# Patient Record
Sex: MALE | Race: WHITE | Employment: OTHER | URBAN - NONMETROPOLITAN AREA
[De-identification: names, ages, dates, MRNs, and addresses within clinical notes are randomized per-mention and may not be internally consistent; named-entity substitution may affect disease eponyms.]

---

## 2017-01-30 ENCOUNTER — TELEPHONE (OUTPATIENT)
Dept: CARDIOLOGY | Age: 72
End: 2017-01-30

## 2017-01-30 ENCOUNTER — OFFICE VISIT (OUTPATIENT)
Dept: CARDIOLOGY | Age: 72
End: 2017-01-30

## 2017-01-30 VITALS
HEIGHT: 65 IN | BODY MASS INDEX: 27.16 KG/M2 | RESPIRATION RATE: 16 BRPM | DIASTOLIC BLOOD PRESSURE: 84 MMHG | WEIGHT: 163 LBS | SYSTOLIC BLOOD PRESSURE: 130 MMHG | HEART RATE: 63 BPM

## 2017-01-30 DIAGNOSIS — I10 ESSENTIAL HYPERTENSION: Primary | ICD-10-CM

## 2017-01-30 DIAGNOSIS — R09.89 BILATERAL CAROTID BRUITS: ICD-10-CM

## 2017-01-30 DIAGNOSIS — E87.5 HYPERKALEMIA: Primary | ICD-10-CM

## 2017-01-30 DIAGNOSIS — R94.31 ABNORMAL ECG: ICD-10-CM

## 2017-01-30 DIAGNOSIS — I25.10 CORONARY ARTERY DISEASE INVOLVING NATIVE CORONARY ARTERY OF NATIVE HEART WITHOUT ANGINA PECTORIS: ICD-10-CM

## 2017-01-30 DIAGNOSIS — E78.5 HYPERLIPIDEMIA, UNSPECIFIED HYPERLIPIDEMIA TYPE: ICD-10-CM

## 2017-01-30 PROCEDURE — G8598 ASA/ANTIPLAT THER USED: HCPCS | Performed by: INTERNAL MEDICINE

## 2017-01-30 PROCEDURE — 4040F PNEUMOC VAC/ADMIN/RCVD: CPT | Performed by: INTERNAL MEDICINE

## 2017-01-30 PROCEDURE — 1123F ACP DISCUSS/DSCN MKR DOCD: CPT | Performed by: INTERNAL MEDICINE

## 2017-01-30 PROCEDURE — 99213 OFFICE O/P EST LOW 20 MIN: CPT | Performed by: INTERNAL MEDICINE

## 2017-01-30 PROCEDURE — G8420 CALC BMI NORM PARAMETERS: HCPCS | Performed by: INTERNAL MEDICINE

## 2017-01-30 PROCEDURE — 1036F TOBACCO NON-USER: CPT | Performed by: INTERNAL MEDICINE

## 2017-01-30 PROCEDURE — G8427 DOCREV CUR MEDS BY ELIG CLIN: HCPCS | Performed by: INTERNAL MEDICINE

## 2017-01-30 PROCEDURE — 3017F COLORECTAL CA SCREEN DOC REV: CPT | Performed by: INTERNAL MEDICINE

## 2017-01-30 PROCEDURE — 93000 ELECTROCARDIOGRAM COMPLETE: CPT | Performed by: INTERNAL MEDICINE

## 2017-01-30 PROCEDURE — G8484 FLU IMMUNIZE NO ADMIN: HCPCS | Performed by: INTERNAL MEDICINE

## 2017-01-30 RX ORDER — SODIUM POLYSTYRENE SULFONATE 15 G/60ML
15 SUSPENSION ORAL; RECTAL ONCE
Qty: 1 BOTTLE | Refills: 0 | Status: SHIPPED | OUTPATIENT
Start: 2017-01-30 | End: 2018-08-13

## 2017-03-17 DIAGNOSIS — E87.5 HYPERKALEMIA: Primary | ICD-10-CM

## 2017-03-20 ENCOUNTER — OFFICE VISIT (OUTPATIENT)
Dept: INTERNAL MEDICINE | Age: 72
End: 2017-03-20
Payer: MEDICARE

## 2017-03-20 VITALS
SYSTOLIC BLOOD PRESSURE: 130 MMHG | WEIGHT: 163.8 LBS | HEART RATE: 68 BPM | DIASTOLIC BLOOD PRESSURE: 80 MMHG | HEIGHT: 66 IN | BODY MASS INDEX: 26.33 KG/M2

## 2017-03-20 DIAGNOSIS — E11.3219: ICD-10-CM

## 2017-03-20 DIAGNOSIS — I34.0 NON-RHEUMATIC MITRAL REGURGITATION: ICD-10-CM

## 2017-03-20 DIAGNOSIS — I10 ESSENTIAL HYPERTENSION: ICD-10-CM

## 2017-03-20 DIAGNOSIS — Z12.11 SCREEN FOR COLON CANCER: ICD-10-CM

## 2017-03-20 DIAGNOSIS — I25.10 CORONARY ARTERY DISEASE INVOLVING NATIVE CORONARY ARTERY OF NATIVE HEART WITHOUT ANGINA PECTORIS: ICD-10-CM

## 2017-03-20 DIAGNOSIS — E87.5 HYPERKALEMIA: ICD-10-CM

## 2017-03-20 DIAGNOSIS — E78.5 DYSLIPIDEMIA: ICD-10-CM

## 2017-03-20 DIAGNOSIS — E11.21 TYPE 2 DIABETES MELLITUS WITH PROTEINURIC DIABETIC NEPHROPATHY (HCC): Primary | ICD-10-CM

## 2017-03-20 DIAGNOSIS — I35.0 NONRHEUMATIC AORTIC VALVE STENOSIS: ICD-10-CM

## 2017-03-20 PROCEDURE — G8484 FLU IMMUNIZE NO ADMIN: HCPCS | Performed by: INTERNAL MEDICINE

## 2017-03-20 PROCEDURE — G8427 DOCREV CUR MEDS BY ELIG CLIN: HCPCS | Performed by: INTERNAL MEDICINE

## 2017-03-20 PROCEDURE — 1123F ACP DISCUSS/DSCN MKR DOCD: CPT | Performed by: INTERNAL MEDICINE

## 2017-03-20 PROCEDURE — 3017F COLORECTAL CA SCREEN DOC REV: CPT | Performed by: INTERNAL MEDICINE

## 2017-03-20 PROCEDURE — 3045F PR MOST RECENT HEMOGLOBIN A1C LEVEL 7.0-9.0%: CPT | Performed by: INTERNAL MEDICINE

## 2017-03-20 PROCEDURE — 4040F PNEUMOC VAC/ADMIN/RCVD: CPT | Performed by: INTERNAL MEDICINE

## 2017-03-20 PROCEDURE — 1036F TOBACCO NON-USER: CPT | Performed by: INTERNAL MEDICINE

## 2017-03-20 PROCEDURE — 99214 OFFICE O/P EST MOD 30 MIN: CPT | Performed by: INTERNAL MEDICINE

## 2017-03-20 PROCEDURE — G8598 ASA/ANTIPLAT THER USED: HCPCS | Performed by: INTERNAL MEDICINE

## 2017-03-20 PROCEDURE — G8420 CALC BMI NORM PARAMETERS: HCPCS | Performed by: INTERNAL MEDICINE

## 2017-03-20 ASSESSMENT — ENCOUNTER SYMPTOMS
BLOOD IN STOOL: 0
BLURRED VISION: 0
NAUSEA: 0
VOMITING: 0
EYE PAIN: 0
COUGH: 0
DOUBLE VISION: 0
WHEEZING: 0
DIARRHEA: 0
SHORTNESS OF BREATH: 0
ABDOMINAL PAIN: 0
CONSTIPATION: 0
EYE DISCHARGE: 0
SORE THROAT: 0

## 2017-07-13 ENCOUNTER — OFFICE VISIT (OUTPATIENT)
Dept: INTERNAL MEDICINE | Age: 72
End: 2017-07-13
Payer: MEDICARE

## 2017-07-13 VITALS
TEMPERATURE: 98.7 F | BODY MASS INDEX: 25.91 KG/M2 | WEIGHT: 161.2 LBS | RESPIRATION RATE: 18 BRPM | HEART RATE: 80 BPM | HEIGHT: 66 IN | SYSTOLIC BLOOD PRESSURE: 130 MMHG | DIASTOLIC BLOOD PRESSURE: 80 MMHG

## 2017-07-13 DIAGNOSIS — E11.21 TYPE 2 DIABETES MELLITUS WITH PROTEINURIC DIABETIC NEPHROPATHY (HCC): Primary | ICD-10-CM

## 2017-07-13 DIAGNOSIS — E78.5 DYSLIPIDEMIA: ICD-10-CM

## 2017-07-13 DIAGNOSIS — I25.10 CORONARY ARTERY DISEASE INVOLVING NATIVE CORONARY ARTERY OF NATIVE HEART WITHOUT ANGINA PECTORIS: ICD-10-CM

## 2017-07-13 DIAGNOSIS — I10 ESSENTIAL HYPERTENSION: ICD-10-CM

## 2017-07-13 DIAGNOSIS — R01.1 HEART MURMUR: ICD-10-CM

## 2017-07-13 PROCEDURE — 3017F COLORECTAL CA SCREEN DOC REV: CPT | Performed by: NURSE PRACTITIONER

## 2017-07-13 PROCEDURE — G8419 CALC BMI OUT NRM PARAM NOF/U: HCPCS | Performed by: NURSE PRACTITIONER

## 2017-07-13 PROCEDURE — G8427 DOCREV CUR MEDS BY ELIG CLIN: HCPCS | Performed by: NURSE PRACTITIONER

## 2017-07-13 PROCEDURE — G8598 ASA/ANTIPLAT THER USED: HCPCS | Performed by: NURSE PRACTITIONER

## 2017-07-13 PROCEDURE — 1123F ACP DISCUSS/DSCN MKR DOCD: CPT | Performed by: NURSE PRACTITIONER

## 2017-07-13 PROCEDURE — 1036F TOBACCO NON-USER: CPT | Performed by: NURSE PRACTITIONER

## 2017-07-13 PROCEDURE — 4040F PNEUMOC VAC/ADMIN/RCVD: CPT | Performed by: NURSE PRACTITIONER

## 2017-07-13 PROCEDURE — 3046F HEMOGLOBIN A1C LEVEL >9.0%: CPT | Performed by: NURSE PRACTITIONER

## 2017-07-13 PROCEDURE — 99214 OFFICE O/P EST MOD 30 MIN: CPT | Performed by: NURSE PRACTITIONER

## 2017-07-14 ASSESSMENT — ENCOUNTER SYMPTOMS
SORE THROAT: 0
TROUBLE SWALLOWING: 0
ABDOMINAL PAIN: 0
DIARRHEA: 0
SINUS PRESSURE: 0
RHINORRHEA: 0
BLOOD IN STOOL: 0
COLOR CHANGE: 0
CONSTIPATION: 0
SHORTNESS OF BREATH: 0
NAUSEA: 0
COUGH: 0
VOMITING: 0
EYE PAIN: 0
CHEST TIGHTNESS: 0

## 2017-07-31 ENCOUNTER — OFFICE VISIT (OUTPATIENT)
Dept: INTERNAL MEDICINE | Age: 72
End: 2017-07-31
Payer: MEDICARE

## 2017-07-31 ENCOUNTER — OFFICE VISIT (OUTPATIENT)
Dept: CARDIOLOGY | Age: 72
End: 2017-07-31
Payer: MEDICARE

## 2017-07-31 VITALS
HEIGHT: 66 IN | DIASTOLIC BLOOD PRESSURE: 76 MMHG | SYSTOLIC BLOOD PRESSURE: 144 MMHG | BODY MASS INDEX: 25.68 KG/M2 | WEIGHT: 159.8 LBS | HEART RATE: 68 BPM

## 2017-07-31 VITALS
HEART RATE: 63 BPM | DIASTOLIC BLOOD PRESSURE: 56 MMHG | HEIGHT: 66 IN | BODY MASS INDEX: 25.65 KG/M2 | SYSTOLIC BLOOD PRESSURE: 146 MMHG | WEIGHT: 159.6 LBS

## 2017-07-31 DIAGNOSIS — Z00.00 ROUTINE GENERAL MEDICAL EXAMINATION AT A HEALTH CARE FACILITY: ICD-10-CM

## 2017-07-31 DIAGNOSIS — E78.5 DYSLIPIDEMIA: ICD-10-CM

## 2017-07-31 DIAGNOSIS — I35.0 NONRHEUMATIC AORTIC VALVE STENOSIS: ICD-10-CM

## 2017-07-31 DIAGNOSIS — I25.10 CORONARY ARTERY DISEASE INVOLVING NATIVE CORONARY ARTERY OF NATIVE HEART WITHOUT ANGINA PECTORIS: Primary | ICD-10-CM

## 2017-07-31 DIAGNOSIS — I25.10 CORONARY ARTERY DISEASE INVOLVING NATIVE CORONARY ARTERY OF NATIVE HEART WITHOUT ANGINA PECTORIS: ICD-10-CM

## 2017-07-31 DIAGNOSIS — I10 ESSENTIAL HYPERTENSION: ICD-10-CM

## 2017-07-31 DIAGNOSIS — E11.21 TYPE 2 DIABETES MELLITUS WITH PROTEINURIC DIABETIC NEPHROPATHY (HCC): Primary | ICD-10-CM

## 2017-07-31 DIAGNOSIS — I34.0 NON-RHEUMATIC MITRAL REGURGITATION: ICD-10-CM

## 2017-07-31 DIAGNOSIS — E78.5 HYPERLIPIDEMIA, UNSPECIFIED HYPERLIPIDEMIA TYPE: ICD-10-CM

## 2017-07-31 PROCEDURE — 99213 OFFICE O/P EST LOW 20 MIN: CPT | Performed by: INTERNAL MEDICINE

## 2017-07-31 PROCEDURE — 3017F COLORECTAL CA SCREEN DOC REV: CPT | Performed by: INTERNAL MEDICINE

## 2017-07-31 PROCEDURE — 93000 ELECTROCARDIOGRAM COMPLETE: CPT | Performed by: INTERNAL MEDICINE

## 2017-07-31 PROCEDURE — 1036F TOBACCO NON-USER: CPT | Performed by: INTERNAL MEDICINE

## 2017-07-31 PROCEDURE — G8419 CALC BMI OUT NRM PARAM NOF/U: HCPCS | Performed by: INTERNAL MEDICINE

## 2017-07-31 PROCEDURE — 3046F HEMOGLOBIN A1C LEVEL >9.0%: CPT | Performed by: INTERNAL MEDICINE

## 2017-07-31 PROCEDURE — 1123F ACP DISCUSS/DSCN MKR DOCD: CPT | Performed by: INTERNAL MEDICINE

## 2017-07-31 PROCEDURE — G8427 DOCREV CUR MEDS BY ELIG CLIN: HCPCS | Performed by: INTERNAL MEDICINE

## 2017-07-31 PROCEDURE — G0439 PPPS, SUBSEQ VISIT: HCPCS | Performed by: INTERNAL MEDICINE

## 2017-07-31 PROCEDURE — G8598 ASA/ANTIPLAT THER USED: HCPCS | Performed by: INTERNAL MEDICINE

## 2017-07-31 PROCEDURE — 4040F PNEUMOC VAC/ADMIN/RCVD: CPT | Performed by: INTERNAL MEDICINE

## 2017-07-31 ASSESSMENT — PATIENT HEALTH QUESTIONNAIRE - PHQ9: SUM OF ALL RESPONSES TO PHQ QUESTIONS 1-9: 0

## 2017-07-31 ASSESSMENT — LIFESTYLE VARIABLES
HOW OFTEN DO YOU HAVE A DRINK CONTAINING ALCOHOL: 2
HOW MANY STANDARD DRINKS CONTAINING ALCOHOL DO YOU HAVE ON A TYPICAL DAY: 0
HOW OFTEN DO YOU HAVE SIX OR MORE DRINKS ON ONE OCCASION: 0
AUDIT-C TOTAL SCORE: 2

## 2017-07-31 ASSESSMENT — ANXIETY QUESTIONNAIRES: GAD7 TOTAL SCORE: 0

## 2017-11-16 ENCOUNTER — HOSPITAL ENCOUNTER (OUTPATIENT)
Dept: LAB | Age: 72
Setting detail: SPECIMEN
Discharge: HOME | End: 2017-11-16
Payer: MEDICARE

## 2017-11-16 DIAGNOSIS — E11.21 TYPE 2 DIABETES MELLITUS WITH PROTEINURIC DIABETIC NEPHROPATHY (HCC): ICD-10-CM

## 2017-11-16 LAB
ESTIMATED AVERAGE GLUCOSE: 189 MG/DL
HBA1C MFR BLD: 8.2 % (ref 4.8–5.9)

## 2017-11-16 PROCEDURE — 83036 HEMOGLOBIN GLYCOSYLATED A1C: CPT

## 2017-11-16 PROCEDURE — 36415 COLL VENOUS BLD VENIPUNCTURE: CPT

## 2017-11-20 ENCOUNTER — OFFICE VISIT (OUTPATIENT)
Dept: INTERNAL MEDICINE | Age: 72
End: 2017-11-20
Payer: MEDICARE

## 2017-11-20 VITALS
BODY MASS INDEX: 25.71 KG/M2 | HEART RATE: 72 BPM | WEIGHT: 160 LBS | DIASTOLIC BLOOD PRESSURE: 76 MMHG | SYSTOLIC BLOOD PRESSURE: 150 MMHG | HEIGHT: 66 IN

## 2017-11-20 DIAGNOSIS — E78.5 DYSLIPIDEMIA: ICD-10-CM

## 2017-11-20 DIAGNOSIS — I34.0 NON-RHEUMATIC MITRAL REGURGITATION: ICD-10-CM

## 2017-11-20 DIAGNOSIS — I25.10 CORONARY ARTERY DISEASE INVOLVING NATIVE CORONARY ARTERY OF NATIVE HEART WITHOUT ANGINA PECTORIS: ICD-10-CM

## 2017-11-20 DIAGNOSIS — I10 ESSENTIAL HYPERTENSION: ICD-10-CM

## 2017-11-20 DIAGNOSIS — Z12.11 SPECIAL SCREENING FOR MALIGNANT NEOPLASMS, COLON: ICD-10-CM

## 2017-11-20 DIAGNOSIS — I35.0 NONRHEUMATIC AORTIC VALVE STENOSIS: ICD-10-CM

## 2017-11-20 PROCEDURE — G8417 CALC BMI ABV UP PARAM F/U: HCPCS | Performed by: INTERNAL MEDICINE

## 2017-11-20 PROCEDURE — 1036F TOBACCO NON-USER: CPT | Performed by: INTERNAL MEDICINE

## 2017-11-20 PROCEDURE — G8484 FLU IMMUNIZE NO ADMIN: HCPCS | Performed by: INTERNAL MEDICINE

## 2017-11-20 PROCEDURE — 3045F PR MOST RECENT HEMOGLOBIN A1C LEVEL 7.0-9.0%: CPT | Performed by: INTERNAL MEDICINE

## 2017-11-20 PROCEDURE — 3017F COLORECTAL CA SCREEN DOC REV: CPT | Performed by: INTERNAL MEDICINE

## 2017-11-20 PROCEDURE — 4040F PNEUMOC VAC/ADMIN/RCVD: CPT | Performed by: INTERNAL MEDICINE

## 2017-11-20 PROCEDURE — G8598 ASA/ANTIPLAT THER USED: HCPCS | Performed by: INTERNAL MEDICINE

## 2017-11-20 PROCEDURE — 1123F ACP DISCUSS/DSCN MKR DOCD: CPT | Performed by: INTERNAL MEDICINE

## 2017-11-20 PROCEDURE — 99214 OFFICE O/P EST MOD 30 MIN: CPT | Performed by: INTERNAL MEDICINE

## 2017-11-20 PROCEDURE — G8427 DOCREV CUR MEDS BY ELIG CLIN: HCPCS | Performed by: INTERNAL MEDICINE

## 2017-11-20 NOTE — PROGRESS NOTES
Maddy Agarwal received counseling on the following healthy behaviors: exercise  Reviewed prior labs and health maintenance  Continue current medications except where noted below, diet and exercise. Discussed use, benefit, and side effects of prescribed medications. Barriers to medication compliance addressed. Patient given educational materials - see patient instructions  Was a self-tracking handout given in paper form or via [employer]? Yes    Requested Prescriptions      No prescriptions requested or ordered in this encounter       All patient questions answered. Patient voiced understanding. Quality Measures    Body mass index is 25.82 kg/m². Normal. Weight control planned discussed: healthy diet and regular exercise. BP: (!) 162/72. Blood pressure is high. Treatment plan consists of: see progress note below. Fall Risk 7/31/2017 7/13/2017 6/27/2016 3/21/2016 6/1/2015 4/28/2014   2 or more falls in past year? no no no no no no   Fall with injury in past year? no no no no no no     The patient does not have a history of falls. I did not - not indicated , complete a risk assessment for falls.  A plan of care for falls No Treatment plan indicated    Lab Results   Component Value Date    LDLCHOLESTEROL 119 03/16/2017    (goal LDL reduction with dx if diabetes is 50% LDL reduction)    PHQ Scores 7/31/2017 6/27/2016 3/21/2016 3/16/2015 1/20/2014   PHQ2 Score 0 0 0 0 0   PHQ9 Score 0 0 0 0 0     Interpretation of Total Score Depression Severity: 1-4 = Minimal depression, 5-9 = Mild depression, 10-14 = Moderate depression, 15-19 = Moderately severe depression, 20-27 = Severe depression

## 2017-11-20 NOTE — PROGRESS NOTES
Chronic Disease Visit Information    BP Readings from Last 3 Encounters:   11/20/17 (!) 162/72   07/31/17 (!) 144/76   07/31/17 (!) 146/56          Hemoglobin A1C (%)   Date Value   11/16/2017 8.2 (H)   07/27/2017 7.3 (H)   03/16/2017 7.5 (H)     Microalb/Crt. Ratio (mcg/mg creat)   Date Value   03/17/2016 1,476 (H)     LDL Cholesterol (mg/dL)   Date Value   03/16/2017 119     HDL (mg/dL)   Date Value   03/16/2017 39 (L)     BUN (mg/dL)   Date Value   07/27/2017 22     CREATININE (mg/dL)   Date Value   07/27/2017 0.96     Glucose (mg/dL)   Date Value   07/27/2017 167 (H)            Have you changed or started any medications since your last visit including any over-the-counter medicines, vitamins, or herbal medicines? no   Are you having any side effects from any of your medications? -  no  Have you stopped taking any of your medications? Is so, why? -  no    Have you seen any other physician or provider since your last visit? No  Have you had any other diagnostic tests since your last visit? Yes - Records Obtained  Have you been seen in the emergency room and/or had an admission to a hospital since we last saw you? No  Have you had your annual diabetic retinal (eye) exam? Yes - Records Obtained  Have you had your routine dental cleaning in the past 6 months? yes -     Have you activated your [employer] account? If not, what are your barriers?  No: declines     Patient Care Team:  Hitesh García MD as PCP - General (Internal Medicine)  Hitesh García MD as PCP - S Attributed Provider         Medical History Review  Past Medical, Family, and Social History reviewed and does contribute to the patient presenting condition    Health Maintenance   Topic Date Due    Colon cancer screen colonoscopy  07/22/1996    DTaP/Tdap/Td vaccine (1 - Tdap) 01/02/2008    AAA screen  08/11/2021 (Originally 1945)    Diabetic retinal exam  12/12/2017    Lipid screen  03/16/2018    Diabetic foot exam  07/31/2018    Diabetic hemoglobin A1C test  11/16/2018    Zostavax vaccine  Completed    Flu vaccine  Completed    Pneumococcal low/med risk  Completed    Hepatitis C screen  Completed

## 2017-11-20 NOTE — PATIENT INSTRUCTIONS
good, quick way to make sure that you have a balanced meal. It also helps you spread carbs throughout the day. ¨ Divide your plate by types of foods. Put non-starchy vegetables on half the plate, meat or other protein food on one-quarter of the plate, and a grain or starchy vegetable in the final quarter of the plate. To this you can add a small piece of fruit and 1 cup of milk or yogurt, depending on how many carbs you are supposed to eat at a meal.  · Try to eat about the same amount of carbs at each meal. Do not \"save up\" your daily allowance of carbs to eat at one meal.  · Proteins have very little or no carbs per serving. Examples of proteins are beef, chicken, turkey, fish, eggs, tofu, cheese, cottage cheese, and peanut butter. A serving size of meat is 3 ounces, which is about the size of a deck of cards. Examples of meat substitute serving sizes (equal to 1 ounce of meat) are 1/4 cup of cottage cheese, 1 egg, 1 tablespoon of peanut butter, and ½ cup of tofu. How can you eat out and still eat healthy? · Learn to estimate the serving sizes of foods that have carbohydrate. If you measure food at home, it will be easier to estimate the amount in a serving of restaurant food. · If the meal you order has too much carbohydrate (such as potatoes, corn, or baked beans), ask to have a low-carbohydrate food instead. Ask for a salad or green vegetables. · If you use insulin, check your blood sugar before and after eating out to help you plan how much to eat in the future. · If you eat more carbohydrate at a meal than you had planned, take a walk or do other exercise. This will help lower your blood sugar. What else should you know? · Limit saturated fat, such as the fat from meat and dairy products. This is a healthy choice because people who have diabetes are at higher risk of heart disease. So choose lean cuts of meat and nonfat or low-fat dairy products.  Use olive or canola oil instead of butter or shortening when cooking. · Don't skip meals. Your blood sugar may drop too low if you skip meals and take insulin or certain medicines for diabetes. · Check with your doctor before you drink alcohol. Alcohol can cause your blood sugar to drop too low. Alcohol can also cause a bad reaction if you take certain diabetes medicines. Follow-up care is a key part of your treatment and safety. Be sure to make and go to all appointments, and call your doctor if you are having problems. It's also a good idea to know your test results and keep a list of the medicines you take. Where can you learn more? Go to [email or web address] and sign in to your [employer] account. Enter L310 in the [employer] box to learn more about \"Learning About Diabetes Food Guidelines. \"     If you do not have an account, please click on the \"Sign Up Now\" link. Current as of: March 13, 2017  Content Version: 11.3  © 6266-5838 [employer]. Care instructions adapted under license by [facility] ([facility]). If you have questions about a medical condition or this instruction, always ask your healthcare professional. [place] [address] any warranty or liability for your use of this information.

## 2017-11-21 NOTE — PROGRESS NOTES
Martita Lam  YOB: 1945    Date of Service:  11/20/2017      HPI:  Rebel Rodriguez was seen in the internal medicine office today for   Chief Complaint   Patient presents with    Diabetes     4mo follow up    Hyperglycemia    Hypertension    Hyperlipidemia      · and continued evaluation and management of chronic medical problems. We addressed the following new, acute or uncontrolled/unstable issues:    1. The diabetes is the biggest concern. Sugars have been a little higher. HA1c is now in more of an unacceptable range at 8.2. We discussed his medicines. He is on the Glucophage and glucotrol. He really would prefer to avoid a third medication. We discussed this for some time. He wants to give this a try with diet and exercise. He has not had polyuria or polydipsia. He has actually been feeling quite good. We addressed the following chronic issues:    · Hypertension  - He is doing well with his antihypertensive meds. No chest pain, dizziness or palpitations. · Dyslipidemia  - He is not having any difficulty with his cholesterol medications. No significant myalgias. · We talked about the mitral regurgitation. We discussed the need for repeat echocardiogram.  I would recommend we go ahead with this. After a lengthy discussion he is okay with going ahead and getting this set up. He is not having symptoms of increased shortness of breath or chest pain. Also has the mild aortic stenosis on echocardiography and again this can be followed up with the echo. · Coronary Artery Disease  - His coronary artery disease has been stable. He has not noted any change in exercise tolerance, chest pain, dyspnea, or palpitation or dizziness. No trouble with his meds. · We discussed colon cancer screening and he agrees to fecal occult blood testing but does not want to do colonoscopy. Review of Systems:  Constitutional:  Negative for chills, fever, and weight loss.   HENT: Negative for congestion, ear pain, and sore throat. Eyes:  Negative for blurred vision, double vision, pain and discharge. Respiratory:  Negative for cough, shortness of breath, and wheezing. Cardiovascular:  Negative for chest pain, palpitations, and PND. Gastrointestinal:  Negative for abdominal pain, blood in stool, constipation, diarrhea, nausea and vomiting. Genitourinary:  Negative for dysuria, frequency, and hematuria. Musculoskeletal:  Negative for myalgias. Skin:  Negative for rash. Neurological:  Negative for tingling, sensory change, speech change, focal weakness, seizures, and headaches. Endo/Heme/Allergies:  Does not bruise/bleed easily. Psychiatric/Behavioral:  Negative for hallucinations and suicidal ideas. 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  60 tablet 3    metFORMIN (GLUCOPHAGE) 1000 MG tablet Take 1 tablet by mouth 2 times daily (with meals) 60 tablet 3    simvastatin (ZOCOR) 20 MG tablet Take 1 tablet by mouth nightly 30 tablet 3    lisinopril (PRINIVIL;ZESTRIL) 20 MG tablet Take 1 tablet by mouth daily 30 tablet 3    glipiZIDE (GLUCOTROL) 5 MG tablet Take 3 tablets by mouth 2 times daily (before meals) 180 tablet 11    amLODIPine (NORVASC) 10 MG tablet Take 10 mg by mouth daily.  aspirin 81 MG EC tablet Take 81 mg by mouth daily.  sodium polystyrene (SPS) 15 GM/60ML suspension Take 60 mLs by mouth once for 1 dose 1 Bottle 0     No current facility-administered medications for this visit. Past medical History:        Diagnosis Date    Acne rosacea     treated with MetroGel    Anemia     BPH (benign prostatic hypertrophy)     CAD (coronary artery disease)     Stent x 3 DEVON 8/16    Carotid stenosis     Fairly minimal plaquing on ultrasound 5/14--in the conclusion described as less than 50% stenosis but in the body of the report described as minimal    Cataract of both eyes     CRI (chronic renal insufficiency)     Diabetes mellitus, type 2 (HCC)     1.) Proteinuria, 24 hour total urine protein 1,420 mg/24 hrs, 09/08, creatinine clearance 114, 09/08 2.) Repeat protein/creatinine ratio 2.97, 02/12 3. )24 hr urine collection 1958 mg/24 hrs, 04/12. 4.) Repeat protein/creatinine ratio 1.99, 08/12 a.) Repeat protein/creatinine ratio 2.14, 07/13 5.) Renal u/s ok 02/12  6) retinopathy at [address] 12/16  early mod. nonprolifferative  macular involved    Dyslipidemia     Erectile dysfunction     H/O agent Orange exposure     per patient in the [employer] along with exposure to cresote and naphtha    Hyperlipidemia     Hypertension     Non-rheumatic mitral regurgitation 6/27/2016    RBBB     Wears dentures     upper       Family Hx and Social Hx    family history includes Coronary Art Dis in his brother and sister; Heart Attack in his father; Hypertension in his mother; Dondra Verde in his mother; Stroke in his mother.   History   Smoking Status    Former Smoker    Quit date: 1/1/1970   Smokeless Tobacco    Never Used     History   Alcohol Use    0.0 oz/week     Comment: a beer a couple times a week       Physical Examination:  Constitutional:  He appears well-developed and

## 2018-02-12 ENCOUNTER — OFFICE VISIT (OUTPATIENT)
Dept: CARDIOLOGY | Age: 73
End: 2018-02-12
Payer: MEDICARE

## 2018-02-12 VITALS
HEIGHT: 66 IN | HEART RATE: 67 BPM | SYSTOLIC BLOOD PRESSURE: 158 MMHG | DIASTOLIC BLOOD PRESSURE: 80 MMHG | WEIGHT: 159 LBS | BODY MASS INDEX: 25.55 KG/M2

## 2018-02-12 DIAGNOSIS — I10 ESSENTIAL HYPERTENSION: ICD-10-CM

## 2018-02-12 DIAGNOSIS — I35.0 AORTIC VALVE STENOSIS, ETIOLOGY OF CARDIAC VALVE DISEASE UNSPECIFIED: ICD-10-CM

## 2018-02-12 DIAGNOSIS — E78.5 HYPERLIPIDEMIA, UNSPECIFIED HYPERLIPIDEMIA TYPE: ICD-10-CM

## 2018-02-12 DIAGNOSIS — R09.89 BILATERAL CAROTID BRUITS: ICD-10-CM

## 2018-02-12 DIAGNOSIS — I34.0 MITRAL VALVE INSUFFICIENCY, UNSPECIFIED ETIOLOGY: ICD-10-CM

## 2018-02-12 DIAGNOSIS — I25.10 CORONARY ARTERY DISEASE INVOLVING NATIVE CORONARY ARTERY OF NATIVE HEART WITHOUT ANGINA PECTORIS: Primary | ICD-10-CM

## 2018-02-12 PROCEDURE — 99213 OFFICE O/P EST LOW 20 MIN: CPT | Performed by: INTERNAL MEDICINE

## 2018-02-12 PROCEDURE — 93000 ELECTROCARDIOGRAM COMPLETE: CPT | Performed by: INTERNAL MEDICINE

## 2018-02-12 NOTE — PROGRESS NOTES
negative. Vitals:    02/12/18 0959   BP: (!) 170/80   Pulse: 67       Vitals as above. Alert and oriented x 3. No JVD, B/l carotid bruit   Lungs are clear to auscultation. Heart sounds are regular, 4/6 systolic murmur. Abdomen is soft, no tenderness. Extremities trace peripheral edema. EKG: SR, PAC, IRBBB, inferior infarction    Meds:    Current Outpatient Prescriptions:     sodium polystyrene (SPS) 15 GM/60ML suspension, Take 60 mLs by mouth once for 1 dose, Disp: 1 Bottle, Rfl: 0    clopidogrel (PLAVIX) 75 MG tablet, Take 1 tablet by mouth daily Take 4 tabs first day then one daily. , Disp: 60 tablet, Rfl: 3    metFORMIN (GLUCOPHAGE) 1000 MG tablet, Take 1 tablet by mouth 2 times daily (with meals), Disp: 60 tablet, Rfl: 3    simvastatin (ZOCOR) 20 MG tablet, Take 1 tablet by mouth nightly, Disp: 30 tablet, Rfl: 3    lisinopril (PRINIVIL;ZESTRIL) 20 MG tablet, Take 1 tablet by mouth daily, Disp: 30 tablet, Rfl: 3    glipiZIDE (GLUCOTROL) 5 MG tablet, Take 3 tablets by mouth 2 times daily (before meals), Disp: 180 tablet, Rfl: 11    amLODIPine (NORVASC) 10 MG tablet, Take 10 mg by mouth daily. , Disp: , Rfl:     aspirin 81 MG EC tablet, Take 81 mg by mouth daily. , Disp: , Rfl:     Recent Labs:  No results for input(s): CHOL, HDL, TRIG in the last 72 hours. Invalid input(s): CHOLHDLR, LDL, LDLCALCU  No results for input(s): NA, K, CL, CO2, BUN, CREATININE in the last 72 hours. Invalid input(s): CA    Assessment and Plan:        -CAD- Nuclear stress test 08/2/16 showed bella-infarct inferolateral ischemia. Cardiac cath 08/23/16 showed 3 vessel CAD. LN normal. LAD mid 70% stenosis and D1 99% stenosis. LCX 30%. OM1 proximal 99% stenosis. RCA 30% stenosis. RPDA distal 100% stenosis where it was a small vessel with faint left to right collaterals. DEVON to mid LAD, D1 and OM1. Continue dual antiplatelets. Patient denies any angina and dyspnea. -TTE 5/2/16: LVEF 53%.  IL wall is thinned and akinetic. Moderate MR and mild aortic stenosis. Repeat TTE to evaluate for progression  -Carotid artery u/s 1/30/17: 42% stenosis right carotid artery. Obtain carotid u/s. -HTN- with white coat HTN. Continue current therapy.   -Obesity - encouraged diet, exercise, and discussed weight loss extensively. -Hyperlipidemia- continue statin. -H/o agent orange exposure.   -RTC 6 months

## 2018-03-12 ENCOUNTER — HOSPITAL ENCOUNTER (OUTPATIENT)
Dept: NON INVASIVE DIAGNOSTICS | Age: 73
Discharge: HOME | End: 2018-03-12
Payer: MEDICARE

## 2018-03-12 DIAGNOSIS — I10 ESSENTIAL HYPERTENSION: ICD-10-CM

## 2018-03-12 DIAGNOSIS — I34.0 MITRAL VALVE INSUFFICIENCY, UNSPECIFIED ETIOLOGY: ICD-10-CM

## 2018-03-12 DIAGNOSIS — I35.0 AORTIC VALVE STENOSIS, ETIOLOGY OF CARDIAC VALVE DISEASE UNSPECIFIED: ICD-10-CM

## 2018-03-12 LAB
LV EF: 50 %
LV EF: 50 %
LVEF MODALITY: NORMAL
LVEF MODALITY: NORMAL

## 2018-03-12 PROCEDURE — 93306 TTE W/DOPPLER COMPLETE: CPT

## 2018-03-14 ENCOUNTER — HOSPITAL ENCOUNTER (OUTPATIENT)
Dept: LAB | Age: 73
Setting detail: SPECIMEN
Discharge: HOME | End: 2018-03-14
Payer: MEDICARE

## 2018-03-14 DIAGNOSIS — E78.5 DYSLIPIDEMIA: ICD-10-CM

## 2018-03-14 DIAGNOSIS — I10 ESSENTIAL HYPERTENSION: ICD-10-CM

## 2018-03-14 LAB
ABSOLUTE EOS #: 0.4 K/UL (ref 0–0.4)
ABSOLUTE IMMATURE GRANULOCYTE: NORMAL K/UL (ref 0–0.3)
ABSOLUTE LYMPH #: 1.7 K/UL (ref 1–4.8)
ABSOLUTE MONO #: 0.7 K/UL (ref 0.1–1.2)
ALT SERPL-CCNC: 15 U/L (ref 5–41)
ANION GAP SERPL CALCULATED.3IONS-SCNC: 10 MMOL/L (ref 9–17)
AST SERPL-CCNC: 16 U/L
BASOPHILS # BLD: 1 % (ref 0–2)
BASOPHILS ABSOLUTE: 0 K/UL (ref 0–0.2)
BUN BLDV-MCNC: 26 MG/DL (ref 8–23)
BUN/CREAT BLD: 26 (ref 9–20)
CALCIUM SERPL-MCNC: 9.4 MG/DL (ref 8.6–10.4)
CHLORIDE BLD-SCNC: 100 MMOL/L (ref 98–107)
CHOLESTEROL/HDL RATIO: 4.9
CHOLESTEROL: 188 MG/DL
CO2: 29 MMOL/L (ref 20–31)
CREAT SERPL-MCNC: 1.01 MG/DL (ref 0.7–1.2)
DIFFERENTIAL TYPE: NORMAL
EOSINOPHILS RELATIVE PERCENT: 4 % (ref 1–8)
ESTIMATED AVERAGE GLUCOSE: 200 MG/DL
GFR AFRICAN AMERICAN: >60 ML/MIN
GFR NON-AFRICAN AMERICAN: >60 ML/MIN
GFR SERPL CREATININE-BSD FRML MDRD: ABNORMAL ML/MIN/{1.73_M2}
GFR SERPL CREATININE-BSD FRML MDRD: ABNORMAL ML/MIN/{1.73_M2}
GLUCOSE BLD-MCNC: 222 MG/DL (ref 70–99)
HBA1C MFR BLD: 8.6 % (ref 4.8–5.9)
HCT VFR BLD CALC: 41.1 % (ref 41–53)
HDLC SERPL-MCNC: 38 MG/DL
HEMOGLOBIN: 13.6 G/DL (ref 13.5–17.5)
IMMATURE GRANULOCYTES: NORMAL %
LDL CHOLESTEROL: 118 MG/DL (ref 0–130)
LYMPHOCYTES # BLD: 21 % (ref 15–43)
MCH RBC QN AUTO: 26.7 PG (ref 26–34)
MCHC RBC AUTO-ENTMCNC: 33.1 G/DL (ref 31–37)
MCV RBC AUTO: 80.6 FL (ref 80–100)
MONOCYTES # BLD: 8 % (ref 6–14)
NRBC AUTOMATED: NORMAL PER 100 WBC
PDW BLD-RTO: 14.2 % (ref 11–14.5)
PLATELET # BLD: 303 K/UL (ref 140–450)
PLATELET ESTIMATE: NORMAL
PMV BLD AUTO: 7.8 FL (ref 6–12)
POTASSIUM SERPL-SCNC: 5.2 MMOL/L (ref 3.7–5.3)
RBC # BLD: 5.11 M/UL (ref 4.5–5.9)
RBC # BLD: NORMAL 10*6/UL
SEG NEUTROPHILS: 66 % (ref 44–74)
SEGMENTED NEUTROPHILS ABSOLUTE COUNT: 5.6 K/UL (ref 1.8–7.7)
SODIUM BLD-SCNC: 139 MMOL/L (ref 135–144)
TOTAL CK: 50 U/L (ref 39–308)
TRIGL SERPL-MCNC: 160 MG/DL
VLDLC SERPL CALC-MCNC: ABNORMAL MG/DL (ref 1–30)
WBC # BLD: 8.4 K/UL (ref 3.5–11)
WBC # BLD: NORMAL 10*3/UL

## 2018-03-14 PROCEDURE — 85025 COMPLETE CBC W/AUTO DIFF WBC: CPT

## 2018-03-14 PROCEDURE — 36415 COLL VENOUS BLD VENIPUNCTURE: CPT

## 2018-03-14 PROCEDURE — 80061 LIPID PANEL: CPT

## 2018-03-14 PROCEDURE — 83036 HEMOGLOBIN GLYCOSYLATED A1C: CPT

## 2018-03-14 PROCEDURE — 82550 ASSAY OF CK (CPK): CPT

## 2018-03-14 PROCEDURE — 80048 BASIC METABOLIC PNL TOTAL CA: CPT

## 2018-03-14 PROCEDURE — 84450 TRANSFERASE (AST) (SGOT): CPT

## 2018-03-14 PROCEDURE — 84460 ALANINE AMINO (ALT) (SGPT): CPT

## 2018-03-19 ENCOUNTER — OFFICE VISIT (OUTPATIENT)
Dept: INTERNAL MEDICINE | Age: 73
End: 2018-03-19
Payer: MEDICARE

## 2018-03-19 VITALS
HEART RATE: 68 BPM | HEIGHT: 66 IN | WEIGHT: 161 LBS | SYSTOLIC BLOOD PRESSURE: 132 MMHG | BODY MASS INDEX: 25.88 KG/M2 | DIASTOLIC BLOOD PRESSURE: 78 MMHG

## 2018-03-19 DIAGNOSIS — I34.0 NON-RHEUMATIC MITRAL REGURGITATION: ICD-10-CM

## 2018-03-19 DIAGNOSIS — E78.5 DYSLIPIDEMIA: ICD-10-CM

## 2018-03-19 DIAGNOSIS — Z12.11 SPECIAL SCREENING FOR MALIGNANT NEOPLASMS, COLON: ICD-10-CM

## 2018-03-19 DIAGNOSIS — I10 ESSENTIAL HYPERTENSION: ICD-10-CM

## 2018-03-19 DIAGNOSIS — I35.0 NONRHEUMATIC AORTIC VALVE STENOSIS: ICD-10-CM

## 2018-03-19 DIAGNOSIS — I25.10 CORONARY ARTERY DISEASE INVOLVING NATIVE CORONARY ARTERY OF NATIVE HEART WITHOUT ANGINA PECTORIS: ICD-10-CM

## 2018-03-19 PROCEDURE — 1036F TOBACCO NON-USER: CPT | Performed by: INTERNAL MEDICINE

## 2018-03-19 PROCEDURE — 3045F PR MOST RECENT HEMOGLOBIN A1C LEVEL 7.0-9.0%: CPT | Performed by: INTERNAL MEDICINE

## 2018-03-19 PROCEDURE — G8417 CALC BMI ABV UP PARAM F/U: HCPCS | Performed by: INTERNAL MEDICINE

## 2018-03-19 PROCEDURE — 1123F ACP DISCUSS/DSCN MKR DOCD: CPT | Performed by: INTERNAL MEDICINE

## 2018-03-19 PROCEDURE — G8482 FLU IMMUNIZE ORDER/ADMIN: HCPCS | Performed by: INTERNAL MEDICINE

## 2018-03-19 PROCEDURE — 3017F COLORECTAL CA SCREEN DOC REV: CPT | Performed by: INTERNAL MEDICINE

## 2018-03-19 PROCEDURE — 99214 OFFICE O/P EST MOD 30 MIN: CPT | Performed by: INTERNAL MEDICINE

## 2018-03-19 PROCEDURE — G8598 ASA/ANTIPLAT THER USED: HCPCS | Performed by: INTERNAL MEDICINE

## 2018-03-19 PROCEDURE — G8427 DOCREV CUR MEDS BY ELIG CLIN: HCPCS | Performed by: INTERNAL MEDICINE

## 2018-03-19 PROCEDURE — 4040F PNEUMOC VAC/ADMIN/RCVD: CPT | Performed by: INTERNAL MEDICINE

## 2018-03-19 NOTE — PROGRESS NOTES
Alma Larkin received counseling on the following healthy behaviors: exercise  Reviewed prior labs and health maintenance  Continue current medications except where noted below, diet and exercise. Discussed use, benefit, and side effects of prescribed medications. Barriers to medication compliance addressed. Patient given educational materials - see patient instructions  Was a self-tracking handout given in paper form or via [employer]? Yes    Requested Prescriptions      No prescriptions requested or ordered in this encounter       All patient questions answered. Patient voiced understanding. Quality Measures    Body mass index is 25.99 kg/m². Normal. Weight control planned discussed: healthy diet and regular exercise. BP: 132/78 (at home this morning). Blood pressure is normal. Treatment plan consists of: see progress note below. Fall Risk 7/31/2017 7/13/2017 6/27/2016 3/21/2016 6/1/2015 4/28/2014   2 or more falls in past year? no no no no no no   Fall with injury in past year? no no no no no no     The patient does not have a history of falls. I did not - not indicated , complete a risk assessment for falls.  A plan of care for falls No Treatment plan indicated    Lab Results   Component Value Date    LDLCHOLESTEROL 118 03/14/2018    (goal LDL reduction with dx if diabetes is 50% LDL reduction)    PHQ Scores 7/31/2017 6/27/2016 3/21/2016 3/16/2015 1/20/2014   PHQ2 Score 0 0 0 0 0   PHQ9 Score 0 0 0 0 0     Interpretation of Total Score Depression Severity: 1-4 = Minimal depression, 5-9 = Mild depression, 10-14 = Moderate depression, 15-19 = Moderately severe depression, 20-27 = Severe depression

## 2018-03-21 NOTE — PROGRESS NOTES
Alondra Box  YOB: 1945    Date of Service:  3/19/2018      HPI:  aCri Bowles was seen in the internal medicine office today for:  Chief Complaint  Patient presents with  · Hyperglycemia. · Diabetes. · Hypertension. · Dyslipidemia. · Coronary artery disease. · Valvular heart disease. · and continued evaluation and management of chronic medical problems. We addressed the following new, acute or uncontrolled/unstable issues:    1. The diabetes is the biggest concern. Unfortunately, the HA1c has drifted up. We reviewed the HA1c at 8.6 which again is unfortunate. It is one of the higher ones he has had. We reviewed the lipids, LFTs, chem-7 and CBC. The rest of the blood work actually looks pretty good. He has not had polyuria or polydipsia. He has not had hypoglycemia. His wife has had quite a bit of health issues and he thinks because he has been in and out of the hospital and has not been taking time with his diet and has not had time to walk as much this explains this and as the weather warms he does not even want to consider a change in medication. There are a number of things that could be done with the medicines and I think at this point it would be indicated but he feels he can make improvement here over the next 90 days. If he does not he would be willing to make a change in the medicine he tells me. We addressed the following chronic issues:    · Hypertension  - He is doing well with his antihypertensive meds. No chest pain, dizziness or palpitations. · Dyslipidemia  - He is not having any difficulty with his cholesterol medications. No significant myalgias. Of note, we could be more aggressive with his lipid regimen but he has had so much trouble with these medicines he is happy just to be on the more modest dose and does not want to manipulate that. · Coronary Artery Disease  - His coronary artery disease has been stable.   He has not noted any change Medication Sig Dispense Refill    clopidogrel (PLAVIX) 75 MG tablet Take 1 tablet by mouth daily Take 4 tabs first day then one daily. 60 tablet 3    metFORMIN (GLUCOPHAGE) 1000 MG tablet Take 1 tablet by mouth 2 times daily (with meals) 60 tablet 3    simvastatin (ZOCOR) 20 MG tablet Take 1 tablet by mouth nightly 30 tablet 3    lisinopril (PRINIVIL;ZESTRIL) 20 MG tablet Take 1 tablet by mouth daily 30 tablet 3    glipiZIDE (GLUCOTROL) 5 MG tablet Take 3 tablets by mouth 2 times daily (before meals) 180 tablet 11    amLODIPine (NORVASC) 10 MG tablet Take 10 mg by mouth daily.  aspirin 81 MG EC tablet Take 81 mg by mouth daily.  sodium polystyrene (SPS) 15 GM/60ML suspension Take 60 mLs by mouth once for 1 dose 1 Bottle 0     No current facility-administered medications for this visit. Past Medical History:        Diagnosis Date    Acne rosacea     treated with MetroGel    Anemia     Aortic stenosis     echo 3/18 Mod AS, Mild MR    BPH (benign prostatic hypertrophy)     CAD (coronary artery disease)     Stent x 3 DEVON 8/16    Carotid stenosis     Fairly minimal plaquing on ultrasound 5/14--in the conclusion described as less than 50% stenosis but in the body of the report described as minimal    Cataract of both eyes     CRI (chronic renal insufficiency)     Diabetes mellitus, type 2 (HCC)     1.) Proteinuria, 24 hour total urine protein 1,420 mg/24 hrs, 09/08, creatinine clearance 114, 09/08 2.) Repeat protein/creatinine ratio 2.97, 02/12 3. )24 hr urine collection 1958 mg/24 hrs, 04/12. 4.) Repeat protein/creatinine ratio 1.99, 08/12 a.) Repeat protein/creatinine ratio 2.14, 07/13 5.) Renal u/s ok 02/12  6) retinopathy at [address] 12/16  early mod. nonprolifferative  macular involved    Dyslipidemia     Erectile dysfunction     H/O agent Orange exposure     per patient in the [employer] along with exposure to cresote and naphtha    Hyperlipidemia     Hypertension     and no guarding. Musculoskeletal:  Normal range of motion. He exhibits no edema or tenderness. Lymphadenopathy:    He has no cervical adenopathy. Neurological:  He is alert. He has normal strength. No cranial nerve deficit or sensory deficit. He exhibits normal muscle tone. Skin:  Skin is warm and dry. No rash noted. He is not diaphoretic. No pallor. Psychiatric:  He has a normal mood and affect. His behavior is normal.  Judgment normal.       Lab Results   Component Value Date    K 5.2 03/14/2018    CREATININE 1.01 03/14/2018    AST 16 03/14/2018    ALT 15 03/14/2018    HCT 41.1 03/14/2018    LABA1C 8.6 03/14/2018    MICROALBUR 533 03/17/2016    GLUCOSE 222 03/14/2018    CALCIUM 9.4 03/14/2018      Lab Results   Component Value Date    CHOL 188 03/14/2018    TRIG 160 03/14/2018    HDL 38 03/14/2018    LDLCHOLESTEROL 118 03/14/2018       Labs reviewed and discussed with the patient? Yes    Assessment/Plan:    1. Uncontrolled type 2 diabetes mellitus with nephropathy (HCC)  Continue Glucophage. Continue Glucotrol. He would be a reasonable candidate for Jardiance. He does not want to consider another medicine. He is going to work on diet and exercise. He feels he has quite a bit of room to improve here and then we will plan on seeing him back in 90 days with a repeat HA1c. They will let me know if he has any problems.  - Hemoglobin A1C; Future    2. Nonrheumatic aortic valve stenosis  3. Non-rheumatic mitral regurgitation  We reviewed the echocardiography from March 2018 and looks reasonably stable. We will continue to monitor. 4. Essential hypertension  Stable. Continue to monitor. No change to his regimen today. He has a list of readings that look good from home and we are going to continue to monitor this. 5. Dyslipidemia  Doing well on his current regimen. Again, he does not want to try to be more aggressive with his dosing.     6. Special screening for malignant neoplasms, colon  He

## 2018-04-02 ENCOUNTER — TELEPHONE (OUTPATIENT)
Dept: CARDIOLOGY | Age: 73
End: 2018-04-02

## 2018-05-29 ENCOUNTER — TELEPHONE (OUTPATIENT)
Dept: CARDIOLOGY | Age: 73
End: 2018-05-29

## 2018-06-21 ENCOUNTER — HOSPITAL ENCOUNTER (OUTPATIENT)
Dept: LAB | Age: 73
Setting detail: SPECIMEN
Discharge: HOME | End: 2018-06-21
Payer: MEDICARE

## 2018-06-21 LAB
ESTIMATED AVERAGE GLUCOSE: 171 MG/DL
HBA1C MFR BLD: 7.6 % (ref 4.8–5.9)

## 2018-06-21 PROCEDURE — 36415 COLL VENOUS BLD VENIPUNCTURE: CPT

## 2018-06-21 PROCEDURE — 83036 HEMOGLOBIN GLYCOSYLATED A1C: CPT

## 2018-06-25 ENCOUNTER — OFFICE VISIT (OUTPATIENT)
Dept: INTERNAL MEDICINE | Age: 73
End: 2018-06-25
Payer: MEDICARE

## 2018-06-25 VITALS
BODY MASS INDEX: 25.39 KG/M2 | SYSTOLIC BLOOD PRESSURE: 136 MMHG | HEART RATE: 76 BPM | WEIGHT: 158 LBS | DIASTOLIC BLOOD PRESSURE: 70 MMHG | HEIGHT: 66 IN

## 2018-06-25 DIAGNOSIS — I25.10 CORONARY ARTERY DISEASE INVOLVING NATIVE CORONARY ARTERY OF NATIVE HEART WITHOUT ANGINA PECTORIS: ICD-10-CM

## 2018-06-25 DIAGNOSIS — E11.21 TYPE 2 DIABETES MELLITUS WITH PROTEINURIC DIABETIC NEPHROPATHY (HCC): Primary | ICD-10-CM

## 2018-06-25 DIAGNOSIS — I10 ESSENTIAL HYPERTENSION: ICD-10-CM

## 2018-06-25 DIAGNOSIS — I35.0 NONRHEUMATIC AORTIC VALVE STENOSIS: ICD-10-CM

## 2018-06-25 DIAGNOSIS — I34.0 NON-RHEUMATIC MITRAL REGURGITATION: ICD-10-CM

## 2018-06-25 DIAGNOSIS — I65.29 STENOSIS OF CAROTID ARTERY, UNSPECIFIED LATERALITY: ICD-10-CM

## 2018-06-25 DIAGNOSIS — E78.5 DYSLIPIDEMIA: ICD-10-CM

## 2018-06-25 PROCEDURE — 99214 OFFICE O/P EST MOD 30 MIN: CPT | Performed by: INTERNAL MEDICINE

## 2018-06-25 PROCEDURE — G8427 DOCREV CUR MEDS BY ELIG CLIN: HCPCS | Performed by: INTERNAL MEDICINE

## 2018-06-25 PROCEDURE — 3045F PR MOST RECENT HEMOGLOBIN A1C LEVEL 7.0-9.0%: CPT | Performed by: INTERNAL MEDICINE

## 2018-06-25 PROCEDURE — 4040F PNEUMOC VAC/ADMIN/RCVD: CPT | Performed by: INTERNAL MEDICINE

## 2018-06-25 PROCEDURE — G8417 CALC BMI ABV UP PARAM F/U: HCPCS | Performed by: INTERNAL MEDICINE

## 2018-06-25 PROCEDURE — 1036F TOBACCO NON-USER: CPT | Performed by: INTERNAL MEDICINE

## 2018-06-25 PROCEDURE — 1123F ACP DISCUSS/DSCN MKR DOCD: CPT | Performed by: INTERNAL MEDICINE

## 2018-06-25 PROCEDURE — 3017F COLORECTAL CA SCREEN DOC REV: CPT | Performed by: INTERNAL MEDICINE

## 2018-06-25 PROCEDURE — G8598 ASA/ANTIPLAT THER USED: HCPCS | Performed by: INTERNAL MEDICINE

## 2018-06-25 PROCEDURE — 2022F DILAT RTA XM EVC RTNOPTHY: CPT | Performed by: INTERNAL MEDICINE

## 2018-08-13 ENCOUNTER — OFFICE VISIT (OUTPATIENT)
Dept: CARDIOLOGY | Age: 73
End: 2018-08-13
Payer: MEDICARE

## 2018-08-13 VITALS
WEIGHT: 159 LBS | HEIGHT: 66 IN | SYSTOLIC BLOOD PRESSURE: 138 MMHG | BODY MASS INDEX: 25.55 KG/M2 | HEART RATE: 76 BPM | DIASTOLIC BLOOD PRESSURE: 68 MMHG

## 2018-08-13 DIAGNOSIS — E78.5 HYPERLIPIDEMIA, UNSPECIFIED HYPERLIPIDEMIA TYPE: ICD-10-CM

## 2018-08-13 DIAGNOSIS — I10 ESSENTIAL HYPERTENSION: Primary | ICD-10-CM

## 2018-08-13 DIAGNOSIS — I35.0 MODERATE AORTIC STENOSIS: ICD-10-CM

## 2018-08-13 DIAGNOSIS — I25.10 CORONARY ARTERY DISEASE INVOLVING NATIVE CORONARY ARTERY OF NATIVE HEART WITHOUT ANGINA PECTORIS: ICD-10-CM

## 2018-08-13 PROCEDURE — 93000 ELECTROCARDIOGRAM COMPLETE: CPT | Performed by: INTERNAL MEDICINE

## 2018-08-13 PROCEDURE — 99213 OFFICE O/P EST LOW 20 MIN: CPT | Performed by: INTERNAL MEDICINE

## 2018-08-13 NOTE — PROGRESS NOTES
regurgitation. Estimated right ventricular systolic pressure is 36 mmHg. No significant pericardial effusion is seen. Assessment and Plan:    -CAD- Patient is asymptomatic at present. He has h/o nuclear stress test 08/2/16 showed bella-infarct inferolateral ischemia. Cardiac cath 08/23/16 showed 3 vessel CAD. LN normal. LAD mid 70% stenosis and D1 99% stenosis. LCX 30%. OM1 proximal 99% stenosis. RCA 30% stenosis. RPDA distal 100% stenosis where it was a small vessel with faint left to right collaterals. DEVON to mid LAD, D1 and OM1. Continue dual antiplatelets. -TTE 3/12/18 LVEF 53%. IL wall is thinned and akinetic. Moderate aortic stenosis. Repeat TTE in 6 months  -Carotid artery u/s 1/30/17: 42% stenosis right carotid artery. Obtain carotid u/s. -HTN- with white coat HTN. Continue current therapy.   -Obesity - encouraged diet, exercise, and discussed weight loss extensively. -Hyperlipidemia- Last . Patient is on low dose simvastatin which he is able to tolerate. Any increase in dose or switch has caused him severe myalgia. I recommended him addition of zetia. He does not want to take this new medication. -H/o agent orange exposure.   -RTC 6 months

## 2018-09-17 ENCOUNTER — HOSPITAL ENCOUNTER (OUTPATIENT)
Dept: INTERVENTIONAL RADIOLOGY/VASCULAR | Age: 73
Discharge: HOME | End: 2018-09-19
Payer: MEDICARE

## 2018-09-17 DIAGNOSIS — R09.89 BILATERAL CAROTID BRUITS: ICD-10-CM

## 2018-09-17 PROCEDURE — 93880 EXTRACRANIAL BILAT STUDY: CPT

## 2018-10-04 ENCOUNTER — HOSPITAL ENCOUNTER (OUTPATIENT)
Dept: LAB | Age: 73
Setting detail: SPECIMEN
Discharge: HOME | End: 2018-10-04
Payer: MEDICARE

## 2018-10-04 DIAGNOSIS — E11.21 TYPE 2 DIABETES MELLITUS WITH PROTEINURIC DIABETIC NEPHROPATHY (HCC): ICD-10-CM

## 2018-10-04 LAB
ESTIMATED AVERAGE GLUCOSE: 186 MG/DL
HBA1C MFR BLD: 8.1 % (ref 4.8–5.9)

## 2018-10-04 PROCEDURE — 83036 HEMOGLOBIN GLYCOSYLATED A1C: CPT

## 2018-10-04 PROCEDURE — 36415 COLL VENOUS BLD VENIPUNCTURE: CPT

## 2018-10-08 ENCOUNTER — OFFICE VISIT (OUTPATIENT)
Dept: INTERNAL MEDICINE | Age: 73
End: 2018-10-08
Payer: MEDICARE

## 2018-10-08 VITALS
HEIGHT: 66 IN | SYSTOLIC BLOOD PRESSURE: 144 MMHG | WEIGHT: 158 LBS | DIASTOLIC BLOOD PRESSURE: 74 MMHG | HEART RATE: 76 BPM | BODY MASS INDEX: 25.39 KG/M2

## 2018-10-08 DIAGNOSIS — I35.0 NONRHEUMATIC AORTIC VALVE STENOSIS: ICD-10-CM

## 2018-10-08 DIAGNOSIS — Z00.00 ROUTINE GENERAL MEDICAL EXAMINATION AT A HEALTH CARE FACILITY: Primary | ICD-10-CM

## 2018-10-08 DIAGNOSIS — E78.5 DYSLIPIDEMIA: ICD-10-CM

## 2018-10-08 DIAGNOSIS — I25.10 CORONARY ARTERY DISEASE INVOLVING NATIVE CORONARY ARTERY OF NATIVE HEART WITHOUT ANGINA PECTORIS: ICD-10-CM

## 2018-10-08 DIAGNOSIS — I65.29 STENOSIS OF CAROTID ARTERY, UNSPECIFIED LATERALITY: ICD-10-CM

## 2018-10-08 DIAGNOSIS — I10 ESSENTIAL HYPERTENSION: ICD-10-CM

## 2018-10-08 DIAGNOSIS — Z13.6 SCREENING FOR CARDIOVASCULAR CONDITION: ICD-10-CM

## 2018-10-08 PROCEDURE — G0439 PPPS, SUBSEQ VISIT: HCPCS | Performed by: INTERNAL MEDICINE

## 2018-10-08 PROCEDURE — 4040F PNEUMOC VAC/ADMIN/RCVD: CPT | Performed by: INTERNAL MEDICINE

## 2018-10-08 PROCEDURE — 1036F TOBACCO NON-USER: CPT | Performed by: INTERNAL MEDICINE

## 2018-10-08 PROCEDURE — 3017F COLORECTAL CA SCREEN DOC REV: CPT | Performed by: INTERNAL MEDICINE

## 2018-10-08 PROCEDURE — 99214 OFFICE O/P EST MOD 30 MIN: CPT | Performed by: INTERNAL MEDICINE

## 2018-10-08 PROCEDURE — G0446 INTENS BEHAVE THER CARDIO DX: HCPCS | Performed by: INTERNAL MEDICINE

## 2018-10-08 PROCEDURE — 3045F PR MOST RECENT HEMOGLOBIN A1C LEVEL 7.0-9.0%: CPT | Performed by: INTERNAL MEDICINE

## 2018-10-08 PROCEDURE — 2022F DILAT RTA XM EVC RTNOPTHY: CPT | Performed by: INTERNAL MEDICINE

## 2018-10-08 PROCEDURE — G8598 ASA/ANTIPLAT THER USED: HCPCS | Performed by: INTERNAL MEDICINE

## 2018-10-08 PROCEDURE — G8427 DOCREV CUR MEDS BY ELIG CLIN: HCPCS | Performed by: INTERNAL MEDICINE

## 2018-10-08 PROCEDURE — 1123F ACP DISCUSS/DSCN MKR DOCD: CPT | Performed by: INTERNAL MEDICINE

## 2018-10-08 PROCEDURE — G8482 FLU IMMUNIZE ORDER/ADMIN: HCPCS | Performed by: INTERNAL MEDICINE

## 2018-10-08 PROCEDURE — 1101F PT FALLS ASSESS-DOCD LE1/YR: CPT | Performed by: INTERNAL MEDICINE

## 2018-10-08 PROCEDURE — G8417 CALC BMI ABV UP PARAM F/U: HCPCS | Performed by: INTERNAL MEDICINE

## 2018-10-08 ASSESSMENT — LIFESTYLE VARIABLES
HOW MANY STANDARD DRINKS CONTAINING ALCOHOL DO YOU HAVE ON A TYPICAL DAY: 0
AUDIT-C TOTAL SCORE: 2
HOW OFTEN DO YOU HAVE A DRINK CONTAINING ALCOHOL: 2
HOW OFTEN DO YOU HAVE SIX OR MORE DRINKS ON ONE OCCASION: 0

## 2018-10-08 ASSESSMENT — PATIENT HEALTH QUESTIONNAIRE - PHQ9
SUM OF ALL RESPONSES TO PHQ QUESTIONS 1-9: 0
SUM OF ALL RESPONSES TO PHQ QUESTIONS 1-9: 0

## 2018-10-08 ASSESSMENT — ANXIETY QUESTIONNAIRES: GAD7 TOTAL SCORE: 0

## 2018-10-08 NOTE — PATIENT INSTRUCTIONS
Personalized Preventive Plan for Promise Hightower - 10/8/2018  Medicare offers a range of preventive health benefits. Some of the tests and screenings are paid in full while other may be subject to a deductible, co-insurance, and/or copay. Some of these benefits include a comprehensive review of your medical history including lifestyle, illnesses that may run in your family, and various assessments and screenings as appropriate. After reviewing your medical record and screening and assessments performed today your provider may have ordered immunizations, labs, imaging, and/or referrals for you. A list of these orders (if applicable) as well as your Preventive Care list are included within your After Visit Summary for your review. Other Preventive Recommendations:    · A preventive eye exam performed by an eye specialist is recommended every 1-2 years to screen for glaucoma; cataracts, macular degeneration, and other eye disorders. · A preventive dental visit is recommended every 6 months. · Try to get at least 150 minutes of exercise per week or 10,000 steps per day on a pedometer . · Order or download the FREE \"Exercise & Physical Activity: Your Everyday Guide\" from [employer]. Call 7-331.726.4525 or search [employer] online. · You need 4197-8414 mg of calcium and 1111-7858 IU of vitamin D per day. It is possible to meet your calcium requirement with diet alone, but a vitamin D supplement is usually necessary to meet this goal.  · When exposed to the sun, use a sunscreen that protects against both UVA and UVB radiation with an SPF of 30 or greater. Reapply every 2 to 3 hours or after sweating, drying off with a towel, or swimming. · Always wear a seat belt when traveling in a car. Always wear a helmet when riding a bicycle or motorcycle.

## 2018-10-08 NOTE — PROGRESS NOTES
Medicare Annual Wellness Visit  Name: Luh Martinez Date: 10/8/2018   MRN: [id number] Sex: Male   Age: 68 y.o. Ethnicity: Non-/Non    : 1945 Race: Дмитрий Juan is here for Medicare AWV; Diabetes (3mo follow up); Hyperlipidemia; and Hypertension    Screenings for behavioral, psychosocial and functional/safety risks, and cognitive dysfunction are all negative except as indicated below. These results, as well as other patient data from the [address] form, are documented in Flowsheets linked to this Encounter. Allergies   Allergen Reactions    Sulfa Antibiotics Swelling     Swelling of retinas     Prior to Visit Medications    Medication Sig Taking? Authorizing Provider   vitamin D (CHOLECALCIFEROL) 1000 UNIT TABS tablet Take 1,000 Units by mouth daily Yes Historical Provider, MD   clopidogrel (PLAVIX) 75 MG tablet Take 1 tablet by mouth daily Take 4 tabs first day then one daily. Yes Trinh Satrr MD   metFORMIN (GLUCOPHAGE) 1000 MG tablet Take 1 tablet by mouth 2 times daily (with meals) Yes Karen Avalos APRN - CNP   simvastatin (ZOCOR) 20 MG tablet Take 1 tablet by mouth nightly  Patient taking differently: Take 10 mg by mouth nightly  Yes Karen Avalos APRN - CADENCE   lisinopril (PRINIVIL;ZESTRIL) 20 MG tablet Take 1 tablet by mouth daily Yes Karen Avalos APRN - CADENCE   glipiZIDE (GLUCOTROL) 5 MG tablet Take 3 tablets by mouth 2 times daily (before meals) Yes Emi Batista MD   amLODIPine (NORVASC) 10 MG tablet Take 10 mg by mouth daily. Yes Historical Provider, MD   aspirin 81 MG EC tablet Take 81 mg by mouth daily.  Yes Historical Provider, MD     Past Medical History:   Diagnosis Date    Acne rosacea     treated with MetroGel    Anemia     Aortic stenosis     echo 3/18 Mod AS, Mild MR    BPH (benign prostatic hypertrophy)     CAD (coronary artery disease)     Stent x 3 DEVON     Carotid stenosis     Fairly minimal plaquing on ultrasound 5/14--in the conclusion described as less than 50% stenosis but in the body of the report described as minimal    Cataract of both eyes     CRI (chronic renal insufficiency)     Diabetes mellitus, type 2 (HCC)     1.) Proteinuria, 24 hour total urine protein 1,420 mg/24 hrs, 09/08, creatinine clearance 114, 09/08 2.) Repeat protein/creatinine ratio 2.97, 02/12 3. )24 hr urine collection 1958 mg/24 hrs, 04/12. 4.) Repeat protein/creatinine ratio 1.99, 08/12 a.) Repeat protein/creatinine ratio 2.14, 07/13 5.) Renal u/s ok 02/12  6) retinopathy at [address] 12/16  early mod. nonprolifferative  macular involved    Dyslipidemia     Erectile dysfunction     H/O agent Orange exposure     per patient in the [employer] along with exposure to cresote and naphtha    Hyperlipidemia     Hypertension     Non-rheumatic mitral regurgitation 6/27/2016    RBBB     Wears dentures     upper     Past Surgical History:   Procedure Laterality Date    CATARACT REMOVAL Bilateral     CIRCUMCISION  age 49   [facility] EYE SURGERY Bilateral     cataract    TYMPANOSTOMY TUBE PLACEMENT      VASECTOMY       Family History   Problem Relation Age of Onset    Heart Attack Father     Coronary Art Dis Sister     Hypertension Mother     Lung Cancer Mother     Stroke Mother     Coronary Art Dis Brother         later in life       CareTeam (Including outside providers/suppliers regularly involved in providing care):   Patient Care Team:  Josef Juarez MD as PCP - General (Internal Medicine)  Josef Juarez MD as PCP - [facility] Attributed Provider    Wt Readings from Last 3 Encounters:   10/08/18 158 lb (71.7 kg)   08/13/18 159 lb (72.1 kg)   06/25/18 158 lb (71.7 kg)     Vitals:    10/08/18 0806   BP: (!) 166/74   Site: Left Upper Arm   Position: Sitting   Cuff Size: Large Adult   Pulse: 76   Weight: 158 lb (71.7 kg)   Height: 5' 6\" (1.676 m)     Body mass index is 25.5 kg/m².         Patient's complete Health Risk Assessment and

## 2018-10-25 ENCOUNTER — OFFICE VISIT (OUTPATIENT)
Dept: VASCULAR SURGERY | Age: 73
End: 2018-10-25
Payer: MEDICARE

## 2018-10-25 VITALS
WEIGHT: 160.6 LBS | SYSTOLIC BLOOD PRESSURE: 142 MMHG | BODY MASS INDEX: 25.81 KG/M2 | OXYGEN SATURATION: 99 % | DIASTOLIC BLOOD PRESSURE: 78 MMHG | HEART RATE: 73 BPM | HEIGHT: 66 IN

## 2018-10-25 DIAGNOSIS — I65.29 STENOSIS OF CAROTID ARTERY, UNSPECIFIED LATERALITY: Primary | ICD-10-CM

## 2018-10-25 DIAGNOSIS — I65.23 BILATERAL CAROTID ARTERY STENOSIS: ICD-10-CM

## 2018-10-25 PROCEDURE — 4040F PNEUMOC VAC/ADMIN/RCVD: CPT | Performed by: SURGERY

## 2018-10-25 PROCEDURE — G8598 ASA/ANTIPLAT THER USED: HCPCS | Performed by: SURGERY

## 2018-10-25 PROCEDURE — G8427 DOCREV CUR MEDS BY ELIG CLIN: HCPCS | Performed by: SURGERY

## 2018-10-25 PROCEDURE — 99203 OFFICE O/P NEW LOW 30 MIN: CPT | Performed by: SURGERY

## 2018-10-25 PROCEDURE — 1123F ACP DISCUSS/DSCN MKR DOCD: CPT | Performed by: SURGERY

## 2018-10-25 PROCEDURE — G8417 CALC BMI ABV UP PARAM F/U: HCPCS | Performed by: SURGERY

## 2018-10-25 PROCEDURE — G8482 FLU IMMUNIZE ORDER/ADMIN: HCPCS | Performed by: SURGERY

## 2018-10-25 PROCEDURE — 1101F PT FALLS ASSESS-DOCD LE1/YR: CPT | Performed by: SURGERY

## 2018-10-25 PROCEDURE — 3017F COLORECTAL CA SCREEN DOC REV: CPT | Performed by: SURGERY

## 2018-10-25 PROCEDURE — 1036F TOBACCO NON-USER: CPT | Performed by: SURGERY

## 2019-01-17 ENCOUNTER — HOSPITAL ENCOUNTER (OUTPATIENT)
Dept: LAB | Age: 74
Discharge: HOME | End: 2019-01-17
Payer: MEDICARE

## 2019-01-17 DIAGNOSIS — E78.5 DYSLIPIDEMIA: ICD-10-CM

## 2019-01-17 LAB
ALT SERPL-CCNC: 16 U/L (ref 5–41)
AST SERPL-CCNC: 21 U/L
CHOLESTEROL/HDL RATIO: 4.5
CHOLESTEROL: 184 MG/DL
CREATININE URINE: 35 MG/DL (ref 39–259)
ESTIMATED AVERAGE GLUCOSE: 177 MG/DL
HBA1C MFR BLD: 7.8 % (ref 4.8–5.9)
HDLC SERPL-MCNC: 41 MG/DL
LDL CHOLESTEROL: 115 MG/DL (ref 0–130)
TOTAL CK: 92 U/L (ref 39–308)
TOTAL PROTEIN, URINE: 112 MG/DL
TRIGL SERPL-MCNC: 139 MG/DL
VLDLC SERPL CALC-MCNC: NORMAL MG/DL (ref 1–30)

## 2019-01-17 PROCEDURE — 84156 ASSAY OF PROTEIN URINE: CPT

## 2019-01-17 PROCEDURE — 84450 TRANSFERASE (AST) (SGOT): CPT

## 2019-01-17 PROCEDURE — 82550 ASSAY OF CK (CPK): CPT

## 2019-01-17 PROCEDURE — 84460 ALANINE AMINO (ALT) (SGPT): CPT

## 2019-01-17 PROCEDURE — 80061 LIPID PANEL: CPT

## 2019-01-17 PROCEDURE — 82570 ASSAY OF URINE CREATININE: CPT

## 2019-01-17 PROCEDURE — 83036 HEMOGLOBIN GLYCOSYLATED A1C: CPT

## 2019-01-17 PROCEDURE — 36415 COLL VENOUS BLD VENIPUNCTURE: CPT

## 2019-01-21 ENCOUNTER — OFFICE VISIT (OUTPATIENT)
Dept: INTERNAL MEDICINE | Age: 74
End: 2019-01-21
Payer: MEDICARE

## 2019-01-21 VITALS
DIASTOLIC BLOOD PRESSURE: 70 MMHG | WEIGHT: 161 LBS | BODY MASS INDEX: 25.88 KG/M2 | HEART RATE: 64 BPM | SYSTOLIC BLOOD PRESSURE: 182 MMHG | HEIGHT: 66 IN

## 2019-01-21 DIAGNOSIS — I34.0 NON-RHEUMATIC MITRAL REGURGITATION: ICD-10-CM

## 2019-01-21 DIAGNOSIS — I10 ESSENTIAL HYPERTENSION: ICD-10-CM

## 2019-01-21 DIAGNOSIS — E11.21 TYPE 2 DIABETES MELLITUS WITH PROTEINURIC DIABETIC NEPHROPATHY (HCC): Primary | ICD-10-CM

## 2019-01-21 DIAGNOSIS — I25.10 CORONARY ARTERY DISEASE INVOLVING NATIVE CORONARY ARTERY OF NATIVE HEART WITHOUT ANGINA PECTORIS: ICD-10-CM

## 2019-01-21 DIAGNOSIS — I35.0 NONRHEUMATIC AORTIC VALVE STENOSIS: ICD-10-CM

## 2019-01-21 DIAGNOSIS — I65.29 STENOSIS OF CAROTID ARTERY, UNSPECIFIED LATERALITY: ICD-10-CM

## 2019-01-21 DIAGNOSIS — E78.5 DYSLIPIDEMIA: ICD-10-CM

## 2019-01-21 PROCEDURE — 1036F TOBACCO NON-USER: CPT | Performed by: INTERNAL MEDICINE

## 2019-01-21 PROCEDURE — 99214 OFFICE O/P EST MOD 30 MIN: CPT | Performed by: INTERNAL MEDICINE

## 2019-01-21 PROCEDURE — G8417 CALC BMI ABV UP PARAM F/U: HCPCS | Performed by: INTERNAL MEDICINE

## 2019-01-21 PROCEDURE — G8482 FLU IMMUNIZE ORDER/ADMIN: HCPCS | Performed by: INTERNAL MEDICINE

## 2019-01-21 PROCEDURE — 3017F COLORECTAL CA SCREEN DOC REV: CPT | Performed by: INTERNAL MEDICINE

## 2019-01-21 PROCEDURE — 2022F DILAT RTA XM EVC RTNOPTHY: CPT | Performed by: INTERNAL MEDICINE

## 2019-01-21 PROCEDURE — 1101F PT FALLS ASSESS-DOCD LE1/YR: CPT | Performed by: INTERNAL MEDICINE

## 2019-01-21 PROCEDURE — G8598 ASA/ANTIPLAT THER USED: HCPCS | Performed by: INTERNAL MEDICINE

## 2019-01-21 PROCEDURE — 1123F ACP DISCUSS/DSCN MKR DOCD: CPT | Performed by: INTERNAL MEDICINE

## 2019-01-21 PROCEDURE — 4040F PNEUMOC VAC/ADMIN/RCVD: CPT | Performed by: INTERNAL MEDICINE

## 2019-01-21 PROCEDURE — G8427 DOCREV CUR MEDS BY ELIG CLIN: HCPCS | Performed by: INTERNAL MEDICINE

## 2019-01-21 PROCEDURE — 3045F PR MOST RECENT HEMOGLOBIN A1C LEVEL 7.0-9.0%: CPT | Performed by: INTERNAL MEDICINE

## 2019-01-21 ASSESSMENT — PATIENT HEALTH QUESTIONNAIRE - PHQ9
SUM OF ALL RESPONSES TO PHQ QUESTIONS 1-9: 0
2. FEELING DOWN, DEPRESSED OR HOPELESS: 0
SUM OF ALL RESPONSES TO PHQ QUESTIONS 1-9: 0
1. LITTLE INTEREST OR PLEASURE IN DOING THINGS: 0
SUM OF ALL RESPONSES TO PHQ9 QUESTIONS 1 & 2: 0

## 2019-02-04 ENCOUNTER — HOSPITAL ENCOUNTER (OUTPATIENT)
Age: 74
Setting detail: SPECIMEN
Discharge: HOME | End: 2019-02-04
Payer: MEDICARE

## 2019-02-04 PROCEDURE — 82570 ASSAY OF URINE CREATININE: CPT

## 2019-02-04 PROCEDURE — 84156 ASSAY OF PROTEIN URINE: CPT

## 2019-02-05 ENCOUNTER — TELEPHONE (OUTPATIENT)
Dept: INTERNAL MEDICINE | Age: 74
End: 2019-02-05

## 2019-02-05 DIAGNOSIS — R80.8 OTHER PROTEINURIA: Primary | ICD-10-CM

## 2019-02-05 DIAGNOSIS — E11.21 TYPE 2 DIABETES MELLITUS WITH PROTEINURIC DIABETIC NEPHROPATHY (HCC): ICD-10-CM

## 2019-02-05 LAB
CREATININE TIMED UR: NORMAL MG/ X H
CREATININE URINE: 71.8 MG/DL
CREATININE, 24H UR: 1633 MG/24 H (ref 1040–2350)
HOURS COLLECTED: 24 H
HOURS COLLECTED: 24 H
PROTEIN 24 HOUR URINE: 2844 MG/24 H
PROTEIN,TOT TIMED UR: ABNORMAL MG/X H
URINE TOTAL PROTEIN: 125 MG/DL
VOLUME: 2275 ML
VOLUME: 2275 ML

## 2019-03-06 ENCOUNTER — OFFICE VISIT (OUTPATIENT)
Dept: CARDIOLOGY | Age: 74
End: 2019-03-06
Payer: MEDICARE

## 2019-03-06 VITALS
SYSTOLIC BLOOD PRESSURE: 170 MMHG | BODY MASS INDEX: 25.88 KG/M2 | WEIGHT: 161 LBS | HEART RATE: 69 BPM | DIASTOLIC BLOOD PRESSURE: 84 MMHG | HEIGHT: 66 IN

## 2019-03-06 DIAGNOSIS — I25.10 CORONARY ARTERY DISEASE INVOLVING NATIVE CORONARY ARTERY OF NATIVE HEART WITHOUT ANGINA PECTORIS: Primary | ICD-10-CM

## 2019-03-06 DIAGNOSIS — I35.0 AORTIC VALVE STENOSIS, ETIOLOGY OF CARDIAC VALVE DISEASE UNSPECIFIED: ICD-10-CM

## 2019-03-06 DIAGNOSIS — E78.5 HYPERLIPIDEMIA, UNSPECIFIED HYPERLIPIDEMIA TYPE: ICD-10-CM

## 2019-03-06 DIAGNOSIS — I10 ESSENTIAL HYPERTENSION: ICD-10-CM

## 2019-03-06 PROCEDURE — 1101F PT FALLS ASSESS-DOCD LE1/YR: CPT | Performed by: INTERNAL MEDICINE

## 2019-03-06 PROCEDURE — G8482 FLU IMMUNIZE ORDER/ADMIN: HCPCS | Performed by: INTERNAL MEDICINE

## 2019-03-06 PROCEDURE — 99213 OFFICE O/P EST LOW 20 MIN: CPT | Performed by: INTERNAL MEDICINE

## 2019-03-06 PROCEDURE — 93000 ELECTROCARDIOGRAM COMPLETE: CPT | Performed by: INTERNAL MEDICINE

## 2019-03-06 PROCEDURE — 4040F PNEUMOC VAC/ADMIN/RCVD: CPT | Performed by: INTERNAL MEDICINE

## 2019-03-06 PROCEDURE — G8598 ASA/ANTIPLAT THER USED: HCPCS | Performed by: INTERNAL MEDICINE

## 2019-03-06 PROCEDURE — 3017F COLORECTAL CA SCREEN DOC REV: CPT | Performed by: INTERNAL MEDICINE

## 2019-03-06 PROCEDURE — 1036F TOBACCO NON-USER: CPT | Performed by: INTERNAL MEDICINE

## 2019-03-06 PROCEDURE — G8427 DOCREV CUR MEDS BY ELIG CLIN: HCPCS | Performed by: INTERNAL MEDICINE

## 2019-03-06 PROCEDURE — 1123F ACP DISCUSS/DSCN MKR DOCD: CPT | Performed by: INTERNAL MEDICINE

## 2019-03-06 PROCEDURE — G8417 CALC BMI ABV UP PARAM F/U: HCPCS | Performed by: INTERNAL MEDICINE

## 2019-04-25 ENCOUNTER — HOSPITAL ENCOUNTER (OUTPATIENT)
Dept: LAB | Age: 74
Discharge: HOME | End: 2019-04-25
Payer: MEDICARE

## 2019-04-25 DIAGNOSIS — E11.21 TYPE 2 DIABETES MELLITUS WITH PROTEINURIC DIABETIC NEPHROPATHY (HCC): ICD-10-CM

## 2019-04-25 LAB
ABSOLUTE EOS #: 0.4 K/UL (ref 0–0.4)
ABSOLUTE IMMATURE GRANULOCYTE: ABNORMAL K/UL (ref 0–0.3)
ABSOLUTE LYMPH #: 1.6 K/UL (ref 1–4.8)
ABSOLUTE MONO #: 0.8 K/UL (ref 0.1–1.2)
ANION GAP SERPL CALCULATED.3IONS-SCNC: 9 MMOL/L (ref 9–17)
BASOPHILS # BLD: 1 % (ref 0–2)
BASOPHILS ABSOLUTE: 0.1 K/UL (ref 0–0.2)
BUN BLDV-MCNC: 30 MG/DL (ref 8–23)
BUN/CREAT BLD: 27 (ref 9–20)
CALCIUM SERPL-MCNC: 9.8 MG/DL (ref 8.6–10.4)
CHLORIDE BLD-SCNC: 103 MMOL/L (ref 98–107)
CO2: 26 MMOL/L (ref 20–31)
CREAT SERPL-MCNC: 1.1 MG/DL (ref 0.7–1.2)
DIFFERENTIAL TYPE: ABNORMAL
EOSINOPHILS RELATIVE PERCENT: 5 % (ref 1–8)
ESTIMATED AVERAGE GLUCOSE: 174 MG/DL
GFR AFRICAN AMERICAN: >60 ML/MIN
GFR NON-AFRICAN AMERICAN: >60 ML/MIN
GFR SERPL CREATININE-BSD FRML MDRD: ABNORMAL ML/MIN/{1.73_M2}
GFR SERPL CREATININE-BSD FRML MDRD: ABNORMAL ML/MIN/{1.73_M2}
GLUCOSE BLD-MCNC: 197 MG/DL (ref 70–99)
HBA1C MFR BLD: 7.7 % (ref 4.8–5.9)
HCT VFR BLD CALC: 43.7 % (ref 41–53)
HEMOGLOBIN: 14.3 G/DL (ref 13.5–17.5)
IMMATURE GRANULOCYTES: ABNORMAL %
LYMPHOCYTES # BLD: 20 % (ref 15–43)
MCH RBC QN AUTO: 26.6 PG (ref 26–34)
MCHC RBC AUTO-ENTMCNC: 32.8 G/DL (ref 31–37)
MCV RBC AUTO: 81.2 FL (ref 80–100)
MONOCYTES # BLD: 10 % (ref 6–14)
NRBC AUTOMATED: ABNORMAL PER 100 WBC
PDW BLD-RTO: 14.8 % (ref 11–14.5)
PLATELET # BLD: 299 K/UL (ref 140–450)
PLATELET ESTIMATE: ABNORMAL
PMV BLD AUTO: 8.9 FL (ref 6–12)
POTASSIUM SERPL-SCNC: 5.3 MMOL/L (ref 3.7–5.3)
RBC # BLD: 5.38 M/UL (ref 4.5–5.9)
RBC # BLD: ABNORMAL 10*6/UL
SEG NEUTROPHILS: 64 % (ref 44–74)
SEGMENTED NEUTROPHILS ABSOLUTE COUNT: 5.4 K/UL (ref 1.8–7.7)
SODIUM BLD-SCNC: 138 MMOL/L (ref 135–144)
WBC # BLD: 8.3 K/UL (ref 3.5–11)
WBC # BLD: ABNORMAL 10*3/UL

## 2019-04-25 PROCEDURE — 85025 COMPLETE CBC W/AUTO DIFF WBC: CPT

## 2019-04-25 PROCEDURE — 36415 COLL VENOUS BLD VENIPUNCTURE: CPT

## 2019-04-25 PROCEDURE — 83036 HEMOGLOBIN GLYCOSYLATED A1C: CPT

## 2019-04-25 PROCEDURE — 80048 BASIC METABOLIC PNL TOTAL CA: CPT

## 2019-04-29 ENCOUNTER — HOSPITAL ENCOUNTER (OUTPATIENT)
Dept: LAB | Age: 74
Discharge: HOME | End: 2019-04-29
Payer: MEDICARE

## 2019-04-29 ENCOUNTER — OFFICE VISIT (OUTPATIENT)
Dept: INTERNAL MEDICINE | Age: 74
End: 2019-04-29
Payer: MEDICARE

## 2019-04-29 ENCOUNTER — HOSPITAL ENCOUNTER (OUTPATIENT)
Dept: NON INVASIVE DIAGNOSTICS | Age: 74
Discharge: HOME | End: 2019-04-29
Payer: MEDICARE

## 2019-04-29 VITALS
HEIGHT: 66 IN | SYSTOLIC BLOOD PRESSURE: 156 MMHG | DIASTOLIC BLOOD PRESSURE: 78 MMHG | HEART RATE: 84 BPM | WEIGHT: 165 LBS | BODY MASS INDEX: 26.52 KG/M2

## 2019-04-29 DIAGNOSIS — E78.5 DYSLIPIDEMIA: ICD-10-CM

## 2019-04-29 DIAGNOSIS — R80.8 OTHER PROTEINURIA: ICD-10-CM

## 2019-04-29 DIAGNOSIS — I65.29 STENOSIS OF CAROTID ARTERY, UNSPECIFIED LATERALITY: ICD-10-CM

## 2019-04-29 DIAGNOSIS — I35.0 NONRHEUMATIC AORTIC VALVE STENOSIS: ICD-10-CM

## 2019-04-29 DIAGNOSIS — I35.0 AORTIC VALVE STENOSIS, ETIOLOGY OF CARDIAC VALVE DISEASE UNSPECIFIED: ICD-10-CM

## 2019-04-29 DIAGNOSIS — I34.0 NON-RHEUMATIC MITRAL REGURGITATION: ICD-10-CM

## 2019-04-29 DIAGNOSIS — E11.21 TYPE 2 DIABETES MELLITUS WITH PROTEINURIC DIABETIC NEPHROPATHY (HCC): Primary | ICD-10-CM

## 2019-04-29 DIAGNOSIS — I25.10 CORONARY ARTERY DISEASE INVOLVING NATIVE CORONARY ARTERY OF NATIVE HEART WITHOUT ANGINA PECTORIS: ICD-10-CM

## 2019-04-29 DIAGNOSIS — I10 ESSENTIAL HYPERTENSION: ICD-10-CM

## 2019-04-29 LAB
-: NORMAL
AMORPHOUS: NORMAL
BACTERIA: NORMAL
BILIRUBIN URINE: NEGATIVE
CASTS UA: NORMAL /LPF (ref 0–2)
COLOR: ABNORMAL
COMMENT UA: ABNORMAL
CRYSTALS, UA: NORMAL /HPF
EPITHELIAL CELLS UA: NORMAL /HPF (ref 0–5)
GLUCOSE URINE: ABNORMAL
KETONES, URINE: NEGATIVE
LEUKOCYTE ESTERASE, URINE: NEGATIVE
LV EF: 50 %
LVEF MODALITY: NORMAL
MUCUS: NORMAL
NITRITE, URINE: NEGATIVE
OTHER OBSERVATIONS UA: NORMAL
PH UA: 6 (ref 5–6)
PROTEIN UA: ABNORMAL
RBC UA: NORMAL /HPF (ref 0–4)
RENAL EPITHELIAL, UA: NORMAL /HPF
SPECIFIC GRAVITY UA: 1.02 (ref 1.01–1.02)
TRICHOMONAS: NORMAL
TURBIDITY: ABNORMAL
URINE HGB: ABNORMAL
UROBILINOGEN, URINE: NORMAL
WBC UA: NORMAL /HPF (ref 0–4)
YEAST: NORMAL

## 2019-04-29 PROCEDURE — 93306 TTE W/DOPPLER COMPLETE: CPT

## 2019-04-29 PROCEDURE — 3017F COLORECTAL CA SCREEN DOC REV: CPT | Performed by: INTERNAL MEDICINE

## 2019-04-29 PROCEDURE — 1036F TOBACCO NON-USER: CPT | Performed by: INTERNAL MEDICINE

## 2019-04-29 PROCEDURE — G8427 DOCREV CUR MEDS BY ELIG CLIN: HCPCS | Performed by: INTERNAL MEDICINE

## 2019-04-29 PROCEDURE — 1123F ACP DISCUSS/DSCN MKR DOCD: CPT | Performed by: INTERNAL MEDICINE

## 2019-04-29 PROCEDURE — 81001 URINALYSIS AUTO W/SCOPE: CPT

## 2019-04-29 PROCEDURE — G8598 ASA/ANTIPLAT THER USED: HCPCS | Performed by: INTERNAL MEDICINE

## 2019-04-29 PROCEDURE — 3045F PR MOST RECENT HEMOGLOBIN A1C LEVEL 7.0-9.0%: CPT | Performed by: INTERNAL MEDICINE

## 2019-04-29 PROCEDURE — 2022F DILAT RTA XM EVC RTNOPTHY: CPT | Performed by: INTERNAL MEDICINE

## 2019-04-29 PROCEDURE — G8417 CALC BMI ABV UP PARAM F/U: HCPCS | Performed by: INTERNAL MEDICINE

## 2019-04-29 PROCEDURE — 99214 OFFICE O/P EST MOD 30 MIN: CPT | Performed by: INTERNAL MEDICINE

## 2019-04-29 PROCEDURE — 4040F PNEUMOC VAC/ADMIN/RCVD: CPT | Performed by: INTERNAL MEDICINE

## 2019-04-29 RX ORDER — PIOGLITAZONEHYDROCHLORIDE 15 MG/1
15 TABLET ORAL DAILY
COMMUNITY
End: 2019-07-29 | Stop reason: DRUGHIGH

## 2019-04-29 NOTE — PROGRESS NOTES
Ellie Waterman received counseling on the following healthy behaviors: exercise  Reviewed prior labs and health maintenance  Continue current medications except where noted below, diet and exercise. Discussed use, benefit, and side effects of prescribed medications. Barriers to medication compliance addressed. Patient given educational materials - see patient instructions  Was a self-tracking handout given in paper form or via [employer]? Yes    Requested Prescriptions      No prescriptions requested or ordered in this encounter       All patient questions answered. Patient voiced understanding. Quality Measures    Body mass index is 26.63 kg/m². Elevated. Weight control planned discussed: healthy diet and regular exercise. BP: (!) 158/78. Blood pressure is high. Treatment plan consists of: see progress note below. Fall Risk 10/8/2018 7/31/2017 7/13/2017 6/27/2016 3/21/2016 6/1/2015 4/28/2014   2 or more falls in past year? no no no no no no no   Fall with injury in past year? no no no no no no no     The patient does not have a history of falls. I did not - not indicated , complete a risk assessment for falls.  A plan of care for falls No Treatment plan indicated    Lab Results   Component Value Date    LDLCHOLESTEROL 115 01/17/2019    (goal LDL reduction with dx if diabetes is 50% LDL reduction)    PHQ Scores 1/21/2019 10/8/2018 7/31/2017 6/27/2016 3/21/2016 3/16/2015 1/20/2014   PHQ2 Score 0 0 0 0 0 0 0   PHQ9 Score 0 0 0 0 0 0 0     Interpretation of Total Score Depression Severity: 1-4 = Minimal depression, 5-9 = Mild depression, 10-14 = Moderate depression, 15-19 = Moderately severe depression, 20-27 = Severe depression

## 2019-04-30 ENCOUNTER — HOSPITAL ENCOUNTER (OUTPATIENT)
Dept: INTERVENTIONAL RADIOLOGY/VASCULAR | Age: 74
Discharge: HOME | End: 2019-05-02
Payer: MEDICARE

## 2019-04-30 DIAGNOSIS — R80.8 OTHER PROTEINURIA: ICD-10-CM

## 2019-04-30 PROCEDURE — 76775 US EXAM ABDO BACK WALL LIM: CPT

## 2019-05-02 ENCOUNTER — TELEPHONE (OUTPATIENT)
Dept: CARDIOLOGY | Age: 74
End: 2019-05-02

## 2019-05-06 NOTE — PROGRESS NOTES
cough, shortness of breath, and wheezing. Cardiovascular:  Negative for chest pain, palpitations, and PND. Gastrointestinal:  Negative for abdominal pain, blood in stool, constipation, diarrhea, nausea and vomiting. Genitourinary:  Negative for dysuria, frequency, and hematuria. Musculoskeletal:  Negative for myalgias. Skin:  Negative for rash. Neurological:  Negative for tingling, sensory change, speech change, focal weakness, seizures, and headaches. Endo/Heme/Allergies:  Does not bruise/bleed easily. Psychiatric/Behavioral:  Negative for hallucinations and suicidal ideas. 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 ([facility])    Non-rheumatic mitral regurgitation    Nonrheumatic aortic valve stenosis    Uncontrolled type 2 diabetes mellitus with nephropathy ([facility] [address]    Coronary artery disease involving native coronary artery of native heart without angina pectoris    Controlled type 2 diabetes mellitus with mild nonproliferative retinopathy and macular edema, without long-term current use of insulin ([facility])    Carotid stenosis       Medications:    Current Outpatient Medications   Medication Sig Dispense Refill    pioglitazone (ACTOS) 15 MG tablet Take 15 mg by mouth daily      vitamin D (CHOLECALCIFEROL) 1000 UNIT TABS tablet Take 1,000 Units by mouth daily      clopidogrel (PLAVIX) 75 MG tablet Take 1 tablet by mouth daily Take 4 tabs first day then one daily.  60 tablet 3    metFORMIN (GLUCOPHAGE) 1000 MG tablet Take 1 tablet by mouth 2 times daily (with meals) 60 tablet 3    simvastatin (ZOCOR) 20 MG tablet Take 1 tablet by mouth nightly (Patient taking differently: Take 10 mg by mouth nightly ) 30 tablet 3    lisinopril (PRINIVIL;ZESTRIL) 20 MG tablet Take 1 tablet by mouth daily 30 tablet 3    glipiZIDE (GLUCOTROL) 5 MG tablet Take 3 tablets by mouth 2 times daily (before meals) (Patient taking differently: Take 10 mg by mouth 2 times daily (before meals) ) 180 tablet 11    amLODIPine (NORVASC) 10 MG tablet Take 10 mg by mouth daily.  aspirin 81 MG EC tablet Take 81 mg by mouth daily. No current facility-administered medications for this visit. Past Medical History:        Diagnosis Date    Acne rosacea     treated with MetroGel    Anemia     Aortic stenosis     echo 3/18 Mod AS, Mild MR    BPH (benign prostatic hypertrophy)     CAD (coronary artery disease)     Stent x 3 DEVON     Carotid stenosis     Fairly minimal plaquing on ultrasound --in the conclusion described as less than 50% stenosis but in the body of the report described as minimal    Cataract of both eyes     CRI (chronic renal insufficiency)     Diabetes mellitus, type 2 (HCC)     1.) Proteinuria, 24 hour total urine protein 1,420 mg/24 hrs, , creatinine clearance 114,  2.) Repeat protein/creatinine ratio 2.97,  3. )24 hr urine collection 1958 mg/24 hrs, . 4.) Repeat protein/creatinine ratio 1.99,  a.) Repeat protein/creatinine ratio 2.14,  5.) Renal u/s ok   6) retinopathy at [address] [facility]   early mod. nonprolifferative  macular involved    Dyslipidemia     Erectile dysfunction     H/O agent Orange exposure     per patient in the [facility] along with exposure to cresote and naphtha    Hyperlipidemia     Hypertension     Non-rheumatic mitral regurgitation 2016    RBBB     Wears dentures     upper       Family Hx and Social Hx    family history includes Coronary Art Dis in his brother and sister; Heart Attack in his father; Hypertension in his mother; Faisal Jordan in his mother; Stroke in his mother.   Social History     Tobacco Use   Smoking Status Former Smoker    Packs/day: 1.00    Years: 1.50    Pack years: 1.50    Last attempt to quit: 1970    Years since quittin.3 sensory deficit. He exhibits normal muscle tone. Skin:  Skin is warm and dry. No rash noted. He is not diaphoretic. No pallor. Psychiatric:  He has a normal mood and affect. Her behavior is normal.  Judgment normal.      Lab Results   Component Value Date    K 5.3 04/25/2019    CREATININE 1.10 04/25/2019    AST 21 01/17/2019    ALT 16 01/17/2019    HCT 43.7 04/25/2019    LABA1C 7.7 04/25/2019    MICROALBUR 533 03/17/2016    GLUCOSE 197 04/25/2019    CALCIUM 9.8 04/25/2019      Lab Results   Component Value Date    CHOL 184 01/17/2019    TRIG 139 01/17/2019    HDL 41 01/17/2019    LDLCHOLESTEROL 115 01/17/2019       Labs reviewed and discussed with the patient? Yes    Assessment/Plan:    1. 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 We would like to be closer to 7.0. With the proteinuria it is particularly important to be aggressive with this. He prefers not to consider a new medication. He remains on the glipizide and Glucophage and Actos. He is going to be working more with his daily activity and daily steps and he thinks he can improve that and is going to be working on diet and we will plan on a 3-month HA1c.    - Hemoglobin A1C; Future    2. Other proteinuria  He is going to see Dr. Quentin Gottron as scheduled. Check renal ultrasound and UA with micro as well before that visit. - Urinalysis Reflex to Culture; Future  - US Renal Kidney; Future    3. Essential hypertension  He tends to run higher here, but he tends to run well at home. His readings have tended to be 120/70s and we will monitor. 4. Dyslipidemia  He rejects a higher dose of the Zocor but has been taking that regularly and we will continue to monitor the lipids. Try to work on diet. 5. Non-rheumatic mitral regurgitation  6.  Nonrheumatic aortic valve stenosis  The echocardiogram done today was reviewed and does show evidence of moderate aortic stenosis which is stable and mild mitral regurgitation. 7. Coronary artery disease involving native coronary artery of native heart without angina pectoris  Stable. Continue to monitor. 8. Stenosis of carotid artery, unspecified laterality  This was 50-69% on the right when checked September 2018. He has a followup with the vascular surgeon and a followup ultrasound at that time. 9.  He declines colon cancer screening. 10.  Given prescription for the shingles vaccine which he plans to try to get at the [address] [facility]. He will call if any problems prior to return. No orders of the defined types were placed in this encounter. Mercedez Abbott am personally transcribing for Julio Dale MD 5/6/19 at 11:07 AM.    I, Julio Dale MD, personally performed the services described in this document as transcribed by Sebastian Longoria, and it is both accurate and complete.     Electronically signed by Julio Dale MD 5/6/2019 at 1:12 PM.

## 2019-05-07 NOTE — TELEPHONE ENCOUNTER
Moderate aortic stenosis at present however reaching severe. Will need repeat echo in 6-12 months. Follow up in 6 months

## 2019-05-16 ENCOUNTER — HOSPITAL ENCOUNTER (OUTPATIENT)
Dept: LAB | Age: 74
Discharge: HOME | End: 2019-05-16
Payer: MEDICARE

## 2019-05-16 LAB
ABSOLUTE EOS #: 0.3 K/UL (ref 0–0.4)
ABSOLUTE IMMATURE GRANULOCYTE: ABNORMAL K/UL (ref 0–0.3)
ABSOLUTE LYMPH #: 1.6 K/UL (ref 1–4.8)
ABSOLUTE MONO #: 0.7 K/UL (ref 0.1–1.2)
BASOPHILS # BLD: 1 % (ref 0–2)
BASOPHILS ABSOLUTE: 0 K/UL (ref 0–0.2)
CREATININE URINE: 80.8 MG/DL (ref 39–259)
DIFFERENTIAL TYPE: ABNORMAL
EOSINOPHILS RELATIVE PERCENT: 4 % (ref 1–8)
HCT VFR BLD CALC: 41.4 % (ref 41–53)
HEMOGLOBIN: 13.6 G/DL (ref 13.5–17.5)
IMMATURE GRANULOCYTES: ABNORMAL %
LYMPHOCYTES # BLD: 17 % (ref 15–43)
MCH RBC QN AUTO: 26.4 PG (ref 26–34)
MCHC RBC AUTO-ENTMCNC: 32.8 G/DL (ref 31–37)
MCV RBC AUTO: 80.6 FL (ref 80–100)
MONOCYTES # BLD: 8 % (ref 6–14)
NRBC AUTOMATED: ABNORMAL PER 100 WBC
PDW BLD-RTO: 14.6 % (ref 11–14.5)
PLATELET # BLD: 301 K/UL (ref 140–450)
PLATELET ESTIMATE: ABNORMAL
PMV BLD AUTO: 8.3 FL (ref 6–12)
RBC # BLD: 5.14 M/UL (ref 4.5–5.9)
RBC # BLD: ABNORMAL 10*6/UL
SEG NEUTROPHILS: 70 % (ref 44–74)
SEGMENTED NEUTROPHILS ABSOLUTE COUNT: 6.8 K/UL (ref 1.8–7.7)
TOTAL PROTEIN, URINE: 357 MG/DL
URINE TOTAL PROTEIN CREATININE RATIO: 4.42 (ref 0–0.2)
WBC # BLD: 9.5 K/UL (ref 3.5–11)
WBC # BLD: ABNORMAL 10*3/UL

## 2019-05-16 PROCEDURE — 84156 ASSAY OF PROTEIN URINE: CPT

## 2019-05-16 PROCEDURE — 85025 COMPLETE CBC W/AUTO DIFF WBC: CPT

## 2019-05-16 PROCEDURE — 36415 COLL VENOUS BLD VENIPUNCTURE: CPT

## 2019-05-16 PROCEDURE — 82570 ASSAY OF URINE CREATININE: CPT

## 2019-07-25 ENCOUNTER — HOSPITAL ENCOUNTER (OUTPATIENT)
Dept: LAB | Age: 74
Discharge: HOME | End: 2019-07-25
Payer: MEDICARE

## 2019-07-25 DIAGNOSIS — E11.21 TYPE 2 DIABETES MELLITUS WITH PROTEINURIC DIABETIC NEPHROPATHY (HCC): ICD-10-CM

## 2019-07-25 LAB
ESTIMATED AVERAGE GLUCOSE: 169 MG/DL
HBA1C MFR BLD: 7.5 % (ref 4.8–5.9)

## 2019-07-25 PROCEDURE — 36415 COLL VENOUS BLD VENIPUNCTURE: CPT

## 2019-07-25 PROCEDURE — 83036 HEMOGLOBIN GLYCOSYLATED A1C: CPT

## 2019-07-29 ENCOUNTER — OFFICE VISIT (OUTPATIENT)
Dept: INTERNAL MEDICINE | Age: 74
End: 2019-07-29
Payer: MEDICARE

## 2019-07-29 VITALS
DIASTOLIC BLOOD PRESSURE: 80 MMHG | HEART RATE: 88 BPM | SYSTOLIC BLOOD PRESSURE: 132 MMHG | WEIGHT: 168.4 LBS | BODY MASS INDEX: 27.06 KG/M2 | HEIGHT: 66 IN | RESPIRATION RATE: 18 BRPM

## 2019-07-29 DIAGNOSIS — I10 ESSENTIAL HYPERTENSION: ICD-10-CM

## 2019-07-29 DIAGNOSIS — E11.21 TYPE 2 DIABETES MELLITUS WITH PROTEINURIC DIABETIC NEPHROPATHY (HCC): Primary | ICD-10-CM

## 2019-07-29 DIAGNOSIS — I65.29 STENOSIS OF CAROTID ARTERY, UNSPECIFIED LATERALITY: ICD-10-CM

## 2019-07-29 DIAGNOSIS — I34.0 NON-RHEUMATIC MITRAL REGURGITATION: ICD-10-CM

## 2019-07-29 DIAGNOSIS — E11.3219 CONTROLLED TYPE 2 DIABETES MELLITUS WITH MILD NONPROLIFERATIVE RETINOPATHY AND MACULAR EDEMA, WITHOUT LONG-TERM CURRENT USE OF INSULIN, UNSPECIFIED LATERALITY (HCC): ICD-10-CM

## 2019-07-29 DIAGNOSIS — R80.8 OTHER PROTEINURIA: ICD-10-CM

## 2019-07-29 DIAGNOSIS — I35.0 NONRHEUMATIC AORTIC VALVE STENOSIS: ICD-10-CM

## 2019-07-29 DIAGNOSIS — E78.5 DYSLIPIDEMIA: ICD-10-CM

## 2019-07-29 DIAGNOSIS — I25.10 CORONARY ARTERY DISEASE INVOLVING NATIVE CORONARY ARTERY OF NATIVE HEART WITHOUT ANGINA PECTORIS: ICD-10-CM

## 2019-07-29 PROCEDURE — G8417 CALC BMI ABV UP PARAM F/U: HCPCS | Performed by: NURSE PRACTITIONER

## 2019-07-29 PROCEDURE — 2022F DILAT RTA XM EVC RTNOPTHY: CPT | Performed by: NURSE PRACTITIONER

## 2019-07-29 PROCEDURE — G8598 ASA/ANTIPLAT THER USED: HCPCS | Performed by: NURSE PRACTITIONER

## 2019-07-29 PROCEDURE — G8427 DOCREV CUR MEDS BY ELIG CLIN: HCPCS | Performed by: NURSE PRACTITIONER

## 2019-07-29 PROCEDURE — 4040F PNEUMOC VAC/ADMIN/RCVD: CPT | Performed by: NURSE PRACTITIONER

## 2019-07-29 PROCEDURE — 3017F COLORECTAL CA SCREEN DOC REV: CPT | Performed by: NURSE PRACTITIONER

## 2019-07-29 PROCEDURE — 99214 OFFICE O/P EST MOD 30 MIN: CPT | Performed by: NURSE PRACTITIONER

## 2019-07-29 PROCEDURE — 1036F TOBACCO NON-USER: CPT | Performed by: NURSE PRACTITIONER

## 2019-07-29 PROCEDURE — 1123F ACP DISCUSS/DSCN MKR DOCD: CPT | Performed by: NURSE PRACTITIONER

## 2019-07-29 PROCEDURE — 3045F PR MOST RECENT HEMOGLOBIN A1C LEVEL 7.0-9.0%: CPT | Performed by: NURSE PRACTITIONER

## 2019-07-29 RX ORDER — PIOGLITAZONEHYDROCHLORIDE 30 MG/1
30 TABLET ORAL DAILY
Status: ON HOLD | COMMUNITY
End: 2021-08-01 | Stop reason: HOSPADM

## 2019-07-29 RX ORDER — GLIPIZIDE 10 MG/1
10 TABLET ORAL
COMMUNITY

## 2019-07-29 RX ORDER — SIMVASTATIN 40 MG
20 TABLET ORAL NIGHTLY
COMMUNITY

## 2019-07-29 ASSESSMENT — ENCOUNTER SYMPTOMS
COLOR CHANGE: 0
WHEEZING: 0
EYE PAIN: 0
COUGH: 0
CONSTIPATION: 0
RHINORRHEA: 0
FACIAL SWELLING: 0
TROUBLE SWALLOWING: 0
SINUS PRESSURE: 0
BLOOD IN STOOL: 0
SHORTNESS OF BREATH: 0
ABDOMINAL PAIN: 0
VOMITING: 0
NAUSEA: 0
CHEST TIGHTNESS: 0
SORE THROAT: 0
DIARRHEA: 0

## 2019-09-09 ENCOUNTER — HOSPITAL ENCOUNTER (OUTPATIENT)
Dept: LAB | Age: 74
Discharge: HOME | End: 2019-09-09
Payer: MEDICARE

## 2019-09-09 LAB
-: NORMAL
ABSOLUTE EOS #: 0.5 K/UL (ref 0–0.4)
ABSOLUTE IMMATURE GRANULOCYTE: ABNORMAL K/UL (ref 0–0.3)
ABSOLUTE LYMPH #: 1.5 K/UL (ref 1–4.8)
ABSOLUTE MONO #: 0.8 K/UL (ref 0.1–1.2)
AMORPHOUS: NORMAL
ANION GAP SERPL CALCULATED.3IONS-SCNC: 11 MMOL/L (ref 9–17)
BACTERIA: NORMAL
BASOPHILS # BLD: 1 % (ref 0–2)
BASOPHILS ABSOLUTE: 0.1 K/UL (ref 0–0.2)
BILIRUBIN URINE: NEGATIVE
BUN BLDV-MCNC: 23 MG/DL (ref 8–23)
BUN/CREAT BLD: 19 (ref 9–20)
CALCIUM SERPL-MCNC: 9.9 MG/DL (ref 8.6–10.4)
CASTS UA: NORMAL /LPF (ref 0–2)
CHLORIDE BLD-SCNC: 100 MMOL/L (ref 98–107)
CO2: 27 MMOL/L (ref 20–31)
COLOR: ABNORMAL
COMMENT UA: ABNORMAL
CREAT SERPL-MCNC: 1.18 MG/DL (ref 0.7–1.2)
CREATININE URINE: 50.8 MG/DL (ref 39–259)
CRYSTALS, UA: NORMAL /HPF
DIFFERENTIAL TYPE: ABNORMAL
EOSINOPHILS RELATIVE PERCENT: 5 % (ref 1–8)
EPITHELIAL CELLS UA: NORMAL /HPF (ref 0–5)
GFR AFRICAN AMERICAN: >60 ML/MIN
GFR NON-AFRICAN AMERICAN: >60 ML/MIN
GFR SERPL CREATININE-BSD FRML MDRD: ABNORMAL ML/MIN/{1.73_M2}
GFR SERPL CREATININE-BSD FRML MDRD: ABNORMAL ML/MIN/{1.73_M2}
GLUCOSE BLD-MCNC: 181 MG/DL (ref 70–99)
GLUCOSE URINE: NEGATIVE
HCT VFR BLD CALC: 39.7 % (ref 41–53)
HEMOGLOBIN: 13.3 G/DL (ref 13.5–17.5)
IMMATURE GRANULOCYTES: ABNORMAL %
KETONES, URINE: NEGATIVE
LEUKOCYTE ESTERASE, URINE: NEGATIVE
LYMPHOCYTES # BLD: 17 % (ref 15–43)
MCH RBC QN AUTO: 27.5 PG (ref 26–34)
MCHC RBC AUTO-ENTMCNC: 33.6 G/DL (ref 31–37)
MCV RBC AUTO: 81.9 FL (ref 80–100)
MONOCYTES # BLD: 9 % (ref 6–14)
MUCUS: NORMAL
NITRITE, URINE: NEGATIVE
NRBC AUTOMATED: ABNORMAL PER 100 WBC
OTHER OBSERVATIONS UA: NORMAL
PDW BLD-RTO: 15 % (ref 11–14.5)
PH UA: 7 (ref 5–6)
PLATELET # BLD: 326 K/UL (ref 140–450)
PLATELET ESTIMATE: ABNORMAL
PMV BLD AUTO: 8 FL (ref 6–12)
POTASSIUM SERPL-SCNC: 5.5 MMOL/L (ref 3.7–5.3)
PROTEIN UA: ABNORMAL
RBC # BLD: 4.85 M/UL (ref 4.5–5.9)
RBC # BLD: ABNORMAL 10*6/UL
RBC UA: NORMAL /HPF (ref 0–4)
RENAL EPITHELIAL, UA: NORMAL /HPF
SEG NEUTROPHILS: 68 % (ref 44–74)
SEGMENTED NEUTROPHILS ABSOLUTE COUNT: 6.4 K/UL (ref 1.8–7.7)
SODIUM BLD-SCNC: 138 MMOL/L (ref 135–144)
SPECIFIC GRAVITY UA: 1.02 (ref 1.01–1.02)
TOTAL PROTEIN, URINE: 163 MG/DL
TRICHOMONAS: NORMAL
TURBIDITY: ABNORMAL
URINE HGB: NEGATIVE
URINE TOTAL PROTEIN CREATININE RATIO: 3.21 (ref 0–0.2)
UROBILINOGEN, URINE: NORMAL
WBC # BLD: 9.2 K/UL (ref 3.5–11)
WBC # BLD: ABNORMAL 10*3/UL
WBC UA: NORMAL /HPF (ref 0–4)
YEAST: NORMAL

## 2019-09-09 PROCEDURE — 85025 COMPLETE CBC W/AUTO DIFF WBC: CPT

## 2019-09-09 PROCEDURE — 82570 ASSAY OF URINE CREATININE: CPT

## 2019-09-09 PROCEDURE — 36415 COLL VENOUS BLD VENIPUNCTURE: CPT

## 2019-09-09 PROCEDURE — 84156 ASSAY OF PROTEIN URINE: CPT

## 2019-09-09 PROCEDURE — 80048 BASIC METABOLIC PNL TOTAL CA: CPT

## 2019-09-09 PROCEDURE — 81001 URINALYSIS AUTO W/SCOPE: CPT

## 2019-09-11 ENCOUNTER — OFFICE VISIT (OUTPATIENT)
Dept: CARDIOLOGY | Age: 74
End: 2019-09-11
Payer: MEDICARE

## 2019-09-11 VITALS
HEART RATE: 76 BPM | BODY MASS INDEX: 26.52 KG/M2 | SYSTOLIC BLOOD PRESSURE: 140 MMHG | HEIGHT: 66 IN | DIASTOLIC BLOOD PRESSURE: 70 MMHG | WEIGHT: 165 LBS

## 2019-09-11 DIAGNOSIS — E78.2 MIXED HYPERLIPIDEMIA: ICD-10-CM

## 2019-09-11 DIAGNOSIS — I25.10 CORONARY ARTERY DISEASE INVOLVING NATIVE CORONARY ARTERY OF NATIVE HEART WITHOUT ANGINA PECTORIS: ICD-10-CM

## 2019-09-11 DIAGNOSIS — N18.30 CKD (CHRONIC KIDNEY DISEASE), STAGE III (HCC): ICD-10-CM

## 2019-09-11 DIAGNOSIS — E66.09 OBESITY DUE TO EXCESS CALORIES WITH SERIOUS COMORBIDITY IN PEDIATRIC PATIENT, UNSPECIFIED BMI: ICD-10-CM

## 2019-09-11 DIAGNOSIS — R07.9 CHEST PAIN, UNSPECIFIED TYPE: Primary | ICD-10-CM

## 2019-09-11 PROCEDURE — G8417 CALC BMI ABV UP PARAM F/U: HCPCS | Performed by: INTERNAL MEDICINE

## 2019-09-11 PROCEDURE — 3017F COLORECTAL CA SCREEN DOC REV: CPT | Performed by: INTERNAL MEDICINE

## 2019-09-11 PROCEDURE — 1123F ACP DISCUSS/DSCN MKR DOCD: CPT | Performed by: INTERNAL MEDICINE

## 2019-09-11 PROCEDURE — G8598 ASA/ANTIPLAT THER USED: HCPCS | Performed by: INTERNAL MEDICINE

## 2019-09-11 PROCEDURE — 4040F PNEUMOC VAC/ADMIN/RCVD: CPT | Performed by: INTERNAL MEDICINE

## 2019-09-11 PROCEDURE — 99214 OFFICE O/P EST MOD 30 MIN: CPT | Performed by: INTERNAL MEDICINE

## 2019-09-11 PROCEDURE — G8427 DOCREV CUR MEDS BY ELIG CLIN: HCPCS | Performed by: INTERNAL MEDICINE

## 2019-09-11 PROCEDURE — 1036F TOBACCO NON-USER: CPT | Performed by: INTERNAL MEDICINE

## 2019-09-11 NOTE — PROGRESS NOTES
gallop  Abdomen: soft, non-tender; bowel sounds normal; no masses,  no organomegaly  Extremities: extremities normal, atraumatic, no cyanosis or edema  Neurologic: Mental status: Alert, oriented, thought content appropriate  Skin: WNL for age and condition, no obvious rashes or leasions      Cardiac Data:  EKG: SR, IRBBB, LVH criteria, inferior infarction. Labs:     CBC:   Recent Labs     09/09/19  0952   WBC 9.2   HGB 13.3*   HCT 39.7*        BMP:   Recent Labs     09/09/19  0952      K 5.5*   CO2 27   BUN 23   CREATININE 1.18   LABGLOM >60   GLUCOSE 181*     PT/INR: No results for input(s): PROTIME, INR in the last 72 hours. FASTING LIPID PANEL:  Lab Results   Component Value Date    HDL 41 01/17/2019    TRIG 139 01/17/2019     LIVER PROFILE:No results for input(s): AST, ALT, LABALBU in the last 72 hours. TTE 3/12/18  Left ventricle is normal in size Global left ventricular systolic function  is low normal. Estimated ejection fraction is 50%. Interventricular septum is thickened. Inferolateral wall is thinned and akinetic. Left atrium is moderately dilated. Right atrium is mildly dilated . Normal right ventricular size and function. Aortic leaflet calcification with moderate stenosis. Peak instantaneous gradient 55 mmHg and mean gradient 32 mmHg. Mitral annular calcification is seen. Mild mitral regurgitation. Trivial tricuspid regurgitation. Estimated right ventricular systolic pressure is 36 mmHg. No significant pericardial effusion is seen.     Assessment and Plan:     -CAD- Patient is asymptomatic at present. He has h/o nuclear stress test 08/2/16 showed bella-infarct inferolateral ischemia. Cardiac cath 08/23/16 showed 3 vessel CAD. LN normal. LAD mid 70% stenosis and D1 99% stenosis. LCX 30%. OM1 proximal 99% stenosis. RCA 30% stenosis. RPDA distal 100% stenosis where it was a small vessel with faint left to right collaterals. DEVON to mid LAD, D1 and OM1.  Patient is

## 2019-09-25 ENCOUNTER — HOSPITAL ENCOUNTER (OUTPATIENT)
Dept: INTERVENTIONAL RADIOLOGY/VASCULAR | Age: 74
Discharge: HOME | End: 2019-09-27
Payer: MEDICARE

## 2019-09-25 DIAGNOSIS — I65.23 BILATERAL CAROTID ARTERY STENOSIS: ICD-10-CM

## 2019-09-25 PROCEDURE — 93880 EXTRACRANIAL BILAT STUDY: CPT

## 2019-10-24 ENCOUNTER — OFFICE VISIT (OUTPATIENT)
Dept: VASCULAR SURGERY | Age: 74
End: 2019-10-24
Payer: MEDICARE

## 2019-10-24 VITALS
SYSTOLIC BLOOD PRESSURE: 130 MMHG | HEART RATE: 78 BPM | HEIGHT: 66 IN | DIASTOLIC BLOOD PRESSURE: 64 MMHG | WEIGHT: 169 LBS | BODY MASS INDEX: 27.16 KG/M2

## 2019-10-24 DIAGNOSIS — I65.29 STENOSIS OF CAROTID ARTERY, UNSPECIFIED LATERALITY: Primary | ICD-10-CM

## 2019-10-24 PROCEDURE — G8598 ASA/ANTIPLAT THER USED: HCPCS | Performed by: SURGERY

## 2019-10-24 PROCEDURE — G8417 CALC BMI ABV UP PARAM F/U: HCPCS | Performed by: SURGERY

## 2019-10-24 PROCEDURE — 99213 OFFICE O/P EST LOW 20 MIN: CPT | Performed by: SURGERY

## 2019-10-24 PROCEDURE — 4040F PNEUMOC VAC/ADMIN/RCVD: CPT | Performed by: SURGERY

## 2019-10-24 PROCEDURE — G8427 DOCREV CUR MEDS BY ELIG CLIN: HCPCS | Performed by: SURGERY

## 2019-10-24 PROCEDURE — G8482 FLU IMMUNIZE ORDER/ADMIN: HCPCS | Performed by: SURGERY

## 2019-10-24 PROCEDURE — 3017F COLORECTAL CA SCREEN DOC REV: CPT | Performed by: SURGERY

## 2019-10-24 PROCEDURE — 1036F TOBACCO NON-USER: CPT | Performed by: SURGERY

## 2019-10-24 PROCEDURE — 1123F ACP DISCUSS/DSCN MKR DOCD: CPT | Performed by: SURGERY

## 2019-10-31 ENCOUNTER — HOSPITAL ENCOUNTER (OUTPATIENT)
Dept: LAB | Age: 74
Discharge: HOME | End: 2019-10-31
Payer: MEDICARE

## 2019-10-31 DIAGNOSIS — E11.21 TYPE 2 DIABETES MELLITUS WITH PROTEINURIC DIABETIC NEPHROPATHY (HCC): ICD-10-CM

## 2019-10-31 LAB
ESTIMATED AVERAGE GLUCOSE: 146 MG/DL
HBA1C MFR BLD: 6.7 % (ref 4.8–5.9)

## 2019-10-31 PROCEDURE — 36415 COLL VENOUS BLD VENIPUNCTURE: CPT

## 2019-10-31 PROCEDURE — 83036 HEMOGLOBIN GLYCOSYLATED A1C: CPT

## 2019-11-04 ENCOUNTER — OFFICE VISIT (OUTPATIENT)
Dept: INTERNAL MEDICINE | Age: 74
End: 2019-11-04
Payer: MEDICARE

## 2019-11-04 VITALS
WEIGHT: 171.2 LBS | SYSTOLIC BLOOD PRESSURE: 136 MMHG | DIASTOLIC BLOOD PRESSURE: 72 MMHG | BODY MASS INDEX: 27.51 KG/M2 | RESPIRATION RATE: 18 BRPM | HEIGHT: 66 IN | HEART RATE: 76 BPM

## 2019-11-04 DIAGNOSIS — E78.5 DYSLIPIDEMIA: ICD-10-CM

## 2019-11-04 DIAGNOSIS — I34.0 NON-RHEUMATIC MITRAL REGURGITATION: ICD-10-CM

## 2019-11-04 DIAGNOSIS — E11.21 TYPE 2 DIABETES MELLITUS WITH PROTEINURIC DIABETIC NEPHROPATHY (HCC): ICD-10-CM

## 2019-11-04 DIAGNOSIS — I25.10 CORONARY ARTERY DISEASE INVOLVING NATIVE CORONARY ARTERY OF NATIVE HEART WITHOUT ANGINA PECTORIS: ICD-10-CM

## 2019-11-04 DIAGNOSIS — I10 ESSENTIAL HYPERTENSION: ICD-10-CM

## 2019-11-04 DIAGNOSIS — I65.29 STENOSIS OF CAROTID ARTERY, UNSPECIFIED LATERALITY: ICD-10-CM

## 2019-11-04 DIAGNOSIS — R80.8 OTHER PROTEINURIA: ICD-10-CM

## 2019-11-04 DIAGNOSIS — I35.0 NONRHEUMATIC AORTIC VALVE STENOSIS: ICD-10-CM

## 2019-11-04 DIAGNOSIS — Z00.00 ROUTINE GENERAL MEDICAL EXAMINATION AT A HEALTH CARE FACILITY: Primary | ICD-10-CM

## 2019-11-04 DIAGNOSIS — E11.3219 CONTROLLED TYPE 2 DIABETES MELLITUS WITH MILD NONPROLIFERATIVE RETINOPATHY AND MACULAR EDEMA, WITHOUT LONG-TERM CURRENT USE OF INSULIN, UNSPECIFIED LATERALITY (HCC): ICD-10-CM

## 2019-11-04 PROCEDURE — 1123F ACP DISCUSS/DSCN MKR DOCD: CPT | Performed by: NURSE PRACTITIONER

## 2019-11-04 PROCEDURE — G8598 ASA/ANTIPLAT THER USED: HCPCS | Performed by: NURSE PRACTITIONER

## 2019-11-04 PROCEDURE — 4040F PNEUMOC VAC/ADMIN/RCVD: CPT | Performed by: NURSE PRACTITIONER

## 2019-11-04 PROCEDURE — G0439 PPPS, SUBSEQ VISIT: HCPCS | Performed by: NURSE PRACTITIONER

## 2019-11-04 PROCEDURE — 3044F HG A1C LEVEL LT 7.0%: CPT | Performed by: NURSE PRACTITIONER

## 2019-11-04 PROCEDURE — 3017F COLORECTAL CA SCREEN DOC REV: CPT | Performed by: NURSE PRACTITIONER

## 2019-11-04 PROCEDURE — G8482 FLU IMMUNIZE ORDER/ADMIN: HCPCS | Performed by: NURSE PRACTITIONER

## 2019-11-04 SDOH — HEALTH STABILITY: MENTAL HEALTH: HOW MANY STANDARD DRINKS CONTAINING ALCOHOL DO YOU HAVE ON A TYPICAL DAY?: 1 OR 2

## 2019-11-04 SDOH — HEALTH STABILITY: MENTAL HEALTH: HOW OFTEN DO YOU HAVE A DRINK CONTAINING ALCOHOL?: 2-4 TIMES A MONTH

## 2019-11-04 ASSESSMENT — ENCOUNTER SYMPTOMS
FACIAL SWELLING: 0
ABDOMINAL PAIN: 0
DIARRHEA: 0
VOMITING: 0
SINUS PRESSURE: 0
COLOR CHANGE: 0
RHINORRHEA: 0
WHEEZING: 0
EYE PAIN: 0
TROUBLE SWALLOWING: 0
CONSTIPATION: 0
COUGH: 0
BLOOD IN STOOL: 0
SHORTNESS OF BREATH: 0
NAUSEA: 0
CHEST TIGHTNESS: 0
SORE THROAT: 0

## 2019-11-04 ASSESSMENT — PATIENT HEALTH QUESTIONNAIRE - PHQ9
SUM OF ALL RESPONSES TO PHQ QUESTIONS 1-9: 0
SUM OF ALL RESPONSES TO PHQ QUESTIONS 1-9: 0

## 2019-11-04 ASSESSMENT — LIFESTYLE VARIABLES
HOW OFTEN DO YOU HAVE SIX OR MORE DRINKS ON ONE OCCASION: 0
HOW OFTEN DO YOU HAVE A DRINK CONTAINING ALCOHOL: 2
HOW MANY STANDARD DRINKS CONTAINING ALCOHOL DO YOU HAVE ON A TYPICAL DAY: 0
AUDIT-C TOTAL SCORE: 2

## 2019-12-03 ENCOUNTER — HOSPITAL ENCOUNTER (INPATIENT)
Dept: CARDIAC CATH/INVASIVE PROCEDURES | Age: 74
LOS: 3 days | Discharge: HOME | DRG: 280 | End: 2019-12-06
Attending: INTERNAL MEDICINE | Admitting: INTERNAL MEDICINE
Payer: MEDICARE

## 2019-12-03 ENCOUNTER — HOSPITAL ENCOUNTER (INPATIENT)
Age: 74
LOS: 1 days | Discharge: ANOTHER HOSPITAL | DRG: 280 | End: 2019-12-03
Attending: EMERGENCY MEDICINE | Admitting: INTERNAL MEDICINE
Payer: MEDICARE

## 2019-12-03 ENCOUNTER — APPOINTMENT (OUTPATIENT)
Dept: GENERAL RADIOLOGY | Age: 74
DRG: 280 | End: 2019-12-03
Payer: MEDICARE

## 2019-12-03 VITALS
HEART RATE: 76 BPM | HEIGHT: 66 IN | SYSTOLIC BLOOD PRESSURE: 134 MMHG | OXYGEN SATURATION: 96 % | BODY MASS INDEX: 27.68 KG/M2 | WEIGHT: 172.2 LBS | RESPIRATION RATE: 18 BRPM | TEMPERATURE: 99.1 F | DIASTOLIC BLOOD PRESSURE: 60 MMHG

## 2019-12-03 DIAGNOSIS — I35.0 AORTIC STENOSIS, SEVERE: ICD-10-CM

## 2019-12-03 PROBLEM — J44.1 COPD EXACERBATION (HCC): Status: ACTIVE | Noted: 2019-12-03

## 2019-12-03 LAB
ABSOLUTE EOS #: 0.4 K/UL (ref 0–0.4)
ABSOLUTE IMMATURE GRANULOCYTE: ABNORMAL K/UL (ref 0–0.3)
ABSOLUTE LYMPH #: 1.7 K/UL (ref 1–4.8)
ABSOLUTE MONO #: 0.9 K/UL (ref 0.1–1.2)
ANION GAP SERPL CALCULATED.3IONS-SCNC: 12 MMOL/L (ref 9–17)
ANION GAP SERPL CALCULATED.3IONS-SCNC: 14 MMOL/L (ref 9–17)
BASOPHILS # BLD: 1 % (ref 0–2)
BASOPHILS ABSOLUTE: 0.1 K/UL (ref 0–0.2)
BNP INTERPRETATION: ABNORMAL
BUN BLDV-MCNC: 27 MG/DL (ref 8–23)
BUN BLDV-MCNC: 30 MG/DL (ref 8–23)
BUN/CREAT BLD: 25 (ref 9–20)
BUN/CREAT BLD: ABNORMAL (ref 9–20)
CALCIUM SERPL-MCNC: 8.6 MG/DL (ref 8.6–10.4)
CALCIUM SERPL-MCNC: 9.6 MG/DL (ref 8.6–10.4)
CHLORIDE BLD-SCNC: 103 MMOL/L (ref 98–107)
CHLORIDE BLD-SCNC: 105 MMOL/L (ref 98–107)
CO2: 18 MMOL/L (ref 20–31)
CO2: 24 MMOL/L (ref 20–31)
CREAT SERPL-MCNC: 1.12 MG/DL (ref 0.7–1.2)
CREAT SERPL-MCNC: 1.22 MG/DL (ref 0.7–1.2)
D DIMER: 235 NG/ML
DIFFERENTIAL TYPE: ABNORMAL
EOSINOPHILS RELATIVE PERCENT: 4 % (ref 1–8)
GFR AFRICAN AMERICAN: >60 ML/MIN
GFR AFRICAN AMERICAN: >60 ML/MIN
GFR NON-AFRICAN AMERICAN: 58 ML/MIN
GFR NON-AFRICAN AMERICAN: >60 ML/MIN
GFR SERPL CREATININE-BSD FRML MDRD: ABNORMAL ML/MIN/{1.73_M2}
GLUCOSE BLD-MCNC: 218 MG/DL (ref 70–99)
GLUCOSE BLD-MCNC: 273 MG/DL (ref 75–110)
GLUCOSE BLD-MCNC: 307 MG/DL (ref 75–110)
GLUCOSE BLD-MCNC: 386 MG/DL (ref 75–110)
GLUCOSE BLD-MCNC: 395 MG/DL (ref 70–99)
HCT VFR BLD CALC: 35.7 % (ref 40.7–50.3)
HCT VFR BLD CALC: 40.2 % (ref 41–53)
HEMOGLOBIN: 11.7 G/DL (ref 13–17)
HEMOGLOBIN: 13.1 G/DL (ref 13.5–17.5)
IMMATURE GRANULOCYTES: ABNORMAL %
LYMPHOCYTES # BLD: 14 % (ref 15–43)
MCH RBC QN AUTO: 27 PG (ref 26–34)
MCH RBC QN AUTO: 27.6 PG (ref 25.2–33.5)
MCHC RBC AUTO-ENTMCNC: 32.6 G/DL (ref 31–37)
MCHC RBC AUTO-ENTMCNC: 32.8 G/DL (ref 28.4–34.8)
MCV RBC AUTO: 82.9 FL (ref 80–100)
MCV RBC AUTO: 84.2 FL (ref 82.6–102.9)
MONOCYTES # BLD: 8 % (ref 6–14)
MYOGLOBIN: 28 NG/ML (ref 28–72)
MYOGLOBIN: 35 NG/ML (ref 28–72)
NRBC AUTOMATED: 0 PER 100 WBC
NRBC AUTOMATED: ABNORMAL PER 100 WBC
PARTIAL THROMBOPLASTIN TIME: 30.5 SEC (ref 27–35)
PARTIAL THROMBOPLASTIN TIME: 35 SEC (ref 27–35)
PDW BLD-RTO: 14.1 % (ref 11.8–14.4)
PDW BLD-RTO: 14.9 % (ref 11–14.5)
PLATELET # BLD: 272 K/UL (ref 140–450)
PLATELET # BLD: 282 K/UL (ref 138–453)
PLATELET ESTIMATE: ABNORMAL
PMV BLD AUTO: 11.2 FL (ref 8.1–13.5)
PMV BLD AUTO: 8.9 FL (ref 6–12)
POTASSIUM SERPL-SCNC: 4.3 MMOL/L (ref 3.7–5.3)
POTASSIUM SERPL-SCNC: 4.5 MMOL/L (ref 3.7–5.3)
PRO-BNP: 2131 PG/ML
RBC # BLD: 4.24 M/UL (ref 4.21–5.77)
RBC # BLD: 4.85 M/UL (ref 4.5–5.9)
RBC # BLD: ABNORMAL 10*6/UL
SEG NEUTROPHILS: 73 % (ref 44–74)
SEGMENTED NEUTROPHILS ABSOLUTE COUNT: 9 K/UL (ref 1.8–7.7)
SODIUM BLD-SCNC: 137 MMOL/L (ref 135–144)
SODIUM BLD-SCNC: 139 MMOL/L (ref 135–144)
TROPONIN INTERP: ABNORMAL
TROPONIN T: ABNORMAL NG/ML
TROPONIN, HIGH SENSITIVITY: 104 NG/L (ref 0–22)
TROPONIN, HIGH SENSITIVITY: 135 NG/L (ref 0–22)
TROPONIN, HIGH SENSITIVITY: 135 NG/L (ref 0–22)
TROPONIN, HIGH SENSITIVITY: 87 NG/L (ref 0–22)
WBC # BLD: 12.1 K/UL (ref 3.5–11)
WBC # BLD: 16.2 K/UL (ref 3.5–11.3)
WBC # BLD: ABNORMAL 10*3/UL

## 2019-12-03 PROCEDURE — 2060000000 HC ICU INTERMEDIATE R&B

## 2019-12-03 PROCEDURE — 36415 COLL VENOUS BLD VENIPUNCTURE: CPT

## 2019-12-03 PROCEDURE — 99223 1ST HOSP IP/OBS HIGH 75: CPT | Performed by: INTERNAL MEDICINE

## 2019-12-03 PROCEDURE — 2709999900 HC NON-CHARGEABLE SUPPLY

## 2019-12-03 PROCEDURE — 6360000002 HC RX W HCPCS: Performed by: INTERNAL MEDICINE

## 2019-12-03 PROCEDURE — 2500000003 HC RX 250 WO HCPCS

## 2019-12-03 PROCEDURE — 94150 VITAL CAPACITY TEST: CPT

## 2019-12-03 PROCEDURE — C1760 CLOSURE DEV, VASC: HCPCS

## 2019-12-03 PROCEDURE — C1769 GUIDE WIRE: HCPCS

## 2019-12-03 PROCEDURE — B2111ZZ FLUOROSCOPY OF MULTIPLE CORONARY ARTERIES USING LOW OSMOLAR CONTRAST: ICD-10-PCS | Performed by: INTERNAL MEDICINE

## 2019-12-03 PROCEDURE — 85025 COMPLETE CBC W/AUTO DIFF WBC: CPT

## 2019-12-03 PROCEDURE — 4A023N8 MEASUREMENT OF CARDIAC SAMPLING AND PRESSURE, BILATERAL, PERCUTANEOUS APPROACH: ICD-10-PCS | Performed by: INTERNAL MEDICINE

## 2019-12-03 PROCEDURE — 71045 X-RAY EXAM CHEST 1 VIEW: CPT

## 2019-12-03 PROCEDURE — 85027 COMPLETE CBC AUTOMATED: CPT

## 2019-12-03 PROCEDURE — C1894 INTRO/SHEATH, NON-LASER: HCPCS

## 2019-12-03 PROCEDURE — 2580000003 HC RX 258: Performed by: NURSE PRACTITIONER

## 2019-12-03 PROCEDURE — 83880 ASSAY OF NATRIURETIC PEPTIDE: CPT

## 2019-12-03 PROCEDURE — 99232 SBSQ HOSP IP/OBS MODERATE 35: CPT | Performed by: INTERNAL MEDICINE

## 2019-12-03 PROCEDURE — C1751 CATH, INF, PER/CENT/MIDLINE: HCPCS

## 2019-12-03 PROCEDURE — 93460 R&L HRT ART/VENTRICLE ANGIO: CPT | Performed by: INTERNAL MEDICINE

## 2019-12-03 PROCEDURE — 82947 ASSAY GLUCOSE BLOOD QUANT: CPT

## 2019-12-03 PROCEDURE — 6360000002 HC RX W HCPCS: Performed by: EMERGENCY MEDICINE

## 2019-12-03 PROCEDURE — 84484 ASSAY OF TROPONIN QUANT: CPT

## 2019-12-03 PROCEDURE — 85730 THROMBOPLASTIN TIME PARTIAL: CPT

## 2019-12-03 PROCEDURE — 94761 N-INVAS EAR/PLS OXIMETRY MLT: CPT

## 2019-12-03 PROCEDURE — 80048 BASIC METABOLIC PNL TOTAL CA: CPT

## 2019-12-03 PROCEDURE — 97161 PT EVAL LOW COMPLEX 20 MIN: CPT | Performed by: PHYSICAL THERAPIST

## 2019-12-03 PROCEDURE — 6360000004 HC RX CONTRAST MEDICATION

## 2019-12-03 PROCEDURE — 2580000003 HC RX 258: Performed by: STUDENT IN AN ORGANIZED HEALTH CARE EDUCATION/TRAINING PROGRAM

## 2019-12-03 PROCEDURE — 83874 ASSAY OF MYOGLOBIN: CPT

## 2019-12-03 PROCEDURE — 96375 TX/PRO/DX INJ NEW DRUG ADDON: CPT

## 2019-12-03 PROCEDURE — B2151ZZ FLUOROSCOPY OF LEFT HEART USING LOW OSMOLAR CONTRAST: ICD-10-PCS | Performed by: INTERNAL MEDICINE

## 2019-12-03 PROCEDURE — 7100000001 HC PACU RECOVERY - ADDTL 15 MIN

## 2019-12-03 PROCEDURE — 6370000000 HC RX 637 (ALT 250 FOR IP): Performed by: EMERGENCY MEDICINE

## 2019-12-03 PROCEDURE — 85379 FIBRIN DEGRADATION QUANT: CPT

## 2019-12-03 PROCEDURE — 87040 BLOOD CULTURE FOR BACTERIA: CPT

## 2019-12-03 PROCEDURE — 96365 THER/PROPH/DIAG IV INF INIT: CPT

## 2019-12-03 PROCEDURE — 6360000002 HC RX W HCPCS

## 2019-12-03 PROCEDURE — 99285 EMERGENCY DEPT VISIT HI MDM: CPT

## 2019-12-03 PROCEDURE — 96372 THER/PROPH/DIAG INJ SC/IM: CPT

## 2019-12-03 PROCEDURE — 6370000000 HC RX 637 (ALT 250 FOR IP): Performed by: STUDENT IN AN ORGANIZED HEALTH CARE EDUCATION/TRAINING PROGRAM

## 2019-12-03 PROCEDURE — 94664 DEMO&/EVAL PT USE INHALER: CPT

## 2019-12-03 PROCEDURE — 93005 ELECTROCARDIOGRAM TRACING: CPT | Performed by: EMERGENCY MEDICINE

## 2019-12-03 PROCEDURE — B2131ZZ FLUOROSCOPY OF MULTIPLE CORONARY ARTERY BYPASS GRAFTS USING LOW OSMOLAR CONTRAST: ICD-10-PCS | Performed by: INTERNAL MEDICINE

## 2019-12-03 PROCEDURE — 6370000000 HC RX 637 (ALT 250 FOR IP): Performed by: NURSE PRACTITIONER

## 2019-12-03 PROCEDURE — 2580000003 HC RX 258: Performed by: EMERGENCY MEDICINE

## 2019-12-03 PROCEDURE — 7100000000 HC PACU RECOVERY - FIRST 15 MIN

## 2019-12-03 PROCEDURE — 2700000000 HC OXYGEN THERAPY PER DAY

## 2019-12-03 RX ORDER — LISINOPRIL 20 MG/1
20 TABLET ORAL DAILY
Status: DISCONTINUED | OUTPATIENT
Start: 2019-12-03 | End: 2019-12-03 | Stop reason: HOSPADM

## 2019-12-03 RX ORDER — ASPIRIN 81 MG/1
81 TABLET ORAL DAILY
Status: DISCONTINUED | OUTPATIENT
Start: 2019-12-03 | End: 2019-12-06 | Stop reason: HOSPADM

## 2019-12-03 RX ORDER — SIMVASTATIN 10 MG
10 TABLET ORAL NIGHTLY
Status: DISCONTINUED | OUTPATIENT
Start: 2019-12-03 | End: 2019-12-03 | Stop reason: HOSPADM

## 2019-12-03 RX ORDER — 0.9 % SODIUM CHLORIDE 0.9 %
1000 INTRAVENOUS SOLUTION INTRAVENOUS ONCE
Status: COMPLETED | OUTPATIENT
Start: 2019-12-03 | End: 2019-12-03

## 2019-12-03 RX ORDER — METHYLPREDNISOLONE SODIUM SUCCINATE 125 MG/2ML
125 INJECTION, POWDER, LYOPHILIZED, FOR SOLUTION INTRAMUSCULAR; INTRAVENOUS ONCE
Status: COMPLETED | OUTPATIENT
Start: 2019-12-03 | End: 2019-12-03

## 2019-12-03 RX ORDER — SODIUM CHLORIDE 0.9 % (FLUSH) 0.9 %
10 SYRINGE (ML) INJECTION EVERY 12 HOURS SCHEDULED
Status: DISCONTINUED | OUTPATIENT
Start: 2019-12-03 | End: 2019-12-06 | Stop reason: HOSPADM

## 2019-12-03 RX ORDER — LISINOPRIL 20 MG/1
20 TABLET ORAL DAILY
Status: DISCONTINUED | OUTPATIENT
Start: 2019-12-03 | End: 2019-12-06 | Stop reason: HOSPADM

## 2019-12-03 RX ORDER — IPRATROPIUM BROMIDE AND ALBUTEROL SULFATE 2.5; .5 MG/3ML; MG/3ML
1 SOLUTION RESPIRATORY (INHALATION)
Status: DISCONTINUED | OUTPATIENT
Start: 2019-12-03 | End: 2019-12-03

## 2019-12-03 RX ORDER — ASPIRIN 81 MG/1
324 TABLET, CHEWABLE ORAL ONCE
Status: COMPLETED | OUTPATIENT
Start: 2019-12-03 | End: 2019-12-03

## 2019-12-03 RX ORDER — ALBUTEROL SULFATE 2.5 MG/3ML
2.5 SOLUTION RESPIRATORY (INHALATION)
Status: DISCONTINUED | OUTPATIENT
Start: 2019-12-03 | End: 2019-12-03 | Stop reason: HOSPADM

## 2019-12-03 RX ORDER — ACETAMINOPHEN 325 MG/1
650 TABLET ORAL EVERY 4 HOURS PRN
Status: DISCONTINUED | OUTPATIENT
Start: 2019-12-03 | End: 2019-12-03 | Stop reason: HOSPADM

## 2019-12-03 RX ORDER — VITAMIN B COMPLEX
1000 TABLET ORAL 2 TIMES DAILY
Status: DISCONTINUED | OUTPATIENT
Start: 2019-12-03 | End: 2019-12-06 | Stop reason: HOSPADM

## 2019-12-03 RX ORDER — MAGNESIUM SULFATE 1 G/100ML
1 INJECTION INTRAVENOUS ONCE
Status: COMPLETED | OUTPATIENT
Start: 2019-12-03 | End: 2019-12-03

## 2019-12-03 RX ORDER — SODIUM CHLORIDE 9 MG/ML
INJECTION, SOLUTION INTRAVENOUS CONTINUOUS
Status: DISCONTINUED | OUTPATIENT
Start: 2019-12-03 | End: 2019-12-03 | Stop reason: HOSPADM

## 2019-12-03 RX ORDER — ALBUTEROL SULFATE 2.5 MG/3ML
2.5 SOLUTION RESPIRATORY (INHALATION)
Status: DISCONTINUED | OUTPATIENT
Start: 2019-12-03 | End: 2019-12-03

## 2019-12-03 RX ORDER — ALBUTEROL SULFATE 90 UG/1
2 AEROSOL, METERED RESPIRATORY (INHALATION) EVERY 6 HOURS PRN
Status: DISCONTINUED | OUTPATIENT
Start: 2019-12-03 | End: 2019-12-06 | Stop reason: HOSPADM

## 2019-12-03 RX ORDER — SODIUM CHLORIDE 0.9 % (FLUSH) 0.9 %
10 SYRINGE (ML) INJECTION EVERY 12 HOURS SCHEDULED
Status: DISCONTINUED | OUTPATIENT
Start: 2019-12-03 | End: 2019-12-03 | Stop reason: HOSPADM

## 2019-12-03 RX ORDER — ATORVASTATIN CALCIUM 40 MG/1
40 TABLET, FILM COATED ORAL NIGHTLY
Status: DISCONTINUED | OUTPATIENT
Start: 2019-12-03 | End: 2019-12-06 | Stop reason: HOSPADM

## 2019-12-03 RX ORDER — ACETAMINOPHEN 325 MG/1
650 TABLET ORAL EVERY 4 HOURS PRN
Status: DISCONTINUED | OUTPATIENT
Start: 2019-12-03 | End: 2019-12-06 | Stop reason: HOSPADM

## 2019-12-03 RX ORDER — NICOTINE POLACRILEX 4 MG
15 LOZENGE BUCCAL PRN
Status: DISCONTINUED | OUTPATIENT
Start: 2019-12-03 | End: 2019-12-03 | Stop reason: HOSPADM

## 2019-12-03 RX ORDER — NICOTINE 21 MG/24HR
1 PATCH, TRANSDERMAL 24 HOURS TRANSDERMAL DAILY PRN
Status: DISCONTINUED | OUTPATIENT
Start: 2019-12-03 | End: 2019-12-03 | Stop reason: HOSPADM

## 2019-12-03 RX ORDER — SODIUM CHLORIDE 0.9 % (FLUSH) 0.9 %
10 SYRINGE (ML) INJECTION PRN
Status: DISCONTINUED | OUTPATIENT
Start: 2019-12-03 | End: 2019-12-06 | Stop reason: HOSPADM

## 2019-12-03 RX ORDER — SODIUM CHLORIDE 0.9 % (FLUSH) 0.9 %
10 SYRINGE (ML) INJECTION PRN
Status: DISCONTINUED | OUTPATIENT
Start: 2019-12-03 | End: 2019-12-03 | Stop reason: HOSPADM

## 2019-12-03 RX ORDER — PIOGLITAZONEHYDROCHLORIDE 15 MG/1
30 TABLET ORAL DAILY
Status: DISCONTINUED | OUTPATIENT
Start: 2019-12-03 | End: 2019-12-03 | Stop reason: HOSPADM

## 2019-12-03 RX ORDER — HEPARIN SODIUM 10000 [USP'U]/100ML
12 INJECTION, SOLUTION INTRAVENOUS CONTINUOUS
Status: DISCONTINUED | OUTPATIENT
Start: 2019-12-03 | End: 2019-12-03 | Stop reason: HOSPADM

## 2019-12-03 RX ORDER — DEXTROSE MONOHYDRATE 50 MG/ML
100 INJECTION, SOLUTION INTRAVENOUS PRN
Status: DISCONTINUED | OUTPATIENT
Start: 2019-12-03 | End: 2019-12-03 | Stop reason: HOSPADM

## 2019-12-03 RX ORDER — AMLODIPINE BESYLATE 10 MG/1
10 TABLET ORAL DAILY
Status: DISCONTINUED | OUTPATIENT
Start: 2019-12-03 | End: 2019-12-06 | Stop reason: HOSPADM

## 2019-12-03 RX ORDER — ASPIRIN 81 MG/1
81 TABLET ORAL DAILY
Status: DISCONTINUED | OUTPATIENT
Start: 2019-12-03 | End: 2019-12-03 | Stop reason: HOSPADM

## 2019-12-03 RX ORDER — ONDANSETRON 2 MG/ML
4 INJECTION INTRAMUSCULAR; INTRAVENOUS EVERY 6 HOURS PRN
Status: DISCONTINUED | OUTPATIENT
Start: 2019-12-03 | End: 2019-12-03 | Stop reason: HOSPADM

## 2019-12-03 RX ORDER — CLOPIDOGREL BISULFATE 75 MG/1
75 TABLET ORAL DAILY
Status: DISCONTINUED | OUTPATIENT
Start: 2019-12-03 | End: 2019-12-03 | Stop reason: HOSPADM

## 2019-12-03 RX ORDER — VITAMIN B COMPLEX
1000 TABLET ORAL 2 TIMES DAILY
Status: DISCONTINUED | OUTPATIENT
Start: 2019-12-03 | End: 2019-12-03 | Stop reason: HOSPADM

## 2019-12-03 RX ORDER — AMLODIPINE BESYLATE 5 MG/1
10 TABLET ORAL DAILY
Status: DISCONTINUED | OUTPATIENT
Start: 2019-12-03 | End: 2019-12-03 | Stop reason: HOSPADM

## 2019-12-03 RX ORDER — DEXTROSE MONOHYDRATE 25 G/50ML
12.5 INJECTION, SOLUTION INTRAVENOUS PRN
Status: DISCONTINUED | OUTPATIENT
Start: 2019-12-03 | End: 2019-12-03 | Stop reason: HOSPADM

## 2019-12-03 RX ORDER — GLIPIZIDE 5 MG/1
10 TABLET ORAL
Status: DISCONTINUED | OUTPATIENT
Start: 2019-12-03 | End: 2019-12-03 | Stop reason: HOSPADM

## 2019-12-03 RX ADMIN — PIOGLITAZONE 30 MG: 15 TABLET ORAL at 10:13

## 2019-12-03 RX ADMIN — MAGNESIUM SULFATE 1 G: 1 INJECTION INTRAVENOUS at 03:00

## 2019-12-03 RX ADMIN — LISINOPRIL 20 MG: 20 TABLET ORAL at 10:13

## 2019-12-03 RX ADMIN — METHYLPREDNISOLONE SODIUM SUCCINATE 125 MG: 125 INJECTION, POWDER, FOR SOLUTION INTRAMUSCULAR; INTRAVENOUS at 03:00

## 2019-12-03 RX ADMIN — INSULIN LISPRO 4 UNITS: 100 INJECTION, SOLUTION INTRAVENOUS; SUBCUTANEOUS at 13:04

## 2019-12-03 RX ADMIN — HEPARIN SODIUM AND DEXTROSE 12 UNITS/KG/HR: 10000; 5 INJECTION INTRAVENOUS at 09:50

## 2019-12-03 RX ADMIN — SODIUM CHLORIDE: 9 INJECTION, SOLUTION INTRAVENOUS at 12:29

## 2019-12-03 RX ADMIN — INSULIN LISPRO 3 UNITS: 100 INJECTION, SOLUTION INTRAVENOUS; SUBCUTANEOUS at 10:14

## 2019-12-03 RX ADMIN — ASPIRIN 81 MG 324 MG: 81 TABLET ORAL at 03:00

## 2019-12-03 RX ADMIN — VITAMIN D, TAB 1000IU (100/BT) 1000 UNITS: 25 TAB at 10:13

## 2019-12-03 RX ADMIN — ENOXAPARIN SODIUM 70 MG: 80 INJECTION SUBCUTANEOUS at 03:55

## 2019-12-03 RX ADMIN — SODIUM CHLORIDE, PRESERVATIVE FREE 10 ML: 5 INJECTION INTRAVENOUS at 21:50

## 2019-12-03 RX ADMIN — GLIPIZIDE 10 MG: 5 TABLET ORAL at 06:45

## 2019-12-03 RX ADMIN — CEFTRIAXONE 1 G: 1 INJECTION, POWDER, FOR SOLUTION INTRAMUSCULAR; INTRAVENOUS at 04:03

## 2019-12-03 RX ADMIN — SODIUM CHLORIDE 1000 ML: 9 INJECTION, SOLUTION INTRAVENOUS at 03:55

## 2019-12-03 RX ADMIN — AZITHROMYCIN MONOHYDRATE 500 MG: 500 INJECTION, POWDER, LYOPHILIZED, FOR SOLUTION INTRAVENOUS at 04:31

## 2019-12-03 RX ADMIN — AMLODIPINE BESYLATE 10 MG: 5 TABLET ORAL at 10:13

## 2019-12-03 RX ADMIN — INSULIN LISPRO 3 UNITS: 100 INJECTION, SOLUTION INTRAVENOUS; SUBCUTANEOUS at 21:12

## 2019-12-03 ASSESSMENT — PAIN SCALES - GENERAL
PAINLEVEL_OUTOF10: 0

## 2019-12-03 NOTE — PROGRESS NOTES
Normal Values                        FEV1 Predicted Normal Values          Age                                     Height in Feet and Inches       Age                                     Height in Feet and Inches       4' 11\" 5' 1\" 5' 3\" 5' 5\" 5' 7\" 5' 9\" 5' 11\" 6' 1\"  4' 11\" 5' 1\" 5' 3\" 5' 5\" 5' 7\" 5' 9\" 5' 11\" 6' 1\"   42 - 45 2.49 2.66 2.84 3.03 3.22 3.42 3.62 3.83 42 - 45 2.82 3.03 3.26 3.49 3.72 3.96 4.22 4.47   46 - 49 2.40 2.57 2.76 2.94 3.14 3.33 3.54 3.75 46 - 49 2.70 2.92 3.14 3.37 3.61 3.85 4.10 4.36   50 - 53 2.31 2.48 2.66 2.85 3.04 3.24 3.45 3.66 50 - 53 2.58 2.80 3.02 3.25 3.49 3.73 3.98 4.24   54 - 57 2.21 2.38 2.57 2.75 2.95 3.14 3.35 3.56 54 - 57 2.46 2.67 2.89 3.12 3.36 3.60 3.85 4.11   58 - 61 2.10 2.28 2.46 2.65 2.84 3.04 3.24 3.45 58 - 61 2.32 2.54 2.76 2.99 3.23 3.47 3.72 3.98   62 - 65 1.99 2.17 2.35 2.54 2.73 2.93 3.13 3.34 62 - 65 2.19 2.40 2.62 2.85 3.09 3.33 3.58 3.84   66 - 69 1.88 2.05 2.23 2.42 2.61 2.81 3.02 3.23 66 - 69 2.04 2.26 2.48 2.71 2.95 3.19 3.44 3.70   70+ 1.82 1.99 2.17 2.36 2.55 2.75 2.95 3.16 70+ 1.97 2.19 2.41 2.64 2.87 3.12 3.37 3.62             Predicted Peak Expiratory Flow Rate                                       Height (in)  Female       Height (in) Male           Age 92 18 19 69 73 37 78 74 Age            20 270 796 058 498 154 422 564 011  29 10 42 14 70 13 74 45 96   25 337 352 366 381 396 411 426 441 25 447 476 505 533 [phone] 677   30 329 344 359 374 389 404 419 434 30 437 466 494 523 552 580 609 638 667   35 322 337 351 366 381 396 411 426 35 426 455 484 512 541 570 598 627 657   40 314 329 344 359 374 389 404 419 40 416 445 473 502 531 559 588 617 647   45 307 322 336 351 366 381 396 411 45 405 434 463 491 520 549 577 606 636   50 299 314 329 344 359 374 389 404 50 395 424 452 481 510 538 567 596 625   55 292 307 321 336 351 366 381 396 55 384 413 442 470 499 528 556 585 615   60 284 299 314 329 344 359 374 389 60 374 403 431 460 489 517 546 575 605 65 277 292 306 321 336 351 366 381 65 363 392 421 449 478 507 535 564 594   70 269 284 299 314 329 344 359 374 70 353 382 410 439 468 496 525 554 583   75 261 274 289 305 319 334 348 364 75 344 372 400 429 458 487 515 544 573   80 253 266 282 296 312 327 342 356 80 335 362 390 419 448 [phone] 562

## 2019-12-03 NOTE — PROGRESS NOTES
Incentive Spirometry education and demonstration given by Respiratory Therapy. Pt achieving 1000 mL at time of instruction. Incentive Spirometer left at bedside and   Patient instructed to do a minimum of 10 breaths every hour.       Moshe Howard  6:46 AM

## 2019-12-03 NOTE — H&P
HOSPITALIST ADMISSION H&P      REASON FOR ADMISSION:  Pneumonia----RLL-----elevated troponins  ESTIMATED LENGTH OF STAY:  > 2 midnights---2-4 days    ATTENDING/ADMITTING PHYSICIAN: Radha Paniagua MD  PCP: ESTHER Kaufmna CNP    HISTORY OF PRESENT ILLNESS:      The patient is a 69 y.o. male patient of ESTHER Kaufman CNP who presents with increasing shortness of breath---was hunting several days before noted to be episodically winded--seen in ER--CXR --> RLL air space disease----elevated troponins--seen by Cardiology --> [place] [id number] for cardiac catheterization---EKG consistent with at least prior inferior infarction       See below for additional PMH. Patient ydwl-zjyzlquuev-zzsvdriw-available records reviewed, including, but not limited to, ER reports--labs--imaging----EKGs---office records--personal notes       Past Medical History:   Diagnosis Date    Acne rosacea     treated with MetroGel    Anemia     Aortic stenosis     echo 3/18 Mod AS, Mild MR    BPH (benign prostatic hypertrophy)     CAD (coronary artery disease)     Stent x 3 DEVON 8/16    Carotid stenosis     Fairly minimal plaquing on ultrasound 5/14--in the conclusion described as less than 50% stenosis but in the body of the report described as minimal    Cataract of both eyes     CRI (chronic renal insufficiency)     Diabetes mellitus, type 2 (HCC)     1.) Proteinuria, 24 hour total urine protein 1,420 mg/24 hrs, 09/08, creatinine clearance 114, 09/08 2.) Repeat protein/creatinine ratio 2.97, 02/12 3. )24 hr urine collection 1958 mg/24 hrs, 04/12. 4.) Repeat protein/creatinine ratio 1.99, 08/12 a.) Repeat protein/creatinine ratio 2.14, 07/13 5.) Renal u/s ok 02/12  6) retinopathy at [address] 12/16  early mod. nonprolifferative  macular involved    Dyslipidemia     Erectile dysfunction     H/O agent Orange exposure     per patient in the [employer] along with exposure to cresote and naphtha    Hyperlipidemia     Hypertension exacerbation ([facility] [address]    Pneumonia of right lower lobe due to infectious organism ([facility] [address]     SHIRLEY CERVANTES md             dc    IM  73  WM  [brayan Rhodes NP--IM; DC Cardiology---[facility]]  FULL CODE    LOVENOX--dcd HEPARIN gtt   SUPPLEMENTAL OXYGEN   AGENT ORANGE exposure     Anti-infectives:  Rocephin IV, Zithromax IV    RLL pneumonia---12. 3.2019  COPD exacerbation due to pneumonia----12. 3.2019          CXR---12. 3.2019---RLL air space disease  Elevated troponin---12. 3.2019  CKD--Stage 3    ASCVD         EKG----12. 3.2019----#2--NSR--73--RSR V1-2--RBBB---inferior infarction         EKG---12. 3.2019--NSR--83---RSR V1-2---RBBB--inferior infarction          2D ECHO---4.29.2019---moderately dilated LA--thickened IVS---inferolateral                         LV thin-akinetic--low normal LVSF--mildly dilated RA--RV dilatation--                        NRVSF--MAC--mild MR--AV leaflet calcification---moderate AS pg 61 mm Hg                         mg 33 mm Hg--trivial TR--RVSP ~ 43 mm Hg---mild pulmonary HTN          Cardiac catheterization---8.23.2016---PTCA--DEVON stent [id number]-LAD--0% LM--                         75% mid-LAD---99% D1--99% OM1---30% left circumflex  Valvular heart disease         Non-rheumatic MR         Non-rheumatic AS  III/VI LENNIE--AS  Peripheral vascular disease         Carotid stenosis   RBBB  Hypertension  Hyperlipidemia   Diabetes Mellitus Type 2           Mild non-proliferative retinopathy           Diabetic proteinuric nephropathy  Tobacco abuse---quit---1.1.1970  HEARING IMPAIRMENT  PMH:  acne rosacea, anemia, BPH, ED, dentures   PSH:  see above, cataract--IOL---OU, circumcision--age 51, PE tubes, vasectomy     Allergies: sulfa---swelling     Code Status:   FULL CODE  OARRS---12. 3.2019         PLAN:    1. Seen by Cardiology---to [facility] for cardiac catheterization  2. Pneumonia--IV antibiotics---med nebs---O2 as required  3. Diabetic admission---orders  4.   Home medications

## 2019-12-03 NOTE — FLOWSHEET NOTE
Assessment: Patient is just learning that he will be transferred to [facility] for a heart cath. He is accepting the transfer with some denial that he has had a heart event. His wife is present and supportive. He is connected and active in his Sikhism and Sikhism has been notified. Intervention: Engaged in conversation. Blessed patient and notified Sikhism. Called [facility] regarding transfer. Patient expressed gratitude for visit and offer of continued prayers. Plan: Chaplains remain available for spiritual and emotional support.        12/03/19 1036   Encounter Summary   Services provided to: Patient and family together   Referral/Consult From: [facility] Family members   Place [facility] Yes   Continue Visiting   (12/3/19)   Complexity of Encounter Moderate   Length of Encounter 15 minutes   Spiritual Assessment Completed Yes   Spiritual/Pentecostalism   Type Spiritual support   Assessment Approachable   Intervention Active listening;Prayer   Outcome Expressed gratitude;Engaged in conversation

## 2019-12-03 NOTE — PROGRESS NOTES
Physical Therapy    Facility/Department: [facility]  PROGRESSIVE CARE  Initial Assessment    NAME: Graham Santana  : 1945  MRN: [id number]    Date of Service: 12/3/2019    Discharge Recommendations:  Home independently        Assessment   Prognosis: Good  Decision Making: Low Complexity  No Skilled PT: Independent with functional mobility   REQUIRES PT FOLLOW UP: No  Activity Tolerance  Activity Tolerance: Patient Tolerated treatment well       Patient Diagnosis(es): The primary encounter diagnosis was Pneumonia of right lower lobe due to infectious organism ([facility] [address] Diagnoses of BJ (acute kidney injury) ([facility] [address] Hypoxia, and Elevated troponin were also pertinent to this visit. has a past medical history of Acne rosacea, Anemia, Aortic stenosis, BPH (benign prostatic hypertrophy), CAD (coronary artery disease), Carotid stenosis, Cataract of both eyes, CRI (chronic renal insufficiency), Diabetes mellitus, type 2 ([facility] [address] Dyslipidemia, Erectile dysfunction, H/O agent Orange exposure, Hyperlipidemia, Hypertension, Non-rheumatic mitral regurgitation, RBBB, and Wears dentures. has a past surgical history that includes Circumcision (age 49); Vasectomy; Tympanostomy tube placement; Cataract removal (Bilateral); and eye surgery (Bilateral).     Restrictions     Vision/Hearing        Subjective  General  Chart Reviewed: Yes  Patient assessed for rehabilitation services?: Yes  Response To Previous Treatment: Not applicable  Family / Caregiver Present: Yes  Follows Commands: Within Functional Limits  Pain Screening  Patient Currently in Pain: No  Vital Signs  Patient Currently in Pain: No       Orientation  Orientation  Overall Orientation Status: Within Normal Limits  Social/Functional History  Social/Functional History  Lives With: Spouse  Type of Home: House  Home Layout: One level  ADL Assistance: Independent  Homemaking Assistance: Independent  Ambulation Assistance: Independent  Transfer Assistance: Independent  Active : Yes  Mode of Transportation: Car  Occupation: Full time employment  Cognition        Objective                      Bed mobility  Supine to Sit: Independent  Sit to Supine: Independent  Scooting: Independent  Transfers  Sit to Stand: Independent  Stand to sit:  Independent  Ambulation  Ambulation?: Yes  Ambulation 1  Device: No Device  Assistance: Independent     Balance  Sitting - Static: Good  Sitting - Dynamic: Good  Standing - Static: Good  Standing - Dynamic: Good        Plan        G-Code       OutComes Score                                                  AM-PAC Score             Goals  Short term goals  Time Frame for Short term goals: 1 day  Short term goal 1: Assess functional status       Therapy Time   Individual Concurrent Group Co-treatment   Time In 1015         Time Out 1025         Minutes 10                 Aldo Hernandez PT

## 2019-12-03 NOTE — CARE COORDINATION
Pt plans on returning home after discharge. Denies any needs at this time. Pt is independent with all ADL's , still drives, and bathroom/ bedroom are first floor accessible.

## 2019-12-03 NOTE — CONSULTS
tablet Take 1 tablet by mouth daily 8/24/16  Yes Eric Castillo APRN - CNP   amLODIPine (NORVASC) 10 MG tablet Take 10 mg by mouth daily. Yes Historical Provider, MD   aspirin 81 MG EC tablet Take 81 mg by mouth daily. Yes Historical Provider, MD       Current Medications: Scheduled Meds:   amLODIPine  10 mg Oral Daily    aspirin  81 mg Oral Daily    metFORMIN  1,000 mg Oral BID WC    lisinopril  20 mg Oral Daily    clopidogrel  75 mg Oral Daily    Vitamin D  1,000 Units Oral BID    pioglitazone  30 mg Oral Daily    simvastatin  10 mg Oral Nightly    glipiZIDE  10 mg Oral BID AC    sodium chloride flush  10 mL Intravenous 2 times per day    [START ON 12/4/2019] azithromycin  500 mg Intravenous Q24H    And    [START ON 12/4/2019] cefTRIAXone (ROCEPHIN) IV  1 g Intravenous Q24H    [START ON 12/4/2019] enoxaparin  1 mg/kg Subcutaneous Daily    insulin lispro  0-6 Units Subcutaneous TID WC    insulin lispro  0-3 Units Subcutaneous Nightly     Continuous Infusions:   sodium chloride 75 mL/hr at 12/03/19 0615    dextrose      heparin (porcine)       PRN Meds:.sodium chloride flush, magnesium hydroxide, ondansetron, nicotine, acetaminophen, albuterol, glucose, dextrose, glucagon (rDNA), dextrose     Allergies:  Sulfa antibiotics    Social History:   reports that he quit smoking about 49 years ago. He has a 1.50 pack-year smoking history. He has never used smokeless tobacco. He reports current alcohol use. He reports that he does not use drugs. Family History: family history includes Coronary Art Dis in his brother and sister; Heart Attack in his father; Hypertension in his mother; Halina Lacer in his mother; Stroke in his mother.      Review of Systems   CONSTITUTIONAL:  negative for fevers, chills, fatigue and malaise    EYES:  negative for discharge    HEENT:  negative for epistaxis and sore throat    RESPIRATORY:  positive forshortness of breath, wheezing    CARDIOVASCULAR:  negative for chest pain, palpitations, syncope, edema    GASTROINTESTINAL:  negative for nausea, vomiting, diarrhea, constipation, abdominal pain    GENITOURINARY:  negative for incontinence    MUSCULOSKELETAL:  negative for neck or back pain    NEUROLOGICAL:  negative for headaches, seizures and double vision   PSYCHIATRIC:  negative               PHYSICAL EXAM:    Blood pressure 129/62, pulse 72, temperature 98 °F (36.7 °C), temperature source Oral, resp. rate 18, height 5' 6\" (1.676 m), weight 172 lb 3.2 oz (78.1 kg), SpO2 92 %. CONSTITUTIONAL: AOx4, no apparent distress, appears stated age   HEAD: normocephalic, atraumatic   EYES: PERRLA, EOMI   ENT: moist mucous membranes, uvula midline   NECK:  symmetric, no midline tenderness to palpation   LUNGS: clear to auscultation bilaterally   CARDIOVASCULAR: regular rate and rhythm, ESM  murmurs  ABDOMEN: Soft, non-tender, non-distended with normal active bowel sounds   SKIN: no rash       DATA:    ECG:NSR , Hyperacute T changes lateral leads   Echo:4/2019  Normal left ventricular diameter. Thickened interventricular septum. Inferolateral wall is thin and akinetic. Left ventricular systolic function is low normal.  Left ventricular ejection fraction 50 %. Grade II Moderate diastolic dysfunction. Left atrium is moderately dilated. Right atrium is mildly dilated . Aortic leaflet calcification with moderate stenosis. Peak Velocity 3.83  cm/s, mean gradient 33 mmHg. Mitral annular calcification. Mild mitral regurgitation. Trivial tricuspid regurgitation. Estimated right ventricular systolic pressure is 43 mmHg. Mild pulmonary  hypertension.       Stress:  Cardiac cath 08/23/16 showed 3 vessel CAD. LN normal. LAD mid 70% stenosis and D1 99% stenosis. LCX 30%. OM1 proximal 99% stenosis. RCA 30% stenosis. RPDA distal 100% stenosis where it was a small vessel with faint left to right collaterals. DEVON to mid LAD, D1 and OM1.     Labs:     CBC:   Recent Labs     12/03/19  0310

## 2019-12-03 NOTE — ED TRIAGE NOTES
Patient presents to ED via EMS after waking up short of breath approximately 1 hour prior to arrival.  Patient denies chest pain or further symptoms. He was found to have an SPO2 of 88% by EMS and they did do an atrovent treatment en route. Patient has history of CAD with multiple stents placed.

## 2019-12-03 NOTE — ED PROVIDER NOTES
artery disease), Carotid stenosis, Cataract of both eyes, CRI (chronic renal insufficiency), Diabetes mellitus, type 2 ([facility] [address] [address] Dyslipidemia, Erectile dysfunction, H/O agent Orange exposure, Hyperlipidemia, Hypertension, Non-rheumatic mitral regurgitation, RBBB, and Wears dentures. SURGICAL HISTORY      has a past surgical history that includes Circumcision (age 49); Vasectomy; Tympanostomy tube placement; Cataract removal (Bilateral); and eye surgery (Bilateral). CURRENT MEDICATIONS       Previous Medications    AMLODIPINE (NORVASC) 10 MG TABLET    Take 10 mg by mouth daily. ASPIRIN 81 MG EC TABLET    Take 81 mg by mouth daily. CLOPIDOGREL (PLAVIX) 75 MG TABLET    Take 1 tablet by mouth daily Take 4 tabs first day then one daily. GLIPIZIDE (GLUCOTROL) 10 MG TABLET    Take 10 mg by mouth 2 times daily (before meals)    LISINOPRIL (PRINIVIL;ZESTRIL) 20 MG TABLET    Take 1 tablet by mouth daily    METFORMIN (GLUCOPHAGE) 1000 MG TABLET    Take 1 tablet by mouth 2 times daily (with meals)    PIOGLITAZONE (ACTOS) 30 MG TABLET    Take 30 mg by mouth daily    SIMVASTATIN (ZOCOR) 10 MG TABLET    Take 10 mg by mouth nightly    VITAMIN D (CHOLECALCIFEROL) 1000 UNIT TABS TABLET    Take 1,000 Units by mouth 2 times daily        ALLERGIES     is allergic to sulfa antibiotics. FAMILY HISTORY     He indicated that the status of his mother is unknown. He indicated that his father is . He indicated that his sister is . He indicated that the status of his brother is unknown.     family history includes Coronary Art Dis in his brother and sister; Heart Attack in his father; Hypertension in his mother; Allie Shouts in his mother; Stroke in his mother. SOCIAL HISTORY      reports that he quit smoking about 49 years ago. He has a 1.50 pack-year smoking history. He has never used smokeless tobacco. He reports current alcohol use. He reports that he does not use drugs.   I counseled the patient against mmol/L    Anion Gap 12 9 - 17 mmol/L    GFR Non-African American 58 (L) >60 mL/min    GFR African American >60 >60 mL/min    GFR Comment          GFR Staging NOT REPORTED    D-Dimer   Result Value Ref Range    D-Dimer, Quant 235 <400 ng/mL   Troponin   Result Value Ref Range    Troponin, High Sensitivity 135 (HH) 0 - 22 ng/L    Troponin T NOT REPORTED <0.03 ng/mL    Troponin Interp NOT REPORTED    Brain Natriuretic Peptide   Result Value Ref Range    Pro-BNP 2,131 (H) <300 pg/mL    BNP Interpretation Pro-BNP Reference Range:        EMERGENCY DEPARTMENT COURSE:     ED Course as of Dec 03 0422   Tue Dec 03, 2019   0343 I updated the patient on his results. He states he overall feels improved. He still requires 2L supplemental oxygen via NC to maintain oxygen saturations in the mid 90's. Plan to treat RLL pneumonia with azithromycin and rocephin. Patient agreeable with plan for admission.    [AM]   0350 I spoke to cardiology, Dr. Conor Du, and updated him on the patient. He recommends giving the patient Lovenox 1mg/kg and admitting to [facility] for cardiology to see in the morning.  I updated the patient on cardiology recommendations.     [AM]   0422 I spoke to DEWAYNE [facility] JHONATHAN NP who agrees to admit the patient with cardiology consult    [AM]      ED Course User Index  [AM] Shailesh Cade,        The patient was given the following medications:  Orders Placed This Encounter   Medications    methylPREDNISolone sodium (SOLU-MEDROL) injection 125 mg    magnesium sulfate 1 g in dextrose 5% 100 mL IVPB    aspirin chewable tablet 324 mg    cefTRIAXone (ROCEPHIN) 1 g IVPB in 50 mL D5W minibag    azithromycin (ZITHROMAX) 500 mg in D5W 250ml addavial    0.9 % sodium chloride bolus    enoxaparin (LOVENOX) injection 70 mg        Vitals:    Vitals:    12/03/19 0304 12/03/19 0326 12/03/19 0345 12/03/19 0352   BP:  131/62  134/63   Pulse: 73 75  73   Resp:   20 20   Temp:       SpO2: 93% 95%  94%   Weight: -------------------------  BP: 134/63, Temp: 97.5 °F (36.4 °C), Pulse: 73, Resp: 20    CONSULTS:  None    CRITICAL CARE:   None    PROCEDURES:  None    DIAGNOSIS/ MDM:   Liliana Starkey is a 69 y.o. male who presents with shortness of breath. The patient arrives hypoxic and slightly tachypneic. Vital signs are otherwise stable. He was given Atrovent and albuterol by EMS in route to the hospital with significant improvement in his symptoms. The patient was treated with supplemental oxygen, magnesium and Solu-Medrol and his symptoms continue to improve. Heart regular rate and rhythm. Lungs clear to auscultation. Abdomen soft nontender. Capillary refill less than 2 seconds. Radial pulses 2/4 and equal bilaterally. BMP shows signs of acute kidney injury with elevated BUN/creatinine. He was treated with 500cc of IV fluids. D-dimer is unremarkable. CBC shows a slight leukocytosis of 12.1 but is otherwise unchanged from baseline. BNP is elevated at 2,131 but he has no signs of edema or fluid overload. Troponin is elevated at 135 but there may be some issues with calibrating the machine. EKGx2 does not show any evidence of ST elevation or depression. The patient continues to deny any chest pain. Chest x-ray shows a right lower lobe pneumonia and he was started on Rocephin and Azithromycine. I spoke to cardiology on-call, Dr. Conor Du, who recommends treating the patient with 1mg/kg of Lovenox and admitting him to [facility]. He states that cardiology will evaluate him in the morning. I spoke to DEWAYNE ONEIL CTR NP at [place] [address] who agrees to admit the patient. FINAL IMPRESSION      1. Pneumonia of right lower lobe due to infectious organism ([facility] [address]    2. BJ (acute kidney injury) ([facility] [address]    3. Hypoxia    4.  Elevated troponin          DISPOSITION/PLAN   DISPOSITION  Admit        (Please note that portions of this note were completed with a voice recognitionprogram.  Efforts were made to edit the dictations but occasionally words are mis-transcribed.)    Fredricka Landau DO  Emergency Physician Attending             Fredricka Landau, DO  12/03/19 042

## 2019-12-04 LAB
-: NORMAL
ANION GAP SERPL CALCULATED.3IONS-SCNC: 13 MMOL/L (ref 9–17)
BUN BLDV-MCNC: 31 MG/DL (ref 8–23)
BUN/CREAT BLD: ABNORMAL (ref 9–20)
CALCIUM SERPL-MCNC: 9 MG/DL (ref 8.6–10.4)
CHLORIDE BLD-SCNC: 103 MMOL/L (ref 98–107)
CO2: 20 MMOL/L (ref 20–31)
CREAT SERPL-MCNC: 1.1 MG/DL (ref 0.7–1.2)
EKG ATRIAL RATE: 73 BPM
EKG ATRIAL RATE: 83 BPM
EKG P AXIS: 44 DEGREES
EKG P AXIS: 46 DEGREES
EKG P-R INTERVAL: 196 MS
EKG P-R INTERVAL: 202 MS
EKG Q-T INTERVAL: 378 MS
EKG Q-T INTERVAL: 410 MS
EKG QRS DURATION: 102 MS
EKG QRS DURATION: 104 MS
EKG QTC CALCULATION (BAZETT): 444 MS
EKG QTC CALCULATION (BAZETT): 451 MS
EKG R AXIS: -17 DEGREES
EKG R AXIS: -24 DEGREES
EKG T AXIS: 27 DEGREES
EKG T AXIS: 40 DEGREES
EKG VENTRICULAR RATE: 73 BPM
EKG VENTRICULAR RATE: 83 BPM
ESTIMATED AVERAGE GLUCOSE: 160 MG/DL
GFR AFRICAN AMERICAN: >60 ML/MIN
GFR NON-AFRICAN AMERICAN: >60 ML/MIN
GFR SERPL CREATININE-BSD FRML MDRD: ABNORMAL ML/MIN/{1.73_M2}
GFR SERPL CREATININE-BSD FRML MDRD: ABNORMAL ML/MIN/{1.73_M2}
GLUCOSE BLD-MCNC: 192 MG/DL (ref 75–110)
GLUCOSE BLD-MCNC: 235 MG/DL (ref 75–110)
GLUCOSE BLD-MCNC: 312 MG/DL (ref 75–110)
GLUCOSE BLD-MCNC: 320 MG/DL (ref 75–110)
GLUCOSE BLD-MCNC: 334 MG/DL (ref 75–110)
GLUCOSE BLD-MCNC: 337 MG/DL (ref 70–99)
HBA1C MFR BLD: 7.2 % (ref 4–6)
HCT VFR BLD CALC: 37.9 % (ref 40.7–50.3)
HEMOGLOBIN: 12.5 G/DL (ref 13–17)
MCH RBC QN AUTO: 27.6 PG (ref 25.2–33.5)
MCHC RBC AUTO-ENTMCNC: 33 G/DL (ref 28.4–34.8)
MCV RBC AUTO: 83.7 FL (ref 82.6–102.9)
NRBC AUTOMATED: 0 PER 100 WBC
PDW BLD-RTO: 14.6 % (ref 11.8–14.4)
PLATELET # BLD: 390 K/UL (ref 138–453)
PMV BLD AUTO: 12.1 FL (ref 8.1–13.5)
POTASSIUM SERPL-SCNC: 4.7 MMOL/L (ref 3.7–5.3)
RBC # BLD: 4.53 M/UL (ref 4.21–5.77)
REASON FOR REJECTION: NORMAL
SODIUM BLD-SCNC: 136 MMOL/L (ref 135–144)
TROPONIN INTERP: ABNORMAL
TROPONIN T: ABNORMAL NG/ML
TROPONIN, HIGH SENSITIVITY: 112 NG/L (ref 0–22)
WBC # BLD: 18.7 K/UL (ref 3.5–11.3)
ZZ NTE CLEAN UP: ORDERED TEST: NORMAL
ZZ NTE WITH NAME CLEAN UP: SPECIMEN SOURCE: NORMAL

## 2019-12-04 PROCEDURE — 6360000002 HC RX W HCPCS: Performed by: NURSE PRACTITIONER

## 2019-12-04 PROCEDURE — 6370000000 HC RX 637 (ALT 250 FOR IP): Performed by: STUDENT IN AN ORGANIZED HEALTH CARE EDUCATION/TRAINING PROGRAM

## 2019-12-04 PROCEDURE — 6370000000 HC RX 637 (ALT 250 FOR IP): Performed by: NURSE PRACTITIONER

## 2019-12-04 PROCEDURE — 83036 HEMOGLOBIN GLYCOSYLATED A1C: CPT

## 2019-12-04 PROCEDURE — 2060000000 HC ICU INTERMEDIATE R&B

## 2019-12-04 PROCEDURE — 84484 ASSAY OF TROPONIN QUANT: CPT

## 2019-12-04 PROCEDURE — 99222 1ST HOSP IP/OBS MODERATE 55: CPT | Performed by: NURSE PRACTITIONER

## 2019-12-04 PROCEDURE — 2580000003 HC RX 258: Performed by: STUDENT IN AN ORGANIZED HEALTH CARE EDUCATION/TRAINING PROGRAM

## 2019-12-04 PROCEDURE — 36415 COLL VENOUS BLD VENIPUNCTURE: CPT

## 2019-12-04 PROCEDURE — 94640 AIRWAY INHALATION TREATMENT: CPT

## 2019-12-04 PROCEDURE — 82947 ASSAY GLUCOSE BLOOD QUANT: CPT

## 2019-12-04 PROCEDURE — 2580000003 HC RX 258: Performed by: NURSE PRACTITIONER

## 2019-12-04 PROCEDURE — 80048 BASIC METABOLIC PNL TOTAL CA: CPT

## 2019-12-04 PROCEDURE — 99221 1ST HOSP IP/OBS SF/LOW 40: CPT | Performed by: THORACIC SURGERY (CARDIOTHORACIC VASCULAR SURGERY)

## 2019-12-04 PROCEDURE — 2700000000 HC OXYGEN THERAPY PER DAY

## 2019-12-04 PROCEDURE — 85027 COMPLETE CBC AUTOMATED: CPT

## 2019-12-04 RX ORDER — FAMOTIDINE 20 MG/1
20 TABLET, FILM COATED ORAL DAILY
Status: DISCONTINUED | OUTPATIENT
Start: 2019-12-04 | End: 2019-12-06 | Stop reason: HOSPADM

## 2019-12-04 RX ORDER — DEXTROSE MONOHYDRATE 50 MG/ML
100 INJECTION, SOLUTION INTRAVENOUS PRN
Status: DISCONTINUED | OUTPATIENT
Start: 2019-12-04 | End: 2019-12-06 | Stop reason: HOSPADM

## 2019-12-04 RX ORDER — CLOPIDOGREL BISULFATE 75 MG/1
75 TABLET ORAL DAILY
Status: DISCONTINUED | OUTPATIENT
Start: 2019-12-04 | End: 2019-12-06 | Stop reason: HOSPADM

## 2019-12-04 RX ORDER — NICOTINE POLACRILEX 4 MG
15 LOZENGE BUCCAL PRN
Status: DISCONTINUED | OUTPATIENT
Start: 2019-12-04 | End: 2019-12-06 | Stop reason: HOSPADM

## 2019-12-04 RX ORDER — DEXTROSE MONOHYDRATE 25 G/50ML
12.5 INJECTION, SOLUTION INTRAVENOUS PRN
Status: DISCONTINUED | OUTPATIENT
Start: 2019-12-04 | End: 2019-12-06 | Stop reason: HOSPADM

## 2019-12-04 RX ADMIN — SODIUM CHLORIDE, PRESERVATIVE FREE 10 ML: 5 INJECTION INTRAVENOUS at 20:38

## 2019-12-04 RX ADMIN — AMLODIPINE BESYLATE 10 MG: 10 TABLET ORAL at 09:01

## 2019-12-04 RX ADMIN — INSULIN LISPRO 4 UNITS: 100 INJECTION, SOLUTION INTRAVENOUS; SUBCUTANEOUS at 15:08

## 2019-12-04 RX ADMIN — FAMOTIDINE 20 MG: 20 TABLET, FILM COATED ORAL at 15:23

## 2019-12-04 RX ADMIN — Medication 81 MG: at 09:01

## 2019-12-04 RX ADMIN — PIPERACILLIN AND TAZOBACTAM 3.38 G: 3; .375 INJECTION, POWDER, FOR SOLUTION INTRAVENOUS at 15:05

## 2019-12-04 RX ADMIN — PIPERACILLIN AND TAZOBACTAM 3.38 G: 3; .375 INJECTION, POWDER, FOR SOLUTION INTRAVENOUS at 23:22

## 2019-12-04 RX ADMIN — INSULIN LISPRO 4 UNITS: 100 INJECTION, SOLUTION INTRAVENOUS; SUBCUTANEOUS at 18:38

## 2019-12-04 RX ADMIN — CLOPIDOGREL 75 MG: 75 TABLET, FILM COATED ORAL at 09:01

## 2019-12-04 RX ADMIN — INSULIN LISPRO 2 UNITS: 100 INJECTION, SOLUTION INTRAVENOUS; SUBCUTANEOUS at 09:27

## 2019-12-04 RX ADMIN — INSULIN LISPRO 1 UNITS: 100 INJECTION, SOLUTION INTRAVENOUS; SUBCUTANEOUS at 20:51

## 2019-12-04 RX ADMIN — VITAMIN D, TAB 1000IU (100/BT) 1000 UNITS: 25 TAB at 20:38

## 2019-12-04 RX ADMIN — DESMOPRESSIN ACETATE 40 MG: 0.2 TABLET ORAL at 20:38

## 2019-12-04 RX ADMIN — ALBUTEROL SULFATE 2 PUFF: 90 AEROSOL, METERED RESPIRATORY (INHALATION) at 07:50

## 2019-12-04 RX ADMIN — VITAMIN D, TAB 1000IU (100/BT) 1000 UNITS: 25 TAB at 09:00

## 2019-12-04 RX ADMIN — SODIUM CHLORIDE, PRESERVATIVE FREE 10 ML: 5 INJECTION INTRAVENOUS at 09:04

## 2019-12-04 RX ADMIN — LISINOPRIL 20 MG: 20 TABLET ORAL at 09:01

## 2019-12-04 ASSESSMENT — ENCOUNTER SYMPTOMS
WHEEZING: 0
CONSTIPATION: 0
SINUS PAIN: 0
BACK PAIN: 0
DIARRHEA: 0
SHORTNESS OF BREATH: 1
NAUSEA: 0
COUGH: 1
VOMITING: 0
CHEST TIGHTNESS: 0
ABDOMINAL DISTENTION: 0
ABDOMINAL PAIN: 0
SINUS PRESSURE: 0

## 2019-12-04 NOTE — DISCHARGE SUMMARY
AS.  7.  Peripheral vascular disease, carotid stenosis. 8.  Right bundle-branch block. 9.  Hypertension. 10.  Hyperlipidemia. 11.  Diabetes mellitus type 2, with mild nonproliferative retinopathy,  diabetic proteinuric nephropathy. 12.  Tobacco abuse, quit 01/01/1970. 13.  Hearing impairment. Other medical problems set forth in the progress note of 12/03/2019,  incorporated for reference herein. HISTORY OF PRESENT ILLNESS AND HOSPITAL COURSE:  The patient is a  67-year-old white male, patient of Heidy Gipson, Nurse Practitioner,  Internal Medicine, and of [facility], [facility]. The patient presented with several issues along with right  lower lobe pneumonia, treated with Rocephin, Zithromax, med-neb  treatments, supplemental oxygen as required. The patient has COPD  exacerbation due to pneumonia. The patient noted to have elevated  troponin, was seen by Cardiology. The patient being transferred to  [facility] for cardiac  catheterization due to a non-ST elevation MI. Chronic kidney  disease at a stage III level. Underlying coronary artery disease, see  above. Prior stents OM1, D1, mid LAD 2016. The patient also has  valvular heart disease with grade 2 to 3/6 systolic ejection murmur of  aortic stenosis. The patient has had, what appears to be, a stable  right bundle-branch block, and hypertension, blood pressure 129/62. Prior smoker,     diabetic type 2, blood glucose elevated at 218. The  patient's hemoglobin A1c, however, is 6.7. The patient was in [address] agent orange exposure, which has  contributed to the patient's diabetes mellitus type 2 and other medical  issues. LABORATORY DATA:  Around the time of discharge, white cell count 12.1,  hemoglobin 13.1, hematocrit 40.2, platelets 563,846.   Sodium 139,  potassium 4.3, chloride 103, CO2 of 24, BUN 30, creatinine 1.2, glucose  218, calcium 9.6, GFR 58. Myoglobin 28. BNP (brain natriuretic  peptide) 2131. Hemoglobin A1c 6.7. DISCHARGE INSTRUCTIONS:  FOLLOWUP:  Discharged 12/03/2019, to [facility]. DIET:  Cardiac, diabetic. Held prior to transfer. ACTIVITY:  Bedrest on discharge and transfer. CONDITION ON DISCHARGE:  Improved. MEDICATIONS:  Heparin continuous infusion; Zithromax 500 mg p.o. IV  daily; ceftriaxone (Rocephin) 1 gm IV every 24 hours; simvastatin  (Zocor) 10 mg p.o. nightly; amlodipine (Norvasc) 10 mg p.o. daily;  aspirin 81 mg p.o. daily; lisinopril 20 mg p.o. daily; Plavix  (clopidogrel) 75 mg p.o. daily; vitamin D 1000 units twice daily; Actos  (pioglitazone) 30 mg p.o. daily; metformin (Glucophage) 1000 mg p.o.  b.i.d. with meals; glipizide (Glucotrol) 10 mg p.o. b.i.d. before meals. Follow up with the patient's personal care provider, Heidy Gipson,  Nurse Practitioner, Internal Medicine. [facility] with [facility]. The patient having been  transferred to [address] for  cardiac catheterization 12/03/2019. Any aspect of the patient's care not discussed in the chart and/or  dictation will be addressed and treated as an outpatient. The patient's medications have been reviewed including, but not limited  to, pre-hospital, hospital and discharge medications. The patient  and/or the patient's personal representatives were specifically advised  the only medications to be taken are those set forth in the discharge  orders and no other medications should be taken. Any prior medications  not on the discharge orders are specifically discontinued.         Clare Agustin    D: 12/03/2019 17:25:31       T: 12/03/2019 17:31:00     [email or web address]  Job#: [id number]     Doc#: [id number]    CC:

## 2019-12-05 LAB
FOLATE: 9.5 NG/ML
GLUCOSE BLD-MCNC: 239 MG/DL (ref 75–110)
GLUCOSE BLD-MCNC: 249 MG/DL (ref 75–110)
GLUCOSE BLD-MCNC: 272 MG/DL (ref 75–110)
GLUCOSE BLD-MCNC: 297 MG/DL (ref 75–110)
TSH SERPL DL<=0.05 MIU/L-ACNC: 1.55 MIU/L (ref 0.3–5)
VITAMIN B-12: 484 PG/ML (ref 232–1245)
VITAMIN D 25-HYDROXY: 26.3 NG/ML (ref 30–100)

## 2019-12-05 PROCEDURE — 94727 GAS DIL/WSHOT DETER LNG VOL: CPT

## 2019-12-05 PROCEDURE — 6360000002 HC RX W HCPCS: Performed by: FAMILY MEDICINE

## 2019-12-05 PROCEDURE — 2580000003 HC RX 258: Performed by: NURSE PRACTITIONER

## 2019-12-05 PROCEDURE — 2580000003 HC RX 258: Performed by: STUDENT IN AN ORGANIZED HEALTH CARE EDUCATION/TRAINING PROGRAM

## 2019-12-05 PROCEDURE — 6370000000 HC RX 637 (ALT 250 FOR IP): Performed by: STUDENT IN AN ORGANIZED HEALTH CARE EDUCATION/TRAINING PROGRAM

## 2019-12-05 PROCEDURE — 94060 EVALUATION OF WHEEZING: CPT

## 2019-12-05 PROCEDURE — 6370000000 HC RX 637 (ALT 250 FOR IP): Performed by: NURSE PRACTITIONER

## 2019-12-05 PROCEDURE — 82306 VITAMIN D 25 HYDROXY: CPT

## 2019-12-05 PROCEDURE — 82607 VITAMIN B-12: CPT

## 2019-12-05 PROCEDURE — 36415 COLL VENOUS BLD VENIPUNCTURE: CPT

## 2019-12-05 PROCEDURE — 82746 ASSAY OF FOLIC ACID SERUM: CPT

## 2019-12-05 PROCEDURE — 94726 PLETHYSMOGRAPHY LUNG VOLUMES: CPT

## 2019-12-05 PROCEDURE — 2060000000 HC ICU INTERMEDIATE R&B

## 2019-12-05 PROCEDURE — 84443 ASSAY THYROID STIM HORMONE: CPT

## 2019-12-05 PROCEDURE — 94729 DIFFUSING CAPACITY: CPT

## 2019-12-05 PROCEDURE — 82947 ASSAY GLUCOSE BLOOD QUANT: CPT

## 2019-12-05 PROCEDURE — 99232 SBSQ HOSP IP/OBS MODERATE 35: CPT | Performed by: FAMILY MEDICINE

## 2019-12-05 PROCEDURE — 94760 N-INVAS EAR/PLS OXIMETRY 1: CPT

## 2019-12-05 PROCEDURE — 6360000002 HC RX W HCPCS: Performed by: NURSE PRACTITIONER

## 2019-12-05 RX ORDER — FUROSEMIDE 10 MG/ML
40 INJECTION INTRAMUSCULAR; INTRAVENOUS 2 TIMES DAILY
Status: DISCONTINUED | OUTPATIENT
Start: 2019-12-05 | End: 2019-12-06 | Stop reason: HOSPADM

## 2019-12-05 RX ADMIN — SODIUM CHLORIDE, PRESERVATIVE FREE 10 ML: 5 INJECTION INTRAVENOUS at 20:50

## 2019-12-05 RX ADMIN — INSULIN LISPRO 4 UNITS: 100 INJECTION, SOLUTION INTRAVENOUS; SUBCUTANEOUS at 08:14

## 2019-12-05 RX ADMIN — CLOPIDOGREL 75 MG: 75 TABLET, FILM COATED ORAL at 08:16

## 2019-12-05 RX ADMIN — PIPERACILLIN AND TAZOBACTAM 3.38 G: 3; .375 INJECTION, POWDER, FOR SOLUTION INTRAVENOUS at 20:49

## 2019-12-05 RX ADMIN — LISINOPRIL 20 MG: 20 TABLET ORAL at 08:16

## 2019-12-05 RX ADMIN — Medication 81 MG: at 08:16

## 2019-12-05 RX ADMIN — VITAMIN D, TAB 1000IU (100/BT) 1000 UNITS: 25 TAB at 22:14

## 2019-12-05 RX ADMIN — VITAMIN D, TAB 1000IU (100/BT) 1000 UNITS: 25 TAB at 08:16

## 2019-12-05 RX ADMIN — INSULIN LISPRO 6 UNITS: 100 INJECTION, SOLUTION INTRAVENOUS; SUBCUTANEOUS at 12:16

## 2019-12-05 RX ADMIN — AMLODIPINE BESYLATE 10 MG: 10 TABLET ORAL at 08:15

## 2019-12-05 RX ADMIN — FUROSEMIDE 40 MG: 10 INJECTION, SOLUTION INTRAMUSCULAR; INTRAVENOUS at 19:05

## 2019-12-05 RX ADMIN — PIPERACILLIN AND TAZOBACTAM 3.38 G: 3; .375 INJECTION, POWDER, FOR SOLUTION INTRAVENOUS at 12:54

## 2019-12-05 RX ADMIN — INSULIN LISPRO 6 UNITS: 100 INJECTION, SOLUTION INTRAVENOUS; SUBCUTANEOUS at 18:01

## 2019-12-05 RX ADMIN — PIPERACILLIN AND TAZOBACTAM 3.38 G: 3; .375 INJECTION, POWDER, FOR SOLUTION INTRAVENOUS at 06:57

## 2019-12-05 RX ADMIN — INSULIN LISPRO 2 UNITS: 100 INJECTION, SOLUTION INTRAVENOUS; SUBCUTANEOUS at 20:50

## 2019-12-05 RX ADMIN — DESMOPRESSIN ACETATE 40 MG: 0.2 TABLET ORAL at 20:49

## 2019-12-05 RX ADMIN — FAMOTIDINE 20 MG: 20 TABLET, FILM COATED ORAL at 08:16

## 2019-12-06 VITALS
BODY MASS INDEX: 28.2 KG/M2 | TEMPERATURE: 97.7 F | HEART RATE: 71 BPM | RESPIRATION RATE: 18 BRPM | HEIGHT: 66 IN | DIASTOLIC BLOOD PRESSURE: 72 MMHG | WEIGHT: 175.44 LBS | SYSTOLIC BLOOD PRESSURE: 111 MMHG | OXYGEN SATURATION: 96 %

## 2019-12-06 LAB
ABSOLUTE EOS #: 0.37 K/UL (ref 0–0.44)
ABSOLUTE IMMATURE GRANULOCYTE: 0.06 K/UL (ref 0–0.3)
ABSOLUTE LYMPH #: 1.23 K/UL (ref 1.1–3.7)
ABSOLUTE MONO #: 1.05 K/UL (ref 0.1–1.2)
ANION GAP SERPL CALCULATED.3IONS-SCNC: 11 MMOL/L (ref 9–17)
BASOPHILS # BLD: 1 % (ref 0–2)
BASOPHILS ABSOLUTE: 0.08 K/UL (ref 0–0.2)
BNP INTERPRETATION: ABNORMAL
BUN BLDV-MCNC: 21 MG/DL (ref 8–23)
BUN/CREAT BLD: ABNORMAL (ref 9–20)
CALCIUM SERPL-MCNC: 8.9 MG/DL (ref 8.6–10.4)
CHLORIDE BLD-SCNC: 102 MMOL/L (ref 98–107)
CO2: 24 MMOL/L (ref 20–31)
CREAT SERPL-MCNC: 1.1 MG/DL (ref 0.7–1.2)
DIFFERENTIAL TYPE: ABNORMAL
EOSINOPHILS RELATIVE PERCENT: 3 % (ref 1–4)
GFR AFRICAN AMERICAN: >60 ML/MIN
GFR NON-AFRICAN AMERICAN: >60 ML/MIN
GFR SERPL CREATININE-BSD FRML MDRD: ABNORMAL ML/MIN/{1.73_M2}
GFR SERPL CREATININE-BSD FRML MDRD: ABNORMAL ML/MIN/{1.73_M2}
GLUCOSE BLD-MCNC: 202 MG/DL (ref 75–110)
GLUCOSE BLD-MCNC: 247 MG/DL (ref 70–99)
GLUCOSE BLD-MCNC: 323 MG/DL (ref 75–110)
HCT VFR BLD CALC: 39 % (ref 40.7–50.3)
HEMOGLOBIN: 12.2 G/DL (ref 13–17)
IMMATURE GRANULOCYTES: 1 %
LV EF: 31 %
LVEF MODALITY: NORMAL
LYMPHOCYTES # BLD: 11 % (ref 24–43)
MCH RBC QN AUTO: 26.3 PG (ref 25.2–33.5)
MCHC RBC AUTO-ENTMCNC: 31.3 G/DL (ref 28.4–34.8)
MCV RBC AUTO: 84.1 FL (ref 82.6–102.9)
MONOCYTES # BLD: 10 % (ref 3–12)
NRBC AUTOMATED: 0 PER 100 WBC
PDW BLD-RTO: 14.1 % (ref 11.8–14.4)
PLATELET # BLD: 287 K/UL (ref 138–453)
PLATELET ESTIMATE: ABNORMAL
PMV BLD AUTO: 10.8 FL (ref 8.1–13.5)
POTASSIUM SERPL-SCNC: 4.1 MMOL/L (ref 3.7–5.3)
PRO-BNP: 2701 PG/ML
RBC # BLD: 4.64 M/UL (ref 4.21–5.77)
RBC # BLD: ABNORMAL 10*6/UL
SEG NEUTROPHILS: 74 % (ref 36–65)
SEGMENTED NEUTROPHILS ABSOLUTE COUNT: 7.96 K/UL (ref 1.5–8.1)
SODIUM BLD-SCNC: 137 MMOL/L (ref 135–144)
WBC # BLD: 10.8 K/UL (ref 3.5–11.3)
WBC # BLD: ABNORMAL 10*3/UL

## 2019-12-06 PROCEDURE — 82947 ASSAY GLUCOSE BLOOD QUANT: CPT

## 2019-12-06 PROCEDURE — 2580000003 HC RX 258: Performed by: NURSE PRACTITIONER

## 2019-12-06 PROCEDURE — 83880 ASSAY OF NATRIURETIC PEPTIDE: CPT

## 2019-12-06 PROCEDURE — 6360000002 HC RX W HCPCS: Performed by: FAMILY MEDICINE

## 2019-12-06 PROCEDURE — 99231 SBSQ HOSP IP/OBS SF/LOW 25: CPT | Performed by: FAMILY MEDICINE

## 2019-12-06 PROCEDURE — 93306 TTE W/DOPPLER COMPLETE: CPT

## 2019-12-06 PROCEDURE — 36415 COLL VENOUS BLD VENIPUNCTURE: CPT

## 2019-12-06 PROCEDURE — 6370000000 HC RX 637 (ALT 250 FOR IP): Performed by: NURSE PRACTITIONER

## 2019-12-06 PROCEDURE — 6360000002 HC RX W HCPCS: Performed by: NURSE PRACTITIONER

## 2019-12-06 PROCEDURE — 80048 BASIC METABOLIC PNL TOTAL CA: CPT

## 2019-12-06 PROCEDURE — 2580000003 HC RX 258: Performed by: STUDENT IN AN ORGANIZED HEALTH CARE EDUCATION/TRAINING PROGRAM

## 2019-12-06 PROCEDURE — 85025 COMPLETE CBC W/AUTO DIFF WBC: CPT

## 2019-12-06 PROCEDURE — 6370000000 HC RX 637 (ALT 250 FOR IP): Performed by: STUDENT IN AN ORGANIZED HEALTH CARE EDUCATION/TRAINING PROGRAM

## 2019-12-06 RX ORDER — FUROSEMIDE 40 MG/1
20 TABLET ORAL DAILY
Qty: 30 TABLET | Refills: 0 | Status: SHIPPED | OUTPATIENT
Start: 2019-12-06 | End: 2020-07-10 | Stop reason: SDUPTHER

## 2019-12-06 RX ORDER — CLOPIDOGREL BISULFATE 75 MG/1
75 TABLET ORAL DAILY
Qty: 60 TABLET | Refills: 3 | Status: SHIPPED | OUTPATIENT
Start: 2019-12-06

## 2019-12-06 RX ORDER — LEVOFLOXACIN 500 MG/1
500 TABLET, FILM COATED ORAL DAILY
Qty: 7 TABLET | Refills: 0 | Status: SHIPPED | OUTPATIENT
Start: 2019-12-06 | End: 2019-12-13

## 2019-12-06 RX ADMIN — PIPERACILLIN AND TAZOBACTAM 3.38 G: 3; .375 INJECTION, POWDER, FOR SOLUTION INTRAVENOUS at 06:29

## 2019-12-06 RX ADMIN — Medication 81 MG: at 09:51

## 2019-12-06 RX ADMIN — INSULIN LISPRO 8 UNITS: 100 INJECTION, SOLUTION INTRAVENOUS; SUBCUTANEOUS at 14:39

## 2019-12-06 RX ADMIN — FAMOTIDINE 20 MG: 20 TABLET, FILM COATED ORAL at 09:51

## 2019-12-06 RX ADMIN — AMLODIPINE BESYLATE 10 MG: 10 TABLET ORAL at 09:51

## 2019-12-06 RX ADMIN — INSULIN LISPRO 4 UNITS: 100 INJECTION, SOLUTION INTRAVENOUS; SUBCUTANEOUS at 09:51

## 2019-12-06 RX ADMIN — FUROSEMIDE 40 MG: 10 INJECTION, SOLUTION INTRAMUSCULAR; INTRAVENOUS at 09:51

## 2019-12-06 RX ADMIN — SODIUM CHLORIDE, PRESERVATIVE FREE 10 ML: 5 INJECTION INTRAVENOUS at 10:08

## 2019-12-06 RX ADMIN — LISINOPRIL 20 MG: 20 TABLET ORAL at 09:51

## 2019-12-06 RX ADMIN — CLOPIDOGREL 75 MG: 75 TABLET, FILM COATED ORAL at 09:52

## 2019-12-06 RX ADMIN — VITAMIN D, TAB 1000IU (100/BT) 1000 UNITS: 25 TAB at 09:51

## 2019-12-09 LAB
CULTURE: NORMAL
CULTURE: NORMAL
Lab: NORMAL
Lab: NORMAL
SPECIMEN DESCRIPTION: NORMAL
SPECIMEN DESCRIPTION: NORMAL

## 2019-12-11 ENCOUNTER — OFFICE VISIT (OUTPATIENT)
Dept: CARDIOLOGY | Age: 74
End: 2019-12-11
Payer: MEDICARE

## 2019-12-11 VITALS
HEIGHT: 65 IN | HEART RATE: 64 BPM | SYSTOLIC BLOOD PRESSURE: 148 MMHG | BODY MASS INDEX: 28.16 KG/M2 | WEIGHT: 169 LBS | DIASTOLIC BLOOD PRESSURE: 70 MMHG

## 2019-12-11 DIAGNOSIS — I10 ESSENTIAL HYPERTENSION: ICD-10-CM

## 2019-12-11 DIAGNOSIS — E78.5 HYPERLIPIDEMIA, UNSPECIFIED HYPERLIPIDEMIA TYPE: ICD-10-CM

## 2019-12-11 DIAGNOSIS — I25.10 CORONARY ARTERY DISEASE INVOLVING NATIVE CORONARY ARTERY OF NATIVE HEART WITHOUT ANGINA PECTORIS: Primary | ICD-10-CM

## 2019-12-11 PROCEDURE — G8427 DOCREV CUR MEDS BY ELIG CLIN: HCPCS | Performed by: INTERNAL MEDICINE

## 2019-12-11 PROCEDURE — G8598 ASA/ANTIPLAT THER USED: HCPCS | Performed by: INTERNAL MEDICINE

## 2019-12-11 PROCEDURE — 4040F PNEUMOC VAC/ADMIN/RCVD: CPT | Performed by: INTERNAL MEDICINE

## 2019-12-11 PROCEDURE — 1123F ACP DISCUSS/DSCN MKR DOCD: CPT | Performed by: INTERNAL MEDICINE

## 2019-12-11 PROCEDURE — G8482 FLU IMMUNIZE ORDER/ADMIN: HCPCS | Performed by: INTERNAL MEDICINE

## 2019-12-11 PROCEDURE — 1036F TOBACCO NON-USER: CPT | Performed by: INTERNAL MEDICINE

## 2019-12-11 PROCEDURE — 99214 OFFICE O/P EST MOD 30 MIN: CPT | Performed by: INTERNAL MEDICINE

## 2019-12-11 PROCEDURE — 3017F COLORECTAL CA SCREEN DOC REV: CPT | Performed by: INTERNAL MEDICINE

## 2019-12-11 PROCEDURE — G8417 CALC BMI ABV UP PARAM F/U: HCPCS | Performed by: INTERNAL MEDICINE

## 2019-12-11 PROCEDURE — 1111F DSCHRG MED/CURRENT MED MERGE: CPT | Performed by: INTERNAL MEDICINE

## 2019-12-17 ENCOUNTER — TELEPHONE (OUTPATIENT)
Dept: INTERNAL MEDICINE | Age: 74
End: 2019-12-17

## 2020-01-07 ENCOUNTER — TELEPHONE (OUTPATIENT)
Dept: OTHER | Age: 75
End: 2020-01-07

## 2020-01-07 DIAGNOSIS — Q25.1 STENOSIS OF AORTA: Primary | ICD-10-CM

## 2020-01-07 PROBLEM — R80.9 PROTEINURIA: Status: ACTIVE | Noted: 2020-01-07

## 2020-01-07 NOTE — TELEPHONE ENCOUNTER
Call to Dr. Nina Sandoval of [address] [place], [place], pt's nephrologist.  Pt's labs t/o 2019 shared with [facility], [address] and this information relayed to Dr. Geneva Santillan. \"No hydration orders needed\"  to proceed with CTA imaging planned for TAVR work up per nephro.

## 2020-01-08 ENCOUNTER — HOSPITAL ENCOUNTER (OUTPATIENT)
Dept: CT IMAGING | Age: 75
Discharge: HOME | End: 2020-01-10
Payer: MEDICARE

## 2020-01-08 ENCOUNTER — HOSPITAL ENCOUNTER (OUTPATIENT)
Dept: CARDIOLOGY CLINIC | Age: 75
Discharge: HOME | End: 2020-01-08
Payer: MEDICARE

## 2020-01-08 PROBLEM — Z97.4 WEARS HEARING AID IN BOTH EARS: Status: ACTIVE | Noted: 2020-01-08

## 2020-01-08 PROBLEM — I50.22 CHF NYHA CLASS I, CHRONIC, SYSTOLIC (HCC): Status: ACTIVE | Noted: 2020-01-08

## 2020-01-08 LAB
GFR NON-AFRICAN AMERICAN: 55 ML/MIN
GFR SERPL CREATININE-BSD FRML MDRD: >60 ML/MIN
GFR SERPL CREATININE-BSD FRML MDRD: ABNORMAL ML/MIN/{1.73_M2}
POC CREATININE: 1.27 MG/DL (ref 0.51–1.19)

## 2020-01-08 PROCEDURE — 75572 CT HRT W/3D IMAGE: CPT

## 2020-01-08 PROCEDURE — 99214 OFFICE O/P EST MOD 30 MIN: CPT | Performed by: NURSE PRACTITIONER

## 2020-01-08 PROCEDURE — 74174 CTA ABD&PLVS W/CONTRAST: CPT

## 2020-01-08 PROCEDURE — 82565 ASSAY OF CREATININE: CPT

## 2020-01-08 PROCEDURE — 6360000004 HC RX CONTRAST MEDICATION: Performed by: NURSE PRACTITIONER

## 2020-01-08 RX ADMIN — IOHEXOL 180 ML: 350 INJECTION, SOLUTION INTRAVENOUS at 11:39

## 2020-01-08 ASSESSMENT — ENCOUNTER SYMPTOMS
EYE DISCHARGE: 0
COUGH: 0
SHORTNESS OF BREATH: 0
BLOOD IN STOOL: 0
ABDOMINAL PAIN: 0

## 2020-01-08 NOTE — PROGRESS NOTES
Date:  2020  Time:  7:01 AM    Valve Clinic at [address] [facility]    Office: [phone] Fax: [phone]    Re:  Mary Croninred   Patient : 1945    No sob, pt is very active, hunts fishes, gardens, does carpentry, mows the lawn. Denies sob with any of these activities. No fatigue  No curtailing of activities to avoid symptoms   No recent edema , took lasix one week ago, takes prn only  No palps  No cp    Works as seo still, 4 d/week       Review of Systems   Constitutional: Negative for activity change, chills, fatigue and fever. Eyes: Negative for discharge and visual disturbance. Respiratory: Negative for cough and shortness of breath. Cardiovascular: Negative for chest pain and leg swelling. Gastrointestinal: Negative for abdominal pain and blood in stool. Endocrine: Negative for cold intolerance and heat intolerance. Genitourinary: Negative for dysuria and flank pain. Musculoskeletal: Negative for joint swelling and myalgias. Skin: Negative for pallor and rash. Neurological: Negative for dizziness and headaches. Psychiatric/Behavioral: Negative for hallucinations and suicidal ideas. Physical Exam  Vitals signs and nursing note reviewed. Constitutional:       Appearance: He is well-developed. Comments:      HENT:      Head: Normocephalic and atraumatic. Eyes:      General: No scleral icterus. Conjunctiva/sclera: Conjunctivae normal.   Neck:      Musculoskeletal: Neck supple. Cardiovascular:      Rate and Rhythm: Normal rate and regular rhythm. Heart sounds: Normal heart sounds. Comments: + Systolic murmur  Pulmonary:      Effort: Pulmonary effort is normal.      Breath sounds: Normal breath sounds. No wheezing or rales. Abdominal:      General: Bowel sounds are normal.      Palpations: Abdomen is soft. Musculoskeletal: Normal range of motion. Right lower leg: Edema present. Left lower leg: Edema present. Comments: 1+ b/l LE edema   Skin:     General: Skin is warm and dry. Neurological:      Mental Status: He is alert and oriented to person, place, and time. Vital Signs:   HR 73   /76  SpO2:98    %      POC Cr=1.27    CBC:   Lab Results   Component Value Date    WBC 10.8 12/06/2019    RBC 4.64 12/06/2019    HGB 12.2 12/06/2019    HCT 39.0 12/06/2019    MCV 84.1 12/06/2019    MCH 26.3 12/06/2019    MCHC 31.3 12/06/2019    RDW 14.1 12/06/2019     12/06/2019    MPV 10.8 12/06/2019     CMP:    Lab Results   Component Value Date     01/16/2020    K 4.9 01/16/2020    CL 99 01/16/2020    CO2 26 01/16/2020    BUN 29 01/16/2020    CREATININE 1.20 01/21/2020    CREATININE 1.26 01/16/2020    GFRAA >60 01/16/2020    LABGLOM 59 01/21/2020    GLUCOSE 179 01/16/2020    PROT 7.3 02/06/2012    CALCIUM 10.1 01/16/2020    AST 21 01/17/2019    ALT 16 01/17/2019 12/2019   a1c = 7.2             [facility] Cardiomyopathy Questionnaire [facility])  The following questions refer to your heart failure and how it may affect your life. Please read and complete the  following questions. There are no right or wrong answers. Please ramsey the answer that best applies to you. 1. Heart failure affects different people in different ways. Some feel shortness of breath while others feel fatigue. Please indicate how much you are limited by heart failure (shortness of breath or fatigue) in your ability to do  the following activities over the past 2 weeks. Activity                       Extremely      Limited Quite a bit    Limited Moderatly    Limited Slightly         Limited Not at all      Limted Limited for other   reasons / or no activity    1 2 3 4 5 6   a. Showering/bathing   []  []  []  []  [x]  []    b.  Walking 1 block on level ground   []  []   []  []   [x]  []    c. Hurrying or jogging (As if to catch a bus) []  []   []  []    [x]  []   Total Score:               2. Over the past 2 weeks, how many Mostly  satisfied Completely  satisfied   1 2 3 4 5    []  []   []    []   [x]  Total Score             8.How much does your heart failure affect your lifestyle? Please indicate how your heart failure may have limited  your participation in the following activities over the past 2 weeks. Activity                       Severely      Limited Limited  Quite a bit   Moderately   Limited Slightly         Limited Did not      Limit at all Does not apply  or did not do   for other reasons    1 2 3 4 5 6   a. Hobbies/recreational activities  []   []  []   []   [x]   []     b. Working or doing household chores  []   []  []    []   [x]  []     c. Visiting family or friends out of the home []    []  []   []    [x]  []   Total Score: Total Score Now:  61    Previous Reported Score:             Diagnostic Testing:    [x]  R&L Heart Cardiac Catheterization  Date Completed: 12/3/19   [x]  Echo      Date Completed: 12/3/19   [x]  MSCT      Date Completed: 1/8/19   [x]  PFT      Date Completed: 12/9/19   [x]  Carotid Ultrasound    Date Completed: 9/25/19    Functional/Physical Evaluation   [x]  [id number]   Score: 62  Date Completed: 1/8/19    [x]  MMSE   Score:  29  Date Completed: 1/8/19      [x]  DEL VALLE ADL's  Score: 6/6  Date Completed: 1/8/19    [x]  5 Meter Walk Test  Score:  4.6,4.5,4.8 s Date Completed: 1/8/19   [x]   Test   Score: 20, 22, 22 kg Date Completed: 1/8/19    Assessment:  1. Aortic stenosis, severe    2. CHF NYHA class I, chronic, systolic (HCC)    3. Stage 2 chronic kidney disease    4. Wears hearing aid in both ears    5. Elevated serum creatinine        Plan:      Proceed with TAVR. Tentatively scheduled for Jan 21, 2020. Pt has edema on exam , taking his prn lasix more frequently is discussed at length. Patient verbalizes understanding. Pt has seen Nephro in the past. No hydration orders were recommend for CTA (see epic notes)  Pt's POC Cr is 1.27 .  Lab slip given to monitor labs over the weekend. Patient verbalizes understanding. Education on TAVR procedure completed by Eyad Boogie RN. Assessment/questionaires completed. Heart/valve models displayed and described. Projected length of stay and follow up appointments discussed. All questions were answered to patient's satisfaction. Patient and family member ( wife, Babs Li)  verbalized  understanding of teaching. [id number], MMSE,   And  DEL VALLE ADLs , 5 Meter Walk Test and   Test testing completed.               Electronically signed by ESTHER Palencia CNP on 1/21/2020 at 7:01 AM

## 2020-01-11 ENCOUNTER — HOSPITAL ENCOUNTER (OUTPATIENT)
Dept: LAB | Age: 75
Discharge: HOME | End: 2020-01-11
Payer: MEDICARE

## 2020-01-11 LAB
ANION GAP SERPL CALCULATED.3IONS-SCNC: 11 MMOL/L (ref 9–17)
BUN BLDV-MCNC: 31 MG/DL (ref 8–23)
BUN/CREAT BLD: 24 (ref 9–20)
CALCIUM SERPL-MCNC: 10.2 MG/DL (ref 8.6–10.4)
CHLORIDE BLD-SCNC: 102 MMOL/L (ref 98–107)
CO2: 25 MMOL/L (ref 20–31)
CREAT SERPL-MCNC: 1.27 MG/DL (ref 0.7–1.2)
GFR AFRICAN AMERICAN: >60 ML/MIN
GFR NON-AFRICAN AMERICAN: 56 ML/MIN
GFR SERPL CREATININE-BSD FRML MDRD: ABNORMAL ML/MIN/{1.73_M2}
GFR SERPL CREATININE-BSD FRML MDRD: ABNORMAL ML/MIN/{1.73_M2}
GLUCOSE BLD-MCNC: 191 MG/DL (ref 70–99)
POTASSIUM SERPL-SCNC: 5.4 MMOL/L (ref 3.7–5.3)
SODIUM BLD-SCNC: 138 MMOL/L (ref 135–144)

## 2020-01-11 PROCEDURE — 80048 BASIC METABOLIC PNL TOTAL CA: CPT

## 2020-01-11 PROCEDURE — 36415 COLL VENOUS BLD VENIPUNCTURE: CPT

## 2020-01-13 ENCOUNTER — TELEPHONE (OUTPATIENT)
Dept: OTHER | Age: 75
End: 2020-01-13

## 2020-01-16 ENCOUNTER — HOSPITAL ENCOUNTER (OUTPATIENT)
Dept: LAB | Age: 75
Discharge: HOME | End: 2020-01-16
Payer: MEDICARE

## 2020-01-16 LAB
ANION GAP SERPL CALCULATED.3IONS-SCNC: 12 MMOL/L (ref 9–17)
BUN BLDV-MCNC: 29 MG/DL (ref 8–23)
BUN/CREAT BLD: 23 (ref 9–20)
CALCIUM SERPL-MCNC: 10.1 MG/DL (ref 8.6–10.4)
CHLORIDE BLD-SCNC: 99 MMOL/L (ref 98–107)
CO2: 26 MMOL/L (ref 20–31)
CREAT SERPL-MCNC: 1.26 MG/DL (ref 0.7–1.2)
GFR AFRICAN AMERICAN: >60 ML/MIN
GFR NON-AFRICAN AMERICAN: 56 ML/MIN
GFR SERPL CREATININE-BSD FRML MDRD: ABNORMAL ML/MIN/{1.73_M2}
GFR SERPL CREATININE-BSD FRML MDRD: ABNORMAL ML/MIN/{1.73_M2}
GLUCOSE BLD-MCNC: 179 MG/DL (ref 70–99)
POTASSIUM SERPL-SCNC: 4.9 MMOL/L (ref 3.7–5.3)
SODIUM BLD-SCNC: 137 MMOL/L (ref 135–144)

## 2020-01-16 PROCEDURE — 80048 BASIC METABOLIC PNL TOTAL CA: CPT

## 2020-01-16 PROCEDURE — 36415 COLL VENOUS BLD VENIPUNCTURE: CPT

## 2020-01-17 NOTE — TELEPHONE ENCOUNTER
Pt and wife called and updated on 1/16/20 that K+ now wnl. Cr stable. Pt to be called on 1/20/20 regarding details for his meds the morning of his TAVR procedure. Patient verbalizes understanding.

## 2020-01-20 ENCOUNTER — PREP FOR PROCEDURE (OUTPATIENT)
Dept: OTHER | Age: 75
End: 2020-01-20

## 2020-01-20 ENCOUNTER — ANESTHESIA EVENT (OUTPATIENT)
Dept: CARDIAC CATH/INVASIVE PROCEDURES | Age: 75
DRG: 267 | End: 2020-01-20
Payer: MEDICARE

## 2020-01-20 ENCOUNTER — TELEPHONE (OUTPATIENT)
Dept: OTHER | Age: 75
End: 2020-01-20

## 2020-01-20 RX ORDER — CLOPIDOGREL BISULFATE 75 MG/1
225 TABLET ORAL ONCE
Status: CANCELLED | OUTPATIENT
Start: 2020-01-20 | End: 2020-01-20

## 2020-01-20 RX ORDER — VANCOMYCIN HYDROCHLORIDE 1 G/200ML
1000 INJECTION, SOLUTION INTRAVENOUS EVERY 12 HOURS
Status: CANCELLED | OUTPATIENT
Start: 2020-01-20 | End: 2020-01-21

## 2020-01-20 RX ORDER — SODIUM CHLORIDE 9 MG/ML
INJECTION, SOLUTION INTRAVENOUS CONTINUOUS
Status: CANCELLED | OUTPATIENT
Start: 2020-01-20

## 2020-01-20 NOTE — TELEPHONE ENCOUNTER
Pt updated on meds to take in am as recommended by Dr Marcelline Bloch. Pt to arrive here at 0930. NPO at midnight tonight. Patient verbalizes understanding.

## 2020-01-21 ENCOUNTER — ANESTHESIA (OUTPATIENT)
Dept: CARDIAC CATH/INVASIVE PROCEDURES | Age: 75
DRG: 267 | End: 2020-01-21
Payer: MEDICARE

## 2020-01-21 ENCOUNTER — HOSPITAL ENCOUNTER (INPATIENT)
Dept: CARDIAC CATH/INVASIVE PROCEDURES | Age: 75
LOS: 2 days | Discharge: HOME | DRG: 267 | End: 2020-01-23
Attending: INTERNAL MEDICINE | Admitting: INTERNAL MEDICINE
Payer: MEDICARE

## 2020-01-21 VITALS — SYSTOLIC BLOOD PRESSURE: 77 MMHG | OXYGEN SATURATION: 99 % | TEMPERATURE: 96.2 F | DIASTOLIC BLOOD PRESSURE: 25 MMHG

## 2020-01-21 PROBLEM — Z95.2 S/P TAVR (TRANSCATHETER AORTIC VALVE REPLACEMENT): Status: ACTIVE | Noted: 2020-01-21

## 2020-01-21 LAB
% CKMB: 7.6 % (ref 0–3.5)
ACTIVATED CLOTTING TIME: 211 SEC (ref 79–149)
ACTIVATED CLOTTING TIME: 215 SEC (ref 79–149)
ACTIVATED CLOTTING TIME: 227 SEC (ref 79–149)
ALBUMIN SERPL-MCNC: 4 G/DL (ref 3.5–5.2)
BILIRUB SERPL-MCNC: 0.26 MG/DL (ref 0.3–1.2)
BNP INTERPRETATION: ABNORMAL
CK MB: 2.9 NG/ML
CKMB INTERPRETATION: ABNORMAL
EKG ATRIAL RATE: 66 BPM
EKG P AXIS: 45 DEGREES
EKG P-R INTERVAL: 214 MS
EKG Q-T INTERVAL: 430 MS
EKG QRS DURATION: 102 MS
EKG QTC CALCULATION (BAZETT): 450 MS
EKG R AXIS: -29 DEGREES
EKG T AXIS: 28 DEGREES
EKG VENTRICULAR RATE: 66 BPM
GFR NON-AFRICAN AMERICAN: 59 ML/MIN
GFR SERPL CREATININE-BSD FRML MDRD: >60 ML/MIN
GFR SERPL CREATININE-BSD FRML MDRD: ABNORMAL ML/MIN/{1.73_M2}
GLUCOSE BLD-MCNC: 178 MG/DL (ref 75–110)
GLUCOSE BLD-MCNC: 182 MG/DL (ref 74–100)
GLUCOSE BLD-MCNC: 182 MG/DL (ref 75–110)
GLUCOSE BLD-MCNC: 201 MG/DL (ref 75–110)
HCT VFR BLD CALC: 39.7 % (ref 40.7–50.3)
HEMOGLOBIN: 12.9 G/DL (ref 13–17)
INR BLD: 0.9
MAGNESIUM: 1.8 MG/DL (ref 1.6–2.6)
MAGNESIUM: 2.5 MG/DL (ref 1.6–2.6)
MCH RBC QN AUTO: 27.4 PG (ref 25.2–33.5)
MCHC RBC AUTO-ENTMCNC: 32.5 G/DL (ref 28.4–34.8)
MCV RBC AUTO: 84.3 FL (ref 82.6–102.9)
NRBC AUTOMATED: 0 PER 100 WBC
PDW BLD-RTO: 14 % (ref 11.8–14.4)
PLATELET # BLD: 259 K/UL (ref 138–453)
PMV BLD AUTO: 10.6 FL (ref 8.1–13.5)
POC CHLORIDE: 104 MMOL/L (ref 98–107)
POC CREATININE: 1.2 MG/DL (ref 0.51–1.19)
POC HEMATOCRIT: 39 % (ref 41–53)
POC HEMOGLOBIN: 13.2 G/DL (ref 13.5–17.5)
POC POTASSIUM: 3.7 MMOL/L (ref 3.5–4.5)
POC SODIUM: 139 MMOL/L (ref 138–146)
POTASSIUM SERPL-SCNC: 4 MMOL/L (ref 3.7–5.3)
PRO-BNP: 1906 PG/ML
PROTHROMBIN TIME: 9.9 SEC (ref 9–12)
RBC # BLD: 4.71 M/UL (ref 4.21–5.77)
TOTAL CK: 38 U/L (ref 39–308)
WBC # BLD: 7.5 K/UL (ref 3.5–11.3)

## 2020-01-21 PROCEDURE — 7100000000 HC PACU RECOVERY - FIRST 15 MIN

## 2020-01-21 PROCEDURE — 33361 REPLACE AORTIC VALVE PERQ: CPT | Performed by: THORACIC SURGERY (CARDIOTHORACIC VASCULAR SURGERY)

## 2020-01-21 PROCEDURE — 85347 COAGULATION TIME ACTIVATED: CPT

## 2020-01-21 PROCEDURE — 2709999900 HC NON-CHARGEABLE SUPPLY

## 2020-01-21 PROCEDURE — 85610 PROTHROMBIN TIME: CPT

## 2020-01-21 PROCEDURE — C1769 GUIDE WIRE: HCPCS

## 2020-01-21 PROCEDURE — 6360000002 HC RX W HCPCS: Performed by: INTERNAL MEDICINE

## 2020-01-21 PROCEDURE — 2500000003 HC RX 250 WO HCPCS: Performed by: INTERNAL MEDICINE

## 2020-01-21 PROCEDURE — 2500000003 HC RX 250 WO HCPCS: Performed by: NURSE ANESTHETIST, CERTIFIED REGISTERED

## 2020-01-21 PROCEDURE — 82553 CREATINE MB FRACTION: CPT

## 2020-01-21 PROCEDURE — 85014 HEMATOCRIT: CPT

## 2020-01-21 PROCEDURE — 82565 ASSAY OF CREATININE: CPT

## 2020-01-21 PROCEDURE — 83880 ASSAY OF NATRIURETIC PEPTIDE: CPT

## 2020-01-21 PROCEDURE — 7100000001 HC PACU RECOVERY - ADDTL 15 MIN

## 2020-01-21 PROCEDURE — 6370000000 HC RX 637 (ALT 250 FOR IP)

## 2020-01-21 PROCEDURE — 6370000000 HC RX 637 (ALT 250 FOR IP): Performed by: INTERNAL MEDICINE

## 2020-01-21 PROCEDURE — C1760 CLOSURE DEV, VASC: HCPCS

## 2020-01-21 PROCEDURE — 6360000002 HC RX W HCPCS: Performed by: NURSE PRACTITIONER

## 2020-01-21 PROCEDURE — 2580000003 HC RX 258: Performed by: NURSE ANESTHETIST, CERTIFIED REGISTERED

## 2020-01-21 PROCEDURE — 3700000000 HC ANESTHESIA ATTENDED CARE

## 2020-01-21 PROCEDURE — 82947 ASSAY GLUCOSE BLOOD QUANT: CPT

## 2020-01-21 PROCEDURE — 6360000004 HC RX CONTRAST MEDICATION

## 2020-01-21 PROCEDURE — 93005 ELECTROCARDIOGRAM TRACING: CPT | Performed by: NURSE PRACTITIONER

## 2020-01-21 PROCEDURE — 84295 ASSAY OF SERUM SODIUM: CPT

## 2020-01-21 PROCEDURE — 82550 ASSAY OF CK (CPK): CPT

## 2020-01-21 PROCEDURE — 82435 ASSAY OF BLOOD CHLORIDE: CPT

## 2020-01-21 PROCEDURE — 2780000006 HC MISC HEART VALVE

## 2020-01-21 PROCEDURE — 2500000003 HC RX 250 WO HCPCS

## 2020-01-21 PROCEDURE — 2060000000 HC ICU INTERMEDIATE R&B

## 2020-01-21 PROCEDURE — 6360000002 HC RX W HCPCS: Performed by: NURSE ANESTHETIST, CERTIFIED REGISTERED

## 2020-01-21 PROCEDURE — 86920 COMPATIBILITY TEST SPIN: CPT

## 2020-01-21 PROCEDURE — C1894 INTRO/SHEATH, NON-LASER: HCPCS

## 2020-01-21 PROCEDURE — 82040 ASSAY OF SERUM ALBUMIN: CPT

## 2020-01-21 PROCEDURE — 85027 COMPLETE CBC AUTOMATED: CPT

## 2020-01-21 PROCEDURE — 84132 ASSAY OF SERUM POTASSIUM: CPT

## 2020-01-21 PROCEDURE — 86901 BLOOD TYPING SEROLOGIC RH(D): CPT

## 2020-01-21 PROCEDURE — 83735 ASSAY OF MAGNESIUM: CPT

## 2020-01-21 PROCEDURE — 02RF38Z REPLACEMENT OF AORTIC VALVE WITH ZOOPLASTIC TISSUE, PERCUTANEOUS APPROACH: ICD-10-PCS | Performed by: INTERNAL MEDICINE

## 2020-01-21 PROCEDURE — 0483T TMVI PERCUTANEOUS APPROACH: CPT | Performed by: INTERNAL MEDICINE

## 2020-01-21 PROCEDURE — 2580000003 HC RX 258: Performed by: NURSE PRACTITIONER

## 2020-01-21 PROCEDURE — 86850 RBC ANTIBODY SCREEN: CPT

## 2020-01-21 PROCEDURE — 2580000003 HC RX 258: Performed by: INTERNAL MEDICINE

## 2020-01-21 PROCEDURE — 86900 BLOOD TYPING SEROLOGIC ABO: CPT

## 2020-01-21 PROCEDURE — C1725 CATH, TRANSLUMIN NON-LASER: HCPCS

## 2020-01-21 PROCEDURE — 82247 BILIRUBIN TOTAL: CPT

## 2020-01-21 PROCEDURE — 6360000002 HC RX W HCPCS

## 2020-01-21 PROCEDURE — 3700000001 HC ADD 15 MINUTES (ANESTHESIA)

## 2020-01-21 RX ORDER — PROTAMINE SULFATE 10 MG/ML
INJECTION, SOLUTION INTRAVENOUS PRN
Status: DISCONTINUED | OUTPATIENT
Start: 2020-01-21 | End: 2020-01-21 | Stop reason: SDUPTHER

## 2020-01-21 RX ORDER — GLIPIZIDE 10 MG/1
10 TABLET ORAL
Status: DISCONTINUED | OUTPATIENT
Start: 2020-01-21 | End: 2020-01-23 | Stop reason: HOSPADM

## 2020-01-21 RX ORDER — FUROSEMIDE 20 MG/1
20 TABLET ORAL DAILY
Status: DISCONTINUED | OUTPATIENT
Start: 2020-01-21 | End: 2020-01-23

## 2020-01-21 RX ORDER — LIDOCAINE HYDROCHLORIDE 10 MG/ML
INJECTION, SOLUTION EPIDURAL; INFILTRATION; INTRACAUDAL; PERINEURAL PRN
Status: DISCONTINUED | OUTPATIENT
Start: 2020-01-21 | End: 2020-01-21 | Stop reason: SDUPTHER

## 2020-01-21 RX ORDER — FENTANYL CITRATE 50 UG/ML
50 INJECTION, SOLUTION INTRAMUSCULAR; INTRAVENOUS EVERY 5 MIN PRN
Status: DISCONTINUED | OUTPATIENT
Start: 2020-01-21 | End: 2020-01-23 | Stop reason: HOSPADM

## 2020-01-21 RX ORDER — FENTANYL CITRATE 50 UG/ML
INJECTION, SOLUTION INTRAMUSCULAR; INTRAVENOUS PRN
Status: DISCONTINUED | OUTPATIENT
Start: 2020-01-21 | End: 2020-01-21 | Stop reason: SDUPTHER

## 2020-01-21 RX ORDER — ONDANSETRON 2 MG/ML
INJECTION INTRAMUSCULAR; INTRAVENOUS PRN
Status: DISCONTINUED | OUTPATIENT
Start: 2020-01-21 | End: 2020-01-21 | Stop reason: SDUPTHER

## 2020-01-21 RX ORDER — ROCURONIUM BROMIDE 10 MG/ML
INJECTION, SOLUTION INTRAVENOUS PRN
Status: DISCONTINUED | OUTPATIENT
Start: 2020-01-21 | End: 2020-01-21 | Stop reason: SDUPTHER

## 2020-01-21 RX ORDER — HEPARIN SODIUM 1000 [USP'U]/ML
INJECTION, SOLUTION INTRAVENOUS; SUBCUTANEOUS PRN
Status: DISCONTINUED | OUTPATIENT
Start: 2020-01-21 | End: 2020-01-21 | Stop reason: SDUPTHER

## 2020-01-21 RX ORDER — SIMVASTATIN 10 MG
10 TABLET ORAL NIGHTLY
Status: DISCONTINUED | OUTPATIENT
Start: 2020-01-21 | End: 2020-01-23 | Stop reason: HOSPADM

## 2020-01-21 RX ORDER — LABETALOL 20 MG/4 ML (5 MG/ML) INTRAVENOUS SYRINGE
5 ONCE
Status: DISCONTINUED | OUTPATIENT
Start: 2020-01-21 | End: 2020-01-23 | Stop reason: HOSPADM

## 2020-01-21 RX ORDER — ASPIRIN 81 MG/1
81 TABLET ORAL DAILY
Status: DISCONTINUED | OUTPATIENT
Start: 2020-01-21 | End: 2020-01-23 | Stop reason: HOSPADM

## 2020-01-21 RX ORDER — MAGNESIUM SULFATE 1 G/100ML
1 INJECTION INTRAVENOUS
Status: COMPLETED | OUTPATIENT
Start: 2020-01-21 | End: 2020-01-21

## 2020-01-21 RX ORDER — SODIUM CHLORIDE 9 MG/ML
INJECTION, SOLUTION INTRAVENOUS CONTINUOUS
Status: DISCONTINUED | OUTPATIENT
Start: 2020-01-21 | End: 2020-01-23 | Stop reason: HOSPADM

## 2020-01-21 RX ORDER — ONDANSETRON 2 MG/ML
4 INJECTION INTRAMUSCULAR; INTRAVENOUS
Status: ACTIVE | OUTPATIENT
Start: 2020-01-21 | End: 2020-01-21

## 2020-01-21 RX ORDER — AMLODIPINE BESYLATE 10 MG/1
10 TABLET ORAL DAILY
Status: DISCONTINUED | OUTPATIENT
Start: 2020-01-21 | End: 2020-01-23 | Stop reason: HOSPADM

## 2020-01-21 RX ORDER — PIOGLITAZONEHYDROCHLORIDE 15 MG/1
30 TABLET ORAL DAILY
Status: DISCONTINUED | OUTPATIENT
Start: 2020-01-21 | End: 2020-01-23 | Stop reason: HOSPADM

## 2020-01-21 RX ORDER — FENTANYL CITRATE 50 UG/ML
25 INJECTION, SOLUTION INTRAMUSCULAR; INTRAVENOUS EVERY 5 MIN PRN
Status: DISCONTINUED | OUTPATIENT
Start: 2020-01-21 | End: 2020-01-23 | Stop reason: HOSPADM

## 2020-01-21 RX ORDER — PROPOFOL 10 MG/ML
INJECTION, EMULSION INTRAVENOUS PRN
Status: DISCONTINUED | OUTPATIENT
Start: 2020-01-21 | End: 2020-01-21 | Stop reason: SDUPTHER

## 2020-01-21 RX ORDER — EPHEDRINE SULFATE/0.9% NACL/PF 50 MG/5 ML
SYRINGE (ML) INTRAVENOUS PRN
Status: DISCONTINUED | OUTPATIENT
Start: 2020-01-21 | End: 2020-01-21 | Stop reason: SDUPTHER

## 2020-01-21 RX ORDER — CLOPIDOGREL BISULFATE 75 MG/1
75 TABLET ORAL DAILY
Status: DISCONTINUED | OUTPATIENT
Start: 2020-01-21 | End: 2020-01-23 | Stop reason: HOSPADM

## 2020-01-21 RX ORDER — LISINOPRIL 20 MG/1
20 TABLET ORAL DAILY
Status: DISCONTINUED | OUTPATIENT
Start: 2020-01-21 | End: 2020-01-23 | Stop reason: HOSPADM

## 2020-01-21 RX ORDER — POTASSIUM CHLORIDE 20 MEQ/1
40 TABLET, EXTENDED RELEASE ORAL ONCE
Status: COMPLETED | OUTPATIENT
Start: 2020-01-21 | End: 2020-01-21

## 2020-01-21 RX ORDER — VANCOMYCIN HYDROCHLORIDE 1 G/200ML
1000 INJECTION, SOLUTION INTRAVENOUS EVERY 12 HOURS
Status: COMPLETED | OUTPATIENT
Start: 2020-01-21 | End: 2020-01-21

## 2020-01-21 RX ORDER — PROPOFOL 10 MG/ML
INJECTION, EMULSION INTRAVENOUS CONTINUOUS PRN
Status: DISCONTINUED | OUTPATIENT
Start: 2020-01-21 | End: 2020-01-21 | Stop reason: SDUPTHER

## 2020-01-21 RX ORDER — CLOPIDOGREL BISULFATE 75 MG/1
225 TABLET ORAL ONCE
Status: COMPLETED | OUTPATIENT
Start: 2020-01-21 | End: 2020-01-21

## 2020-01-21 RX ADMIN — Medication 5 MG: at 08:28

## 2020-01-21 RX ADMIN — HEPARIN SODIUM 12000 UNITS: 1000 INJECTION INTRAVENOUS; SUBCUTANEOUS at 08:42

## 2020-01-21 RX ADMIN — PHENYLEPHRINE HYDROCHLORIDE 100 MCG: 10 INJECTION INTRAVENOUS at 08:37

## 2020-01-21 RX ADMIN — PROPOFOL 50 MCG/KG/MIN: 10 INJECTION, EMULSION INTRAVENOUS at 08:10

## 2020-01-21 RX ADMIN — PIOGLITAZONE 30 MG: 15 TABLET ORAL at 11:04

## 2020-01-21 RX ADMIN — Medication 5 MG: at 08:32

## 2020-01-21 RX ADMIN — LIDOCAINE HYDROCHLORIDE 10 MG: 10 INJECTION, SOLUTION EPIDURAL; INFILTRATION; INTRACAUDAL; PERINEURAL at 07:42

## 2020-01-21 RX ADMIN — VANCOMYCIN HYDROCHLORIDE 1000 MG: 1 INJECTION, SOLUTION INTRAVENOUS at 18:05

## 2020-01-21 RX ADMIN — Medication 5 MG: at 08:49

## 2020-01-21 RX ADMIN — SODIUM CHLORIDE: 0.9 INJECTION, SOLUTION INTRAVENOUS at 10:26

## 2020-01-21 RX ADMIN — Medication 5 MG: at 08:37

## 2020-01-21 RX ADMIN — SODIUM CHLORIDE 0.7 MCG/KG/HR: 9 INJECTION, SOLUTION INTRAVENOUS at 07:48

## 2020-01-21 RX ADMIN — SODIUM CHLORIDE: 0.9 INJECTION, SOLUTION INTRAVENOUS at 07:46

## 2020-01-21 RX ADMIN — Medication 5 MG: at 08:44

## 2020-01-21 RX ADMIN — POTASSIUM CHLORIDE 40 MEQ: 1500 TABLET, EXTENDED RELEASE ORAL at 10:55

## 2020-01-21 RX ADMIN — SODIUM CHLORIDE: 0.9 INJECTION, SOLUTION INTRAVENOUS at 06:48

## 2020-01-21 RX ADMIN — ROCURONIUM BROMIDE 50 MG: 10 INJECTION INTRAVENOUS at 08:25

## 2020-01-21 RX ADMIN — AMLODIPINE BESYLATE 10 MG: 10 TABLET ORAL at 11:04

## 2020-01-21 RX ADMIN — GLIPIZIDE 10 MG: 10 TABLET ORAL at 16:44

## 2020-01-21 RX ADMIN — FENTANYL CITRATE 25 MCG: 50 INJECTION INTRAMUSCULAR; INTRAVENOUS at 08:07

## 2020-01-21 RX ADMIN — CLOPIDOGREL BISULFATE 225 MG: 75 TABLET ORAL at 06:23

## 2020-01-21 RX ADMIN — PROTAMINE SULFATE 20 MG: 10 INJECTION, SOLUTION INTRAVENOUS at 09:07

## 2020-01-21 RX ADMIN — MAGNESIUM SULFATE HEPTAHYDRATE 1 G: 1 INJECTION, SOLUTION INTRAVENOUS at 10:55

## 2020-01-21 RX ADMIN — HEPARIN SODIUM 4000 UNITS: 1000 INJECTION INTRAVENOUS; SUBCUTANEOUS at 08:54

## 2020-01-21 RX ADMIN — ONDANSETRON 4 MG: 2 INJECTION, SOLUTION INTRAMUSCULAR; INTRAVENOUS at 09:21

## 2020-01-21 RX ADMIN — PHENYLEPHRINE HYDROCHLORIDE 100 MCG: 10 INJECTION INTRAVENOUS at 08:44

## 2020-01-21 RX ADMIN — PHENYLEPHRINE HYDROCHLORIDE 100 MCG: 10 INJECTION INTRAVENOUS at 08:49

## 2020-01-21 RX ADMIN — ROCURONIUM BROMIDE 20 MG: 10 INJECTION INTRAVENOUS at 08:52

## 2020-01-21 RX ADMIN — MAGNESIUM SULFATE HEPTAHYDRATE 1 G: 1 INJECTION, SOLUTION INTRAVENOUS at 12:26

## 2020-01-21 RX ADMIN — PHENYLEPHRINE HYDROCHLORIDE 30 MCG/MIN: 10 INJECTION INTRAVENOUS at 08:44

## 2020-01-21 RX ADMIN — SUGAMMADEX 200 MG: 100 INJECTION, SOLUTION INTRAVENOUS at 09:21

## 2020-01-21 RX ADMIN — SODIUM CHLORIDE: 0.9 INJECTION, SOLUTION INTRAVENOUS at 08:55

## 2020-01-21 RX ADMIN — PROPOFOL 150 MG: 10 INJECTION, EMULSION INTRAVENOUS at 08:25

## 2020-01-21 RX ADMIN — VANCOMYCIN HYDROCHLORIDE 1 G: 1 INJECTION, SOLUTION INTRAVENOUS at 07:56

## 2020-01-21 RX ADMIN — LISINOPRIL 20 MG: 20 TABLET ORAL at 13:39

## 2020-01-21 RX ADMIN — FENTANYL CITRATE 50 MCG: 50 INJECTION INTRAMUSCULAR; INTRAVENOUS at 07:55

## 2020-01-21 RX ADMIN — SIMVASTATIN 10 MG: 10 TABLET, FILM COATED ORAL at 20:54

## 2020-01-21 RX ADMIN — FENTANYL CITRATE 25 MCG: 50 INJECTION INTRAMUSCULAR; INTRAVENOUS at 08:00

## 2020-01-21 ASSESSMENT — PULMONARY FUNCTION TESTS
PIF_VALUE: 20
PIF_VALUE: 19
PIF_VALUE: 18
PIF_VALUE: 19
PIF_VALUE: 18
PIF_VALUE: 0
PIF_VALUE: 1
PIF_VALUE: 19
PIF_VALUE: 17
PIF_VALUE: 1
PIF_VALUE: 19
PIF_VALUE: 1
PIF_VALUE: 1
PIF_VALUE: 24
PIF_VALUE: 19
PIF_VALUE: 16
PIF_VALUE: 1
PIF_VALUE: 20
PIF_VALUE: 16
PIF_VALUE: 2
PIF_VALUE: 1
PIF_VALUE: 1
PIF_VALUE: 18
PIF_VALUE: 21
PIF_VALUE: 1
PIF_VALUE: 18
PIF_VALUE: 1
PIF_VALUE: 19
PIF_VALUE: 17
PIF_VALUE: 19
PIF_VALUE: 21
PIF_VALUE: 20
PIF_VALUE: 1
PIF_VALUE: 1
PIF_VALUE: 20
PIF_VALUE: 1
PIF_VALUE: 2
PIF_VALUE: 1
PIF_VALUE: 17
PIF_VALUE: 0
PIF_VALUE: 1
PIF_VALUE: 19
PIF_VALUE: 17
PIF_VALUE: 1
PIF_VALUE: 1
PIF_VALUE: 0
PIF_VALUE: 1
PIF_VALUE: 16
PIF_VALUE: 17
PIF_VALUE: 19
PIF_VALUE: 19
PIF_VALUE: 1
PIF_VALUE: 21
PIF_VALUE: 1
PIF_VALUE: 18
PIF_VALUE: 20
PIF_VALUE: 17
PIF_VALUE: 1
PIF_VALUE: 20
PIF_VALUE: 19
PIF_VALUE: 2
PIF_VALUE: 19
PIF_VALUE: 1
PIF_VALUE: 18
PIF_VALUE: 1
PIF_VALUE: 19
PIF_VALUE: 19
PIF_VALUE: 20
PIF_VALUE: 18
PIF_VALUE: 1
PIF_VALUE: 21
PIF_VALUE: 1
PIF_VALUE: 19
PIF_VALUE: 1
PIF_VALUE: 21
PIF_VALUE: 19
PIF_VALUE: 19
PIF_VALUE: 1
PIF_VALUE: 4
PIF_VALUE: 22
PIF_VALUE: 22
PIF_VALUE: 1
PIF_VALUE: 20
PIF_VALUE: 2
PIF_VALUE: 1
PIF_VALUE: 19
PIF_VALUE: 1
PIF_VALUE: 17
PIF_VALUE: 2
PIF_VALUE: 2
PIF_VALUE: 17
PIF_VALUE: 19
PIF_VALUE: 19
PIF_VALUE: 20
PIF_VALUE: 1

## 2020-01-21 ASSESSMENT — PAIN SCALES - GENERAL
PAINLEVEL_OUTOF10: 0

## 2020-01-21 NOTE — PROGRESS NOTES
Spoke with Frankie Kanner in cath lab. . waiting to hear back from [facility] about venous sheath site.

## 2020-01-21 NOTE — H&P
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)    Non-rheumatic mitral regurgitation    Nonrheumatic aortic valve stenosis    Uncontrolled type 2 diabetes mellitus with nephropathy ([facility] [address]    Coronary artery disease involving native coronary artery of native heart without angina pectoris    Controlled type 2 diabetes mellitus with mild nonproliferative retinopathy and macular edema, without long-term current use of insulin (HCC)    Carotid stenosis    CKD (chronic kidney disease), stage III ([facility])    COPD exacerbation ([facility])    Pneumonia of right lower lobe due to infectious organism ([facility] [address]    Aortic stenosis, severe    Hypertension    Hyperlipidemia    H/O agent Orange exposure    Diabetes mellitus, type 2 ([facility] [address]    CAD (coronary artery disease)    Anemia    Proteinuria    Wears hearing aid in both ears    CHF NYHA class I, chronic, systolic ([facility])       RECOMMENDATIONS:  1. TAVR: EVOLUT 34 is recommended. 2. Possible post deployment dilation due to calcification. 3. Manage medication prost procedure and adjust due to CKD  4. Closure of both Femoral access  5. Echocardiogram post procedure. 6. Further orders post procedure. Discussed with patient and family and Nurse.     Electronically signed by Alvaro Bartholomew MD on 1/21/2020 at [address] [place] [address]           [phone]

## 2020-01-21 NOTE — PROGRESS NOTES
Patient admitted, consent signed and questions answered. Patient ready for procedure. Call light to reach with side rails up 2 of 2. Chest and groin clipped. family at bedside with patient. History and physical updated.

## 2020-01-21 NOTE — ANESTHESIA POSTPROCEDURE EVALUATION
Department of Anesthesiology  Postprocedure Note    Patient: Ingris Garduno  MRN: [id number]  YOB: 1945  Date of evaluation: 1/21/2020  Time:  12:15 PM     Procedure Summary     Date:  01/21/20 Room / Location:  [facility] Cath Lab    Anesthesia Start:  0746 Anesthesia Stop:  4149    Procedure:  TAVR W/ ANESTHESIA Diagnosis:             S/P TAVR (transcatheter aortic valve replacement)    Scheduled Providers:   Responsible Provider:  Dannielle Mar MD    Anesthesia Type:  MAC ASA Status:  4          Anesthesia Type: MAC    Herminia Phase I: Herminia Score: 9    Herminia Phase II:      Last vitals: Reviewed and per EMR flowsheets.    POST-OP ANESTHESIA NOTE       BP (!) 121/47   Pulse 60   Temp 97.7 °F (36.5 °C) (Oral)   Resp 17   Ht 5' 6\" (1.676 m)   Wt 160 lb (72.6 kg)   SpO2 100%   BMI 25.82 kg/m²    Pain Assessment: 0-10  Pain Level: 0       Anesthesia Post Evaluation    Patient location during evaluation: ICU  Patient participation: complete - patient participated  Level of consciousness: awake  Pain score: 0  Airway patency: patent  Nausea & Vomiting: no vomiting and no nausea  Complications: no  Cardiovascular status: hemodynamically stable  Respiratory status: acceptable  Hydration status: stable

## 2020-01-21 NOTE — PROGRESS NOTES
Dr. Jake Gant in at bedside to remove transvenous pacemaker. RN removed art line at that time as well.

## 2020-01-22 LAB
ABO/RH: NORMAL
ANION GAP SERPL CALCULATED.3IONS-SCNC: 10 MMOL/L (ref 9–17)
ANTIBODY SCREEN: NEGATIVE
ARM BAND NUMBER: NORMAL
BLD PROD TYP BPU: NORMAL
BLD PROD TYP BPU: NORMAL
BUN BLDV-MCNC: 17 MG/DL (ref 8–23)
BUN/CREAT BLD: ABNORMAL (ref 9–20)
CALCIUM SERPL-MCNC: 9.2 MG/DL (ref 8.6–10.4)
CHLORIDE BLD-SCNC: 101 MMOL/L (ref 98–107)
CO2: 24 MMOL/L (ref 20–31)
CREAT SERPL-MCNC: 0.9 MG/DL (ref 0.7–1.2)
CROSSMATCH RESULT: NORMAL
CROSSMATCH RESULT: NORMAL
DISPENSE STATUS BLOOD BANK: NORMAL
DISPENSE STATUS BLOOD BANK: NORMAL
EXPIRATION DATE: NORMAL
GFR AFRICAN AMERICAN: >60 ML/MIN
GFR NON-AFRICAN AMERICAN: >60 ML/MIN
GFR SERPL CREATININE-BSD FRML MDRD: ABNORMAL ML/MIN/{1.73_M2}
GFR SERPL CREATININE-BSD FRML MDRD: ABNORMAL ML/MIN/{1.73_M2}
GLUCOSE BLD-MCNC: 154 MG/DL (ref 75–110)
GLUCOSE BLD-MCNC: 157 MG/DL (ref 75–110)
GLUCOSE BLD-MCNC: 197 MG/DL (ref 75–110)
GLUCOSE BLD-MCNC: 201 MG/DL (ref 70–99)
GLUCOSE BLD-MCNC: 218 MG/DL (ref 75–110)
HCT VFR BLD CALC: 39 % (ref 40.7–50.3)
HEMOGLOBIN: 12.2 G/DL (ref 13–17)
MAGNESIUM: 1.9 MG/DL (ref 1.6–2.6)
MCH RBC QN AUTO: 26.6 PG (ref 25.2–33.5)
MCHC RBC AUTO-ENTMCNC: 31.3 G/DL (ref 28.4–34.8)
MCV RBC AUTO: 85.2 FL (ref 82.6–102.9)
NRBC AUTOMATED: 0 PER 100 WBC
PDW BLD-RTO: 14 % (ref 11.8–14.4)
PLATELET # BLD: 232 K/UL (ref 138–453)
PMV BLD AUTO: 10.5 FL (ref 8.1–13.5)
POTASSIUM SERPL-SCNC: 5 MMOL/L (ref 3.7–5.3)
RBC # BLD: 4.58 M/UL (ref 4.21–5.77)
SODIUM BLD-SCNC: 135 MMOL/L (ref 135–144)
TRANSFUSION STATUS: NORMAL
TRANSFUSION STATUS: NORMAL
UNIT DIVISION: 0
UNIT DIVISION: 0
UNIT NUMBER: NORMAL
UNIT NUMBER: NORMAL
WBC # BLD: 10.2 K/UL (ref 3.5–11.3)

## 2020-01-22 PROCEDURE — 85027 COMPLETE CBC AUTOMATED: CPT

## 2020-01-22 PROCEDURE — 80048 BASIC METABOLIC PNL TOTAL CA: CPT

## 2020-01-22 PROCEDURE — 83735 ASSAY OF MAGNESIUM: CPT

## 2020-01-22 PROCEDURE — 2060000000 HC ICU INTERMEDIATE R&B

## 2020-01-22 PROCEDURE — 6370000000 HC RX 637 (ALT 250 FOR IP): Performed by: INTERNAL MEDICINE

## 2020-01-22 PROCEDURE — 82947 ASSAY GLUCOSE BLOOD QUANT: CPT

## 2020-01-22 PROCEDURE — 36415 COLL VENOUS BLD VENIPUNCTURE: CPT

## 2020-01-22 RX ADMIN — GLIPIZIDE 10 MG: 10 TABLET ORAL at 16:28

## 2020-01-22 RX ADMIN — SIMVASTATIN 10 MG: 10 TABLET, FILM COATED ORAL at 20:11

## 2020-01-22 RX ADMIN — GLIPIZIDE 10 MG: 10 TABLET ORAL at 08:34

## 2020-01-22 RX ADMIN — PIOGLITAZONE 30 MG: 15 TABLET ORAL at 08:34

## 2020-01-22 RX ADMIN — LISINOPRIL 20 MG: 20 TABLET ORAL at 08:34

## 2020-01-22 RX ADMIN — CLOPIDOGREL 75 MG: 75 TABLET, FILM COATED ORAL at 08:34

## 2020-01-22 RX ADMIN — Medication 81 MG: at 08:34

## 2020-01-22 RX ADMIN — AMLODIPINE BESYLATE 10 MG: 10 TABLET ORAL at 08:34

## 2020-01-22 RX ADMIN — FUROSEMIDE 20 MG: 20 TABLET ORAL at 08:34

## 2020-01-22 ASSESSMENT — PAIN SCALES - GENERAL
PAINLEVEL_OUTOF10: 0

## 2020-01-22 NOTE — PROGRESS NOTES
The incidental findings from pt's TAVR work up were discussed with the pt and his wife. The indeterminate left adrenal gland nodule measuring 2 cm, and the 4.0 mm solid pulmonary nodules within the R upper and lower lobes noted on CTA were discussed in detail. The pt understands to discuss these findings with his pcp at his next appointment. He understands an adrenal CT at 1 year is recommended and additional routine follow-up imaging of the chest is dependent upon his risk stratification.      [facility]

## 2020-01-22 NOTE — FLOWSHEET NOTE
Assessment: Patient was reclining in his chair when  visited. Family was present and open to spiritual care. Patient remained hopeful and seemed to appreciate the treatment and care he was receiving. When asked how he was feeling, patient responded; \"fine. I am okay. \" Patient said he was raised Jain and a member of [employer]. Intervention:  provided presence, offered support and prayed with patient and family. Patient received sacrament of anointing of the sick. Outcome: Patient and family were very appreciative of the anointing and spiritual support they received. Chaplains would continue to visit for on going assessment of patient's condition and for more prayers and support. 01/22/20 1119   Encounter Summary   Services provided to: Patient and family together   Support System Family members   Place of [address] [facility]   Continue Visiting   (01/22/2020)   Complexity of Encounter Moderate   Length of Encounter 30 minutes   Spiritual Assessment Completed Yes   Routine   Type Initial   Spiritual/Judaism   Type Spiritual support   Assessment Calm; Approachable; Hopeful   Intervention Active listening;Prayer; Anointing;Nurtured hope   Outcome Expressed gratitude   Sacraments   Sacrament of Sick-Anointing Anointed

## 2020-01-22 NOTE — FLOWSHEET NOTE
SPIRITUAL CARE NOTE     PATIENT:  Darien Ireland  ROOM:  12  :  [place]       Patient returned entry greeting hospitably. When asked, reported doing well. Reported a valve replacement procedure. We discussed this some.   Engaged in reeflective listening.  offered prayer, which was accepted. Patient expressed gratitude.

## 2020-01-22 NOTE — PROGRESS NOTES
Wt 160 lb (72.6 kg)   SpO2 97%   BMI 25.82 kg/m²   General appearance: Alert. No acute distress. HEENT: Head: Normal, normocephalic, atraumatic.   Neck: Supple, no carotid bruit, no JVD, trachea midline  Lungs: Clear to auscultation bilaterally  Heart: Regular rate and rhythm, S1, S2 normal, no murmur  Abdomen: Soft, non-tender; bowel sounds normal  Extremities: No cyanosis or edema  Neurologic: No focal neurologic deficits  Mental status: Alert, oriented, mood appropriate      EKG:   Results for orders placed or performed during the hospital encounter of 01/21/20   EKG 12 Lead   Result Value Ref Range    Ventricular Rate 66 BPM    Atrial Rate 66 BPM    P-R Interval 214 ms    QRS Duration 102 ms    Q-T Interval 430 ms    QTc Calculation (Bazett) 450 ms    P Axis 45 degrees    R Axis -29 degrees    T Axis 28 degrees    Narrative    Sinus rhythm with 1st degree A-V block with Premature atrial complexes with Aberrant conduction  RSR' or QR pattern in V1 suggests right ventricular conduction delay  Moderate voltage criteria for LVH, may be normal variant  Inferior infarct , age undetermined  Abnormal ECG  No previous ECGs available       ECHO:   Results for orders placed or performed during the hospital encounter of 12/03/19   ECHO Complete 2D W Doppler W Color   Result Value Ref Range    Left Ventricular Ejection Fraction 31     LVEF MODALITY ECHO     Narrative    Transthoracic Echocardiography Report (TTE)     [facility]     Date of Study               12/06/2019                SHIRLEY PORTER      Date of      1945  Gender                      Male   Birth      Age          69 year(s)  Race                              Room Number  3011        Height:                     65 inch, 165.1 cm      Corporate ID [id number]    Weight:                     172 pounds, 78 kg   #      Patient Acct [de-identified]   BSA:          1.86 m^2      BMI:      28.62   # Atrium  Left atrium is mildly dilated. Left Ventricle  Left ventricle is normal in size. Global left ventricular systolic function  is moderately reduced. Estimated ejection fraction is 30 % . Calculated EF via heart model is 31 %. Grade I (mild) left ventricular diastolic dysfunction. Right Atrium  Right atrial dilatation. Right Ventricle  Mildly dilated right ventricular cavity. Right ventricular function appears normal .  Mitral Valve  Normal mitral valve structure. Mild mitral regurgitation. No mitral stenosis. Aortic Valve  The aortic valve is calcified with restricted cusp mobility. Moderate Aortic Stenosis, however, maybe underestimated due to poor LVEF. Mean gradient of 26 mmHg and a valve area of 0.97 cm2. No aortic insufficiency. Tricuspid Valve  Normal tricuspid valve leaflets. Trivial tricuspid regurgitation. No tricuspid stenosis. Estimated right ventricular systolic pressure is 27 mmHg. Pulmonic Valve  Pulmonic valve is normal in structure and function. Trivial pulmonic insufficiency. No evidence of pulmonic stenosis. Pericardial Effusion  No significant pericardial effusion is seen. Miscellaneous  Normal aortic root dimension. The ascending aorta is normal in size. E/E' = 12.5 . IVC normal diameter & inspiratory collapse indicating normal RA filling  pressure .     M-mode / 2D Measurements & Calculations:      LVIDd:5.8 cm(3.7 - 5.6 cm)       Diastolic ZOYMCF:831 ml   AYHZW:6.1 cm(2.2 - 4.0 cm)       Systolic YHFPNB:475 ml   KDPF:1.4 cm(0.6 - 1.1 cm)        Aortic Root:3.1 cm(2.0 - 3.7 cm)   LVPWd:0.6 cm(0.6 - 1.1 cm)       LA Dimension: 4.8 cm(1.9 - 4.0 cm)   Fractional Shortening:15.52 %    LA volume/Index: 67.9 ml /37m^2   Calculated LVEF (%): 31.68 %     LVOT:2.2 cm                                    RVDd:4.8 cm      Mitral:                                 Aortic      Valve Area (P1/2-Time): 1.93 cm^2       Peak Velocity: 3.27 m/s   Peak E-Wave: 0.52 m/s Weight:               172 pounds, 78 kg   ID #      Patient   [id number]             BSA:       1.86 m^2   BMI:     28.62   Acct #                                                         kg/m^2      MR #      [id number]               Performing Physician  Laura Kirk      Accession [id number]             Referring Physician   #      Case ID # [id number]                 Assisting Physician     Additional Comments  H&P reviewed and patient examined by performing physician prior to the  procedure on 12/3/2019 at [phone]  No changes noted. If changes, see note below. Mallampati Classification 2 / ASA Classification III : per Physician . Patient medications reviewed by Physician prior to procedure. corrected Case ID to [id number] 12/4/19    Procedure  Procedure Type:     Diagnostic procedure: Rt & Lt Heart Cath, Coronary Angios, LVgram     Complications:    - No complication    Indications:    - NSTEMI      - Aortic stenosis by ECHO      - CHF     Conclusions      Procedure Summary      Severe aortic stenosis. Mild LV systolic dysfunction. Patent mid LAD, proximal D1 and mid LCX stents. Focal distal LAD 70%   stenosis. Recommendations      Medical therapy as needed. Risk factor modification. Elective CV surgical consultation for AVR/ CABG      Signature      ----------------------------------------------------------------   Electronically signed by Alanda Crigler Sohail(Performing Physician)   on 12/04/2019 08:21   ----------------------------------------------------------------      Angiographic Findings      Cardiac Arteries and Lesion Findings     LMCA: has distal 20% stenosis. LAD: has mid patent stent. The distal segment has focal 70% stenosis. The D1  stent is patent. LCx: has patent mid stent. The OM1 has 20% stenosis. The OM2 has 30%  stenosis. The LPL branch is occluded (known) with right to left collaterals. RCA: has 30-40% stenosis. The RPDA is occluded which is known.      Coronary Tree Dominance: Right     LV Analysis  LV function assessed as:Abnormal.  Ejection Fraction  +----------------------------------------------------------------------+---+  ! Method                                                                ! EF%! +----------------------------------------------------------------------+---+  ! LV gram                                                               !40 !  +----------------------------------------------------------------------+---+     LV Segment Contractility      1 - Normal    3 - Mild         5 - Severe       7 - Dyskinesis   hypokinesis      hypokinesis      2 -           4 - Moderate     6 - Akinesis     8 - Aneurysm   Hypokinesis   hypokinesis     Procedure Data  Procedure Start Time: 12/03/2019 17:41. Procedure End Time: 12/03/2019  18:10. Diagnostic Cath Status: Urgent    Entry Locations    - Retrograde Percutaneous access was performed through the Right Femoral      artery. A 6 Fr sheath was inserted. Hemostasis was successfully obtained      using Mynx (Access). - Percutaneous access was performed through the Right Brachial Vein. A 7      Fr sheath was inserted. Hemostasis was successfully obtained using Mynx      (Access). Procedure Medications: - Versed I.V. 2 mg. Catheters and Wires:    - 7F Pulmonary Wedge Pressure Catheter was used for Right heart cath. - 6F Catheter AL 1 was used for Left ventriculography. - 6F Dual Lumen Pigtail was used for Left ventriculography. - 6F Catheter JL 4 was used for Left coronary angiography. - 6F Catheter JR 4 was used for Right coronary angiography. Contrast Material:    - Optiray 71686 ml    Fluoroscopy Time: Diagnostic: 5:45 minutes. Total: 5:45 minutes. Estimated Blood Loss: 15 ml. Medical History     Allergies    - Sulfa. Risk Factors      The patient risk factors include:last creatinine: 1.2 mg/dl and creatinine   clearance: 60.5 ml/min.      Admission Data  Admission Date: 12/03/2019    Admission Status: Inpatient.        -The patient's anginal syndrome was assessed as No symptoms according to      the [facility] clinical classification. Hemodynamics      Condition: Baseline Room Air      Estimated: 231. 26Heart Rate: 94 bpm     Oxygen Saturation  +--------+-----+----+----------------------+---+---------------------------+  ! Location! pCO2 ! pO2 !% Saturation          ! Hgb! O2 Content                 ! +--------+-----+----+----------------------+---+---------------------------+  ! PA      !     !    !71.1                  !   !                           !  +--------+-----+----+----------------------+---+---------------------------+  ! FA      !     !    !92.6                  !   !                           !  +--------+-----+----+----------------------+---+---------------------------+    Pressure  +-----+--------------------------------------------------------------------+  ! Site ! Pressure                                                            ! +-----+--------------------------------------------------------------------+  ! AO   !-18/-21 (-20)                                                       !  +-----+--------------------------------------------------------------------+  ! AO   !104/59 (77)                                                         !  +-----+--------------------------------------------------------------------+  ! AO   !113/53 (80)                                                         !  +-----+--------------------------------------------------------------------+  ! AO   !104/42 (74)                                                         !  +-----+--------------------------------------------------------------------+  ! LV   !165/0 ,13                                                           ! +-----+--------------------------------------------------------------------+  ! LV   !157/1 ,12 !  +----------+--------+--------+--------+---------+----------+---------------+  ! Aortic    !50      !48      !0.82    !0.44     !250.93    ! Nery           !  +----------+--------+--------+--------+---------+----------+---------------+  ! Aortic    !50      !48      !        !         !          !               !  +----------+--------+--------+--------+---------+----------+---------------+    Shunts     Oxygen Values      O2 Gsmhecva297. 16O2 Esulclpvxcl490.26     Vascular Resistance    +-----------------------------+-----+------+------+------+---------+-------+  ! CO method                    ! TSVR ! SVR   ! TPVR  ! PVR   ! TPVR/TSVR! PVR/SVR!  +-----------------------------+-----+------+------+------+---------+-------+  ! Raul Rob                         !99 [id number]. 1!960.68!314.98!156.04!0.32     !0.16   !  +-----------------------------+-----+------+------+------+---------+-------+         Assessment:   1. Severe aortic stenosis s/p TAVR on 1/21/2020  2. Ischemic / valvular cardiomyopathy  3. CAD s/p stents in past  4. CKD  5. DM II  6. Chronic anemia  7. COPD  8. HTN  9.  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, without long-term current use of insulin (HCC)    Carotid stenosis    CKD [facility]chronic kidney disease), stage III ([facility])    COPD exacerbation (HCC)    Pneumonia of right lower lobe due to infectious organism ([address]    Aortic stenosis, severe    Hypertension    Hyperlipidemia    H/O agent Orange exposure    Diabetes mellitus, type 2 ([address]    CAD (coronary artery disease)    Anemia    Proteinuria    Wears hearing aid in both ears    CHF NYHA class I, chronic, systolic (HCC)    S/P TAVR (transcatheter aortic valve replacement)         Plan / Recommendations:   1. On ASA 81, Plavix 75, Lasix 20 PO daily, Zocor 10, Lisinopril 20, and Norvasc 10  2. S/P temp pacer removal on 1/21  3. PT/OT evaluation for right leg pain  4. Continue current management  5. Echo tomorrow  6. Likely D/C home if echo unremarkable    Electronically signed on 01/22/20 at 3:17 PM by:    Leeann Araiza MD   Fellow, [address]    Attending Physician Statement  I have discussed the case of Arsenio Tellez including pertinent history and exam findings with the resident. I have seen and examined the patient and the key elements of the encounter have been performed by me. I agree with the assessment, plan and orders as documented by the resident With changes made to the note.      Electronically signed by Blu Villatoro MD on 1/23/2020 at 1:58 PM.    [facility]      [phone]

## 2020-01-23 VITALS
TEMPERATURE: 98 F | HEART RATE: 59 BPM | BODY MASS INDEX: 25.71 KG/M2 | WEIGHT: 160 LBS | DIASTOLIC BLOOD PRESSURE: 53 MMHG | HEIGHT: 66 IN | SYSTOLIC BLOOD PRESSURE: 124 MMHG | RESPIRATION RATE: 20 BRPM | OXYGEN SATURATION: 99 %

## 2020-01-23 PROBLEM — Z77.22 SECOND HAND SMOKE EXPOSURE: Status: ACTIVE | Noted: 2020-01-23

## 2020-01-23 LAB
ABSOLUTE EOS #: 0.29 K/UL (ref 0–0.44)
ABSOLUTE IMMATURE GRANULOCYTE: 0.06 K/UL (ref 0–0.3)
ABSOLUTE LYMPH #: 1.07 K/UL (ref 1.1–3.7)
ABSOLUTE MONO #: 1.1 K/UL (ref 0.1–1.2)
ANION GAP SERPL CALCULATED.3IONS-SCNC: 10 MMOL/L (ref 9–17)
BASOPHILS # BLD: 1 % (ref 0–2)
BASOPHILS ABSOLUTE: 0.05 K/UL (ref 0–0.2)
BUN BLDV-MCNC: 19 MG/DL (ref 8–23)
BUN/CREAT BLD: ABNORMAL (ref 9–20)
CALCIUM SERPL-MCNC: 8.9 MG/DL (ref 8.6–10.4)
CHLORIDE BLD-SCNC: 102 MMOL/L (ref 98–107)
CO2: 24 MMOL/L (ref 20–31)
CREAT SERPL-MCNC: 1.02 MG/DL (ref 0.7–1.2)
DIFFERENTIAL TYPE: ABNORMAL
EOSINOPHILS RELATIVE PERCENT: 3 % (ref 1–4)
GFR AFRICAN AMERICAN: >60 ML/MIN
GFR NON-AFRICAN AMERICAN: >60 ML/MIN
GFR SERPL CREATININE-BSD FRML MDRD: ABNORMAL ML/MIN/{1.73_M2}
GFR SERPL CREATININE-BSD FRML MDRD: ABNORMAL ML/MIN/{1.73_M2}
GLUCOSE BLD-MCNC: 167 MG/DL (ref 70–99)
GLUCOSE BLD-MCNC: 172 MG/DL (ref 75–110)
GLUCOSE BLD-MCNC: 191 MG/DL (ref 75–110)
HCT VFR BLD CALC: 33 % (ref 40.7–50.3)
HEMOGLOBIN: 11.2 G/DL (ref 13–17)
IMMATURE GRANULOCYTES: 1 %
LV EF: 36 %
LVEF MODALITY: NORMAL
LYMPHOCYTES # BLD: 11 % (ref 24–43)
MAGNESIUM: 2 MG/DL (ref 1.6–2.6)
MCH RBC QN AUTO: 29.6 PG (ref 25.2–33.5)
MCHC RBC AUTO-ENTMCNC: 33.9 G/DL (ref 28.4–34.8)
MCV RBC AUTO: 87.3 FL (ref 82.6–102.9)
MONOCYTES # BLD: 11 % (ref 3–12)
NRBC AUTOMATED: 0 PER 100 WBC
PDW BLD-RTO: 16 % (ref 11.8–14.4)
PLATELET # BLD: 163 K/UL (ref 138–453)
PLATELET ESTIMATE: ABNORMAL
PMV BLD AUTO: 10.5 FL (ref 8.1–13.5)
POTASSIUM SERPL-SCNC: 4.7 MMOL/L (ref 3.7–5.3)
RBC # BLD: 3.78 M/UL (ref 4.21–5.77)
RBC # BLD: ABNORMAL 10*6/UL
SEG NEUTROPHILS: 73 % (ref 36–65)
SEGMENTED NEUTROPHILS ABSOLUTE COUNT: 7.04 K/UL (ref 1.5–8.1)
SODIUM BLD-SCNC: 136 MMOL/L (ref 135–144)
WBC # BLD: 9.6 K/UL (ref 3.5–11.3)
WBC # BLD: ABNORMAL 10*3/UL

## 2020-01-23 PROCEDURE — 36415 COLL VENOUS BLD VENIPUNCTURE: CPT

## 2020-01-23 PROCEDURE — 97535 SELF CARE MNGMENT TRAINING: CPT

## 2020-01-23 PROCEDURE — 82947 ASSAY GLUCOSE BLOOD QUANT: CPT

## 2020-01-23 PROCEDURE — 6370000000 HC RX 637 (ALT 250 FOR IP): Performed by: INTERNAL MEDICINE

## 2020-01-23 PROCEDURE — 97530 THERAPEUTIC ACTIVITIES: CPT

## 2020-01-23 PROCEDURE — 97162 PT EVAL MOD COMPLEX 30 MIN: CPT

## 2020-01-23 PROCEDURE — 93306 TTE W/DOPPLER COMPLETE: CPT

## 2020-01-23 PROCEDURE — 85025 COMPLETE CBC W/AUTO DIFF WBC: CPT

## 2020-01-23 PROCEDURE — 97166 OT EVAL MOD COMPLEX 45 MIN: CPT

## 2020-01-23 PROCEDURE — 83735 ASSAY OF MAGNESIUM: CPT

## 2020-01-23 PROCEDURE — 80048 BASIC METABOLIC PNL TOTAL CA: CPT

## 2020-01-23 RX ORDER — FUROSEMIDE 40 MG/1
40 TABLET ORAL DAILY
Status: DISCONTINUED | OUTPATIENT
Start: 2020-01-24 | End: 2020-01-23 | Stop reason: HOSPADM

## 2020-01-23 RX ORDER — AMLODIPINE BESYLATE 10 MG/1
10 TABLET ORAL DAILY
Qty: 30 TABLET | Refills: 5 | Status: ON HOLD | OUTPATIENT
Start: 2020-01-23 | End: 2022-03-30 | Stop reason: HOSPADM

## 2020-01-23 RX ADMIN — AMLODIPINE BESYLATE 10 MG: 10 TABLET ORAL at 08:43

## 2020-01-23 RX ADMIN — CLOPIDOGREL 75 MG: 75 TABLET, FILM COATED ORAL at 08:43

## 2020-01-23 RX ADMIN — PIOGLITAZONE 30 MG: 15 TABLET ORAL at 08:43

## 2020-01-23 RX ADMIN — GLIPIZIDE 10 MG: 10 TABLET ORAL at 08:43

## 2020-01-23 RX ADMIN — LISINOPRIL 20 MG: 20 TABLET ORAL at 08:43

## 2020-01-23 RX ADMIN — Medication 81 MG: at 08:43

## 2020-01-23 RX ADMIN — FUROSEMIDE 20 MG: 20 TABLET ORAL at 08:43

## 2020-01-23 ASSESSMENT — PAIN SCALES - GENERAL
PAINLEVEL_OUTOF10: 0

## 2020-01-23 NOTE — PROGRESS NOTES
Occupational Therapy   Occupational Therapy Initial Assessment  Date: 2020   Patient Name: Tc Mayer  MRN: [id number]     : 1945    Copied from Cardiology Progress note 2020:  Reason for Admission: S/P TAVR (transcatheter aortic valve replacement)     Date of Service: 2020    Discharge Recommendations:    No therapy recommended at discharge. OT Equipment Recommendations  Equipment Needed: Yes  Mobility Devices: [place]: Rolling    Assessment   Performance deficits / Impairments: Decreased ADL status; Decreased functional mobility ; Decreased endurance  Assessment: Pt would benefit from continued acute care OT to address minimal deficits in ADL/ functional activities, endurance and functional transfers/ mobility following TAVR. Pt required use of RW and SBA assist for safety during activity on this date   Treatment Diagnosis: TAVR  Prognosis: Good  Decision Making: Medium Complexity  OT Education: OT Role;Plan of Care;Transfer Training  Patient Education: good return   REQUIRES OT FOLLOW UP: Yes  Activity Tolerance  Activity Tolerance: Patient Tolerated treatment well  Safety Devices  Safety Devices in place: Yes  Type of devices: Call light within reach;Nurse notified; Left in chair;Gait belt  Restraints  Initially in place: No         Patient Diagnosis(es): The encounter diagnosis was S/P TAVR (transcatheter aortic valve replacement). has a past medical history of Acne rosacea, Anemia, Aortic stenosis, BPH (benign prostatic hypertrophy), CAD (coronary artery disease), Carotid stenosis, Cataract of both eyes, CRI (chronic renal insufficiency), Diabetes mellitus, type 2 ([address] Dyslipidemia, Erectile dysfunction, H/O agent Orange exposure, Hearing loss, Hyperlipidemia, Hypertension, Non-rheumatic mitral regurgitation, RBBB, and Wears dentures. has a past surgical history that includes Circumcision (age 49); Vasectomy;  Tympanostomy tube placement; Cataract removal (Bilateral); Stand by assistance(use of RW )  Functional Mobility  Functional - Mobility Device: Rolling Walker  Activity: Other  Assist Level: Stand by assistance  Functional Mobility Comments: NO LOB or SOB. Increased time d/t LLE pain with weightbearing   ADL  Feeding: Independent  Grooming: Contact guard assistance  UE Bathing: Contact guard assistance  LE Bathing: Contact guard assistance  UE Dressing: Contact guard assistance(todon second gown standing )  LE Dressing: Contact guard assistance(to don/ doff socks seated in recliner)  Toileting: Contact guard assistance  Additional Comments: Pt seated in recliner on arrival. Pt completed sit > stand transfer and functional mobility onto unit with use of RW. Pt required VCS for hand placement on RW during session. No LOB or SOB noted. Pt able to don/ doff sock seated in recliner. Pt reamined in chair, call light in reach and RN notified on therapist exit.       Tone RUE  RUE Tone: Normotonic  Tone LUE  LUE Tone: Normotonic  Coordination  Movements Are Fluid And Coordinated: Yes        Bed mobility  Comment: Pt seated in chair on arrival, returned to chair on exit      Transfers  Stand Step Transfers: Stand by assistance  Sit to stand: Stand by assistance  Stand to sit: Stand by assistance  Transfer Comments: Pt denies dizziness with postional change         Cognition  Overall Cognitive Status: WFL     Perception  Overall Perceptual Status: WFL        Sensation  Overall Sensation Status: WFL        LUE AROM (degrees)  LUE AROM : WFL  RUE AROM (degrees)  RUE AROM : WFL  LUE Strength  Gross LUE Strength: WFL  RUE Strength  Gross RUE Strength: WFL    Plan   Plan  Times per week: 4-5x/wk   Current Treatment Recommendations: Functional Mobility Training, Endurance Training, Safety Education & Training, Patient/Caregiver Education & Training, Equipment Evaluation, Education, & procurement, Self-Care / ADL    AM-PAC Score  AM-PAC Inpatient Daily Activity Raw Score: 19 (01/23/20 1109)  AM-PAC Inpatient ADL T-Scale Score : 40.22 (01/23/20 1109)  ADL Inpatient CMS 0-100% Score: 42.8 (01/23/20 1109)  ADL Inpatient CMS G-Code Modifier : CK (01/23/20 1109)    Goals  Short term goals  Time Frame for Short term goals: by discharge, pt will  Short term goal 1: demo I in UE/LE ADL activities   Short term goal 2: demo I in functional transfers/ mobility with use of RW and good safety awareness during ADL/ functional activities   Short term goal 3: demo increased activity tolerance of 35+ min to assist with ADL/ functional activities   Short term goal 4: demo understanding and I use of ECWS, fall prevention tech during functional activities   Patient Goals   Patient goals : to go home ASAP       Therapy Time   Individual Concurrent Group Co-treatment   Time In 0815         Time Out 0836         Minutes 21          See above for LOF. RN reports patient is medically stable for therapy treatment this date. Chart reviewed prior to treatment and patient is agreeable for therapy. All lines intact and patient positioned comfortably at end of treatment. All patient needs addressed prior to ending therapy session.         Win Chua, OTR/L

## 2020-01-23 NOTE — PROGRESS NOTES
disease), Carotid stenosis, Cataract of both eyes, CRI (chronic renal insufficiency), Diabetes mellitus, type 2 ([address] Dyslipidemia, Erectile dysfunction, H/O agent Orange exposure, Hearing loss, Hyperlipidemia, Hypertension, Non-rheumatic mitral regurgitation, RBBB, and Wears dentures. has a past surgical history that includes Circumcision (age 49); Vasectomy; Tympanostomy tube placement; Cataract removal (Bilateral); eye surgery (Bilateral); and Cardiac catheterization (12/03/2019). Restrictions  Restrictions/Precautions  Restrictions/Precautions: General Precautions, Fall Risk  Required Braces or Orthoses?: No  Position Activity Restriction  Other position/activity restrictions: Up with assist, ambulate patient   Vision/Hearing        Subjective  General  Patient assessed for rehabilitation services?: Yes  Family / Caregiver Present: No  Follows Commands: Within Functional Limits  General Comment  Comments: Pt is sitting up in chair upon arrival and is agreeable to physical therapy. Subjective  Subjective: Pt reports that his right leg is feeling better than yesterday.    Pain Screening  Patient Currently in Pain: Denies  Vital Signs  Patient Currently in Pain: Denies       Orientation  Orientation  Overall Orientation Status: Within Functional Limits  Social/Functional History  Social/Functional History  Lives With: Spouse  Type of Home: House  Home Layout: One level  Home Access: Stairs to enter without rails  Entrance Stairs - Number of Steps: 1 SETH  Bathroom Shower/Tub: Tub/Shower unit, Walk-in shower  Bathroom Toilet: Handicap height  Bathroom Equipment: Grab bars in shower, Built-in shower seat(Pt reports grab bars and shower chair in walk-in shower )  Home Equipment: Standard walker(possible RW, was not using prior to admission)  ADL Assistance: [address] [place] [address] Avenue: Independent  Homemaking Responsibilities: Yes(Pt reports home care activities are shared with wife )  Meal Prep Patient/Caregiver Education & Training, Balance Training, Neuromuscular Re-education, Functional Mobility Training, Transfer Training, Safety Education & Training  Safety Devices  Type of devices: All fall risk precautions in place, Left in chair, Call light within reach, Gait belt                                                  AM-PAC Score  AM-PAC Inpatient Mobility Raw Score : 19 (01/23/20 0851)  AM-PAC Inpatient T-Scale Score : 45.44 (01/23/20 0851)  Mobility Inpatient CMS 0-100% Score: 41.77 (01/23/20 0851)  Mobility Inpatient CMS G-Code Modifier : CK (01/23/20 0851)          Goals  Short term goals  Time Frame for Short term goals: 10 visits  Short term goal 1: Pt will be able to perform bed mobility independently. Short term goal 2: Pt will be able to perform transfers independently. Short term goal 3: Pt will be able to ambulate 300 ft independently with least restrictive device. Short term goal 4: Pt will be able to ascend/descend 1 step independently without use of hand rail. Patient Goals   Patient goals : To go home today       Therapy Time   Individual Concurrent Group Co-treatment   Time In 0820         Time Out 9570         Minutes 15         Timed Code Treatment Minutes: 8 Minutes       TARI Pena This treatment/evaluation completed by signing SPT. Signing PT agrees with treatment and documentation.

## 2020-01-23 NOTE — CARE COORDINATION
Case Management Initial Discharge Plan  Wyatt Dueñas,             Met with:patient to discuss discharge plans. Information verified: address, contacts, phone number, , insurance Yes  PCP: ESTHER Pacheco CNP  Date of last visit: couple months    Insurance Provider: Ofe Hameed, [facility]    Discharge Planning    Living Arrangements:  Spouse/Significant Other   Support Systems:  Spouse/Significant Other    Home has 1 stories  1 stairs to climb to get into front door, location of bedroom/bathroom in home main    Patient able to perform ADL's:Independent    Current Services (outpatient & in home) none  DME equipment: none  DME provider: none      Potential Assistance Needed:  N/A    Patient agreeable to home care: No  [place] of choice provided:  no    Prior SNF/Rehab Placement and Facility: no  Agreeable to SNF/Rehab: No  [place] of choice provided: no   Evaluation: no    Expected Discharge date:     Patient expects to be discharged to:  home  Follow Up Appointment: Best Day/ Time:      Transportation provider: family  Transportation arrangements needed for discharge: No    Readmission Risk              Risk of Unplanned Readmission:        15             Does patient have a readmission risk score greater than 14?: Yes  If yes, follow-up appointment must be made within 7 days of discharge.      Goals of Care:       Discharge Plan: home with wife          Electronically signed by Hima Thomas RN on 20 at 11:34 AM
Topic Date Due    Lipid screen  01/17/2020       London Hollins RN

## 2020-01-24 ENCOUNTER — CARE COORDINATION (OUTPATIENT)
Dept: CASE MANAGEMENT | Age: 75
End: 2020-01-24

## 2020-01-24 PROCEDURE — 1111F DSCHRG MED/CURRENT MED MERGE: CPT | Performed by: NURSE PRACTITIONER

## 2020-01-27 ENCOUNTER — CARE COORDINATION (OUTPATIENT)
Dept: CASE MANAGEMENT | Age: 75
End: 2020-01-27

## 2020-01-29 ENCOUNTER — OFFICE VISIT (OUTPATIENT)
Dept: CARDIOLOGY | Age: 75
End: 2020-01-29
Payer: MEDICARE

## 2020-01-29 ENCOUNTER — OFFICE VISIT (OUTPATIENT)
Dept: INTERNAL MEDICINE | Age: 75
End: 2020-01-29
Payer: MEDICARE

## 2020-01-29 VITALS
RESPIRATION RATE: 18 BRPM | HEIGHT: 66 IN | BODY MASS INDEX: 25.71 KG/M2 | HEART RATE: 63 BPM | WEIGHT: 160 LBS | DIASTOLIC BLOOD PRESSURE: 70 MMHG | SYSTOLIC BLOOD PRESSURE: 136 MMHG

## 2020-01-29 VITALS
HEIGHT: 66 IN | WEIGHT: 160 LBS | HEART RATE: 63 BPM | BODY MASS INDEX: 25.71 KG/M2 | DIASTOLIC BLOOD PRESSURE: 70 MMHG | SYSTOLIC BLOOD PRESSURE: 136 MMHG

## 2020-01-29 PROCEDURE — 1123F ACP DISCUSS/DSCN MKR DOCD: CPT | Performed by: INTERNAL MEDICINE

## 2020-01-29 PROCEDURE — G8482 FLU IMMUNIZE ORDER/ADMIN: HCPCS | Performed by: INTERNAL MEDICINE

## 2020-01-29 PROCEDURE — 3017F COLORECTAL CA SCREEN DOC REV: CPT | Performed by: INTERNAL MEDICINE

## 2020-01-29 PROCEDURE — 99214 OFFICE O/P EST MOD 30 MIN: CPT | Performed by: INTERNAL MEDICINE

## 2020-01-29 PROCEDURE — 99495 TRANSJ CARE MGMT MOD F2F 14D: CPT | Performed by: NURSE PRACTITIONER

## 2020-01-29 PROCEDURE — 4040F PNEUMOC VAC/ADMIN/RCVD: CPT | Performed by: INTERNAL MEDICINE

## 2020-01-29 PROCEDURE — 93010 ELECTROCARDIOGRAM REPORT: CPT | Performed by: INTERNAL MEDICINE

## 2020-01-29 PROCEDURE — 1111F DSCHRG MED/CURRENT MED MERGE: CPT | Performed by: INTERNAL MEDICINE

## 2020-01-29 PROCEDURE — G8417 CALC BMI ABV UP PARAM F/U: HCPCS | Performed by: INTERNAL MEDICINE

## 2020-01-29 PROCEDURE — 1036F TOBACCO NON-USER: CPT | Performed by: INTERNAL MEDICINE

## 2020-01-29 PROCEDURE — 99213 OFFICE O/P EST LOW 20 MIN: CPT

## 2020-01-29 PROCEDURE — G8427 DOCREV CUR MEDS BY ELIG CLIN: HCPCS | Performed by: INTERNAL MEDICINE

## 2020-01-29 PROCEDURE — 93005 ELECTROCARDIOGRAM TRACING: CPT | Performed by: INTERNAL MEDICINE

## 2020-01-29 ASSESSMENT — PATIENT HEALTH QUESTIONNAIRE - PHQ9
SUM OF ALL RESPONSES TO PHQ9 QUESTIONS 1 & 2: 0
1. LITTLE INTEREST OR PLEASURE IN DOING THINGS: 0
SUM OF ALL RESPONSES TO PHQ QUESTIONS 1-9: 0
SUM OF ALL RESPONSES TO PHQ QUESTIONS 1-9: 0
2. FEELING DOWN, DEPRESSED OR HOPELESS: 0

## 2020-01-29 NOTE — PROGRESS NOTES
distress and appears stated age  [de-identified]: PERRL, no cervical lymphadenopathy. No masses palpable. Normal oral mucosa  Respiratory:  · Normal excursion and expansion without use of accessory muscles  · Resp Auscultation: Good respiratory effort. No for increased work of breathing. On auscultation: clear to auscultation bilaterally  Cardiovascular:  · 3/6 systolic murmur  · Jugular venous pulsation Normal  · Right carotid bruit  Abdomen:   · soft  · Bowel sounds present  Extremities:  ·  No edema  Neurological:  · Alert and oriented. Labs:   FASTING LIPID PANEL:  Lab Results   Component Value Date    HDL 41 01/17/2019    TRIG 139 01/17/2019       TTE 3/12/18  Left ventricle is normal in size Global left ventricular systolic function  is low normal. Estimated ejection fraction is 50%. Interventricular septum is thickened. Inferolateral wall is thinned and akinetic. Left atrium is moderately dilated. Right atrium is mildly dilated . Normal right ventricular size and function. Aortic leaflet calcification with moderate stenosis. Peak instantaneous gradient 55 mmHg and mean gradient 32 mmHg. Mitral annular calcification is seen. Mild mitral regurgitation. Trivial tricuspid regurgitation. Estimated right ventricular systolic pressure is 36 mmHg. No significant pericardial effusion is seen. Carotid u/s 9/25/19  The right internal carotid artery demonstrates 0-50% stenosis .       2. The left internal carotid artery demonstrates 0-50% stenosis .       3. Bilateral vertebral arteries are patent with flow in the normal direction.       4. Atherosclerotic disease at the bilateral carotid bifurcation. TTE 12/3/19  Summary  Left ventricle is normal in size. Global left ventricular systolic function  is moderately reduced. Calculated EF via heart model is 31 %. Grade I (mild) left ventricular diastolic dysfunction. Left atrium is mildly dilated. Right atrial dilatation.   Mildly dilated right ventricular cavity. Right ventricular function appears normal .  The aortic valve is calcified with restricted cusp mobility. Moderate Aortic Stenosis, however, maybe underestimated due to poor LVEF. Mild mitral regurgitation. Trivial tricuspid regurgitation. Estimated right ventricular systolic  pressure is 27 mmHg. Cardiac cath 12/3/19   Procedure Summary      Severe aortic stenosis. Mild LV systolic dysfunction. Patent mid LAD, proximal D1 and mid LCX stents. Focal distal LAD 70%   stenosis. Recommendations      Medical therapy as needed. Risk factor modification. Elective CV surgical consultation for AVR/ CABG    Echo 01/23/2020:  Left ventricle is mildly enlarged, basal septal hypertrophy, global left  ventricular systolic function is moderately to severely reduced, calculated  ejection fraction is 36%. Grade I (mild) left ventricular diastolic dysfunction. Left atrium is moderately dilated. Normal right ventricular size and function. Bioprosthetic valve is seated in the aortic position, appears to be  functioning normally with no obvious regrugitation or stenosis. Trivial perivalvular leak noted. Peak instantaneous gradient 17 mmHg and mean gradient 9 mmHg. Mitral valve sclerosis without stenosis. Mild mitral regurgitation. Tricuspid valve was not well visualized. Trivial tricuspid regurgitation.     Assessment and Plan:     -CAD with h/o DEVON to LAD, D and mid LCX 8/23/16. Cardiac cath for NSTEMI 12/3/19 showed patent stents with chronic RDPA occlusion with focal distal LAD 70%; LVEF 40%. Stable on current medical treatment.  -Severe aortic stenosis s/p TAVR on 1/21/2020 functioning normally on echo post TAVR as above.  -Carotid artery disease. Follows with vascular surgery. -HTN- with white coat HTN. Continue current therapy.   -Ischemic / valvular cardiomyopathy EF of 35-40% currently without signs of volume overload. -Hyperlipidemia- Last  on 1/17/19.  Patient is on low dose simvastatin which he is able to tolerate. Any increase in dose or switch has caused him severe myalgia. I recommended him addition of zetia. He does not want to take this new medication. -H/o agent orange exposure.   -CKD- Has been referred to nephrology by PCP  -RTC 6 months    Electronically signed by Abhinav Cobos MD on 1/29/2020 at 12:08 PM      [facility]  [phone]

## 2020-01-29 NOTE — PROGRESS NOTES
Transition Care Office Visit    Date of Face-to-Face: 1/29/2020  Persons at visit: wife   Date of interactive contact/ attempted contact with the patient and/or caregiver: 1/27/2020-See transitional care telephone note. HPI :   73-year-old patient with history of diabetes, hypertension, dyslipidemia, mitral regurgitation aortic valve stenosis CAD and stenosis of carotid artery being seen for post hospital follow-up from [facility] where he was admitted 1/21/2020 through 1/23/2020 when he was admitted for a TAVR for the aortic stenosis. He underwent transcatheter aortic valve replacement on 1/21/202020 with no significant postop complications except for right lower leg tenderness. States over the last week this is significantly improving however is inquiring about physical therapy due to some weakness in that leg. Denies any back pain. No increased swelling. No redness. No calf pain states it is just tender when he walks on his right leg. Previously had discharge was only able to walk for 5 steps now able to walk the entire distance and without stopping for the pain. He denies any shortness of breath. No chest discomfort. Blood pressure significantly improved. Labs stable during hospitalization. Noted in work-up for valve replacement abnormal CT showing lung nodule along with an adrenal cyst which suggested one-year follow-up CT. Patient is in agreement of getting this set up. He is also here for his 3-month follow-up. Continues on the diabetic meds without any hypoglycemia. Tries to work on diet. Continues to follow with nephrology for the proteinuria. No difficulties with urination. No lightheadedness chest discomfort shortness of breath. We again discussed the dyslipidemia. Continues on low-dose Zocor. Increase in medication or further statin drugs.     Activity: activity as tolerated and at the recommendation of [facility] with recent aortic valve replacement  Any medication changes since post-hospitalization phone call? No  Any treatment changes since post-hospitalization phone call? No  Any further education needed on medications/treatment plan? No    Current Medications   Outpatient Medications Marked as Taking for the 1/29/20 encounter (Office Visit) with ESTHER Torres CNP   Medication Sig Dispense Refill    amLODIPine (NORVASC) 10 MG tablet Take 1 tablet by mouth daily 30 tablet 5    furosemide (LASIX) 40 MG tablet Take 0.5 tablets by mouth daily 30 tablet 0    clopidogrel (PLAVIX) 75 MG tablet Take 1 tablet by mouth daily Take 1 daily 60 tablet 3    pioglitazone (ACTOS) 30 MG tablet Take 30 mg by mouth daily      simvastatin (ZOCOR) 10 MG tablet Take 10 mg by mouth nightly      glipiZIDE (GLUCOTROL) 10 MG tablet Take 10 mg by mouth 2 times daily (before meals)      vitamin D (CHOLECALCIFEROL) 1000 UNIT TABS tablet Take 1,000 Units by mouth 2 times daily       metFORMIN (GLUCOPHAGE) 1000 MG tablet Take 1 tablet by mouth 2 times daily (with meals) 60 tablet 3    lisinopril (PRINIVIL;ZESTRIL) 20 MG tablet Take 1 tablet by mouth daily 30 tablet 3    aspirin 81 MG EC tablet Take 81 mg by mouth daily. Medication Reconciliation  Provider has reviewed medication record and it has been modified as necessary to accurately depict current medications since hospitalization. Angelo Lazcano has been instructed on these changes.      Social History     Socioeconomic History    Marital status:      Spouse name: None    Number of children: None    Years of education: None    Highest education level: None   Occupational History    Occupation: seo   Social Needs    Financial resource strain: None    Food insecurity:     Worry: None     Inability: None    Transportation needs:     Medical: None     Non-medical: None   Tobacco Use    Smoking status: Former Smoker     Packs/day: 0.50     Years: 2.00     Pack years: 1.00     Last attempt to quit: 1/1/1970 pharynx moist and pink,  Back: Symmetric, no tenderness  Lungs: Chest rises and falls symmetrically, no use of accessory muscles, Lungs clear throughout  Chest wall: No tenderness  Heart[de-identified] Regular rate and rhythm, S1 and S2 normal, no murmur or gallop  Abdomen: Nontender, bowel sounds active in all 4 quadrants, no masses  Extremities: Extremities without clubbing,cyanosis or edema. Decreased strength to right lower extremity. No significant edema, negative Homans, pulses palpable  skin: Skin color normal, no rashes or lesions. Neurological: Cranial nerves II-XII appears grossly intact- no focal deficit  Psychiatric: mood and affect within normal limits    Assessment/Plan    1. 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 Hemoglobin A1c in the next week through [place]. Continue to work on diet, remain active. - CBC; Future  - Comprehensive Metabolic Panel; Future    2. 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 Other proteinuria  Continues to follow with Dr. Katja Paul, continues on ACE inhibitor    4. Essential hypertension  Stable on current antihypertensive regimen, continue to monitor    5. Dyslipidemia  Continues on low-dose Zocor. Declines any increase or change in statin due to significant myalgias. We discussed Zetia which she continues to decline  - Lipid Panel; Future    6. Non-rheumatic mitral regurgitation  Stable, ongoing follow-up with cardiology. 7. Nonrheumatic aortic valve stenosis  Recent trans catheter aortic valve replacement  Seen by cardiology prior to my visit, continue on current drug regimen. Has a follow-up in 30 days scheduled with cardiology    8.  Coronary artery disease involving native coronary artery of native heart without angina pectoris  Stable at present, history of 3 stents, continues on Plavix, aspirin, statin, ACE inhibitor. Seen by cardiology prior to this appointment    9. Stenosis of carotid artery, unspecified laterality  Ongoing follow-up with vascular surgeon. Previously noted to have 50 to 69% on the right when checked in September 2018. Continue on Plavix Zocor and aspirin. Vascular surgeon suggest every 2-year carotid ultrasounds. 10. Adrenal cyst ([address]  Follow-up with CT in 1 year  - CT ADRENALS WO CONTRAST; Future    11. Lung nodule  Low risk follow-up 1 year CT  - CT Chest W WO Contrast; Future    12. Right leg pain    - Amb External Referral To Physical Therapy    13. Vitamin D deficiency  Serial lab follow-up  - Vitamin D 25 Hydroxy; Future        Decision making of moderate complexity   Hospital records reviewed. Plan as noted above  Eye exam will be completed by the [place] along with his upcoming labs he currently declines colonoscopy. We discussed Cologuard. States wants to readdress this in 3 months time follow-up.   He has had too much going on here recently  Follow up for routine visit, call sooner with any concerns prior    Electronically signed by ESTHER Andrea CNP on 1/29/2020 at 2:02 PM

## 2020-02-03 ENCOUNTER — CARE COORDINATION (OUTPATIENT)
Dept: CASE MANAGEMENT | Age: 75
End: 2020-02-03

## 2020-02-03 NOTE — CARE COORDINATION
[facility] Transitions Final Call    2/3/2020    Patient: Eriberto Perales  Patient : 1945   MRN: [id number]  Reason for Admission: TAVR  Discharge Date: 20 RARS: Readmission Risk Score: 14      Spoke with: patient reports feeling better & doing well. He was seen by PCP & cardio on  - PT ordered for his right foot heaviness, but improving. Denies any other concern. Informed of final transitional call & resolved episode. Care Transitions Subsequent and Final Call    Schedule Follow Up Appointment with PCP:  Completed  Subsequent and Final Calls  Do you have any ongoing symptoms?:  Yes  Onset of Patient-reported symptoms: In the past 7 days  Have your medications changed?:  No  Do you have any questions related to your medications?:  No  Do you currently have any active services?:  No  Do you have any needs or concerns that I can assist you with?:  No  Care Transitions Interventions                          Other Interventions:             Follow Up  Future Appointments   Date Time Provider Julio Ratliff   3/4/2020 10:30 AM MD STEVE Carter [facility]   2020  1:30 PM ESTHER Johnson - CNP [facility]   6/3/2020  9:15 AM MD STEVE Carter [facility]   2021 10:00 AM MD CT ROOM KIRAN CT SCAN [facility] [place]   2021 11:00 AM MD CT ROOM KIRAN CT SCAN [facility] [place]       Josse Benedict RN

## 2020-02-27 ENCOUNTER — HOSPITAL ENCOUNTER (OUTPATIENT)
Dept: NON INVASIVE DIAGNOSTICS | Age: 75
Discharge: HOME | End: 2020-02-27
Payer: MEDICARE

## 2020-02-27 LAB
LV EF: 45 %
LVEF MODALITY: NORMAL

## 2020-02-27 PROCEDURE — 93306 TTE W/DOPPLER COMPLETE: CPT

## 2020-03-04 ENCOUNTER — HOSPITAL ENCOUNTER (OUTPATIENT)
Dept: LAB | Age: 75
Discharge: HOME | End: 2020-03-04
Payer: MEDICARE

## 2020-03-04 ENCOUNTER — OFFICE VISIT (OUTPATIENT)
Dept: CARDIOLOGY | Age: 75
End: 2020-03-04
Payer: MEDICARE

## 2020-03-04 VITALS
HEIGHT: 66 IN | DIASTOLIC BLOOD PRESSURE: 70 MMHG | WEIGHT: 165 LBS | SYSTOLIC BLOOD PRESSURE: 148 MMHG | BODY MASS INDEX: 26.52 KG/M2 | HEART RATE: 66 BPM

## 2020-03-04 LAB
ANION GAP SERPL CALCULATED.3IONS-SCNC: 14 MMOL/L (ref 9–17)
BUN BLDV-MCNC: 20 MG/DL (ref 8–23)
BUN/CREAT BLD: 19 (ref 9–20)
CALCIUM SERPL-MCNC: 9.9 MG/DL (ref 8.6–10.4)
CHLORIDE BLD-SCNC: 101 MMOL/L (ref 98–107)
CO2: 24 MMOL/L (ref 20–31)
CREAT SERPL-MCNC: 1.07 MG/DL (ref 0.7–1.2)
GFR AFRICAN AMERICAN: >60 ML/MIN
GFR NON-AFRICAN AMERICAN: >60 ML/MIN
GFR SERPL CREATININE-BSD FRML MDRD: ABNORMAL ML/MIN/{1.73_M2}
GFR SERPL CREATININE-BSD FRML MDRD: ABNORMAL ML/MIN/{1.73_M2}
GLUCOSE BLD-MCNC: 208 MG/DL (ref 70–99)
POTASSIUM SERPL-SCNC: 4.4 MMOL/L (ref 3.7–5.3)
SODIUM BLD-SCNC: 139 MMOL/L (ref 135–144)

## 2020-03-04 PROCEDURE — 93010 ELECTROCARDIOGRAM REPORT: CPT | Performed by: INTERNAL MEDICINE

## 2020-03-04 PROCEDURE — G8427 DOCREV CUR MEDS BY ELIG CLIN: HCPCS | Performed by: INTERNAL MEDICINE

## 2020-03-04 PROCEDURE — 4040F PNEUMOC VAC/ADMIN/RCVD: CPT | Performed by: INTERNAL MEDICINE

## 2020-03-04 PROCEDURE — 1036F TOBACCO NON-USER: CPT | Performed by: INTERNAL MEDICINE

## 2020-03-04 PROCEDURE — 99212 OFFICE O/P EST SF 10 MIN: CPT

## 2020-03-04 PROCEDURE — 1123F ACP DISCUSS/DSCN MKR DOCD: CPT | Performed by: INTERNAL MEDICINE

## 2020-03-04 PROCEDURE — 99213 OFFICE O/P EST LOW 20 MIN: CPT

## 2020-03-04 PROCEDURE — G8417 CALC BMI ABV UP PARAM F/U: HCPCS | Performed by: INTERNAL MEDICINE

## 2020-03-04 PROCEDURE — 93005 ELECTROCARDIOGRAM TRACING: CPT | Performed by: INTERNAL MEDICINE

## 2020-03-04 PROCEDURE — 3017F COLORECTAL CA SCREEN DOC REV: CPT | Performed by: INTERNAL MEDICINE

## 2020-03-04 PROCEDURE — 36415 COLL VENOUS BLD VENIPUNCTURE: CPT

## 2020-03-04 PROCEDURE — 80048 BASIC METABOLIC PNL TOTAL CA: CPT

## 2020-03-04 PROCEDURE — G8482 FLU IMMUNIZE ORDER/ADMIN: HCPCS | Performed by: INTERNAL MEDICINE

## 2020-03-04 PROCEDURE — 99214 OFFICE O/P EST MOD 30 MIN: CPT | Performed by: INTERNAL MEDICINE

## 2020-03-04 NOTE — PROGRESS NOTES
Today's Date: 3/4/2020  Patient's Name: Chucky Root  Patient's age: 69 y.o., 1945    Subjective:  Chucky Root  is follow-up status post TAVR. No chest pain, no dyspnea, no PND, no syncope or pre-syncope, no orthopnea. He is complaining of right knee buckling back while walking. He denies any numbness      Past Medical History:   has a past medical history of Acne rosacea, Anemia, Aortic stenosis, BPH (benign prostatic hypertrophy), CAD (coronary artery disease), Carotid stenosis, Cataract of both eyes, CRI (chronic renal insufficiency), Diabetes mellitus, type 2 ([facility] Dyslipidemia, Erectile dysfunction, H/O agent Orange exposure, Hearing loss, Hyperlipidemia, Hypertension, Non-rheumatic mitral regurgitation, RBBB, and Wears dentures. Past Surgical History:   has a past surgical history that includes Circumcision (age 49); Vasectomy; Tympanostomy tube placement; Cataract removal (Bilateral); eye surgery (Bilateral); and Cardiac catheterization (12/03/2019). Home Medications:  Prior to Admission medications    Medication Sig Start Date End Date Taking?  Authorizing Provider   amLODIPine (NORVASC) 10 MG tablet Take 1 tablet by mouth daily 1/23/20   Tavia Chavira MD   furosemide (LASIX) 40 MG tablet Take 0.5 tablets by mouth daily 12/6/19   Chante Renteria MD   clopidogrel (PLAVIX) 75 MG tablet Take 1 tablet by mouth daily Take 1 daily 12/6/19   ESTHER Hollins - CNP   pioglitazone (ACTOS) 30 MG tablet Take 30 mg by mouth daily    Historical Provider, MD   simvastatin (ZOCOR) 10 MG tablet Take 10 mg by mouth nightly    Historical Provider, MD   glipiZIDE (GLUCOTROL) 10 MG tablet Take 10 mg by mouth 2 times daily (before meals)    Historical Provider, MD   vitamin D (CHOLECALCIFEROL) 1000 UNIT TABS tablet Take 1,000 Units by mouth 2 times daily     Historical Provider, MD   metFORMIN (GLUCOPHAGE) 1000 MG tablet Take 1 tablet by mouth 2 times daily (with meals) 8/25/16 MaharvinderESTHER Hill CNP   lisinopril (PRINIVIL;ZESTRIL) 20 MG tablet Take 1 tablet by mouth daily 8/24/16   Jimbo ESTHER Child CNP   aspirin 81 MG EC tablet Take 81 mg by mouth daily. Historical Provider, MD       Allergies:  Sulfa antibiotics    Social History:   reports that he quit smoking about 50 years ago. He has a 1.00 pack-year smoking history. He has never used smokeless tobacco. He reports current alcohol use. He reports that he does not use drugs. Family History: family history includes Coronary Art Dis in his brother and sister; Heart Attack in his father; Hypertension in his mother; Lorenda Pamela in his mother; Stroke in his mother. No h/o sudden cardiac death. No for premature CAD    REVIEW OF SYSTEMS:    · Constitutional: there has been no unanticipated weight loss. There's been No change in energy level, No change in activity level. · Eyes: No visual changes or diplopia. No scleral icterus. · ENT: No Headaches, hearing loss or vertigo. No mouth sores or sore throat. · Cardiovascular: see above  · Respiratory: see above  · Gastrointestinal: No abdominal pain, appetite loss, blood in stools. · Genitourinary: No dysuria, trouble voiding, or hematuria. · Musculoskeletal: see above. · Integumentary: No rash or pruritis. · Neurological: No headache or diplopia. No tingling  · Psychiatric: No anxiety, or depression. · Endocrine: No temperature intolerance. · Hematologic/Lymphatic: No abnormal bruising or bleeding, blood clots or swollen lymph nodes. · Allergic/Immunologic: No nasal congestion or hives. PHYSICAL EXAM:      Vitals:    03/04/20 1020   BP: (!) 160/70   Pulse: 66       Constitutional and General Appearance: alert, cooperative, no distress and appears stated age  HEENT: PERRL, no cervical lymphadenopathy. No masses palpable.  Normal oral mucosa  Respiratory:  · Normal excursion and expansion without use of accessory muscles  · Resp Auscultation: Good fraction is 36%. Grade I (mild) left ventricular diastolic dysfunction. Left atrium is moderately dilated. Normal right ventricular size and function. Bioprosthetic valve is seated in the aortic position, appears to be  functioning normally with no obvious regrugitation or stenosis. Trivial perivalvular leak noted. Peak instantaneous gradient 17 mmHg and mean gradient 9 mmHg. Mitral valve sclerosis without stenosis. Mild mitral regurgitation. Tricuspid valve was not well visualized. Trivial tricuspid regurgitation. S/p TAVR 1/21/2020 34 mm CoreValve EVOLUT via right Femoral approach. Serial # [id number]    TTE 2/27/2020  Summary  Thinned and severely hypokinetic inferolateral wall. Left ventricular systolic function is mildly reduced. Left ventricular ejection fraction 45 %. Grade II (moderate) left ventricular diastolic dysfunction. Right ventricular dilatation with normal systolic function. Bioprosthetic aortic valve( TAVR) that is normal in appearance and function. Peak instantaneous gradient 9 mmHg and mean gradient 5 mmHg. Trace aortic insufficiency. Mitral leaflets are mildly thickened without stenosis. Mild mitral regurgitation. No pericardial effusion.     Assessment and Plan:     -CAD with h/o DEVON to LAD, D and mid LCX 8/23/16. Cardiac cath for NSTEMI 12/3/19 showed patent stents with chronic RDPA occlusion with focal distal LAD 70%; LVEF 40%. Stable on current medical treatment.  -Severe aortic stenosis s/p TAVR on 1/21/2020 functioning normally on echo post TAVR as above. Antibiotics prior to dental work. -Carotid artery disease. Follows with vascular surgery. -HTN- with white coat HTN. Continue current therapy.   -Ischemic / valvular cardiomyopathy EF of 45% currently without signs of volume overload. -Hyperlipidemia- Last  on 1/17/19. Patient is on low dose simvastatin which he is able to tolerate. Any increase in dose or switch has caused him severe myalgia.  I recommended

## 2020-03-10 ENCOUNTER — OFFICE VISIT (OUTPATIENT)
Dept: ORTHOPEDIC SURGERY | Age: 75
End: 2020-03-10
Payer: MEDICARE

## 2020-03-10 ENCOUNTER — HOSPITAL ENCOUNTER (OUTPATIENT)
Dept: GENERAL RADIOLOGY | Age: 75
Discharge: HOME | End: 2020-03-12
Payer: MEDICARE

## 2020-03-10 VITALS
HEIGHT: 66 IN | WEIGHT: 165 LBS | BODY MASS INDEX: 26.52 KG/M2 | HEART RATE: 66 BPM | SYSTOLIC BLOOD PRESSURE: 148 MMHG | DIASTOLIC BLOOD PRESSURE: 68 MMHG

## 2020-03-10 PROCEDURE — 99204 OFFICE O/P NEW MOD 45 MIN: CPT

## 2020-03-10 PROCEDURE — G8482 FLU IMMUNIZE ORDER/ADMIN: HCPCS | Performed by: PHYSICIAN ASSISTANT

## 2020-03-10 PROCEDURE — 4040F PNEUMOC VAC/ADMIN/RCVD: CPT | Performed by: PHYSICIAN ASSISTANT

## 2020-03-10 PROCEDURE — 20610 DRAIN/INJ JOINT/BURSA W/O US: CPT | Performed by: PHYSICIAN ASSISTANT

## 2020-03-10 PROCEDURE — 1036F TOBACCO NON-USER: CPT | Performed by: PHYSICIAN ASSISTANT

## 2020-03-10 PROCEDURE — 99203 OFFICE O/P NEW LOW 30 MIN: CPT | Performed by: PHYSICIAN ASSISTANT

## 2020-03-10 PROCEDURE — G8427 DOCREV CUR MEDS BY ELIG CLIN: HCPCS | Performed by: PHYSICIAN ASSISTANT

## 2020-03-10 PROCEDURE — 3017F COLORECTAL CA SCREEN DOC REV: CPT | Performed by: PHYSICIAN ASSISTANT

## 2020-03-10 PROCEDURE — 1123F ACP DISCUSS/DSCN MKR DOCD: CPT | Performed by: PHYSICIAN ASSISTANT

## 2020-03-10 PROCEDURE — 73562 X-RAY EXAM OF KNEE 3: CPT

## 2020-03-10 PROCEDURE — G8417 CALC BMI ABV UP PARAM F/U: HCPCS | Performed by: PHYSICIAN ASSISTANT

## 2020-03-10 RX ORDER — BUPIVACAINE HYDROCHLORIDE 5 MG/ML
5 INJECTION, SOLUTION PERINEURAL ONCE
Status: COMPLETED | OUTPATIENT
Start: 2020-03-10 | End: 2020-03-10

## 2020-03-10 RX ORDER — TRIAMCINOLONE ACETONIDE 40 MG/ML
80 INJECTION, SUSPENSION INTRA-ARTICULAR; INTRAMUSCULAR ONCE
Status: COMPLETED | OUTPATIENT
Start: 2020-03-10 | End: 2020-03-10

## 2020-03-10 RX ADMIN — TRIAMCINOLONE ACETONIDE 80 MG: 40 INJECTION, SUSPENSION INTRA-ARTICULAR; INTRAMUSCULAR at 14:58

## 2020-03-10 RX ADMIN — BUPIVACAINE HYDROCHLORIDE 25 MG: 5 INJECTION, SOLUTION PERINEURAL at 14:57

## 2020-03-10 NOTE — PROGRESS NOTES
SURGERY Bilateral     cataract    TYMPANOSTOMY TUBE PLACEMENT      VASECTOMY           Current  Medications:  Current Outpatient Medications   Medication Sig Dispense Refill    amLODIPine (NORVASC) 10 MG tablet Take 1 tablet by mouth daily 30 tablet 5    clopidogrel (PLAVIX) 75 MG tablet Take 1 tablet by mouth daily Take 1 daily 60 tablet 3    pioglitazone (ACTOS) 30 MG tablet Take 30 mg by mouth daily      simvastatin (ZOCOR) 10 MG tablet Take 10 mg by mouth nightly      glipiZIDE (GLUCOTROL) 10 MG tablet Take 10 mg by mouth 2 times daily (before meals)      vitamin D (CHOLECALCIFEROL) 1000 UNIT TABS tablet Take 1,000 Units by mouth 2 times daily       metFORMIN (GLUCOPHAGE) 1000 MG tablet Take 1 tablet by mouth 2 times daily (with meals) 60 tablet 3    lisinopril (PRINIVIL;ZESTRIL) 20 MG tablet Take 1 tablet by mouth daily 30 tablet 3    aspirin 81 MG EC tablet Take 81 mg by mouth daily.  furosemide (LASIX) 40 MG tablet Take 0.5 tablets by mouth daily (Patient not taking: Reported on 3/10/2020) 30 tablet 0     No current facility-administered medications for this visit.         Allergies:  Sulfa antibiotics    Social History:   Social History     Tobacco Use   Smoking Status Former Smoker    Packs/day: 0.50    Years: 2.00    Pack years: 1.00    Last attempt to quit: 1970    Years since quittin.2   Smokeless Tobacco Never Used     Social History     Substance and Sexual Activity   Alcohol Use Yes    Alcohol/week: 0.0 standard drinks    Frequency: 2-4 times a month    Drinks per session: 1 or 2    Binge frequency: Never    Comment: RARELY     Social History     Substance and Sexual Activity   Drug Use No       Family History:  Family History   Problem Relation Age of Onset    Heart Attack Father     Coronary Art Dis Sister     Hypertension Mother     Lung Cancer Mother     Stroke Mother     Coronary Art Dis Brother         later in life       REVIEW OF SYSTEMS:  Constitutional: Denies any fever, chills. Musculoskeletal: Positive for right knee pain. PHYSICAL EXAM:  [unfilled]  General appearance:  Alert and oriented x 3. No apparent distress, appears stated age, calm and cooperative. Musculoskeletal: Minimal pain with palpation along the medial joint line negative valgus stress testing does have some pain with deep knee flexion no catching or locking is noted. DATA:  CBC:   Lab Results   Component Value Date    WBC 9.6 01/23/2020    HGB 11.2 01/23/2020     01/23/2020     BMP:    Lab Results   Component Value Date     03/04/2020    K 4.4 03/04/2020     03/04/2020    CO2 24 03/04/2020    BUN 20 03/04/2020    CREATININE 1.07 03/04/2020    CALCIUM 9.9 03/04/2020    GLUCOSE 208 03/04/2020     PT/INR:    Lab Results   Component Value Date    PROTIME 9.9 01/21/2020    INR 0.9 01/21/2020     Troponin:  No results found for: TROPONINI  No results for input(s): LIPASE, AMYLASE in the last 72 hours. No results for input(s): AST, ALT, BILIDIR, BILITOT, ALKPHOS in the last 72 hours. Uric Acid:  No components found for: URIC  Urine Culture:  No components found for: CURINE    Radiology:   Xr Knee Right (3 Views)    Result Date: 3/10/2020  EXAMINATION: THREE XRAY VIEWS OF THE RIGHT KNEE 3/10/2020 9:54 am COMPARISON: None. HISTORY: ORDERING SYSTEM PROVIDED HISTORY: Right knee pain, unspecified chronicity TECHNOLOGIST PROVIDED HISTORY: pain Reason for Exam: No injury; states he had a heart valve replacement on 1/21/20 and since then his rt knee has been giving out on him; denies pain Acuity: Acute Type of Exam: Initial FINDINGS: There is no acute osseous abnormality. The joint spaces are maintained. There is no joint effusion. The surrounding soft tissues are unremarkable. No acute osseous or soft tissue abnormality.        X-rays personally reviewed by me of mild osteoarthritic changes over the medial compartment mild patellofemoral joint arthritis       Diagnosis

## 2020-03-16 ENCOUNTER — TELEPHONE (OUTPATIENT)
Dept: INTERNAL MEDICINE | Age: 75
End: 2020-03-16

## 2020-03-16 ENCOUNTER — TELEPHONE (OUTPATIENT)
Dept: CARDIOLOGY | Age: 75
End: 2020-03-16

## 2020-03-16 RX ORDER — IBUPROFEN 600 MG/1
600 TABLET ORAL EVERY 6 HOURS
Qty: 28 TABLET | Refills: 0 | Status: SHIPPED | OUTPATIENT
Start: 2020-03-16 | End: 2020-08-19

## 2020-03-16 NOTE — TELEPHONE ENCOUNTER
Patient states that his right leg is still bothering him. He got a shot of cortizone last week and it did not help any. He is wondering what is next? There is no pain, he states that it just is not working right.

## 2020-03-16 NOTE — TELEPHONE ENCOUNTER
Call to pt by  and Rashmi Silvestre RN after discussion with dr Huong Sahu regarding pt's RLE weakness since his TAVR procedure. Pt has been going to PT and states his leg is not much better. He works for 3 hrs, then must rest for about 2 hrs, in order to go back to work for the afternoon. He states his knee is not giving out, but feels his leg is not stable. He wife reports he seems to drag his foot. He denies pain. Per Dr Diaz Haines recommendation, pt to stop zocor and asa. Uric acid lab ordered. Pt to take ibuprofen 600 mg q 6 hrs, x 1 week. Pt instructed to return phone call in one week and update on progress. PT is Klarissa De León in [place].

## 2020-03-17 ENCOUNTER — HOSPITAL ENCOUNTER (OUTPATIENT)
Dept: LAB | Age: 75
Discharge: HOME | End: 2020-03-17
Payer: MEDICARE

## 2020-03-17 LAB — URIC ACID: 4.7 MG/DL (ref 3.4–7)

## 2020-03-17 PROCEDURE — 36415 COLL VENOUS BLD VENIPUNCTURE: CPT

## 2020-03-17 PROCEDURE — 84550 ASSAY OF BLOOD/URIC ACID: CPT

## 2020-03-23 ENCOUNTER — TELEPHONE (OUTPATIENT)
Dept: CARDIOLOGY | Age: 75
End: 2020-03-23

## 2020-03-23 RX ORDER — IBUPROFEN 600 MG/1
600 TABLET ORAL EVERY 6 HOURS PRN
Qty: 28 TABLET | Refills: 0 | Status: SHIPPED | OUTPATIENT
Start: 2020-03-23 | End: 2020-08-19

## 2020-03-23 NOTE — TELEPHONE ENCOUNTER
Pt calls back this week as was planned for an update. Pt states he has stopped his ASA and zocor, and had lab work. Initially he states his leg/gait is no better , but then states the NSAID treatment  X 1 w \"may have helped some. \" In regards to  the foot dragging his wife mentioned last week, she states improvement is noted \" on some days, and some days not much. \"   Pt's Phys therapist, Precious Jefferson , called at [facility] [place] -[phone]. He states he is working with pt on Closed Chain exercises and that pt is slowly improving. He does plan to work the pt harder this week, adding in additional exercises to help the pt with his weak quads and hips. An overall goal is to improve functional strength. Writer will reorder ibuprofen 600 q 6 hrs for another week to help improve any  Inflammation. Advised to return to his asa and zocor qd. It is reccommended pt continue PT plan to it's completion. Patient verbalizes understanding. Discussed pt comments, pt progress, and medication advice plans with Dr Beatrice Moffett.

## 2020-03-25 PROBLEM — E78.5 DYSLIPIDEMIA: Status: RESOLVED | Noted: 2020-03-25 | Resolved: 2020-03-24

## 2020-04-28 ENCOUNTER — OFFICE VISIT (OUTPATIENT)
Dept: ORTHOPEDIC SURGERY | Age: 75
End: 2020-04-28
Payer: MEDICARE

## 2020-04-28 VITALS
HEART RATE: 67 BPM | TEMPERATURE: 97.7 F | SYSTOLIC BLOOD PRESSURE: 152 MMHG | HEIGHT: 66 IN | DIASTOLIC BLOOD PRESSURE: 73 MMHG | BODY MASS INDEX: 26.52 KG/M2 | WEIGHT: 165 LBS

## 2020-04-28 PROCEDURE — 99213 OFFICE O/P EST LOW 20 MIN: CPT | Performed by: PHYSICIAN ASSISTANT

## 2020-04-28 PROCEDURE — 1123F ACP DISCUSS/DSCN MKR DOCD: CPT | Performed by: PHYSICIAN ASSISTANT

## 2020-04-28 PROCEDURE — 99214 OFFICE O/P EST MOD 30 MIN: CPT

## 2020-04-28 PROCEDURE — 1036F TOBACCO NON-USER: CPT | Performed by: PHYSICIAN ASSISTANT

## 2020-04-28 PROCEDURE — G8427 DOCREV CUR MEDS BY ELIG CLIN: HCPCS | Performed by: PHYSICIAN ASSISTANT

## 2020-04-28 PROCEDURE — G8417 CALC BMI ABV UP PARAM F/U: HCPCS | Performed by: PHYSICIAN ASSISTANT

## 2020-04-28 PROCEDURE — 3017F COLORECTAL CA SCREEN DOC REV: CPT | Performed by: PHYSICIAN ASSISTANT

## 2020-04-28 PROCEDURE — 4040F PNEUMOC VAC/ADMIN/RCVD: CPT | Performed by: PHYSICIAN ASSISTANT

## 2020-04-28 NOTE — PROGRESS NOTES
Activity   Alcohol Use Yes    Alcohol/week: 0.0 standard drinks    Frequency: 2-4 times a month    Drinks per session: 1 or 2    Binge frequency: Never    Comment: RARELY     Social History     Substance and Sexual Activity   Drug Use No       Family History:  Family History   Problem Relation Age of Onset    Heart Attack Father     Coronary Art Dis Sister     Hypertension Mother     Lung Cancer Mother     Stroke Mother     Coronary Art Dis Brother         later in life       REVIEW OF SYSTEMS:  Constitutional: Denies any fever, chills. Musculoskeletal: Positive for right leg pain. PHYSICAL EXAM:  [unfilled]  General appearance:  Alert and oriented x 3. No apparent distress, appears stated age, calm and cooperative. Musculoskeletal: Patient noted to have full range of motion of right hip no pain with straight leg raise right lower extremity. Able to flex extend toes. Able to fully flex and extend the knee. Does have hyperextension of this right knee compared to the left. Does have some laxity with posterior drawer test.  Valgus varus stress testing negative no pain to palpation along the medial or lateral joint line of the right knee. Noted pitting edema bilateral lower extremities. DATA:  CBC:   Lab Results   Component Value Date    WBC 9.6 01/23/2020    HGB 11.2 01/23/2020     01/23/2020     BMP:    Lab Results   Component Value Date     03/04/2020    K 4.4 03/04/2020     03/04/2020    CO2 24 03/04/2020    BUN 20 03/04/2020    CREATININE 1.07 03/04/2020    CALCIUM 9.9 03/04/2020    GLUCOSE 208 03/04/2020     PT/INR:    Lab Results   Component Value Date    PROTIME 9.9 01/21/2020    INR 0.9 01/21/2020     Troponin:  No results found for: TROPONINI  No results for input(s): LIPASE, AMYLASE in the last 72 hours. No results for input(s): AST, ALT, BILIDIR, BILITOT, ALKPHOS in the last 72 hours.   Uric Acid:  No components found for: URIC  Urine Culture:  No components

## 2020-05-01 ENCOUNTER — HOSPITAL ENCOUNTER (OUTPATIENT)
Dept: MRI IMAGING | Age: 75
Discharge: HOME | End: 2020-05-03
Payer: MEDICARE

## 2020-05-01 PROCEDURE — 73721 MRI JNT OF LWR EXTRE W/O DYE: CPT

## 2020-05-12 ENCOUNTER — TELEPHONE (OUTPATIENT)
Dept: ORTHOPEDIC SURGERY | Age: 75
End: 2020-05-12

## 2020-05-19 ENCOUNTER — HOSPITAL ENCOUNTER (OUTPATIENT)
Dept: INTERVENTIONAL RADIOLOGY/VASCULAR | Age: 75
Discharge: HOME | End: 2020-05-21
Payer: MEDICARE

## 2020-05-19 PROCEDURE — 93922 UPR/L XTREMITY ART 2 LEVELS: CPT

## 2020-05-21 ENCOUNTER — HOSPITAL ENCOUNTER (OUTPATIENT)
Dept: LAB | Age: 75
Discharge: HOME | End: 2020-05-21
Payer: MEDICARE

## 2020-05-21 LAB
ALBUMIN SERPL-MCNC: 3.9 G/DL (ref 3.5–5.2)
ALBUMIN/GLOBULIN RATIO: 1.4 (ref 1–2.5)
ALP BLD-CCNC: 70 U/L (ref 40–129)
ALT SERPL-CCNC: 12 U/L (ref 5–41)
ANION GAP SERPL CALCULATED.3IONS-SCNC: 10 MMOL/L (ref 9–17)
AST SERPL-CCNC: 15 U/L
BILIRUB SERPL-MCNC: 0.23 MG/DL (ref 0.3–1.2)
BUN BLDV-MCNC: 20 MG/DL (ref 8–23)
BUN/CREAT BLD: 20 (ref 9–20)
CALCIUM SERPL-MCNC: 9.5 MG/DL (ref 8.6–10.4)
CHLORIDE BLD-SCNC: 103 MMOL/L (ref 98–107)
CHOLESTEROL/HDL RATIO: 3.8
CHOLESTEROL: 186 MG/DL
CO2: 27 MMOL/L (ref 20–31)
CREAT SERPL-MCNC: 0.99 MG/DL (ref 0.7–1.2)
ESTIMATED AVERAGE GLUCOSE: 166 MG/DL
GFR AFRICAN AMERICAN: >60 ML/MIN
GFR NON-AFRICAN AMERICAN: >60 ML/MIN
GFR SERPL CREATININE-BSD FRML MDRD: ABNORMAL ML/MIN/{1.73_M2}
GFR SERPL CREATININE-BSD FRML MDRD: ABNORMAL ML/MIN/{1.73_M2}
GLUCOSE BLD-MCNC: 130 MG/DL (ref 70–99)
HBA1C MFR BLD: 7.4 % (ref 4.8–5.9)
HCT VFR BLD CALC: 38.8 % (ref 40.7–50.3)
HDLC SERPL-MCNC: 49 MG/DL
HEMOGLOBIN: 12.9 G/DL (ref 13–17)
LDL CHOLESTEROL: 110 MG/DL (ref 0–130)
MCH RBC QN AUTO: 27.3 PG (ref 25.2–33.5)
MCHC RBC AUTO-ENTMCNC: 33.2 G/DL (ref 25.2–33.5)
MCV RBC AUTO: 82.2 FL (ref 82.6–102.9)
NRBC AUTOMATED: 0 PER 100 WBC
PDW BLD-RTO: 14.7 % (ref 11.8–14.4)
PLATELET # BLD: 327 K/UL (ref 138–453)
PMV BLD AUTO: 9.8 FL (ref 8.1–13.5)
POTASSIUM SERPL-SCNC: 4.4 MMOL/L (ref 3.7–5.3)
RBC # BLD: 4.72 M/UL (ref 4.21–5.77)
SODIUM BLD-SCNC: 140 MMOL/L (ref 135–144)
TOTAL CK: 37 U/L (ref 39–308)
TOTAL PROTEIN: 6.6 G/DL (ref 6.4–8.3)
TRIGL SERPL-MCNC: 136 MG/DL
VITAMIN D 25-HYDROXY: 31.4 NG/ML (ref 30–100)
VLDLC SERPL CALC-MCNC: NORMAL MG/DL (ref 1–30)
WBC # BLD: 8.7 K/UL (ref 3.5–11.3)

## 2020-05-21 PROCEDURE — 82306 VITAMIN D 25 HYDROXY: CPT

## 2020-05-21 PROCEDURE — 36415 COLL VENOUS BLD VENIPUNCTURE: CPT

## 2020-05-21 PROCEDURE — 80061 LIPID PANEL: CPT

## 2020-05-21 PROCEDURE — 83036 HEMOGLOBIN GLYCOSYLATED A1C: CPT

## 2020-05-21 PROCEDURE — 82550 ASSAY OF CK (CPK): CPT

## 2020-05-21 PROCEDURE — 80053 COMPREHEN METABOLIC PANEL: CPT

## 2020-05-21 PROCEDURE — 85027 COMPLETE CBC AUTOMATED: CPT

## 2020-05-28 ENCOUNTER — OFFICE VISIT (OUTPATIENT)
Dept: VASCULAR SURGERY | Age: 75
End: 2020-05-28
Payer: MEDICARE

## 2020-05-28 VITALS
SYSTOLIC BLOOD PRESSURE: 110 MMHG | HEART RATE: 72 BPM | HEIGHT: 66 IN | DIASTOLIC BLOOD PRESSURE: 70 MMHG | BODY MASS INDEX: 25.39 KG/M2 | WEIGHT: 158 LBS

## 2020-05-28 PROBLEM — I73.9 PERIPHERAL ARTERIAL DISEASE (HCC): Status: ACTIVE | Noted: 2020-05-28

## 2020-05-28 PROCEDURE — 99214 OFFICE O/P EST MOD 30 MIN: CPT | Performed by: SURGERY

## 2020-05-28 PROCEDURE — 4040F PNEUMOC VAC/ADMIN/RCVD: CPT | Performed by: SURGERY

## 2020-05-28 PROCEDURE — 3017F COLORECTAL CA SCREEN DOC REV: CPT | Performed by: SURGERY

## 2020-05-28 PROCEDURE — G8427 DOCREV CUR MEDS BY ELIG CLIN: HCPCS | Performed by: SURGERY

## 2020-05-28 PROCEDURE — G8417 CALC BMI ABV UP PARAM F/U: HCPCS | Performed by: SURGERY

## 2020-05-28 PROCEDURE — 99213 OFFICE O/P EST LOW 20 MIN: CPT | Performed by: SURGERY

## 2020-05-28 PROCEDURE — 1123F ACP DISCUSS/DSCN MKR DOCD: CPT | Performed by: SURGERY

## 2020-05-28 PROCEDURE — 1036F TOBACCO NON-USER: CPT | Performed by: SURGERY

## 2020-05-28 NOTE — PROGRESS NOTES
[id number] [facility] VASCULAR SURG  [address] [facility], [address]  [place] [place] [facility] [address]  Marguerite Landis  1945  69 y.o.male       Chief Complaint:  Chief Complaint   Patient presents with    New Patient     left knee from ankle feels full after valve place in January        History of present Illness: This is a 67-year-old male who had previously seen for carotid stenosis which was asymptomatic. In January 2020 he underwent aortic valve replacement percutaneously. He states that immediately after the procedure he was unable to walk without his right leg feeling as if were going to give out. He is had an MRI of the knee which shows some arthritic changes and also had an CONNER PVR study which was normal.  Again he has palpable pulses on exam and no signs of arterial or venous disease. He states that prior to his procedure he was able to walk 3 miles without any problems and suddenly he feels debilitated. He states that his leg feels as if it swollen by the end of the day although he states that it in fact is not swollen when he looks at it. I reassured him that this is not an arterial or venous problem. It may be a case of injury to a lymph node in his groin which could give him the symptoms that he describes. There is no treatment for this other than to wear compression stockings. He is pretty insistent that his leg does not feel right and a work-up by neurology may be in order. Again from the standpoint of either a stroke or nerve injury, the symptoms typically do not wax and wane and I am convinced this is secondary to osteoarthritis of either his back or his knee.     Past Medical History:  has a past medical history of Acne rosacea, Anemia, Aortic stenosis, BPH (benign prostatic hypertrophy), CAD (coronary artery disease), Carotid stenosis, Cataract of both eyes, CRI (chronic renal insufficiency), Diabetes mellitus, type 2 ([facility] [address] tablet, Take 30 mg by mouth daily, Disp: , Rfl:     simvastatin (ZOCOR) 10 MG tablet, Take 10 mg by mouth nightly Per patient takes 40 mg, Disp: , Rfl:     glipiZIDE (GLUCOTROL) 10 MG tablet, Take 10 mg by mouth 2 times daily (before meals), Disp: , Rfl:     vitamin D (CHOLECALCIFEROL) 1000 UNIT TABS tablet, Take 1,000 Units by mouth 2 times daily , Disp: , Rfl:     metFORMIN (GLUCOPHAGE) 1000 MG tablet, Take 1 tablet by mouth 2 times daily (with meals), Disp: 60 tablet, Rfl: 3    lisinopril (PRINIVIL;ZESTRIL) 20 MG tablet, Take 1 tablet by mouth daily, Disp: 30 tablet, Rfl: 3    aspirin 81 MG EC tablet, Take 81 mg by mouth daily. , Disp: , Rfl:     ibuprofen (ADVIL;MOTRIN) 600 MG tablet, Take 1 tablet by mouth every 6 hours as needed for Pain (Patient not taking: Reported on 5/28/2020), Disp: 28 tablet, Rfl: 0    ibuprofen (ADVIL;MOTRIN) 600 MG tablet, Take 1 tablet by mouth every 6 hours (Patient not taking: Reported on 5/28/2020), Disp: 28 tablet, Rfl: 0    Vital Signs:  Vitals:    05/28/20 1042   BP: 110/70   Pulse: 72       Physical Exam:  General appearance - alert, well appearing and in no acute distress  Mental status - oriented to person, place and time with normal affect  Head - normocephalic and atraumatic  Eyes - pupils equal and reactive, extraocular eye movements intact, conjunctiva clear  Ears - hearing appears to be intact  Nose - no drainage noted  Mouth - mucous membranes moist  Neck - supple, no carotid bruits, thyroid not palpable, no JVD  Chest - clear to auscultation, normal effort  Heart - normal rate, regular rhythm, no murmurs  Abdomen - soft, non-tender, non-distended, bowel sounds present all four quadrants, no masses, hepatomegaly, splenomegaly or aortic enlargement  Neurological - normal speech, no focal findings or movement disorder noted, cranial nerves II through XII grossly intact  Extremities - peripheral pulses palpable, 1+ pitting edema on the right, no calf pain with

## 2020-06-02 ENCOUNTER — HOSPITAL ENCOUNTER (OUTPATIENT)
Dept: GENERAL RADIOLOGY | Age: 75
Discharge: HOME | End: 2020-06-04
Payer: MEDICARE

## 2020-06-02 ENCOUNTER — OFFICE VISIT (OUTPATIENT)
Dept: INTERNAL MEDICINE | Age: 75
End: 2020-06-02
Payer: MEDICARE

## 2020-06-02 ENCOUNTER — TELEPHONE (OUTPATIENT)
Dept: INTERNAL MEDICINE | Age: 75
End: 2020-06-02

## 2020-06-02 VITALS
DIASTOLIC BLOOD PRESSURE: 68 MMHG | SYSTOLIC BLOOD PRESSURE: 136 MMHG | BODY MASS INDEX: 25.5 KG/M2 | HEART RATE: 60 BPM | WEIGHT: 158 LBS | RESPIRATION RATE: 18 BRPM

## 2020-06-02 PROBLEM — J44.1 COPD EXACERBATION (HCC): Status: RESOLVED | Noted: 2019-12-03 | Resolved: 2020-06-02

## 2020-06-02 PROCEDURE — 1123F ACP DISCUSS/DSCN MKR DOCD: CPT | Performed by: NURSE PRACTITIONER

## 2020-06-02 PROCEDURE — 3051F HG A1C>EQUAL 7.0%<8.0%: CPT | Performed by: NURSE PRACTITIONER

## 2020-06-02 PROCEDURE — G8427 DOCREV CUR MEDS BY ELIG CLIN: HCPCS | Performed by: NURSE PRACTITIONER

## 2020-06-02 PROCEDURE — 4040F PNEUMOC VAC/ADMIN/RCVD: CPT | Performed by: NURSE PRACTITIONER

## 2020-06-02 PROCEDURE — 72100 X-RAY EXAM L-S SPINE 2/3 VWS: CPT

## 2020-06-02 PROCEDURE — G8417 CALC BMI ABV UP PARAM F/U: HCPCS | Performed by: NURSE PRACTITIONER

## 2020-06-02 PROCEDURE — 3017F COLORECTAL CA SCREEN DOC REV: CPT | Performed by: NURSE PRACTITIONER

## 2020-06-02 PROCEDURE — 99212 OFFICE O/P EST SF 10 MIN: CPT

## 2020-06-02 PROCEDURE — 2022F DILAT RTA XM EVC RTNOPTHY: CPT | Performed by: NURSE PRACTITIONER

## 2020-06-02 PROCEDURE — 1036F TOBACCO NON-USER: CPT | Performed by: NURSE PRACTITIONER

## 2020-06-02 PROCEDURE — 99214 OFFICE O/P EST MOD 30 MIN: CPT | Performed by: NURSE PRACTITIONER

## 2020-06-03 ASSESSMENT — ENCOUNTER SYMPTOMS
SHORTNESS OF BREATH: 0
RHINORRHEA: 0
TROUBLE SWALLOWING: 0
CHEST TIGHTNESS: 0
BLOOD IN STOOL: 0
WHEEZING: 0
DIARRHEA: 0
FACIAL SWELLING: 0
EYE PAIN: 0
COUGH: 0
SORE THROAT: 0
NAUSEA: 0
CONSTIPATION: 0
VOMITING: 0
ABDOMINAL PAIN: 0
SINUS PRESSURE: 0
COLOR CHANGE: 0

## 2020-07-10 RX ORDER — FUROSEMIDE 40 MG/1
20 TABLET ORAL DAILY
Qty: 30 TABLET | Refills: 0 | Status: SHIPPED | OUTPATIENT
Start: 2020-07-10 | End: 2021-08-25 | Stop reason: DRUGHIGH

## 2020-07-27 NOTE — OP NOTE
therefore  was thought to be a good TAVR candidate. He was found to be an  acceptable candidate, and he was put forward for TAVR on 01/21/2020. FINDINGS AT SURGERY:  There was the successful deployment of the  mentioned 34-mm CoreValve device into the aortic annulus with no  significant perivalvular leak and no significant residual stenosis. SURGERY IN DETAIL:  The patient was identified in his bed in the [facility] and  was transported to the cardiac catheterization lab, where he was induced  for local MAC anesthesia. Dr. Phill Ching accessed both femoral arteries and  the left femoral vein for the reasons described above. The valve was  crossed, and ultimately with Dr. Phill Ching and Dr. Morelos An sharing position 1  and with myself in position 2, we deployed the aortic valve across into  the annulus without difficulty. The findings were as described above,  and there was no bleeding at the conclusion of the case with successful  percutaneous closure device placement. RAINE Garcia MD    D: 07/26/2020 14:26:14       T: 07/26/2020 22:42:16     [email or web address]  Job#: [id number]     Doc#: [id number]    CC:
with no complication. The protamine was then administered, and the wound were closed in layers with Vicryl suture. The patient tolerated the procedure well. Estimated Blood Loss:  [] Minimal < 25 cc [x] Moderate 25-50 cc  [] >50 cc    IMPRESSION:    1. Successful Transcatheter Artic valve replacement    RECOMMENDATIONS:  1. Post TAVR protocol  2. Temporary pacer management  3. TTE in 48 hours. Discussed with patient family and nursing.     Electronically signed by Hossein Fletcher MD on 1/21/2020 at 7:35 AM.  [facility] cardiology Consultant
Statement Selected

## 2020-08-19 ENCOUNTER — OFFICE VISIT (OUTPATIENT)
Dept: NEUROLOGY | Age: 75
End: 2020-08-19
Payer: MEDICARE

## 2020-08-19 VITALS
WEIGHT: 166.2 LBS | HEIGHT: 66 IN | TEMPERATURE: 98 F | BODY MASS INDEX: 26.71 KG/M2 | SYSTOLIC BLOOD PRESSURE: 153 MMHG | DIASTOLIC BLOOD PRESSURE: 73 MMHG | HEART RATE: 67 BPM

## 2020-08-19 PROBLEM — R26.2 IMPAIRED AMBULATION: Status: ACTIVE | Noted: 2020-08-19

## 2020-08-19 PROBLEM — R29.898 HEAVY SENSATION OF LOWER EXTREMITY: Status: ACTIVE | Noted: 2020-08-19

## 2020-08-19 PROCEDURE — G8427 DOCREV CUR MEDS BY ELIG CLIN: HCPCS | Performed by: PSYCHIATRY & NEUROLOGY

## 2020-08-19 PROCEDURE — 3051F HG A1C>EQUAL 7.0%<8.0%: CPT | Performed by: PSYCHIATRY & NEUROLOGY

## 2020-08-19 PROCEDURE — G8417 CALC BMI ABV UP PARAM F/U: HCPCS | Performed by: PSYCHIATRY & NEUROLOGY

## 2020-08-19 PROCEDURE — 3017F COLORECTAL CA SCREEN DOC REV: CPT | Performed by: PSYCHIATRY & NEUROLOGY

## 2020-08-19 PROCEDURE — 2022F DILAT RTA XM EVC RTNOPTHY: CPT | Performed by: PSYCHIATRY & NEUROLOGY

## 2020-08-19 PROCEDURE — 1036F TOBACCO NON-USER: CPT | Performed by: PSYCHIATRY & NEUROLOGY

## 2020-08-19 PROCEDURE — 1123F ACP DISCUSS/DSCN MKR DOCD: CPT | Performed by: PSYCHIATRY & NEUROLOGY

## 2020-08-19 PROCEDURE — 4040F PNEUMOC VAC/ADMIN/RCVD: CPT | Performed by: PSYCHIATRY & NEUROLOGY

## 2020-08-19 PROCEDURE — 99204 OFFICE O/P NEW MOD 45 MIN: CPT | Performed by: PSYCHIATRY & NEUROLOGY

## 2020-08-19 NOTE — PROGRESS NOTES
NEUROLOGY FOLLOW-UP  Patient Name:  Mona Quiroz  :   1945  Clinic Visit Date: 2020        REVIEW OF SYSTEMS  Constitutional Weight changes: absent, Fevers : absent, Fatigue: absent; Any recent hospitalizations:  absent.    HEENT Ears: normal, Visual disturbance: absent   Reespiratory Shortness of breath: absent, Cough: absent   Cardivascular Chest pain: absent, Leg swelling :present   GI Constipation: absent, Diarrhea: absent, Swallowing change: absent    Urinary frequency: absent, Urinary urgency: absent, Urinary incontinence: absent   Musculoskeletal Neck pain: absent, Back pain: absent, Stiffness: absent, Muscle pain: absent, Joint pain: absent   Dermatological Hair loss: absent, Skin changes: absent   Neurological Memory loss: absent, Confusion: absent, Seizures: absent; Trouble walking or imbalance: present, Dizziness: absent, Weakness: absent, Numbness absent, Tremor: absent, Spasm: absent, Speech difficulty: absent, Headache: absent, Light sensitivity: absent   Psychiatric Anxiety: absent, Depression  absent, Suicidal ideations absent   Hematologic Abnormal bleeding: absent, Anemia: absent, Clotting disorder: absent, Lymph gland changes: absent

## 2020-08-19 NOTE — PROGRESS NOTES
NEUROLOGY CONSULT  Patient Name:       Tamika Lopez  :        1945  Clinic Visit Date:    2020    Dear Dr. Ria Ryan, APRN - CNP     I had the opportunity to see your patient, Mr. Tamika Lopez in neurology consultation today. As you know he  is a 77 y.o. right handed  male presented with c/o heaviness in right lower extremity since . He has had heart valve replacement on 2020 and afterwards he started having right lower extremity weakness with ambulation difficulty. Was discharged to home on walker and referred to orthopedic physician and MRI of right knee done demonstrating advanced arthritic disease. He has had cortisone injection therapy with no significant relief. Afterwards he had physical therapy for about 8 weeks without \"much benefit\". Then he went back to his PCP and he was referred to vascular surgeon, Dr. Parish Ruggiero. Patient was told \"good circulation and all pulses are okay\" and he was referred to chiropractic therapy. Patient had chiropractic adjustments once weekly for ~6 weeks with no significant improvement. Subsequently he was referred to neurology. He describes heaviness as difficulty lifting right knee at times. He has been limping due to weakness in right foot. He denies back pain radiating down right lower extremity. He further added \"no pain at all\". He denies bladder dysfunction. He also reports that \"my right hip does not coordinate with right lower leg\". He has significant gait difficulty related to that. He has had 2 falls related to that. Denied head injury, LOC, dizziness. Denies numbness, dysesthesias in lower extremities. Denies muscle spasms. Denies bladder dysfunction. Review of systems done by staff reviewed and pertinent positives include balance difficulty and walking difficulty.      Current Outpatient Medications on File Prior to Visit   Medication Sig Dispense Refill    furosemide (LASIX) 40 MG tablet Take 0.5 tablets by mouth daily 30 tablet 0    Handicap Placard MISC by Does not apply route Duration 2 years 1 each 0    Handicap Placard MISC by Does not apply route Duration 2 years. 1 each 0    amLODIPine (NORVASC) 10 MG tablet Take 1 tablet by mouth daily 30 tablet 5    clopidogrel (PLAVIX) 75 MG tablet Take 1 tablet by mouth daily Take 1 daily 60 tablet 3    pioglitazone (ACTOS) 30 MG tablet Take 30 mg by mouth daily      simvastatin (ZOCOR) 40 MG tablet Take 40 mg by mouth nightly       glipiZIDE (GLUCOTROL) 10 MG tablet Take 10 mg by mouth 2 times daily (before meals)      vitamin D (CHOLECALCIFEROL) 1000 UNIT TABS tablet Take 1,000 Units by mouth 2 times daily       metFORMIN (GLUCOPHAGE) 1000 MG tablet Take 1 tablet by mouth 2 times daily (with meals) 60 tablet 3    lisinopril (PRINIVIL;ZESTRIL) 20 MG tablet Take 1 tablet by mouth daily 30 tablet 3    aspirin 81 MG EC tablet Take 81 mg by mouth daily. No current facility-administered medications on file prior to visit. Allergies: Caitlyn Wright is allergic to sulfa antibiotics. Past Medical History:   Diagnosis Date    Acne rosacea     treated with MetroGel    Anemia     Aortic stenosis     echo 3/18 Mod AS, Mild MR    BPH (benign prostatic hypertrophy)     CAD (coronary artery disease)     Stent x 3 DEVON 8/16    Carotid stenosis     Fairly minimal plaquing on ultrasound 5/14--in the conclusion described as less than 50% stenosis but in the body of the report described as minimal    Cataract of both eyes     CRI (chronic renal insufficiency)     Diabetes mellitus, type 2 (HCC)     1.) Proteinuria, 24 hour total urine protein 1,420 mg/24 hrs, 09/08, creatinine clearance 114, 09/08 2.) Repeat protein/creatinine ratio 2.97, 02/12 3. )24 hr urine collection 1958 mg/24 hrs, 04/12. 4.) Repeat protein/creatinine ratio 1.99, 08/12 a.) Repeat protein/creatinine ratio 2.14, 07/13 5.) Renal u/s ok 02/12  6) retinopathy at [facility] 12/16  early mod. nonprolifferative  macular involved    Dyslipidemia     Erectile dysfunction     H/O agent Orange exposure     per patient in the [employer] along with exposure to cresote and naphtha    Hearing loss     Hyperlipidemia     Hypertension     Non-rheumatic mitral regurgitation 6/27/2016    RBBB     Wears dentures     upper       Past Surgical History:   Procedure Laterality Date    CARDIAC CATHETERIZATION  12/03/2019    CATARACT REMOVAL Bilateral     CIRCUMCISION  age 49   [facility] Bilateral     cataract    TYMPANOSTOMY TUBE PLACEMENT      VASECTOMY       Social History: Margie Hobbs  reports that he quit smoking about 50 years ago. He has a 1.00 pack-year smoking history. He has never used smokeless tobacco. He reports current alcohol use. He reports that he does not use drugs. Family History   Problem Relation Age of Onset    Heart Attack Father     Coronary Art Dis Sister     Hypertension Mother     Lung Cancer Mother     Stroke Mother     Coronary Art Dis Brother         later in life       On exam: Blood pressure (!) 153/73, pulse 67, temperature 98 °F (36.7 °C), height 5' 6\" (1.676 m), weight 166 lb 3.2 oz (75.4 kg). NEUROLOGIC EXAMINATION  GENERAL  Appears comfortable and in no distress   HEENT  NC/ AT   NECK  Supple and no bruits heard   MENTAL STATUS:  Alert, oriented, intact memory, no confusion, normal speech, normal language, no hallucination or delusion   CRANIAL NERVES: II     -      PERRLA, Fundi reveal intact venous pulsations;  Visual fields intact to confrontation  III,IV,VI -  EOMs full, no afferent defect, no                      ARI, no ptosis  V     -     Normal facial sensation  VII    -     Normal facial symmetry  VIII   -     Intact hearing  IX,X -     Symmetrical palate  XI    -     Symmetrical shoulder shrug  XII   -     Midline tongue, no atrophy    MOTOR FUNCTION:  significant for good strength of grade 5/5 in bilateral proximal and distal muscle groups of both upper and lower extremities with normal bulk, normal tone and no involuntary movements, no tremor; SLR negative; FIRDA testing negative   SENSORY FUNCTION:  Normal touch, normal pin, normal vibration, normal proprioception   CEREBELLAR FUNCTION:  Intact fine motor control over upper limbs   REFLEX FUNCTION:  Symmetric DTRs in UE, brisk DTRs in bilateral LE; no perverted reflex, no Babinski sign   STATION and GAIT  Normal station, antalgic gait (limping)      XR Lumbar Spine (6/2/20): multilevel degenerative disc disease and facet joint arthropathy. No acute lumbar spine abnormality. MRI Rt knee (5/1/20): Impression and Plan: Mr. Ade Kate is a 77 y.o. male with   Heaviness in Rt Lower leg and ambulation difficulty since 1/23/2020   Hx of hosp for TAVR for severe aortic stenosis on 1/21/2020    Neurologic examination significant for antalgia without any sensorimotor impairment; will get NCS/ EMG testing of bilateral lower extremities  Follow up in clinic in 2-3 weeks afterwards. Elevated blood pressure reading: Checks blood pressures at home and\" always normal\";  whitecoat hypertension  Comorbid conditions include hypertension, dyslipidemia, diabetes, CAD    Follow-up in clinic in 2-3 weeks after EMG testing. Please note that portions of this note were completed with a voice recognition program.  Although every effort was made to ensure the accuracy of this automated transcription, some errors in transcription may have occurred; occasionally words are mis-transcribed. Thank you for understanding.

## 2020-08-19 NOTE — LETTER
[facility] Neurology  [address] [place] [address]  Phone: [phone]  Fax: [phone]    Uvaldo Mills MD        2020     ESTHER Stanton CNP  [address] [facility]    Patient: Devan Estes  MR Number: [id number]  YOB: 1945  Date of Visit: 2020    Dear Dr. Ryan Cruz: Thank you for the request for consultation for Diallo Grider to me for the evaluation of SHIRLEY CEVRANTES  Below are the relevant portions of my assessment and plan of care. NEUROLOGY CONSULT  Patient Name:       Devan Estes  :        1945  Clinic Visit Date:    2020    Dear Dr. Ryan Cruz, ESTHER - CNP     I had the opportunity to see your patient, Mr. Devan Estse in neurology consultation today. As you know he  is a 77 y.o. right handed  male presented with c/o heaviness in right lower extremity since . He has had heart valve replacement on 2020 and afterwards he started having right lower extremity weakness with ambulation difficulty. Was discharged to home on walker and referred to orthopedic physician and MRI of right knee done demonstrating advanced arthritic disease. He has had cortisone injection therapy with no significant relief. Afterwards he had physical therapy for about 8 weeks without \"much benefit\". Then he went back to his PCP and he was referred to vascular surgeon, Dr. Driss Rojas. Patient was told \"good circulation and all pulses are okay\" and he was referred to chiropractic therapy. Patient had chiropractic adjustments once weekly for ~6 weeks with no significant improvement. Subsequently he was referred to neurology. He describes heaviness as difficulty lifting right knee at times. He has been limping due to weakness in right foot.   He denies back pain radiating down right lower extremity. He further added \"no pain at all\". He denies bladder dysfunction. He also reports that \"my right hip does not coordinate with right lower leg\". He has significant gait difficulty related to that. He has had 2 falls related to that. Denied head injury, LOC, dizziness. Denies numbness, dysesthesias in lower extremities. Denies muscle spasms. Denies bladder dysfunction. Review of systems done by staff reviewed and pertinent positives include balance difficulty and walking difficulty. Current Outpatient Medications on File Prior to Visit   Medication Sig Dispense Refill    furosemide (LASIX) 40 MG tablet Take 0.5 tablets by mouth daily 30 tablet 0    Handicap Placard MISC by Does not apply route Duration 2 years 1 each 0    Handicap Placard MISC by Does not apply route Duration 2 years. 1 each 0    amLODIPine (NORVASC) 10 MG tablet Take 1 tablet by mouth daily 30 tablet 5    clopidogrel (PLAVIX) 75 MG tablet Take 1 tablet by mouth daily Take 1 daily 60 tablet 3    pioglitazone (ACTOS) 30 MG tablet Take 30 mg by mouth daily      simvastatin (ZOCOR) 40 MG tablet Take 40 mg by mouth nightly       glipiZIDE (GLUCOTROL) 10 MG tablet Take 10 mg by mouth 2 times daily (before meals)      vitamin D (CHOLECALCIFEROL) 1000 UNIT TABS tablet Take 1,000 Units by mouth 2 times daily       metFORMIN (GLUCOPHAGE) 1000 MG tablet Take 1 tablet by mouth 2 times daily (with meals) 60 tablet 3    lisinopril (PRINIVIL;ZESTRIL) 20 MG tablet Take 1 tablet by mouth daily 30 tablet 3    aspirin 81 MG EC tablet Take 81 mg by mouth daily. No current facility-administered medications on file prior to visit. Allergies: Zonia Organ is allergic to sulfa antibiotics.     Past Medical History:   Diagnosis Date    Acne rosacea     treated with MetroGel    Anemia     Aortic stenosis     echo 3/18 Mod AS, Mild MR    BPH (benign prostatic hypertrophy)  CAD (coronary artery disease)     Stent x 3 DEVON 8/16    Carotid stenosis     Fairly minimal plaquing on ultrasound 5/14--in the conclusion described as less than 50% stenosis but in the body of the report described as minimal    Cataract of both eyes     CRI (chronic renal insufficiency)     Diabetes mellitus, type 2 (HCC)     1.) Proteinuria, 24 hour total urine protein 1,420 mg/24 hrs, 09/08, creatinine clearance 114, 09/08 2.) Repeat protein/creatinine ratio 2.97, 02/12 3. )24 hr urine collection 1958 mg/24 hrs, 04/12. 4.) Repeat protein/creatinine ratio 1.99, 08/12 a.) Repeat protein/creatinine ratio 2.14, 07/13 5.) Renal u/s ok 02/12  6) retinopathy at [address] 12/16  early mod. nonprolifferative  macular involved    Dyslipidemia     Erectile dysfunction     H/O agent Orange exposure     per patient in the [employer] along with exposure to cresote and naphtha    Hearing loss     Hyperlipidemia     Hypertension     Non-rheumatic mitral regurgitation 6/27/2016    RBBB     Wears dentures     upper       Past Surgical History:   Procedure Laterality Date    CARDIAC CATHETERIZATION  12/03/2019    CATARACT REMOVAL Bilateral     CIRCUMCISION  age 49   [facility] E[facility] SURGERY Bilateral     cataract    TYMPANOSTOMY TUBE PLACEMENT      VASECTOMY       Social History: Ziara Bran  reports that he quit smoking about 50 years ago. He has a 1.00 pack-year smoking history. He has never used smokeless tobacco. He reports current alcohol use. He reports that he does not use drugs. Family History   Problem Relation Age of Onset    Heart Attack Father     Coronary Art Dis Sister     Hypertension Mother     Lung Cancer Mother     Stroke Mother     Coronary Art Dis Brother         later in life       On exam: Blood pressure (!) 153/73, pulse 67, temperature 98 °F (36.7 °C), height 5' 6\" (1.676 m), weight 166 lb 3.2 oz (75.4 kg).     NEUROLOGIC EXAMINATION  GENERAL  Appears comfortable and in no distress

## 2020-09-03 ENCOUNTER — HOSPITAL ENCOUNTER (OUTPATIENT)
Dept: LAB | Age: 75
Discharge: HOME | End: 2020-09-03
Payer: MEDICARE

## 2020-09-03 LAB
ABSOLUTE EOS #: 0.42 K/UL (ref 0–0.44)
ABSOLUTE IMMATURE GRANULOCYTE: 0.1 K/UL (ref 0–0.3)
ABSOLUTE LYMPH #: 1.4 K/UL (ref 1.1–3.7)
ABSOLUTE MONO #: 0.72 K/UL (ref 0.1–1.2)
ANION GAP SERPL CALCULATED.3IONS-SCNC: 11 MMOL/L (ref 9–17)
ANION GAP SERPL CALCULATED.3IONS-SCNC: 11 MMOL/L (ref 9–17)
BASOPHILS # BLD: 1 % (ref 0–2)
BASOPHILS ABSOLUTE: 0.08 K/UL (ref 0–0.2)
BUN BLDV-MCNC: 29 MG/DL (ref 8–23)
BUN BLDV-MCNC: 29 MG/DL (ref 8–23)
BUN/CREAT BLD: 23 (ref 9–20)
BUN/CREAT BLD: 23 (ref 9–20)
CALCIUM SERPL-MCNC: 10.2 MG/DL (ref 8.6–10.4)
CALCIUM SERPL-MCNC: 10.2 MG/DL (ref 8.6–10.4)
CHLORIDE BLD-SCNC: 102 MMOL/L (ref 98–107)
CHLORIDE BLD-SCNC: 102 MMOL/L (ref 98–107)
CO2: 26 MMOL/L (ref 20–31)
CO2: 26 MMOL/L (ref 20–31)
CREAT SERPL-MCNC: 1.27 MG/DL (ref 0.7–1.2)
CREAT SERPL-MCNC: 1.27 MG/DL (ref 0.7–1.2)
DIFFERENTIAL TYPE: ABNORMAL
EOSINOPHILS RELATIVE PERCENT: 5 % (ref 1–4)
ESTIMATED AVERAGE GLUCOSE: 166 MG/DL
GFR AFRICAN AMERICAN: >60 ML/MIN
GFR AFRICAN AMERICAN: >60 ML/MIN
GFR NON-AFRICAN AMERICAN: 55 ML/MIN
GFR NON-AFRICAN AMERICAN: 55 ML/MIN
GFR SERPL CREATININE-BSD FRML MDRD: ABNORMAL ML/MIN/{1.73_M2}
GLUCOSE BLD-MCNC: 172 MG/DL (ref 70–99)
GLUCOSE BLD-MCNC: 172 MG/DL (ref 70–99)
HBA1C MFR BLD: 7.4 % (ref 4.8–5.9)
HCT VFR BLD CALC: 40.7 % (ref 40.7–50.3)
HEMOGLOBIN: 13.1 G/DL (ref 13–17)
IMMATURE GRANULOCYTES: 1 %
LYMPHOCYTES # BLD: 16 % (ref 24–43)
MCH RBC QN AUTO: 28.1 PG (ref 25.2–33.5)
MCHC RBC AUTO-ENTMCNC: 32.2 G/DL (ref 25.2–33.5)
MCV RBC AUTO: 87.3 FL (ref 82.6–102.9)
MONOCYTES # BLD: 8 % (ref 3–12)
NRBC AUTOMATED: 0 PER 100 WBC
PDW BLD-RTO: 14.3 % (ref 11.8–14.4)
PLATELET # BLD: 279 K/UL (ref 138–453)
PLATELET ESTIMATE: ABNORMAL
PMV BLD AUTO: 10.1 FL (ref 8.1–13.5)
POTASSIUM SERPL-SCNC: 5.1 MMOL/L (ref 3.7–5.3)
POTASSIUM SERPL-SCNC: 5.1 MMOL/L (ref 3.7–5.3)
RBC # BLD: 4.66 M/UL (ref 4.21–5.77)
RBC # BLD: ABNORMAL 10*6/UL
SEG NEUTROPHILS: 69 % (ref 36–65)
SEGMENTED NEUTROPHILS ABSOLUTE COUNT: 6.16 K/UL (ref 1.5–8.1)
SODIUM BLD-SCNC: 139 MMOL/L (ref 135–144)
SODIUM BLD-SCNC: 139 MMOL/L (ref 135–144)
TOTAL PROTEIN, URINE: 115 MG/DL
WBC # BLD: 8.9 K/UL (ref 3.5–11.3)
WBC # BLD: ABNORMAL 10*3/UL

## 2020-09-03 PROCEDURE — 83036 HEMOGLOBIN GLYCOSYLATED A1C: CPT

## 2020-09-03 PROCEDURE — 36415 COLL VENOUS BLD VENIPUNCTURE: CPT

## 2020-09-03 PROCEDURE — 80048 BASIC METABOLIC PNL TOTAL CA: CPT

## 2020-09-03 PROCEDURE — 85025 COMPLETE CBC W/AUTO DIFF WBC: CPT

## 2020-09-03 PROCEDURE — 84156 ASSAY OF PROTEIN URINE: CPT

## 2020-09-08 ENCOUNTER — HOSPITAL ENCOUNTER (OUTPATIENT)
Dept: NEUROLOGY | Age: 75
Discharge: HOME | End: 2020-09-08
Payer: MEDICARE

## 2020-09-08 PROCEDURE — 95886 MUSC TEST DONE W/N TEST COMP: CPT

## 2020-09-08 PROCEDURE — 95911 NRV CNDJ TEST 9-10 STUDIES: CPT

## 2020-09-08 NOTE — PROGRESS NOTES
EMG/NCS Bilateral    lower Completed    PCP: Augustina Lim, APRN - CNP    Ordering: Flor Dee MD    Interpreting Physician: Payam Roca, Neurologist    Technician: Frank Albrecht RN

## 2020-09-09 ENCOUNTER — OFFICE VISIT (OUTPATIENT)
Dept: CARDIOLOGY | Age: 75
End: 2020-09-09
Payer: MEDICARE

## 2020-09-09 ENCOUNTER — OFFICE VISIT (OUTPATIENT)
Dept: INTERNAL MEDICINE | Age: 75
End: 2020-09-09
Payer: MEDICARE

## 2020-09-09 ENCOUNTER — HOSPITAL ENCOUNTER (OUTPATIENT)
Dept: LAB | Age: 75
Discharge: HOME | End: 2020-09-09
Payer: MEDICARE

## 2020-09-09 VITALS
TEMPERATURE: 96.9 F | HEART RATE: 60 BPM | DIASTOLIC BLOOD PRESSURE: 74 MMHG | WEIGHT: 170 LBS | SYSTOLIC BLOOD PRESSURE: 138 MMHG | HEIGHT: 66 IN | OXYGEN SATURATION: 98 % | BODY MASS INDEX: 27.32 KG/M2 | RESPIRATION RATE: 16 BRPM

## 2020-09-09 VITALS
DIASTOLIC BLOOD PRESSURE: 70 MMHG | BODY MASS INDEX: 27.32 KG/M2 | SYSTOLIC BLOOD PRESSURE: 158 MMHG | WEIGHT: 170 LBS | HEART RATE: 61 BPM | HEIGHT: 66 IN

## 2020-09-09 LAB — POTASSIUM SERPL-SCNC: 5 MMOL/L (ref 3.7–5.3)

## 2020-09-09 PROCEDURE — 93005 ELECTROCARDIOGRAM TRACING: CPT | Performed by: INTERNAL MEDICINE

## 2020-09-09 PROCEDURE — 99213 OFFICE O/P EST LOW 20 MIN: CPT

## 2020-09-09 PROCEDURE — G8417 CALC BMI ABV UP PARAM F/U: HCPCS | Performed by: INTERNAL MEDICINE

## 2020-09-09 PROCEDURE — 1123F ACP DISCUSS/DSCN MKR DOCD: CPT | Performed by: INTERNAL MEDICINE

## 2020-09-09 PROCEDURE — 1123F ACP DISCUSS/DSCN MKR DOCD: CPT | Performed by: NURSE PRACTITIONER

## 2020-09-09 PROCEDURE — 4040F PNEUMOC VAC/ADMIN/RCVD: CPT | Performed by: NURSE PRACTITIONER

## 2020-09-09 PROCEDURE — 99214 OFFICE O/P EST MOD 30 MIN: CPT | Performed by: INTERNAL MEDICINE

## 2020-09-09 PROCEDURE — 99214 OFFICE O/P EST MOD 30 MIN: CPT | Performed by: NURSE PRACTITIONER

## 2020-09-09 PROCEDURE — 2022F DILAT RTA XM EVC RTNOPTHY: CPT | Performed by: NURSE PRACTITIONER

## 2020-09-09 PROCEDURE — 3051F HG A1C>EQUAL 7.0%<8.0%: CPT | Performed by: NURSE PRACTITIONER

## 2020-09-09 PROCEDURE — G8417 CALC BMI ABV UP PARAM F/U: HCPCS | Performed by: NURSE PRACTITIONER

## 2020-09-09 PROCEDURE — G8427 DOCREV CUR MEDS BY ELIG CLIN: HCPCS | Performed by: NURSE PRACTITIONER

## 2020-09-09 PROCEDURE — 1036F TOBACCO NON-USER: CPT | Performed by: INTERNAL MEDICINE

## 2020-09-09 PROCEDURE — 1036F TOBACCO NON-USER: CPT | Performed by: NURSE PRACTITIONER

## 2020-09-09 PROCEDURE — 99213 OFFICE O/P EST LOW 20 MIN: CPT | Performed by: NURSE PRACTITIONER

## 2020-09-09 PROCEDURE — 36415 COLL VENOUS BLD VENIPUNCTURE: CPT

## 2020-09-09 PROCEDURE — 3017F COLORECTAL CA SCREEN DOC REV: CPT | Performed by: INTERNAL MEDICINE

## 2020-09-09 PROCEDURE — 4040F PNEUMOC VAC/ADMIN/RCVD: CPT | Performed by: INTERNAL MEDICINE

## 2020-09-09 PROCEDURE — G8428 CUR MEDS NOT DOCUMENT: HCPCS | Performed by: INTERNAL MEDICINE

## 2020-09-09 PROCEDURE — 99213 OFFICE O/P EST LOW 20 MIN: CPT | Performed by: INTERNAL MEDICINE

## 2020-09-09 PROCEDURE — 93010 ELECTROCARDIOGRAM REPORT: CPT | Performed by: INTERNAL MEDICINE

## 2020-09-09 PROCEDURE — 84132 ASSAY OF SERUM POTASSIUM: CPT

## 2020-09-09 PROCEDURE — 3017F COLORECTAL CA SCREEN DOC REV: CPT | Performed by: NURSE PRACTITIONER

## 2020-09-09 RX ORDER — SODIUM POLYSTYRENE SULFONATE 15 G/60ML
15 SUSPENSION ORAL; RECTAL ONCE
Qty: 1 BOTTLE | Refills: 3 | Status: SHIPPED | OUTPATIENT
Start: 2020-09-09 | End: 2020-09-30

## 2020-09-09 NOTE — PROGRESS NOTES
09/09/20  Margie Hobbs  1945      Chief Complaint:   1. Controlled type 2 diabetes mellitus with mild nonproliferative retinopathy and macular edema, without long-term current use of insulin, unspecified laterality ([address]    2. 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 Other proteinuria    4. Essential hypertension    5. Dyslipidemia    6. Non-rheumatic mitral regurgitation    7. Nonrheumatic aortic valve stenosis    8. Coronary artery disease involving native coronary artery of native heart without angina pectoris    9. Stenosis of carotid artery, unspecified laterality    10. Lung nodule    11. Adrenal cyst ([facility] [address]    12. Vitamin D deficiency        HPI:  72-year-old patient being seen for 3-month follow-up of above chronic health conditions. Of most concern to patient is ongoing complaints of heaviness to right lower leg. He has seen orthopedics he has seen vascular surgeon and we have gotten him set up with Dr. Bryson Brannon, neurology and Moriah Hill who completed an EMG. We reviewed this with patient today. We are going to get him set back up with them due to abnormal findings. He states he can continue to walk and move around but not like he used to. States he was walking 3 miles a day and now he is just getting up moving to do the things he needs to do it is noted his weight is up and his hemoglobin A1c is up however he again contributes this to that right lower leg. His blood pressure has been stable denies any dizziness lightheadedness. Denies any difficulty with statin drugs again he declines high-dose statin. No chest discomfort shortness of breath no swelling to lower extremities we again reviewed his previous abnormal CT of lung and abdomen and he is set up to have repeat scans in January.   He continues to follow with his eye doctor through the [place] we will obtain this record he did come from cardiology's appointment today and was noted to have T wave inversion Patient connected to scheduling to set up stress echo, labs and OV. Patient needs to complete this before mid-May, he is aware that Dr. Palmer's schedule may be full.   with a potassium of 5 so they sent him in some Kayexalate. Also was having some difficulties with bruising to bilateral forearms and he asked to go off of his aspirin. Cardiology okayed him to be off of the aspirin but continue to suggest Plavix daily. Patient states bruises minimal to bilateral arms was just concerned asking our opinion. We discussed maybe going down to aspirin Monday Wednesday Friday continue on Plavix daily and see how the bruising goes if stable with aspirin 3 times a week suggest he stay on it 3 times a week. Allergies   Allergen Reactions    Sulfa Antibiotics Swelling     Swelling of retinas       Past Medical History:   Diagnosis Date    Acne rosacea     treated with MetroGel    Anemia     Aortic stenosis     echo 3/18 Mod AS, Mild MR    BPH (benign prostatic hypertrophy)     CAD (coronary artery disease)     Stent x 3 DEVON 8/16    Carotid stenosis     Fairly minimal plaquing on ultrasound 5/14--in the conclusion described as less than 50% stenosis but in the body of the report described as minimal    Cataract of both eyes     CRI (chronic renal insufficiency)     Diabetes mellitus, type 2 (HCC)     1.) Proteinuria, 24 hour total urine protein 1,420 mg/24 hrs, 09/08, creatinine clearance 114, 09/08 2.) Repeat protein/creatinine ratio 2.97, 02/12 3. )24 hr urine collection 1958 mg/24 hrs, 04/12. 4.) Repeat protein/creatinine ratio 1.99, 08/12 a.) Repeat protein/creatinine ratio 2.14, 07/13 5.) Renal u/s ok 02/12  6) retinopathy at [address] 12/16  early mod. nonprolifferative  macular involved    Dyslipidemia     Erectile dysfunction     H/O agent Orange exposure     per patient in the [employer] along with exposure to cresote and naphtha    Hearing loss     Hyperlipidemia     Hypertension     Non-rheumatic mitral regurgitation 6/27/2016    RBBB     Wears dentures     upper       Past Surgical History:   Procedure Laterality Date    CARDIAC CATHETERIZATION  12/03/2019    CATARACT REMOVAL Bilateral     CIRCUMCISION  age 49   [facility] EYE SURGERY Bilateral     cataract    TYMPANOSTOMY TUBE PLACEMENT      VASECTOMY         Current Outpatient Medications on File Prior to Visit   Medication Sig Dispense Refill    furosemide (LASIX) 40 MG tablet Take 0.5 tablets by mouth daily 30 tablet 0    amLODIPine (NORVASC) 10 MG tablet Take 1 tablet by mouth daily 30 tablet 5    clopidogrel (PLAVIX) 75 MG tablet Take 1 tablet by mouth daily Take 1 daily 60 tablet 3    pioglitazone (ACTOS) 30 MG tablet Take 30 mg by mouth daily      simvastatin (ZOCOR) 40 MG tablet Take 40 mg by mouth nightly       glipiZIDE (GLUCOTROL) 10 MG tablet Take 10 mg by mouth 2 times daily (before meals)      vitamin D (CHOLECALCIFEROL) 1000 UNIT TABS tablet Take 1,000 Units by mouth 2 times daily       metFORMIN (GLUCOPHAGE) 1000 MG tablet Take 1 tablet by mouth 2 times daily (with meals) 60 tablet 3    lisinopril (PRINIVIL;ZESTRIL) 20 MG tablet Take 1 tablet by mouth daily 30 tablet 3    sodium polystyrene (SPS) 15 GM/60ML suspension Take 60 mLs by mouth once for 1 dose 1 Bottle 3    Handicap Placard MISC by Does not apply route Duration 2 years 1 each 0    Handicap Placard MISC by Does not apply route Duration 2 years. 1 each 0     No current facility-administered medications on file prior to visit.         Social History     Socioeconomic History    Marital status:      Spouse name: Not on file    Number of children: Not on file    Years of education: Not on file    Highest education level: Not on file   Occupational History    Occupation: seo   Social Needs    Financial resource strain: Not on file    Food insecurity     Worry: Not on file     Inability: Not on file   [employer] needs     Medical: Not on file     Non-medical: Not on file   Tobacco Use    Smoking status: Former Smoker     Packs/day: 0.50     Years: 2.00     Pack years: 1.00     Last attempt to quit: 1/1/1970     Years since quittin.7    Smokeless tobacco: Never Used   Substance and Sexual Activity    Alcohol use: Yes     Alcohol/week: 0.0 standard drinks     Frequency: 2-4 times a month     Drinks per session: 1 or 2     Binge frequency: Never     Comment: RARELY    Drug use: No    Sexual activity: Not on file   Lifestyle    Physical activity     Days per week: Not on file     Minutes per session: Not on file    Stress: Not on file   Relationships    Social connections     Talks on phone: Not on file     Gets together: Not on file     Attends Sikhism service: Not on file     Active member of club or organization: Not on file     Attends meetings of clubs or organizations: Not on file     Relationship status: Not on file    Intimate partner violence     Fear of current or ex partner: Not on file     Emotionally abused: Not on file     Physically abused: Not on file     Forced sexual activity: Not on file   Other Topics Concern    Not on file   Social History Narrative    Not on file       Review of Systems   Constitutional: Negative for activity change, appetite change, chills, fatigue, fever and unexpected weight change. HENT: Negative for congestion, dental problem, ear discharge, ear pain, facial swelling, hearing loss, postnasal drip, rhinorrhea, sinus pressure, sore throat and trouble swallowing. Eyes: Negative for pain and visual disturbance. Respiratory: Negative for cough, chest tightness, shortness of breath and wheezing. Cardiovascular: Negative for chest pain, palpitations and leg swelling. Gastrointestinal: Negative for abdominal pain, blood in stool, constipation, diarrhea, nausea and vomiting. Endocrine: Negative for cold intolerance, heat intolerance and polyuria. Genitourinary: Negative for difficulty urinating. Musculoskeletal: Negative for arthralgias, gait problem, myalgias, neck pain and neck stiffness. Skin: Negative for color change, rash and wound.    Neurological: Negative for dizziness, tremors, seizures, weakness, light-headedness, numbness and headaches. Heaviness to right leg - ongoing    Psychiatric/Behavioral: Negative for confusion and hallucinations. The patient is not nervous/anxious. Physical Exam  Vitals signs and nursing note reviewed. Constitutional:       General: He is not in acute distress. Appearance: He is well-developed. He is not diaphoretic. HENT:      Head: Normocephalic and atraumatic. Right Ear: External ear normal.      Left Ear: External ear normal.   Eyes:      General: Lids are normal.         Right eye: No discharge. Left eye: No discharge. Conjunctiva/sclera: Conjunctivae normal.      Pupils: Pupils are equal, round, and reactive to light. Neck:      Musculoskeletal: Normal range of motion and neck supple. No edema or erythema. Trachea: No tracheal deviation. Cardiovascular:      Rate and Rhythm: Normal rate and regular rhythm. Heart sounds: Normal heart sounds. No murmur. No friction rub. No gallop. Pulmonary:      Effort: Pulmonary effort is normal. No accessory muscle usage or respiratory distress. Breath sounds: Normal breath sounds. No wheezing or rales. Abdominal:      General: Bowel sounds are normal. There is no distension. Palpations: Abdomen is soft. Abdomen is not rigid. Musculoskeletal: Normal range of motion. Right hip: He exhibits normal range of motion, normal strength, no tenderness, no bony tenderness, no swelling and no crepitus. Right knee: He exhibits normal range of motion, no swelling, no effusion and no ecchymosis. No tenderness found. No medial joint line and no lateral joint line tenderness noted. Right upper leg: He exhibits no tenderness, no bony tenderness, no swelling, no edema, no deformity and no laceration. Skin:     General: Skin is warm and dry. Capillary Refill: Capillary refill takes less than 2 seconds. Coloration: Skin is not pale. Findings: No erythema or rash. Neurological:      Mental Status: He is alert and oriented to person, place, and time. Cranial Nerves: No cranial nerve deficit. Sensory: No sensory deficit. Coordination: Coordination normal.   Psychiatric:         Behavior: Behavior normal.         Thought Content: Thought content normal.         Judgment: Judgment normal.       Vitals:    09/09/20 1058   BP: 138/74   Site: Left Upper Arm   Position: Sitting   Cuff Size: Large Adult   Pulse: 60   Resp: 16   Temp: 96.9 °F (36.1 °C)   TempSrc: Temporal   SpO2: 98%   Weight: 170 lb (77.1 kg)   Height: 5' 6\" (1.676 m)       Assessment:  1. Controlled type 2 diabetes mellitus with mild nonproliferative retinopathy and macular edema, without long-term current use of insulin, unspecified laterality (HCC)   hemoglobin A1c 7.4-   Continues on Actos 30 mg, Glucotrol 10 mg twice a day and metformin at thousand twice a day  [facility]     2. 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 Other proteinuria  Continues on ACE- follows with Dr Saeed Jolley    4. Essential hypertension  Stable continue on current drug regime     5. Dyslipidemia  The 10-year ASCVD risk score (Brian Goodwin, et al., 2013) is: 49.1%    Values used to calculate the score:      Age: 77 years      Sex: Male      Is Non- : No      Diabetic: Yes      Tobacco smoker: No      Systolic Blood Pressure: 023 mmHg      Is BP treated: Yes      HDL Cholesterol: 49 mg/dL      Total Cholesterol: 186 mg/dL  Continues on low dose statin declines high dose     6. Non-rheumatic mitral regurgitation  7. Nonrheumatic aortic valve stenosis  Status post TAVR, ongoing follow-up with cardiology stable, continues on aspirin Plavix    8.  Coronary artery disease involving native coronary artery of native heart without angina pectoris  Statin and ACE inhibitor. History of 3 stents. Seen cardiology prior to my appointment today. Aspirin was stopped today by cardio due to bruising to bilateral arms  Patient is going to try to take his Plavix every day and aspirin Monday Wednesday Friday to see how the bruising goes    9. Stenosis of carotid artery, unspecified laterality  Ongoing follow-up with vascular surgeon, carotid ultrasound stable 9/25/2019 will be due for repeat ultrasound in 2 years. Seen by vascular surgeon 5/28/2020    10. Lung nodule  Has repeat follow-up scheduled January 11. Adrenal cyst [facility])  Has a repeat follow-up in January 12. Video vitamin D deficiency  Stable on vitamin D      Plan:  As noted above. Again patient declines colonoscopy aware of risk  Follow up for routine visit. Call sooner with concerns prior.     Electronically signed by ESTHER Moon CNP on 9/9/2020 at 1:14 PM

## 2020-09-09 NOTE — PROGRESS NOTES
Today's Date: 9/9/2020  Patient's Name: Mabel Garduno  Patient's age: 77 y.o., 1945    Subjective:  Mabel Garduno  is here for follow up. He reports feeling good. He denies any chest pain, no dyspnea, no PND, no syncope or pre-syncope, no orthopnea. He has numbness in right lower extremities. He has seen neurology and vascular surgery. He did not take his BP medication. At home BP is 120-130/70s    Past Medical History:   has a past medical history of Acne rosacea, Anemia, Aortic stenosis, BPH (benign prostatic hypertrophy), CAD (coronary artery disease), Carotid stenosis, Cataract of both eyes, CRI (chronic renal insufficiency), Diabetes mellitus, type 2 ([address] Dyslipidemia, Erectile dysfunction, H/O agent Orange exposure, Hearing loss, Hyperlipidemia, Hypertension, Non-rheumatic mitral regurgitation, RBBB, and Wears dentures. Past Surgical History:   has a past surgical history that includes Circumcision (age 49); Vasectomy; Tympanostomy tube placement; Cataract removal (Bilateral); eye surgery (Bilateral); and Cardiac catheterization (12/03/2019). Home Medications:  Prior to Admission medications    Medication Sig Start Date End Date Taking? Authorizing Provider   furosemide (LASIX) 40 MG tablet Take 0.5 tablets by mouth daily 7/10/20   Declan Tony MD   Handicap Placard MISC by Does not apply route Duration 2 years 6/2/20   ESTHER Juárez CNP   Handicap Placard MISC by Does not apply route Duration 2 years.  6/2/20   ESTHER Juárez CNP   amLODIPine (NORVASC) 10 MG tablet Take 1 tablet by mouth daily 1/23/20   Sigifredo Richardson MD   clopidogrel (PLAVIX) 75 MG tablet Take 1 tablet by mouth daily Take 1 daily 12/6/19   ESTHER Flores CNP   pioglitazone (ACTOS) 30 MG tablet Take 30 mg by mouth daily    Historical Provider, MD   simvastatin (ZOCOR) 40 MG tablet Take 40 mg by mouth nightly     Historical Provider, MD   glipiZIDE (GLUCOTROL) 10 MG tablet Take 10 mg by mouth 2 times daily (before meals)    Historical Provider, MD   vitamin D (CHOLECALCIFEROL) 1000 UNIT TABS tablet Take 1,000 Units by mouth 2 times daily     Historical Provider, MD   metFORMIN (GLUCOPHAGE) 1000 MG tablet Take 1 tablet by mouth 2 times daily (with meals) 8/25/16   Eldon KinseyESTHER CNP   lisinopril (PRINIVIL;ZESTRIL) 20 MG tablet Take 1 tablet by mouth daily 8/24/16   ESTHER Johnson CNP   aspirin 81 MG EC tablet Take 81 mg by mouth daily. Historical Provider, MD       Allergies:  Sulfa antibiotics    Social History:   reports that he quit smoking about 50 years ago. He has a 1.00 pack-year smoking history. He has never used smokeless tobacco. He reports current alcohol use. He reports that he does not use drugs. Family History: family history includes Coronary Art Dis in his brother and sister; Heart Attack in his father; Hypertension in his mother; Deette Allis in his mother; Stroke in his mother. No h/o sudden cardiac death. No for premature CAD    REVIEW OF SYSTEMS:    · Constitutional: there has been no unanticipated weight loss. There's been No change in energy level, No change in activity level. · Eyes: No visual changes or diplopia. No scleral icterus. · ENT: No Headaches, hearing loss or vertigo. No mouth sores or sore throat. · Cardiovascular: see above  · Respiratory: see above  · Gastrointestinal: No abdominal pain, appetite loss, blood in stools. · Genitourinary: No dysuria, trouble voiding, or hematuria. · Musculoskeletal:  No gait disturbance, No weakness or joint complaints. · Integumentary: No rash or pruritis. · Neurological: No headache or diplopia. No tingling  · Psychiatric: No anxiety, or depression. · Endocrine: No temperature intolerance. · Hematologic/Lymphatic: No abnormal bruising or bleeding, blood clots or swollen lymph nodes. · Allergic/Immunologic: No nasal congestion or hives.       PHYSICAL EXAM:      Vitals: 09/09/20 1015   BP: (!) 180/70   Pulse:        Constitutional and General Appearance: alert, cooperative, no distress and appears stated age  HEENT: PERRL, no cervical lymphadenopathy. No masses palpable. Normal oral mucosa  Respiratory:  · Normal excursion and expansion without use of accessory muscles  · Resp Auscultation: Good respiratory effort. No for increased work of breathing. On auscultation: clear to auscultation bilaterally  Cardiovascular:  · Heart tones are crisp and normal. regular S1 and S2.  · Jugular venous pulsation Normal  · The carotid upstroke is normal in amplitude and contour without delay or bruit   Abdomen:   · soft  · Bowel sounds present  Extremities:  ·  No edema  Neurological:  · Alert and oriented. Cardiac Data:  EKG: SR, first degree AV block, inferior infarction    Labs:     CBC: No results for input(s): WBC, HGB, HCT, PLT in the last 72 hours. BMP: No results for input(s): NA, K, CO2, BUN, CREATININE, LABGLOM, GLUCOSE in the last 72 hours. PT/INR: No results for input(s): PROTIME, INR in the last 72 hours. FASTING LIPID PANEL:  Lab Results   Component Value Date    HDL 49 05/21/2020    TRIG 136 05/21/2020     LIVER PROFILE:No results for input(s): AST, ALT, LABALBU in the last 72 hours. TTE 12/3/19  Summary  Left ventricle is normal in size. Global left ventricular systolic function  is moderately reduced. Calculated EF via heart model is 31 %. Grade I (mild) left ventricular diastolic dysfunction. Left atrium is mildly dilated. Right atrial dilatation. Mildly dilated right ventricular cavity. Right ventricular function appears normal .  The aortic valve is calcified with restricted cusp mobility. Moderate Aortic Stenosis, however, maybe underestimated due to poor LVEF. Mild mitral regurgitation. Trivial tricuspid regurgitation.  Estimated right ventricular systolic  pressure is 27 mmHg.     Cardiac cath 12/3/19   Procedure Summary      Severe aortic stenosis.   Mild LV systolic dysfunction.   Patent mid LAD, proximal D1 and mid LCX stents. Focal distal LAD 70%   stenosis.      Recommendations      Medical therapy as needed.   Risk factor modification.   Elective CV surgical consultation for AVR/ CABG     Echo 01/23/2020:  Left ventricle is mildly enlarged, basal septal hypertrophy, global left  ventricular systolic function is moderately to severely reduced, calculated  ejection fraction is 36%. Grade I (mild) left ventricular diastolic dysfunction. Left atrium is moderately dilated. Normal right ventricular size and function. Bioprosthetic valve is seated in the aortic position, appears to be  functioning normally with no obvious regrugitation or stenosis. Trivial perivalvular leak noted. Peak instantaneous gradient 17 mmHg and mean gradient 9 mmHg. Mitral valve sclerosis without stenosis. Mild mitral regurgitation. Tricuspid valve was not well visualized. Trivial tricuspid regurgitation.     S/p TAVR 1/21/2020 34 mm CoreValve EVOLUT via right Femoral approach. Serial # [id number]     TTE 2/27/2020  Summary  Thinned and severely hypokinetic inferolateral wall. Left ventricular systolic function is mildly reduced. Left ventricular ejection fraction 45 %. Grade II (moderate) left ventricular diastolic dysfunction. Right ventricular dilatation with normal systolic function. Bioprosthetic aortic valve( TAVR) that is normal in appearance and function. Peak instantaneous gradient 9 mmHg and mean gradient 5 mmHg. Trace aortic insufficiency. Mitral leaflets are mildly thickened without stenosis. Mild mitral regurgitation. No pericardial effusion.     Assessment and Plan:     -CAD with h/o DEVON to LAD, D and mid LCX 8/23/16. Cardiac cath for NSTEMI 12/3/19 showed patent stents with chronic RDPA occlusion with focal distal LAD 70%; LVEF 40%. Stable on current medical treatment.  Continue ASA/plavix  -Severe aortic stenosis s/p TAVR on 1/21/2020 functioning normally on echo post TAVR as above. Antibiotics prior to dental work. -Carotid artery disease. Follows with vascular surgery. -HTN- with white coat HTN. Continue current therapy.   -Ischemic / valvular cardiomyopathy EF of 45% currently without signs of volume overload. -Hyperlipidemia- Last  on 5/21/2020. Patient is on low dose simvastatin which he is able to tolerate. Any increase in dose or switch has caused him severe myalgia. I recommended him addition of zetia. He does not want to take this new medication. -H/o agent orange exposure. -CKD- Has been referred to nephrology by PCP  -Right lower ext numbess. Seeing neurology and vascular surgery.  -Check K. It was 5.1 on 9/3.   -RTC 6 months    Elidia Haji [facility]           [phone]      Reports a lot of bruising. Stop ASA and continue plavix.

## 2020-09-09 NOTE — PROCEDURES
Deja [address]                 [address] [place] [id number]                          EMG/NERVE CONDUCTION STUDIES REPORT      PATIENT NAME: Bayron Martinez                   :        1945  MED REC NO:   [id number]                             ROOM:  ACCOUNT NO:   [de-identified]                           ADMIT DATE: 2020  PROVIDER:     Martha Christian MD    DATE OF EM2020    REFERRING PROVIDER:  Tony Walker MD, Moriah juan [facility]. TECHNICAL SUMMARY:  The nature, purpose, goals, expectations and process  involved in the procedures of nerve conduction studies and needle  electromyography were reviewed, discussed, explained and verbal consent  was obtained from the patient. The patient's questions were answered  with reference to the above processes and procedures. There were no significant technical difficulties encountered during this  study and nerve conduction studies were performed under temperature  monitoring. CLINICAL DATA:        The patient is 77years old male with a history of heart  valve replacement in 2020, with complaints of heaviness involving the  legs, gait difficulty, balance problem. Complains of weakness  especially involving the right lower extremity, progressive since  2020. The patient has history of peripheral arterial disorder. The  patient has history of type 2 diabetes. The purpose of the study is to evaluate for mononeuropathy,  radiculopathy, plexopathy, peripheral polyneuropathy, myopathy. SUMMARY:        The sensory nerve action potentials of the right and left  sural and superficial peroneal nerves were not recordable bilaterally. Compound muscle action potentials of the right peroneal nerve recorded  at ankle is not recordable. Peroneal motor conduction studies recorded at tibialis anterior shows  low amplitudes, normal distal latency.     Peroneal motor conduction studies on the left side shows low amplitudes  and low conduction velocities. Tibial motor conduction studies shows low amplitude, prolonged distal  latencies, and borderline conduction velocities. Proximal conductions as measured by the F responses were not recordable  in the right peroneal and tibial nerves. Left peroneal nerve F response  is not recordable. Left tibial F response appear fairly within normal  limits. Right tibial H response is not recordable. Left tibial H response is  Borderline. Nerve  Conductions   Results  Were  Personally  Reviewed and  Analysed. Abnormal  Nerve  Conductions  Were  Personally  Repeated,  Verified, reconfirmed  And  Updated as needed  appropriately. Please    See   Wave  Forms   And    Details  Of     Nerve  Conduction   Studies   For  Additional  Information             Extensive   Needle  Electromyography  Was personally  Performed  In  Both      Lower  Extremities     In  The  Following  Muscles :      Gluteus  Medius,   Vastus  Lateralis,  Internal  Hamstring,    External  Hamstring,   Anterior Tibialis,  Medial  Gastrocnemius,         Extensive  Needle  Electromyography  Shows  No   Abnormality with :      A) Normal  insertional  Activity. There  Is  Absence  Of   P  Waves,  Fibrillations,  Fasciculations and        Other  Spontaneous   Activity. B) Voluntary  Motor unit  Potentials    Show :    Normal  Effort,  Recruitment, amplitude,  Duration. No Polyphasia  Noted. IMPRESSION:        1. There is electrodiagnostic evidence of severe right peroneal    Mononeuropathy. 2.  There is electrodiagnostic evidence of moderate, sensory motor,    peripheral polyneuropathy involving examined both lower extremities. 3.  Nonrecordable F responses and right tibial H responses are    suggestive of early involvement of L4-L5 and L5-S1 myotomes.         4.  Needle electromyography shows no active denervation changes or    chronic reinnervation changes. 5.  There is no definite electrodiagnostic evidence of inflammatory    myopathy involving examined both lower extremities. Further clinical correlation and followup recommended.           Diana Mckinney MD  Board Certified Neurologist    D: 09/08/2020 12:00:20       T: 09/08/2020 12:29:41     [id number]  Job#: [id number]     Doc#: [id number]    CC:  [address]

## 2020-09-10 ASSESSMENT — ENCOUNTER SYMPTOMS
ABDOMINAL PAIN: 0
FACIAL SWELLING: 0
COUGH: 0
SHORTNESS OF BREATH: 0
NAUSEA: 0
SINUS PRESSURE: 0
SORE THROAT: 0
CONSTIPATION: 0
WHEEZING: 0
EYE PAIN: 0
CHEST TIGHTNESS: 0
TROUBLE SWALLOWING: 0
BLOOD IN STOOL: 0
RHINORRHEA: 0
VOMITING: 0
DIARRHEA: 0
COLOR CHANGE: 0

## 2020-09-11 ENCOUNTER — TELEPHONE (OUTPATIENT)
Dept: NEUROLOGY | Age: 75
End: 2020-09-11

## 2020-09-11 NOTE — PROGRESS NOTES
Dr. Irizarry Shock office returned call. Staff will ask Dr. Consuelo Low to review EMG results from 9/8/20 and advise patient recommendations and whether the provider still wants to see him in office on 9/30/20. Last DM eye exam requested from [address]

## 2020-09-11 NOTE — TELEPHONE ENCOUNTER
Please let the patient know that EMG results need to be discussed and also he needs to be examined. I will do those during upcoming visit.   Thank you.   -dr. Lara Standing

## 2020-09-17 ENCOUNTER — HOSPITAL ENCOUNTER (OUTPATIENT)
Dept: LAB | Age: 75
Discharge: HOME | End: 2020-09-17
Payer: MEDICARE

## 2020-09-17 LAB
CREAT SERPL-MCNC: 1.37 MG/DL (ref 0.7–1.2)
GFR AFRICAN AMERICAN: >60 ML/MIN
GFR NON-AFRICAN AMERICAN: 51 ML/MIN
GFR SERPL CREATININE-BSD FRML MDRD: ABNORMAL ML/MIN/{1.73_M2}
GFR SERPL CREATININE-BSD FRML MDRD: ABNORMAL ML/MIN/{1.73_M2}
POTASSIUM SERPL-SCNC: 4.7 MMOL/L (ref 3.7–5.3)

## 2020-09-17 PROCEDURE — 84132 ASSAY OF SERUM POTASSIUM: CPT

## 2020-09-17 PROCEDURE — 36415 COLL VENOUS BLD VENIPUNCTURE: CPT

## 2020-09-17 PROCEDURE — 82565 ASSAY OF CREATININE: CPT

## 2020-09-30 ENCOUNTER — OFFICE VISIT (OUTPATIENT)
Dept: NEUROLOGY | Age: 75
End: 2020-09-30
Payer: MEDICARE

## 2020-09-30 VITALS
HEIGHT: 66 IN | SYSTOLIC BLOOD PRESSURE: 170 MMHG | WEIGHT: 164 LBS | TEMPERATURE: 98 F | DIASTOLIC BLOOD PRESSURE: 84 MMHG | BODY MASS INDEX: 26.36 KG/M2 | HEART RATE: 53 BPM

## 2020-09-30 PROCEDURE — 4040F PNEUMOC VAC/ADMIN/RCVD: CPT | Performed by: PSYCHIATRY & NEUROLOGY

## 2020-09-30 PROCEDURE — 2022F DILAT RTA XM EVC RTNOPTHY: CPT | Performed by: PSYCHIATRY & NEUROLOGY

## 2020-09-30 PROCEDURE — 1036F TOBACCO NON-USER: CPT | Performed by: PSYCHIATRY & NEUROLOGY

## 2020-09-30 PROCEDURE — 1123F ACP DISCUSS/DSCN MKR DOCD: CPT | Performed by: PSYCHIATRY & NEUROLOGY

## 2020-09-30 PROCEDURE — 3017F COLORECTAL CA SCREEN DOC REV: CPT | Performed by: PSYCHIATRY & NEUROLOGY

## 2020-09-30 PROCEDURE — 3051F HG A1C>EQUAL 7.0%<8.0%: CPT | Performed by: PSYCHIATRY & NEUROLOGY

## 2020-09-30 PROCEDURE — 99214 OFFICE O/P EST MOD 30 MIN: CPT | Performed by: PSYCHIATRY & NEUROLOGY

## 2020-09-30 PROCEDURE — G8427 DOCREV CUR MEDS BY ELIG CLIN: HCPCS | Performed by: PSYCHIATRY & NEUROLOGY

## 2020-09-30 PROCEDURE — G8417 CALC BMI ABV UP PARAM F/U: HCPCS | Performed by: PSYCHIATRY & NEUROLOGY

## 2020-09-30 NOTE — PROGRESS NOTES
NEUROLOGY FOLLOW-UP VISIT  Patient Name:       Los Ochoa  :        1945  Clinic Visit Date:    2020    Dear Dr. Ekta Mcdermott, APRN - CNP     I saw Mr. Los Ochoa in follow-up in the office today in continuation of neurologic care. As you know he  is a 77 y.o. right handed  male is seen in initial neurology consultation on 2020 for c/o heaviness in right lower extremity since . Today he comes to clinic for follow-up visit stating \"no pain\" but that he continues to drag his right foot. He has had physical therapy sessions x10-12 sessions with marginal improvement. He denied radiating pain and paresthesias from the lower back and also denied bladder dysfunction. He describes heaviness as difficulty lifting right knee at times. He has been limping due to weakness in right foot. He denies back pain radiating down right lower extremity. He further added \"no pain at all\". He denies bladder dysfunction. He also reports that \"my right hip does not coordinate with right lower leg\". He has significant gait difficulty related to that. He has had 2 falls related to that. Denied head injury, LOC, dizziness. Denies numbness, dysesthesias in lower extremities. Denies muscle spasms. Denies bladder dysfunction. He has had heart valve replacement on 2020 and afterwards he started having right lower extremity weakness with ambulation difficulty. Was discharged to home on walker and referred to orthopedic physician and MRI of right knee done demonstrating advanced arthritic disease. He has had cortisone injection therapy with no significant relief. Afterwards he had physical therapy for about 8 weeks without \"much benefit\". Then he went back to his PCP and he was referred to vascular surgeon, Dr. Kathe Rondon. Patient was told \"good circulation and all pulses are okay\" and he was referred to chiropractic therapy.   Patient had chiropractic adjustments once weekly for ~6 weeks with no significant improvement. Subsequently he was referred to neurology. Review of systems done by staff reviewed and pertinent positives include balance difficulty and walking difficulty. He has not had any recent febrile illnesses. He offers no new complaints stating \"nothing checked\". Current Outpatient Medications on File Prior to Visit   Medication Sig Dispense Refill    furosemide (LASIX) 40 MG tablet Take 0.5 tablets by mouth daily (Patient taking differently: Take 20 mg by mouth as needed ) 30 tablet 0    Handicap Placard MISC by Does not apply route Duration 2 years 1 each 0    amLODIPine (NORVASC) 10 MG tablet Take 1 tablet by mouth daily 30 tablet 5    clopidogrel (PLAVIX) 75 MG tablet Take 1 tablet by mouth daily Take 1 daily 60 tablet 3    pioglitazone (ACTOS) 30 MG tablet Take 30 mg by mouth daily      simvastatin (ZOCOR) 40 MG tablet Take 10 mg by mouth nightly       glipiZIDE (GLUCOTROL) 10 MG tablet Take 10 mg by mouth 2 times daily (before meals)      vitamin D (CHOLECALCIFEROL) 1000 UNIT TABS tablet Take 1,000 Units by mouth 2 times daily       metFORMIN (GLUCOPHAGE) 1000 MG tablet Take 1 tablet by mouth 2 times daily (with meals) 60 tablet 3    lisinopril (PRINIVIL;ZESTRIL) 20 MG tablet Take 1 tablet by mouth daily 30 tablet 3     No current facility-administered medications on file prior to visit. Allergies: Laurie Jesus is allergic to sulfa antibiotics.     Past Medical History:   Diagnosis Date    Acne rosacea     treated with MetroGel    Anemia     Aortic stenosis     echo 3/18 Mod AS, Mild MR    BPH (benign prostatic hypertrophy)     CAD (coronary artery disease)     Stent x 3 DEVON 8/16    Carotid stenosis     Fairly minimal plaquing on ultrasound 5/14--in the conclusion described as less than 50% stenosis but in the body of the report described as minimal    Cataract of both eyes     CRI (chronic renal insufficiency)     Diabetes mellitus, type 2 ([facility] [address]     1.) Proteinuria, 24 hour total urine protein 1,420 mg/24 hrs, 09/08, creatinine clearance 114, 09/08 2.) Repeat protein/creatinine ratio 2.97, 02/12 3. )24 hr urine collection 1958 mg/24 hrs, 04/12. 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    Dyslipidemia     Erectile dysfunction     H/O agent Orange exposure     per patient in the [employer] along with exposure to cresote and naphtha    Hearing loss     Hyperlipidemia     Hypertension     Non-rheumatic mitral regurgitation 6/27/2016    RBBB     Wears dentures     upper       Past Surgical History:   Procedure Laterality Date    CARDIAC CATHETERIZATION  12/03/2019    CATARACT REMOVAL Bilateral     CIRCUMCISION  age 49   [facility] Bilateral     cataract    TYMPANOSTOMY TUBE PLACEMENT      VASECTOMY       Social History: Jennifer Potter  reports that he quit smoking about 50 years ago. He has a 1.00 pack-year smoking history. He has never used smokeless tobacco. He reports current alcohol use. He reports that he does not use drugs. Family History   Problem Relation Age of Onset    Heart Attack Father     Coronary Art Dis Sister     Hypertension Mother     Lung Cancer Mother     Stroke Mother     Coronary Art Dis Brother         later in life       On exam: Blood pressure (!) 170/84, pulse 53, temperature 98 °F (36.7 °C), height 5' 6\" (1.676 m), weight 164 lb (74.4 kg). NEUROLOGIC EXAMINATION  GENERAL  Appears comfortable and in no distress   HEENT  NC/ AT   NECK  Supple and no bruits heard   MENTAL STATUS:  Alert, oriented, intact memory, no confusion, normal speech, normal language, no hallucination or delusion; appropriate affect   CRANIAL NERVES: II     -      PERRLA, Fundi reveal intact venous pulsations;  Visual fields intact to confrontation  III,IV,VI -  EOMs full, no afferent defect, no                      ARI, no ptosis  V     -     Normal facial sensation  VII    -     Normal facial symmetry  VIII   -     Intact hearing  IX,X -     Symmetrical palate  XI    -     Symmetrical shoulder shrug  XII   -     Midline tongue, no atrophy    MOTOR FUNCTION:  significant for good strength of grade 5/5 in bilateral proximal and distal muscle groups of both upper and lower extremities with normal bulk, normal tone and no involuntary movements, no tremor; SLR negative; FIRDA testing negative   SENSORY FUNCTION:  Normal touch, normal pin, normal vibration, normal proprioception   CEREBELLAR FUNCTION:  Intact fine motor control over upper limbs   REFLEX FUNCTION:  Symmetric DTRs in UE, brisk DTRs in bilateral LE; no perverted reflex, no Babinski sign   STATION and GAIT  Normal station, antalgic gait (limping)      XR Lumbar Spine (6/2/20): multilevel degenerative disc disease and facet joint arthropathy. No acute lumbar spine abnormality. MRI Rt knee (5/1/20): ACL ganglion formation versus mucoid degeneration of the ACL. No discrete ACL tear evident. Grade 2 patellofemoral chondromalacia. Degeneration within medial and lateral menisci without discrete meniscal tear.   Small Baker's cyst.    CBC:     Lab Results   Component Value Date    WBC 8.9 09/03/2020     09/03/2020      BMP:     Lab Results   Component Value Date     09/03/2020    K 4.7 09/17/2020    GLUCOSE 172 (H) 09/03/2020    CALCIUM 10.2 09/03/2020       No results found for: PHENYTOIN, PHENOBARB, VALPROATE, CBMZ    Lab Results   Component Value Date    CHOL 186 05/21/2020    LDLCHOLESTEROL 110 05/21/2020    HDL 49 05/21/2020    TRIG 136 05/21/2020    ALT 12 05/21/2020    AST 15 05/21/2020    TSH 1.55 12/05/2019    INR 0.9 01/21/2020    LABA1C 7.4 (H) 09/03/2020    LABMICR 1,476 (H) 03/17/2016    [id number] 484 12/05/2019             NCS/EMG by Dr. Jeniffer Foley on 9/8/2020: Bilateral sural and superficial peroneal sensory potentials were not recordable bilaterally. Right peroneal motor potential is unelicitable. Right peroneal motor potential recording right tibialis anterior muscle showed low amplitude but with a normal distal latency. Tibial motor potentials demonstrated low amplitude, prolonged distal latencies and borderline conduction velocities. Right peroneal and tibial F wave latencies are unelicitable. Left peroneal F wave latency is unrecordable. Left tibial F wave latency is within normal limits. Right tibial heads reflex is not recordable. Left tibial H-reflex is borderline. Impression and Plan: Mr. Chetan Felix is a 77 y.o. male with   Abnormal sensation in right lower extremity with resultant gait difficulties since 1/23/2020 with weakness and right lower leg; dragging right foot; Abnormal neuro exam significant for antalgia    Abnormal NCS/EMG (9/8/2020) demonstrating multiple unelicitable sensory responses bilaterally; unrecordable distal peroneal motor potential but same peroneal motor potential recording proximal muscle demonstrated smaller responses; unelicitable F-wave latencies; all of these findings are suggestive of diffuse sensorimotor peripheral polyneuropathy these findings are suggestive of distal symmetrical sensorimotor peripheral polyneuropathy seen in patients with diabetes mellitus with elevated hemoglobin A1c at 7.4; poorly controlled diabetes; Presently on Pioglitazaone, glipizide and metformin for diabetes control.     Reviewed EMG findings and also advised to keep checking bs tid as recommended by his endocrinologist at [facility]  Also to get MRI Lumbar Spine    Hx of hosp for TAVR for severe aortic stenosis on 1/21/2020  Elevated blood pressure reading: Checks blood pressures at home and\" always normal\";  whitecoat hypertension  Comorbid conditions include hypertension, dyslipidemia, diabetes, CAD    Follow-up in clinic in 3 months. Please note that portions of this note were completed with a voice recognition program.  Although every effort was made to ensure the accuracy of this automated transcription, some errors in transcription may have occurred; occasionally words are mis-transcribed. Thank you for understanding.

## 2020-09-30 NOTE — PROGRESS NOTES
All items selected indicate a positive finding. Those items not selected are negative.   Constitutional [] Weight loss/gain   [] Fatigue  [] Fever/Chills   HEENT [] Hearing Loss  [] Visual Disturbance  [] Tinnitus  [] Eye pain   Respiratory [] Shortness of Breath  [] Cough  [] Snoring   Cardiovascular [] Chest Pain  [] Palpitations  [] Lightheaded   GI [] Constipation  [] Diarrhea  [] Swallowing change  [] Nausea/vomiting    [] Urinary Frequency  [] Urinary Urgency   Musculoskeletal [] Neck pain  [] Back pain  [] Muscle pain  [] Restless legs   Dermatologic [] Skin changes   Neurologic [] Memory loss/confusion  [] Seizures  [] Trouble walking or imbalance  [] Dizziness  [] Sleep disturbance  [] Weakness  [] Numbness  [] Tremors  [] Speech Difficulty  [] Headaches  [] Light Sensitivity  [] Sound Sensitivity   Endocrinology []Excessive thirst  []Excessive hunger   Psychiatric [] Anxiety/Depression  [] Hallucination   Allergy/immunology []Hives/environmental allergies   Hematologic/lymph [] Abnormal bleeding  [] Abnormal bruising

## 2020-10-08 ENCOUNTER — HOSPITAL ENCOUNTER (OUTPATIENT)
Dept: MRI IMAGING | Age: 75
Discharge: HOME | End: 2020-10-10
Payer: MEDICARE

## 2020-10-08 PROCEDURE — 72148 MRI LUMBAR SPINE W/O DYE: CPT

## 2020-10-23 ENCOUNTER — TELEPHONE (OUTPATIENT)
Dept: NEUROLOGY | Age: 75
End: 2020-10-23

## 2020-10-23 NOTE — TELEPHONE ENCOUNTER
----- Message from Lahoma Hatchet, MD sent at 10/15/2020 10:16 PM EDT -----  Regarding: mri lumbar spine  Jeniffer Dos Santos    Please let him know that MRI Lumbar spine is showing a lot of arthritic changes at  multiple levels. If he has any back pain radiating down lower extremities; we can refer him to physical therapy and or pain clinic for possible epidural injection therapy. MRI lumbar spine (10/8/2020): No abnormalities of the spinal cord. Multilevel degenerative changes with disc bulges and facet degenerative changes causing moderate-severe disc space narrowing creating moderate-severe bilateral foraminal stenosis.     Thank you.   -dr. Caroline Ennis

## 2020-10-23 NOTE — TELEPHONE ENCOUNTER
I spoke with Gita Sears and gave him the results. He denies a lot of back pain, he has heaviness in the right leg from the knee down. He has already had therapy. It helped a little bit.  He would like to get his leg feeling normal again and ask what you suggest.

## 2020-10-24 NOTE — TELEPHONE ENCOUNTER
Gera Morales  I called and spoke to the patient. He did not have back pain. Therefore no used to refer to pain clinic. Discussed about physical therapy. Patient is agreeable to get physical therapy for gait strengthening. I will put in the consult for physical therapy.   Please call the patient next week about the referral.  Thank you so much  -Amalia Carlisle MD

## 2020-12-02 ENCOUNTER — OFFICE VISIT (OUTPATIENT)
Dept: NEUROLOGY | Age: 75
End: 2020-12-02
Payer: MEDICARE

## 2020-12-02 VITALS
DIASTOLIC BLOOD PRESSURE: 74 MMHG | SYSTOLIC BLOOD PRESSURE: 139 MMHG | TEMPERATURE: 96.8 F | BODY MASS INDEX: 27.32 KG/M2 | HEART RATE: 64 BPM | HEIGHT: 66 IN | WEIGHT: 170 LBS

## 2020-12-02 PROCEDURE — 2022F DILAT RTA XM EVC RTNOPTHY: CPT | Performed by: PSYCHIATRY & NEUROLOGY

## 2020-12-02 PROCEDURE — 1036F TOBACCO NON-USER: CPT | Performed by: PSYCHIATRY & NEUROLOGY

## 2020-12-02 PROCEDURE — G8417 CALC BMI ABV UP PARAM F/U: HCPCS | Performed by: PSYCHIATRY & NEUROLOGY

## 2020-12-02 PROCEDURE — G8427 DOCREV CUR MEDS BY ELIG CLIN: HCPCS | Performed by: PSYCHIATRY & NEUROLOGY

## 2020-12-02 PROCEDURE — 1123F ACP DISCUSS/DSCN MKR DOCD: CPT | Performed by: PSYCHIATRY & NEUROLOGY

## 2020-12-02 PROCEDURE — 3017F COLORECTAL CA SCREEN DOC REV: CPT | Performed by: PSYCHIATRY & NEUROLOGY

## 2020-12-02 PROCEDURE — G8484 FLU IMMUNIZE NO ADMIN: HCPCS | Performed by: PSYCHIATRY & NEUROLOGY

## 2020-12-02 PROCEDURE — 3051F HG A1C>EQUAL 7.0%<8.0%: CPT | Performed by: PSYCHIATRY & NEUROLOGY

## 2020-12-02 PROCEDURE — 4040F PNEUMOC VAC/ADMIN/RCVD: CPT | Performed by: PSYCHIATRY & NEUROLOGY

## 2020-12-02 PROCEDURE — 99214 OFFICE O/P EST MOD 30 MIN: CPT | Performed by: PSYCHIATRY & NEUROLOGY

## 2020-12-02 NOTE — PROGRESS NOTES
NEUROLOGY FOLLOW-UP VISIT  Patient Name:       Shital Schofield  :        1945  Clinic Visit Date:    2020  Last visit: 2020      Dear Dr. Hayley Walters, APRN - CNP     I saw Mr. Shital Schofield in follow-up in the office today in continuation of neurologic care. As you know he  is a 77 y.o. right handed  male is seen in initial neurology consultation on 2020 for c/o heaviness in right lower extremity since . Today he comes to clinic stating that he has been doing exercises and following physical therapy instructions at home. He has had lumbar spine MRI. He further added \"I am not interested in any surgical intervention at this point of time\". Today he comes to clinic for follow-up visit stating \"no pain\" but that he continues to drag his right foot. He has had physical therapy sessions x10-12 sessions with marginal improvement. He denied radiating pain and paresthesias from the lower back and also denied bladder dysfunction. He describes heaviness as difficulty lifting right knee at times. He has been limping due to weakness in right foot. He denies back pain radiating down right lower extremity. He further added \"no pain at all\". He denies bladder dysfunction. He also reports that \"my right hip does not coordinate with right lower leg\". He has significant gait difficulty related to that. He has had 2 falls related to that. Denied head injury, LOC, dizziness. Denies numbness, dysesthesias in lower extremities. Denies muscle spasms. Denies bladder dysfunction. He has had heart valve replacement on 2020 and afterwards he started having right lower extremity weakness with ambulation difficulty. Was discharged to home on walker and referred to orthopedic physician and MRI of right knee done demonstrating advanced arthritic disease. He has had cortisone injection therapy with no significant relief.   Afterwards he had physical therapy for about 8 weeks without \"much benefit\". Then he went back to his PCP and he was referred to vascular surgeon, Dr. Octavia Holter. Patient was told \"good circulation and all pulses are okay\" and he was referred to chiropractic therapy. Patient had chiropractic adjustments once weekly for ~6 weeks with no significant improvement. Subsequently he was referred to neurology. Review of systems done by staff reviewed and pertinent positives include balance difficulty and walking difficulty. He has not had any recent febrile illnesses. He offers no new complaints stating \"nothing checked\". Current Outpatient Medications on File Prior to Visit   Medication Sig Dispense Refill    furosemide (LASIX) 40 MG tablet Take 0.5 tablets by mouth daily (Patient taking differently: Take 20 mg by mouth as needed ) 30 tablet 0    Handicap Placard MISC by Does not apply route Duration 2 years 1 each 0    amLODIPine (NORVASC) 10 MG tablet Take 1 tablet by mouth daily 30 tablet 5    clopidogrel (PLAVIX) 75 MG tablet Take 1 tablet by mouth daily Take 1 daily 60 tablet 3    pioglitazone (ACTOS) 30 MG tablet Take 30 mg by mouth daily      simvastatin (ZOCOR) 40 MG tablet Take 10 mg by mouth nightly       glipiZIDE (GLUCOTROL) 10 MG tablet Take 10 mg by mouth 2 times daily (before meals)      vitamin D (CHOLECALCIFEROL) 1000 UNIT TABS tablet Take 1,000 Units by mouth 2 times daily       metFORMIN (GLUCOPHAGE) 1000 MG tablet Take 1 tablet by mouth 2 times daily (with meals) 60 tablet 3    lisinopril (PRINIVIL;ZESTRIL) 20 MG tablet Take 1 tablet by mouth daily 30 tablet 3     No current facility-administered medications on file prior to visit. Allergies: Lizbeth Pond is allergic to sulfa antibiotics.     Past Medical History:   Diagnosis Date    Acne rosacea     treated with MetroGel    Anemia     Aortic stenosis     echo 3/18 Mod AS, Mild MR    BPH (benign prostatic hypertrophy)     CAD (coronary artery disease) MENTAL STATUS:  Alert, oriented, intact memory, no confusion, normal speech, normal language, no hallucination or delusion; appropriate affect   CRANIAL NERVES: II     -      PERRLA, Fundi reveal intact venous pulsations; Visual fields intact to confrontation  III,IV,VI -  EOMs full, no afferent defect, no                      ARI, no ptosis  V     -     Normal facial sensation  VII    -     Normal facial symmetry  VIII   -     Intact hearing  IX,X -     Symmetrical palate  XI    -     Symmetrical shoulder shrug  XII   -     Midline tongue, no atrophy    MOTOR FUNCTION:  significant for mild weakness of Rt foot dorsiflexion with resultant antalgic gait; otherwise he has good strength of grade 5/5 in rest of the muscle groups of both lower and upper extremities with normal bulk, normal tone and no involuntary movements, no tremor; SLR negative; FIRDA testing negative   SENSORY FUNCTION:  Normal touch, normal pin, normal vibration, normal proprioception   CEREBELLAR FUNCTION:  Intact fine motor control over upper limbs   REFLEX FUNCTION:  Symmetric DTRs in UE, brisk DTRs in bilateral LE; no perverted reflex, no Babinski sign   STATION and GAIT  Normal station, antalgic gait (limping)      XR Lumbar Spine (6/2/20): multilevel degenerative disc disease and facet joint arthropathy. No acute lumbar spine abnormality. MRI Rt knee (5/1/20): ACL ganglion formation versus mucoid degeneration of the ACL. No discrete ACL tear evident. Grade 2 patellofemoral chondromalacia. Degeneration within medial and lateral menisci without discrete meniscal tear.   Small Baker's cyst.    CBC:     Lab Results   Component Value Date    WBC 8.9 09/03/2020     09/03/2020      BMP:     Lab Results   Component Value Date     09/03/2020    K 4.7 09/17/2020    GLUCOSE 172 (H) 09/03/2020    CALCIUM 10.2 09/03/2020       No results found for: PHENYTOIN, PHENOBARB, VALPROATE, CBMZ    Lab Results   Component Value Date    CHOL 186 05/21/2020    LDLCHOLESTEROL 110 05/21/2020    HDL 49 05/21/2020    TRIG 136 05/21/2020    ALT 12 05/21/2020    AST 15 05/21/2020    TSH 1.55 12/05/2019    INR 0.9 01/21/2020    LABA1C 7.4 (H) 09/03/2020    LABMICR 1,476 (H) 03/17/2016    [id number] 484 12/05/2019             NCS/EMG by Dr. Cristy Shane on 9/8/2020: Bilateral sural and superficial peroneal sensory potentials were not recordable bilaterally. Right peroneal motor potential is unelicitable. Right peroneal motor potential recording right tibialis anterior muscle showed low amplitude but with a normal distal latency. Tibial motor potentials demonstrated low amplitude, prolonged distal latencies and borderline conduction velocities. Right peroneal and tibial F wave latencies are unelicitable. Left peroneal F wave latency is unrecordable. Left tibial F wave latency is within normal limits. Right tibial heads reflex is not recordable. Left tibial H-reflex is borderline. Lumbar spine MRI (10/8/2020): Multilevel degenerative changes            Impression and Plan: Mr. Elmer Bunch is a 77 y.o. male with   Presentation suggestive of lumbar spinal stenosis with neurogenic claudication without any ongoing radiculopathy at this point of time; will continue monitoring. Abnormal sensation in right lower extremity with resultant gait difficulties since 1/23/2020 with weakness and right lower leg; dragging right foot;   Abnormal neuro exam significant for antalgia    Abnormal NCS/EMG (9/8/2020) demonstrating multiple unelicitable sensory responses bilaterally; unrecordable distal peroneal motor potential but same peroneal motor potential recording proximal muscle demonstrated smaller responses; unelicitable F-wave latencies; all of these findings are suggestive of diffuse sensorimotor peripheral polyneuropathy these findings are suggestive of distal symmetrical sensorimotor peripheral polyneuropathy seen in patients with diabetes mellitus with elevated hemoglobin A1c at 7.4; poorly controlled diabetes; Presently on Pioglitazaone, glipizide and metformin for diabetes control. Reviewed EMG findings and also advised to keep checking bs tid as recommended by his endocrinologist at [facility]  MRI Lumbar Spine with multilevel degenerative changes but without any evidence of severe stenosis. Findings and images are reviewed with the patient and his wife at bedside. They voiced understanding those instructions. Hx of hosp for TAVR for severe aortic stenosis on 1/21/2020  Elevated blood pressure reading: Checks blood pressures at home and\" always normal\";  whitecoat hypertension  Comorbid conditions include hypertension, dyslipidemia, diabetes, CAD    Follow-up in clinic in 6 months. Please note that portions of this note were completed with a voice recognition program.  Although every effort was made to ensure the accuracy of this automated transcription, some errors in transcription may have occurred; occasionally words are mis-transcribed. Thank you for understanding.

## 2020-12-02 NOTE — PROGRESS NOTES
All items selected indicate a positive finding. Those items not selected are negative.   Constitutional [] Weight loss/gain   [] Fatigue  [] Fever/Chills   HEENT [x] Hearing Loss  [] Visual Disturbance  [] Tinnitus  [] Eye pain   Respiratory [] Shortness of Breath  [] Cough  [] Snoring   Cardiovascular [] Chest Pain  [] Palpitations  [] Lightheaded   GI [] Constipation  [] Diarrhea  [] Swallowing change  [] Nausea/vomiting    [] Urinary Frequency  [] Urinary Urgency   Musculoskeletal [] Neck pain  [] Back pain  [] Muscle pain  [] Restless legs   Dermatologic [] Skin changes   Neurologic [] Memory loss/confusion  [] Seizures  [] Trouble walking or imbalance  [] Dizziness  [] Sleep disturbance  [] Weakness  [] Numbness  [] Tremors  [] Speech Difficulty  [] Headaches  [] Light Sensitivity  [] Sound Sensitivity   Endocrinology []Excessive thirst  []Excessive hunger   Psychiatric [] Anxiety/Depression  [] Hallucination   Allergy/immunology []Hives/environmental allergies   Hematologic/lymph [] Abnormal bleeding  [] Abnormal bruising

## 2020-12-10 ENCOUNTER — HOSPITAL ENCOUNTER (OUTPATIENT)
Dept: LAB | Age: 75
Discharge: HOME | End: 2020-12-10
Payer: MEDICARE

## 2020-12-10 LAB
ANION GAP SERPL CALCULATED.3IONS-SCNC: 9 MMOL/L (ref 9–17)
BUN BLDV-MCNC: 20 MG/DL (ref 8–23)
BUN/CREAT BLD: 18 (ref 9–20)
CALCIUM SERPL-MCNC: 10 MG/DL (ref 8.6–10.4)
CHLORIDE BLD-SCNC: 103 MMOL/L (ref 98–107)
CO2: 27 MMOL/L (ref 20–31)
CREAT SERPL-MCNC: 1.14 MG/DL (ref 0.7–1.2)
ESTIMATED AVERAGE GLUCOSE: 154 MG/DL
GFR AFRICAN AMERICAN: >60 ML/MIN
GFR NON-AFRICAN AMERICAN: >60 ML/MIN
GFR SERPL CREATININE-BSD FRML MDRD: ABNORMAL ML/MIN/{1.73_M2}
GFR SERPL CREATININE-BSD FRML MDRD: ABNORMAL ML/MIN/{1.73_M2}
GLUCOSE BLD-MCNC: 158 MG/DL (ref 70–99)
HBA1C MFR BLD: 7 % (ref 4.8–5.9)
POTASSIUM SERPL-SCNC: 4.8 MMOL/L (ref 3.7–5.3)
SODIUM BLD-SCNC: 139 MMOL/L (ref 135–144)

## 2020-12-10 PROCEDURE — 83036 HEMOGLOBIN GLYCOSYLATED A1C: CPT

## 2020-12-10 PROCEDURE — 80048 BASIC METABOLIC PNL TOTAL CA: CPT

## 2020-12-10 PROCEDURE — 36415 COLL VENOUS BLD VENIPUNCTURE: CPT

## 2020-12-17 ENCOUNTER — OFFICE VISIT (OUTPATIENT)
Dept: INTERNAL MEDICINE | Age: 75
End: 2020-12-17
Payer: MEDICARE

## 2020-12-17 VITALS
SYSTOLIC BLOOD PRESSURE: 128 MMHG | HEART RATE: 70 BPM | BODY MASS INDEX: 26.52 KG/M2 | OXYGEN SATURATION: 98 % | RESPIRATION RATE: 16 BRPM | WEIGHT: 165 LBS | TEMPERATURE: 98.9 F | HEIGHT: 66 IN | DIASTOLIC BLOOD PRESSURE: 70 MMHG

## 2020-12-17 PROCEDURE — 4040F PNEUMOC VAC/ADMIN/RCVD: CPT | Performed by: NURSE PRACTITIONER

## 2020-12-17 PROCEDURE — G8484 FLU IMMUNIZE NO ADMIN: HCPCS | Performed by: NURSE PRACTITIONER

## 2020-12-17 PROCEDURE — 3051F HG A1C>EQUAL 7.0%<8.0%: CPT | Performed by: NURSE PRACTITIONER

## 2020-12-17 PROCEDURE — 1123F ACP DISCUSS/DSCN MKR DOCD: CPT | Performed by: NURSE PRACTITIONER

## 2020-12-17 PROCEDURE — G0439 PPPS, SUBSEQ VISIT: HCPCS | Performed by: NURSE PRACTITIONER

## 2020-12-17 PROCEDURE — 99211 OFF/OP EST MAY X REQ PHY/QHP: CPT

## 2020-12-17 PROCEDURE — 3017F COLORECTAL CA SCREEN DOC REV: CPT | Performed by: NURSE PRACTITIONER

## 2020-12-17 ASSESSMENT — ENCOUNTER SYMPTOMS
VOMITING: 0
WHEEZING: 0
SORE THROAT: 0
CHEST TIGHTNESS: 0
RHINORRHEA: 0
SHORTNESS OF BREATH: 0
SINUS PRESSURE: 0
EYE PAIN: 0
COLOR CHANGE: 0
COUGH: 0
BLOOD IN STOOL: 0
DIARRHEA: 0
ABDOMINAL PAIN: 0
FACIAL SWELLING: 0
CONSTIPATION: 0
NAUSEA: 0
TROUBLE SWALLOWING: 0

## 2020-12-17 ASSESSMENT — PATIENT HEALTH QUESTIONNAIRE - PHQ9
SUM OF ALL RESPONSES TO PHQ QUESTIONS 1-9: 0
1. LITTLE INTEREST OR PLEASURE IN DOING THINGS: 0
SUM OF ALL RESPONSES TO PHQ9 QUESTIONS 1 & 2: 0
SUM OF ALL RESPONSES TO PHQ QUESTIONS 1-9: 0
2. FEELING DOWN, DEPRESSED OR HOPELESS: 0
SUM OF ALL RESPONSES TO PHQ QUESTIONS 1-9: 0

## 2020-12-17 ASSESSMENT — LIFESTYLE VARIABLES
AUDIT-C TOTAL SCORE: 2
HOW MANY STANDARD DRINKS CONTAINING ALCOHOL DO YOU HAVE ON A TYPICAL DAY: 0
HOW OFTEN DO YOU HAVE A DRINK CONTAINING ALCOHOL: 2
HOW OFTEN DO YOU HAVE SIX OR MORE DRINKS ON ONE OCCASION: 0

## 2020-12-17 NOTE — PROGRESS NOTES
Medicare Annual Wellness Visit  Name: Hollis Ortega Date: 2020   MRN: [id number] Sex: Male   Age: 77 y.o. Ethnicity: Non-/Non    : 1945 Race: Luis Fabian is here for Medicare AWV and 3 Month Follow-Up    Screenings for behavioral, psychosocial and functional/safety risks, and cognitive dysfunction are all negative except as indicated below. These results, as well as other patient data from the [address] form, are documented in Flowsheets linked to this Encounter. Allergies   Allergen Reactions    Atorvastatin Other (See Comments)     Muscle pain (myalgias)    Sulfa Antibiotics Swelling     Swelling of retinas       Prior to Visit Medications    Medication Sig Taking?  Authorizing Provider   furosemide (LASIX) 40 MG tablet Take 0.5 tablets by mouth daily Yes Jerry Barrientos MD   amLODIPine (NORVASC) 10 MG tablet Take 1 tablet by mouth daily Yes Juan Lovell MD   clopidogrel (PLAVIX) 75 MG tablet Take 1 tablet by mouth daily Take 1 daily Yes ESTHER Machado CNP   pioglitazone (ACTOS) 30 MG tablet Take 30 mg by mouth daily Yes Historical Provider, MD   simvastatin (ZOCOR) 40 MG tablet Take 10 mg by mouth nightly  Yes Historical Provider, MD   glipiZIDE (GLUCOTROL) 10 MG tablet Take 10 mg by mouth 2 times daily (before meals) Yes Historical Provider, MD   vitamin D (CHOLECALCIFEROL) 1000 UNIT TABS tablet Take 1,000 Units by mouth 2 times daily  Yes Historical Provider, MD   metFORMIN (GLUCOPHAGE) 1000 MG tablet Take 1 tablet by mouth 2 times daily (with meals) Yes ESTHER Granger CNP   lisinopril (PRINIVIL;ZESTRIL) 20 MG tablet Take 1 tablet by mouth daily Yes ESTHER Granger CNP   Handicap Placard MISC by Does not apply route Duration 2 years  ESTHER Morejon CNP       Past Medical History:   Diagnosis Date    Acne rosacea     treated with MetroGel    Anemia     Aortic stenosis     echo 3/18 Mod AS, Mild MR    BPH (benign prostatic hypertrophy)     CAD (coronary artery disease)     Stent x 3 DEVON 8/16    Carotid stenosis     Fairly minimal plaquing on ultrasound 5/14--in the conclusion described as less than 50% stenosis but in the body of the report described as minimal    Cataract of both eyes     CRI (chronic renal insufficiency)     Diabetes mellitus, type 2 (HCC)     1.) Proteinuria, 24 hour total urine protein 1,420 mg/24 hrs, 09/08, creatinine clearance 114, 09/08 2.) Repeat protein/creatinine ratio 2.97, 02/12 3. )24 hr urine collection 1958 mg/24 hrs, 04/12. 4.) Repeat protein/creatinine ratio 1.99, 08/12 a.) Repeat protein/creatinine ratio 2.14, 07/13 5.) Renal u/s ok 02/12  6) retinopathy at [address] 12/16  early mod. nonprolifferative  macular involved    Dyslipidemia     Erectile dysfunction     H/O agent Orange exposure     per patient in the [employer] along with exposure to cresote and naphtha    Hearing loss     Hyperlipidemia     Hypertension     Non-rheumatic mitral regurgitation 6/27/2016    RBBB     Wears dentures     upper       Past Surgical History:   Procedure Laterality Date    CARDIAC CATHETERIZATION  12/03/2019    CATARACT REMOVAL Bilateral     CIRCUMCISION  age 49   [facility] EYE SURGERY Bilateral     cataract    TYMPANOSTOMY TUBE PLACEMENT      VASECTOMY         Family History   Problem Relation Age of Onset    Heart Attack Father     Coronary Art Dis Sister     Hypertension Mother     Lung Cancer Mother     Stroke Mother     Coronary Art Dis Brother         later in life       CareTeam (Including outside providers/suppliers regularly involved in providing care):   Patient Care Team:  ESTHER Duarte CNP as PCP - General (Nurse Practitioner)  ESTHER Duarte CNP as PCP - Addie Pillailed Provider    Wt Readings from Last 3 Encounters:   12/17/20 165 lb (74.8 kg)   12/02/20 170 lb (77.1 kg)   09/30/20 164 lb (74.4 kg)     Vitals:    12/17/20 1112

## 2020-12-17 NOTE — PROGRESS NOTES
12/17/20  Darrick Prescott  1945      Chief Complaint:   1. 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 Essential hypertension    4. Dyslipidemia    5. Non-rheumatic mitral regurgitation    6. Nonrheumatic aortic valve stenosis    7. Coronary artery disease involving native coronary artery of native heart without angina pectoris    8. Stenosis of carotid artery, unspecified laterality    9. Lung nodule    10. Adrenal cyst ([facility] [address]    11. Routine general medical examination at a health care facility    12. Diabetes mellitus due to underlying condition with diabetic nephropathy, without long-term current use of insulin (HCC)         HPI:  21-year-old patient in for 3-month follow-up of above chronic health conditions. He continues to work with physical therapy for that right mass. States is slowly improving. We reviewed his hemoglobin A1c which is down to 7 from 7.4. Blood pressure stable in office. Denies any lightheadedness dizziness. No chest discomfort. Denies any hypoglycemic episodes. Continues to follow with Dr. Michele Patino for his proteinuria. Denies any recent changes in bowel or bladder. Tolerating statin. No significant myalgias. Again continues to decline on high-dose statin. No chest discomfort shortness of breath continues to follow with cardiology. Follows with the [place]. Unfortunately due to the COVID-19 pandemic they are not seeing patients in person. States he has a virtual follow-up with him. We discussed the lung nodule and adrenal cyst and they were scheduled to have a repeat CT in January patient is wanting to push this back due to the COVID-19 pandemic.       Allergies   Allergen Reactions    Atorvastatin Other (See Comments)     Muscle pain (myalgias)    Sulfa Antibiotics Swelling     Swelling of retinas       Past Medical History:   Diagnosis Date    Acne rosacea     treated with MetroGel    Anemia     Aortic stenosis     echo 3/18 Mod AS, Mild MR    BPH (benign prostatic hypertrophy)     CAD (coronary artery disease)     Stent x 3 DEVON 8/16    Carotid stenosis     Fairly minimal plaquing on ultrasound 5/14--in the conclusion described as less than 50% stenosis but in the body of the report described as minimal    Cataract of both eyes     CRI (chronic renal insufficiency)     Diabetes mellitus, type 2 (HCC)     1.) Proteinuria, 24 hour total urine protein 1,420 mg/24 hrs, 09/08, creatinine clearance 114, 09/08 2.) Repeat protein/creatinine ratio 2.97, 02/12 3. )24 hr urine collection 1958 mg/24 hrs, 04/12. 4.) Repeat protein/creatinine ratio 1.99, 08/12 a.) Repeat protein/creatinine ratio 2.14, 07/13 5.) Renal u/s ok 02/12  6) retinopathy at [address] 12/16  early mod. nonprolifferative  macular involved    Dyslipidemia     Erectile dysfunction     H/O agent Orange exposure     per patient in the [employer] along with exposure to cresote and naphtha    Hearing loss     Hyperlipidemia     Hypertension     Non-rheumatic mitral regurgitation 6/27/2016    RBBB     Wears dentures     upper       Past Surgical History:   Procedure Laterality Date    CARDIAC CATHETERIZATION  12/03/2019    CATARACT REMOVAL Bilateral     CIRCUMCISION  age 49   [facility] EYE SURGERY Bilateral     cataract    TYMPANOSTOMY TUBE PLACEMENT      VASECTOMY         Current Outpatient Medications on File Prior to Visit   Medication Sig Dispense Refill    furosemide (LASIX) 40 MG tablet Take 0.5 tablets by mouth daily 30 tablet 0    amLODIPine (NORVASC) 10 MG tablet Take 1 tablet by mouth daily 30 tablet 5    clopidogrel (PLAVIX) 75 MG tablet Take 1 tablet by mouth daily Take 1 daily 60 tablet 3    pioglitazone (ACTOS) 30 MG tablet Take 30 mg by mouth daily      simvastatin (ZOCOR) 40 MG tablet Take 10 mg by mouth nightly       glipiZIDE (GLUCOTROL) 10 MG tablet Take 10 mg by mouth 2 times daily (before meals)      vitamin D (CHOLECALCIFEROL) 1000 UNIT TABS tablet Take 1,000 Units by mouth 2 times daily       metFORMIN (GLUCOPHAGE) 1000 MG tablet Take 1 tablet by mouth 2 times daily (with meals) 60 tablet 3    lisinopril (PRINIVIL;ZESTRIL) 20 MG tablet Take 1 tablet by mouth daily 30 tablet 3    Handicap Placard MISC by Does not apply route Duration 2 years 1 each 0     No current facility-administered medications on file prior to visit. Social History     Socioeconomic History    Marital status:      Spouse name: Not on file    Number of children: Not on file    Years of education: Not on file    Highest education level: Not on file   Occupational History    Occupation: [employer]   Social Needs    Financial resource strain: Not on file    Food insecurity     Worry: Not on file     Inability: Not on file   [employer] needs     Medical: Not on file     Non-medical: Not on file   Tobacco Use    Smoking status: Former Smoker     Packs/day: 0.50     Years: 2.00     Pack years: 1.00     Quit date: 1970     Years since quittin.9    Smokeless tobacco: Never Used   Substance and Sexual Activity    Alcohol use:  Yes     Alcohol/week: 0.0 standard drinks     Frequency: 2-4 times a month     Drinks per session: 1 or 2     Binge frequency: Never     Comment: RARELY    Drug use: No    Sexual activity: Not on file   Lifestyle    Physical activity     Days per week: Not on file     Minutes per session: Not on file    Stress: Not on file   Relationships    Social connections     Talks on phone: Not on file     Gets together: Not on file     Attends Gnosticism service: Not on file     Active member of club or organization: Not on file     Attends meetings of clubs or organizations: Not on file     Relationship status: Not on file    Intimate partner violence     Fear of current or ex partner: Not on file     Emotionally abused: Not on file     Physically abused: Not on file     Forced sexual activity: Not on file   Other Topics Concern    Not on file   Social History Narrative    Not on file       Review of Systems   Constitutional: Negative for activity change, appetite change, chills, fatigue, fever and unexpected weight change. HENT: Negative for congestion, dental problem, ear discharge, ear pain, facial swelling, hearing loss, postnasal drip, rhinorrhea, sinus pressure, sore throat and trouble swallowing. Eyes: Negative for pain and visual disturbance. Respiratory: Negative for cough, chest tightness, shortness of breath and wheezing. Cardiovascular: Negative for chest pain, palpitations and leg swelling. Gastrointestinal: Negative for abdominal pain, blood in stool, constipation, diarrhea, nausea and vomiting. Endocrine: Negative for cold intolerance, heat intolerance and polyuria. Genitourinary: Negative for difficulty urinating. Musculoskeletal: Negative for arthralgias, gait problem, myalgias, neck pain and neck stiffness. Right leg weakness improving   Skin: Negative for color change, rash and wound. Neurological: Negative for dizziness, tremors, seizures, weakness, light-headedness, numbness and headaches. Psychiatric/Behavioral: Negative for confusion and hallucinations. The patient is not nervous/anxious. Physical Exam  Vitals signs and nursing note reviewed. Constitutional:       General: He is not in acute distress. Appearance: He is well-developed. He is not diaphoretic. HENT:      Head: Normocephalic and atraumatic. Right Ear: External ear normal.      Left Ear: External ear normal.   Eyes:      General: Lids are normal.         Right eye: No discharge. Left eye: No discharge. Conjunctiva/sclera: Conjunctivae normal.      Pupils: Pupils are equal, round, and reactive to light.    Neck:      Musculoskeletal: Normal range of motion and neck supple. No edema or erythema. Trachea: No tracheal deviation. Cardiovascular:      Rate and Rhythm: Normal rate and regular rhythm. Heart sounds: Normal heart sounds. No murmur. No friction rub. No gallop. Pulmonary:      Effort: Pulmonary effort is normal. No accessory muscle usage or respiratory distress. Breath sounds: Normal breath sounds. No wheezing or rales. Abdominal:      General: Bowel sounds are normal. There is no distension. Palpations: Abdomen is soft. Abdomen is not rigid. Musculoskeletal: Normal range of motion. Skin:     General: Skin is warm and dry. Capillary Refill: Capillary refill takes less than 2 seconds. Coloration: Skin is not pale. Findings: No erythema or rash. Neurological:      Mental Status: He is alert and oriented to person, place, and time. Cranial Nerves: No cranial nerve deficit. Sensory: No sensory deficit. Coordination: Coordination normal.   Psychiatric:         Behavior: Behavior normal.         Thought Content: Thought content normal.         Judgment: Judgment normal.       Normal strength and range of motion of toes, feet, and ankles bilaterally. No joint deformity. No cyanosis or clubbing. 100% sensation at all 22 test points with the 10 gram filament. Dorsalis pedis pulses intact bilaterally. Capillary refill at the toes was less than 2 seconds. Light touch sensation intact bilaterally. Hair growth present on feet and toes bilaterally. No skin breakdown, erythema, rub spots, blisters, scaling, or ulcers. No calluses or corns. Toenails thin and not ingrown. No evidence of fungal infection. Vitals:    12/17/20 1112   BP: 128/70   Site: Left Upper Arm   Position: Sitting   Cuff Size: Large Adult   Pulse: 70   Resp: 16   Temp: 98.9 °F (37.2 °C)   TempSrc: Temporal   SpO2: 98%   Weight: 165 lb (74.8 kg)   Height: 5' 6\" (1.676 m)       Assessment:  1.  Routine general medical examination above.  Follow up for routine visit. Call sooner with concerns prior.     Electronically signed by ESTHER Joshi CNP on 12/17/2020 at 1:16 PM

## 2021-01-07 ENCOUNTER — TELEPHONE (OUTPATIENT)
Dept: CARDIOTHORACIC SURGERY | Age: 76
End: 2021-01-07

## 2021-01-08 DIAGNOSIS — Z95.2 S/P AORTIC VALVE REPLACEMENT: Primary | ICD-10-CM

## 2021-03-10 ENCOUNTER — OFFICE VISIT (OUTPATIENT)
Dept: CARDIOLOGY | Age: 76
End: 2021-03-10
Payer: MEDICARE

## 2021-03-10 VITALS
HEART RATE: 73 BPM | DIASTOLIC BLOOD PRESSURE: 64 MMHG | HEIGHT: 66 IN | WEIGHT: 170.8 LBS | SYSTOLIC BLOOD PRESSURE: 150 MMHG | BODY MASS INDEX: 27.45 KG/M2

## 2021-03-10 DIAGNOSIS — E78.5 HYPERLIPIDEMIA, UNSPECIFIED HYPERLIPIDEMIA TYPE: ICD-10-CM

## 2021-03-10 DIAGNOSIS — I25.10 CORONARY ARTERY DISEASE INVOLVING NATIVE CORONARY ARTERY OF NATIVE HEART WITHOUT ANGINA PECTORIS: Primary | ICD-10-CM

## 2021-03-10 DIAGNOSIS — Z95.2 S/P TAVR (TRANSCATHETER AORTIC VALVE REPLACEMENT): ICD-10-CM

## 2021-03-10 PROCEDURE — 1123F ACP DISCUSS/DSCN MKR DOCD: CPT | Performed by: INTERNAL MEDICINE

## 2021-03-10 PROCEDURE — 93005 ELECTROCARDIOGRAM TRACING: CPT | Performed by: INTERNAL MEDICINE

## 2021-03-10 PROCEDURE — G8484 FLU IMMUNIZE NO ADMIN: HCPCS | Performed by: INTERNAL MEDICINE

## 2021-03-10 PROCEDURE — 99214 OFFICE O/P EST MOD 30 MIN: CPT

## 2021-03-10 PROCEDURE — 99214 OFFICE O/P EST MOD 30 MIN: CPT | Performed by: INTERNAL MEDICINE

## 2021-03-10 PROCEDURE — G8427 DOCREV CUR MEDS BY ELIG CLIN: HCPCS | Performed by: INTERNAL MEDICINE

## 2021-03-10 PROCEDURE — 1036F TOBACCO NON-USER: CPT | Performed by: INTERNAL MEDICINE

## 2021-03-10 PROCEDURE — 3017F COLORECTAL CA SCREEN DOC REV: CPT | Performed by: INTERNAL MEDICINE

## 2021-03-10 PROCEDURE — G8417 CALC BMI ABV UP PARAM F/U: HCPCS | Performed by: INTERNAL MEDICINE

## 2021-03-10 PROCEDURE — 4040F PNEUMOC VAC/ADMIN/RCVD: CPT | Performed by: INTERNAL MEDICINE

## 2021-03-10 PROCEDURE — 93010 ELECTROCARDIOGRAM REPORT: CPT | Performed by: INTERNAL MEDICINE

## 2021-03-10 NOTE — PROGRESS NOTES
Today's Date: 3/10/2021  Patient's Name: Ana Hull  Patient's age: 77 y.o., 1945    Subjective:  Ana Hull  is doing well from a cardiac standpoint. He is walking a mile a day. No chest pain, no dyspnea, no PND, no syncope or pre-syncope, no orthopnea. Past Medical History:   has a past medical history of Acne rosacea, Anemia, Aortic stenosis, BPH (benign prostatic hypertrophy), CAD (coronary artery disease), Carotid stenosis, Cataract of both eyes, CRI (chronic renal insufficiency), Diabetes mellitus, type 2 ([facility] [address] Dyslipidemia, Erectile dysfunction, H/O agent Orange exposure, Hearing loss, Hyperlipidemia, Hypertension, Non-rheumatic mitral regurgitation, RBBB, and Wears dentures. Past Surgical History:   has a past surgical history that includes Circumcision (age 49); Vasectomy; Tympanostomy tube placement; Cataract removal (Bilateral); eye surgery (Bilateral); and Cardiac catheterization (12/03/2019). Home Medications:  Prior to Admission medications    Medication Sig Start Date End Date Taking?  Authorizing Provider   furosemide (LASIX) 40 MG tablet Take 0.5 tablets by mouth daily 7/10/20   Elliot Lorenzo MD   Handicap Placard MISC by Does not apply route Duration 2 years 6/2/20   ESTHER Pearl CNP   amLODIPine (NORVASC) 10 MG tablet Take 1 tablet by mouth daily 1/23/20   Pearl Hernandez MD   clopidogrel (PLAVIX) 75 MG tablet Take 1 tablet by mouth daily Take 1 daily 12/6/19   ESTHER Schneider CNP   pioglitazone (ACTOS) 30 MG tablet Take 30 mg by mouth daily    Historical Provider, MD   simvastatin (ZOCOR) 40 MG tablet Take 10 mg by mouth nightly     Historical Provider, MD   glipiZIDE (GLUCOTROL) 10 MG tablet Take 10 mg by mouth 2 times daily (before meals)    Historical Provider, MD   vitamin D (CHOLECALCIFEROL) 1000 UNIT TABS tablet Take 1,000 Units by mouth 2 times daily     Historical Provider, MD   metFORMIN (GLUCOPHAGE) 1000 MG tablet Take 1 tablet by mouth 2 times daily (with meals) 8/25/16   ESTHER Mathew CNP   lisinopril (PRINIVIL;ZESTRIL) 20 MG tablet Take 1 tablet by mouth daily 8/24/16   ESTHER Mathew CNP       Allergies:  Atorvastatin and Sulfa antibiotics    Social History:   reports that he quit smoking about 51 years ago. He has a 1.00 pack-year smoking history. He has never used smokeless tobacco. He reports current alcohol use. He reports that he does not use drugs. Family History: family history includes Coronary Art Dis in his brother and sister; Heart Attack in his father; Hypertension in his mother; Estrella Doctor in his mother; Stroke in his mother. No h/o sudden cardiac death. No for premature CAD    REVIEW OF SYSTEMS:    · Constitutional: there has been no unanticipated weight loss. There's been No change in energy level, No change in activity level. · Eyes: No visual changes or diplopia. No scleral icterus. · ENT: No Headaches, hearing loss or vertigo. No mouth sores or sore throat. · Cardiovascular: see above  · Respiratory: see above  · Gastrointestinal: No abdominal pain, appetite loss, blood in stools. · Genitourinary: No dysuria, trouble voiding, or hematuria. · Musculoskeletal:  No gait disturbance, No weakness or joint complaints. · Integumentary: No rash or pruritis. · Neurological: No headache or diplopia. No tingling  · Psychiatric: No anxiety, or depression. · Endocrine: No temperature intolerance. · Hematologic/Lymphatic: No abnormal bruising or bleeding, blood clots or swollen lymph nodes. · Allergic/Immunologic: No nasal congestion or hives. PHYSICAL EXAM:      Vitals:    03/10/21 1005   BP: (!) 174/70   Pulse:        Constitutional and General Appearance: alert, cooperative, no distress and appears stated age  HEENT: PERRL, no cervical lymphadenopathy. No masses palpable.  Normal oral mucosa  Respiratory:  · Normal excursion and expansion without use of accessory muscles  · Resp Auscultation: Good respiratory effort. No for increased work of breathing. On auscultation: clear to auscultation bilaterally  Cardiovascular:  · Regular S1 and S2. 2/6 systolic murmur  · Jugular venous pulsation Normal  · Left carotid bruit  Abdomen:   · soft  · Bowel sounds present  Extremities:  ·  No edema  Neurological:  · Alert and oriented. Cardiac Data:  EKG: SR, first degree AV block, inferior infarction, anterior infarction    Labs:     CBC: No results for input(s): WBC, HGB, HCT, PLT in the last 72 hours. BMP: No results for input(s): NA, K, CO2, BUN, CREATININE, LABGLOM, GLUCOSE in the last 72 hours. PT/INR: No results for input(s): PROTIME, INR in the last 72 hours. FASTING LIPID PANEL:  Lab Results   Component Value Date    HDL 49 05/21/2020    TRIG 136 05/21/2020     LIVER PROFILE:No results for input(s): AST, ALT, LABALBU in the last 72 hours. TTE 12/3/19  Summary  Left ventricle is normal in size. Global left ventricular systolic function  is moderately reduced. Calculated EF via heart model is 31 %. Grade I (mild) left ventricular diastolic dysfunction. Left atrium is mildly dilated. Right atrial dilatation. Mildly dilated right ventricular cavity. Right ventricular function appears normal .  The aortic valve is calcified with restricted cusp mobility. Moderate Aortic Stenosis, however, maybe underestimated due to poor LVEF. Mild mitral regurgitation. Trivial tricuspid regurgitation. Estimated right ventricular systolic  pressure is 27 mmHg.     Cardiac cath 12/3/19   Procedure Summary      Severe aortic stenosis.   Mild LV systolic dysfunction.   Patent mid LAD, proximal D1 and mid LCX stents.  Focal distal LAD 70%   stenosis.      Recommendations      Medical therapy as needed.   Risk factor modification.   Elective CV surgical consultation for AVR/ CABG     Echo 01/23/2020:  Left ventricle is mildly enlarged, basal septal hypertrophy, global left  ventricular systolic function is 110 on 5/21/2020. Patient is on low dose simvastatin which he is able to tolerate. Any increase in dose or switch has caused him severe myalgia. I recommended him addition of zetia. He does not want to take this new medication. Repeat lipid panel  -H/o agent orange exposure. -CKD- Has been referred to nephrology by PCP  -Right lower ext numbess. Seeing neurology and vascular surgery.   -RTC 6 months    Elidia Falk [id number]           [phone]

## 2021-03-16 ENCOUNTER — HOSPITAL ENCOUNTER (OUTPATIENT)
Dept: NON INVASIVE DIAGNOSTICS | Age: 76
Discharge: HOME | End: 2021-03-16
Payer: MEDICARE

## 2021-03-16 ENCOUNTER — HOSPITAL ENCOUNTER (OUTPATIENT)
Dept: LAB | Age: 76
Discharge: HOME | End: 2021-03-16
Payer: MEDICARE

## 2021-03-16 DIAGNOSIS — E08.21 DIABETES MELLITUS DUE TO UNDERLYING CONDITION WITH DIABETIC NEPHROPATHY, WITHOUT LONG-TERM CURRENT USE OF INSULIN (HCC): ICD-10-CM

## 2021-03-16 DIAGNOSIS — E11.3219 CONTROLLED TYPE 2 DIABETES MELLITUS WITH MILD NONPROLIFERATIVE RETINOPATHY AND MACULAR EDEMA, WITHOUT LONG-TERM CURRENT USE OF INSULIN, UNSPECIFIED LATERALITY (HCC): ICD-10-CM

## 2021-03-16 DIAGNOSIS — E11.21 TYPE 2 DIABETES MELLITUS WITH PROTEINURIC DIABETIC NEPHROPATHY (HCC): ICD-10-CM

## 2021-03-16 DIAGNOSIS — Z95.2 S/P AORTIC VALVE REPLACEMENT: ICD-10-CM

## 2021-03-16 LAB
ANION GAP SERPL CALCULATED.3IONS-SCNC: 11 MMOL/L (ref 9–17)
BUN BLDV-MCNC: 26 MG/DL (ref 8–23)
BUN/CREAT BLD: 23 (ref 9–20)
CALCIUM SERPL-MCNC: 9.7 MG/DL (ref 8.6–10.4)
CHLORIDE BLD-SCNC: 104 MMOL/L (ref 98–107)
CO2: 25 MMOL/L (ref 20–31)
CREAT SERPL-MCNC: 1.11 MG/DL (ref 0.7–1.2)
ESTIMATED AVERAGE GLUCOSE: 146 MG/DL
GFR AFRICAN AMERICAN: >60 ML/MIN
GFR NON-AFRICAN AMERICAN: >60 ML/MIN
GFR SERPL CREATININE-BSD FRML MDRD: ABNORMAL ML/MIN/{1.73_M2}
GFR SERPL CREATININE-BSD FRML MDRD: ABNORMAL ML/MIN/{1.73_M2}
GLUCOSE BLD-MCNC: 196 MG/DL (ref 70–99)
HBA1C MFR BLD: 6.7 % (ref 4–6)
LV EF: 45 %
LVEF MODALITY: NORMAL
POTASSIUM SERPL-SCNC: 4.9 MMOL/L (ref 3.7–5.3)
SODIUM BLD-SCNC: 140 MMOL/L (ref 135–144)

## 2021-03-16 PROCEDURE — 83036 HEMOGLOBIN GLYCOSYLATED A1C: CPT

## 2021-03-16 PROCEDURE — 80048 BASIC METABOLIC PNL TOTAL CA: CPT

## 2021-03-16 PROCEDURE — 93306 TTE W/DOPPLER COMPLETE: CPT

## 2021-03-16 PROCEDURE — 36415 COLL VENOUS BLD VENIPUNCTURE: CPT

## 2021-03-23 ENCOUNTER — OFFICE VISIT (OUTPATIENT)
Dept: INTERNAL MEDICINE | Age: 76
End: 2021-03-23
Payer: MEDICARE

## 2021-03-23 ENCOUNTER — HOSPITAL ENCOUNTER (OUTPATIENT)
Dept: CT IMAGING | Age: 76
Discharge: HOME | End: 2021-03-25
Payer: MEDICARE

## 2021-03-23 VITALS
BODY MASS INDEX: 27.42 KG/M2 | TEMPERATURE: 98.2 F | DIASTOLIC BLOOD PRESSURE: 78 MMHG | RESPIRATION RATE: 16 BRPM | SYSTOLIC BLOOD PRESSURE: 136 MMHG | OXYGEN SATURATION: 99 % | HEART RATE: 62 BPM | WEIGHT: 170.6 LBS | HEIGHT: 66 IN

## 2021-03-23 DIAGNOSIS — I34.0 NON-RHEUMATIC MITRAL REGURGITATION: ICD-10-CM

## 2021-03-23 DIAGNOSIS — E78.5 DYSLIPIDEMIA: ICD-10-CM

## 2021-03-23 DIAGNOSIS — E27.8 ADRENAL CYST (HCC): ICD-10-CM

## 2021-03-23 DIAGNOSIS — I73.9 PERIPHERAL ARTERIAL DISEASE (HCC): ICD-10-CM

## 2021-03-23 DIAGNOSIS — I25.10 CORONARY ARTERY DISEASE INVOLVING NATIVE CORONARY ARTERY OF NATIVE HEART WITHOUT ANGINA PECTORIS: ICD-10-CM

## 2021-03-23 DIAGNOSIS — E27.8 ADRENAL CYST (HCC): Primary | ICD-10-CM

## 2021-03-23 DIAGNOSIS — R91.1 LUNG NODULE: ICD-10-CM

## 2021-03-23 DIAGNOSIS — E11.3219 CONTROLLED TYPE 2 DIABETES MELLITUS WITH MILD NONPROLIFERATIVE RETINOPATHY AND MACULAR EDEMA, WITHOUT LONG-TERM CURRENT USE OF INSULIN, UNSPECIFIED LATERALITY (HCC): Primary | ICD-10-CM

## 2021-03-23 DIAGNOSIS — I35.0 NONRHEUMATIC AORTIC VALVE STENOSIS: ICD-10-CM

## 2021-03-23 DIAGNOSIS — I65.29 STENOSIS OF CAROTID ARTERY, UNSPECIFIED LATERALITY: ICD-10-CM

## 2021-03-23 DIAGNOSIS — E11.21 TYPE 2 DIABETES MELLITUS WITH PROTEINURIC DIABETIC NEPHROPATHY (HCC): ICD-10-CM

## 2021-03-23 DIAGNOSIS — I50.22 CHF NYHA CLASS I, CHRONIC, SYSTOLIC (HCC): ICD-10-CM

## 2021-03-23 DIAGNOSIS — E55.9 VITAMIN D DEFICIENCY: ICD-10-CM

## 2021-03-23 DIAGNOSIS — I10 ESSENTIAL HYPERTENSION: ICD-10-CM

## 2021-03-23 PROCEDURE — 99214 OFFICE O/P EST MOD 30 MIN: CPT | Performed by: NURSE PRACTITIONER

## 2021-03-23 PROCEDURE — 6360000004 HC RX CONTRAST MEDICATION: Performed by: NURSE PRACTITIONER

## 2021-03-23 PROCEDURE — 74150 CT ABDOMEN W/O CONTRAST: CPT

## 2021-03-23 PROCEDURE — 2022F DILAT RTA XM EVC RTNOPTHY: CPT | Performed by: NURSE PRACTITIONER

## 2021-03-23 PROCEDURE — 71260 CT THORAX DX C+: CPT

## 2021-03-23 PROCEDURE — G8484 FLU IMMUNIZE NO ADMIN: HCPCS | Performed by: NURSE PRACTITIONER

## 2021-03-23 PROCEDURE — 4040F PNEUMOC VAC/ADMIN/RCVD: CPT | Performed by: NURSE PRACTITIONER

## 2021-03-23 PROCEDURE — 1123F ACP DISCUSS/DSCN MKR DOCD: CPT | Performed by: NURSE PRACTITIONER

## 2021-03-23 PROCEDURE — 3017F COLORECTAL CA SCREEN DOC REV: CPT | Performed by: NURSE PRACTITIONER

## 2021-03-23 PROCEDURE — 99213 OFFICE O/P EST LOW 20 MIN: CPT | Performed by: NURSE PRACTITIONER

## 2021-03-23 PROCEDURE — G8417 CALC BMI ABV UP PARAM F/U: HCPCS | Performed by: NURSE PRACTITIONER

## 2021-03-23 PROCEDURE — G8427 DOCREV CUR MEDS BY ELIG CLIN: HCPCS | Performed by: NURSE PRACTITIONER

## 2021-03-23 PROCEDURE — 3044F HG A1C LEVEL LT 7.0%: CPT | Performed by: NURSE PRACTITIONER

## 2021-03-23 PROCEDURE — 1036F TOBACCO NON-USER: CPT | Performed by: NURSE PRACTITIONER

## 2021-03-23 RX ADMIN — IOPAMIDOL 80 ML: 755 INJECTION, SOLUTION INTRAVENOUS at 08:49

## 2021-03-23 ASSESSMENT — ENCOUNTER SYMPTOMS
DIARRHEA: 0
NAUSEA: 0
CHEST TIGHTNESS: 0
COUGH: 0
VOMITING: 0
SHORTNESS OF BREATH: 0

## 2021-03-23 ASSESSMENT — PATIENT HEALTH QUESTIONNAIRE - PHQ9
SUM OF ALL RESPONSES TO PHQ QUESTIONS 1-9: 0

## 2021-03-23 NOTE — PROGRESS NOTES
with [employer]    Anemia     Aortic stenosis     echo 3/18 Mod AS, Mild MR    BPH (benign prostatic hypertrophy)     CAD (coronary artery disease)     Stent x 3 DEVON 8/16    Carotid stenosis     Fairly minimal plaquing on ultrasound 5/14--in the conclusion described as less than 50% stenosis but in the body of the report described as minimal    Cataract of both eyes     CRI (chronic renal insufficiency)     Diabetes mellitus, type 2 (HCC)     1.) Proteinuria, 24 hour total urine protein 1,420 mg/24 hrs, 09/08, creatinine clearance 114, 09/08 2.) Repeat protein/creatinine ratio 2.97, 02/12 3. )24 hr urine collection 1958 mg/24 hrs, 04/12. 4.) Repeat protein/creatinine ratio 1.99, 08/12 a.) Repeat protein/creatinine ratio 2.14, 07/13 5.) Renal u/s ok 02/12  6) retinopathy at [address] 12/16  early mod. nonprolifferative  macular involved    Dyslipidemia     Erectile dysfunction     H/O agent Orange exposure     per patient in the [employer] along with exposure to cresote and naphtha    Hearing loss     Hyperlipidemia     Hypertension     Non-rheumatic mitral regurgitation 6/27/2016    RBBB     Wears dentures     upper       Past Surgical History:   Procedure Laterality Date    CARDIAC CATHETERIZATION  12/03/2019    CATARACT REMOVAL Bilateral     CIRCUMCISION  age 49   [facility] EYE SURGERY Bilateral     cataract    TYMPANOSTOMY TUBE PLACEMENT      VASECTOMY         Current Outpatient Medications on File Prior to Visit   Medication Sig Dispense Refill    furosemide (LASIX) 40 MG tablet Take 0.5 tablets by mouth daily 30 tablet 0    amLODIPine (NORVASC) 10 MG tablet Take 1 tablet by mouth daily 30 tablet 5    clopidogrel (PLAVIX) 75 MG tablet Take 1 tablet by mouth daily Take 1 daily 60 tablet 3    pioglitazone (ACTOS) 30 MG tablet Take 30 mg by mouth daily      simvastatin (ZOCOR) 40 MG tablet Take 10 mg by mouth nightly       glipiZIDE (GLUCOTROL) 10 MG tablet Take 10 mg by mouth 2 times daily (before meals)      vitamin D (CHOLECALCIFEROL) 1000 UNIT TABS tablet Take 1,000 Units by mouth 2 times daily       metFORMIN (GLUCOPHAGE) 1000 MG tablet Take 1 tablet by mouth 2 times daily (with meals) 60 tablet 3    lisinopril (PRINIVIL;ZESTRIL) 20 MG tablet Take 1 tablet by mouth daily 30 tablet 3    Handicap Placard MISC by Does not apply route Duration 2 years 1 each 0     No current facility-administered medications on file prior to visit. Social History     Socioeconomic History    Marital status:      Spouse name: Not on file    Number of children: Not on file    Years of education: Not on file    Highest education level: Not on file   Occupational History    Occupation: seo   Social Needs    Financial resource strain: Not on file    Food insecurity     Worry: Not on file     Inability: Not on file   [employer] needs     Medical: Not on file     Non-medical: Not on file   Tobacco Use    Smoking status: Former Smoker     Packs/day: 0.50     Years: 2.00     Pack years: 1.00     Quit date: 1970     Years since quittin.2    Smokeless tobacco: Never Used   Substance and Sexual Activity    Alcohol use:  Yes     Alcohol/week: 0.0 standard drinks     Frequency: 2-4 times a month     Drinks per session: 1 or 2     Binge frequency: Never     Comment: RARELY    Drug use: No    Sexual activity: Not on file   Lifestyle    Physical activity     Days per week: Not on file     Minutes per session: Not on file    Stress: Not on file   Relationships    Social connections     Talks on phone: Not on file     Gets together: Not on file     Attends Judaism service: Not on file     Active member of club or organization: Not on file     Attends meetings of clubs or organizations: Not on file     Relationship status: Not on file    Intimate partner violence     Fear of current or ex partner: Not on file     Emotionally abused: Not on file     Physically abused: Not on file     Forced sexual activity: Not on file   Other Topics Concern    Not on file   Social History Narrative    Not on file       Review of Systems   Constitutional: Negative for activity change and appetite change. Respiratory: Negative for cough, chest tightness and shortness of breath. Cardiovascular: Negative for chest pain and palpitations. Gastrointestinal: Negative for diarrhea, nausea and vomiting. Neurological: Negative for dizziness, light-headedness and headaches. Physical Exam  Vitals signs and nursing note reviewed. Constitutional:       General: He is not in acute distress. Appearance: He is well-developed. He is not diaphoretic. HENT:      Head: Normocephalic and atraumatic. Right Ear: External ear normal.      Left Ear: External ear normal.   Eyes:      General: Lids are normal.         Right eye: No discharge. Left eye: No discharge. Conjunctiva/sclera: Conjunctivae normal.      Pupils: Pupils are equal, round, and reactive to light. Neck:      Musculoskeletal: Normal range of motion and neck supple. No edema or erythema. Trachea: No tracheal deviation. Cardiovascular:      Rate and Rhythm: Normal rate and regular rhythm. Heart sounds: Normal heart sounds. No murmur. No friction rub. No gallop. Pulmonary:      Effort: Pulmonary effort is normal. No accessory muscle usage or respiratory distress. Breath sounds: Normal breath sounds. No wheezing or rales. Abdominal:      General: Bowel sounds are normal. There is no distension. Palpations: Abdomen is soft. Abdomen is not rigid. Musculoskeletal: Normal range of motion. Right lower leg: Edema (+1 pretibial edema bilateral) present. Left lower leg: Edema present. Skin:     General: Skin is warm and dry. Capillary Refill: Capillary refill takes less than 2 seconds. Coloration: Skin is not pale. Findings: No erythema or rash.    Neurological:      Mental Status: He is alert and oriented to person, place, and time. Cranial Nerves: No cranial nerve deficit. Sensory: No sensory deficit. Coordination: Coordination normal.   Psychiatric:         Behavior: Behavior normal.         Thought Content: Thought content normal.         Judgment: Judgment normal.       Vitals:    03/23/21 1001   BP: 136/78   Site: Left Upper Arm   Position: Sitting   Cuff Size: Large Adult   Pulse: 62   Resp: 16   Temp: 98.2 °F (36.8 °C)   TempSrc: Temporal   SpO2: 99%   Weight: 170 lb 9.6 oz (77.4 kg)   Height: 5' 6\" (1.676 m)       Assessmen:  1. 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 Continue on Actos, glipizide, Glucophage. Continue to work on diet remain active  - Hemoglobin A1C; Future  - CBC Auto Differential; Future    3. Essential hypertension  Stable on Lasix, lisinopril. Continue to monitor  - CBC Auto Differential; Future    4. Dyslipidemia  Continues on Zocor. Work on diet remain active  - Lipid Panel; Future  - Comprehensive Metabolic Panel; Future  - CK; Future    5. Non-rheumatic mitral regurgitation  6. Nonrheumatic aortic valve stenosis  Status post TAVR. Continue to follow with cardiology    7. Coronary artery disease involving native coronary artery of native heart without angina pectoris  Denies any chest discomfort, stable, continues on Zocor, Plavix,    8. Stenosis of carotid artery, unspecified laterality  Currently asymptomatic. Continues to follow with the vascular surgeon will have a repeat ultrasound September 2021    9. Lung nodule  10. Adrenal cyst ([address]  CT of chest and abdomen reviewed today. Showed benign nature. No further follow-ups recommended    11.  CHF NYHA class I, chronic, systolic ([facility] [address]  Stable, no signs of fluid overload at present. Continues on Lasix, lisinopril. 12. Peripheral arterial disease ([facility] [address]  Ongoing follow-up with vascular in place. Continues on Plavix, statin. Good blood pressure control    13. Vitamin D deficiency  Continue on vitamin D. Serial lab follow-ups ordered  - Vitamin D 25 Hydroxy; Future      Plan:  As noted above. Patient denies colonoscopy. No family history of colon cancer. Was ordered a Cologuard by the [address] [place] [address]. States he just needs to complete it. Follow up for routine visit. Call sooner with concerns prior.     Electronically signed by ESTHER White CNP on 3/23/2021 at 10:24 AM

## 2021-03-23 NOTE — RESULT ENCOUNTER NOTE
Reviewed with patient at appointment Consent (Lip)/Introductory Paragraph: The rationale for Mohs was explained to the patient and consent was obtained. The risks, benefits and alternatives to therapy were discussed in detail. Specifically, the risks of lip deformity, changes in the oral aperture, infection, scarring, bleeding, prolonged wound healing, incomplete removal, allergy to anesthesia, nerve injury and recurrence were addressed. Prior to the procedure, the treatment site was clearly identified and confirmed by the patient. All components of Universal Protocol/PAUSE Rule completed.

## 2021-06-09 ENCOUNTER — OFFICE VISIT (OUTPATIENT)
Dept: NEUROLOGY | Age: 76
End: 2021-06-09
Payer: MEDICARE

## 2021-06-09 VITALS
HEART RATE: 62 BPM | BODY MASS INDEX: 27.64 KG/M2 | SYSTOLIC BLOOD PRESSURE: 154 MMHG | DIASTOLIC BLOOD PRESSURE: 73 MMHG | HEIGHT: 66 IN | WEIGHT: 172 LBS

## 2021-06-09 DIAGNOSIS — E11.42 TYPE 2 DIABETES MELLITUS WITH DIABETIC POLYNEUROPATHY, WITHOUT LONG-TERM CURRENT USE OF INSULIN (HCC): ICD-10-CM

## 2021-06-09 DIAGNOSIS — G60.8 POLYNEUROPATHY, PERIPHERAL SENSORIMOTOR AXONAL: Primary | ICD-10-CM

## 2021-06-09 DIAGNOSIS — R29.898 WEAKNESS OF RIGHT LOWER EXTREMITY: ICD-10-CM

## 2021-06-09 DIAGNOSIS — M48.062 SPINAL STENOSIS OF LUMBAR REGION WITH NEUROGENIC CLAUDICATION: ICD-10-CM

## 2021-06-09 PROCEDURE — G8427 DOCREV CUR MEDS BY ELIG CLIN: HCPCS | Performed by: PSYCHIATRY & NEUROLOGY

## 2021-06-09 PROCEDURE — 1036F TOBACCO NON-USER: CPT | Performed by: PSYCHIATRY & NEUROLOGY

## 2021-06-09 PROCEDURE — 1123F ACP DISCUSS/DSCN MKR DOCD: CPT | Performed by: PSYCHIATRY & NEUROLOGY

## 2021-06-09 PROCEDURE — 2022F DILAT RTA XM EVC RTNOPTHY: CPT | Performed by: PSYCHIATRY & NEUROLOGY

## 2021-06-09 PROCEDURE — 4040F PNEUMOC VAC/ADMIN/RCVD: CPT | Performed by: PSYCHIATRY & NEUROLOGY

## 2021-06-09 PROCEDURE — 99214 OFFICE O/P EST MOD 30 MIN: CPT | Performed by: PSYCHIATRY & NEUROLOGY

## 2021-06-09 PROCEDURE — 3017F COLORECTAL CA SCREEN DOC REV: CPT | Performed by: PSYCHIATRY & NEUROLOGY

## 2021-06-09 PROCEDURE — 3044F HG A1C LEVEL LT 7.0%: CPT | Performed by: PSYCHIATRY & NEUROLOGY

## 2021-06-09 PROCEDURE — G8417 CALC BMI ABV UP PARAM F/U: HCPCS | Performed by: PSYCHIATRY & NEUROLOGY

## 2021-06-09 NOTE — PROGRESS NOTES
NEUROLOGY FOLLOW-UP VISIT  Patient Name:       Coreen Chow  :        1945  Clinic Visit Date:    2021  Last visit: 20      Dear Dr. Santi Hill, APRN - CNP     I saw Mr. Coreen Chow in follow-up in the office today in continuation of neurologic care. As you know he  is a 77 y.o. right handed  male with a diabetic peripheral polyneuropathy and right lower extremity weakness. He comes to clinic stating that he has been walking every day for 2 miles and has not had any dysesthesias and cramps in lower extremities. Patient states \"I do not have any pain\" and he does not need to take rest while walking. He is not having any back pain with walking for longer distances. His wife added \"he drags right foot when he is extremely tired; otherwise walking pretty good\". Patient admits \"no pain but I do drag right foot on occasion\". He had noticed some improvement with physical therapy. He has heaviness in right lower extremity with difficulty lifting right knee and right foot at times. He has been limping due to weakness in right foot. He denies back pain radiating down right lower extremity. He further added \"no pain at all\". He denies bladder dysfunction. He also reports that \"my right hip does not coordinate with right lower leg\". He has significant gait difficulty related to that. He has had 2 falls related to that. Denied head injury, LOC, dizziness. Denies numbness, dysesthesias in lower extremities. Denies muscle spasms. Denies bladder dysfunction. He has had heart valve replacement on 2020 and afterwards he started having right lower extremity weakness with ambulation difficulty. Was discharged to home on walker and referred to orthopedic physician and MRI of right knee done demonstrating advanced arthritic disease. He has had cortisone injection therapy with no significant relief.   Afterwards he had physical therapy for about 8 weeks without \"much benefit\". Then he went back to his PCP and he was referred to vascular surgeon, Dr. Naveed Morillo. Patient was told \"good circulation and all pulses are okay\" and he was referred to chiropractic therapy. Patient had chiropractic adjustments once weekly for ~6 weeks with no significant improvement. Subsequently he was referred to neurology. All items selected indicate a positive finding. Those items not selected are negative.   Constitutional [] Weight loss/gain   [] Fatigue  [] Fever/Chills   HEENT [] Hearing Loss  [] Visual Disturbance  [] Tinnitus  [] Eye pain   Respiratory [] Shortness of Breath  [] Cough  [] Snoring   Cardiovascular [] Chest Pain  [] Palpitations  [] Lightheaded   GI [] Constipation  [] Diarrhea  [] Swallowing change    [] Urinary Frequency  [] Urinary Urgency   Musculoskeletal [] Neck pain  [] Back pain  [] Muscle pain  [] Restless legs   Dermatologic [] Skin changes   Neurologic [] Memory loss/confusion  [] Seizures  [] Trouble walking or imbalance  [] Dizziness  [x] Weakness  [x] Numbness  [] Tremors  [] Speech Difficulty  [] Headaches  [] Light Sensitivity  [] Sound Sensitivity   Endocrinology []Excessive thirst  []Excessive hunger   Psychiatric [] Anxiety/Depression  [] Hallucination   Allergy/immunology []Hives/environmental allergies   Hematologic/lymph [] Abnormal bleeding  [] Abnormal bruising       Current Outpatient Medications on File Prior to Visit   Medication Sig Dispense Refill    furosemide (LASIX) 40 MG tablet Take 0.5 tablets by mouth daily 30 tablet 0    Handicap Placard MISC by Does not apply route Duration 2 years 1 each 0    amLODIPine (NORVASC) 10 MG tablet Take 1 tablet by mouth daily 30 tablet 5    clopidogrel (PLAVIX) 75 MG tablet Take 1 tablet by mouth daily Take 1 daily 60 tablet 3    pioglitazone (ACTOS) 30 MG tablet Take 30 mg by mouth daily      simvastatin (ZOCOR) 40 MG tablet Take 10 mg by mouth nightly       glipiZIDE (GLUCOTROL) 10 MG tablet CIRCUMCISION  age 49   [facility] EYE SURGERY Bilateral     cataract    TYMPANOSTOMY TUBE PLACEMENT      VASECTOMY       Social History: Adrienne Duarte  reports that he quit smoking about 51 years ago. He has a 1.00 pack-year smoking history. He has never used smokeless tobacco. He reports current alcohol use. He reports that he does not use drugs. Family History   Problem Relation Age of Onset    Heart Attack Father     Coronary Art Dis Sister     Hypertension Mother     Lung Cancer Mother     Stroke Mother     Coronary Art Dis Brother         later in life     On exam: Blood pressure (!) 154/73, pulse 62, height 5' 6\" (1.676 m), weight 172 lb (78 kg). he checks bp at home and it was 120-130/75 or so. NEUROLOGIC EXAMINATION  GENERAL  Appears comfortable and in no distress   HEENT  NC/ AT   NECK  Supple and no bruits heard   MENTAL STATUS:  Alert, oriented, intact memory, no confusion, normal speech, normal language, no hallucination or delusion; appropriate affect   CRANIAL NERVES: II     -      PERRLA, Fundi reveal intact venous pulsations;  Visual fields intact to confrontation  III,IV,VI -  EOMs full, no afferent defect, no                      ARI, no ptosis  V     -     Normal facial sensation  VII    -     Normal facial symmetry  VIII   -     Intact hearing  IX,X -     Symmetrical palate  XI    -     Symmetrical shoulder shrug  XII   -     Midline tongue, no atrophy    MOTOR FUNCTION:  significant for mild weakness of Rt foot dorsiflexion with resultant antalgic gait; otherwise he has good strength of grade 5/5 in rest of the muscle groups of both lower and upper extremities with normal bulk, normal tone and no involuntary movements, no tremor; SLR negative; FIRDA testing negative   SENSORY FUNCTION:  Asymmetric sensory impairment to light touch in distal lower extremities    CEREBELLAR FUNCTION:  Intact fine motor control over upper limbs   REFLEX FUNCTION:  Symmetric DTRs in UE, brisk DTRs in

## 2021-07-22 ENCOUNTER — HOSPITAL ENCOUNTER (OUTPATIENT)
Dept: LAB | Age: 76
Discharge: HOME | End: 2021-07-22
Payer: MEDICARE

## 2021-07-22 DIAGNOSIS — E11.3219 CONTROLLED TYPE 2 DIABETES MELLITUS WITH MILD NONPROLIFERATIVE RETINOPATHY AND MACULAR EDEMA, WITHOUT LONG-TERM CURRENT USE OF INSULIN, UNSPECIFIED LATERALITY (HCC): ICD-10-CM

## 2021-07-22 DIAGNOSIS — E55.9 VITAMIN D DEFICIENCY: ICD-10-CM

## 2021-07-22 DIAGNOSIS — I10 ESSENTIAL HYPERTENSION: ICD-10-CM

## 2021-07-22 DIAGNOSIS — E11.21 TYPE 2 DIABETES MELLITUS WITH PROTEINURIC DIABETIC NEPHROPATHY (HCC): ICD-10-CM

## 2021-07-22 DIAGNOSIS — E78.5 DYSLIPIDEMIA: ICD-10-CM

## 2021-07-22 LAB
ABSOLUTE EOS #: 0.35 K/UL (ref 0–0.44)
ABSOLUTE IMMATURE GRANULOCYTE: 0.03 K/UL (ref 0–0.3)
ABSOLUTE LYMPH #: 1.43 K/UL (ref 1.1–3.7)
ABSOLUTE MONO #: 0.77 K/UL (ref 0.1–1.2)
ALBUMIN SERPL-MCNC: 3.8 G/DL (ref 3.5–5.2)
ALBUMIN/GLOBULIN RATIO: 1.2 (ref 1–2.5)
ALP BLD-CCNC: 80 U/L (ref 40–129)
ALT SERPL-CCNC: 10 U/L (ref 5–41)
ANION GAP SERPL CALCULATED.3IONS-SCNC: 9 MMOL/L (ref 9–17)
AST SERPL-CCNC: 22 U/L
BASOPHILS # BLD: 1 % (ref 0–2)
BASOPHILS ABSOLUTE: 0.04 K/UL (ref 0–0.2)
BILIRUB SERPL-MCNC: 0.26 MG/DL (ref 0.3–1.2)
BUN BLDV-MCNC: 26 MG/DL (ref 8–23)
BUN/CREAT BLD: 21 (ref 9–20)
CALCIUM SERPL-MCNC: 9.6 MG/DL (ref 8.6–10.4)
CHLORIDE BLD-SCNC: 106 MMOL/L (ref 98–107)
CHOLESTEROL/HDL RATIO: 3.7
CHOLESTEROL: 157 MG/DL
CO2: 26 MMOL/L (ref 20–31)
CREAT SERPL-MCNC: 1.26 MG/DL (ref 0.7–1.2)
DIFFERENTIAL TYPE: ABNORMAL
EOSINOPHILS RELATIVE PERCENT: 5 % (ref 1–4)
ESTIMATED AVERAGE GLUCOSE: 148 MG/DL
GFR AFRICAN AMERICAN: >60 ML/MIN
GFR NON-AFRICAN AMERICAN: 56 ML/MIN
GFR SERPL CREATININE-BSD FRML MDRD: ABNORMAL ML/MIN/{1.73_M2}
GFR SERPL CREATININE-BSD FRML MDRD: ABNORMAL ML/MIN/{1.73_M2}
GLUCOSE BLD-MCNC: 105 MG/DL (ref 70–99)
HBA1C MFR BLD: 6.8 % (ref 4–6)
HCT VFR BLD CALC: 37.1 % (ref 40.7–50.3)
HDLC SERPL-MCNC: 42 MG/DL
HEMOGLOBIN: 11.8 G/DL (ref 13–17)
IMMATURE GRANULOCYTES: 0 %
LDL CHOLESTEROL: 95 MG/DL (ref 0–130)
LYMPHOCYTES # BLD: 20 % (ref 24–43)
MCH RBC QN AUTO: 27.4 PG (ref 25.2–33.5)
MCHC RBC AUTO-ENTMCNC: 31.8 G/DL (ref 25.2–33.5)
MCV RBC AUTO: 86.1 FL (ref 82.6–102.9)
MONOCYTES # BLD: 11 % (ref 3–12)
NRBC AUTOMATED: 0 PER 100 WBC
PDW BLD-RTO: 14.3 % (ref 11.8–14.4)
PLATELET # BLD: 276 K/UL (ref 138–453)
PLATELET ESTIMATE: ABNORMAL
PMV BLD AUTO: 10 FL (ref 8.1–13.5)
POTASSIUM SERPL-SCNC: 5.2 MMOL/L (ref 3.7–5.3)
RBC # BLD: 4.31 M/UL (ref 4.21–5.77)
RBC # BLD: ABNORMAL 10*6/UL
SEG NEUTROPHILS: 63 % (ref 36–65)
SEGMENTED NEUTROPHILS ABSOLUTE COUNT: 4.7 K/UL (ref 1.5–8.1)
SODIUM BLD-SCNC: 141 MMOL/L (ref 135–144)
TOTAL CK: 59 U/L (ref 39–308)
TOTAL PROTEIN: 6.9 G/DL (ref 6.4–8.3)
TRIGL SERPL-MCNC: 102 MG/DL
VITAMIN D 25-HYDROXY: 31.9 NG/ML (ref 30–100)
VLDLC SERPL CALC-MCNC: NORMAL MG/DL (ref 1–30)
WBC # BLD: 7.3 K/UL (ref 3.5–11.3)
WBC # BLD: ABNORMAL 10*3/UL

## 2021-07-22 PROCEDURE — 80053 COMPREHEN METABOLIC PANEL: CPT

## 2021-07-22 PROCEDURE — 36415 COLL VENOUS BLD VENIPUNCTURE: CPT

## 2021-07-22 PROCEDURE — 82306 VITAMIN D 25 HYDROXY: CPT

## 2021-07-22 PROCEDURE — 82550 ASSAY OF CK (CPK): CPT

## 2021-07-22 PROCEDURE — 85025 COMPLETE CBC W/AUTO DIFF WBC: CPT

## 2021-07-22 PROCEDURE — 83036 HEMOGLOBIN GLYCOSYLATED A1C: CPT

## 2021-07-22 PROCEDURE — 80061 LIPID PANEL: CPT

## 2021-07-27 ENCOUNTER — OFFICE VISIT (OUTPATIENT)
Dept: INTERNAL MEDICINE | Age: 76
End: 2021-07-27
Payer: MEDICARE

## 2021-07-27 VITALS
HEART RATE: 70 BPM | SYSTOLIC BLOOD PRESSURE: 130 MMHG | WEIGHT: 172.2 LBS | DIASTOLIC BLOOD PRESSURE: 74 MMHG | HEIGHT: 66 IN | BODY MASS INDEX: 27.68 KG/M2

## 2021-07-27 DIAGNOSIS — E11.21 TYPE 2 DIABETES MELLITUS WITH PROTEINURIC DIABETIC NEPHROPATHY (HCC): ICD-10-CM

## 2021-07-27 DIAGNOSIS — I50.22 CHF NYHA CLASS I, CHRONIC, SYSTOLIC (HCC): ICD-10-CM

## 2021-07-27 DIAGNOSIS — I65.29 STENOSIS OF CAROTID ARTERY, UNSPECIFIED LATERALITY: ICD-10-CM

## 2021-07-27 DIAGNOSIS — I10 ESSENTIAL HYPERTENSION: ICD-10-CM

## 2021-07-27 DIAGNOSIS — H61.23 IMPACTED CERUMEN, BILATERAL: ICD-10-CM

## 2021-07-27 DIAGNOSIS — E55.9 VITAMIN D DEFICIENCY: ICD-10-CM

## 2021-07-27 DIAGNOSIS — I35.0 NONRHEUMATIC AORTIC VALVE STENOSIS: ICD-10-CM

## 2021-07-27 DIAGNOSIS — E11.3219 CONTROLLED TYPE 2 DIABETES MELLITUS WITH MILD NONPROLIFERATIVE RETINOPATHY AND MACULAR EDEMA, WITHOUT LONG-TERM CURRENT USE OF INSULIN, UNSPECIFIED LATERALITY (HCC): Primary | ICD-10-CM

## 2021-07-27 DIAGNOSIS — E27.8 ADRENAL CYST (HCC): ICD-10-CM

## 2021-07-27 DIAGNOSIS — I73.9 PERIPHERAL ARTERIAL DISEASE (HCC): ICD-10-CM

## 2021-07-27 DIAGNOSIS — R91.1 LUNG NODULE: ICD-10-CM

## 2021-07-27 DIAGNOSIS — E78.5 DYSLIPIDEMIA: ICD-10-CM

## 2021-07-27 DIAGNOSIS — I34.0 NON-RHEUMATIC MITRAL REGURGITATION: ICD-10-CM

## 2021-07-27 DIAGNOSIS — I25.10 CORONARY ARTERY DISEASE INVOLVING NATIVE CORONARY ARTERY OF NATIVE HEART WITHOUT ANGINA PECTORIS: ICD-10-CM

## 2021-07-27 PROCEDURE — 99213 OFFICE O/P EST LOW 20 MIN: CPT | Performed by: NURSE PRACTITIONER

## 2021-07-27 PROCEDURE — G8417 CALC BMI ABV UP PARAM F/U: HCPCS | Performed by: NURSE PRACTITIONER

## 2021-07-27 PROCEDURE — 69210 REMOVE IMPACTED EAR WAX UNI: CPT | Performed by: NURSE PRACTITIONER

## 2021-07-27 PROCEDURE — G8427 DOCREV CUR MEDS BY ELIG CLIN: HCPCS | Performed by: NURSE PRACTITIONER

## 2021-07-27 PROCEDURE — 4040F PNEUMOC VAC/ADMIN/RCVD: CPT | Performed by: NURSE PRACTITIONER

## 2021-07-27 PROCEDURE — 1123F ACP DISCUSS/DSCN MKR DOCD: CPT | Performed by: NURSE PRACTITIONER

## 2021-07-27 PROCEDURE — 99214 OFFICE O/P EST MOD 30 MIN: CPT | Performed by: NURSE PRACTITIONER

## 2021-07-27 PROCEDURE — 3017F COLORECTAL CA SCREEN DOC REV: CPT | Performed by: NURSE PRACTITIONER

## 2021-07-27 PROCEDURE — 1036F TOBACCO NON-USER: CPT | Performed by: NURSE PRACTITIONER

## 2021-07-27 PROCEDURE — 2022F DILAT RTA XM EVC RTNOPTHY: CPT | Performed by: NURSE PRACTITIONER

## 2021-07-27 PROCEDURE — 3044F HG A1C LEVEL LT 7.0%: CPT | Performed by: NURSE PRACTITIONER

## 2021-07-27 ASSESSMENT — ENCOUNTER SYMPTOMS
BLOOD IN STOOL: 0
VOMITING: 0
NAUSEA: 0
COUGH: 0
CHEST TIGHTNESS: 0
SINUS PRESSURE: 0
SHORTNESS OF BREATH: 0
EYE PAIN: 0
RHINORRHEA: 0
ABDOMINAL PAIN: 0
FACIAL SWELLING: 0
COLOR CHANGE: 0
SORE THROAT: 0
DIARRHEA: 0
TROUBLE SWALLOWING: 0
WHEEZING: 0
CONSTIPATION: 0

## 2021-07-27 NOTE — PROGRESS NOTES
07/27/21  Chetan Felix  1945      Chief Complaint:   1. 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 Essential hypertension    4. Dyslipidemia    5. Non-rheumatic mitral regurgitation    6. Nonrheumatic aortic valve stenosis    7. Coronary artery disease involving native coronary artery of native heart without angina pectoris    8. Stenosis of carotid artery, unspecified laterality    9. Lung nodule    10. Adrenal cyst ([address]    11. CHF NYHA class I, chronic, systolic ([address]    12. Peripheral arterial disease ([address]    13. Vitamin D deficiency    14. Impacted cerumen, bilateral        HPI:  71-year-old patient being seen for 4-month follow-up of chronic problems. Overall he has been doing well. Did go to the [place] yesterday was seen by the hearing aid specialist to clean his hearing aids. States he needed a hearing exam however he needed to have his ears cleaned out prior to then. Patient noticed he had decreased hearing over the last few months. States his wife states he ask her to repeat a lot. He continues on his diabetic regimen for his diabetes mellitus. We reviewed his A1c which is stable. Blood pressure has been stable denies any lightheadedness dizziness no chest discomfort or shortness of breath. Does have intermittent swelling to lower extremities. Continues on statin for his dyslipidemia. He was seeing the vascular surgeon for the stenosis of the carotid arteries and PAD. States he told him he did not need to follow with him any further. We reviewed his note together would suggest that he be seen in 2 years which is May 2022. Continues on his supplemental vitamin D for his deficiency. Denies any unwanted side effects.       Allergies   Allergen Reactions    Atorvastatin Other (See Comments) Muscle pain (myalgias)    Sulfa Antibiotics Swelling     Swelling of retinas    Sulfanilamide Other (See Comments)       Past Medical History:   Diagnosis Date    Acne rosacea     treated with MetroGel    Anemia     Aortic stenosis     echo 3/18 Mod AS, Mild MR    BPH (benign prostatic hypertrophy)     CAD (coronary artery disease)     Stent x 3 DEVON 8/16    Carotid stenosis     Fairly minimal plaquing on ultrasound 5/14--in the conclusion described as less than 50% stenosis but in the body of the report described as minimal    Cataract of both eyes     CRI (chronic renal insufficiency)     Diabetes mellitus, type 2 (HCC)     1.) Proteinuria, 24 hour total urine protein 1,420 mg/24 hrs, 09/08, creatinine clearance 114, 09/08 2.) Repeat protein/creatinine ratio 2.97, 02/12 3. )24 hr urine collection 1958 mg/24 hrs, 04/12. 4.) Repeat protein/creatinine ratio 1.99, 08/12 a.) Repeat protein/creatinine ratio 2.14, 07/13 5.) Renal u/s ok 02/12  6) retinopathy at [address] [address] 12/16  early mod. nonprolifferative  macular involved    Dyslipidemia     Erectile dysfunction     H/O agent Orange exposure     per patient in the [employer] along with exposure to cresote and naphtha    Hearing loss     Hyperlipidemia     Hypertension     Non-rheumatic mitral regurgitation 06/27/2016    RBBB     Status post transcatheter aortic valve replacement (TAVR) using bioprosthesis 01/21/2020    Wears dentures     upper       Past Surgical History:   Procedure Laterality Date    CARDIAC CATHETERIZATION  12/03/2019    CATARACT REMOVAL Bilateral     CIRCUMCISION  age 49   Patricia See EYE SURGERY Bilateral     cataract    TYMPANOSTOMY TUBE PLACEMENT      VASECTOMY         Current Outpatient Medications on File Prior to Visit   Medication Sig Dispense Refill    furosemide (LASIX) 40 MG tablet Take 0.5 tablets by mouth daily (Patient taking differently: Take 20 mg by mouth as needed ) 30 tablet 0    amLODIPine (NORVASC) 10 MG tablet Take 1 tablet by mouth daily 30 tablet 5    clopidogrel (PLAVIX) 75 MG tablet Take 1 tablet by mouth daily Take 1 daily 60 tablet 3    pioglitazone (ACTOS) 30 MG tablet Take 30 mg by mouth daily      simvastatin (ZOCOR) 40 MG tablet Take 10 mg by mouth nightly       glipiZIDE (GLUCOTROL) 10 MG tablet Take 10 mg by mouth 2 times daily (before meals)      vitamin D (CHOLECALCIFEROL) 1000 UNIT TABS tablet Take 1,000 Units by mouth 2 times daily       metFORMIN (GLUCOPHAGE) 1000 MG tablet Take 1 tablet by mouth 2 times daily (with meals) 60 tablet 3    lisinopril (PRINIVIL;ZESTRIL) 20 MG tablet Take 1 tablet by mouth daily 30 tablet 3    Handicap Placard MISC by Does not apply route Duration 2 years 1 each 0     No current facility-administered medications on file prior to visit. Social History     Socioeconomic History    Marital status:      Spouse name: Not on file    Number of children: Not on file    Years of education: Not on file    Highest education level: Not on file   Occupational History    Occupation: seo   Tobacco Use    Smoking status: Former Smoker     Packs/day: 0.50     Years: 2.00     Pack years: 1.00     Quit date: 1970     Years since quittin.6    Smokeless tobacco: Never Used   Vaping Use    Vaping Use: Never used   Substance and Sexual Activity    Alcohol use: Yes     Alcohol/week: 0.0 standard drinks     Comment: RARELY    Drug use: No    Sexual activity: Not on file   Other Topics Concern    Not on file   Social History Narrative    Not on file     Social Determinants of Health     Financial Resource Strain:     Difficulty of Paying Living Expenses:    Food Insecurity:     Worried About Running Out of Food in the Last Year:     [address] in the Last Year:    Transportation Needs:     Lack of Transportation (Medical):      Lack of Transportation (Non-Medical):    Physical Activity:     Days of Exercise per Week:     Minutes of Exercise per Session:    Stress:     Feeling of Stress :    Social Connections:     Frequency of Communication with Friends and Family:     Frequency of Social Gatherings with Friends and Family:     Attends Gnosticist Services:     Active Member of Clubs or Organizations:     Attends Club or Organization Meetings:     Marital Status:    Intimate Partner Violence:     Fear of Current or Ex-Partner:     Emotionally Abused:     Physically Abused:     Sexually Abused:        Review of Systems   Constitutional: Negative for activity change, appetite change, chills, fatigue, fever and unexpected weight change. HENT: Positive for hearing loss. Negative for congestion, dental problem, ear discharge, ear pain, facial swelling, postnasal drip, rhinorrhea, sinus pressure, sore throat and trouble swallowing. Eyes: Negative for pain and visual disturbance. Respiratory: Negative for cough, chest tightness, shortness of breath and wheezing. Cardiovascular: Negative for chest pain, palpitations and leg swelling. Gastrointestinal: Negative for abdominal pain, blood in stool, constipation, diarrhea, nausea and vomiting. Endocrine: Negative for cold intolerance, heat intolerance and polyuria. Genitourinary: Negative for difficulty urinating. Musculoskeletal: Negative for arthralgias, gait problem, myalgias, neck pain and neck stiffness. Skin: Negative for color change, rash and wound. Neurological: Negative for dizziness, tremors, seizures, weakness, light-headedness, numbness and headaches. Psychiatric/Behavioral: Negative for confusion and hallucinations. The patient is not nervous/anxious. Physical Exam  Vitals and nursing note reviewed. Constitutional:       General: He is not in acute distress. Appearance: He is well-developed. He is not diaphoretic. HENT:      Head: Normocephalic and atraumatic. Right Ear: External ear normal. No drainage, swelling or tenderness.  There is impacted Behavior: Behavior normal.         Thought Content: Thought content normal.       Vitals:    07/27/21 1024   BP: 130/74   Site: Left Upper Arm   Position: Sitting   Cuff Size: Large Adult   Pulse: 70   Weight: 172 lb 3.2 oz (78.1 kg)   Height: 5' 6\" (1.676 m)       Assessment:  1. 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 Continues on Actos glipizide Glucophage. Work on diet remain active  - Basic Metabolic Panel; Future  - Hemoglobin A1C; Future  - Hemoglobin And Hematocrit, Blood; Future    2. 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 Essential hypertension  Stable on lisinopril and Lasix. Continue the same    4. Dyslipidemia  Continues on simvastatin. Work on diet lipid panel reviewed  The 10-year ASCVD risk score (Leandra Calles, et al., 2013) is: 47.7%    Values used to calculate the score:      Age: 77 years      Sex: Male      Is Non- : No      Diabetic: Yes      Tobacco smoker: No      Systolic Blood Pressure: 510 mmHg      Is BP treated: Yes      HDL Cholesterol: 42 mg/dL      Total Cholesterol: 157 mg/dL      5. Non-rheumatic mitral regurgitation  6. Nonrheumatic aortic valve stenosis  Currently asymptomatic. Status post TAVR continue to follow with cardiology    7. Coronary artery disease involving native coronary artery of native heart without angina pectoris  Stable. Denies any chest discomfort. Continue on statin Plavix. Follows with cardiology    8. Stenosis of carotid artery, unspecified laterality  Currently asymptomatic. Will need a follow-up ultrasound along with appointment May 2022 per vascular's note    9. Lung nodule  10.  Adrenal cyst ([facility]  Last CT of chest and abdomen showed benign in nature. No further follow-ups recommended    11. CHF NYHA class I, chronic, systolic (HCC)  Stable. No signs of fluid overload. 12. Peripheral arterial disease ([address]  Ongoing follow-up with vascular    13. Vitamin D deficiency  Continue on supplemental vitamin D, lab within normal limits    14. Impacted cerumen, bilateral  Removal as noted above  - CT REMOVAL IMPACTED CERUMEN INSTRUMENTATION UNILAT      Plan:  As noted above. Patient is going to reach out to the [facility] to get Cologuard scheduled completed  Follow up for routine visit. Call sooner with concerns prior.     Electronically signed by ESTHER Carney CNP on 7/27/2021 at 1:23 PM

## 2021-07-31 ENCOUNTER — HOSPITAL ENCOUNTER (INPATIENT)
Age: 76
LOS: 1 days | Discharge: HOME | DRG: 291 | End: 2021-08-01
Attending: EMERGENCY MEDICINE | Admitting: FAMILY MEDICINE
Payer: MEDICARE

## 2021-07-31 ENCOUNTER — APPOINTMENT (OUTPATIENT)
Dept: GENERAL RADIOLOGY | Age: 76
DRG: 291 | End: 2021-07-31
Payer: MEDICARE

## 2021-07-31 DIAGNOSIS — I50.43 ACUTE ON CHRONIC COMBINED SYSTOLIC AND DIASTOLIC CHF (CONGESTIVE HEART FAILURE) (HCC): ICD-10-CM

## 2021-07-31 DIAGNOSIS — J98.11 ATELECTASIS: ICD-10-CM

## 2021-07-31 DIAGNOSIS — J18.9 PNEUMONIA OF BOTH LOWER LOBES DUE TO INFECTIOUS ORGANISM: ICD-10-CM

## 2021-07-31 DIAGNOSIS — I50.9 ACUTE CONGESTIVE HEART FAILURE, UNSPECIFIED HEART FAILURE TYPE (HCC): ICD-10-CM

## 2021-07-31 DIAGNOSIS — J18.9 PNEUMONIA OF BOTH LUNGS DUE TO INFECTIOUS ORGANISM, UNSPECIFIED PART OF LUNG: Primary | ICD-10-CM

## 2021-07-31 LAB
ABSOLUTE EOS #: 0.22 K/UL (ref 0–0.44)
ABSOLUTE IMMATURE GRANULOCYTE: 0.03 K/UL (ref 0–0.3)
ABSOLUTE LYMPH #: 1.04 K/UL (ref 1.1–3.7)
ABSOLUTE MONO #: 0.8 K/UL (ref 0.1–1.2)
ANION GAP SERPL CALCULATED.3IONS-SCNC: 14 MMOL/L (ref 9–17)
BASOPHILS # BLD: 1 % (ref 0–2)
BASOPHILS ABSOLUTE: 0.07 K/UL (ref 0–0.2)
BNP INTERPRETATION: ABNORMAL
BUN BLDV-MCNC: 23 MG/DL (ref 8–23)
BUN/CREAT BLD: 18 (ref 9–20)
CALCIUM SERPL-MCNC: 9.7 MG/DL (ref 8.6–10.4)
CHLORIDE BLD-SCNC: 100 MMOL/L (ref 98–107)
CO2: 23 MMOL/L (ref 20–31)
CREAT SERPL-MCNC: 1.29 MG/DL (ref 0.7–1.2)
DIFFERENTIAL TYPE: ABNORMAL
EOSINOPHILS RELATIVE PERCENT: 2 % (ref 1–4)
GFR AFRICAN AMERICAN: >60 ML/MIN
GFR NON-AFRICAN AMERICAN: 54 ML/MIN
GFR SERPL CREATININE-BSD FRML MDRD: ABNORMAL ML/MIN/{1.73_M2}
GFR SERPL CREATININE-BSD FRML MDRD: ABNORMAL ML/MIN/{1.73_M2}
GLUCOSE BLD-MCNC: 115 MG/DL (ref 70–99)
GLUCOSE BLD-MCNC: 188 MG/DL (ref 75–110)
GLUCOSE BLD-MCNC: 84 MG/DL (ref 75–110)
GLUCOSE BLD-MCNC: 90 MG/DL (ref 75–110)
HCT VFR BLD CALC: 41.4 % (ref 40.7–50.3)
HEMOGLOBIN: 13.2 G/DL (ref 13–17)
IMMATURE GRANULOCYTES: 0 %
LYMPHOCYTES # BLD: 11 % (ref 24–43)
MAGNESIUM: 1.7 MG/DL (ref 1.6–2.6)
MCH RBC QN AUTO: 27.3 PG (ref 25.2–33.5)
MCHC RBC AUTO-ENTMCNC: 31.9 G/DL (ref 25.2–33.5)
MCV RBC AUTO: 85.5 FL (ref 82.6–102.9)
MONOCYTES # BLD: 9 % (ref 3–12)
NRBC AUTOMATED: 0 PER 100 WBC
PDW BLD-RTO: 14.7 % (ref 11.8–14.4)
PLATELET # BLD: 285 K/UL (ref 138–453)
PLATELET ESTIMATE: ABNORMAL
PMV BLD AUTO: 10.7 FL (ref 8.1–13.5)
POTASSIUM SERPL-SCNC: 4.2 MMOL/L (ref 3.7–5.3)
PRO-BNP: 4596 PG/ML
RBC # BLD: 4.84 M/UL (ref 4.21–5.77)
RBC # BLD: ABNORMAL 10*6/UL
SARS-COV-2, RAPID: NOT DETECTED
SEG NEUTROPHILS: 77 % (ref 36–65)
SEGMENTED NEUTROPHILS ABSOLUTE COUNT: 6.96 K/UL (ref 1.5–8.1)
SODIUM BLD-SCNC: 137 MMOL/L (ref 135–144)
SPECIMEN DESCRIPTION: NORMAL
TROPONIN INTERP: ABNORMAL
TROPONIN T: ABNORMAL NG/ML
TROPONIN, HIGH SENSITIVITY: 87 NG/L (ref 0–22)
TROPONIN, HIGH SENSITIVITY: 88 NG/L (ref 0–22)
TROPONIN, HIGH SENSITIVITY: 96 NG/L (ref 0–22)
TSH SERPL DL<=0.05 MIU/L-ACNC: 1.54 MIU/L (ref 0.3–5)
WBC # BLD: 9.1 K/UL (ref 3.5–11.3)
WBC # BLD: ABNORMAL 10*3/UL

## 2021-07-31 PROCEDURE — 6370000000 HC RX 637 (ALT 250 FOR IP): Performed by: FAMILY MEDICINE

## 2021-07-31 PROCEDURE — 87040 BLOOD CULTURE FOR BACTERIA: CPT

## 2021-07-31 PROCEDURE — 94761 N-INVAS EAR/PLS OXIMETRY MLT: CPT

## 2021-07-31 PROCEDURE — 2580000003 HC RX 258: Performed by: FAMILY MEDICINE

## 2021-07-31 PROCEDURE — 6360000002 HC RX W HCPCS: Performed by: FAMILY MEDICINE

## 2021-07-31 PROCEDURE — 83880 ASSAY OF NATRIURETIC PEPTIDE: CPT

## 2021-07-31 PROCEDURE — 99222 1ST HOSP IP/OBS MODERATE 55: CPT | Performed by: FAMILY MEDICINE

## 2021-07-31 PROCEDURE — 85025 COMPLETE CBC W/AUTO DIFF WBC: CPT

## 2021-07-31 PROCEDURE — 6370000000 HC RX 637 (ALT 250 FOR IP): Performed by: EMERGENCY MEDICINE

## 2021-07-31 PROCEDURE — 6360000002 HC RX W HCPCS: Performed by: EMERGENCY MEDICINE

## 2021-07-31 PROCEDURE — 87635 SARS-COV-2 COVID-19 AMP PRB: CPT

## 2021-07-31 PROCEDURE — 83735 ASSAY OF MAGNESIUM: CPT

## 2021-07-31 PROCEDURE — 36415 COLL VENOUS BLD VENIPUNCTURE: CPT

## 2021-07-31 PROCEDURE — 82947 ASSAY GLUCOSE BLOOD QUANT: CPT

## 2021-07-31 PROCEDURE — 80048 BASIC METABOLIC PNL TOTAL CA: CPT

## 2021-07-31 PROCEDURE — 96365 THER/PROPH/DIAG IV INF INIT: CPT

## 2021-07-31 PROCEDURE — 94640 AIRWAY INHALATION TREATMENT: CPT

## 2021-07-31 PROCEDURE — 2580000003 HC RX 258: Performed by: EMERGENCY MEDICINE

## 2021-07-31 PROCEDURE — 83036 HEMOGLOBIN GLYCOSYLATED A1C: CPT

## 2021-07-31 PROCEDURE — 71045 X-RAY EXAM CHEST 1 VIEW: CPT

## 2021-07-31 PROCEDURE — 2060000000 HC ICU INTERMEDIATE R&B

## 2021-07-31 PROCEDURE — 84484 ASSAY OF TROPONIN QUANT: CPT

## 2021-07-31 PROCEDURE — 96367 TX/PROPH/DG ADDL SEQ IV INF: CPT

## 2021-07-31 PROCEDURE — 96375 TX/PRO/DX INJ NEW DRUG ADDON: CPT

## 2021-07-31 PROCEDURE — 6370000000 HC RX 637 (ALT 250 FOR IP)

## 2021-07-31 PROCEDURE — 84443 ASSAY THYROID STIM HORMONE: CPT

## 2021-07-31 PROCEDURE — 99285 EMERGENCY DEPT VISIT HI MDM: CPT

## 2021-07-31 PROCEDURE — 93005 ELECTROCARDIOGRAM TRACING: CPT | Performed by: EMERGENCY MEDICINE

## 2021-07-31 RX ORDER — IPRATROPIUM BROMIDE AND ALBUTEROL SULFATE 2.5; .5 MG/3ML; MG/3ML
SOLUTION RESPIRATORY (INHALATION)
Status: COMPLETED
Start: 2021-07-31 | End: 2021-07-31

## 2021-07-31 RX ORDER — ATORVASTATIN CALCIUM 10 MG/1
10 TABLET, FILM COATED ORAL DAILY
Status: DISCONTINUED | OUTPATIENT
Start: 2021-07-31 | End: 2021-07-31 | Stop reason: CLARIF

## 2021-07-31 RX ORDER — LISINOPRIL 20 MG/1
20 TABLET ORAL DAILY
Status: DISCONTINUED | OUTPATIENT
Start: 2021-07-31 | End: 2021-08-01 | Stop reason: HOSPADM

## 2021-07-31 RX ORDER — VITAMIN B COMPLEX
1000 TABLET ORAL 2 TIMES DAILY
Status: DISCONTINUED | OUTPATIENT
Start: 2021-07-31 | End: 2021-08-01 | Stop reason: HOSPADM

## 2021-07-31 RX ORDER — FUROSEMIDE 10 MG/ML
40 INJECTION INTRAMUSCULAR; INTRAVENOUS ONCE
Status: COMPLETED | OUTPATIENT
Start: 2021-07-31 | End: 2021-07-31

## 2021-07-31 RX ORDER — ONDANSETRON 4 MG/1
4 TABLET, ORALLY DISINTEGRATING ORAL EVERY 8 HOURS PRN
Status: DISCONTINUED | OUTPATIENT
Start: 2021-07-31 | End: 2021-08-01 | Stop reason: HOSPADM

## 2021-07-31 RX ORDER — SODIUM CHLORIDE 9 MG/ML
25 INJECTION, SOLUTION INTRAVENOUS PRN
Status: DISCONTINUED | OUTPATIENT
Start: 2021-07-31 | End: 2021-08-01 | Stop reason: HOSPADM

## 2021-07-31 RX ORDER — ASPIRIN 81 MG/1
324 TABLET, CHEWABLE ORAL ONCE
Status: COMPLETED | OUTPATIENT
Start: 2021-07-31 | End: 2021-07-31

## 2021-07-31 RX ORDER — GLIPIZIDE 5 MG/1
10 TABLET ORAL
Status: DISCONTINUED | OUTPATIENT
Start: 2021-07-31 | End: 2021-08-01 | Stop reason: HOSPADM

## 2021-07-31 RX ORDER — DEXTROSE MONOHYDRATE 25 G/50ML
12.5 INJECTION, SOLUTION INTRAVENOUS PRN
Status: DISCONTINUED | OUTPATIENT
Start: 2021-07-31 | End: 2021-08-01 | Stop reason: HOSPADM

## 2021-07-31 RX ORDER — SODIUM CHLORIDE 0.9 % (FLUSH) 0.9 %
5-40 SYRINGE (ML) INJECTION EVERY 12 HOURS SCHEDULED
Status: DISCONTINUED | OUTPATIENT
Start: 2021-07-31 | End: 2021-08-01 | Stop reason: HOSPADM

## 2021-07-31 RX ORDER — INSULIN GLARGINE 100 [IU]/ML
10 INJECTION, SOLUTION SUBCUTANEOUS NIGHTLY
Status: DISCONTINUED | OUTPATIENT
Start: 2021-07-31 | End: 2021-08-01 | Stop reason: HOSPADM

## 2021-07-31 RX ORDER — ONDANSETRON 2 MG/ML
4 INJECTION INTRAMUSCULAR; INTRAVENOUS EVERY 6 HOURS PRN
Status: DISCONTINUED | OUTPATIENT
Start: 2021-07-31 | End: 2021-08-01 | Stop reason: HOSPADM

## 2021-07-31 RX ORDER — ALBUTEROL SULFATE 2.5 MG/3ML
2.5 SOLUTION RESPIRATORY (INHALATION) EVERY 6 HOURS PRN
Status: DISCONTINUED | OUTPATIENT
Start: 2021-07-31 | End: 2021-07-31 | Stop reason: SDUPTHER

## 2021-07-31 RX ORDER — SIMVASTATIN 40 MG
40 TABLET ORAL NIGHTLY
Status: DISCONTINUED | OUTPATIENT
Start: 2021-07-31 | End: 2021-08-01 | Stop reason: HOSPADM

## 2021-07-31 RX ORDER — FUROSEMIDE 10 MG/ML
20 INJECTION INTRAMUSCULAR; INTRAVENOUS 2 TIMES DAILY
Status: DISCONTINUED | OUTPATIENT
Start: 2021-07-31 | End: 2021-07-31

## 2021-07-31 RX ORDER — IPRATROPIUM BROMIDE AND ALBUTEROL SULFATE 2.5; .5 MG/3ML; MG/3ML
1 SOLUTION RESPIRATORY (INHALATION) EVERY 4 HOURS PRN
Status: DISCONTINUED | OUTPATIENT
Start: 2021-07-31 | End: 2021-08-01 | Stop reason: HOSPADM

## 2021-07-31 RX ORDER — ACETAMINOPHEN 325 MG/1
650 TABLET ORAL EVERY 6 HOURS PRN
Status: DISCONTINUED | OUTPATIENT
Start: 2021-07-31 | End: 2021-08-01 | Stop reason: HOSPADM

## 2021-07-31 RX ORDER — POLYETHYLENE GLYCOL 3350 17 G/17G
17 POWDER, FOR SOLUTION ORAL DAILY PRN
Status: DISCONTINUED | OUTPATIENT
Start: 2021-07-31 | End: 2021-08-01 | Stop reason: HOSPADM

## 2021-07-31 RX ORDER — MAGNESIUM SULFATE 1 G/100ML
1000 INJECTION INTRAVENOUS ONCE
Status: COMPLETED | OUTPATIENT
Start: 2021-07-31 | End: 2021-07-31

## 2021-07-31 RX ORDER — SODIUM CHLORIDE 0.9 % (FLUSH) 0.9 %
5-40 SYRINGE (ML) INJECTION PRN
Status: DISCONTINUED | OUTPATIENT
Start: 2021-07-31 | End: 2021-08-01 | Stop reason: HOSPADM

## 2021-07-31 RX ORDER — MAGNESIUM SULFATE HEPTAHYDRATE 500 MG/ML
1000 INJECTION, SOLUTION INTRAMUSCULAR; INTRAVENOUS ONCE
Status: DISCONTINUED | OUTPATIENT
Start: 2021-07-31 | End: 2021-07-31

## 2021-07-31 RX ORDER — DEXTROSE MONOHYDRATE 50 MG/ML
100 INJECTION, SOLUTION INTRAVENOUS PRN
Status: DISCONTINUED | OUTPATIENT
Start: 2021-07-31 | End: 2021-08-01 | Stop reason: HOSPADM

## 2021-07-31 RX ORDER — IPRATROPIUM BROMIDE AND ALBUTEROL SULFATE 2.5; .5 MG/3ML; MG/3ML
1 SOLUTION RESPIRATORY (INHALATION)
Status: DISCONTINUED | OUTPATIENT
Start: 2021-07-31 | End: 2021-07-31

## 2021-07-31 RX ORDER — AMLODIPINE BESYLATE 5 MG/1
10 TABLET ORAL DAILY
Status: DISCONTINUED | OUTPATIENT
Start: 2021-07-31 | End: 2021-08-01 | Stop reason: HOSPADM

## 2021-07-31 RX ORDER — FUROSEMIDE 10 MG/ML
20 INJECTION INTRAMUSCULAR; INTRAVENOUS DAILY
Status: DISCONTINUED | OUTPATIENT
Start: 2021-08-01 | End: 2021-08-01 | Stop reason: HOSPADM

## 2021-07-31 RX ORDER — NICOTINE POLACRILEX 4 MG
15 LOZENGE BUCCAL PRN
Status: DISCONTINUED | OUTPATIENT
Start: 2021-07-31 | End: 2021-08-01 | Stop reason: HOSPADM

## 2021-07-31 RX ORDER — CLOPIDOGREL BISULFATE 75 MG/1
75 TABLET ORAL DAILY
Status: DISCONTINUED | OUTPATIENT
Start: 2021-07-31 | End: 2021-08-01 | Stop reason: HOSPADM

## 2021-07-31 RX ORDER — ACETAMINOPHEN 650 MG/1
650 SUPPOSITORY RECTAL EVERY 6 HOURS PRN
Status: DISCONTINUED | OUTPATIENT
Start: 2021-07-31 | End: 2021-08-01 | Stop reason: HOSPADM

## 2021-07-31 RX ORDER — ALBUTEROL SULFATE 2.5 MG/3ML
2.5 SOLUTION RESPIRATORY (INHALATION) EVERY 6 HOURS PRN
Status: DISCONTINUED | OUTPATIENT
Start: 2021-07-31 | End: 2021-08-01 | Stop reason: HOSPADM

## 2021-07-31 RX ADMIN — MAGNESIUM SULFATE HEPTAHYDRATE 1000 MG: 1 INJECTION, SOLUTION INTRAVENOUS at 16:42

## 2021-07-31 RX ADMIN — FUROSEMIDE 40 MG: 10 INJECTION, SOLUTION INTRAMUSCULAR; INTRAVENOUS at 09:02

## 2021-07-31 RX ADMIN — ALBUTEROL SULFATE 2.5 MG: 2.5 SOLUTION RESPIRATORY (INHALATION) at 06:53

## 2021-07-31 RX ADMIN — LISINOPRIL 20 MG: 20 TABLET ORAL at 12:15

## 2021-07-31 RX ADMIN — CHOLECALCIFEROL TAB 25 MCG (1000 UNIT) 1000 UNITS: 25 TAB at 12:18

## 2021-07-31 RX ADMIN — IPRATROPIUM BROMIDE AND ALBUTEROL SULFATE 1 AMPULE: .5; 3 SOLUTION RESPIRATORY (INHALATION) at 12:41

## 2021-07-31 RX ADMIN — CHOLECALCIFEROL TAB 25 MCG (1000 UNIT) 1000 UNITS: 25 TAB at 21:13

## 2021-07-31 RX ADMIN — IPRATROPIUM BROMIDE AND ALBUTEROL SULFATE 1 AMPULE: .5; 3 SOLUTION RESPIRATORY (INHALATION) at 17:18

## 2021-07-31 RX ADMIN — ENOXAPARIN SODIUM 40 MG: 40 INJECTION SUBCUTANEOUS at 12:15

## 2021-07-31 RX ADMIN — GLIPIZIDE 10 MG: 5 TABLET ORAL at 16:42

## 2021-07-31 RX ADMIN — CEFTRIAXONE 1000 MG: 1 INJECTION, POWDER, FOR SOLUTION INTRAMUSCULAR; INTRAVENOUS at 08:29

## 2021-07-31 RX ADMIN — IPRATROPIUM BROMIDE AND ALBUTEROL SULFATE 1 AMPULE: .5; 3 SOLUTION RESPIRATORY (INHALATION) at 20:10

## 2021-07-31 RX ADMIN — SODIUM CHLORIDE, PRESERVATIVE FREE 10 ML: 5 INJECTION INTRAVENOUS at 21:13

## 2021-07-31 RX ADMIN — INSULIN LISPRO 2 UNITS: 100 INJECTION, SOLUTION INTRAVENOUS; SUBCUTANEOUS at 12:15

## 2021-07-31 RX ADMIN — GLIPIZIDE 10 MG: 5 TABLET ORAL at 12:14

## 2021-07-31 RX ADMIN — AZITHROMYCIN MONOHYDRATE 500 MG: 500 INJECTION, POWDER, LYOPHILIZED, FOR SOLUTION INTRAVENOUS at 09:05

## 2021-07-31 RX ADMIN — SODIUM CHLORIDE, PRESERVATIVE FREE 10 ML: 5 INJECTION INTRAVENOUS at 10:41

## 2021-07-31 RX ADMIN — ASPIRIN 81 MG CHEWABLE TABLET 324 MG: 81 TABLET CHEWABLE at 07:35

## 2021-07-31 RX ADMIN — IPRATROPIUM BROMIDE AND ALBUTEROL SULFATE 3 ML: .5; 3 SOLUTION RESPIRATORY (INHALATION) at 06:45

## 2021-07-31 ASSESSMENT — PAIN SCALES - GENERAL
PAINLEVEL_OUTOF10: 0

## 2021-07-31 NOTE — ED PROVIDER NOTES
ALLERGIES     is allergic to atorvastatin, sulfa antibiotics, and sulfanilamide. FAMILY HISTORY     He indicated that the status of his mother is unknown. He indicated that his father is . He indicated that his sister is . He indicated that the status of his brother is unknown.     family history includes Coronary Art Dis in his brother and sister; Heart Attack in his father; Hypertension in his mother; Sherlie Cleveland in his mother; Stroke in his mother. SOCIAL HISTORY      reports that he quit smoking about 51 years ago. He has a 1.00 pack-year smoking history. He has never used smokeless tobacco. He reports current alcohol use. He reports that he does not use drugs. Diagnostic Results     EKG     906 sinus rhythm first-degree AV block rate 73    no acute ST or T wave changes. Unchanged from 6:35 AM    LABS:   Results for orders placed or performed during the hospital encounter of 21   COVID-19, Rapid    Specimen: Nasopharyngeal Swab   Result Value Ref Range    Specimen Description . NASOPHARYNGEAL SWAB     SARS-CoV-2, Rapid Not Detected Not Detected   CBC Auto Differential   Result Value Ref Range    WBC 9.1 3.5 - 11.3 k/uL    RBC 4.84 4.21 - 5.77 m/uL    Hemoglobin 13.2 13.0 - 17.0 g/dL    Hematocrit 41.4 40.7 - 50.3 %    MCV 85.5 82.6 - 102.9 fL    MCH 27.3 25.2 - 33.5 pg    MCHC 31.9 25.2 - 33.5 g/dL    RDW 14.7 (H) 11.8 - 14.4 %    Platelets 691 164 - 311 k/uL    MPV 10.7 8.1 - 13.5 fL    NRBC Automated 0.0 0.0 per 100 WBC    Differential Type NOT REPORTED     Seg Neutrophils 77 (H) 36 - 65 %    Lymphocytes 11 (L) 24 - 43 %    Monocytes 9 3 - 12 %    Eosinophils % 2 1 - 4 %    Basophils 1 0 - 2 %    Immature Granulocytes 0 0 %    Segs Absolute 6.96 1.50 - 8.10 k/uL    Absolute Lymph # 1.04 (L) 1.10 - 3.70 k/uL    Absolute Mono # 0.80 0.10 - 1.20 k/uL    Absolute Eos # 0.22 0.00 - 0.44 k/uL    Basophils Absolute 0.07 0.00 - 0.20 k/uL    Absolute Immature Granulocyte 0.03 0.00 - 0.30 k/uL    WBC Morphology NOT REPORTED     RBC Morphology ANISOCYTOSIS PRESENT     Platelet Estimate NOT REPORTED    Basic Metabolic Panel   Result Value Ref Range    Glucose 115 (H) 70 - 99 mg/dL    BUN 23 8 - 23 mg/dL    CREATININE 1.29 (H) 0.70 - 1.20 mg/dL    Bun/Cre Ratio 18 9 - 20    Calcium 9.7 8.6 - 10.4 mg/dL    Sodium 137 135 - 144 mmol/L    Potassium 4.2 3.7 - 5.3 mmol/L    Chloride 100 98 - 107 mmol/L    CO2 23 20 - 31 mmol/L    Anion Gap 14 9 - 17 mmol/L    GFR Non-African American 54 (L) >60 mL/min    GFR African American >60 >60 mL/min    GFR Comment          GFR Staging NOT REPORTED    TROPONIN   Result Value Ref Range    Troponin, High Sensitivity 96 (HH) 0 - 22 ng/L    Troponin T NOT REPORTED <0.03 ng/mL    Troponin Interp NOT REPORTED    Brain Natriuretic Peptide   Result Value Ref Range    Pro-BNP 4,596 (H) <300 pg/mL    BNP Interpretation Pro-BNP Reference Range:    TROPONIN   Result Value Ref Range    Troponin, High Sensitivity 88 (HH) 0 - 22 ng/L    Troponin T NOT REPORTED <0.03 ng/mL    Troponin Interp NOT REPORTED        RADIOLOGY:  XR CHEST PORTABLE   Final Result   Cardiomegaly and trace effusions with lower lung infiltrates representing   atelectasis versus pneumonia. ED Course     The patient was given the following medications:  Orders Placed This Encounter   Medications    ipratropium-albuterol (DUONEB) 0.5-2.5 (3) MG/3ML nebulizer solution     YONI CLEANING: cabinet override    ipratropium-albuterol (DUONEB) nebulizer solution 1 ampule     Order Specific Question:   Initiate RT Bronchodilator Protocol     Answer: Yes    albuterol (PROVENTIL) nebulizer solution 2.5 mg     Order Specific Question:   Initiate RT Bronchodilator Protocol     Answer:    Yes    aspirin chewable tablet 324 mg    cefTRIAXone (ROCEPHIN) 1000 mg IVPB in 50 mL D5W minibag     Order Specific Question:   Antimicrobial Indications     Answer:   Pneumonia (CAP)    azithromycin (ZITHROMAX) 500 mg in D5W 250ml addavial     Order Specific Question:   Antimicrobial Indications     Answer:   Pneumonia (CAP)    furosemide (LASIX) injection 40 mg         RECENT VITALS:  BP (!) 133/92   Pulse 86   Resp 17   Ht 5' 7\" (1.702 m)   Wt 174 lb (78.9 kg)   SpO2 95%   BMI 27.25 kg/m²     9 AM at time of care transfer patient was waiting for second troponin and repeat EKG. Repeat troponin improved from the first 1. His ones in December were significantly elevated and today's were less than that. He denies chest pain. Hypoxia on arrival 80% room air on 3 L has been in the 90s since. Chest x-ray atelectasis versus pneumonia will cover with antibiotics. Blood cultures obtained. Covid negative. Will admit    920 AM Discussed with Dr. Diallo Hugo who agrees to admission. OARRS Report if indicated    Periodic Controlled Substance Monitoring: No signs of potential drug abuse or diversion identified. ([place]ody Christian, )      Disposition     CLINICAL IMPRESSION:  1. Pneumonia of both lungs due to infectious organism, unspecified part of lung    2. Acute congestive heart failure, unspecified heart failure type ([facility]        PATIENT REFERRED TO:  No follow-up provider specified. DISCHARGE MEDICATIONS:  New Prescriptions    No medications on file       DISPOSITION:   DISPOSITION Decision To Admit 07/31/2021 09:00:49 AM      (Please note that portions of this note were completed with a voice recognition program.  Efforts were made to edit the dictations but occasionally words are mis-transcribed.)    Nitin Chavarria DO D.O.            Nitin Chavarria DO  07/31/21 [id number]

## 2021-07-31 NOTE — PLAN OF CARE
Problem: Falls - Risk of:  Goal: Will remain free from falls  Description: Will remain free from falls  Outcome: Ongoing  Note: Call light in reach, bed in lowest position, and bed alarm activated. Education given on use of call light before ambulation and when in need of assistance. Patient expressed understanding. Hourly visual checks performed and charted. Toileting offered to patient. No falls this shift, at any time. Arm band and falling star in place. Will continue to monitor. Problem: Pain:  Goal: Pain level will decrease  Description: Pain level will decrease  Outcome: Ongoing  Note: Patient able to use 0-10 pain scale. Denies pain at this time. Agreeable to take PRN pain medications. Problem: Skin Integrity:  Goal: Will show no infection signs and symptoms  Description: Will show no infection signs and symptoms  Outcome: Ongoing  Note: No signs of skin breakdown. Skin warm, dry, and intact. Mucous membranes pink and moist.  Assistance with turns/ambulation provided PRN. Will continue to monitor. Problem: Discharge Planning:  Goal: Discharged to appropriate level of care  Description: Discharged to appropriate level of care  Outcome: Ongoing  Note: Discharge planning in process and discussed with patient/family. Social work consulted for any additional needs. Care manager aware of discharge needs.

## 2021-07-31 NOTE — H&P
Hospital Medicine  History and Physical    Patient:  Darrick Prescott  MRN: [id number]    CHIEF COMPLAINT:  Shortness of breath    History Obtained From:  patient  PCP: ESTHER Watt CNP    HISTORY OF PRESENT ILLNESS:   The patient is a 77 y.o. male who presents with shortness of breath was hypoxic enroute and  on ER arrival has improved since then has had a mild cough. No chest pain or SOB,h/o  Systolic CHF,CAD s/p stents,CKD,DM type 2 ,s/p AVR in 1 /2020. Has chronic elevated troponin levels. His LVEF per recent [facility] in 3/2021 was 45%. Denies any leg swelling or weight gain. Past Medical History:        Diagnosis Date    Acne rosacea     treated with MetroGel    Anemia     Aortic stenosis     echo 3/18 Mod AS, Mild MR    BPH (benign prostatic hypertrophy)     CAD (coronary artery disease)     Stent x 3 DEVON 8/16    Carotid stenosis     Fairly minimal plaquing on ultrasound 5/14--in the conclusion described as less than 50% stenosis but in the body of the report described as minimal    Cataract of both eyes     CRI (chronic renal insufficiency)     Diabetes mellitus, type 2 (HCC)     1.) Proteinuria, 24 hour total urine protein 1,420 mg/24 hrs, 09/08, creatinine clearance 114, 09/08 2.) Repeat protein/creatinine ratio 2.97, 02/12 3. )24 hr urine collection 1958 mg/24 hrs, 04/12. 4.) Repeat protein/creatinine ratio 1.99, 08/12 a.) Repeat protein/creatinine ratio 2.14, 07/13 5.) Renal u/s ok 02/12  6) retinopathy at [address] 12/16  early mod. nonprolifferative  macular involved    Dyslipidemia     Erectile dysfunction     H/O agent Orange exposure     per patient in the [employer] along with exposure to cresote and naphtha    Hearing loss     Hyperlipidemia     Hypertension     Non-rheumatic mitral regurgitation 06/27/2016    RBBB     Status post transcatheter aortic valve replacement (TAVR) using bioprosthesis 01/21/2020    Wears dentures     upper       Past Surgical History: Procedure Laterality Date    AORTIC VALVE SURGERY  01/2020    CARDIAC CATHETERIZATION  12/03/2019    CATARACT REMOVAL Bilateral     CIRCUMCISION  age 49    EYE SURGERY Bilateral     cataract    TYMPANOSTOMY TUBE PLACEMENT      VASECTOMY         Medications Prior to Admission:  I obtained, documented, reviewed, and updated the patient's current medications. Prior to Admission medications    Medication Sig Start Date End Date Taking? Authorizing Provider   furosemide (LASIX) 40 MG tablet Take 0.5 tablets by mouth daily  Patient taking differently: Take 20 mg by mouth as needed  7/10/20  Yes Ashley Kwon MD   Handicap [facility] by Does not apply route Duration 2 years 6/2/20  Yes ESTHER Arzate CNP   amLODIPine (NORVASC) 10 MG tablet Take 1 tablet by mouth daily 1/23/20  Yes Markos Maguire MD   clopidogrel (PLAVIX) 75 MG tablet Take 1 tablet by mouth daily Take 1 daily 12/6/19  Yes ESTHER Contreras CNP   pioglitazone (ACTOS) 30 MG tablet Take 30 mg by mouth daily   Yes Historical Provider, MD   simvastatin (ZOCOR) 40 MG tablet Take 10 mg by mouth nightly    Yes Historical Provider, MD   glipiZIDE (GLUCOTROL) 10 MG tablet Take 10 mg by mouth 2 times daily (before meals)   Yes Historical Provider, MD   vitamin D (CHOLECALCIFEROL) 1000 UNIT TABS tablet Take 1,000 Units by mouth 2 times daily    Yes Historical Provider, MD   metFORMIN (GLUCOPHAGE) 1000 MG tablet Take 1 tablet by mouth 2 times daily (with meals) 8/25/16  Yes ESTHER Beasley CNP   lisinopril (PRINIVIL;ZESTRIL) 20 MG tablet Take 1 tablet by mouth daily 8/24/16  Yes ESTHER Beasley CNP       Allergies:  Atorvastatin, Sulfa antibiotics, and Sulfanilamide    Social History:   TOBACCO:   reports that he quit smoking about 51 years ago. He has a 1.00 pack-year smoking history. He has never used smokeless tobacco.  ETOH:   reports current alcohol use.   OCCUPATION:      Family History:       Problem Relation Age of Onset    Heart Attack Father     Coronary Art Dis Sister     Hypertension Mother     Lung Cancer Mother     Stroke Mother     Coronary Art Dis Brother         later in life       REVIEW OF SYSTEMS:  Constitutional:  negative for  fevers, chills and fatigue  HEENT:  negative for  tinnitus, nasal congestion, snoring and sore throat  Neck:  No lumps or masses and No swollen glands  Cardiac: No chest apin positive for  dyspnea  Respiratory:  positive for  dyspnea  Gastrointestinal:  negative  :  negative  Musculoskeletal:  positive for  arthralgias and back pain  Neuro:  positive for neuropathy and weakness in legs  Psychiatry:No depression or anxiety  Rest of ROS Negative    Physical Exam:    Vitals: BP (!) 126/57   Pulse 73   Temp 98.8 °F (37.1 °C) (Tympanic)   Resp 18   Ht 5' 7\" (1.702 m)   Wt 174 lb 9.6 oz (79.2 kg)   SpO2 93%   BMI 27.35 kg/m²   General appearance: alert, appears stated age and cooperative  Skin: Skin color, texture, turgor normal. No rashes or lesions  HEENT: Eye: Normal external eye, conjunctiva, lids cornea, SARIAH. Neck: no adenopathy, no carotid bruit, no JVD, supple, symmetrical, trachea midline and thyroid not enlarged, symmetric, no tenderness/mass/nodules  Lungs: clear to auscultation bilaterally  Heart: regular rate and rhythm, S1, S2 normal, no murmur, click, rub or gallop  Abdomen: soft, non-tender; bowel sounds normal; no masses,  no organomegaly  Extremities: extremities normal, atraumatic, no cyanosis or edema  Neurologic: Mental status: Alert, oriented, thought content appropriate    CBC:   Recent Labs     07/31/21  0632   WBC 9.1   HGB 13.2        BMP:    Recent Labs     07/31/21  0632      K 4.2      CO2 23   BUN 23   CREATININE 1.29*   GLUCOSE 115*     Hepatic: No results for input(s): AST, ALT, ALB, BILITOT, ALKPHOS in the last 72 hours. Troponin: No results for input(s): TROPONINI in the last 72 hours.   BNP: No results for input(s): BNP in the last 72 hours. Lipids: No results for input(s): CHOL, HDL in the last 72 hours. Invalid input(s): LDLCALCU  ABGs: No results found for: PHART, PO2ART, KDY5PPR  INR: No results for input(s): INR in the last 72 hours. -----------------------------------------------------------------  PA/lat CXR: Basilar infiltrates and trace effusion  EKG: No acute changes    Assessment and Plan   1. Pneumonia. 2. CHF systolic chronic. 3. Chronically elevated troponin  4. DM type 2.  5. HTN  6. S/P AVR Bioprosthetic. Plan:IV Rocephin and Zithromax. supplemental oxygen as needed,currently has been off supplemental oxygen and 99% on room air. IV lasix. Aerosol treatments prn. Discontinue Actos due to h/o CHF,hold metformin,Lantus and SS coverage for diabetes. check magnesium and TSH levels. DVT prophylaxsis. Repeat CXR in am.                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    Non-rheumatic mitral regurgitation I34.0    Nonrheumatic aortic valve stenosis I35.0    Uncontrolled type 2 diabetes mellitus with nephropathy (HCC) E11.21, E11.65    Coronary artery disease involving native coronary artery of native heart without angina pectoris I25.10    Controlled type 2 diabetes mellitus with mild nonproliferative retinopathy and macular edema, without long-term current use of insulin ([address] W27.7721    Carotid stenosis I65.29    CKD (chronic kidney disease), stage III (HCC) N18.30    Pneumonia of right lower lobe due to infectious organism J18.9    Aortic stenosis, severe I35.0    Hypertension I10    Hyperlipidemia E78.5    H/O agent [place] exposure Z77.098    Diabetes mellitus, type 2 ([address] E11.9    CAD (coronary artery disease) I25.10    Anemia D64.9    Proteinuria R80.9    Wears hearing aid in both ears Z97.4    CHF NYHA class I, chronic, systolic (HCC) G98.54    S/P TAVR (transcatheter aortic valve replacement) Z95.2    Second hand smoke exposure Z77.22    Peripheral arterial disease (HCC) I73.9    Heavy sensation of lower extremity R29.898    Impaired ambulation R26.2    Pneumonia J18.9       DVT prophylaxis:   [x] Lovenox   [x] SCDs   [] SQ Heparin   [] Encourage ambulation, low risk for DVT, no chemical or mechanical    prophylaxis necessary      [] Already on Anticoagulation        Shasta Phan MD, MD

## 2021-07-31 NOTE — ED PROVIDER NOTES
eMERGENCY dEPARTMENT eNCOUnter      Pt Name: Lizbeth Pond  MRN: [id number]  [place] 1945  Date of evaluation: 7/31/2021      CHIEF COMPLAINT       Chief Complaint   Patient presents with    Shortness of Breath     woke up at 0100 with SOB,          HISTORY OF PRESENT ILLNESS    Lizbeth Pond is a 77 y.o. male who presents with shortness of breath. Patient states she was awoken from sleep at 1 AM because of shortness of breath he has had a mild cough prior to this no chest pain no fevers no chills no exacerbating or relieving factors when squad arrived his sats were in the low 80s he was placed on 6 L he came in here again on the low 80s with elevated respiratory rate he was placed on a nonrebreather and up into the low 90s        REVIEW OF SYSTEMS       Review of systems are all reviewed and negative except stated above in HPI    PAST MEDICAL HISTORY    has a past medical history of Acne rosacea, Anemia, Aortic stenosis, BPH (benign prostatic hypertrophy), CAD (coronary artery disease), Carotid stenosis, Cataract of both eyes, CRI (chronic renal insufficiency), Diabetes mellitus, type 2 ([address] Dyslipidemia, Erectile dysfunction, H/O agent Orange exposure, Hearing loss, Hyperlipidemia, Hypertension, Non-rheumatic mitral regurgitation, RBBB, 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 SURGICAL HISTORY      has a past surgical history that includes Circumcision (age 49); Vasectomy; Tympanostomy tube placement; Cataract removal (Bilateral); eye surgery (Bilateral); Cardiac catheterization (12/03/2019); and Aortic valve surgery (01/2020).     CURRENT MEDICATIONS       Previous Medications    AMLODIPINE (NORVASC) 10 MG TABLET    Take 1 tablet by mouth daily    CLOPIDOGREL (PLAVIX) 75 MG TABLET    Take 1 tablet by mouth daily Take 1 daily    FUROSEMIDE (LASIX) 40 MG TABLET    Take 0.5 tablets by mouth daily    GLIPIZIDE (GLUCOTROL) 10 MG TABLET    Take 10 mg by mouth 2 times daily (before meals)    HANDICAP PLACARD MISC    by Does not apply route Duration 2 years    LISINOPRIL (PRINIVIL;ZESTRIL) 20 MG TABLET    Take 1 tablet by mouth daily    METFORMIN (GLUCOPHAGE) 1000 MG TABLET    Take 1 tablet by mouth 2 times daily (with meals)    PIOGLITAZONE (ACTOS) 30 MG TABLET    Take 30 mg by mouth daily    SIMVASTATIN (ZOCOR) 40 MG TABLET    Take 10 mg by mouth nightly     VITAMIN D (CHOLECALCIFEROL) 1000 UNIT TABS TABLET    Take 1,000 Units by mouth 2 times daily        ALLERGIES     is allergic to atorvastatin, sulfa antibiotics, and sulfanilamide. FAMILY HISTORY     He indicated that the status of his mother is unknown. He indicated that his father is . He indicated that his sister is . He indicated that the status of his brother is unknown.     family history includes Coronary Art Dis in his brother and sister; Heart Attack in his father; Hypertension in his mother; Jerre Brown in his mother; Stroke in his mother. SOCIAL HISTORY      reports that he quit smoking about 51 years ago. He has a 1.00 pack-year smoking history. He has never used smokeless tobacco. He reports current alcohol use. He reports that he does not use drugs. PHYSICAL EXAM     INITIAL VITALS:  height is 5' 7\" (1.702 m) and weight is 174 lb (78.9 kg). His blood pressure is 147/72 (abnormal) and his pulse is 98. His respiration is 28 and oxygen saturation is 96%.       General: Patient is alert nontoxic-appearing male in mild amount of respiratory distress  HEENT: Head is atraumatic conjunctiva are clear mouth shows moist mucous membranes  Neck: Supple  Respiratory: Patient has diminished breath sounds throughout with no obvious wheezing no accessory muscle use but elevated respiratory rate  Cardiac: Heart is regular rate and rhythm  GI: Abdomen is mildly obese soft nontender  Peripheral exam patient is 1+ pitting edema in the lower extremities good distal pulses  Neuro: Patient has no gross focal neurological deficits at bedside exam    DIFFERENTIAL DIAGNOSIS/ MDM:     CHF COPD pneumonia obtain cardiac work-up pulmonary work-up    DIAGNOSTIC RESULTS     EKG: All EKG's are interpreted by the Emergency Department Physician who either signs or Co-signs this chart in the absence of a cardiologist.    EKG interpreted by me reveals a sinus rhythm with first-degree AV block TN of 212 with occasional PACs left axis deviation QRS normal at 98 no acute ST elevations or depressions    RADIOLOGY:   I directly visualized the following  images and reviewed the radiologist interpretations:  XR CHEST PORTABLE    (Results Pending)         LABS:  Labs Reviewed   CBC WITH AUTO DIFFERENTIAL - Abnormal; Notable for the following components:       Result Value    RDW 14.7 (*)     Seg Neutrophils 77 (*)     Lymphocytes 11 (*)     Absolute Lymph # 1.04 (*)     All other components within normal limits   BASIC METABOLIC PANEL   TROPONIN   BRAIN NATRIURETIC PEPTIDE         EMERGENCY DEPARTMENT COURSE:   Vitals:    Vitals:    07/31/21 0626 07/31/21 0629 07/31/21 0630 07/31/21 0645   BP:  (!) 165/77 (!) 144/74 (!) 147/72   Pulse:   98    Resp:   28    SpO2: (!) 88%  96%    Weight:   174 lb (78.9 kg)    Height:   5' 7\" (1.702 m)      -------------------------  BP: (!) 147/72,  , Pulse: 98, Resp: 28    Orders Placed This Encounter   Medications    ipratropium-albuterol (DUONEB) 0.5-2.5 (3) MG/3ML nebulizer solution     YONI CLEANING: cabinet override    ipratropium-albuterol (DUONEB) nebulizer solution 1 ampule     Order Specific Question:   Initiate RT Bronchodilator Protocol     Answer:    Yes    albuterol (PROVENTIL) nebulizer solution 2.5 mg     Order Specific Question:   Initiate RT Bronchodilator Protocol     Answer:   Yes           Re-evaluation Notes    This time care will be turned over to oncoming physician Dr. Gricelda Cortés secondary to the end of my shift for final disposition diagnosis    CRITICAL CARE:

## 2021-08-01 ENCOUNTER — APPOINTMENT (OUTPATIENT)
Dept: GENERAL RADIOLOGY | Age: 76
DRG: 291 | End: 2021-08-01
Payer: MEDICARE

## 2021-08-01 VITALS
BODY MASS INDEX: 26.74 KG/M2 | HEIGHT: 67 IN | SYSTOLIC BLOOD PRESSURE: 139 MMHG | RESPIRATION RATE: 16 BRPM | HEART RATE: 65 BPM | DIASTOLIC BLOOD PRESSURE: 87 MMHG | TEMPERATURE: 98.4 F | WEIGHT: 170.4 LBS | OXYGEN SATURATION: 96 %

## 2021-08-01 LAB
ABSOLUTE EOS #: 0.22 K/UL (ref 0–0.44)
ABSOLUTE IMMATURE GRANULOCYTE: 0.03 K/UL (ref 0–0.3)
ABSOLUTE LYMPH #: 0.84 K/UL (ref 1.1–3.7)
ABSOLUTE MONO #: 0.89 K/UL (ref 0.1–1.2)
ANION GAP SERPL CALCULATED.3IONS-SCNC: 10 MMOL/L (ref 9–17)
BASOPHILS # BLD: 1 % (ref 0–2)
BASOPHILS ABSOLUTE: 0.04 K/UL (ref 0–0.2)
BUN BLDV-MCNC: 22 MG/DL (ref 8–23)
BUN/CREAT BLD: 15 (ref 9–20)
CALCIUM SERPL-MCNC: 9.1 MG/DL (ref 8.6–10.4)
CHLORIDE BLD-SCNC: 105 MMOL/L (ref 98–107)
CO2: 25 MMOL/L (ref 20–31)
CREAT SERPL-MCNC: 1.46 MG/DL (ref 0.7–1.2)
DIFFERENTIAL TYPE: ABNORMAL
EOSINOPHILS RELATIVE PERCENT: 3 % (ref 1–4)
ESTIMATED AVERAGE GLUCOSE: 148 MG/DL
GFR AFRICAN AMERICAN: 57 ML/MIN
GFR NON-AFRICAN AMERICAN: 47 ML/MIN
GFR SERPL CREATININE-BSD FRML MDRD: ABNORMAL ML/MIN/{1.73_M2}
GFR SERPL CREATININE-BSD FRML MDRD: ABNORMAL ML/MIN/{1.73_M2}
GLUCOSE BLD-MCNC: 113 MG/DL (ref 70–99)
GLUCOSE BLD-MCNC: 218 MG/DL (ref 75–110)
HBA1C MFR BLD: 6.8 % (ref 4–6)
HCT VFR BLD CALC: 33 % (ref 40.7–50.3)
HEMOGLOBIN: 10.7 G/DL (ref 13–17)
IMMATURE GRANULOCYTES: 0 %
LYMPHOCYTES # BLD: 11 % (ref 24–43)
MAGNESIUM: 2.1 MG/DL (ref 1.6–2.6)
MCH RBC QN AUTO: 27.2 PG (ref 25.2–33.5)
MCHC RBC AUTO-ENTMCNC: 32.4 G/DL (ref 25.2–33.5)
MCV RBC AUTO: 83.8 FL (ref 82.6–102.9)
MONOCYTES # BLD: 12 % (ref 3–12)
NRBC AUTOMATED: 0 PER 100 WBC
PDW BLD-RTO: 14.9 % (ref 11.8–14.4)
PLATELET # BLD: 245 K/UL (ref 138–453)
PLATELET ESTIMATE: ABNORMAL
PMV BLD AUTO: 10.4 FL (ref 8.1–13.5)
POTASSIUM SERPL-SCNC: 4.8 MMOL/L (ref 3.7–5.3)
RBC # BLD: 3.94 M/UL (ref 4.21–5.77)
RBC # BLD: ABNORMAL 10*6/UL
SEG NEUTROPHILS: 73 % (ref 36–65)
SEGMENTED NEUTROPHILS ABSOLUTE COUNT: 5.38 K/UL (ref 1.5–8.1)
SODIUM BLD-SCNC: 140 MMOL/L (ref 135–144)
WBC # BLD: 7.4 K/UL (ref 3.5–11.3)
WBC # BLD: ABNORMAL 10*3/UL

## 2021-08-01 PROCEDURE — 82947 ASSAY GLUCOSE BLOOD QUANT: CPT

## 2021-08-01 PROCEDURE — 80048 BASIC METABOLIC PNL TOTAL CA: CPT

## 2021-08-01 PROCEDURE — 6360000002 HC RX W HCPCS: Performed by: FAMILY MEDICINE

## 2021-08-01 PROCEDURE — 6370000000 HC RX 637 (ALT 250 FOR IP): Performed by: FAMILY MEDICINE

## 2021-08-01 PROCEDURE — 83735 ASSAY OF MAGNESIUM: CPT

## 2021-08-01 PROCEDURE — 99238 HOSP IP/OBS DSCHRG MGMT 30/<: CPT | Performed by: FAMILY MEDICINE

## 2021-08-01 PROCEDURE — 36415 COLL VENOUS BLD VENIPUNCTURE: CPT

## 2021-08-01 PROCEDURE — 2580000003 HC RX 258: Performed by: FAMILY MEDICINE

## 2021-08-01 PROCEDURE — 94761 N-INVAS EAR/PLS OXIMETRY MLT: CPT

## 2021-08-01 PROCEDURE — 94640 AIRWAY INHALATION TREATMENT: CPT

## 2021-08-01 PROCEDURE — 71046 X-RAY EXAM CHEST 2 VIEWS: CPT

## 2021-08-01 PROCEDURE — 85025 COMPLETE CBC W/AUTO DIFF WBC: CPT

## 2021-08-01 RX ORDER — CEFUROXIME AXETIL 500 MG/1
500 TABLET ORAL 2 TIMES DAILY
Qty: 14 TABLET | Refills: 0 | Status: SHIPPED | OUTPATIENT
Start: 2021-08-01 | End: 2021-08-08

## 2021-08-01 RX ORDER — AZITHROMYCIN 500 MG/1
500 TABLET, FILM COATED ORAL DAILY
Qty: 1 PACKET | Refills: 0 | Status: SHIPPED | OUTPATIENT
Start: 2021-08-01 | End: 2021-08-04

## 2021-08-01 RX ADMIN — INSULIN LISPRO 4 UNITS: 100 INJECTION, SOLUTION INTRAVENOUS; SUBCUTANEOUS at 12:20

## 2021-08-01 RX ADMIN — AZITHROMYCIN MONOHYDRATE 500 MG: 500 INJECTION, POWDER, LYOPHILIZED, FOR SOLUTION INTRAVENOUS at 08:41

## 2021-08-01 RX ADMIN — IPRATROPIUM BROMIDE AND ALBUTEROL SULFATE 1 AMPULE: .5; 3 SOLUTION RESPIRATORY (INHALATION) at 08:55

## 2021-08-01 RX ADMIN — SODIUM CHLORIDE, PRESERVATIVE FREE 10 ML: 5 INJECTION INTRAVENOUS at 08:02

## 2021-08-01 RX ADMIN — CLOPIDOGREL BISULFATE 75 MG: 75 TABLET ORAL at 08:01

## 2021-08-01 RX ADMIN — FUROSEMIDE 20 MG: 10 INJECTION, SOLUTION INTRAMUSCULAR; INTRAVENOUS at 08:01

## 2021-08-01 RX ADMIN — LISINOPRIL 20 MG: 20 TABLET ORAL at 12:25

## 2021-08-01 RX ADMIN — CEFTRIAXONE 1000 MG: 1 INJECTION, POWDER, FOR SOLUTION INTRAMUSCULAR; INTRAVENOUS at 08:02

## 2021-08-01 RX ADMIN — GLIPIZIDE 10 MG: 5 TABLET ORAL at 07:07

## 2021-08-01 RX ADMIN — ENOXAPARIN SODIUM 40 MG: 40 INJECTION SUBCUTANEOUS at 08:02

## 2021-08-01 RX ADMIN — CHOLECALCIFEROL TAB 25 MCG (1000 UNIT) 1000 UNITS: 25 TAB at 08:01

## 2021-08-01 ASSESSMENT — PAIN SCALES - GENERAL: PAINLEVEL_OUTOF10: 0

## 2021-08-01 NOTE — PLAN OF CARE
Problem: Falls - Risk of:  Goal: Will remain free from falls  Description: Will remain free from falls  8/1/2021 0025 by Edward Johnson RN  Outcome: Ongoing  7/31/2021 1105 by Brinton Cowden, RN  Outcome: Ongoing  Note: Call light in reach, bed in lowest position, and bed alarm activated. Education given on use of call light before ambulation and when in need of assistance. Patient expressed understanding. Hourly visual checks performed and charted. Toileting offered to patient. No falls this shift, at any time. Arm band and falling star in place. Will continue to monitor. Goal: Absence of physical injury  Description: Absence of physical injury  8/1/2021 0025 by Edward Johnson RN  Outcome: Ongoing  7/31/2021 1105 by Brinton Cowden, RN  Outcome: Ongoing     Problem: Pain:  Goal: Pain level will decrease  Description: Pain level will decrease  8/1/2021 0025 by Edward Johnson RN  Outcome: Ongoing  7/31/2021 1105 by Brinton Cowden, RN  Outcome: Ongoing  Note: Patient able to use 0-10 pain scale. Denies pain at this time. Agreeable to take PRN pain medications. Goal: Control of acute pain  Description: Control of acute pain  8/1/2021 0025 by Edward Johnson RN  Outcome: Ongoing  7/31/2021 1105 by Brinton Cowden, RN  Outcome: Ongoing  Goal: Control of chronic pain  Description: Control of chronic pain  8/1/2021 0025 by Edward Johnson RN  Outcome: Ongoing  7/31/2021 1105 by Brinton Cowden, RN  Outcome: Ongoing     Problem: Skin Integrity:  Goal: Will show no infection signs and symptoms  Description: Will show no infection signs and symptoms  8/1/2021 0025 by Edward Johnson RN  Outcome: Ongoing  7/31/2021 1105 by Brinton Cowden, RN  Outcome: Ongoing  Note: No signs of skin breakdown. Skin warm, dry, and intact. Mucous membranes pink and moist.  Assistance with turns/ambulation provided PRN. Will continue to monitor.    Goal: Absence of new skin breakdown  Description: Absence of new skin

## 2021-08-01 NOTE — PROGRESS NOTES
Discharge teaching and instructions for diagnosis of CHF and pneumonia completed with patient and wife using teachback method. AVS reviewed with the patient. Prescriptions were sent to [address] [place] [address] for the patient to  after discharge. Patient voiced understanding regarding prescriptions, education sheets regarding new medications were given to the patient (Ceftin and Zithromax), follow up appointments were not made for the patient- patient was advised to call to make follow-up appointments with PCP and cardiologist on Monday. Unit phone number highlighted on AVS if questions arise.

## 2021-08-01 NOTE — DISCHARGE SUMMARY
Discharge Summary    Katarzyna Godinez Patient Status:  Inpatient    1945 MRN [id number]   Location [facility] Attending Deni Lindsey MD   Hosp Day # 1 PCP ESTHER Joshi CNP     Date of Admission: 2021    Date of Discharge: 31    Admitting Diagnosis: Pneumonia [J18.9]  Pneumonia [J18.9]    Discharge Diagnosis:   Acute on chronic Combined CHF. Bibasilar atelectasis VS pneumonia. Chronically elevated Troponin stable  S/p AVR  Reason for Admission/HPI: Shortnes of breath with hypoxia on ER arrival.    Hospital Course: Patient was started on IV rocephin and Zithromax for possible pneumonia. His hypoxia had resolved by the time he came up to the floor and  after he received dose of IV lasix in the ER. Patients troponin is chronically  elevated and was stable,no chest pain or acute EKG cahnges. His Actos was held due to H/O CHF will discontinue on discharge. Patients respiratory symptoms have remarkably resolved and is down  about 4 lbs with diuresis since admission. His repeat CXR 2 view this am with no evidence of pneumonia has small pleural effusion. Will follow up with cardiology as outpatient. Advised to take lasix daily and f/u with PCP to monitor his diabetes and medication adjustments as needed.     Consultations: Outpatient cardiology    Procedures: None    Complications: None    Disposition: Home    Discharge Condition: good    Discharge Medications:    Phil Glassing   Home Medication Instructions [id number]    Printed on:21 1050   Medication Information                      amLODIPine (NORVASC) 10 MG tablet  Take 1 tablet by mouth daily             azithromycin (ZITHROMAX) 500 MG tablet  Take 1 tablet by mouth daily for 3 days             cefUROXime (CEFTIN) 500 MG tablet  Take 1 tablet by mouth 2 times daily for 7 days             clopidogrel (PLAVIX) 75 MG tablet  Take 1 tablet by mouth daily Take 1 daily             furosemide (LASIX) 40 MG tablet  Take 0.5 tablets by mouth daily             glipiZIDE (GLUCOTROL) 10 MG tablet  Take 10 mg by mouth 2 times daily (before meals)             Handicap Placard MISC  by Does not apply route Duration 2 years             lisinopril (PRINIVIL;ZESTRIL) 20 MG tablet  Take 1 tablet by mouth daily             metFORMIN (GLUCOPHAGE) 1000 MG tablet  Take 1 tablet by mouth 2 times daily (with meals)             simvastatin (ZOCOR) 40 MG tablet  Take 10 mg by mouth nightly              vitamin D (CHOLECALCIFEROL) 1000 UNIT TABS tablet  Take 1,000 Units by mouth 2 times daily                  Follow up Visits:  Follow-up with PCP in 1 week      Total Time spent with patient and coordinating care:  30 minutes    Rod Allen MD  8/1/2021  10:50 AM

## 2021-08-02 ENCOUNTER — CARE COORDINATION (OUTPATIENT)
Dept: CASE MANAGEMENT | Age: 76
End: 2021-08-02

## 2021-08-02 LAB
EKG ATRIAL RATE: 73 BPM
EKG ATRIAL RATE: 88 BPM
EKG P AXIS: 40 DEGREES
EKG P AXIS: 52 DEGREES
EKG P-R INTERVAL: 212 MS
EKG P-R INTERVAL: 224 MS
EKG Q-T INTERVAL: 368 MS
EKG Q-T INTERVAL: 394 MS
EKG QRS DURATION: 104 MS
EKG QRS DURATION: 98 MS
EKG QTC CALCULATION (BAZETT): 434 MS
EKG QTC CALCULATION (BAZETT): 445 MS
EKG R AXIS: -43 DEGREES
EKG R AXIS: -48 DEGREES
EKG T AXIS: 65 DEGREES
EKG T AXIS: 66 DEGREES
EKG VENTRICULAR RATE: 73 BPM
EKG VENTRICULAR RATE: 88 BPM

## 2021-08-02 NOTE — CARE COORDINATION
[facility] Transitions Initial Follow Up Call    Call within 2 business days of discharge: Yes    Patient: Amelia Garvin Patient : 1945   MRN: <[id number]>  Reason for Admission: sob  Discharge Date: 21 RARS: Readmission Risk Score: 15      Last Discharge [facility]       Complaint Diagnosis Description Type Department Provider    21 Shortness of Breath Pneumonia of both lungs due to infectious organism, unspecified part of lung . .. ED to Hosp-Admission (Discharged) (ADMITTED) Susan Hale MD; Idalmis Triplett...            # 1 tempt-unable to reach patient, left vm message with name and call back information, requested call back//JU    Facility: [facility]    Non-face-to-face services provided:      Care Transitions 24 Hour Call    Do you have all of your prescriptions and are they filled?:  (Comment: no new medications - has all meds prescribed)  Care Transitions Interventions         Follow Up  Future Appointments   Date Time Provider Julio Ratliff   8/3/2021 11:00 AM DO STEVE Phillips [facility]   2021 10:30 AM ESTHER Wilson CNP [facility]   2021  2:30 PM ESTHER Wilson CNP [facility]   12/15/2021  9:40 AM MD Lacie Hartman, RN

## 2021-08-03 ENCOUNTER — CARE COORDINATION (OUTPATIENT)
Dept: CASE MANAGEMENT | Age: 76
End: 2021-08-03

## 2021-08-03 ENCOUNTER — OFFICE VISIT (OUTPATIENT)
Dept: CARDIOLOGY | Age: 76
End: 2021-08-03
Payer: MEDICARE

## 2021-08-03 VITALS
BODY MASS INDEX: 27 KG/M2 | SYSTOLIC BLOOD PRESSURE: 144 MMHG | WEIGHT: 168 LBS | HEIGHT: 66 IN | HEART RATE: 58 BPM | DIASTOLIC BLOOD PRESSURE: 68 MMHG

## 2021-08-03 DIAGNOSIS — N18.32 STAGE 3B CHRONIC KIDNEY DISEASE (HCC): ICD-10-CM

## 2021-08-03 DIAGNOSIS — Z95.2 S/P TAVR (TRANSCATHETER AORTIC VALVE REPLACEMENT): ICD-10-CM

## 2021-08-03 DIAGNOSIS — I25.10 CORONARY ARTERY DISEASE INVOLVING NATIVE CORONARY ARTERY OF NATIVE HEART WITHOUT ANGINA PECTORIS: ICD-10-CM

## 2021-08-03 DIAGNOSIS — I35.0 AORTIC STENOSIS, SEVERE: ICD-10-CM

## 2021-08-03 DIAGNOSIS — E78.2 MIXED HYPERLIPIDEMIA: ICD-10-CM

## 2021-08-03 DIAGNOSIS — E11.3219 CONTROLLED TYPE 2 DIABETES MELLITUS WITH MILD NONPROLIFERATIVE RETINOPATHY AND MACULAR EDEMA, WITHOUT LONG-TERM CURRENT USE OF INSULIN, UNSPECIFIED LATERALITY (HCC): ICD-10-CM

## 2021-08-03 DIAGNOSIS — I10 HYPERTENSION, ESSENTIAL: Primary | ICD-10-CM

## 2021-08-03 DIAGNOSIS — I65.29 STENOSIS OF CAROTID ARTERY, UNSPECIFIED LATERALITY: ICD-10-CM

## 2021-08-03 PROCEDURE — G8427 DOCREV CUR MEDS BY ELIG CLIN: HCPCS | Performed by: INTERNAL MEDICINE

## 2021-08-03 PROCEDURE — 3044F HG A1C LEVEL LT 7.0%: CPT | Performed by: INTERNAL MEDICINE

## 2021-08-03 PROCEDURE — 1111F DSCHRG MED/CURRENT MED MERGE: CPT | Performed by: INTERNAL MEDICINE

## 2021-08-03 PROCEDURE — 4040F PNEUMOC VAC/ADMIN/RCVD: CPT | Performed by: INTERNAL MEDICINE

## 2021-08-03 PROCEDURE — 3017F COLORECTAL CA SCREEN DOC REV: CPT | Performed by: INTERNAL MEDICINE

## 2021-08-03 PROCEDURE — 99213 OFFICE O/P EST LOW 20 MIN: CPT | Performed by: INTERNAL MEDICINE

## 2021-08-03 PROCEDURE — G8417 CALC BMI ABV UP PARAM F/U: HCPCS | Performed by: INTERNAL MEDICINE

## 2021-08-03 PROCEDURE — 99214 OFFICE O/P EST MOD 30 MIN: CPT | Performed by: INTERNAL MEDICINE

## 2021-08-03 PROCEDURE — 1036F TOBACCO NON-USER: CPT | Performed by: INTERNAL MEDICINE

## 2021-08-03 PROCEDURE — 2022F DILAT RTA XM EVC RTNOPTHY: CPT | Performed by: INTERNAL MEDICINE

## 2021-08-03 PROCEDURE — 1123F ACP DISCUSS/DSCN MKR DOCD: CPT | Performed by: INTERNAL MEDICINE

## 2021-08-03 NOTE — PROGRESS NOTES
Today's Date: 8/3/2021  Patient's Name: Linda Friend  Patient's age: 77 y.o., 1945    HPI and CC:  Linda Friend  is doing well from a cardiac standpoint. He is walking a mile a day. No chest pain, no dyspnea, no PND, no syncope or pre-syncope, no orthopnea. Says he has never felt better. Past Medical History:   has a past medical history of Acne rosacea, Anemia, Aortic stenosis, BPH (benign prostatic hypertrophy), CAD (coronary artery disease), Carotid stenosis, Cataract of both eyes, CRI (chronic renal insufficiency), Diabetes mellitus, type 2 ([address] Dyslipidemia, Erectile dysfunction, H/O agent Orange exposure, Hearing loss, Hyperlipidemia, Hypertension, Non-rheumatic mitral regurgitation, RBBB, 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 Past Surgical History:   has a past surgical history that includes Circumcision (age 49); Vasectomy; Tympanostomy tube placement; Cataract removal (Bilateral); eye surgery (Bilateral); Cardiac catheterization (12/03/2019); and Aortic valve surgery (01/2020). Home Medications:  Prior to Admission medications    Medication Sig Start Date End Date Taking?  Authorizing Provider   cefUROXime (CEFTIN) 500 MG tablet Take 1 tablet by mouth 2 times daily for 7 days 8/1/21 8/8/21 Yes Bryson Jackman MD   azithromycin (ZITHROMAX) 500 MG tablet Take 1 tablet by mouth daily for 3 days 8/1/21 8/4/21 Yes Bryson Jackman MD   furosemide (LASIX) 40 MG tablet Take 0.5 tablets by mouth daily  Patient taking differently: Take 20 mg by mouth as needed  7/10/20  Yes Charito Benoit MD   amLODIPine (NORVASC) 10 MG tablet Take 1 tablet by mouth daily 1/23/20  Yes Luis Charles MD   clopidogrel (PLAVIX) 75 MG tablet Take 1 tablet by mouth daily Take 1 daily 12/6/19  Yes ESTHER Gamble CNP   simvastatin (ZOCOR) 40 MG tablet Take 10 mg by mouth nightly    Yes Historical Provider, MD   glipiZIDE (GLUCOTROL) 10 MG tablet Take 10 mg by mouth 2 times daily (before meals)   Yes Historical Provider, MD   vitamin D (CHOLECALCIFEROL) 1000 UNIT TABS tablet Take 1,000 Units by mouth 2 times daily    Yes Historical Provider, MD   metFORMIN (GLUCOPHAGE) 1000 MG tablet Take 1 tablet by mouth 2 times daily (with meals) 8/25/16  Yes ESTHER Christianson CNP   lisinopril (PRINIVIL;ZESTRIL) 20 MG tablet Take 1 tablet by mouth daily 8/24/16  Yes ESTHER Christianson CNP   Handicap Placard MISC by Does not apply route Duration 2 years 6/2/20   Corlis Backers, APRN - CNP       Allergies:  Atorvastatin, Sulfa antibiotics, and Sulfanilamide    Social History:   reports that he quit smoking about 51 years ago. He has a 1.00 pack-year smoking history. He has never used smokeless tobacco. He reports current alcohol use. He reports that he does not use drugs. Family History: family history includes Coronary Art Dis in his brother and sister; Heart Attack in his father; Hypertension in his mother; [place] in his mother; Stroke in his mother. No h/o sudden cardiac death. No for premature CAD    REVIEW OF SYSTEMS:    · Constitutional: there has been no unanticipated weight loss. There's been No change in energy level, No change in activity level. · Eyes: No visual changes or diplopia. No scleral icterus. · ENT: No Headaches, hearing loss or vertigo. No mouth sores or sore throat. · Cardiovascular: see above  · Respiratory: see above  · Gastrointestinal: No abdominal pain, appetite loss, blood in stools. · Genitourinary: No dysuria, trouble voiding, or hematuria. · Musculoskeletal:  No gait disturbance, No weakness or joint complaints. · Integumentary: No rash or pruritis. · Neurological: No headache or diplopia. No tingling  · Psychiatric: No anxiety, or depression. · Endocrine: No temperature intolerance.    · Hematologic/Lymphatic: No abnormal bruising or bleeding, blood clots or swollen lymph nodes.  · Allergic/Immunologic: No nasal congestion or hives. PHYSICAL EXAM:      BP (!) 162/72 (Site: Right Upper Arm, Position: Sitting, Cuff Size: Medium Adult)   Pulse 62   Ht 5' 6\" (1.676 m)   Wt 168 lb (76.2 kg)   BMI 27.12 kg/m²   General appearance: alert and cooperative with exam  HEENT: Head: Normocephalic, no lesions, without obvious abnormality. Eyes: PERRL, EOMI  Ears: Not obvious deformations or lack of hearing  Neck: no carotid bruit, no JVD  Lungs: clear to auscultation bilaterally  Heart: regular rate and rhythm, S1, S2 normal, no murmur, click, rub or gallop  Abdomen: soft, non-tender; bowel sounds normal; no masses,  no organomegaly  Extremities: extremities normal, atraumatic, no cyanosis or edema  Neurologic: Mental status: Alert, oriented, thought content appropriate  Skin: WNL for age and condition, no obvious rashes or leasions      Cardiac Data:  EKG: SR, first degree AV block, inferior infarction, anterior infarction    Labs:     CBC:   Recent Labs     08/01/21  0623   WBC 7.4   HGB 10.7*   HCT 33.0*        BMP:   Recent Labs     08/01/21  0623      K 4.8   CO2 25   BUN 22   CREATININE 1.46*   LABGLOM 47*   GLUCOSE 113*     PT/INR: No results for input(s): PROTIME, INR in the last 72 hours. FASTING LIPID PANEL:  Lab Results   Component Value Date    HDL 42 07/22/2021    TRIG 102 07/22/2021     LIVER PROFILE:No results for input(s): AST, ALT, LABALBU in the last 72 hours. TTE 12/3/19  Summary  Left ventricle is normal in size. Global left ventricular systolic function  is moderately reduced. Calculated EF via heart model is 31 %. Grade I (mild) left ventricular diastolic dysfunction. Left atrium is mildly dilated. Right atrial dilatation. Mildly dilated right ventricular cavity. Right ventricular function appears normal .  The aortic valve is calcified with restricted cusp mobility. Moderate Aortic Stenosis, however, maybe underestimated due to poor LVEF.   Mild mitral regurgitation. Trivial tricuspid regurgitation. Estimated right ventricular systolic  pressure is 27 mmHg.     Cardiac cath 12/3/19   Procedure Summary      Severe aortic stenosis.   Mild LV systolic dysfunction.   Patent mid LAD, proximal D1 and mid LCX stents. Focal distal LAD 70%   stenosis.      Recommendations      Medical therapy as needed.   Risk factor modification.   Elective CV surgical consultation for AVR/ CABG     Echo 01/23/2020:  Left ventricle is mildly enlarged, basal septal hypertrophy, global left  ventricular systolic function is moderately to severely reduced, calculated  ejection fraction is 36%. Grade I (mild) left ventricular diastolic dysfunction. Left atrium is moderately dilated. Normal right ventricular size and function. Bioprosthetic valve is seated in the aortic position, appears to be  functioning normally with no obvious regrugitation or stenosis. Trivial perivalvular leak noted. Peak instantaneous gradient 17 mmHg and mean gradient 9 mmHg. Mitral valve sclerosis without stenosis. Mild mitral regurgitation. Tricuspid valve was not well visualized. Trivial tricuspid regurgitation.     S/p TAVR 1/21/2020   34 mm CoreValve EVOLUT via right Femoral approach. Serial # [id number]     TTE 2/27/2020  Summary  Thinned and severely hypokinetic inferolateral wall. Left ventricular systolic function is mildly reduced. Left ventricular ejection fraction 45 %. Grade II (moderate) left ventricular diastolic dysfunction. Right ventricular dilatation with normal systolic function. Bioprosthetic aortic valve( TAVR) that is normal in appearance and function. Peak instantaneous gradient 9 mmHg and mean gradient 5 mmHg. Trace aortic insufficiency. Mitral leaflets are mildly thickened without stenosis. Mild mitral regurgitation. No pericardial effusion. Echo: 3/16/21  Normal left ventricular diameter. Mildly thickened interventricular septum.   Thinned and akinetic inferolateral wall. Left ventricular systolic function is mildly reduced. Left ventricular ejection fraction 45 %. Grade I (mild) left ventricular diastolic dysfunction. Left atrium is severely dilated. Right atrial dilatation. Right ventricular dilatation with normal systolic function. Bioprosthetic aortic valve (per TAVR) that is normal in appearance and  function. Peak instantaneous gradient 12 mmHg and mean gradient 6 mmHg. Mitral annular calcification. Mild mitral regurgitation. Normal function of other valves. No pericardial effusion.     Assessment and Plan:     1. CAD with h/o DEVON to LAD, D and mid LCX 8/23/16. Cardiac cath for NSTEMI 12/3/19 showed patent stents with chronic RDPA occlusion with focal distal LAD 70%; LVEF 40%. Stable on current medical treatment. Continue ASA/plavix  2. Severe aortic stenosis s/p TAVR on 1/21/2020 functioning normally on echo post TAVR as above. Follow with TTEs. Antibiotics prior to dental work. 3. Carotid artery disease. Follows with vascular surgery. 4. HTN- with white coat HTN. Continue current therapy. Patient to check BP at home and to report if > 130/85  5. Ischemic / valvular cardiomyopathy EF of 45% currently without signs of volume overload. 6. Hyperlipidemia- Last  on 5/21/2020. Patient is on low dose simvastatin which he is able to tolerate. Any increase in dose or switch has caused him severe myalgia. I recommended him addition of zetia. He does not want to take this new medication. Repeat lipid panel  7. H/o agent orange exposure. 8. CKD- Has been referred to nephrology by PCP  9. Right lower ext numbess. Seeing neurology and vascular surgery. 10. RTC 6 months      Thank you for allowing me to participate in the care of this patient, please do not hesitate to call if you have any questions. Wilmer Johnson, [id number] [facility]  [facility] [place]  [phone]

## 2021-08-03 NOTE — CARE COORDINATION
[facility] Transitions Initial Follow Up Call    Call within 2 business days of discharge: Yes    Patient: Micha Sanchez Patient : 1945   MRN: [id number]  Reason for Admission: Pneumonia  CHF    Discharge Date: 21 RARS: Readmission Risk Score: 15      Last Discharge [address] [id number]       Complaint Diagnosis Description Type Department Provider    21 Shortness of Breath Pneumonia of both lungs due to infectious organism, unspecified part of lung . .. ED to Hosp-Admission (Discharged) (ADMITTED) Savita Zeng MD; Rogelio Arce... Spoke with: 24 HOUR initial call. 2nd and final attempt. No answer at both numbers provided. Left HIPPA compliant message to return call to this writer.      Facility:[facility]    Non-face-to-face services provided:  Obtained and reviewed discharge summary and/or continuity of care documents    Care Transitions 24 Hour Call    Do you have all of your prescriptions and are they filled?:  (Comment: no new medications - has all meds prescribed)  Care Transitions Interventions         Follow Up  Future Appointments   Date Time Provider Julio Ratliff   2021 10:30 AM ESTHER Klein CNP [facility]   2021  2:30 PM ESTHER Klein CNP [facility]   12/15/2021  9:40 AM Jc Pruett MD [facility]   2022 10:15 AM DO STEVE Gauthier [facility]       Marlene Fuentes, [address] [place] [address] Care Transitions Nurse   [phone]

## 2021-08-04 ENCOUNTER — OFFICE VISIT (OUTPATIENT)
Dept: INTERNAL MEDICINE | Age: 76
End: 2021-08-04
Payer: MEDICARE

## 2021-08-04 VITALS
HEIGHT: 66 IN | SYSTOLIC BLOOD PRESSURE: 124 MMHG | BODY MASS INDEX: 26.97 KG/M2 | WEIGHT: 167.8 LBS | TEMPERATURE: 97.7 F | OXYGEN SATURATION: 98 % | DIASTOLIC BLOOD PRESSURE: 62 MMHG | RESPIRATION RATE: 16 BRPM | HEART RATE: 60 BPM

## 2021-08-04 DIAGNOSIS — Z95.2 S/P TAVR (TRANSCATHETER AORTIC VALVE REPLACEMENT): ICD-10-CM

## 2021-08-04 DIAGNOSIS — I50.22 CHF NYHA CLASS I, CHRONIC, SYSTOLIC (HCC): Primary | ICD-10-CM

## 2021-08-04 DIAGNOSIS — I10 ESSENTIAL HYPERTENSION: ICD-10-CM

## 2021-08-04 DIAGNOSIS — E11.21 TYPE 2 DIABETES MELLITUS WITH PROTEINURIC DIABETIC NEPHROPATHY (HCC): ICD-10-CM

## 2021-08-04 DIAGNOSIS — N18.2 STAGE 2 CHRONIC KIDNEY DISEASE: ICD-10-CM

## 2021-08-04 DIAGNOSIS — I35.0 AORTIC STENOSIS, SEVERE: ICD-10-CM

## 2021-08-04 DIAGNOSIS — J18.9 PNEUMONIA DUE TO INFECTIOUS ORGANISM, UNSPECIFIED LATERALITY, UNSPECIFIED PART OF LUNG: ICD-10-CM

## 2021-08-04 PROCEDURE — 99495 TRANSJ CARE MGMT MOD F2F 14D: CPT | Performed by: NURSE PRACTITIONER

## 2021-08-04 NOTE — PROGRESS NOTES
Transition Care Office Visit    Date of Face-to-Face: 8/4/2021  Persons at visit: Wife  Date of interactive contact/ attempted contact with the patient and/or caregiver: 8/3/2021-See transitional care telephone note. HPI   67-year-old patient being seen for post hospital follow-up from [facility] where he was admitted 7/31/2021 through 8/1/2021 for acute on chronic CHF, bibasilar atelectasis versus pneumonia. Patient with chronically elevated troponins which have been stable. Patient with severe aortic stenosis status post TAVR. He states he woke up in the middle night with significant shortness of breath. Squad was called. Patient was seen in the ER with significant hypoxia. Was given IV Lasix chest x-ray with question pneumonia was given IV antibiotics and discharged home on Zithromax and Ceftin. Patient is down greater than 4 pounds. Is noted that over the last week he states he started eating ham more. States usually he does not eat him because that is all they got when he was in the [facility]. Aware of a 2 g sodium diet. His Actos was also discontinued due to the CHF. He does follow with the [place] for his blood sugars/diabetes. We will send a note for further medication adjustments through the [place]. Patient also was using the Lasix on an as-needed basis. He has now been using it 20 mg daily. Does have some swelling to lower extremities but wife states significantly improved. Patient was seen by cardiology yesterday. No new adjustments in medication    Activity: activity as tolerated  Any medication changes since post-hospitalization phone call? No  Any treatment changes since post-hospitalization phone call? No  Any further education needed on medications/treatment plan?  No    Current Medications   Outpatient Medications Marked as Taking for the 8/4/21 encounter (Office Visit) with ESTHER Singh CNP   Medication Sig Dispense Refill    cefUROXime (CEFTIN) 500 MG tablet Take 1 tablet by mouth 2 times daily for 7 days 14 tablet 0    azithromycin (ZITHROMAX) 500 MG tablet Take 1 tablet by mouth daily for 3 days 1 packet 0    furosemide (LASIX) 40 MG tablet Take 0.5 tablets by mouth daily 30 tablet 0    amLODIPine (NORVASC) 10 MG tablet Take 1 tablet by mouth daily 30 tablet 5    clopidogrel (PLAVIX) 75 MG tablet Take 1 tablet by mouth daily Take 1 daily 60 tablet 3    simvastatin (ZOCOR) 40 MG tablet Take 10 mg by mouth nightly       glipiZIDE (GLUCOTROL) 10 MG tablet Take 10 mg by mouth 2 times daily (before meals)      vitamin D (CHOLECALCIFEROL) 1000 UNIT TABS tablet Take 1,000 Units by mouth 2 times daily       metFORMIN (GLUCOPHAGE) 1000 MG tablet Take 1 tablet by mouth 2 times daily (with meals) 60 tablet 3    lisinopril (PRINIVIL;ZESTRIL) 20 MG tablet Take 1 tablet by mouth daily 30 tablet 3       Medication Reconciliation  Provider has reviewed medication record and it has been modified as necessary to accurately depict current medications since hospitalization. Jony Devine has been instructed on these changes. Social History     Socioeconomic History    Marital status:      Spouse name: None    Number of children: None    Years of education: None    Highest education level: None   Occupational History    Occupation: seo   Tobacco Use    Smoking status: Former Smoker     Packs/day: 0.50     Years: 2.00     Pack years: 1.00     Quit date: 1970     Years since quittin.6    Smokeless tobacco: Never Used   Vaping Use    Vaping Use: Never used   Substance and Sexual Activity    Alcohol use:  Yes     Alcohol/week: 0.0 standard drinks     Comment: RARELY    Drug use: No    Sexual activity: None   Other Topics Concern    None   Social History Narrative    None     Social Determinants of Health     Financial Resource Strain:     Difficulty of Paying Living Expenses:    Food Insecurity:     Worried About Running Out of Food in the Last (TAVR) using bioprosthesis 01/21/2020    Wears dentures     upper       Family History   Problem Relation Age of Onset    Heart Attack Father     Coronary Art Dis Sister     Hypertension Mother     Lung Cancer Mother     Stroke Mother     Coronary Art Dis Brother         later in life       Updated and reviewed pertinent [facility]    Allergies   Allergen Reactions    Atorvastatin Other (See Comments)     Muscle pain (myalgias)    Sulfa Antibiotics Swelling     Swelling of retinas    Sulfanilamide Other (See Comments)       ROS   General: Denies fever, chills, night sweats, or recent weight changes. Skin: Denies any rashes/wounds. HEENT: Denies any headaches. Denies any blurred vision, double vision, or eye pain. Denies any tinnitus, vertigo, or dizziness. Denies discharge, stuffiness, obstruction, or epistaxis. Denies any hoarseness, dysphagia/ pain. Cardiovascular: Denies any chest pain, shortness of breath, dyspnea on exertion, orthopnea, or palpations. Respiratory: Denies cough, sputum production, hemoptysis, or wheezing. Gastrointestinal: Denies any nausea, vomiting, diarrhea, constipation, or melena. Genitourinary: Denies any dysuria, hematuria, frequency, urgency, or hesitancy. Endocrine:  Denies any heat or cold intolerances, polydipsia, polyuria, or polyphagia. Musculoskeletal: Denies any arthritis, arthralagias, myalgias, or muscle weakness. Neuropsychiatric: Denies any depression, syncope, tremors, seizures, anxiety or nervousness. Physical Exam:  Vitals /62 (Site: Right Upper Arm, Position: Sitting, Cuff Size: Large Adult)   Pulse 60   Temp 97.7 °F (36.5 °C) (Temporal)   Resp 16   Ht 5' 6\" (1.676 m)   Wt 167 lb 12.8 oz (76.1 kg)   SpO2 98%   BMI 27.08 kg/m²   General Appearance: Alert, no distress; vital signs were reviewed today  Head: Normocephalic, atraumatic.   Eyes:  Sclera clear, no drainage  Ears:  External auditory canals patent, no drainage  Nose:  Septum midline, mucosa normal, no drainage or sinus tenderness  Throat: pharynx moist and pink, no exudate  Neck: Supple, trachea midline, no adenopathy; Thyroid: No enlargement/tenderness/nodules;  Back: Symmetric, no tenderness  Lungs: Chest rises and falls symmetrically, no use of accessory muscles, Lungs clear throughout  Chest wall: No tenderness  Heart[de-identified] Regular rate and rhythm, S1 and S2 normal, no murmur or gallop  Abdomen: Nontender, bowel sounds active in all 4 quadrants, no masses  Extremities: Extremities without clubbing,cyanosis. Patient with +2 pretibial edema  Skin: Skin color normal, no rashes or lesions  Neurological: Cranial nerves II-XII appears grossly intact- no focal deficit  Psychiatric: mood and affect within normal limits    Assessment/Plan    1. CHF NYHA class I, chronic, systolic (HCC)  Increase Lasix to 40 mg daily for the next 2 days, 2 g sodium diet. CHF education provided    2. Pneumonia due to infectious organism, unspecified laterality, unspecified part of lung  Last x-ray with no acute findings in ER. Complete antibiotics. No need for further chest x-ray    3. Stage 2 chronic kidney disease  Continues to follow with Dr. Joycelyn Kang. Follow-up labs in 2 weeks  - CBC Auto Differential; Future  - Basic Metabolic Panel; Future    4. 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 Actos was discontinued due to CHF at last hospitalization. He is to check his blood sugars daily and report to the [place]. We will also reach out to the [place] for medication adjustments. 5. Essential hypertension  Stable at present    6. Aortic stenosis, severe  7. S/P TAVR (transcatheter aortic valve replacement)  Stable, follows with cardiology routinely        Decision making of moderate complexity   Hospital records reviewed.    Follow up for routine visit, call sooner with any concerns prior    Electronically signed by ESTHER Wilson - CADENCE on 8/4/2021 at 12:12 PM

## 2021-08-13 ENCOUNTER — HOSPITAL ENCOUNTER (OUTPATIENT)
Dept: LAB | Age: 76
Discharge: HOME | End: 2021-08-13
Payer: MEDICARE

## 2021-08-13 DIAGNOSIS — N18.2 STAGE 2 CHRONIC KIDNEY DISEASE: ICD-10-CM

## 2021-08-13 LAB
ABSOLUTE EOS #: 0.31 K/UL (ref 0–0.44)
ABSOLUTE IMMATURE GRANULOCYTE: 0.06 K/UL (ref 0–0.3)
ABSOLUTE LYMPH #: 1.5 K/UL (ref 1.1–3.7)
ABSOLUTE MONO #: 0.75 K/UL (ref 0.1–1.2)
ANION GAP SERPL CALCULATED.3IONS-SCNC: 8 MMOL/L (ref 9–17)
BASOPHILS # BLD: 1 % (ref 0–2)
BASOPHILS ABSOLUTE: 0.08 K/UL (ref 0–0.2)
BUN BLDV-MCNC: 30 MG/DL (ref 8–23)
BUN/CREAT BLD: 23 (ref 9–20)
CALCIUM SERPL-MCNC: 9.7 MG/DL (ref 8.6–10.4)
CHLORIDE BLD-SCNC: 104 MMOL/L (ref 98–107)
CO2: 25 MMOL/L (ref 20–31)
CREAT SERPL-MCNC: 1.3 MG/DL (ref 0.7–1.2)
DIFFERENTIAL TYPE: ABNORMAL
EOSINOPHILS RELATIVE PERCENT: 4 % (ref 1–4)
GFR AFRICAN AMERICAN: >60 ML/MIN
GFR NON-AFRICAN AMERICAN: 54 ML/MIN
GFR SERPL CREATININE-BSD FRML MDRD: ABNORMAL ML/MIN/{1.73_M2}
GFR SERPL CREATININE-BSD FRML MDRD: ABNORMAL ML/MIN/{1.73_M2}
GLUCOSE BLD-MCNC: 174 MG/DL (ref 70–99)
HCT VFR BLD CALC: 37.4 % (ref 40.7–50.3)
HEMOGLOBIN: 12.1 G/DL (ref 13–17)
IMMATURE GRANULOCYTES: 1 %
LYMPHOCYTES # BLD: 17 % (ref 24–43)
MCH RBC QN AUTO: 27.4 PG (ref 25.2–33.5)
MCHC RBC AUTO-ENTMCNC: 32.4 G/DL (ref 25.2–33.5)
MCV RBC AUTO: 84.8 FL (ref 82.6–102.9)
MONOCYTES # BLD: 9 % (ref 3–12)
NRBC AUTOMATED: 0 PER 100 WBC
PDW BLD-RTO: 14.2 % (ref 11.8–14.4)
PLATELET # BLD: 276 K/UL (ref 138–453)
PLATELET ESTIMATE: ABNORMAL
PMV BLD AUTO: 9.6 FL (ref 8.1–13.5)
POTASSIUM SERPL-SCNC: 5.7 MMOL/L (ref 3.7–5.3)
RBC # BLD: 4.41 M/UL (ref 4.21–5.77)
RBC # BLD: ABNORMAL 10*6/UL
SEG NEUTROPHILS: 68 % (ref 36–65)
SEGMENTED NEUTROPHILS ABSOLUTE COUNT: 6.02 K/UL (ref 1.5–8.1)
SODIUM BLD-SCNC: 137 MMOL/L (ref 135–144)
WBC # BLD: 8.7 K/UL (ref 3.5–11.3)
WBC # BLD: ABNORMAL 10*3/UL

## 2021-08-13 PROCEDURE — 36415 COLL VENOUS BLD VENIPUNCTURE: CPT

## 2021-08-13 PROCEDURE — 80048 BASIC METABOLIC PNL TOTAL CA: CPT

## 2021-08-13 PROCEDURE — 85025 COMPLETE CBC W/AUTO DIFF WBC: CPT

## 2021-08-16 DIAGNOSIS — N18.2 STAGE 2 CHRONIC KIDNEY DISEASE: ICD-10-CM

## 2021-08-16 DIAGNOSIS — E87.5 HYPERKALEMIA: Primary | ICD-10-CM

## 2021-08-17 ENCOUNTER — HOSPITAL ENCOUNTER (OUTPATIENT)
Dept: LAB | Age: 76
Discharge: HOME | End: 2021-08-17
Payer: MEDICARE

## 2021-08-17 DIAGNOSIS — E87.5 HYPERKALEMIA: ICD-10-CM

## 2021-08-17 DIAGNOSIS — N18.2 STAGE 2 CHRONIC KIDNEY DISEASE: ICD-10-CM

## 2021-08-17 LAB
ANION GAP SERPL CALCULATED.3IONS-SCNC: 9 MMOL/L (ref 9–17)
BUN BLDV-MCNC: 36 MG/DL (ref 8–23)
BUN/CREAT BLD: 26 (ref 9–20)
CALCIUM SERPL-MCNC: 9.9 MG/DL (ref 8.6–10.4)
CHLORIDE BLD-SCNC: 106 MMOL/L (ref 98–107)
CO2: 26 MMOL/L (ref 20–31)
CREAT SERPL-MCNC: 1.4 MG/DL (ref 0.7–1.2)
GFR AFRICAN AMERICAN: 60 ML/MIN
GFR NON-AFRICAN AMERICAN: 49 ML/MIN
GFR SERPL CREATININE-BSD FRML MDRD: ABNORMAL ML/MIN/{1.73_M2}
GFR SERPL CREATININE-BSD FRML MDRD: ABNORMAL ML/MIN/{1.73_M2}
GLUCOSE BLD-MCNC: 175 MG/DL (ref 70–99)
POTASSIUM SERPL-SCNC: 5.5 MMOL/L (ref 3.7–5.3)
SODIUM BLD-SCNC: 141 MMOL/L (ref 135–144)

## 2021-08-17 PROCEDURE — 80048 BASIC METABOLIC PNL TOTAL CA: CPT

## 2021-08-17 PROCEDURE — 36415 COLL VENOUS BLD VENIPUNCTURE: CPT

## 2021-08-17 NOTE — RESULT ENCOUNTER NOTE
Patient notified of results by phone.  Will increase water and get blood work done on Monday   No issues with urination

## 2021-08-23 ENCOUNTER — HOSPITAL ENCOUNTER (OUTPATIENT)
Dept: LAB | Age: 76
Discharge: HOME | End: 2021-08-23
Payer: MEDICARE

## 2021-08-23 DIAGNOSIS — N28.9 ABNORMAL KIDNEY FUNCTION: Primary | ICD-10-CM

## 2021-08-23 DIAGNOSIS — N18.2 STAGE 2 CHRONIC KIDNEY DISEASE: Primary | ICD-10-CM

## 2021-08-23 DIAGNOSIS — N18.2 STAGE 2 CHRONIC KIDNEY DISEASE: ICD-10-CM

## 2021-08-23 LAB
ANION GAP SERPL CALCULATED.3IONS-SCNC: 12 MMOL/L (ref 9–17)
BUN BLDV-MCNC: 39 MG/DL (ref 8–23)
BUN/CREAT BLD: 23 (ref 9–20)
CALCIUM SERPL-MCNC: 9.6 MG/DL (ref 8.6–10.4)
CHLORIDE BLD-SCNC: 101 MMOL/L (ref 98–107)
CO2: 20 MMOL/L (ref 20–31)
CREAT SERPL-MCNC: 1.67 MG/DL (ref 0.7–1.2)
GFR AFRICAN AMERICAN: 49 ML/MIN
GFR NON-AFRICAN AMERICAN: 40 ML/MIN
GFR SERPL CREATININE-BSD FRML MDRD: ABNORMAL ML/MIN/{1.73_M2}
GFR SERPL CREATININE-BSD FRML MDRD: ABNORMAL ML/MIN/{1.73_M2}
GLUCOSE BLD-MCNC: 148 MG/DL (ref 70–99)
POTASSIUM SERPL-SCNC: 5.4 MMOL/L (ref 3.7–5.3)
SODIUM BLD-SCNC: 133 MMOL/L (ref 135–144)

## 2021-08-23 PROCEDURE — 80048 BASIC METABOLIC PNL TOTAL CA: CPT

## 2021-08-23 PROCEDURE — 36415 COLL VENOUS BLD VENIPUNCTURE: CPT

## 2021-08-24 ENCOUNTER — HOSPITAL ENCOUNTER (OUTPATIENT)
Dept: ULTRASOUND IMAGING | Age: 76
Discharge: HOME | End: 2021-08-26
Payer: MEDICARE

## 2021-08-24 ENCOUNTER — HOSPITAL ENCOUNTER (OUTPATIENT)
Dept: LAB | Age: 76
Discharge: HOME | End: 2021-08-24
Payer: MEDICARE

## 2021-08-24 DIAGNOSIS — N28.9 ABNORMAL KIDNEY FUNCTION: ICD-10-CM

## 2021-08-24 LAB
-: NORMAL
AMORPHOUS: NORMAL
ANION GAP SERPL CALCULATED.3IONS-SCNC: 10 MMOL/L (ref 9–17)
BACTERIA: NORMAL
BILIRUBIN URINE: NEGATIVE
BUN BLDV-MCNC: 33 MG/DL (ref 8–23)
BUN/CREAT BLD: 22 (ref 9–20)
CALCIUM SERPL-MCNC: 9.9 MG/DL (ref 8.6–10.4)
CASTS UA: NORMAL /LPF (ref 0–2)
CHLORIDE BLD-SCNC: 101 MMOL/L (ref 98–107)
CO2: 24 MMOL/L (ref 20–31)
COLOR: ABNORMAL
COMMENT UA: ABNORMAL
CREAT SERPL-MCNC: 1.47 MG/DL (ref 0.7–1.2)
CRYSTALS, UA: NORMAL /HPF
EPITHELIAL CELLS UA: NORMAL /HPF (ref 0–5)
GFR AFRICAN AMERICAN: 57 ML/MIN
GFR NON-AFRICAN AMERICAN: 47 ML/MIN
GFR SERPL CREATININE-BSD FRML MDRD: ABNORMAL ML/MIN/{1.73_M2}
GFR SERPL CREATININE-BSD FRML MDRD: ABNORMAL ML/MIN/{1.73_M2}
GLUCOSE BLD-MCNC: 185 MG/DL (ref 70–99)
GLUCOSE URINE: NEGATIVE
KETONES, URINE: NEGATIVE
LEUKOCYTE ESTERASE, URINE: NEGATIVE
MUCUS: NORMAL
NITRITE, URINE: NEGATIVE
OTHER OBSERVATIONS UA: NORMAL
PH UA: 5.5 (ref 5–6)
POTASSIUM SERPL-SCNC: 5.5 MMOL/L (ref 3.7–5.3)
PROTEIN UA: ABNORMAL
RBC UA: NORMAL /HPF (ref 0–4)
RENAL EPITHELIAL, UA: NORMAL /HPF
SODIUM BLD-SCNC: 135 MMOL/L (ref 135–144)
SPECIFIC GRAVITY UA: 1.02 (ref 1.01–1.02)
TRICHOMONAS: NORMAL
TURBIDITY: ABNORMAL
URINE HGB: ABNORMAL
UROBILINOGEN, URINE: NORMAL
WBC UA: NORMAL /HPF (ref 0–4)
YEAST: NORMAL

## 2021-08-24 PROCEDURE — 76770 US EXAM ABDO BACK WALL COMP: CPT

## 2021-08-24 PROCEDURE — 80048 BASIC METABOLIC PNL TOTAL CA: CPT

## 2021-08-24 PROCEDURE — 36415 COLL VENOUS BLD VENIPUNCTURE: CPT

## 2021-08-24 PROCEDURE — 87086 URINE CULTURE/COLONY COUNT: CPT

## 2021-08-24 PROCEDURE — 81001 URINALYSIS AUTO W/SCOPE: CPT

## 2021-08-25 DIAGNOSIS — N28.9 ABNORMAL KIDNEY FUNCTION: Primary | ICD-10-CM

## 2021-08-25 LAB
CULTURE: NO GROWTH
Lab: NORMAL
SPECIMEN DESCRIPTION: NORMAL

## 2021-08-25 RX ORDER — FUROSEMIDE 40 MG/1
20 TABLET ORAL
COMMUNITY
End: 2021-09-30 | Stop reason: SDUPTHER

## 2021-09-01 ENCOUNTER — HOSPITAL ENCOUNTER (OUTPATIENT)
Dept: LAB | Age: 76
Discharge: HOME | End: 2021-09-01
Payer: MEDICARE

## 2021-09-01 LAB
-: NORMAL
ABSOLUTE EOS #: 0.43 K/UL (ref 0–0.44)
ABSOLUTE IMMATURE GRANULOCYTE: 0.04 K/UL (ref 0–0.3)
ABSOLUTE LYMPH #: 1.49 K/UL (ref 1.1–3.7)
ABSOLUTE MONO #: 0.69 K/UL (ref 0.1–1.2)
AMORPHOUS: NORMAL
ANION GAP SERPL CALCULATED.3IONS-SCNC: 10 MMOL/L (ref 9–17)
BACTERIA: NORMAL
BASOPHILS # BLD: 1 % (ref 0–2)
BASOPHILS ABSOLUTE: 0.06 K/UL (ref 0–0.2)
BILIRUBIN URINE: NEGATIVE
BUN BLDV-MCNC: 28 MG/DL (ref 8–23)
BUN/CREAT BLD: 20 (ref 9–20)
CALCIUM SERPL-MCNC: 9.7 MG/DL (ref 8.6–10.4)
CASTS UA: NORMAL /LPF (ref 0–2)
CHLORIDE BLD-SCNC: 104 MMOL/L (ref 98–107)
CO2: 24 MMOL/L (ref 20–31)
COLOR: ABNORMAL
COMMENT UA: ABNORMAL
CREAT SERPL-MCNC: 1.39 MG/DL (ref 0.7–1.2)
CRYSTALS, UA: NORMAL /HPF
DIFFERENTIAL TYPE: ABNORMAL
EOSINOPHILS RELATIVE PERCENT: 5 % (ref 1–4)
EPITHELIAL CELLS UA: NORMAL /HPF (ref 0–5)
GFR AFRICAN AMERICAN: >60 ML/MIN
GFR NON-AFRICAN AMERICAN: 50 ML/MIN
GFR SERPL CREATININE-BSD FRML MDRD: ABNORMAL ML/MIN/{1.73_M2}
GFR SERPL CREATININE-BSD FRML MDRD: ABNORMAL ML/MIN/{1.73_M2}
GLUCOSE BLD-MCNC: 163 MG/DL (ref 70–99)
GLUCOSE URINE: NEGATIVE
HCT VFR BLD CALC: 37.8 % (ref 40.7–50.3)
HEMOGLOBIN: 11.9 G/DL (ref 13–17)
IMMATURE GRANULOCYTES: 1 %
KETONES, URINE: NEGATIVE
LEUKOCYTE ESTERASE, URINE: NEGATIVE
LYMPHOCYTES # BLD: 18 % (ref 24–43)
MCH RBC QN AUTO: 26.8 PG (ref 25.2–33.5)
MCHC RBC AUTO-ENTMCNC: 31.5 G/DL (ref 25.2–33.5)
MCV RBC AUTO: 85.1 FL (ref 82.6–102.9)
MONOCYTES # BLD: 8 % (ref 3–12)
MUCUS: NORMAL
NITRITE, URINE: NEGATIVE
NRBC AUTOMATED: 0 PER 100 WBC
OTHER OBSERVATIONS UA: NORMAL
PDW BLD-RTO: 14.3 % (ref 11.8–14.4)
PH UA: 7 (ref 5–6)
PLATELET # BLD: 287 K/UL (ref 138–453)
PLATELET ESTIMATE: ABNORMAL
PMV BLD AUTO: 10.4 FL (ref 8.1–13.5)
POTASSIUM SERPL-SCNC: 5.5 MMOL/L (ref 3.7–5.3)
PROTEIN UA: ABNORMAL
RBC # BLD: 4.44 M/UL (ref 4.21–5.77)
RBC # BLD: ABNORMAL 10*6/UL
RBC UA: NORMAL /HPF (ref 0–4)
RENAL EPITHELIAL, UA: NORMAL /HPF
SEG NEUTROPHILS: 67 % (ref 36–65)
SEGMENTED NEUTROPHILS ABSOLUTE COUNT: 5.74 K/UL (ref 1.5–8.1)
SODIUM BLD-SCNC: 138 MMOL/L (ref 135–144)
SPECIFIC GRAVITY UA: 1.02 (ref 1.01–1.02)
TRICHOMONAS: NORMAL
TURBIDITY: ABNORMAL
URINE HGB: ABNORMAL
UROBILINOGEN, URINE: NORMAL
WBC # BLD: 8.5 K/UL (ref 3.5–11.3)
WBC # BLD: ABNORMAL 10*3/UL
WBC UA: NORMAL /HPF (ref 0–4)
YEAST: NORMAL

## 2021-09-01 PROCEDURE — 80048 BASIC METABOLIC PNL TOTAL CA: CPT

## 2021-09-01 PROCEDURE — 81001 URINALYSIS AUTO W/SCOPE: CPT

## 2021-09-01 PROCEDURE — 85025 COMPLETE CBC W/AUTO DIFF WBC: CPT

## 2021-09-01 PROCEDURE — 36415 COLL VENOUS BLD VENIPUNCTURE: CPT

## 2021-09-20 NOTE — PROGRESS NOTES
Visit note (most recent) faxed to [facility]. [address] attn. Dr. Anne Sanchez per patient request. Fax request also sent for most recent eye exam at the [place]. Patient states he had updated this Summer 2021.

## 2021-09-30 RX ORDER — FUROSEMIDE 40 MG/1
20 TABLET ORAL DAILY
Qty: 60 TABLET | Refills: 1 | Status: ON HOLD | OUTPATIENT
Start: 2021-09-30 | End: 2021-12-27 | Stop reason: HOSPADM

## 2021-11-23 ENCOUNTER — HOSPITAL ENCOUNTER (OUTPATIENT)
Dept: LAB | Age: 76
Discharge: HOME | End: 2021-11-23
Payer: MEDICARE

## 2021-11-23 DIAGNOSIS — E11.21 TYPE 2 DIABETES MELLITUS WITH PROTEINURIC DIABETIC NEPHROPATHY (HCC): ICD-10-CM

## 2021-11-23 DIAGNOSIS — E11.3219 CONTROLLED TYPE 2 DIABETES MELLITUS WITH MILD NONPROLIFERATIVE RETINOPATHY AND MACULAR EDEMA, WITHOUT LONG-TERM CURRENT USE OF INSULIN, UNSPECIFIED LATERALITY (HCC): ICD-10-CM

## 2021-11-23 LAB
ANION GAP SERPL CALCULATED.3IONS-SCNC: 9 MMOL/L (ref 9–17)
BUN BLDV-MCNC: 27 MG/DL (ref 8–23)
BUN/CREAT BLD: 22 (ref 9–20)
CALCIUM SERPL-MCNC: 9.7 MG/DL (ref 8.6–10.4)
CHLORIDE BLD-SCNC: 103 MMOL/L (ref 98–107)
CO2: 25 MMOL/L (ref 20–31)
CREAT SERPL-MCNC: 1.23 MG/DL (ref 0.7–1.2)
ESTIMATED AVERAGE GLUCOSE: 157 MG/DL
GFR AFRICAN AMERICAN: >60 ML/MIN
GFR NON-AFRICAN AMERICAN: 57 ML/MIN
GFR SERPL CREATININE-BSD FRML MDRD: ABNORMAL ML/MIN/{1.73_M2}
GFR SERPL CREATININE-BSD FRML MDRD: ABNORMAL ML/MIN/{1.73_M2}
GLUCOSE BLD-MCNC: 191 MG/DL (ref 70–99)
HBA1C MFR BLD: 7.1 % (ref 4–6)
HCT VFR BLD CALC: 40 % (ref 40.7–50.3)
HEMOGLOBIN: 12.8 G/DL (ref 13–17)
POTASSIUM SERPL-SCNC: 4.8 MMOL/L (ref 3.7–5.3)
SODIUM BLD-SCNC: 137 MMOL/L (ref 135–144)

## 2021-11-23 PROCEDURE — 80048 BASIC METABOLIC PNL TOTAL CA: CPT

## 2021-11-23 PROCEDURE — 85014 HEMATOCRIT: CPT

## 2021-11-23 PROCEDURE — 83036 HEMOGLOBIN GLYCOSYLATED A1C: CPT

## 2021-11-23 PROCEDURE — 85018 HEMOGLOBIN: CPT

## 2021-11-23 PROCEDURE — 36415 COLL VENOUS BLD VENIPUNCTURE: CPT

## 2021-11-29 ENCOUNTER — OFFICE VISIT (OUTPATIENT)
Dept: INTERNAL MEDICINE | Age: 76
End: 2021-11-29
Payer: MEDICARE

## 2021-11-29 VITALS
HEART RATE: 70 BPM | SYSTOLIC BLOOD PRESSURE: 128 MMHG | BODY MASS INDEX: 26.48 KG/M2 | HEIGHT: 66 IN | WEIGHT: 164.8 LBS | DIASTOLIC BLOOD PRESSURE: 72 MMHG

## 2021-11-29 DIAGNOSIS — I65.29 STENOSIS OF CAROTID ARTERY, UNSPECIFIED LATERALITY: ICD-10-CM

## 2021-11-29 DIAGNOSIS — E55.9 VITAMIN D DEFICIENCY: ICD-10-CM

## 2021-11-29 DIAGNOSIS — I35.0 NONRHEUMATIC AORTIC VALVE STENOSIS: ICD-10-CM

## 2021-11-29 DIAGNOSIS — E78.5 DYSLIPIDEMIA: ICD-10-CM

## 2021-11-29 DIAGNOSIS — E27.8 ADRENAL CYST (HCC): ICD-10-CM

## 2021-11-29 DIAGNOSIS — I50.22 CHF NYHA CLASS I, CHRONIC, SYSTOLIC (HCC): ICD-10-CM

## 2021-11-29 DIAGNOSIS — I25.10 CORONARY ARTERY DISEASE INVOLVING NATIVE CORONARY ARTERY OF NATIVE HEART WITHOUT ANGINA PECTORIS: ICD-10-CM

## 2021-11-29 DIAGNOSIS — Z95.2 S/P TAVR (TRANSCATHETER AORTIC VALVE REPLACEMENT): ICD-10-CM

## 2021-11-29 DIAGNOSIS — N18.2 STAGE 2 CHRONIC KIDNEY DISEASE: ICD-10-CM

## 2021-11-29 DIAGNOSIS — I73.9 PERIPHERAL ARTERIAL DISEASE (HCC): ICD-10-CM

## 2021-11-29 DIAGNOSIS — E11.21 TYPE 2 DIABETES MELLITUS WITH PROTEINURIC DIABETIC NEPHROPATHY (HCC): ICD-10-CM

## 2021-11-29 DIAGNOSIS — E11.3219 CONTROLLED TYPE 2 DIABETES MELLITUS WITH MILD NONPROLIFERATIVE RETINOPATHY AND MACULAR EDEMA, WITHOUT LONG-TERM CURRENT USE OF INSULIN, UNSPECIFIED LATERALITY (HCC): Primary | ICD-10-CM

## 2021-11-29 DIAGNOSIS — I34.0 NON-RHEUMATIC MITRAL REGURGITATION: ICD-10-CM

## 2021-11-29 DIAGNOSIS — R91.1 LUNG NODULE: ICD-10-CM

## 2021-11-29 DIAGNOSIS — I10 ESSENTIAL HYPERTENSION: ICD-10-CM

## 2021-11-29 PROCEDURE — G8427 DOCREV CUR MEDS BY ELIG CLIN: HCPCS | Performed by: NURSE PRACTITIONER

## 2021-11-29 PROCEDURE — 4040F PNEUMOC VAC/ADMIN/RCVD: CPT | Performed by: NURSE PRACTITIONER

## 2021-11-29 PROCEDURE — 99214 OFFICE O/P EST MOD 30 MIN: CPT | Performed by: NURSE PRACTITIONER

## 2021-11-29 PROCEDURE — 3051F HG A1C>EQUAL 7.0%<8.0%: CPT | Performed by: NURSE PRACTITIONER

## 2021-11-29 PROCEDURE — G8417 CALC BMI ABV UP PARAM F/U: HCPCS | Performed by: NURSE PRACTITIONER

## 2021-11-29 PROCEDURE — 1123F ACP DISCUSS/DSCN MKR DOCD: CPT | Performed by: NURSE PRACTITIONER

## 2021-11-29 PROCEDURE — G8484 FLU IMMUNIZE NO ADMIN: HCPCS | Performed by: NURSE PRACTITIONER

## 2021-11-29 PROCEDURE — 1036F TOBACCO NON-USER: CPT | Performed by: NURSE PRACTITIONER

## 2021-11-29 PROCEDURE — 99213 OFFICE O/P EST LOW 20 MIN: CPT | Performed by: NURSE PRACTITIONER

## 2021-11-29 PROCEDURE — 2022F DILAT RTA XM EVC RTNOPTHY: CPT | Performed by: NURSE PRACTITIONER

## 2021-11-29 PROCEDURE — 3017F COLORECTAL CA SCREEN DOC REV: CPT | Performed by: NURSE PRACTITIONER

## 2021-11-29 RX ORDER — LISINOPRIL 20 MG/1
10 TABLET ORAL DAILY
COMMUNITY
End: 2022-01-10 | Stop reason: DRUGHIGH

## 2021-11-29 NOTE — PROGRESS NOTES
11/29/21  Carissa Figueroa  1945      Chief Complaint:   1. 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 Essential hypertension    4. Dyslipidemia    5. Non-rheumatic mitral regurgitation    6. Nonrheumatic aortic valve stenosis    7. S/P TAVR (transcatheter aortic valve replacement)    8. Coronary artery disease involving native coronary artery of native heart without angina pectoris    9. Stenosis of carotid artery, unspecified laterality    10. Lung nodule    11. Adrenal cyst ([address]    12. CHF NYHA class I, chronic, systolic ([address]    13. Peripheral arterial disease ([address]    14. Vitamin D deficiency    15. Stage 2 chronic kidney disease        HPI:  78-year-old male in for 3-month follow-up of chronic health conditions. He has been doing well. Continues to follow with the [place] for his hearing aids and feels he is hearing much better. Is been stable denies any lightheadedness dizziness. He continues to work on his diet for his diabetes. Denies any hypoglycemic episodes. Continues on a statin for his dyslipidemia denies any significant myalgias. He declines any lightheadedness dizziness no chest discomfort. Continues on his supplemental vitamin D without unwanted side effects.     Allergies   Allergen Reactions    Atorvastatin Other (See Comments)     Muscle pain (myalgias)    Sulfa Antibiotics Swelling     Swelling of retinas    Sulfanilamide Other (See Comments)       Past Medical History:   Diagnosis Date    Acne rosacea     treated with MetroGel    Anemia     Aortic stenosis     echo 3/18 Mod AS, Mild MR    BPH (benign prostatic hypertrophy)     CAD (coronary artery disease)     Stent x 3 DEVON 8/16    Carotid stenosis     Fairly minimal plaquing on ultrasound 5/14--in the conclusion described as less than 50% stenosis but in the body of the report described as minimal    Cataract of both eyes     CRI (chronic renal insufficiency)     Diabetes mellitus, type 2 (HCC)     1.) Proteinuria, 24 hour total urine protein 1,420 mg/24 hrs, 09/08, creatinine clearance 114, 09/08 2.) Repeat protein/creatinine ratio 2.97, 02/12 3. )24 hr urine collection 1958 mg/24 hrs, 04/12. 4.) Repeat protein/creatinine ratio 1.99, 08/12 a.) Repeat protein/creatinine ratio 2.14, 07/13 5.) Renal u/s ok 02/12  6) retinopathy at [address] [facility] [address] 12/16  early mod. nonprolifferative  macular involved    Dyslipidemia     Erectile dysfunction     H/O agent Orange exposure     per patient in the [employer] along with exposure to cresote and naphtha    Hearing loss     Hyperlipidemia     Hypertension     Mild nonproliferative diabetic retinopathy ([address]     Non-rheumatic mitral regurgitation 06/27/2016    RBBB     Status post transcatheter aortic valve replacement (TAVR) using bioprosthesis 01/21/2020    Wears dentures     upper       Past Surgical History:   Procedure Laterality Date    AORTIC VALVE SURGERY  01/2020    CARDIAC CATHETERIZATION  12/03/2019    CATARACT REMOVAL Bilateral     CIRCUMCISION  age 49   [facility]YE SURGERY Bilateral     cataract    TYMPANOSTOMY TUBE PLACEMENT      VASECTOMY         Current Outpatient Medications on File Prior to Visit   Medication Sig Dispense Refill    lisinopril (PRINIVIL;ZESTRIL) 20 MG tablet Take 10 mg by mouth daily      furosemide (LASIX) 40 MG tablet Take 0.5 tablets by mouth daily Once daily on Monday, Wednesday and Friday 60 tablet 1    amLODIPine (NORVASC) 10 MG tablet Take 1 tablet by mouth daily 30 tablet 5    clopidogrel (PLAVIX) 75 MG tablet Take 1 tablet by mouth daily Take 1 daily 60 tablet 3    simvastatin (ZOCOR) 40 MG tablet Take 10 mg by mouth nightly       glipiZIDE (GLUCOTROL) 10 MG tablet Take 10 mg by mouth 2 times daily (before meals)      vitamin D (CHOLECALCIFEROL) 1000 UNIT TABS tablet Take 1,000 Units by mouth 2 times daily       metFORMIN (GLUCOPHAGE) 1000 MG tablet Take 1 tablet by mouth 2 times daily (with meals) 60 tablet 3    Handicap Placard MISC by Does not apply route Duration 2 years 1 each 0     No current facility-administered medications on file prior to visit. Social History     Socioeconomic History    Marital status:      Spouse name: Not on file    Number of children: Not on file    Years of education: Not on file    Highest education level: Not on file   Occupational History    Occupation: seo   Tobacco Use    Smoking status: Former Smoker     Packs/day: 0.50     Years: 2.00     Pack years: 1.00     Quit date: 1970     Years since quittin.9    Smokeless tobacco: Never Used   Vaping Use    Vaping Use: Never used   Substance and Sexual Activity    Alcohol use: Yes     Alcohol/week: 0.0 standard drinks     Comment: RARELY    Drug use: No    Sexual activity: Not on file   Other Topics Concern    Not on file   Social History Narrative    Not on file     Social Determinants of Health     Financial Resource Strain:     Difficulty of Paying Living Expenses: Not on file   Food Insecurity:     Worried About Running Out of Food in the Last Year: Not on file    Gema of Food in the Last Year: Not on file   Transportation Needs:     Lack of Transportation (Medical): Not on file    Lack of Transportation (Non-Medical):  Not on file   Physical Activity:     Days of Exercise per Week: Not on file    Minutes of Exercise per Session: Not on file   Stress:     Feeling of Stress : Not on file   Social Connections:     Frequency of Communication with Friends and Family: Not on file    Frequency of Social Gatherings with Friends and Family: Not on file    Attends Hinduism Services: Not on file    Active Member of Clubs or Organizations: Not on file    Attends Club or Organization Meetings: Not on file    Marital Status: Not on file   Intimate Partner Violence:     Fear of Current or Ex-Partner: Not on file    Emotionally Abused: Not on file    Physically Abused: Not on file    Sexually Abused: Not on file   Housing Stability:     Unable to Pay for Housing in the Last Year: Not on file    Number of [place] in the Last Year: Not on file    Unstable Housing in the Last Year: Not on file       Review of Systems   Constitutional: Negative for activity change, appetite change, chills, fatigue, fever and unexpected weight change. HENT: Negative for congestion, dental problem, ear discharge, ear pain, facial swelling, hearing loss, postnasal drip, rhinorrhea, sinus pressure, sore throat and trouble swallowing. Eyes: Negative for pain and visual disturbance. Respiratory: Negative for cough, chest tightness, shortness of breath and wheezing. Cardiovascular: Negative for chest pain, palpitations and leg swelling. Gastrointestinal: Negative for abdominal pain, blood in stool, constipation, diarrhea, nausea and vomiting. Endocrine: Negative for cold intolerance, heat intolerance and polyuria. Genitourinary: Negative for difficulty urinating. Musculoskeletal: Negative for arthralgias, gait problem, myalgias, neck pain and neck stiffness. Skin: Negative for color change, rash and wound. Neurological: Negative for dizziness, tremors, seizures, weakness, light-headedness, numbness and headaches. Psychiatric/Behavioral: Negative for confusion and hallucinations. The patient is not nervous/anxious. Physical Exam  Vitals and nursing note reviewed. Constitutional:       General: He is not in acute distress. Appearance: He is well-developed. He is not diaphoretic. HENT:      Head: Normocephalic and atraumatic. Right Ear: External ear normal.      Left Ear: External ear normal.   Eyes:      General: Lids are normal.         Right eye: No discharge. Left eye: No discharge. Conjunctiva/sclera: Conjunctivae normal.      Pupils: Pupils are equal, round, and reactive to light. Neck:      Trachea: No tracheal deviation. Cardiovascular:      Rate and Rhythm: Normal rate and regular rhythm. Heart sounds: Normal heart sounds. No murmur heard. No friction rub. No gallop. Pulmonary:      Effort: Pulmonary effort is normal. No accessory muscle usage or respiratory distress. Breath sounds: Normal breath sounds. No wheezing or rales. Abdominal:      General: Bowel sounds are normal. There is no distension. Palpations: Abdomen is soft. Abdomen is not rigid. Musculoskeletal:         General: Normal range of motion. Cervical back: Normal range of motion and neck supple. No edema or erythema. Skin:     General: Skin is warm and dry. Capillary Refill: Capillary refill takes less than 2 seconds. Coloration: Skin is not pale. Findings: No erythema or rash. Neurological:      Mental Status: He is alert and oriented to person, place, and time. Cranial Nerves: No cranial nerve deficit. Sensory: No sensory deficit. Coordination: Coordination normal.   Psychiatric:         Behavior: Behavior normal.         Thought Content: Thought content normal.         Judgment: Judgment normal.       Vitals:    11/29/21 1433   BP: 128/72   Site: Left Upper Arm   Position: Sitting   Cuff Size: Large Adult   Pulse: 70   Weight: 164 lb 12.8 oz (74.8 kg)   Height: 5' 6\" (1.676 m)       Assessment:  1. 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 Continues on Actos, glipizide and Metformin. Continue to work on diet and remain active  - Hemoglobin A1C; Future  - Basic Metabolic Panel; Future    2.  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 Essential hypertension  Stable on lisinopril and Lasix. Continue to monitor    4. Dyslipidemia  The 10-year ASCVD risk score (Maliha Villanueva, et al., 2013) is: 46.7%    Values used to calculate the score:      Age: 77 years      Sex: Male      Is Non- : No      Diabetic: Yes      Tobacco smoker: No      Systolic Blood Pressure: 906 mmHg      Is BP treated: Yes      HDL Cholesterol: 42 mg/dL      Total Cholesterol: 157 mg/dL  Continue on simvastatin. Continue to work on diet and remain active    5. Non-rheumatic mitral regurgitation  No signs of fluid overload. Status post TAVR. Follows with cardiology    6. Nonrheumatic aortic valve stenosis  As noted above    7. S/P TAVR (transcatheter aortic valve replacement)  8. Coronary artery disease involving native coronary artery of native heart without angina pectoris  Stable. Continue on statin Plavix. Follows with cardiology    9. Stenosis of carotid artery, unspecified laterality  Asymptomatic. Will need a follow-up ultrasound with an appointment May 2022 per vascular's note    10. Lung nodule  11. Adrenal cyst ([address]  Last CT of chest and abdomen showed benign in nature no further follow-up needed    12. CHF NYHA class I, chronic, systolic (HCC)  Stable no signs of fluid overload. Continue on Lasix    13. Peripheral arterial disease ([address]  Follows with vascular as noted in #8    14. Vitamin D deficiency  Continues on supplemental vitamin D    15. Stage 2 chronic kidney disease  Stable avoid nephrotoxic drugs      Plan:  As noted above. Follow up for routine visit. Again we will reach out to [place] for his Cologuard. Call sooner with concerns prior.     Electronically signed by ESTHER Cho CNP on 11/29/2021 at 4:13 PM

## 2021-12-01 ENCOUNTER — TELEPHONE (OUTPATIENT)
Dept: INTERNAL MEDICINE | Age: 76
End: 2021-12-01

## 2021-12-01 NOTE — TELEPHONE ENCOUNTER
Lab results of 11/23/21 faxed to Dr. Villa Almendarez at the [place] per patient's request. Patient informed.

## 2021-12-01 NOTE — TELEPHONE ENCOUNTER
Message from Kirsten Hill sent at 12/1/2021 11:59 AM    \"Patient called in stating he had an appointment on Monday and he needs his bloodwork send to the [place] for DR. MCARTHUR. The fax   number is [phone]. He would like a call when it is faxed over. [phone]. \"

## 2021-12-03 ASSESSMENT — ENCOUNTER SYMPTOMS
VOMITING: 0
RHINORRHEA: 0
COLOR CHANGE: 0
NAUSEA: 0
CONSTIPATION: 0
SINUS PRESSURE: 0
TROUBLE SWALLOWING: 0
BLOOD IN STOOL: 0
EYE PAIN: 0
SHORTNESS OF BREATH: 0
CHEST TIGHTNESS: 0
COUGH: 0
FACIAL SWELLING: 0
DIARRHEA: 0
ABDOMINAL PAIN: 0
SORE THROAT: 0
WHEEZING: 0

## 2021-12-20 ENCOUNTER — OFFICE VISIT (OUTPATIENT)
Dept: PRIMARY CARE CLINIC | Age: 76
End: 2021-12-20
Payer: MEDICARE

## 2021-12-20 ENCOUNTER — HOSPITAL ENCOUNTER (OUTPATIENT)
Dept: GENERAL RADIOLOGY | Age: 76
Discharge: HOME | End: 2021-12-22
Payer: MEDICARE

## 2021-12-20 VITALS
OXYGEN SATURATION: 94 % | HEART RATE: 75 BPM | SYSTOLIC BLOOD PRESSURE: 110 MMHG | DIASTOLIC BLOOD PRESSURE: 78 MMHG | TEMPERATURE: 99.7 F | RESPIRATION RATE: 20 BRPM | WEIGHT: 166.4 LBS | BODY MASS INDEX: 26.86 KG/M2

## 2021-12-20 DIAGNOSIS — R05.9 COUGH: ICD-10-CM

## 2021-12-20 DIAGNOSIS — R50.9 FEVER, UNSPECIFIED FEVER CAUSE: ICD-10-CM

## 2021-12-20 DIAGNOSIS — J18.9 PNEUMONIA OF RIGHT LUNG DUE TO INFECTIOUS ORGANISM, UNSPECIFIED PART OF LUNG: ICD-10-CM

## 2021-12-20 DIAGNOSIS — J10.1 INFLUENZA A: Primary | ICD-10-CM

## 2021-12-20 LAB
INFLUENZA A ANTIBODY: NORMAL
INFLUENZA B ANTIBODY: NORMAL

## 2021-12-20 PROCEDURE — 99212 OFFICE O/P EST SF 10 MIN: CPT | Performed by: NURSE PRACTITIONER

## 2021-12-20 PROCEDURE — 71046 X-RAY EXAM CHEST 2 VIEWS: CPT

## 2021-12-20 PROCEDURE — G8427 DOCREV CUR MEDS BY ELIG CLIN: HCPCS | Performed by: NURSE PRACTITIONER

## 2021-12-20 PROCEDURE — 3017F COLORECTAL CA SCREEN DOC REV: CPT | Performed by: NURSE PRACTITIONER

## 2021-12-20 PROCEDURE — 87804 INFLUENZA ASSAY W/OPTIC: CPT | Performed by: NURSE PRACTITIONER

## 2021-12-20 PROCEDURE — 1123F ACP DISCUSS/DSCN MKR DOCD: CPT | Performed by: NURSE PRACTITIONER

## 2021-12-20 PROCEDURE — 4040F PNEUMOC VAC/ADMIN/RCVD: CPT | Performed by: NURSE PRACTITIONER

## 2021-12-20 PROCEDURE — G8484 FLU IMMUNIZE NO ADMIN: HCPCS | Performed by: NURSE PRACTITIONER

## 2021-12-20 PROCEDURE — 1036F TOBACCO NON-USER: CPT | Performed by: NURSE PRACTITIONER

## 2021-12-20 PROCEDURE — G8417 CALC BMI ABV UP PARAM F/U: HCPCS | Performed by: NURSE PRACTITIONER

## 2021-12-20 PROCEDURE — 99214 OFFICE O/P EST MOD 30 MIN: CPT | Performed by: NURSE PRACTITIONER

## 2021-12-20 PROCEDURE — PBSHW POCT INFLUENZA A/B: Performed by: NURSE PRACTITIONER

## 2021-12-20 RX ORDER — OSELTAMIVIR PHOSPHATE 30 MG/1
30 CAPSULE ORAL 2 TIMES DAILY
Qty: 10 CAPSULE | Refills: 0 | Status: ON HOLD | OUTPATIENT
Start: 2021-12-20 | End: 2021-12-27 | Stop reason: HOSPADM

## 2021-12-20 RX ORDER — LEVOFLOXACIN 500 MG/1
500 TABLET, FILM COATED ORAL DAILY
Qty: 7 TABLET | Refills: 0 | Status: ON HOLD | OUTPATIENT
Start: 2021-12-20 | End: 2021-12-27 | Stop reason: HOSPADM

## 2021-12-20 ASSESSMENT — ENCOUNTER SYMPTOMS
GASTROINTESTINAL NEGATIVE: 1
CHEST TIGHTNESS: 0
COUGH: 1
SHORTNESS OF BREATH: 0
RHINORRHEA: 0
SORE THROAT: 0
WHEEZING: 0

## 2021-12-20 NOTE — PATIENT INSTRUCTIONS
Patient Education   Take Coricidin HBP over the counter for cough and congestion. Influenza (Flu): Care Instructions  Overview     Influenza (flu) is an infection in the lungs and breathing passages. It is caused by the influenza virus. There are different strains, or types, of the flu virus from year to year. Unlike the common cold, the flu comes on suddenly and the symptoms can be more severe. These symptoms include a cough, congestion, fever, chills, fatigue, aches, and pains. These symptoms may last up to 10 days. Although the flu can make you feel very sick, it usually doesn't cause serious health problems. Home treatment is usually all you need for flu symptoms. But your doctor may prescribe antiviral medicine to prevent other health problems, such as pneumonia, from developing. The risk of other health problems from the flu is highest for young children (under 2), older adults (over 72), pregnant women, and people with long-term health conditions. Follow-up care is a key part of your treatment and safety. Be sure to make and go to all appointments, and call your doctor if you are having problems. It's also a good idea to know your test results and keep a list of the medicines you take. How can you care for yourself at home? · Get plenty of rest.  · Drink plenty of fluids. If you have to limit fluids because of a health problem, talk with your doctor before you increase the amount of fluids you drink. · Take an over-the-counter pain medicine if needed, such as acetaminophen (Tylenol), ibuprofen (Advil, Motrin), or naproxen (Aleve), to relieve fever, headache, and muscle aches. Be safe with medicines. Read and follow all instructions on the label. · No one younger than 20 should take aspirin. It has been linked to Reye syndrome, a serious illness. · Take any prescribed medicine exactly as directed. · Do not smoke. Smoking can make the flu worse.  If you need help quitting, talk to your doctor about stop-smoking programs and medicines. These can increase your chances of quitting for good. · If the skin around your nose and lips becomes sore, put some petroleum jelly (such as Vaseline) on the area. · To ease coughing:  ? Suck on cough drops or plain, hard candy. ? Try an over-the-counter cough or cold medicine. Read and follow all instructions on the label. ? Raise your head at night with an extra pillow. This may help you rest if coughing keeps you awake. To avoid spreading the flu  · Wash your hands regularly, and keep your hands away from your face. · Stay home from school, work, and other public places until you are feeling better and your fever has been gone for at least 24 hours. The fever needs to have gone away on its own without the help of medicine. · Ask people living with you to talk to their doctors about preventing the flu. They may get antiviral medicine to keep from getting the flu from you. · To prevent the flu in the future, get the flu vaccine every fall. Encourage people living with you to get the vaccine. · Cover your mouth when you cough or sneeze. If you can, cough or sneeze into the bend of your elbow, not your hands. When should you call for help? Call 911 anytime you think you may need emergency care. For example, call if:    · You have severe trouble breathing.     · You have a seizure. Call your doctor now or seek immediate medical care if:    · You have new or worse trouble breathing.     · You have pain or pressure in your chest or belly.     · You have a fever or cough that returns after getting better.     · You feel very sleepy, dizzy, or confused.     · You are not urinating.     · You have severe muscle pain.     · You have severe weakness, or you are unsteady.     · You have medical conditions that are getting worse.    Watch closely for changes in your health, and be sure to contact your doctor if:    · You do not get better as expected.     · You are having a problem with your medicine. Where can you learn more? Go to https://chpepiceweb.[employer]ixon. org and sign in to your [employer] account. Enter H825 in the [employer] box to learn more about \"Influenza (Flu): Care Instructions. \"     If you do not have an account, please click on the \"Sign Up Now\" link. Current as of: July 6, 2021               Content Version: 13.0  © 2006-2021 [employer]. Care instructions adapted under license by [employer] [address] If you have questions about a medical condition or this instruction, always ask your healthcare professional. [place] [address] any warranty or liability for your use of this information. Patient Education        Pneumonia: Care Instructions  Overview     Pneumonia is an infection of the lungs. Most cases are caused by infections from bacteria or viruses. Pneumonia may be mild or very severe. If it is caused by bacteria, you will be treated with antibiotics. It may take a few weeks to a few months to recover fully from pneumonia, depending on how sick you were and whether your overall health is good. Follow-up care is a key part of your treatment and safety. Be sure to make and go to all appointments, and call your doctor if you are having problems. It's also a good idea to know your test results and keep a list of the medicines you take. How can you care for yourself at home? · Take your antibiotics exactly as directed. Do not stop taking the medicine just because you are feeling better. You need to take the full course of antibiotics. · Take your medicines exactly as prescribed. Call your doctor if you think you are having a problem with your medicine. · Get plenty of rest and sleep. You may feel weak and tired for a while, but your energy level will improve with time. · To prevent dehydration, drink plenty of fluids. Choose water and other clear liquids.  If you have kidney, heart, or liver disease and have to limit fluids, talk with your doctor before you increase the amount of fluids you drink. · Take care of your cough so you can rest. A cough that brings up mucus from your lungs is common with pneumonia. It is one way your body gets rid of the infection. But if coughing keeps you from resting or causes severe fatigue and chest-wall pain, talk to your doctor. Your doctor may suggest that you take a medicine to reduce the cough. · Use a vaporizer or humidifier to add moisture to your bedroom. Follow the directions for cleaning the machine. · Do not smoke or allow others to smoke around you. Smoke will make your cough last longer. If you need help quitting, talk to your doctor about stop-smoking programs and medicines. These can increase your chances of quitting for good. · Take an over-the-counter pain medicine, such as acetaminophen (Tylenol), ibuprofen (Advil, Motrin), or naproxen (Aleve). Read and follow all instructions on the label. · Do not take two or more pain medicines at the same time unless the doctor told you to. Many pain medicines have acetaminophen, which is Tylenol. Too much acetaminophen (Tylenol) can be harmful. · If you were given a spirometer to measure how well your lungs are working, use it as instructed. This can help your doctor tell how your recovery is going. · To prevent pneumonia in the future, talk to your doctor about getting a flu vaccine (once a year) and a pneumococcal vaccine (one time only for most people). When should you call for help? Call 911 anytime you think you may need emergency care. For example, call if:    · You have severe trouble breathing. Call your doctor now or seek immediate medical care if:    · You cough up dark brown or bloody mucus (sputum).     · You have new or worse trouble breathing.     · You are dizzy or lightheaded, or you feel like you may faint.    Watch closely for changes in your health, and be sure to contact your doctor if:    · You have a new or higher fever.     · You are coughing more deeply or more often.     · You are not getting better after 2 days (48 hours).     · You do not get better as expected. Where can you learn more? Go to https://SpectraSciencepeneerajeb.[employer]ogenetix. org and sign in to your [employer] account. Enter D336 in the [employer] box to learn more about \"Pneumonia: Care Instructions. \"     If you do not have an account, please click on the \"Sign Up Now\" link. Current as of: July 6, 2021               Content Version: 13.0  © 6413-7844 [employer]. Care instructions adapted under license by [facility] ([facility]). If you have questions about a medical condition or this instruction, always ask your healthcare professional. [address] any warranty or liability for your use of this information.

## 2021-12-20 NOTE — PROGRESS NOTES
[facility]             [address], [address]                        Telephone [phone]             Fax [phone]     Mu Barron  1945  WEJ:[id number]   Date of visit:  12/20/2021    Subjective:    Mu Barron is a 77 y.o.  male who presents to [facility] today (12/20/2021) for evaluation of:    Chief Complaint   Patient presents with    Cough     dry cough, fever last night x1 day        Cough  This is a new problem. The current episode started yesterday. The problem has been unchanged. The problem occurs every few minutes. The cough is non-productive. Associated symptoms include chills, a fever (low grade last night) and nasal congestion. Pertinent negatives include no chest pain, headaches, myalgias, postnasal drip, rash, rhinorrhea, sore throat, shortness of breath or wheezing. Nothing aggravates the symptoms. He has tried OTC cough suppressant (tylenol) for the symptoms. The treatment provided mild relief.        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)    Non-rheumatic mitral regurgitation    Nonrheumatic aortic valve stenosis    Uncontrolled type 2 diabetes mellitus with nephropathy ([address]    Coronary artery disease involving native coronary artery of native heart without angina pectoris    Controlled type 2 diabetes mellitus with mild nonproliferative retinopathy and macular edema, without long-term current use of insulin (HCC)    Carotid stenosis    CKD (chronic kidney disease), stage III (HCC)    Pneumonia of right lower lobe due to infectious organism    Aortic stenosis, severe    Hypertension    Hyperlipidemia    H/O agent Orange exposure    Diabetes mellitus, type 2 ([address]    CAD (coronary artery disease)    Anemia    Proteinuria    Wears hearing aid in both ears    CHF NYHA class I, chronic, systolic (HCC)    S/P TAVR (transcatheter aortic valve replacement)    Second hand smoke exposure    Peripheral arterial disease (HCC)    Heavy sensation of lower extremity    Impaired ambulation    Pneumonia        Current medications are:  Current Outpatient Medications   Medication Sig Dispense Refill    oseltamivir (TAMIFLU) 30 MG capsule Take 1 capsule by mouth 2 times daily for 5 days 10 capsule 0    levoFLOXacin (LEVAQUIN) 500 MG tablet Take 1 tablet by mouth daily for 7 days 7 tablet 0    lisinopril (PRINIVIL;ZESTRIL) 20 MG tablet Take 10 mg by mouth daily      furosemide (LASIX) 40 MG tablet Take 0.5 tablets by mouth daily Once daily on Monday, Wednesday and Friday 60 tablet 1    Handicap Placard MISC by Does not apply route Duration 2 years 1 each 0    amLODIPine (NORVASC) 10 MG tablet Take 1 tablet by mouth daily 30 tablet 5    clopidogrel (PLAVIX) 75 MG tablet Take 1 tablet by mouth daily Take 1 daily 60 tablet 3    simvastatin (ZOCOR) 40 MG tablet Take 10 mg by mouth nightly       glipiZIDE (GLUCOTROL) 10 MG tablet Take 10 mg by mouth 2 times daily (before meals)      vitamin D (CHOLECALCIFEROL) 1000 UNIT TABS tablet Take 1,000 Units by mouth 2 times daily       metFORMIN (GLUCOPHAGE) 1000 MG tablet Take 1 tablet by mouth 2 times daily (with meals) 60 tablet 3     No current facility-administered medications for this visit. He is allergic to atorvastatin, sulfa antibiotics, and sulfanilamide. Snehal Whatley He  reports that he quit smoking about 52 years ago. He has a 1.00 pack-year smoking history. He has never used smokeless tobacco.      Objective:    Vitals:    12/20/21 1002   BP: 110/78   Pulse: 75   Resp: 20   Temp: 99.7 °F (37.6 °C)   SpO2: 94%   Weight: 166 lb 6.4 oz (75.5 kg)     Body mass index is 26.86 kg/m².     Review of Systems   Constitutional: Positive for chills and fever (low grade last night). Negative for appetite change and fatigue. HENT: Positive for congestion. Negative for postnasal drip, rhinorrhea and sore throat. Respiratory: Positive for cough. Negative for chest tightness, shortness of breath and wheezing. Cardiovascular: Negative. Negative for chest pain. Gastrointestinal: Negative. Musculoskeletal: Negative for myalgias. Skin: Negative for rash. Neurological: Negative for headaches. Physical Exam  Vitals and nursing note reviewed. Constitutional:       Appearance: He is well-developed. HENT:      Head: Normocephalic. Jaw: There is normal jaw occlusion. Right Ear: Tympanic membrane, ear canal and external ear normal.      Left Ear: Tympanic membrane, ear canal and external ear normal.      Nose: Congestion present. Right Turbinates: Swollen (erythema). Left Turbinates: Swollen (erythema). Right Sinus: No maxillary sinus tenderness or frontal sinus tenderness. Left Sinus: No maxillary sinus tenderness or frontal sinus tenderness. Mouth/Throat:      Lips: Pink. Mouth: Mucous membranes are moist.      Pharynx: Oropharynx is clear. Uvula midline. Eyes:      Pupils: Pupils are equal, round, and reactive to light. Cardiovascular:      Rate and Rhythm: Normal rate and regular rhythm. Heart sounds: Normal heart sounds. Pulmonary:      Effort: Pulmonary effort is normal.      Breath sounds: Normal air entry. Wheezing (expiratory bilateral throughout) present. Comments: Dry cough noted  Musculoskeletal:      Cervical back: Normal range of motion and neck supple. Lymphadenopathy:      Cervical: No cervical adenopathy. Skin:     General: Skin is warm and dry. Neurological:      General: No focal deficit present. Mental Status: He is alert and oriented to person, place, and time. Psychiatric:         Behavior: Behavior normal.         Thought Content:  Thought content normal.       Assessment and Plan:    Results for POC orders placed in visit on 12/20/21   POCT Influenza A/B   Result Value Ref Range    Influenza A Ab POS     Influenza B Ab NEG      XR CHEST (2 VW)    Result Date: 12/20/2021  EXAMINATION: TWO XRAY VIEWS OF THE CHEST 12/20/2021 10:32 am COMPARISON: 08/01/2021 HISTORY: ORDERING SYSTEM PROVIDED HISTORY: Cough TECHNOLOGIST PROVIDED HISTORY: cough and fever Reason for Exam: Cough, and fever. FINDINGS: The cardiac and mediastinal contours appear unchanged, notable for endovascular aortic valve repair. Patchy bilateral airspace disease is noted. Mild septal thickening in the lung bases. No pneumothorax or significant effusion identified. No acute osseous abnormality identified. Patchy opacities in the right lung and mild septal thickening. Findings are suggestive of interstitial edema. Underlying inflammatory process or infection should also be considered in the appropriate clinical setting. Diagnosis Orders   1. Influenza A  oseltamivir (TAMIFLU) 30 MG capsule   2. Pneumonia of right lung due to infectious organism, unspecified part of lung  levoFLOXacin (LEVAQUIN) 500 MG tablet   3. Cough  POCT Influenza A/B    XR CHEST (2 VW)   4. Fever, unspecified fever cause  POCT Influenza A/B    XR CHEST (2 VW)     Complete full course of antibiotic. Take Tamiflu as directed. I recommended that he use Coricidin HBP to help with congestion and cough. he was also encouraged to use tylenol for pain/fever. Increase water intake. Use cool mist humidifier at bedtime. Use nasal saline flush as needed. Good hand hygiene. he was instructed to return if there is no improvement or symptoms worsen. The use, risks, benefits, and side effects of prescribed or recommended medications were discussed. All questions were answered and the patient/caregiver voiced understanding. No orders of the defined types were placed in this encounter.         Electronically signed by

## 2021-12-21 ENCOUNTER — HOSPITAL ENCOUNTER (EMERGENCY)
Age: 76
Discharge: ANOTHER HOSPITAL | End: 2021-12-22
Attending: EMERGENCY MEDICINE
Payer: MEDICARE

## 2021-12-21 ENCOUNTER — APPOINTMENT (OUTPATIENT)
Dept: GENERAL RADIOLOGY | Age: 76
End: 2021-12-21
Payer: MEDICARE

## 2021-12-21 DIAGNOSIS — I50.30 DIASTOLIC CONGESTIVE HEART FAILURE, UNSPECIFIED HF CHRONICITY (HCC): Primary | ICD-10-CM

## 2021-12-21 DIAGNOSIS — I21.4 NSTEMI (NON-ST ELEVATED MYOCARDIAL INFARCTION) (HCC): ICD-10-CM

## 2021-12-21 LAB
ABSOLUTE EOS #: <0.03 K/UL (ref 0–0.44)
ABSOLUTE IMMATURE GRANULOCYTE: 0.1 K/UL (ref 0–0.3)
ABSOLUTE LYMPH #: 3.13 K/UL (ref 1.1–3.7)
ABSOLUTE MONO #: 1.05 K/UL (ref 0.1–1.2)
ANION GAP SERPL CALCULATED.3IONS-SCNC: 14 MMOL/L (ref 9–17)
BASOPHILS # BLD: 1 % (ref 0–2)
BASOPHILS ABSOLUTE: 0.08 K/UL (ref 0–0.2)
BNP INTERPRETATION: ABNORMAL
BUN BLDV-MCNC: 24 MG/DL (ref 8–23)
BUN/CREAT BLD: 17 (ref 9–20)
CALCIUM SERPL-MCNC: 8.7 MG/DL (ref 8.6–10.4)
CHLORIDE BLD-SCNC: 98 MMOL/L (ref 98–107)
CO2: 19 MMOL/L (ref 20–31)
CREAT SERPL-MCNC: 1.38 MG/DL (ref 0.7–1.2)
DIFFERENTIAL TYPE: ABNORMAL
EOSINOPHILS RELATIVE PERCENT: 0 % (ref 1–4)
GFR AFRICAN AMERICAN: >60 ML/MIN
GFR NON-AFRICAN AMERICAN: 50 ML/MIN
GFR SERPL CREATININE-BSD FRML MDRD: ABNORMAL ML/MIN/{1.73_M2}
GFR SERPL CREATININE-BSD FRML MDRD: ABNORMAL ML/MIN/{1.73_M2}
GLUCOSE BLD-MCNC: 292 MG/DL (ref 70–99)
HCT VFR BLD CALC: 39.7 % (ref 40.7–50.3)
HEMOGLOBIN: 12.7 G/DL (ref 13–17)
IMMATURE GRANULOCYTES: 1 %
LACTIC ACID, SEPSIS WHOLE BLOOD: ABNORMAL MMOL/L (ref 0.5–1.9)
LACTIC ACID, SEPSIS: 2.8 MMOL/L (ref 0.5–1.9)
LYMPHOCYTES # BLD: 18 % (ref 24–43)
MCH RBC QN AUTO: 26.7 PG (ref 25.2–33.5)
MCHC RBC AUTO-ENTMCNC: 32 G/DL (ref 25.2–33.5)
MCV RBC AUTO: 83.6 FL (ref 82.6–102.9)
MONOCYTES # BLD: 6 % (ref 3–12)
NRBC AUTOMATED: 0 PER 100 WBC
PDW BLD-RTO: 13.9 % (ref 11.8–14.4)
PLATELET # BLD: 369 K/UL (ref 138–453)
PLATELET ESTIMATE: ABNORMAL
PMV BLD AUTO: 10.6 FL (ref 8.1–13.5)
POTASSIUM SERPL-SCNC: 4.3 MMOL/L (ref 3.7–5.3)
PRO-BNP: 5403 PG/ML
RBC # BLD: 4.75 M/UL (ref 4.21–5.77)
RBC # BLD: ABNORMAL 10*6/UL
SARS-COV-2, RAPID: NOT DETECTED
SEG NEUTROPHILS: 74 % (ref 36–65)
SEGMENTED NEUTROPHILS ABSOLUTE COUNT: 13.02 K/UL (ref 1.5–8.1)
SODIUM BLD-SCNC: 131 MMOL/L (ref 135–144)
SPECIMEN DESCRIPTION: NORMAL
TROPONIN INTERP: ABNORMAL
TROPONIN T: ABNORMAL NG/ML
TROPONIN, HIGH SENSITIVITY: 313 NG/L (ref 0–22)
WBC # BLD: 17.4 K/UL (ref 3.5–11.3)
WBC # BLD: ABNORMAL 10*3/UL

## 2021-12-21 PROCEDURE — 6360000002 HC RX W HCPCS: Performed by: EMERGENCY MEDICINE

## 2021-12-21 PROCEDURE — 87040 BLOOD CULTURE FOR BACTERIA: CPT

## 2021-12-21 PROCEDURE — 80048 BASIC METABOLIC PNL TOTAL CA: CPT

## 2021-12-21 PROCEDURE — 85025 COMPLETE CBC W/AUTO DIFF WBC: CPT

## 2021-12-21 PROCEDURE — 36415 COLL VENOUS BLD VENIPUNCTURE: CPT

## 2021-12-21 PROCEDURE — 94660 CPAP INITIATION&MGMT: CPT

## 2021-12-21 PROCEDURE — 96372 THER/PROPH/DIAG INJ SC/IM: CPT

## 2021-12-21 PROCEDURE — 96374 THER/PROPH/DIAG INJ IV PUSH: CPT

## 2021-12-21 PROCEDURE — 99285 EMERGENCY DEPT VISIT HI MDM: CPT

## 2021-12-21 PROCEDURE — 71045 X-RAY EXAM CHEST 1 VIEW: CPT

## 2021-12-21 PROCEDURE — 87635 SARS-COV-2 COVID-19 AMP PRB: CPT

## 2021-12-21 PROCEDURE — 84484 ASSAY OF TROPONIN QUANT: CPT

## 2021-12-21 PROCEDURE — 94640 AIRWAY INHALATION TREATMENT: CPT

## 2021-12-21 PROCEDURE — 83880 ASSAY OF NATRIURETIC PEPTIDE: CPT

## 2021-12-21 PROCEDURE — 93005 ELECTROCARDIOGRAM TRACING: CPT | Performed by: EMERGENCY MEDICINE

## 2021-12-21 PROCEDURE — 83605 ASSAY OF LACTIC ACID: CPT

## 2021-12-21 PROCEDURE — 2700000000 HC OXYGEN THERAPY PER DAY

## 2021-12-21 RX ORDER — FUROSEMIDE 10 MG/ML
20 INJECTION INTRAMUSCULAR; INTRAVENOUS ONCE
Status: COMPLETED | OUTPATIENT
Start: 2021-12-21 | End: 2021-12-21

## 2021-12-21 RX ORDER — ALBUTEROL SULFATE 2.5 MG/3ML
2.5 SOLUTION RESPIRATORY (INHALATION) ONCE
Status: COMPLETED | OUTPATIENT
Start: 2021-12-21 | End: 2021-12-21

## 2021-12-21 RX ADMIN — ALBUTEROL SULFATE 2.5 MG: 2.5 SOLUTION RESPIRATORY (INHALATION) at 21:14

## 2021-12-21 RX ADMIN — ENOXAPARIN SODIUM 80 MG: 80 INJECTION SUBCUTANEOUS at 23:58

## 2021-12-21 RX ADMIN — FUROSEMIDE 20 MG: 10 INJECTION, SOLUTION INTRAMUSCULAR; INTRAVENOUS at 22:03

## 2021-12-22 ENCOUNTER — HOSPITAL ENCOUNTER (INPATIENT)
Age: 76
LOS: 6 days | Discharge: HOME | DRG: 871 | End: 2021-12-28
Attending: EMERGENCY MEDICINE
Payer: MEDICARE

## 2021-12-22 ENCOUNTER — APPOINTMENT (OUTPATIENT)
Dept: GENERAL RADIOLOGY | Age: 76
DRG: 871 | End: 2021-12-22
Payer: MEDICARE

## 2021-12-22 VITALS
DIASTOLIC BLOOD PRESSURE: 59 MMHG | WEIGHT: 170.5 LBS | RESPIRATION RATE: 18 BRPM | HEART RATE: 61 BPM | TEMPERATURE: 97.8 F | SYSTOLIC BLOOD PRESSURE: 114 MMHG | BODY MASS INDEX: 27.52 KG/M2 | OXYGEN SATURATION: 97 %

## 2021-12-22 DIAGNOSIS — J11.1 INFLUENZA WITH RESPIRATORY MANIFESTATION OTHER THAN PNEUMONIA: ICD-10-CM

## 2021-12-22 DIAGNOSIS — A41.9 SEPTICEMIA (HCC): ICD-10-CM

## 2021-12-22 DIAGNOSIS — I21.4 NSTEMI (NON-ST ELEVATED MYOCARDIAL INFARCTION) (HCC): ICD-10-CM

## 2021-12-22 DIAGNOSIS — I50.9 ACUTE ON CHRONIC CONGESTIVE HEART FAILURE, UNSPECIFIED HEART FAILURE TYPE (HCC): Primary | ICD-10-CM

## 2021-12-22 DIAGNOSIS — R09.02 HYPOXIA: ICD-10-CM

## 2021-12-22 DIAGNOSIS — D72.829 LEUKOCYTOSIS, UNSPECIFIED TYPE: ICD-10-CM

## 2021-12-22 PROBLEM — I50.23 ACUTE ON CHRONIC SYSTOLIC HEART FAILURE (HCC): Status: ACTIVE | Noted: 2020-01-08

## 2021-12-22 PROBLEM — J96.01 ACUTE RESPIRATORY FAILURE WITH HYPOXIA (HCC): Status: ACTIVE | Noted: 2021-12-22

## 2021-12-22 PROBLEM — N18.31 CHRONIC KIDNEY DISEASE, STAGE 3A (HCC): Status: ACTIVE | Noted: 2019-09-11

## 2021-12-22 PROBLEM — J10.1 INFLUENZA A: Status: ACTIVE | Noted: 2021-12-22

## 2021-12-22 PROBLEM — N17.9 AKI (ACUTE KIDNEY INJURY) (HCC): Status: ACTIVE | Noted: 2021-12-22

## 2021-12-22 LAB
ABSOLUTE EOS #: 0 K/UL (ref 0–0.4)
ABSOLUTE IMMATURE GRANULOCYTE: 0 K/UL (ref 0–0.3)
ABSOLUTE LYMPH #: 0.46 K/UL (ref 1–4.8)
ABSOLUTE MONO #: 0.23 K/UL (ref 0.1–0.8)
ANION GAP SERPL CALCULATED.3IONS-SCNC: 19 MMOL/L (ref 9–17)
BASOPHILS # BLD: 0 % (ref 0–2)
BASOPHILS ABSOLUTE: 0 K/UL (ref 0–0.2)
BNP INTERPRETATION: ABNORMAL
BUN BLDV-MCNC: 34 MG/DL (ref 8–23)
BUN/CREAT BLD: ABNORMAL (ref 9–20)
CALCIUM SERPL-MCNC: 8.4 MG/DL (ref 8.6–10.4)
CHLORIDE BLD-SCNC: 100 MMOL/L (ref 98–107)
CO2: 13 MMOL/L (ref 20–31)
CREAT SERPL-MCNC: 1.69 MG/DL (ref 0.7–1.2)
DIFFERENTIAL TYPE: ABNORMAL
EKG ATRIAL RATE: 70 BPM
EKG P AXIS: 46 DEGREES
EKG P-R INTERVAL: 230 MS
EKG Q-T INTERVAL: 418 MS
EKG QRS DURATION: 104 MS
EKG QTC CALCULATION (BAZETT): 451 MS
EKG R AXIS: -32 DEGREES
EKG T AXIS: 104 DEGREES
EKG VENTRICULAR RATE: 70 BPM
EOSINOPHILS RELATIVE PERCENT: 0 % (ref 1–4)
GFR AFRICAN AMERICAN: 48 ML/MIN
GFR NON-AFRICAN AMERICAN: 40 ML/MIN
GFR SERPL CREATININE-BSD FRML MDRD: ABNORMAL ML/MIN/{1.73_M2}
GFR SERPL CREATININE-BSD FRML MDRD: ABNORMAL ML/MIN/{1.73_M2}
GLUCOSE BLD-MCNC: 285 MG/DL (ref 75–110)
GLUCOSE BLD-MCNC: 340 MG/DL (ref 70–99)
HCT VFR BLD CALC: 33.4 % (ref 40.7–50.3)
HCT VFR BLD CALC: 35.1 % (ref 40.7–50.3)
HEMOGLOBIN: 10.8 G/DL (ref 13–17)
HEMOGLOBIN: 11.3 G/DL (ref 13–17)
IMMATURE GRANULOCYTES: 0 %
LACTIC ACID, SEPSIS WHOLE BLOOD: 2.4 MMOL/L (ref 0.5–1.9)
LACTIC ACID, SEPSIS WHOLE BLOOD: 2.5 MMOL/L (ref 0.5–1.9)
LACTIC ACID, SEPSIS WHOLE BLOOD: NORMAL MMOL/L (ref 0.5–1.9)
LACTIC ACID, SEPSIS: 1.7 MMOL/L (ref 0.5–1.9)
LACTIC ACID, SEPSIS: ABNORMAL MMOL/L (ref 0.5–1.9)
LACTIC ACID, SEPSIS: ABNORMAL MMOL/L (ref 0.5–1.9)
LYMPHOCYTES # BLD: 6 % (ref 24–44)
MCH RBC QN AUTO: 26.2 PG (ref 25.2–33.5)
MCH RBC QN AUTO: 26.3 PG (ref 25.2–33.5)
MCHC RBC AUTO-ENTMCNC: 32.2 G/DL (ref 28.4–34.8)
MCHC RBC AUTO-ENTMCNC: 32.3 G/DL (ref 28.4–34.8)
MCV RBC AUTO: 81.3 FL (ref 82.6–102.9)
MCV RBC AUTO: 81.3 FL (ref 82.6–102.9)
MONOCYTES # BLD: 3 % (ref 1–7)
MORPHOLOGY: ABNORMAL
MORPHOLOGY: ABNORMAL
NRBC AUTOMATED: 0 PER 100 WBC
NRBC AUTOMATED: 0 PER 100 WBC
PARTIAL THROMBOPLASTIN TIME: 29 SEC (ref 20.5–30.5)
PARTIAL THROMBOPLASTIN TIME: 29.8 SEC (ref 20.5–30.5)
PARTIAL THROMBOPLASTIN TIME: 33.8 SEC (ref 20.5–30.5)
PDW BLD-RTO: 13.8 % (ref 11.8–14.4)
PDW BLD-RTO: 13.8 % (ref 11.8–14.4)
PLATELET # BLD: 235 K/UL (ref 138–453)
PLATELET # BLD: 249 K/UL (ref 138–453)
PLATELET ESTIMATE: ABNORMAL
PMV BLD AUTO: 10.7 FL (ref 8.1–13.5)
PMV BLD AUTO: 11 FL (ref 8.1–13.5)
POTASSIUM SERPL-SCNC: 5.2 MMOL/L (ref 3.7–5.3)
PRO-BNP: 7044 PG/ML
RBC # BLD: 4.11 M/UL (ref 4.21–5.77)
RBC # BLD: 4.32 M/UL (ref 4.21–5.77)
RBC # BLD: ABNORMAL 10*6/UL
SEG NEUTROPHILS: 91 % (ref 36–66)
SEGMENTED NEUTROPHILS ABSOLUTE COUNT: 7.01 K/UL (ref 1.8–7.7)
SODIUM BLD-SCNC: 132 MMOL/L (ref 135–144)
TROPONIN INTERP: ABNORMAL
TROPONIN INTERP: ABNORMAL
TROPONIN T: ABNORMAL NG/ML
TROPONIN T: ABNORMAL NG/ML
TROPONIN, HIGH SENSITIVITY: 355 NG/L (ref 0–22)
TROPONIN, HIGH SENSITIVITY: 369 NG/L (ref 0–22)
WBC # BLD: 6.3 K/UL (ref 3.5–11.3)
WBC # BLD: 7.7 K/UL (ref 3.5–11.3)
WBC # BLD: ABNORMAL 10*3/UL

## 2021-12-22 PROCEDURE — 83605 ASSAY OF LACTIC ACID: CPT

## 2021-12-22 PROCEDURE — 80048 BASIC METABOLIC PNL TOTAL CA: CPT

## 2021-12-22 PROCEDURE — 93005 ELECTROCARDIOGRAM TRACING: CPT

## 2021-12-22 PROCEDURE — 96374 THER/PROPH/DIAG INJ IV PUSH: CPT

## 2021-12-22 PROCEDURE — 85730 THROMBOPLASTIN TIME PARTIAL: CPT

## 2021-12-22 PROCEDURE — 83880 ASSAY OF NATRIURETIC PEPTIDE: CPT

## 2021-12-22 PROCEDURE — 71045 X-RAY EXAM CHEST 1 VIEW: CPT

## 2021-12-22 PROCEDURE — 6370000000 HC RX 637 (ALT 250 FOR IP): Performed by: NURSE PRACTITIONER

## 2021-12-22 PROCEDURE — 6360000002 HC RX W HCPCS: Performed by: NURSE PRACTITIONER

## 2021-12-22 PROCEDURE — 6370000000 HC RX 637 (ALT 250 FOR IP): Performed by: INTERNAL MEDICINE

## 2021-12-22 PROCEDURE — 2700000000 HC OXYGEN THERAPY PER DAY

## 2021-12-22 PROCEDURE — 84484 ASSAY OF TROPONIN QUANT: CPT

## 2021-12-22 PROCEDURE — 85027 COMPLETE CBC AUTOMATED: CPT

## 2021-12-22 PROCEDURE — 6360000002 HC RX W HCPCS: Performed by: STUDENT IN AN ORGANIZED HEALTH CARE EDUCATION/TRAINING PROGRAM

## 2021-12-22 PROCEDURE — 94640 AIRWAY INHALATION TREATMENT: CPT

## 2021-12-22 PROCEDURE — 36415 COLL VENOUS BLD VENIPUNCTURE: CPT

## 2021-12-22 PROCEDURE — 85025 COMPLETE CBC W/AUTO DIFF WBC: CPT

## 2021-12-22 PROCEDURE — 82947 ASSAY GLUCOSE BLOOD QUANT: CPT

## 2021-12-22 PROCEDURE — 2580000003 HC RX 258: Performed by: NURSE PRACTITIONER

## 2021-12-22 PROCEDURE — 99223 1ST HOSP IP/OBS HIGH 75: CPT | Performed by: INTERNAL MEDICINE

## 2021-12-22 PROCEDURE — 6370000000 HC RX 637 (ALT 250 FOR IP): Performed by: STUDENT IN AN ORGANIZED HEALTH CARE EDUCATION/TRAINING PROGRAM

## 2021-12-22 PROCEDURE — 2580000003 HC RX 258: Performed by: STUDENT IN AN ORGANIZED HEALTH CARE EDUCATION/TRAINING PROGRAM

## 2021-12-22 PROCEDURE — 99285 EMERGENCY DEPT VISIT HI MDM: CPT

## 2021-12-22 PROCEDURE — 94660 CPAP INITIATION&MGMT: CPT

## 2021-12-22 PROCEDURE — 2060000000 HC ICU INTERMEDIATE R&B

## 2021-12-22 RX ORDER — ATORVASTATIN CALCIUM 20 MG/1
20 TABLET, FILM COATED ORAL DAILY
Status: DISCONTINUED | OUTPATIENT
Start: 2021-12-22 | End: 2021-12-28 | Stop reason: HOSPADM

## 2021-12-22 RX ORDER — ACETAMINOPHEN 325 MG/1
650 TABLET ORAL EVERY 6 HOURS PRN
Status: DISCONTINUED | OUTPATIENT
Start: 2021-12-22 | End: 2021-12-28 | Stop reason: HOSPADM

## 2021-12-22 RX ORDER — ACETAMINOPHEN 650 MG/1
650 SUPPOSITORY RECTAL EVERY 6 HOURS PRN
Status: DISCONTINUED | OUTPATIENT
Start: 2021-12-22 | End: 2021-12-28 | Stop reason: HOSPADM

## 2021-12-22 RX ORDER — OSELTAMIVIR PHOSPHATE 30 MG/1
30 CAPSULE ORAL 2 TIMES DAILY
Status: COMPLETED | OUTPATIENT
Start: 2021-12-22 | End: 2021-12-26

## 2021-12-22 RX ORDER — SODIUM CHLORIDE 0.9 % (FLUSH) 0.9 %
5-40 SYRINGE (ML) INJECTION PRN
Status: DISCONTINUED | OUTPATIENT
Start: 2021-12-22 | End: 2021-12-28 | Stop reason: HOSPADM

## 2021-12-22 RX ORDER — LEVOFLOXACIN 5 MG/ML
750 INJECTION, SOLUTION INTRAVENOUS
Status: COMPLETED | OUTPATIENT
Start: 2021-12-22 | End: 2021-12-28

## 2021-12-22 RX ORDER — GLIPIZIDE 10 MG/1
10 TABLET ORAL
Status: DISCONTINUED | OUTPATIENT
Start: 2021-12-22 | End: 2021-12-22

## 2021-12-22 RX ORDER — LISINOPRIL 10 MG/1
10 TABLET ORAL DAILY
Status: DISCONTINUED | OUTPATIENT
Start: 2021-12-22 | End: 2021-12-28 | Stop reason: HOSPADM

## 2021-12-22 RX ORDER — PREDNISONE 20 MG/1
20 TABLET ORAL DAILY
Status: COMPLETED | OUTPATIENT
Start: 2021-12-22 | End: 2021-12-26

## 2021-12-22 RX ORDER — FUROSEMIDE 10 MG/ML
40 INJECTION INTRAMUSCULAR; INTRAVENOUS ONCE
Status: DISCONTINUED | OUTPATIENT
Start: 2021-12-22 | End: 2021-12-23

## 2021-12-22 RX ORDER — CLOPIDOGREL BISULFATE 75 MG/1
75 TABLET ORAL DAILY
Status: DISCONTINUED | OUTPATIENT
Start: 2021-12-22 | End: 2021-12-28 | Stop reason: HOSPADM

## 2021-12-22 RX ORDER — VITAMIN B COMPLEX
1000 TABLET ORAL 2 TIMES DAILY
Status: DISCONTINUED | OUTPATIENT
Start: 2021-12-22 | End: 2021-12-28 | Stop reason: HOSPADM

## 2021-12-22 RX ORDER — HEPARIN SODIUM 10000 [USP'U]/100ML
5-30 INJECTION, SOLUTION INTRAVENOUS CONTINUOUS
Status: DISCONTINUED | OUTPATIENT
Start: 2021-12-22 | End: 2021-12-28

## 2021-12-22 RX ORDER — AMLODIPINE BESYLATE 10 MG/1
10 TABLET ORAL DAILY
Status: DISCONTINUED | OUTPATIENT
Start: 2021-12-22 | End: 2021-12-28 | Stop reason: HOSPADM

## 2021-12-22 RX ORDER — DEXTROSE MONOHYDRATE 25 G/50ML
12.5 INJECTION, SOLUTION INTRAVENOUS PRN
Status: DISCONTINUED | OUTPATIENT
Start: 2021-12-22 | End: 2021-12-28 | Stop reason: HOSPADM

## 2021-12-22 RX ORDER — SODIUM CHLORIDE 0.9 % (FLUSH) 0.9 %
5-40 SYRINGE (ML) INJECTION EVERY 12 HOURS SCHEDULED
Status: DISCONTINUED | OUTPATIENT
Start: 2021-12-22 | End: 2021-12-28 | Stop reason: HOSPADM

## 2021-12-22 RX ORDER — 0.9 % SODIUM CHLORIDE 0.9 %
30 INTRAVENOUS SOLUTION INTRAVENOUS ONCE
Status: CANCELLED | OUTPATIENT
Start: 2021-12-22 | End: 2021-12-22

## 2021-12-22 RX ORDER — HEPARIN SODIUM 1000 [USP'U]/ML
4000 INJECTION, SOLUTION INTRAVENOUS; SUBCUTANEOUS PRN
Status: DISCONTINUED | OUTPATIENT
Start: 2021-12-22 | End: 2021-12-28

## 2021-12-22 RX ORDER — ASPIRIN 81 MG/1
324 TABLET, CHEWABLE ORAL ONCE
Status: COMPLETED | OUTPATIENT
Start: 2021-12-22 | End: 2021-12-22

## 2021-12-22 RX ORDER — NICOTINE POLACRILEX 4 MG
15 LOZENGE BUCCAL PRN
Status: DISCONTINUED | OUTPATIENT
Start: 2021-12-22 | End: 2021-12-28 | Stop reason: HOSPADM

## 2021-12-22 RX ORDER — HEPARIN SODIUM 1000 [USP'U]/ML
4000 INJECTION, SOLUTION INTRAVENOUS; SUBCUTANEOUS ONCE
Status: COMPLETED | OUTPATIENT
Start: 2021-12-22 | End: 2021-12-22

## 2021-12-22 RX ORDER — DEXTROSE MONOHYDRATE 50 MG/ML
100 INJECTION, SOLUTION INTRAVENOUS PRN
Status: DISCONTINUED | OUTPATIENT
Start: 2021-12-22 | End: 2021-12-28 | Stop reason: HOSPADM

## 2021-12-22 RX ORDER — SODIUM CHLORIDE 9 MG/ML
25 INJECTION, SOLUTION INTRAVENOUS PRN
Status: DISCONTINUED | OUTPATIENT
Start: 2021-12-22 | End: 2021-12-28 | Stop reason: HOSPADM

## 2021-12-22 RX ORDER — HEPARIN SODIUM 1000 [USP'U]/ML
2000 INJECTION, SOLUTION INTRAVENOUS; SUBCUTANEOUS PRN
Status: DISCONTINUED | OUTPATIENT
Start: 2021-12-22 | End: 2021-12-28

## 2021-12-22 RX ADMIN — Medication 500 MG: at 06:19

## 2021-12-22 RX ADMIN — Medication 1000 UNITS: at 22:28

## 2021-12-22 RX ADMIN — Medication 1000 UNITS: at 09:51

## 2021-12-22 RX ADMIN — INSULIN LISPRO 3 UNITS: 100 INJECTION, SOLUTION INTRAVENOUS; SUBCUTANEOUS at 21:33

## 2021-12-22 RX ADMIN — AMLODIPINE BESYLATE 10 MG: 10 TABLET ORAL at 09:51

## 2021-12-22 RX ADMIN — CEFTRIAXONE SODIUM 1000 MG: 1 INJECTION, POWDER, FOR SOLUTION INTRAMUSCULAR; INTRAVENOUS at 05:37

## 2021-12-22 RX ADMIN — ASPIRIN 324 MG: 81 TABLET, CHEWABLE ORAL at 06:20

## 2021-12-22 RX ADMIN — SODIUM CHLORIDE, PRESERVATIVE FREE 5 ML: 5 INJECTION INTRAVENOUS at 21:33

## 2021-12-22 RX ADMIN — HEPARIN SODIUM 4000 UNITS: 1000 INJECTION INTRAVENOUS; SUBCUTANEOUS at 06:57

## 2021-12-22 RX ADMIN — ATORVASTATIN CALCIUM 20 MG: 20 TABLET, FILM COATED ORAL at 09:51

## 2021-12-22 RX ADMIN — OSELTAMIVIR PHOSPHATE 30 MG: 30 CAPSULE ORAL at 21:30

## 2021-12-22 RX ADMIN — HEPARIN SODIUM 2000 UNITS: 1000 INJECTION INTRAVENOUS; SUBCUTANEOUS at 19:08

## 2021-12-22 RX ADMIN — SODIUM CHLORIDE, PRESERVATIVE FREE 10 ML: 5 INJECTION INTRAVENOUS at 09:53

## 2021-12-22 RX ADMIN — PREDNISONE 20 MG: 20 TABLET ORAL at 13:49

## 2021-12-22 RX ADMIN — CLOPIDOGREL 75 MG: 75 TABLET, FILM COATED ORAL at 09:51

## 2021-12-22 RX ADMIN — ALBUTEROL SULFATE 2.5 MG: 5 SOLUTION RESPIRATORY (INHALATION) at 05:55

## 2021-12-22 RX ADMIN — OSELTAMIVIR PHOSPHATE 30 MG: 30 CAPSULE ORAL at 09:51

## 2021-12-22 RX ADMIN — Medication 12 UNITS/KG/HR: at 12:38

## 2021-12-22 RX ADMIN — LEVOFLOXACIN 750 MG: 5 INJECTION, SOLUTION INTRAVENOUS at 07:29

## 2021-12-22 ASSESSMENT — ENCOUNTER SYMPTOMS
NAUSEA: 0
WHEEZING: 0
BACK PAIN: 0
BLOOD IN STOOL: 0
COUGH: 1
CHOKING: 0
VOMITING: 0
DIARRHEA: 0
SHORTNESS OF BREATH: 1
RHINORRHEA: 0
ABDOMINAL PAIN: 0
SORE THROAT: 0

## 2021-12-22 ASSESSMENT — PAIN SCALES - GENERAL: PAINLEVEL_OUTOF10: 0

## 2021-12-22 NOTE — ED PROVIDER NOTES
eMERGENCY dEPARTMENT eNCOUnter      Pt Name: Cindi Rondon  MRN: [id number]  [place] 1945  Date of evaluation: 12/21/2021      CHIEF COMPLAINT       Chief Complaint   Patient presents with    Shortness of [address] is a 77 y.o. male who presents with shortness of breath. Patient has been experiencing the above complaints for the last couple of days he was seen at urgent care 2 days ago was diagnosed with influenza and pneumonia was placed on antibiotics along with Tamiflu. He is denying associated chest pain fever chills any nausea vomiting chest significant shortness of breath        REVIEW OF SYSTEMS       Review of systems are all reviewed and negative except stated above in HPI    [address]    has a past medical history of Acne rosacea, Anemia, Aortic stenosis, BPH (benign prostatic hypertrophy), CAD (coronary artery disease), Carotid stenosis, Cataract of both eyes, CRI (chronic renal insufficiency), Diabetes mellitus, type 2 ([address] Dyslipidemia, Erectile dysfunction, H/O agent Orange exposure, Hearing loss, Hyperlipidemia, Hypertension, Mild nonproliferative diabetic retinopathy ([address] Non-rheumatic mitral regurgitation, RBBB, 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 SURGICAL HISTORY      has a past surgical history that includes Circumcision (age 49); Vasectomy; Tympanostomy tube placement; Cataract removal (Bilateral); eye surgery (Bilateral); Cardiac catheterization (12/03/2019); and Aortic valve surgery (01/2020).     CURRENT MEDICATIONS       Previous Medications    AMLODIPINE (NORVASC) 10 MG TABLET    Take 1 tablet by mouth daily    CLOPIDOGREL (PLAVIX) 75 MG TABLET    Take 1 tablet by mouth daily Take 1 daily    FUROSEMIDE (LASIX) 40 MG TABLET    Take 0.5 tablets by mouth daily Once daily on Monday, Wednesday and Friday    GLIPIZIDE (GLUCOTROL) 10 MG TABLET    Take 10 mg by mouth 2 times daily (before meals)    HANDICAP PLACARD MISC    by Does not apply route Duration 2 years    LEVOFLOXACIN (LEVAQUIN) 500 MG TABLET    Take 1 tablet by mouth daily for 7 days    LISINOPRIL (PRINIVIL;ZESTRIL) 20 MG TABLET    Take 10 mg by mouth daily    METFORMIN (GLUCOPHAGE) 1000 MG TABLET    Take 1 tablet by mouth 2 times daily (with meals)    OSELTAMIVIR (TAMIFLU) 30 MG CAPSULE    Take 1 capsule by mouth 2 times daily for 5 days    SIMVASTATIN (ZOCOR) 40 MG TABLET    Take 10 mg by mouth nightly     VITAMIN D (CHOLECALCIFEROL) 1000 UNIT TABS TABLET    Take 1,000 Units by mouth 2 times daily        ALLERGIES     is allergic to atorvastatin, sulfa antibiotics, and sulfanilamide. FAMILY HISTORY     He indicated that the status of his mother is unknown. He indicated that his father is . He indicated that his sister is . He indicated that the status of his brother is unknown.     family history includes Coronary Art Dis in his brother and sister; Heart Attack in his father; Hypertension in his mother; Tollie Kaushik in his mother; Stroke in his mother. SOCIAL HISTORY      reports that he quit smoking about 52 years ago. He has a 1.00 pack-year smoking history. He has never used smokeless tobacco. He reports current alcohol use. He reports that he does not use drugs. PHYSICAL EXAM     INITIAL VITALS:  weight is 170 lb 8 oz (77.3 kg). His tympanic temperature is 97.8 °F (36.6 °C). His blood pressure is 129/45 (abnormal) and his pulse is 67. His respiration is 15 and oxygen saturation is 97%.       General: Patient elderly male in moderate amount of respiratory distress  HEENT: Head is atraumatic conjunctiva are clear  Neck: Supple  Respiratory: Patient has got diffuse rales rhonchi throughout he is using accessory muscles sats are low  Cardiac: Heart is regular rate and rhythm  GI: Abdomen soft nontender  Peripheral exam no cyanosis good distal pulses  Neuro: Patient has no gross focal components:    Troponin, High Sensitivity 313 (*)     All other components within normal limits   COVID-19, RAPID   CULTURE, BLOOD 1   CULTURE, BLOOD 1   LACTATE, SEPSIS   TROPONIN         EMERGENCY DEPARTMENT COURSE:   Vitals:    Vitals:    12/21/21 2213 12/21/21 2227 12/21/21 2325 12/21/21 2333   BP:  (!) 105/42  (!) 129/45   Pulse:  68  67   Resp: 18 23 24 15   Temp:       TempSrc:       SpO2: 100% 94% 95% 97%   Weight:         -------------------------  BP: (!) 129/45, Temp: 97.8 °F (36.6 °C), Pulse: 67, Resp: 15    Orders Placed This Encounter   Medications    albuterol (PROVENTIL) nebulizer solution 2.5 mg     Order Specific Question:   Initiate RT Bronchodilator Protocol     Answer:   No    furosemide (LASIX) injection 20 mg    enoxaparin (LOVENOX) injection 80 mg           Re-evaluation Notes    Patient improved dramatically when placed on BiPAP. For says he feels great chest x-ray shows worsening cardiomegaly increasing vascular markings with possible pneumonia versus pulmonary edema proBNP and and troponin are both markedly elevated patient was given a dose of Lasix here white count was also elevated he is currently on antibiotics at this time I do feel he needs more emergent care we do not have any hospital beds here I did speak with the ER attending at [facility]. Shawanda Dalton who is agreeable to accept him    CRITICAL CARE:   CRITICAL CARE: There was a high probability of clinically significant/life threatening deterioration in this patient's condition which required my urgent intervention. Total critical care time was 30 minutes. This excludes any time for separately reportable procedures. CONSULTS:      PROCEDURES:  None    FINAL IMPRESSION      1. Diastolic congestive heart failure, unspecified HF chronicity ([facility] [address]    2.  NSTEMI (non-ST elevated myocardial infarction) [facility])          DISPOSITION/PLAN   DISPOSITION Decision To Transfer 12/21/2021 11:46:31 PM      Condition on Disposition    Fair    PATIENT REFERRED TO:  No follow-up provider specified. DISCHARGE MEDICATIONS:  New Prescriptions    No medications on file       (Please note that portions of this note were completed with a voice recognition program.  Efforts were made to edit the dictations but occasionally words are mis-transcribed.)    Emil Naqvi MD,, MD, F.A.C.E.P.   Attending Emergency Physician        Emil Naqvi MD  12/22/21 0002

## 2021-12-22 NOTE — ED PROVIDER NOTES
[facility] ED  Emergency Department Encounter  EmergencyMedicine Resident     Pt Name:Jose Enrique Samuels  MRN: [id number]  [place] 1945  Date of evaluation: 12/22/21  PCP:  ESTHER Victor [id number]       Chief Complaint   Patient presents with    Shortness of Breath     started Monday, worsening this morning. HISTORY OF PRESENT ILLNESS  (Location/Symptom, Timing/Onset, Context/Setting, Quality, Duration, Modifying Factors, Severity.)      Michael Callejas is a 77 y.o. male who presents with NSTEMI, hypoxia, acute CHF, influenza A infection. Patient is a 81-year-old male with a history of CAD with stents, hypertension, hyperlipidemia, dyslipidemia, diabetes, TAVR. He states 2 days ago he started to feel ill. He presented to [facility] urgent care where he was diagnosed with influenza B. On 12/21/2021 he presented to [facility] for respiratory distress. He was found by EMS hypoxic at 70% placed on BiPAP, continue BiPAP at [facility]. Work-up was remarkable for elevated troponins in the 300s and trending upward, no EKG changes, elevated BNP, WBC count of 17, lactic acid of 3, chest x-ray significant for bilateral infiltrates, infectious versus pulmonary edema. He does have lower extremity edema. He denied any chest pain. Patient received 80 mg of Lovenox, 40 mg Lasix. Patient's respiratory requirement decreased, only requiring nasal cannula oxygen. Patient was transferred to [address] [place] [address] due to bed shortage.     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 has a past surgical history that includes Circumcision (age 49); Vasectomy; Tympanostomy tube placement; Cataract removal (Bilateral); eye surgery (Bilateral); Cardiac catheterization (2019); and Aortic valve surgery (2020). Social History     Socioeconomic History    Marital status:      Spouse name: Not on file    Number of children: Not on file    Years of education: Not on file    Highest education level: Not on file   Occupational History    Occupation: seo   Tobacco Use    Smoking status: Former Smoker     Packs/day: 0.50     Years: 2.00     Pack years: 1.00     Quit date: 1970     Years since quittin.0    Smokeless tobacco: Never Used   Vaping Use    Vaping Use: Never used   Substance and Sexual Activity    Alcohol use: Yes     Alcohol/week: 0.0 standard drinks     Comment: RARELY    Drug use: No    Sexual activity: Not on file   Other Topics Concern    Not on file   Social History Narrative    Not on file     Social Determinants of Health     Financial Resource Strain:     Difficulty of Paying Living Expenses: Not on file   Food Insecurity:     Worried About Running Out of Food in the Last Year: Not on file    Gema of Food in the Last Year: Not on file   Transportation Needs:     Lack of Transportation (Medical): Not on file    Lack of Transportation (Non-Medical):  Not on file   Physical Activity:     Days of Exercise per Week: Not on file    Minutes of Exercise per Session: Not on file   Stress:     Feeling of Stress : Not on file   Social Connections:     Frequency of Communication with Friends and Family: Not on file    Frequency of Social Gatherings with Friends and Family: Not on file    Attends Holiness Services: Not on file    Active Member of Clubs or Organizations: Not on file    Attends Club or Organization Meetings: Not on file    Marital Status: Not on file   Intimate Partner Violence:     Fear of Current or Ex-Partner: Not on file    Emotionally Abused: Not on file    Physically Abused: Not on file    Sexually Abused: Not on file   Housing Stability:     Unable to Pay for Housing in the Last Year: Not on file    Number of [place] in the Last Year: Not on file    Unstable Housing in the Last Year: Not on file       Family History   Problem Relation Age of Onset    Heart Attack Father     Coronary Art Dis Sister     Hypertension Mother     Lung Cancer Mother     Stroke Mother     Coronary Art Dis Brother         later in life       Allergies:  Atorvastatin, Sulfa antibiotics, and Sulfanilamide    Home Medications:  Prior to Admission medications    Medication Sig Start Date End Date Taking?  Authorizing Provider   oseltamivir (TAMIFLU) 30 MG capsule Take 1 capsule by mouth 2 times daily for 5 days 12/20/21 12/25/21  ESTHER Quintanilla CNP   levoFLOXacin (LEVAQUIN) 500 MG tablet Take 1 tablet by mouth daily for 7 days 12/20/21 12/27/21  ESTHER Quintanilla CNP   lisinopril (PRINIVIL;ZESTRIL) 20 MG tablet Take 10 mg by mouth daily    Historical Provider, MD   furosemide (LASIX) 40 MG tablet Take 0.5 tablets by mouth daily Once daily on Monday, Wednesday and Friday 9/30/21   ESTHER Soni CNP   Handicap Placard MISC by Does not apply route Duration 2 years 6/2/20   ESTHER Soni CNP   amLODIPine (NORVASC) 10 MG tablet Take 1 tablet by mouth daily 1/23/20   Abdelrahman Montalvo MD   clopidogrel (PLAVIX) 75 MG tablet Take 1 tablet by mouth daily Take 1 daily 12/6/19   ESTHER Sun CNP   simvastatin (ZOCOR) 40 MG tablet Take 10 mg by mouth nightly     Historical Provider, MD   glipiZIDE (GLUCOTROL) 10 MG tablet Take 10 mg by mouth 2 times daily (before meals)    Historical Provider, MD   vitamin D (CHOLECALCIFEROL) 1000 UNIT TABS tablet Take 1,000 Units by mouth 2 times daily     Historical Provider, MD   metFORMIN (GLUCOPHAGE) 1000 MG tablet Take 1 tablet by mouth 2 times daily (with meals) 8/25/16   Ana Lozada APRN - CNP       REVIEW OF SYSTEMS    (2-9 systems for level 4, 10 or more for level 5)      Review of Systems   Constitutional: Negative for chills and fever. HENT: Negative for congestion and sore throat. Respiratory: Positive for cough and shortness of breath. Cardiovascular: Negative for chest pain. Gastrointestinal: Negative for abdominal pain, nausea and vomiting. Genitourinary: Negative for dysuria and frequency. Musculoskeletal: Negative for back pain and neck stiffness. Skin: Negative for rash. Neurological: Negative for weakness and headaches. PHYSICAL EXAM   (up to 7 for level 4, 8 or more for level 5)      INITIAL VITALS:   /73   Pulse 68   Temp 98 °F (36.7 °C) (Oral)   Resp 18   Ht 5' 6\" (1.676 m)   Wt 170 lb (77.1 kg)   SpO2 96%   BMI 27.44 kg/m²      Vitals:    12/22/21 0503 12/22/21 0545 12/22/21 0555   BP: 137/77 135/73    Pulse: 86 68    Resp: 14 22 18   Temp: 98 °F (36.7 °C)     TempSrc: Oral     SpO2: 92% 92% 96%   Weight: 170 lb (77.1 kg)     Height: 5' 6\" (1.676 m)          Physical Exam  Vitals reviewed. Constitutional:       General: He is not in acute distress. Appearance: He is well-developed. He is not ill-appearing. HENT:      Head: Normocephalic and atraumatic. Eyes:      Pupils: Pupils are equal, round, and reactive to light. Cardiovascular:      Rate and Rhythm: Normal rate and regular rhythm. Pulmonary:      Effort: Pulmonary effort is normal.      Breath sounds: Wheezing present. Abdominal:      General: There is no distension. Palpations: Abdomen is soft. Tenderness: There is no abdominal tenderness. Musculoskeletal:         General: Normal range of motion. Cervical back: Normal range of motion and neck supple. Right lower leg: Edema present. Left lower leg: Edema present. Skin:     General: Skin is warm and dry. Neurological:      Mental Status: He is alert and oriented to person, place, and time.        DIFFERENTIAL  DIAGNOSIS     PLAN (LABS / IMAGING / EKG):  Orders Placed This Encounter   Procedures    XR CHEST PORTABLE    CBC WITH AUTO DIFFERENTIAL    BASIC METABOLIC PANEL    Brain Natriuretic Peptide    Troponin    Inpatient consult to Cardiology    Inpatient consult to Hospitalist    Respiratory Care Evaluation and Treat    EKG 12 Lead       MEDICATIONS ORDERED:  Orders Placed This Encounter   Medications    AND Linked Order Group     cefTRIAXone (ROCEPHIN) 1000 mg IVPB in 50 mL D5W minibag      Order Specific Question:   Antimicrobial Indications      Answer:   Pneumonia (CAP)     azithromycin (ZITHROMAX) 500 mg in dextrose 5% 250 mL IVPB      Order Specific Question:   Antimicrobial Indications      Answer:   Pneumonia (CAP)    albuterol (PROVENTIL) nebulizer solution 2.5 mg     Order Specific Question:   Initiate RT Bronchodilator Protocol     Answer:   No       DIAGNOSTIC RESULTS / EMERGENCY DEPARTMENT COURSE / MDM   LAB RESULTS:  Results for orders placed or performed during the hospital encounter of 12/22/21   CBC WITH AUTO DIFFERENTIAL   Result Value Ref Range    WBC 7.7 3.5 - 11.3 k/uL    RBC 4.32 4.21 - 5.77 m/uL    Hemoglobin 11.3 (L) 13.0 - 17.0 g/dL    Hematocrit 35.1 (L) 40.7 - 50.3 %    MCV 81.3 (L) 82.6 - 102.9 fL    MCH 26.2 25.2 - 33.5 pg    MCHC 32.2 28.4 - 34.8 g/dL    RDW 13.8 11.8 - 14.4 %    Platelets 920 456 - 695 k/uL    MPV 11.0 8.1 - 13.5 fL    NRBC Automated 0.0 0.0 per 100 WBC    Differential Type NOT REPORTED     Seg Neutrophils PENDING %    Lymphocytes PENDING %    Monocytes PENDING %    Eosinophils % PENDING %    Basophils PENDING %    Immature Granulocytes PENDING 0 %    Segs Absolute PENDING k/uL    Absolute Lymph # PENDING k/uL    Absolute Mono # PENDING k/uL    Absolute Eos # PENDING k/uL    Basophils Absolute PENDING 0.0 - 0.2 k/uL    Absolute Immature Granulocyte PENDING 0.00 - 0.30 k/uL    WBC Morphology NOT REPORTED     RBC Morphology NOT REPORTED     Platelet Estimate NOT REPORTED          RADIOLOGY:  XR CHEST PORTABLE    (Results Pending)        EKG    EKG Interpretation    Interpreted by me    Rhythm: normal sinus   Rate: normal  Axis: Left  Ectopy: none  Conduction: First-degree AV block  ST Segments: no acute change  T Waves: no acute change  Q Waves: Inferior, septal    Clinical Impression: Abnormal EKG, sinus rhythm with first-degree AV block PACs,. When compared to previous EKG no acute findings. All EKG's are interpreted by the Emergency Department Physician who either signs or Co-signs this chart in the absence of a cardiologist.    [facility]:    Patient arrives as a transfer from [facility] for NSTEMI, hypoxia secondary to influenza and CHF. He is speaking in full sentences vital signs are normal with exception of SPO2 which is ranged from 88 to 92% on 2 L. He was placed on 6 L nasal cannula and given a breathing treatment with improvement in wheezing and resolution of hypoxia. Believe his respiratory failure secondary to both influenza and acute CHF. Per review of chest x-ray does look like there could be right sided infiltrate, additionally met sepsis criteria. Patient was started on ceftriaxone and azithromycin. Sepsis criteria met may be secondary to respiratory distress and influenza B. May consider de-escalating or discontinue antibiotics on admission. Cardiology was consulted see ED course below. Patient is admitted to [facility] for further work-up and management. ED Course as of 12/22/21 0611   Wed Dec 22, 2021   4844 Cardiology requesting full-strength aspirin. Additionally requesting to start heparin 12 hours after dose of Lovenox.  [CS]   5621 Admitted to [facility] [CS]      ED Course User Index  [CS] Gunnar Olguin DO         PROCEDURES:      CONSULTS:  IP CONSULT TO CARDIOLOGY  IP CONSULT TO HOSPITALIST    CRITICAL CARE:  Please see attending note    FINAL IMPRESSION      1. Acute on chronic congestive heart failure, unspecified heart failure type ([facility] [address]    2. NSTEMI (non-ST elevated myocardial infarction) ([facility] [address]    3. Influenza with respiratory manifestation other than pneumonia    4. Septicemia (HCC)    5. Leukocytosis, unspecified type    6. Hypoxia          DISPOSITION / PLAN     DISPOSITION Decision To Admit 12/22/2021 05:29:13 AM      PATIENT REFERRED TO:  No follow-up provider specified.     DISCHARGE MEDICATIONS:  New Prescriptions    No medications on file       Gunnar Olguin DO  Emergency Medicine Resident    (Please note that portions of thisnote were completed with a voice recognition program.  Efforts were made to edit the dictations but occasionally words are mis-transcribed.)        Gunnar Olguin DO  Resident  12/22/21 6728

## 2021-12-22 NOTE — CONSULTS
[facility]   Consult Note         Today's Date: 12/22/2021  Patient Name: Elisha Chaudhry  Date of admission: 12/22/2021  5:01 AM  Patient's age: 77 y.o., 1945  Admission Dx: Sepsis [facility]) [A41.9]  Requesting Physician: Feliz Bumpers, DO  Reason for Consult:  NSTEMI  History Obtained From:  Patient, Electronic Medical Records  History Of Present Illness:      Elisha Chaudhry is a 77 y.o. male who presented with NSTEMI, hypoxia, acute CHF, influenza A infection. Patient started having symptoms 2 days ago and presented to [facility] urgent care where he was diagnosed with influenza B. On 12/20/2021 he presented to [facility] for respiratory distress and was found to be hypoxic by the EMS 70% was placed on BiPAP. Patient had elevated troponins in the 300s, no EKG changes, elevated BNP lactic acid of 3, chest x-ray significant for bilateral infiltrates, infectious versus pulmonary edema. Patient has bilateral lower extremity edema patient denies any chest pain. Patient received 80 mg of Lovenox, 40 mg of Lasix. Patient respiratory requirement decreased, and is on nasal cannula oxygen and was transferred to [facility]. [facility] due to bed shortage. Past medical history significant for CAD with stents, hypertension, hyperlipidemia, dyslipidemia, diabetes, TAVR   Troponins are at 313, 355. Pro Bnp- 5403, 7,044. Cr-1.69.     Past Medical History:   has a past medical history of Acne rosacea, Anemia, Aortic stenosis, BPH (benign prostatic hypertrophy), CAD (coronary artery disease), Carotid stenosis, Cataract of both eyes, CRI (chronic renal insufficiency), Diabetes mellitus, type 2 ([address] Dyslipidemia, Erectile dysfunction, H/O agent Orange exposure, Hearing loss, Hyperlipidemia, Hypertension, Mild nonproliferative diabetic retinopathy ([address] Non-rheumatic mitral regurgitation, RBBB, 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 Past Surgical History:   has a past surgical history that includes Circumcision (age 49); Vasectomy; Tympanostomy tube placement; Cataract removal (Bilateral); eye surgery (Bilateral); Cardiac catheterization (12/03/2019); and Aortic valve surgery (01/2020). Home Medications:    Prior to Admission medications    Medication Sig Start Date End Date Taking?  Authorizing Provider   oseltamivir (TAMIFLU) 30 MG capsule Take 1 capsule by mouth 2 times daily for 5 days 12/20/21 12/25/21  ESTHER Barboza CNP   levoFLOXacin (LEVAQUIN) 500 MG tablet Take 1 tablet by mouth daily for 7 days 12/20/21 12/27/21  ESTHER Barboza CNP   lisinopril (PRINIVIL;ZESTRIL) 20 MG tablet Take 10 mg by mouth daily    Historical Provider, MD   furosemide (LASIX) 40 MG tablet Take 0.5 tablets by mouth daily Once daily on Monday, Wednesday and Friday 9/30/21   ESTHER Cho CNP   Handicap Placard MISC by Does not apply route Duration 2 years 6/2/20   ESTHER Cho CNP   amLODIPine (NORVASC) 10 MG tablet Take 1 tablet by mouth daily 1/23/20   Radames King MD   clopidogrel (PLAVIX) 75 MG tablet Take 1 tablet by mouth daily Take 1 daily 12/6/19   ESTHER Chery CNP   simvastatin (ZOCOR) 40 MG tablet Take 10 mg by mouth nightly     Historical Provider, MD   glipiZIDE (GLUCOTROL) 10 MG tablet Take 10 mg by mouth 2 times daily (before meals)    Historical Provider, MD   vitamin D (CHOLECALCIFEROL) 1000 UNIT TABS tablet Take 1,000 Units by mouth 2 times daily     Historical Provider, MD   metFORMIN (GLUCOPHAGE) 1000 MG tablet Take 1 tablet by mouth 2 times daily (with meals) 8/25/16   ESTHER Gonzalez CNP     Vitamin D, 1,000 Units, Oral, BID    atorvastatin, 20 mg, Oral, Daily    clopidogrel, 75 mg, Oral, Daily    amLODIPine, 10 mg, Oral, Daily    [Held by provider] lisinopril, 10 mg, Oral, Daily    oseltamivir, 30 mg, Oral, BID    sodium chloride flush, 5-40 mL, IntraVENous, 2 times per day    cefTRIAXone (ROCEPHIN) IV, 1,000 mg, IntraVENous, Q24H **AND** levofloxacin, 750 mg, IntraVENous, Q48H    furosemide, 40 mg, IntraVENous, Once    Allergies:  Atorvastatin, Sulfa antibiotics, and Sulfanilamide    Social History:   reports that he quit smoking about 52 years ago. He has a 1.00 pack-year smoking history. He has never used smokeless tobacco. He reports current alcohol use. He reports that he does not use drugs. Family History: family history includes Coronary Art Dis in his brother and sister; Heart Attack in his father; Hypertension in his mother; [place] in his mother; Stroke in his mother. No h/o sudden cardiac death. Review of Systems:    Review of Systems   Constitutional: Negative for appetite change, chills, fatigue, fever and unexpected weight change. HENT: Negative for ear pain, postnasal drip, rhinorrhea and sore throat. Respiratory: Positive for cough and shortness of breath. Negative for choking and wheezing. Cardiovascular: Negative for chest pain and leg swelling. Gastrointestinal: Negative for abdominal pain, blood in stool, diarrhea, nausea and vomiting. Genitourinary: Negative for decreased urine volume, difficulty urinating and enuresis. Musculoskeletal: Negative for back pain and joint swelling. Neurological: Negative for syncope, light-headedness and headaches. Psychiatric/Behavioral: The patient is not nervous/anxious. Physical Exam:      /62   Pulse 80   Temp 98 °F (36.7 °C) (Oral)   Resp 21   Ht 5' 6\" (1.676 m)   Wt 170 lb (77.1 kg)   SpO2 95%   BMI 27.44 kg/m²     Physical Exam -  Not seen . Cardiac Investigations:  EKG:      Rhythm: normal sinus   Rate: normal  Axis: Left  Ectopy: none  Conduction: First-degree AV block  ST Segments: no acute change  T Waves: no acute change  Q Waves: Inferior, septal     Clinical Impression: Abnormal EKG, sinus rhythm with first-degree AV block PACs,.   When compared to previous EKG no acute findings. ECHO 3/16/2021: EF 45%, grade I DD, LA is severely dilated, bio-AV with peak gradient gradient 12 mmHg and mean gradient of 6 mmHg, mild MR.      CATH 12/3/19:    Severe aortic stenosis.   Mild LV systolic dysfunction.   Patent mid LAD, proximal D1 and mid LCX stents. Focal distal LAD 70% stenosis.     CATH 8/23/16: DEVON to LAD, D and mid LCX     Previous office/hospital visit:   Dr. Oscar Mathias 8/3/2021:   1. CAD with h/o DEVON to LAD, D and mid LCX 8/23/16. Cardiac cath for NSTEMI 12/3/19 showed patent stents with chronic RDPA occlusion with focal distal LAD 70%; LVEF 40%. Stable on current medical treatment. Continue ASA/plavix  2. Severe aortic stenosis s/p TAVR on 1/21/2020 functioning normally on echo post TAVR as above. Follow with TTEs. Antibiotics prior to dental work. 3. Carotid artery disease. Follows with vascular surgery. 4. HTN- with white coat HTN. Continue current therapy. Patient to check BP at home and to report if > 130/85  5. Ischemic / valvular cardiomyopathy EF of 45% currently without signs of volume overload. 6. Hyperlipidemia- Last  on 5/21/2020. Patient is on low dose simvastatin which he is able to tolerate. Any increase in dose or switch has caused him severe myalgia. I recommended him addition of zetia. He does not want to take this new medication. Repeat lipid panel  7. H/o agent orange exposure. 8. CKD- Has been referred to nephrology by PCP  9. Right lower ext numbess. Seeing neurology and vascular surgery. 10. RTC 6 months       Labs:   CBC:   Recent Labs     12/22/21  0529 12/22/21  0647   WBC 7.7 6.3   HGB 11.3* 10.8*   HCT 35.1* 33.4*    235     BMP:   Recent Labs     12/21/21  2100 12/22/21  0529   * 132*   K 4.3 5.2   CO2 19* 13*   BUN 24* 34*   CREATININE 1.38* 1.69*   LABGLOM 50* 40*   GLUCOSE 292* 340*     BNP: No results for input(s): BNP in the last 72 hours.   PT/INR: No results for input(s): PROTIME, INR in the last 72 hours. APTT:  Recent Labs     12/22/21  0647   APTT 29.0     CARDIAC ENZYMES:No results for input(s): CKTOTAL, CKMB, CKMBINDEX, TROPONINI in the last 72 hours. FASTING LIPID PANEL:  Lab Results   Component Value Date    HDL 42 07/22/2021    TRIG 102 07/22/2021     LIVER PROFILE:No results for input(s): AST, ALT, LABALBU in the last 72 hours. 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 ([facility])    Non-rheumatic mitral regurgitation    Nonrheumatic aortic valve stenosis    Uncontrolled type 2 diabetes mellitus with nephropathy [facility]    Coronary artery disease involving native coronary artery of native heart without angina pectoris    Controlled type 2 diabetes mellitus with mild nonproliferative retinopathy and macular edema, without long-term current use of insulin ([facility])    Carotid stenosis    CKD (chronic kidney disease), stage III ([facility])    Pneumonia of right lower lobe due to infectious organism    Aortic stenosis, severe    Hypertension    Hyperlipidemia    H/O agent Orange exposure    Diabetes mellitus, type 2 ([address]    CAD (coronary artery disease)    Anemia    Proteinuria    Wears hearing aid in both ears    CHF NYHA class I, chronic, systolic ([facility])    S/P TAVR (transcatheter aortic valve replacement)    Second hand smoke exposure    Peripheral arterial disease ([facility])    Heavy sensation of lower extremity    Impaired ambulation    Pneumonia    Sepsis ([facility])       Impression:  NSTEMI  Severe aortic stenosis status post TAVR  CAD with history of DEVON to LAD distal and mid left circumflex 8/23/2016  Hypertension  Ischemic/valvular cardiomyopathy EF of 45%  Hyperlipidemia  CKD      Recommendations:    Continue aspirin IV  Continue aspirin daily  Continue Lipitor tablet 20 mg daily.   Continue clopidogrel 75 mg daily  Continue Norvasc tablet 10 mg. Will discuss with attending physician. Recommendations above are pending approval by attending physician. Sabina Lefort MD  PGY-1, Internal Medicine Resident  [facility], [facility]  12/22/2021 10:27 AM          Attestation signed by      Attending Physician Statement:    I have discussed the care of  Gasper Badillo , including pertinent history and exam findings, with the Cardiology fellow/resident. I have seen and examined the patient and the key elements of all parts of the encounter have been performed by me. I agree with the assessment, plan and orders as documented by the fellow/resident, after I modified exam findings and plan of treatments, and the final version is my approved version of the assessment.      Additional Comments:   No chest pain, short of breath with influenza B and told pneumonia, being treated for 2 days, today is still short of breath, no chest pain/palpitations  Elevated trops, possible type II MI, will get echocardiogram to rule out any wall motion   If it is high risk, then will need further cardiac work up   BJ, Nephrology  Continue Heparin   Continue home medications at this time   Talked with PT and wife , pt physically not examined by me   Please refer physical exam to other physician   Rudene Merlin, MD

## 2021-12-22 NOTE — ED NOTES
Bed: 18  Expected date:   Expected time:   Means of arrival:   Comments:  [facility]  78 yo M  CHF, influenza A,  pneumonia,   Trop 300+, chest pain free, giving lovenox,   On bipap, >97%  Sbp 129,    Accepted by Dr Keyshawn Barrios, RN  12/22/21 [id number]

## 2021-12-22 NOTE — ED NOTES
Pt moved to ED 18 via stretcher by EMS. Pt transferred from [place]. Pt presents with SOB since yesterday morning, which got worse during the day. Pt is on NC at 5 L/min. NAD noted. Pt denies any CP or other pain. Pt placed on cardiac monitor. EKG and labs obtained. Call light provided. PTA at [place] pt was given 20 Lasix and 80 Lovenox. BNP 5400. Pt tested positive for influenza A, Covid -.      Raynelle Sacks, RN  12/22/21 [id number]

## 2021-12-22 NOTE — CARE COORDINATION
Case Management Initial Discharge Plan  Xin Mayer,             Met with:patient and wife  to discuss discharge plans. Information verified: address, contacts, phone number, , insurance Yes  Insurance Provider: [facility]     Emergency Contact/Next of Kin name & number: spouse Dina Dobbins   Who are involved in patient's support system? family    PCP: ESTHER Fox - CNP  Date of last visit: [place] month      Discharge Planning    Living Arrangements:        Home has 1 stories  2 stairs to climb to get into front door, 0stairs to climb to reach second floor  Location of bedroom/bathroom in home main    Patient able to perform ADL's:Independent    Current Services (outpatient & in home) none  DME equipment: glucometer has talss seats and shower chair and grab bars   DME provider:     Is patient receiving oral anticoagulation therapy? No    If indicated:   Physician managing anticoagulation treatment:   Where does patient obtain lab work for ATC treatment? Potential Assistance Needed:       Patient agreeable to home care: TBD  [place] of choice provided:  no    Prior SNF/Rehab Placement and Facility: none  Agreeable to SNF/Rehab: TBD doesn't feel has any needs   Freedom of choice provided: no     Evaluation: no    Expected Discharge date:       Patient expects to be discharged to: If home: is the family and/or caregiver wiling & able to provide support at home? Yes   Who will be providing this support? Dina Dobbins*    Follow Up Appointment: Best Day/ Time:      Transportation provider: family  Transportation arrangements needed for discharge: No    Readmission Risk              Risk of Unplanned Readmission:  23             Does patient have a readmission risk score greater than 14?: No  If yes, follow-up appointment must be made within 7 days of discharge.      Goals of Care:       Educated pt on transitional options, did not  provide freedom of choice and are agreeable with plan      Discharge Plan:Home independent lives with wife has pcp and ride follow for any additional needs           Electronically signed by Regulo Webb RN on 12/22/21 at 12:26 PM EST

## 2021-12-22 NOTE — ED NOTES
Report called to [facility] ED      Monica Gomes RN  12/22/21 [id number]

## 2021-12-22 NOTE — PROGRESS NOTES
Pharmacy Note     Renal Dose Adjustment    Ko Gautam is a 77 y.o. male. Pharmacist assessment of renally cleared medications. Recent Labs     12/21/21  2100   BUN 24*       Recent Labs     12/21/21  2100   CREATININE 1.38*       Estimated Creatinine Clearance: 45 mL/min (A) (based on SCr of 1.38 mg/dL (H)). Height:   Ht Readings from Last 1 Encounters:   12/22/21 5' 6\" (1.676 m)     Weight:  Wt Readings from Last 1 Encounters:   12/22/21 170 lb (77.1 kg)       The following medication dose has been adjusted based upon renal function per P&T Guidelines:             Levaquin changed to q48h for CrCl <50 mL/min. Layne Brown, Pharm. D., DAPHNE, BCCCP  12/22/2021 6:36 AM

## 2021-12-22 NOTE — H&P
[facility]  Office: [address], DO, Jacob Crocker, DO, Paramjitmarisol Rendon, DO, Lety Ochoa, DO, Theodora Coleman MD, Leslee Barnes MD, Nora Freeman MD, rTip Wilhelm MD, Brett Diaz MD, Federico Mcguire MD, Gita Frye MD, Min Joaquin, DO, Silvio Jordan DO, Lawson Webber MD,  Jad Coombs, DO, Gen Morley MD, Vincent Joaquin MD, Shagufta Luong MD, Jaime Fernandez MD, Olga Freeman MD, Maddi Massey MD, Christopher Soto MD, Bowen Kaplan, [facility], [facility], CNP, Penny Cancel, CNP, Honey Fret, CNS, Cheyenne Shayne, CNP, Carolyne Brain, CNP, Karson Cosme, CNP, Leyla Chaidez, CNP, Jaime Bo, CNP, Geraldo Boyd PA-C, Tyra Donovan, DNP, Jim Rodriguez, DNP, Mary Richard, CNP, Marlen Chowdhury, CNP, Anshu Yung, CNP, Francisco Mccarthy, CNP, Monica Aguero, CNP, Shannon Champion, CNP         [address]    HISTORY AND PHYSICAL EXAMINATION            Date:   12/22/2021  Patient name:  Gerson Barclay  Date of admission:  12/22/2021  5:01 AM  MRN:   [id number]  Account:  [de-identified]  YOB: 1945  PCP:    ESTHER Ozuna CNP  Room:   18/18  Code Status:    Full Code    Chief Complaint:     Chief Complaint   Patient presents with    Shortness of Breath     started Monday, worsening this morning. History Obtained From:     patient, electronic medical record    History of Present Illness:     Gerson Barclay is a 77 y.o. gentleman who initially presented to [facility] ED for evaluation of shortness of breath on 12/22/2021. Onset of symptoms was approximately 2-3 days ago. He was diagnosed with influenza A & pneumonia at the urgent care and was placed on antibiotics and tamiflu. His comorbidities include a history of CAD s/p stenting, essential hypertension, TAVR, and DM II. He was noted to have a leukocytosis and elevated creatinine in their ED.  BNP noted to be elevated as well as troponin and lactic acidemia. He was placed on bipap therapy due to hypoxia (SpO2 70%) with significant improvement of his shortness of breath. Due to his elevated troponin and symptoms, he was transferred directly from [facility] ED to [facility] for higher level of care. Prior to transfer, he did receive Lovenox and Lasix. Since arrival to the emergency department, patient reports he is feeling significantly improved. Remains on 4 L of oxygen via nasal cannula. His wife reports that he looks much better. He states that he wants to go home. Cardiology at bedside. He will be admitted for further work-up and evaluation of NSTEMI. Past Medical History:     Past Medical History:   Diagnosis Date    Acne rosacea     treated with MetroGel    Anemia     Aortic stenosis     echo 3/18 Mod AS, Mild MR    BPH (benign prostatic hypertrophy)     CAD (coronary artery disease)     Stent x 3 DEVON 8/16    Carotid stenosis     Fairly minimal plaquing on ultrasound 5/14--in the conclusion described as less than 50% stenosis but in the body of the report described as minimal    Cataract of both eyes     CRI (chronic renal insufficiency)     Diabetes mellitus, type 2 (HCC)     1.) Proteinuria, 24 hour total urine protein 1,420 mg/24 hrs, 09/08, creatinine clearance 114, 09/08 2.) Repeat protein/creatinine ratio 2.97, 02/12 3. )24 hr urine collection 1958 mg/24 hrs, 04/12. 4.) Repeat protein/creatinine ratio 1.99, 08/12 a.) Repeat protein/creatinine ratio 2.14, 07/13 5.) Renal u/s ok 02/12  6) retinopathy at [address] 12/16  early mod. nonprolifferative  macular involved    Dyslipidemia     Erectile dysfunction     H/O agent Orange exposure     per patient in the [employer] along with exposure to cresote and naphtha    Hearing loss     Hyperlipidemia     Hypertension     Mild nonproliferative diabetic retinopathy ([address]     Non-rheumatic mitral regurgitation 06/27/2016    RBBB     Status post transcatheter aortic valve replacement (TAVR) using bioprosthesis 01/21/2020    Wears dentures     upper        Past Surgical History:     Past Surgical History:   Procedure Laterality Date    AORTIC VALVE SURGERY  01/2020    CARDIAC CATHETERIZATION  12/03/2019    CATARACT REMOVAL Bilateral     CIRCUMCISION  age 49   [facility] Bilateral     cataract    TYMPANOSTOMY TUBE PLACEMENT      VASECTOMY          Medications Prior to Admission:     Prior to Admission medications    Medication Sig Start Date End Date Taking?  Authorizing Provider   oseltamivir (TAMIFLU) 30 MG capsule Take 1 capsule by mouth 2 times daily for 5 days 12/20/21 12/25/21  ESTHER Low CNP   levoFLOXacin (LEVAQUIN) 500 MG tablet Take 1 tablet by mouth daily for 7 days 12/20/21 12/27/21  ESTHER Low CNP   lisinopril (PRINIVIL;ZESTRIL) 20 MG tablet Take 10 mg by mouth daily    Historical Provider, MD   furosemide (LASIX) 40 MG tablet Take 0.5 tablets by mouth daily Once daily on Monday, Wednesday and Friday 9/30/21   ESTHER Hernandez CNP   Handicap Placard MISC by Does not apply route Duration 2 years 6/2/20   ESTHER Hernandez CNP   amLODIPine (NORVASC) 10 MG tablet Take 1 tablet by mouth daily 1/23/20   Princess Chu MD   clopidogrel (PLAVIX) 75 MG tablet Take 1 tablet by mouth daily Take 1 daily 12/6/19   ESTHER Ortega CNP   simvastatin (ZOCOR) 40 MG tablet Take 10 mg by mouth nightly     Historical Provider, MD   glipiZIDE (GLUCOTROL) 10 MG tablet Take 10 mg by mouth 2 times daily (before meals)    Historical Provider, MD   vitamin D (CHOLECALCIFEROL) 1000 UNIT TABS tablet Take 1,000 Units by mouth 2 times daily     Historical Provider, MD   metFORMIN (GLUCOPHAGE) 1000 MG tablet Take 1 tablet by mouth 2 times daily (with meals) 8/25/16   ESTHER Pollack CNP        Allergies:     Atorvastatin, Sulfa antibiotics, and Sulfanilamide    Social History:     Tobacco: distress, elderly  gentleman sitting up in bed  Mental status: oriented to person, place, and time  Head: normocephalic, atraumatic  Eye: no icterus, redness, pupils equal and reactive, extraocular eye movements intact, conjunctiva clear  Ear: normal external ear, no discharge, hearing intact  Nose: no drainage noted  Mouth: mucous membranes moist  Neck: supple, no carotid bruits, thyroid not palpable  Lungs: Bilateral equal air entry, clear to ausculation, bilateral and expiratory wheezing present, no rales or rhonchi, normal effort  Cardiovascular: normal rate, regular rhythm, no murmur, gallop, rub  Abdomen: Soft, nontender, protuberant, nondistended, normal bowel sounds, no hepatomegaly or splenomegaly  Neurologic: There are no new focal motor or sensory deficits, normal muscle tone and bulk, no abnormal sensation, normal speech, cranial nerves II through XII grossly intact  Skin: No gross lesions, rashes, bruising or bleeding on exposed skin area  Extremities: peripheral pulses palpable, trace pedal edema, no calf pain with palpation  Psych: normal affect    Investigations:      Laboratory Testing:  Recent Results (from the past 24 hour(s))   CBC Auto Differential    Collection Time: 12/21/21  9:00 PM   Result Value Ref Range    WBC 17.4 (H) 3.5 - 11.3 k/uL    RBC 4.75 4.21 - 5.77 m/uL    Hemoglobin 12.7 (L) 13.0 - 17.0 g/dL    Hematocrit 39.7 (L) 40.7 - 50.3 %    MCV 83.6 82.6 - 102.9 fL    MCH 26.7 25.2 - 33.5 pg    MCHC 32.0 25.2 - 33.5 g/dL    RDW 13.9 11.8 - 14.4 %    Platelets 229 285 - 486 k/uL    MPV 10.6 8.1 - 13.5 fL    NRBC Automated 0.0 0.0 per 100 WBC    Differential Type NOT REPORTED     Seg Neutrophils 74 (H) 36 - 65 %    Lymphocytes 18 (L) 24 - 43 %    Monocytes 6 3 - 12 %    Eosinophils % 0 (L) 1 - 4 %    Basophils 1 0 - 2 %    Immature Granulocytes 1 (H) 0 %    Segs Absolute 13.02 (H) 1.50 - 8.10 k/uL    Absolute Lymph # 3.13 1.10 - 3.70 k/uL    Absolute Mono # 1.05 0.10 - 1.20 k/uL Absolute Eos # <0.03 0.00 - 0.44 k/uL    Basophils Absolute 0.08 0.00 - 0.20 k/uL    Absolute Immature Granulocyte 0.10 0.00 - 0.30 k/uL    WBC Morphology NOT REPORTED     RBC Morphology NOT REPORTED     Platelet Estimate NOT REPORTED    Basic Metabolic Panel    Collection Time: 12/21/21  9:00 PM   Result Value Ref Range    Glucose 292 (H) 70 - 99 mg/dL    BUN 24 (H) 8 - 23 mg/dL    CREATININE 1.38 (H) 0.70 - 1.20 mg/dL    Bun/Cre Ratio 17 9 - 20    Calcium 8.7 8.6 - 10.4 mg/dL    Sodium 131 (L) 135 - 144 mmol/L    Potassium 4.3 3.7 - 5.3 mmol/L    Chloride 98 98 - 107 mmol/L    CO2 19 (L) 20 - 31 mmol/L    Anion Gap 14 9 - 17 mmol/L    GFR Non-African American 50 (L) >60 mL/min    GFR African American >60 >60 mL/min    GFR Comment          GFR Staging NOT REPORTED    Brain Natriuretic Peptide    Collection Time: 12/21/21  9:00 PM   Result Value Ref Range    Pro-BNP 5,403 (H) <300 pg/mL    BNP Interpretation NOT REPORTED    COVID-19, Rapid    Collection Time: 12/21/21  9:05 PM    Specimen: Nasopharyngeal Swab   Result Value Ref Range    Specimen Description . NASOPHARYNGEAL SWAB     SARS-CoV-2, Rapid Not Detected Not Detected   Lactate, Sepsis    Collection Time: 12/21/21 10:24 PM   Result Value Ref Range    Lactic Acid, Sepsis 2.8 (H) 0.5 - 1.9 mmol/L    Lactic Acid, Sepsis, Whole Blood NOT REPORTED 0.5 - 1.9 mmol/L   TROPONIN    Collection Time: 12/21/21 10:24 PM   Result Value Ref Range    Troponin, High Sensitivity 313 (HH) 0 - 22 ng/L    Troponin T NOT REPORTED <0.03 ng/mL    Troponin Interp NOT REPORTED    Lactate, Sepsis    Collection Time: 12/22/21 12:17 AM   Result Value Ref Range    Lactic Acid, Sepsis 1.7 0.5 - 1.9 mmol/L    Lactic Acid, Sepsis, Whole Blood NOT REPORTED 0.5 - 1.9 mmol/L   TROPONIN    Collection Time: 12/22/21 12:17 AM   Result Value Ref Range    Troponin, High Sensitivity 369 (HH) 0 - 22 ng/L    Troponin T NOT REPORTED <0.03 ng/mL    Troponin Interp NOT REPORTED    CBC WITH AUTO DIFFERENTIAL    Collection Time: 12/22/21  5:29 AM   Result Value Ref Range    WBC 7.7 3.5 - 11.3 k/uL    RBC 4.32 4.21 - 5.77 m/uL    Hemoglobin 11.3 (L) 13.0 - 17.0 g/dL    Hematocrit 35.1 (L) 40.7 - 50.3 %    MCV 81.3 (L) 82.6 - 102.9 fL    MCH 26.2 25.2 - 33.5 pg    MCHC 32.2 28.4 - 34.8 g/dL    RDW 13.8 11.8 - 14.4 %    Platelets 921 194 - 442 k/uL    MPV 11.0 8.1 - 13.5 fL    NRBC Automated 0.0 0.0 per 100 WBC    Differential Type NOT REPORTED     Seg Neutrophils PENDING %    Lymphocytes PENDING %    Monocytes PENDING %    Eosinophils % PENDING %    Basophils PENDING %    Immature Granulocytes PENDING 0 %    Segs Absolute PENDING k/uL    Absolute Lymph # PENDING k/uL    Absolute Mono # PENDING k/uL    Absolute Eos # PENDING k/uL    Basophils Absolute PENDING 0.0 - 0.2 k/uL    Absolute Immature Granulocyte PENDING 0.00 - 0.30 k/uL    WBC Morphology NOT REPORTED     RBC Morphology NOT REPORTED     Platelet Estimate NOT REPORTED    BASIC METABOLIC PANEL    Collection Time: 12/22/21  5:29 AM   Result Value Ref Range    Glucose 340 (H) 70 - 99 mg/dL    BUN 34 (H) 8 - 23 mg/dL    CREATININE 1.69 (H) 0.70 - 1.20 mg/dL    Bun/Cre Ratio NOT REPORTED 9 - 20    Calcium 8.4 (L) 8.6 - 10.4 mg/dL    Sodium 132 (L) 135 - 144 mmol/L    Potassium 5.2 3.7 - 5.3 mmol/L    Chloride 100 98 - 107 mmol/L    CO2 13 (L) 20 - 31 mmol/L    Anion Gap 19 (H) 9 - 17 mmol/L    GFR Non-African American 40 (L) >60 mL/min    GFR  48 (L) >60 mL/min    GFR Comment          GFR Staging NOT REPORTED    Brain Natriuretic Peptide    Collection Time: 12/22/21  5:29 AM   Result Value Ref Range    Pro-BNP 7,044 (H) <300 pg/mL    BNP Interpretation NOT REPORTED    Troponin    Collection Time: 12/22/21  5:29 AM   Result Value Ref Range    Troponin, High Sensitivity 355 (HH) 0 - 22 ng/L    Troponin T NOT REPORTED <0.03 ng/mL    Troponin Interp NOT REPORTED    Lactate, Sepsis    Collection Time: 12/22/21  6:47 AM   Result Value Ref Range Lactic Acid, Sepsis NOT REPORTED 0.5 - 1.9 mmol/L    Lactic Acid, Sepsis, Whole Blood 2.5 (H) 0.5 - 1.9 mmol/L   CBC    Collection Time: 12/22/21  6:47 AM   Result Value Ref Range    WBC 6.3 3.5 - 11.3 k/uL    RBC 4.11 (L) 4.21 - 5.77 m/uL    Hemoglobin 10.8 (L) 13.0 - 17.0 g/dL    Hematocrit 33.4 (L) 40.7 - 50.3 %    MCV 81.3 (L) 82.6 - 102.9 fL    MCH 26.3 25.2 - 33.5 pg    MCHC 32.3 28.4 - 34.8 g/dL    RDW 13.8 11.8 - 14.4 %    Platelets 136 815 - 320 k/uL    MPV 10.7 8.1 - 13.5 fL    NRBC Automated 0.0 0.0 per 100 WBC   APTT    Collection Time: 12/22/21  6:47 AM   Result Value Ref Range    PTT 29.0 20.5 - 30.5 sec       Imaging/Diagnostics:  XR CHEST (2 VW)    Result Date: 12/20/2021  Patchy opacities in the right lung and mild septal thickening. Findings are suggestive of interstitial edema. Underlying inflammatory process or infection should also be considered in the appropriate clinical setting. XR CHEST PORTABLE    Result Date: 12/22/2021  Normal examination. Congestive failure evident on yesterday's exam has resolved. XR CHEST PORTABLE    Result Date: 12/21/2021  Interval worsening of cardiomegaly. Interval significant worsening of parahilar densities and increased interstitial marking.   Although finding may be suggestive of pulmonary edema, extensive viral pneumonia should also be placed in differential.       Assessment :      Hospital Problems           Last Modified POA    * (Principal) Acute respiratory failure with hypoxia ([address] 12/22/2021 Yes    COPD exacerbation ([address] 12/22/2021 Yes    Acute on chronic systolic heart failure ([address] 12/22/2021 Yes    Sepsis ([address] 12/22/2021 Yes    Influenza A 12/22/2021 Yes    NSTEMI (non-ST elevated myocardial infarction) ([address] 12/22/2021 Yes    BJ (acute kidney injury) ([address] 12/22/2021 Yes    Essential hypertension 12/22/2021 Yes    Chronic kidney disease, stage 3a ([facility] 12/22/2021 Yes    S/P TAVR (transcatheter aortic valve replacement) 12/22/2021 Yes 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 Admit to Intermed  2. Acute respiratory failure with hypoxia-uncertain as to the etiology. Either superimposed bacterial pneumonia on influenza a infection, COPD exacerbation, or acute CHF exacerbation  3. Influenza A infection-continue Tamiflu  4. Possible superimposed bacterial infection-stop Rocephin, continue Levaquin  5. NSTEMI - cardiology is following. Recommending 2D echo  6. Acute on chronic systolic heart failure-diuresing now with Lasix. Monitor I's and O's.  7. Possible COPD exacerbation-start prednisone 20 mg x 5 days  8. BJ on CKD stage IIIa-initially creatinine 1.38. Up trended overnight to 1.69. Giving Lasix now. Suspect possible volume overload plus minus hypoxia/tachycardia causing low flow state to the kidney. 9. Lactic acidemia-continue to trend  10. Normocytic normochromic anemia-check iron studies  11. Type 2 diabetes mellitus-hyperglycemic. Start high-dose insulin sliding scale for coverage. Hold Metformin and glipizide while inpatient  12. Severe aortic stenosis status post TAVR  13. Essential hypertension-hold lisinopril while with BJ. Continue Norvasc. 14. Coronary artery disease status post stenting - continue Plavix and statin therapy  15. Continue to wean oxygen. If he continues to improve, possible discharge tomorrow. Last EF was noted to be 40%. Consultations:   IP CONSULT TO CARDIOLOGY  IP CONSULT TO HOSPITALIST    Patient is admitted as inpatient status because of co-morbidities listed above, severity of signs and symptoms as outlined, requirement for current medical therapies and most importantly because of direct risk to patient if care not provided in a hospital setting. Expected length of stay > 48 hours.     33 Lindsay Fuentes DO  12/22/2021  8:16 AM    Copy sent to ESTHER Hamilton - CNP

## 2021-12-22 NOTE — ED PROVIDER NOTES
[facility]     Emergency Department     Faculty Attestation    I performed a history and physical examination of the patient and discussed management with the resident. I have reviewed and agree with the residents findings including all diagnostic interpretations, and treatment plans as written. Any areas of disagreement are noted on the chart. I was personally present for the key portions of any procedures. I have documented in the chart those procedures where I was not present during the key portions. I have reviewed the emergency nurses triage note. I agree with the chief complaint, past medical history, past surgical history, allergies, medications, social and family history as documented unless otherwise noted below. Documentation of the HPI, Physical Exam and Medical Decision Making performed by scribconnor is based on my personal performance of the HPI, PE and MDM. For Physician Assistant/ Nurse Practitioner cases/documentation I have personally evaluated this patient and have completed at least one if not all key elements of the E/M (history, physical exam, and MDM). Additional findings are as noted. 78 yo M c/o sob, transferred from [place], dx nstemi, flu A +,   Given lovenox & lasix at previous facility,   Pt pain free, no nausea,   pe vss 94% sat on nc, gcs 15 nec, supple, abdomen non tender, no distension, no rigidity, 2+ ankle edema without calf tenderness,     -consulting cardiology > admit, asa / heparin gtt,     EKG Interpretation    Interpreted by me  Normal sinus, heart rate 91, no ischemia, left axis, first-degree AV block, QT corrected 462    CRITICAL CARE: There was a high probability of clinically significant/life threatening deterioration in this patient's condition which required my urgent intervention. Total critical care time was 10minutes. This excludes any time for separately reportable procedures.        Arelis Pedraza DO Broderick Dys, DO  12/22/21 [place] [address], DO  12/22/21 [id number]

## 2021-12-22 NOTE — PROGRESS NOTES
[facility]  Documentation Note                Admission Dx: Sepsis ([facility] [A41.9]    Past Medical History:   has a past medical history of Acne rosacea, Anemia, Aortic stenosis, BPH (benign prostatic hypertrophy), CAD (coronary artery disease), Carotid stenosis, Cataract of both eyes, CRI (chronic renal insufficiency), Diabetes mellitus, type 2 ([facility] Dyslipidemia, Erectile dysfunction, H/O agent Orange exposure, Hearing loss, Hyperlipidemia, Hypertension, Mild nonproliferative diabetic retinopathy ([facility] Non-rheumatic mitral regurgitation, RBBB, 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 Previous Testing:     ECHO 3/16/2021: EF 45%, grade I DD, LA is severely dilated, bio-AV with peak gradient gradient 12 mmHg and mean gradient of 6 mmHg, mild MR. CATH 12/3/19:    Severe aortic stenosis. Mild LV systolic dysfunction. Patent mid LAD, proximal D1 and mid LCX stents. Focal distal LAD 70% stenosis. CATH 8/23/16: DEVON to LAD, D and mid LCX    Previous office/hospital visit:   Dr. Ankush Romero 8/3/2021:   1. CAD with h/o DEVON to LAD, D and mid LCX 8/23/16. Cardiac cath for NSTEMI 12/3/19 showed patent stents with chronic RDPA occlusion with focal distal LAD 70%; LVEF 40%. Stable on current medical treatment. Continue ASA/plavix  2. Severe aortic stenosis s/p TAVR on 1/21/2020 functioning normally on echo post TAVR as above. Follow with TTEs. Antibiotics prior to dental work. 3. Carotid artery disease. Follows with vascular surgery. 4. HTN- with white coat HTN. Continue current therapy. Patient to check BP at home and to report if > 130/85  5. Ischemic / valvular cardiomyopathy EF of 45% currently without signs of volume overload. 6. Hyperlipidemia- Last  on 5/21/2020. Patient is on low dose simvastatin which he is able to tolerate. Any increase in dose or switch has caused him severe myalgia. I recommended him addition of zetia. He does not want to take this new medication. Repeat lipid panel  7. H/o agent orange exposure. 8. CKD- Has been referred to nephrology by PCP  9. Right lower ext numbess. Seeing neurology and vascular surgery.   10. RTC 6 months     Jeimy [facility]

## 2021-12-22 NOTE — ED NOTES
[facility] per Dr Parisa Pradhan. Dr Parisa Pradhan spoke with Dr Flor Michael at [facility] ED. Pt to go ED to ED transfer. [address] to call Dr Flor Michael to confirm and then set up transport for pt.       Lewis Santos RN  12/21/21 [id number]

## 2021-12-23 ENCOUNTER — APPOINTMENT (OUTPATIENT)
Dept: GENERAL RADIOLOGY | Age: 76
DRG: 871 | End: 2021-12-23
Payer: MEDICARE

## 2021-12-23 LAB
ABSOLUTE EOS #: <0.03 K/UL (ref 0–0.44)
ABSOLUTE IMMATURE GRANULOCYTE: 0.12 K/UL (ref 0–0.3)
ABSOLUTE LYMPH #: 0.85 K/UL (ref 1.1–3.7)
ABSOLUTE MONO #: 1.05 K/UL (ref 0.1–1.2)
ANION GAP SERPL CALCULATED.3IONS-SCNC: 13 MMOL/L (ref 9–17)
BASOPHILS # BLD: 0 % (ref 0–2)
BASOPHILS ABSOLUTE: <0.03 K/UL (ref 0–0.2)
BUN BLDV-MCNC: 37 MG/DL (ref 8–23)
BUN/CREAT BLD: ABNORMAL (ref 9–20)
CALCIUM SERPL-MCNC: 8.9 MG/DL (ref 8.6–10.4)
CHLORIDE BLD-SCNC: 102 MMOL/L (ref 98–107)
CO2: 19 MMOL/L (ref 20–31)
CREAT SERPL-MCNC: 1.55 MG/DL (ref 0.7–1.2)
D-DIMER QUANTITATIVE: 0.32 MG/L FEU
DIFFERENTIAL TYPE: ABNORMAL
EOSINOPHILS RELATIVE PERCENT: 0 % (ref 1–4)
FERRITIN: 437 UG/L (ref 30–400)
GFR AFRICAN AMERICAN: 53 ML/MIN
GFR NON-AFRICAN AMERICAN: 44 ML/MIN
GFR SERPL CREATININE-BSD FRML MDRD: ABNORMAL ML/MIN/{1.73_M2}
GFR SERPL CREATININE-BSD FRML MDRD: ABNORMAL ML/MIN/{1.73_M2}
GLUCOSE BLD-MCNC: 222 MG/DL (ref 75–110)
GLUCOSE BLD-MCNC: 244 MG/DL (ref 75–110)
GLUCOSE BLD-MCNC: 256 MG/DL (ref 75–110)
GLUCOSE BLD-MCNC: 276 MG/DL (ref 70–99)
GLUCOSE BLD-MCNC: 307 MG/DL (ref 75–110)
HCT VFR BLD CALC: 35.3 % (ref 40.7–50.3)
HEMOGLOBIN: 11.2 G/DL (ref 13–17)
IMMATURE GRANULOCYTES: 1 %
INR BLD: 1
IRON SATURATION: 22 % (ref 20–55)
IRON: 55 UG/DL (ref 59–158)
LACTIC ACID, WHOLE BLOOD: 2.4 MMOL/L (ref 0.7–2.1)
LACTIC ACID: ABNORMAL MMOL/L
LYMPHOCYTES # BLD: 6 % (ref 24–43)
MCH RBC QN AUTO: 25.4 PG (ref 25.2–33.5)
MCHC RBC AUTO-ENTMCNC: 31.7 G/DL (ref 28.4–34.8)
MCV RBC AUTO: 80 FL (ref 82.6–102.9)
MONOCYTES # BLD: 7 % (ref 3–12)
NRBC AUTOMATED: 0 PER 100 WBC
PARTIAL THROMBOPLASTIN TIME: 36.8 SEC (ref 20.5–30.5)
PARTIAL THROMBOPLASTIN TIME: 40.5 SEC (ref 20.5–30.5)
PARTIAL THROMBOPLASTIN TIME: 40.7 SEC (ref 20.5–30.5)
PARTIAL THROMBOPLASTIN TIME: 52.2 SEC (ref 20.5–30.5)
PDW BLD-RTO: 13.6 % (ref 11.8–14.4)
PLATELET # BLD: 299 K/UL (ref 138–453)
PLATELET ESTIMATE: ABNORMAL
PMV BLD AUTO: 11 FL (ref 8.1–13.5)
POTASSIUM SERPL-SCNC: 5 MMOL/L (ref 3.7–5.3)
PROCALCITONIN: 3.61 NG/ML
PROTHROMBIN TIME: 10.7 SEC (ref 9.1–12.3)
RBC # BLD: 4.41 M/UL (ref 4.21–5.77)
RBC # BLD: ABNORMAL 10*6/UL
SEG NEUTROPHILS: 87 % (ref 36–65)
SEGMENTED NEUTROPHILS ABSOLUTE COUNT: 13.15 K/UL (ref 1.5–8.1)
SODIUM BLD-SCNC: 134 MMOL/L (ref 135–144)
TOTAL IRON BINDING CAPACITY: 245 UG/DL (ref 250–450)
UNSATURATED IRON BINDING CAPACITY: 190 UG/DL (ref 112–347)
WBC # BLD: 15.2 K/UL (ref 3.5–11.3)
WBC # BLD: ABNORMAL 10*3/UL

## 2021-12-23 PROCEDURE — 94761 N-INVAS EAR/PLS OXIMETRY MLT: CPT

## 2021-12-23 PROCEDURE — 85730 THROMBOPLASTIN TIME PARTIAL: CPT

## 2021-12-23 PROCEDURE — 6370000000 HC RX 637 (ALT 250 FOR IP): Performed by: INTERNAL MEDICINE

## 2021-12-23 PROCEDURE — 97535 SELF CARE MNGMENT TRAINING: CPT

## 2021-12-23 PROCEDURE — 83550 IRON BINDING TEST: CPT

## 2021-12-23 PROCEDURE — 85610 PROTHROMBIN TIME: CPT

## 2021-12-23 PROCEDURE — 80048 BASIC METABOLIC PNL TOTAL CA: CPT

## 2021-12-23 PROCEDURE — 6360000002 HC RX W HCPCS: Performed by: STUDENT IN AN ORGANIZED HEALTH CARE EDUCATION/TRAINING PROGRAM

## 2021-12-23 PROCEDURE — 94640 AIRWAY INHALATION TREATMENT: CPT

## 2021-12-23 PROCEDURE — 97166 OT EVAL MOD COMPLEX 45 MIN: CPT

## 2021-12-23 PROCEDURE — 71045 X-RAY EXAM CHEST 1 VIEW: CPT

## 2021-12-23 PROCEDURE — 85025 COMPLETE CBC W/AUTO DIFF WBC: CPT

## 2021-12-23 PROCEDURE — 85379 FIBRIN DEGRADATION QUANT: CPT

## 2021-12-23 PROCEDURE — 36415 COLL VENOUS BLD VENIPUNCTURE: CPT

## 2021-12-23 PROCEDURE — 83605 ASSAY OF LACTIC ACID: CPT

## 2021-12-23 PROCEDURE — 97116 GAIT TRAINING THERAPY: CPT

## 2021-12-23 PROCEDURE — 83540 ASSAY OF IRON: CPT

## 2021-12-23 PROCEDURE — 2580000003 HC RX 258: Performed by: NURSE PRACTITIONER

## 2021-12-23 PROCEDURE — 6360000002 HC RX W HCPCS: Performed by: NURSE PRACTITIONER

## 2021-12-23 PROCEDURE — 82728 ASSAY OF FERRITIN: CPT

## 2021-12-23 PROCEDURE — 84145 PROCALCITONIN (PCT): CPT

## 2021-12-23 PROCEDURE — 2700000000 HC OXYGEN THERAPY PER DAY

## 2021-12-23 PROCEDURE — 82947 ASSAY GLUCOSE BLOOD QUANT: CPT

## 2021-12-23 PROCEDURE — 99233 SBSQ HOSP IP/OBS HIGH 50: CPT | Performed by: INTERNAL MEDICINE

## 2021-12-23 PROCEDURE — 2060000000 HC ICU INTERMEDIATE R&B

## 2021-12-23 PROCEDURE — 6370000000 HC RX 637 (ALT 250 FOR IP): Performed by: NURSE PRACTITIONER

## 2021-12-23 PROCEDURE — 97162 PT EVAL MOD COMPLEX 30 MIN: CPT

## 2021-12-23 PROCEDURE — 6360000002 HC RX W HCPCS: Performed by: INTERNAL MEDICINE

## 2021-12-23 RX ORDER — DIPHENHYDRAMINE HYDROCHLORIDE 50 MG/ML
25 INJECTION INTRAMUSCULAR; INTRAVENOUS ONCE
Status: DISCONTINUED | OUTPATIENT
Start: 2021-12-23 | End: 2021-12-28 | Stop reason: HOSPADM

## 2021-12-23 RX ORDER — GLIPIZIDE 10 MG/1
10 TABLET ORAL
Status: DISCONTINUED | OUTPATIENT
Start: 2021-12-23 | End: 2021-12-24

## 2021-12-23 RX ORDER — FUROSEMIDE 10 MG/ML
40 INJECTION INTRAMUSCULAR; INTRAVENOUS 2 TIMES DAILY
Status: DISCONTINUED | OUTPATIENT
Start: 2021-12-23 | End: 2021-12-27

## 2021-12-23 RX ADMIN — HEPARIN SODIUM 2000 UNITS: 1000 INJECTION INTRAVENOUS; SUBCUTANEOUS at 17:31

## 2021-12-23 RX ADMIN — ATORVASTATIN CALCIUM 20 MG: 20 TABLET, FILM COATED ORAL at 07:58

## 2021-12-23 RX ADMIN — Medication 1000 UNITS: at 22:15

## 2021-12-23 RX ADMIN — OSELTAMIVIR PHOSPHATE 30 MG: 30 CAPSULE ORAL at 07:58

## 2021-12-23 RX ADMIN — GLIPIZIDE 10 MG: 10 TABLET ORAL at 16:36

## 2021-12-23 RX ADMIN — INSULIN LISPRO 6 UNITS: 100 INJECTION, SOLUTION INTRAVENOUS; SUBCUTANEOUS at 16:35

## 2021-12-23 RX ADMIN — HEPARIN SODIUM 2000 UNITS: 1000 INJECTION INTRAVENOUS; SUBCUTANEOUS at 02:40

## 2021-12-23 RX ADMIN — OSELTAMIVIR PHOSPHATE 30 MG: 30 CAPSULE ORAL at 22:14

## 2021-12-23 RX ADMIN — GLIPIZIDE 10 MG: 10 TABLET ORAL at 10:37

## 2021-12-23 RX ADMIN — SODIUM CHLORIDE, PRESERVATIVE FREE 10 ML: 5 INJECTION INTRAVENOUS at 22:16

## 2021-12-23 RX ADMIN — INSULIN LISPRO 5 UNITS: 100 INJECTION, SOLUTION INTRAVENOUS; SUBCUTANEOUS at 22:50

## 2021-12-23 RX ADMIN — INSULIN LISPRO 6 UNITS: 100 INJECTION, SOLUTION INTRAVENOUS; SUBCUTANEOUS at 11:34

## 2021-12-23 RX ADMIN — SODIUM CHLORIDE, PRESERVATIVE FREE 10 ML: 5 INJECTION INTRAVENOUS at 07:58

## 2021-12-23 RX ADMIN — FUROSEMIDE 40 MG: 10 INJECTION, SOLUTION INTRAVENOUS at 10:37

## 2021-12-23 RX ADMIN — CLOPIDOGREL 75 MG: 75 TABLET, FILM COATED ORAL at 07:58

## 2021-12-23 RX ADMIN — PREDNISONE 20 MG: 20 TABLET ORAL at 07:58

## 2021-12-23 RX ADMIN — Medication 18 UNITS/KG/HR: at 14:15

## 2021-12-23 RX ADMIN — HEPARIN SODIUM 2000 UNITS: 1000 INJECTION INTRAVENOUS; SUBCUTANEOUS at 09:25

## 2021-12-23 RX ADMIN — ALBUTEROL SULFATE 2.5 MG: 5 SOLUTION RESPIRATORY (INHALATION) at 06:06

## 2021-12-23 RX ADMIN — INSULIN LISPRO 8 UNITS: 100 INJECTION, SOLUTION INTRAVENOUS; SUBCUTANEOUS at 07:58

## 2021-12-23 RX ADMIN — Medication 1000 UNITS: at 07:58

## 2021-12-23 RX ADMIN — FUROSEMIDE 40 MG: 10 INJECTION, SOLUTION INTRAVENOUS at 17:30

## 2021-12-23 RX ADMIN — AMLODIPINE BESYLATE 10 MG: 10 TABLET ORAL at 07:58

## 2021-12-23 ASSESSMENT — PAIN SCALES - GENERAL
PAINLEVEL_OUTOF10: 0

## 2021-12-23 NOTE — CARE COORDINATION
21  nHpredict tool uploaded to chart and can be viewed in the media tab, recommending [facility] for the greatest gains. [address]      1945    Skilled Nursing: On supplemental oxygen, on IV Rocephin and Levaquin for pneumonia. PLOF: Patient lives in home with spouse with 1 SETH without rails. Pt does not use DME at baseline. Pt has walk in shower with grab bars. IND for ambulation and ADLs prior to admission. No AD at baseline. CLOF: CGA for mobility, CGA for transfers, IND for eating/grooming, and SBA for other ADLs. Hearing aids bilat, wears glasses. A&Ox4 .  nH Predict recommends: greater gains going home with care vs SNF. DC Plan: Home with wife. Pt may need supplemental oxygen on d/c, will need home setup. Pt on IV antibiotics at present.              Jeffery Wu [facility]     Transitional Care Coordinator     M: [phone]

## 2021-12-23 NOTE — PROGRESS NOTES
Physical Therapy    Facility/Department: [facility] 4A STEPDOWN  Initial Assessment    NAME: Dani Sawyer  : 1945  MRN: [id number]    Date of Service: 2021  Chief Complaint   Patient presents with    Shortness of Breath     started Monday, worsening this morning. Discharge Recommendations:  Patient would benefit from continued therapy after discharge   PT Equipment Recommendations  Equipment Needed: No    Assessment   Body structures, Functions, Activity limitations: Decreased functional mobility ; Decreased endurance;Decreased strength;Decreased balance  Assessment: Pt with mobility deficits requiring CGA to ambulate 5 feet with a RW. Pt limited this date secondary to decreased endurance, SpO2 dropped with sit<>stand transfer to 88% but quickly recovers with pursed lip breathing. Pt would benefit from additional therapy upon discharge. Prognosis: Good  Decision Making: Medium Complexity  PT Education: Goals;Transfer Training;PT Role;Functional Mobility Training;General Safety;Plan of Care;Gait Training  REQUIRES PT FOLLOW UP: Yes  Activity Tolerance  Activity Tolerance: Patient Tolerated treatment well;Patient limited by endurance  Activity Tolerance: Pt's SpO2 decreased from 93% to 88% upon performing sit<>stand transfer, quickly recovers with pursed lip breathing. Patient Diagnosis(es): The primary encounter diagnosis was Acute on chronic congestive heart failure, unspecified heart failure type ([address] Diagnoses of NSTEMI (non-ST elevated myocardial infarction) ([address] Influenza with respiratory manifestation other than pneumonia, Septicemia ([address] Leukocytosis, unspecified type, and Hypoxia were also pertinent to this visit.      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 has a past surgical history that includes Circumcision (age 49); Vasectomy; Tympanostomy tube placement; Cataract removal (Bilateral); eye surgery (Bilateral); Cardiac catheterization (12/03/2019); and Aortic valve surgery (01/2020). Restrictions  Restrictions/Precautions  Restrictions/Precautions: Isolation,Up as Tolerated  Required Braces or Orthoses?: No  Position Activity Restriction  Other position/activity restrictions: droplet isolation, up with assist  Vision/Hearing  Vision: Impaired  Vision Exceptions: Wears glasses for reading  Hearing: Exceptions to [facility]  Hearing Exceptions: Bilateral hearing aid     Subjective  General  Patient assessed for rehabilitation services?: Yes  Response To Previous Treatment: Not applicable  Family / Caregiver Present: Yes (wife)  Follows Commands: Within Functional Limits  Subjective  Subjective: Pt supine in bed and agreeable to therapy, RN agreeable to therapy. Pt very pleasant and cooperative throughout today's session.   Pain Screening  Patient Currently in Pain: Denies  Vital Signs  Patient Currently in Pain: Denies       Social/Functional History  Social/Functional History  Lives With: Spouse  Type of Home: House  Home Layout: One level  Home Access: Stairs to enter without rails  Entrance Stairs - Number of Steps: 1  Bathroom Shower/Tub: Walk-in shower  Bathroom Toilet: Handicap height  Bathroom Equipment: Built-in shower seat,Grab bars around toilet  Home Equipment:  (does not own or use any DME at a baseline)  ADL Assistance: [facility]: Independent  Homemaking Responsibilities: Yes (shares with wife, wife performs grocery shopping)  Ambulation Assistance: Independent  Transfer Assistance: Independent  Active : Yes  Mode of Transportation: Car  Occupation: Retired  Type of occupation: seo  Leisure & Hobbies: spending time with family  Cognition   Cognition  Overall Cognitive Status: WFL    Objective     Observation/Palpation  Posture: Good    AROM RLE (degrees)  RLE AROM: WFL  AROM LLE (degrees)  LLE AROM : WFL  AROM RUE (degrees)  RUE General AROM: Co-eval with OT, see OT note for UE detail  AROM LUE (degrees)  LUE General AROM: Co-eval with OT, see OT note for UE detail  Strength RLE  Strength RLE: WFL  Comment: Grossly 4+/5  Strength LLE  Strength LLE: WFL  Comment: Grossly 4+/5  Strength RUE  Comment: Co-eval with OT, see OT note for UE detail  Strength LUE  Comment: Co-eval with OT, see OT note for UE detail     Sensation  Overall Sensation Status: WFL (pt denies any numbness/tingling)  Bed mobility  Supine to Sit: Stand by assistance  Sit to Supine: Stand by assistance  Scooting: Stand by assistance  Comment: bed mobility performed with the HOB elevated ~30 degrees without use of handrails  Transfers  Sit to Stand: Contact guard assistance  Stand to sit: Contact guard assistance  Comment: Verbal cues for hand placement  Ambulation  Ambulation?: Yes  More Ambulation?: No  Ambulation 1  Surface: level tile  Device: Rolling Walker  Assistance: Contact guard assistance  Quality of Gait: decreased stride length, decreased gait speed, good stability. Gait Deviations: Slow [place];Decreased step length  Distance: 5 feet  Comments: ambulation distance limited by high flow. SpO2 drops to 88% upon initially standing but very quickly recovers to 93% with pursed lip breathing. Stairs/Curb  Stairs?: No     Balance  Posture: Good  Sitting - Static: Good  Sitting - Dynamic: Good;-  Standing - Static: Fair;+  Standing - Dynamic: Fair;+  Comments: standing balance assessed with a RW.         Plan   Plan  Times per week: 5x  Current Treatment Recommendations: Strengthening,Transfer Training,Endurance Training,ROM,Balance Training,Gait Training,Functional Mobility Training,Stair training,Safety Education & Training,Home Exercise Program,Equipment Evaluation, Education, & procurement,Patient/Caregiver Education & Training  Safety Devices  Type of devices: Left in chair,Call light within reach,Chair alarm in place,Gait belt  Restraints  Initially in place: No                                                 AM-PAC Score  AM-PAC Inpatient Mobility Raw Score : 20 (12/23/21 1209)  AM-PAC Inpatient T-Scale Score : 47.67 (12/23/21 1209)  Mobility Inpatient CMS 0-100% Score: 35.83 (12/23/21 1209)  Mobility Inpatient CMS G-Code Modifier : Travis Davenport (12/23/21 1209)          Goals  Short term goals  Time Frame for Short term goals: 14 visits  Short term goal 1: Pt will ambulate 250 feet with no device and supervision to increase functional independence. Short term goal 2: Pt will tolerate a 35 minute therapy session to promote increased endurance. Short term goal 3: Pt will demonstrate good standing balance to decrease fall risk. Short term goal 4: Pt will negotiate 1 stair with no handrail and SBA to allow the pt to enter prior living arrangements. Short term goal 5: Pt will perform sit<>stand transfer independently to increase functional independence.        Therapy Time   Individual Concurrent Group Co-treatment   Time In 8034         Time Out 1110         Minutes 28         Timed Code Treatment Minutes: 8 Minutes       Mery Mathias PT

## 2021-12-23 NOTE — PROGRESS NOTES
[facility]   Progress Note                   Date:   12/23/2021  Patient name: Vikash Chicas  Date of admission:  12/22/2021  5:01 AM  MRN:   [id number]  YOB: 1945  PCP: ESTHER Wang CNP    Reason for Admission: Septicemia ([facility] [A41.9]  Hypoxia [R09.02]  Influenza with respiratory manifestation other than pneumonia [J11.1]  NSTEMI (non-ST elevated myocardial infarction) ([facility] [I21.4]  Sepsis ([facility] [A41.9]  Leukocytosis, unspecified type [D72.829]  Acute on chronic congestive heart failure, unspecified heart failure type ([facility] [I50.9]    Subjective:       Clinical Changes / Abnormalities: Pt seen and examined in the room. Remains on high flow 02. Family at bedside. Vitals, labs and medications reviewed. Medications:   Scheduled Meds:   insulin lispro  0-18 Units SubCUTAneous TID WC    insulin lispro  0-9 Units SubCUTAneous Nightly    furosemide  40 mg IntraVENous BID    glipiZIDE  10 mg Oral BID AC    Vitamin D  1,000 Units Oral BID    atorvastatin  20 mg Oral Daily    clopidogrel  75 mg Oral Daily    amLODIPine  10 mg Oral Daily    [Held by provider] lisinopril  10 mg Oral Daily    oseltamivir  30 mg Oral BID    sodium chloride flush  5-40 mL IntraVENous 2 times per day    levofloxacin  750 mg IntraVENous Q48H    predniSONE  20 mg Oral Daily     Continuous Infusions:   sodium chloride      heparin (PORCINE) Infusion 18 Units/kg/hr (12/23/21 0926)    dextrose       CBC:   Recent Labs     12/22/21  0529 12/22/21  0647 12/23/21  0430   WBC 7.7 6.3 15.2*   HGB 11.3* 10.8* 11.2*    235 299     BMP:    Recent Labs     12/21/21  2100 12/22/21  0529 12/23/21  0430   * 132* 134*   K 4.3 5.2 5.0   CL 98 100 102   CO2 19* 13* 19*   BUN 24* 34* 37*   CREATININE 1.38* 1.69* 1.55*   GLUCOSE 292* 340* 276*     Hepatic: No results for input(s): AST, ALT, ALB, BILITOT, ALKPHOS in the last 72 hours.   Troponin:   Recent Labs     12/21/21  2229 orange exposure. 8. CKD- Has been referred to nephrology by PCP  9. Right lower ext numbess. Seeing neurology and vascular surgery. 10. RTC 6 months    Objective:   Vitals: /68   Pulse 97   Temp 98.1 °F (36.7 °C) (Temporal)   Resp 15   Ht 5' 6\" (1.676 m)   Wt 164 lb 14.5 oz (74.8 kg)   SpO2 93%   BMI 26.62 kg/m²   General appearance: alert and cooperative with exam  HEENT: Head: Normocephalic, no lesions, without obvious abnormality.   Neck: no JVD, trachea midline, no adenopathy  Lungs: Clear to auscultation, on high flow   Heart: Regular rate and rhythm, s1/s2 auscultated, no murmurs  Abdomen: soft, non-tender, bowel sounds active  Extremities: +1  edema  Neurologic: not done        Assessment / Acute Cardiac Problems:   NSTEMI  Severe aortic stenosis status post TAVR  CAD with history of DEVON to LAD distal and mid left circumflex 8/23/2016  Hypertension  Ischemic/valvular cardiomyopathy EF of 45%  Hyperlipidemia  CKD    Patient Active Problem List:     Essential hypertension     Chronic kidney disease     Unilateral sensorineural hearing loss     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)     Non-rheumatic mitral regurgitation     Nonrheumatic aortic valve stenosis     Uncontrolled type 2 diabetes mellitus with nephropathy (HCC)     Coronary artery disease involving native coronary artery of native heart without angina pectoris     Controlled type 2 diabetes mellitus with mild nonproliferative retinopathy and macular edema, without long-term current use of insulin (HCC)     Carotid stenosis     Chronic kidney disease, stage 3a (HCC)     COPD exacerbation (HCC)     Pneumonia of right lower lobe due to infectious organism     Aortic stenosis, severe     Hypertension     Hyperlipidemia     H/O agent Orange exposure     Diabetes mellitus, type 2 (HCC)     CAD (coronary artery disease)     Anemia     Proteinuria     Wears hearing aid in both ears     Acute on chronic systolic heart failure (HCC)     S/P TAVR (transcatheter aortic valve replacement)     Second hand smoke exposure     Peripheral arterial disease (HCC)     Heavy sensation of lower extremity     Impaired ambulation     Pneumonia     Sepsis ([address]     Influenza A     Acute respiratory failure with hypoxia ([facility])     NSTEMI (non-ST elevated myocardial infarction) ([facility]     BJ (acute kidney injury) ([facility]      Plan of Treatment:   1. Elevated trops, likely type II secondary to demand ischemia. No chest pain. Await ECHO results. If no change from prior, no further workup. 2. CHF. On IV lasix. 3. If WMA, will need to discuss further ischemia workup. BJ- may need nephrology consult if abnormal ECHO.       Electronically signed by ESTHER Ramirez CNP on 12/23/2021 at 11:30 AM  [address]  [phone]

## 2021-12-23 NOTE — PROGRESS NOTES
Occupational Therapy   Occupational Therapy Initial Assessment  Date: 2021   Patient Name: Padma Pierce  MRN: [id number]     : 1945    Date of Service: 2021  Chief Complaint   Patient presents with    Shortness of Breath     started Monday, worsening this morning. Discharge Recommendations:  Patient would benefit from continued therapy after discharge     Assessment   Performance deficits / Impairments: Decreased functional mobility ; Decreased ADL status; Decreased endurance;Decreased high-level IADLs  Assessment: Patient demonstrates decreased endurance and functional activity tolerance at this time without a drop in SpO2. Pt educated on use of pursed lip breathing tech with s/s of SOB and awareness to change in SpO2 % when <90% with good return. Pt completed bed mobility and transfers at [facility] to complete mobility and retired in bedside recliner with all needs met. Patient would benefit from continued acute OT services to address functional deficits through skilled intervention of ADL and IADL compensatory training, balance, safety and transfer training, education of EC/WS techs and implementation, use of AE/DME to increase independence, and strengthening activities to improve function for ADLs to promote functional outcomes. Prognosis: Good  Decision Making: Medium Complexity  Patient Education: OT role,OT POC, purpose of evaluation, hand placement for transfers, pursed lip breathing techs - good return  REQUIRES OT FOLLOW UP: Yes  Activity Tolerance  Activity Tolerance: Patient Tolerated treatment well;Treatment limited secondary to medical complications (free text)  Activity Tolerance: drop in SpO2 with functional tasks; limited d/t attachment to Hi[employer] NC  Safety Devices  Safety Devices in place: Yes  Type of devices: Call light within reach; Left in chair;Nurse notified;Gait belt  Restraints  Initially in place: No         Patient Diagnosis(es): The primary encounter diagnosis was Acute on chronic congestive heart failure, unspecified heart failure type ([facility] Diagnoses of NSTEMI (non-ST elevated myocardial infarction) ([facility] Influenza with respiratory manifestation other than pneumonia, Septicemia ([facility] Leukocytosis, unspecified type, and Hypoxia were also pertinent to this visit. has a past medical history of Acne rosacea, Anemia, Aortic stenosis, BPH (benign prostatic hypertrophy), CAD (coronary artery disease), Carotid stenosis, Cataract of both eyes, CRI (chronic renal insufficiency), Diabetes mellitus, type 2 ([address] Dyslipidemia, Erectile dysfunction, H/O agent Orange exposure, Hearing loss, Hyperlipidemia, Hypertension, Mild nonproliferative diabetic retinopathy ([facility] Non-rheumatic mitral regurgitation, RBBB, 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 has a past surgical history that includes Circumcision (age 49); Vasectomy; Tympanostomy tube placement; Cataract removal (Bilateral); eye surgery (Bilateral); Cardiac catheterization (12/03/2019); and Aortic valve surgery (01/2020). Restrictions  Restrictions/Precautions  Restrictions/Precautions: Isolation,Up as Tolerated (droplet)  Required Braces or Orthoses?: No  Position Activity Restriction  Other position/activity restrictions: droplet isolation, up with assist    Subjective   General  Patient assessed for rehabilitation services?: Yes  Family / Caregiver Present: Yes (wife entered at end of evaluation)  General Comment  Comments: RN ok'd patient for OT/PT evaluation. Pt pleasant and cooperative throughout.   Patient Currently in Pain: Denies  Vital Signs  Patient Currently in Pain: Denies    Social/Functional History  Social/Functional History  Lives With: Spouse  Type of Home: House  Home Layout: One level  Home Access: Stairs to enter without rails  Entrance Stairs - Number of Steps: 1  Bathroom Shower/Tub: Walk-in shower  Bathroom Toilet: Handicap height  Bathroom Equipment: Built-in shower seat,Grab bars around toilet  Home Equipment:  (does not own or use any DME at a baseline)  ADL Assistance: [address]: [address] [facility] Responsibilities: Yes (shares with wife, wife performs grocery shopping)  Meal Prep Responsibility: Secondary  Laundry Responsibility: Secondary  Cleaning Responsibility: Secondary  Shopping Responsibility: Secondary  Ambulation Assistance: Independent  Transfer Assistance: Independent  Active : Yes  Mode of Transportation: Car  Occupation: Retired  Type of occupation: seo  Leisure & Hobbies: spending time with family     Objective   Vision: Impaired  Vision Exceptions: Wears glasses for reading  Hearing: Exceptions to [facility]  Hearing Exceptions: Bilateral hearing aid       Observation/Palpation  Posture: Good  Balance  Sitting Balance: Independent (EOB for 10-11 min; in bedside recliner; intermittent dynamic movement during functional tasks)  Standing Balance: Stand by assistance  Standing Balance  Time: 2-3 min  Activity: EOB at bedside recliner  Functional Mobility  Functional - Mobility Device: Rolling Walker  Activity: Other  Assist Level: Stand by assistance  Functional Mobility Comments: EOB > bedside recliner  ADL  Feeding: Independent  Grooming: Independent  UE Bathing: Modified independent   LE Bathing: Stand by assistance  UE Dressing: Modified Independent  LE Dressing: Stand by assistance  Toileting: Supervision  Additional Comments: Patient required VCs intermittently with functional tasks d/t drop in SpO2 with functional tasks, reaching 86% with VC on use of pursed lip breathing appropriately during functional tasks with good return and quick recovery  Tone RUE  RUE Tone: Normotonic  Tone LUE  LUE Tone: Normotonic  Coordination  Movements Are Fluid And Coordinated: Yes     Bed mobility  Supine to Sit: Stand by assistance  Scooting: Stand by assistance  Comment: Retired to bedside recliner at end of session  Transfers  Sit to stand: Stand by assistance  Stand to sit: Supervision  Transfer Comments: VC for appropriate hand placement with poor return     Cognition  Overall Cognitive Status: WFL        Sensation  Overall Sensation Status: WFL      LUE AROM : WFL  Left Hand AROM: WFL  RUE AROM : WFL  Right Hand AROM: WFL  LUE Strength  Gross LUE Strength: WFL  L Hand General: 4+/5  RUE Strength  Gross RUE Strength: WFL  R Hand General: 4+/5              Plan   Plan  Times per week: 3x/wk  Current Treatment Recommendations: Strengthening,Endurance Training,Patient/Caregiver Education & Training,Self-Care / ADL,Home Management Training,Functional Mobility Training,Safety Education & Training,Balance Training    AM-PAC Score        AM-PAC Inpatient Daily Activity Raw Score: 21 (12/23/21 1429)  AM-PAC Inpatient ADL T-Scale Score : 44.27 (12/23/21 1429)  ADL Inpatient CMS 0-100% Score: 32.79 (12/23/21 1429)  ADL Inpatient CMS G-Code Modifier : Gui Flores (12/23/21 1429)    Goals  Short term goals  Time Frame for Short term goals: Patient will, by discharge  Short term goal 1: demo ADLs independently  Short term goal 2: demo independence in B UE HEP to maintain strength for functional tasks  Short term goal 3: demo use of pursed lip breathing independently with s/s of SOB to reduce exertion  Short term goal 4: demo functional transfers/mobility independently to engage in ADLs  Short term goal 5: demo use of EC/WS techs independently into ADL tasks to reduce exertion     Therapy Time   Individual Concurrent Group Co-treatment   Time In 1043         Time Out 1110         Minutes 27         Timed Code Treatment Minutes: 8 Minutes     Matthew Perales OTR/L

## 2021-12-23 NOTE — PLAN OF CARE
Problem: Falls - Risk of:  Goal: Will remain free from falls  Description: Will remain free from falls  Outcome: Ongoing  Goal: Absence of physical injury  Description: Absence of physical injury  Outcome: Ongoing     Problem: ABCDS Injury Assessment  Goal: Absence of physical injury  Outcome: Ongoing     Problem: OXYGENATION/RESPIRATORY FUNCTION  Goal: Patient will maintain patent airway  Outcome: Ongoing  Goal: Patient will achieve/maintain normal respiratory rate/effort  Description: Respiratory rate and effort will be within normal limits for the patient  Outcome: Ongoing     Problem: HEMODYNAMIC STATUS  Goal: Patient has stable vital signs and fluid balance  Outcome: Ongoing     Problem: FLUID AND ELECTROLYTE IMBALANCE  Goal: Fluid and electrolyte balance are achieved/maintained  Outcome: Ongoing     Problem: ACTIVITY INTOLERANCE/IMPAIRED MOBILITY  Goal: Mobility/activity is maintained at optimum level for patient  Outcome: Ongoing

## 2021-12-23 NOTE — PROGRESS NOTES
[facility]  Office: [address], DO, Jose De Jesus Katie, DO, Leann Foster, DO, Pierochente Ochoa, DO, Kaity Holguin MD, Nati Dolan MD, Joyce Marti MD, Obie Slater MD, Justin Robles MD, Alannah Coats MD, Anai Mishra MD, Ritesh Rg, DO, Josiah Albrecht, DO, Joe Vilchis MD,  Karthikeyan Morales, DO, Tatiana Rhodes MD, Kyree Juares MD, Sofia Martin MD, Ana M Mays MD, Melissa Orona MD, Ry Starkey MD, Evonne Horan MD, Nancy Franco, [facility], [facility], [facility], Najma Leach, [facility], Dick Goyal, CNS, Jayashree Coats, CNP, Ksenia Silva, CNP, Alea Watkins, CNP, Rachid Quintana, CNP, Rama Edge, CNP, Hope Miller PA-C, Bradford Steel, DNP, Juliette Quezada DNP, Aleida Fuentes, CNP, Kinsey Alex, CNP, Ivette Blanco, CNP, Melchor Morales CNP, Rommel Cui [facility], Jakub Carter, [address] [place] [address]    Progress Note    12/23/2021    9:19 AM    Name:   Desean Clarke  MRN:     [id number]     Acct:      [de-identified]   Room:   [address] [place] [address] Day:  1  Admit Date:  12/22/2021  5:01 AM    PCP:   ESTHER Iraheta CNP  Code Status:  Full Code    Subjective:     C/C:   Chief Complaint   Patient presents with    Shortness of Breath     started Monday, worsening this morning. Interval History Status: Unchanged    Patient seen examined this morning. Overnight, patient has been placed on BiPAP therapy. Had oxygen desaturations and worsening tachypnea. Reports that shortness of breath has improved with BiPAP. He is having hypoxic episodes on 5 L of oxygen nasal cannula. He reports that he still wanted a time. Reports good urine output. Brief History:     Desean Clarke is a 77 y.o. gentleman who initially presented to [facility] ED for evaluation of shortness of breath on 12/22/2021. Onset of symptoms was approximately 2-3 days ago.  He was diagnosed with influenza A & pneumonia at the urgent care and was placed on antibiotics and tamiflu. His comorbidities include a history of CAD s/p stenting, essential hypertension, TAVR, and DM II. He was noted to have a leukocytosis and elevated creatinine in their ED. BNP noted to be elevated as well as troponin and lactic acidemia. He was placed on bipap therapy due to hypoxia (SpO2 70%) with significant improvement of his shortness of breath. Due to his elevated troponin and symptoms, he was transferred directly from [facility] ED to [facility] ED for higher level of care. Prior to transfer, he did receive Lovenox and Lasix.      Since arrival to the emergency department, patient reports he is feeling significantly improved. Remains on 4 L of oxygen via nasal cannula. His wife reports that he looks much better. He states that he wants to go home. Cardiology at bedside. He will be admitted for further work-up and evaluation of NSTEMI. Review of Systems:     Constitutional:  negative for chills, fevers, sweats  Respiratory: Reports persistent shortness of breath; negative for cough, wheezing  Cardiovascular:  negative for chest pain, chest pressure/discomfort, lower extremity edema, palpitations  Gastrointestinal:  negative for abdominal pain, constipation, diarrhea, nausea, vomiting  Neurological:  negative for dizziness, headache    Medications: Allergies:     Allergies   Allergen Reactions    Atorvastatin Other (See Comments)     Muscle pain (myalgias)    Sulfa Antibiotics Swelling     Swelling of retinas    Sulfanilamide Other (See Comments)       Current Meds:   Scheduled Meds:    insulin lispro  0-18 Units SubCUTAneous TID WC    insulin lispro  0-9 Units SubCUTAneous Nightly    furosemide  40 mg IntraVENous BID    Vitamin D  1,000 Units Oral BID    atorvastatin  20 mg Oral Daily    clopidogrel  75 mg Oral Daily    amLODIPine  10 mg Oral Daily    [Held by provider] lisinopril  10 mg Oral Daily    oseltamivir  30 mg Oral BID    sodium chloride flush  5-40 mL IntraVENous 2 times per day    levofloxacin  750 mg IntraVENous Q48H    predniSONE  20 mg Oral Daily     Continuous Infusions:    sodium chloride      heparin (PORCINE) Infusion 16 Units/kg/hr (21 0240)    dextrose       PRN Meds: albuterol, sodium chloride flush, sodium chloride, acetaminophen **OR** acetaminophen, heparin (porcine), heparin (porcine), glucose, dextrose, glucagon (rDNA), dextrose    Data:     Past Medical History:   has a past medical history of Acne rosacea, Anemia, Aortic stenosis, BPH (benign prostatic hypertrophy), CAD (coronary artery disease), Carotid stenosis, Cataract of both eyes, CRI (chronic renal insufficiency), Diabetes mellitus, type 2 ([address] Dyslipidemia, Erectile dysfunction, H/O agent Orange exposure, Hearing loss, Hyperlipidemia, Hypertension, Mild nonproliferative diabetic retinopathy ([address] Non-rheumatic mitral regurgitation, RBBB, 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 Social History:   reports that he quit smoking about 52 years ago. He has a 1.00 pack-year smoking history. He has never used smokeless tobacco. He reports current alcohol use. He reports that he does not use drugs. Family History:   Family History   Problem Relation Age of Onset    Heart Attack Father     Coronary Art Dis Sister     Hypertension Mother     Lung Cancer Mother     Stroke Mother     Coronary Art Dis Brother         later in life       Vitals:  /68   Pulse 97   Temp 98.1 °F (36.7 °C) (Temporal)   Resp 19   Ht 5' 6\" (1.676 m)   Wt 164 lb 14.5 oz (74.8 kg)   SpO2 98%   BMI 26.62 kg/m²   Temp (24hrs), Av.5 °F (36.9 °C), Min:98.1 °F (36.7 °C), Max:99.1 °F (37.3 °C)    Recent Labs     21  0630   POCGLU 285* 307*       I/O (24Hr):     Intake/Output Summary (Last 24 hours) at 2021 0919  Last data filed at 2021 0533  Gross per 24 hour   Intake 800 ml   Output 775 ml   Net 25 ml       Labs:  Hematology:  Recent Labs     12/22/21  0529 12/22/21  0647 12/23/21  0430   WBC 7.7 6.3 15.2*   RBC 4.32 4.11* 4.41   HGB 11.3* 10.8* 11.2*   HCT 35.1* 33.4* 35.3*   MCV 81.3* 81.3* 80.0*   MCH 26.2 26.3 25.4   MCHC 32.2 32.3 31.7   RDW 13.8 13.8 13.6    235 299   MPV 11.0 10.7 11.0   INR  --   --  1.0     Chemistry:  Recent Labs     12/21/21  2100 12/21/21  2224 12/22/21  0017 12/22/21  0529 12/23/21  0430   *  --   --  132* 134*   K 4.3  --   --  5.2 5.0   CL 98  --   --  100 102   CO2 19*  --   --  13* 19*   GLUCOSE 292*  --   --  340* 276*   BUN 24*  --   --  34* 37*   CREATININE 1.38*  --   --  1.69* 1.55*   ANIONGAP 14  --   --  19* 13   LABGLOM 50*  --   --  40* 44*   GFRAA >60  --   --  48* 53*   CALCIUM 8.7  --   --  8.4* 8.9   PROBNP 5,403*  --   --  7,044*  --    TROPHS  --  313* 369* 355*  --      Recent Labs     12/22/21 2009 12/23/21  0630   POCGLU 285* 307*     ABG:No results found for: POCPH, PHART, PH, POCPCO2, BXK6XBG, PCO2, POCPO2, PO2ART, PO2, POCHCO3, WAP5TPY, HCO3, NBEA, PBEA, BEART, BE, THGBART, THB, DJP8KLH, XUWT9FVI, Y5UOKNKB, O2SAT, FIO2  Lab Results   Component Value Date/Time    SPECIAL NOT REPORTED 12/21/2021 10:29 PM     Lab Results   Component Value Date/Time    CULTURE NO GROWTH 12 HOURS 12/21/2021 10:29 PM       Radiology:  XR CHEST (2 VW)    Result Date: 12/20/2021  Patchy opacities in the right lung and mild septal thickening. Findings are suggestive of interstitial edema. Underlying inflammatory process or infection should also be considered in the appropriate clinical setting. XR CHEST PORTABLE    Result Date: 12/22/2021  Normal examination. Congestive failure evident on yesterday's exam has resolved. XR CHEST PORTABLE    Result Date: 12/21/2021  Interval worsening of cardiomegaly. Interval significant worsening of parahilar densities and increased interstitial marking.   Although finding may be suggestive of pulmonary edema, extensive viral pneumonia should also be placed in differential.       Physical Examination:        General appearance:  alert, cooperative and no distress, obese  gentleman sitting up in bed  Mental Status:  oriented to person, place and time and normal affect  HEENT: EOMI, PERRLA, sclera anti-icteric, simple cannula present  Lungs:  clear to auscultation bilaterally, occasional scattered rhonchi, no wheezing today, normal effort  Heart:  regular rate and rhythm, no murmur  Abdomen:  soft, nontender, nondistended, normal bowel sounds, no masses, hepatomegaly, splenomegaly  Extremities:  no edema, redness, tenderness in the calves  Skin:  no gross lesions, rashes, induration    Assessment:        Hospital Problems           Last Modified POA    * (Principal) Acute respiratory failure with hypoxia ([address] 12/22/2021 Yes    COPD exacerbation ([address] 12/22/2021 Yes    Acute on chronic systolic heart failure ([address] 12/22/2021 Yes    Sepsis ([address] 12/22/2021 Yes    Influenza A 12/22/2021 Yes    NSTEMI (non-ST elevated myocardial infarction) ([address] 12/22/2021 Yes    BJ (acute kidney injury) ([address] 12/22/2021 Yes    Essential hypertension 12/22/2021 Yes    Chronic kidney disease, stage 3a ([address] 12/22/2021 Yes    S/P TAVR (transcatheter aortic valve replacement) 12/22/2021 Yes    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 Acute respiratory failure with hypoxia-uncertain as to what the etiology of this is. Checking 2D echo to rule out worsening systolic heart failure. Schedule IV Lasix 40 mg twice daily. Adding-nasal cannula and as needed BiPAP. Check chest x-ray this morning. Check D-dimer this morning. 2. Possible superimposed pneumonia on influenza A infection-continue Tamiflu. Continue Levaquin. 3. COPD exacerbation-wheezing has significant improved with addition of prednisone. Continue for 4 more days.   [facility] Continue Leva[facility]. 4. NSTEMI-cardiology following. Await 2D echo. Remains on heparin infusion  5. Acute on chronic systolic heart failure-scheduling IV diuresis. 6. BJ on CKD stage IIIa-renal function improving today after IV lasix yesterday. Creatinine not too far off from patient's baseline  7. New leukocytosis-likely related to steroid administration. Check procalcitonin. 8. Lactic acidemia-recheck lactic acid this morning  9. Normocytic normochromic anemia-hemoglobin stable. Adding on iron studies this morning. 10. Severe aortic stenosis status post TAVR  11. Essential hypertension-hold lisinopril while with BJ. Continue Norvasc. Blood pressures stable. 12. Coronary artery disease status post stents-continue Plavix and statin therapy  13. Type 2 diabetes mellitus-hypoglycemic this morning. Continue high-dose insulin sliding scale. Restart glipizide. 14. Disposition: Uncertain as to why the patient is still hypoxic. Checking D-dimer and working up his hypoxia today. Await 2D echo.     33 Lindsay Fuentes DO  12/23/2021  9:19 AM

## 2021-12-24 LAB
ABSOLUTE EOS #: <0.03 K/UL (ref 0–0.44)
ABSOLUTE IMMATURE GRANULOCYTE: 0.08 K/UL (ref 0–0.3)
ABSOLUTE LYMPH #: 1.13 K/UL (ref 1.1–3.7)
ABSOLUTE MONO #: 0.91 K/UL (ref 0.1–1.2)
ANION GAP SERPL CALCULATED.3IONS-SCNC: 10 MMOL/L (ref 9–17)
BASOPHILS # BLD: 0 % (ref 0–2)
BASOPHILS ABSOLUTE: <0.03 K/UL (ref 0–0.2)
BUN BLDV-MCNC: 35 MG/DL (ref 8–23)
BUN/CREAT BLD: ABNORMAL (ref 9–20)
CALCIUM SERPL-MCNC: 8.9 MG/DL (ref 8.6–10.4)
CHLORIDE BLD-SCNC: 100 MMOL/L (ref 98–107)
CO2: 24 MMOL/L (ref 20–31)
CREAT SERPL-MCNC: 1.46 MG/DL (ref 0.7–1.2)
DIFFERENTIAL TYPE: ABNORMAL
EOSINOPHILS RELATIVE PERCENT: 0 % (ref 1–4)
GFR AFRICAN AMERICAN: 57 ML/MIN
GFR NON-AFRICAN AMERICAN: 47 ML/MIN
GFR SERPL CREATININE-BSD FRML MDRD: ABNORMAL ML/MIN/{1.73_M2}
GFR SERPL CREATININE-BSD FRML MDRD: ABNORMAL ML/MIN/{1.73_M2}
GLUCOSE BLD-MCNC: 119 MG/DL (ref 70–99)
GLUCOSE BLD-MCNC: 221 MG/DL (ref 75–110)
GLUCOSE BLD-MCNC: 312 MG/DL (ref 75–110)
GLUCOSE BLD-MCNC: 365 MG/DL (ref 75–110)
GLUCOSE BLD-MCNC: 68 MG/DL (ref 75–110)
HCT VFR BLD CALC: 34.9 % (ref 40.7–50.3)
HEMOGLOBIN: 11.7 G/DL (ref 13–17)
IMMATURE GRANULOCYTES: 1 %
LV EF: 28 %
LVEF MODALITY: NORMAL
LYMPHOCYTES # BLD: 10 % (ref 24–43)
MCH RBC QN AUTO: 26.5 PG (ref 25.2–33.5)
MCHC RBC AUTO-ENTMCNC: 33.5 G/DL (ref 28.4–34.8)
MCV RBC AUTO: 79.1 FL (ref 82.6–102.9)
MONOCYTES # BLD: 8 % (ref 3–12)
NRBC AUTOMATED: 0 PER 100 WBC
PARTIAL THROMBOPLASTIN TIME: 37.5 SEC (ref 20.5–30.5)
PARTIAL THROMBOPLASTIN TIME: 40.8 SEC (ref 20.5–30.5)
PARTIAL THROMBOPLASTIN TIME: 62.1 SEC (ref 20.5–30.5)
PDW BLD-RTO: 13.6 % (ref 11.8–14.4)
PLATELET # BLD: 303 K/UL (ref 138–453)
PLATELET ESTIMATE: ABNORMAL
PMV BLD AUTO: 11 FL (ref 8.1–13.5)
POTASSIUM SERPL-SCNC: 4.3 MMOL/L (ref 3.7–5.3)
RBC # BLD: 4.41 M/UL (ref 4.21–5.77)
RBC # BLD: ABNORMAL 10*6/UL
SEG NEUTROPHILS: 81 % (ref 36–65)
SEGMENTED NEUTROPHILS ABSOLUTE COUNT: 8.75 K/UL (ref 1.5–8.1)
SODIUM BLD-SCNC: 134 MMOL/L (ref 135–144)
WBC # BLD: 10.9 K/UL (ref 3.5–11.3)
WBC # BLD: ABNORMAL 10*3/UL

## 2021-12-24 PROCEDURE — 93306 TTE W/DOPPLER COMPLETE: CPT

## 2021-12-24 PROCEDURE — 6370000000 HC RX 637 (ALT 250 FOR IP): Performed by: INTERNAL MEDICINE

## 2021-12-24 PROCEDURE — 2580000003 HC RX 258: Performed by: NURSE PRACTITIONER

## 2021-12-24 PROCEDURE — 99223 1ST HOSP IP/OBS HIGH 75: CPT | Performed by: INTERNAL MEDICINE

## 2021-12-24 PROCEDURE — 2060000000 HC ICU INTERMEDIATE R&B

## 2021-12-24 PROCEDURE — 80048 BASIC METABOLIC PNL TOTAL CA: CPT

## 2021-12-24 PROCEDURE — 6370000000 HC RX 637 (ALT 250 FOR IP): Performed by: NURSE PRACTITIONER

## 2021-12-24 PROCEDURE — 2700000000 HC OXYGEN THERAPY PER DAY

## 2021-12-24 PROCEDURE — 85025 COMPLETE CBC W/AUTO DIFF WBC: CPT

## 2021-12-24 PROCEDURE — 6360000002 HC RX W HCPCS: Performed by: NURSE PRACTITIONER

## 2021-12-24 PROCEDURE — 94761 N-INVAS EAR/PLS OXIMETRY MLT: CPT

## 2021-12-24 PROCEDURE — 6360000002 HC RX W HCPCS: Performed by: INTERNAL MEDICINE

## 2021-12-24 PROCEDURE — 99232 SBSQ HOSP IP/OBS MODERATE 35: CPT | Performed by: INTERNAL MEDICINE

## 2021-12-24 PROCEDURE — 36415 COLL VENOUS BLD VENIPUNCTURE: CPT

## 2021-12-24 PROCEDURE — 85730 THROMBOPLASTIN TIME PARTIAL: CPT

## 2021-12-24 PROCEDURE — 82947 ASSAY GLUCOSE BLOOD QUANT: CPT

## 2021-12-24 RX ORDER — INSULIN GLARGINE 100 [IU]/ML
10 INJECTION, SOLUTION SUBCUTANEOUS DAILY
Status: DISCONTINUED | OUTPATIENT
Start: 2021-12-24 | End: 2021-12-27

## 2021-12-24 RX ADMIN — ATORVASTATIN CALCIUM 20 MG: 20 TABLET, FILM COATED ORAL at 09:00

## 2021-12-24 RX ADMIN — HEPARIN SODIUM 2000 UNITS: 1000 INJECTION INTRAVENOUS; SUBCUTANEOUS at 15:07

## 2021-12-24 RX ADMIN — FUROSEMIDE 40 MG: 10 INJECTION, SOLUTION INTRAVENOUS at 08:58

## 2021-12-24 RX ADMIN — INSULIN LISPRO 12 UNITS: 100 INJECTION, SOLUTION INTRAVENOUS; SUBCUTANEOUS at 17:00

## 2021-12-24 RX ADMIN — INSULIN LISPRO 7 UNITS: 100 INJECTION, SOLUTION INTRAVENOUS; SUBCUTANEOUS at 20:29

## 2021-12-24 RX ADMIN — LEVOFLOXACIN 750 MG: 5 INJECTION, SOLUTION INTRAVENOUS at 06:21

## 2021-12-24 RX ADMIN — OSELTAMIVIR PHOSPHATE 30 MG: 30 CAPSULE ORAL at 08:58

## 2021-12-24 RX ADMIN — FUROSEMIDE 40 MG: 10 INJECTION, SOLUTION INTRAVENOUS at 16:49

## 2021-12-24 RX ADMIN — GLIPIZIDE 10 MG: 10 TABLET ORAL at 06:20

## 2021-12-24 RX ADMIN — OSELTAMIVIR PHOSPHATE 30 MG: 30 CAPSULE ORAL at 20:27

## 2021-12-24 RX ADMIN — SODIUM CHLORIDE, PRESERVATIVE FREE 10 ML: 5 INJECTION INTRAVENOUS at 20:28

## 2021-12-24 RX ADMIN — Medication 20 UNITS/KG/HR: at 14:00

## 2021-12-24 RX ADMIN — Medication 1000 UNITS: at 08:57

## 2021-12-24 RX ADMIN — Medication 1000 UNITS: at 20:27

## 2021-12-24 RX ADMIN — SODIUM CHLORIDE, PRESERVATIVE FREE 10 ML: 5 INJECTION INTRAVENOUS at 09:00

## 2021-12-24 RX ADMIN — AMLODIPINE BESYLATE 10 MG: 10 TABLET ORAL at 08:57

## 2021-12-24 RX ADMIN — PREDNISONE 20 MG: 20 TABLET ORAL at 08:57

## 2021-12-24 RX ADMIN — INSULIN LISPRO 6 UNITS: 100 INJECTION, SOLUTION INTRAVENOUS; SUBCUTANEOUS at 08:58

## 2021-12-24 RX ADMIN — CLOPIDOGREL 75 MG: 75 TABLET, FILM COATED ORAL at 08:58

## 2021-12-24 ASSESSMENT — PAIN SCALES - GENERAL
PAINLEVEL_OUTOF10: 0

## 2021-12-24 NOTE — PROGRESS NOTES
[facility]   Progress Note                   Date:   12/24/2021  Patient name: Jose Barron  Date of admission:  12/22/2021  5:01 AM  MRN:   [id number]  YOB: 1945  PCP: ESTHER Espinoza CNP    Reason for Admission: Septicemia ([facility] [A41.9]  Hypoxia [R09.02]  Influenza with respiratory manifestation other than pneumonia [J11.1]  NSTEMI (non-ST elevated myocardial infarction) ([facility] [I21.4]  Sepsis ([facility] [A41.9]  Leukocytosis, unspecified type [D72.829]  Acute on chronic congestive heart failure, unspecified heart failure type ([facility] [I50.9]    Subjective:       Clinical Changes / Abnormalities: Pt seen and examined in the room. Remains on high flow 02. Discussed with Dr. Mihai Mccoy, labs and medications reviewed.          Medications:   Scheduled Meds:   insulin glargine  10 Units SubCUTAneous Daily    insulin lispro  0-18 Units SubCUTAneous TID WC    insulin lispro  0-9 Units SubCUTAneous Nightly    furosemide  40 mg IntraVENous BID    diphenhydrAMINE  25 mg IntraVENous Once    Vitamin D  1,000 Units Oral BID    atorvastatin  20 mg Oral Daily    clopidogrel  75 mg Oral Daily    amLODIPine  10 mg Oral Daily    [Held by provider] lisinopril  10 mg Oral Daily    oseltamivir  30 mg Oral BID    sodium chloride flush  5-40 mL IntraVENous 2 times per day    levofloxacin  750 mg IntraVENous Q48H    predniSONE  20 mg Oral Daily     Continuous Infusions:   sodium chloride      heparin (PORCINE) Infusion 20 Units/kg/hr (12/23/21 4346)    dextrose       CBC:   Recent Labs     12/22/21  0647 12/23/21  0430 12/24/21  0651   WBC 6.3 15.2* 10.9   HGB 10.8* 11.2* 11.7*    299 303     BMP:    Recent Labs     12/22/21  0529 12/23/21  0430 12/24/21  0651   * 134* 134*   K 5.2 5.0 4.3    102 100   CO2 13* 19* 24   BUN 34* 37* 35*   CREATININE 1.69* 1.55* 1.46*   GLUCOSE 340* 276* 119*     Hepatic: No results for input(s): AST, ALT, ALB, BILITOT, ALKPHOS in to take this new medication. Repeat lipid panel  7. H/o agent orange exposure. 8. CKD- Has been referred to nephrology by PCP  9. Right lower ext numbess. Seeing neurology and vascular surgery. 10. RTC 6 months    Objective:   Vitals: /62   Pulse 82   Temp 98.3 °F (36.8 °C) (Oral)   Resp 18   Ht 5' 6\" (1.676 m)   Wt 165 lb 8 oz (75.1 kg)   SpO2 98%   BMI 26.71 kg/m²   General appearance: alert and cooperative with exam  HEENT: Head: Normocephalic, no lesions, without obvious abnormality.   Neck: no JVD, trachea midline, no adenopathy  Lungs: Clear to auscultation, on high flow   Heart: Regular rate and rhythm, s1/s2 auscultated, no murmurs  Abdomen: soft, non-tender, bowel sounds active  Extremities: +1  edema  Neurologic: not done        Assessment / Acute Cardiac Problems:   NSTEMI  Severe aortic stenosis status post TAVR  CAD with history of DEVON to LAD distal and mid left circumflex 8/23/2016  Hypertension  Ischemic/valvular cardiomyopathy EF of 45%  Hyperlipidemia  CKD    Patient Active Problem List:     Essential hypertension     Chronic kidney disease     Unilateral sensorineural hearing loss     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)     Non-rheumatic mitral regurgitation     Nonrheumatic aortic valve stenosis     Uncontrolled type 2 diabetes mellitus with nephropathy (HCC)     Coronary artery disease involving native coronary artery of native heart without angina pectoris     Controlled type 2 diabetes mellitus with mild nonproliferative retinopathy and macular edema, without long-term current use of insulin (HCC)     Carotid stenosis     Chronic kidney disease, stage 3a (HCC)     COPD exacerbation (HCC)     Pneumonia of right lower lobe due to infectious organism     Aortic stenosis, severe     Hypertension     Hyperlipidemia     H/O agent Orange exposure     Diabetes mellitus, type 2 (HCC)     CAD (coronary artery disease)     Anemia     Proteinuria     Wears hearing aid in both ears     Acute on chronic systolic heart failure (HCC)     S/P TAVR (transcatheter aortic valve replacement)     Second hand smoke exposure     Peripheral arterial disease (HCC)     Heavy sensation of lower extremity     Impaired ambulation     Pneumonia     Sepsis (HCC)     Influenza A     Acute respiratory failure with hypoxia ([facility])     NSTEMI (non-ST elevated myocardial infarction) ([facility]     BJ (acute kidney injury) ([facility]      Plan of Treatment:   1. Elevated trops, likely type II secondary to demand ischemia. No chest pain. Await ECHO results. If no change from prior, no further workup. 2. CHF. On IV lasix. 3. If WMA, will need to discuss further ischemia workup. BJ- may need nephrology consult if abnormal ECHO.       Electronically signed by Harvest Cranker, APRN - CNP on 12/24/2021 at 10:27 [address] [place] [address].  [phone]

## 2021-12-24 NOTE — PLAN OF CARE
Problem: Falls - Risk of:  Goal: Will remain free from falls  Description: Will remain free from falls  Outcome: Met This Shift  Goal: Absence of physical injury  Description: Absence of physical injury  Outcome: Met This Shift     Problem: ABCDS Injury Assessment  Goal: Absence of physical injury  Outcome: Met This Shift     Problem: OXYGENATION/RESPIRATORY FUNCTION  Goal: Patient will maintain patent airway  Outcome: Met This Shift  Goal: Patient will achieve/maintain normal respiratory rate/effort  Description: Respiratory rate and effort will be within normal limits for the patient  Outcome: Met This Shift     Problem: HEMODYNAMIC STATUS  Goal: Patient has stable vital signs and fluid balance  Outcome: Met This Shift     Problem: FLUID AND ELECTROLYTE IMBALANCE  Goal: Fluid and electrolyte balance are achieved/maintained  Outcome: Met This Shift     Problem: ACTIVITY INTOLERANCE/IMPAIRED MOBILITY  Goal: Mobility/activity is maintained at optimum level for patient  Outcome: Met This Shift

## 2021-12-24 NOTE — PROGRESS NOTES
[facility]  Office: [address], DO, Leatha Miller, DO, Joey Carmona, DO, Nathalia Garibay Blood, DO, Howard Carlisle MD, Farida Banda MD, Sahil Cisneros MD, Gregory Francisco MD, Kev Leon MD, Demetri Wood MD, Lex Cunningham MD, Chilo Dixon, DO, Rodolfo Felix, DO, Juancho Noonan MD,  Shante Gudino, DO, Gwen Paniagua MD, Mehrdad Chaves MD, No Holder MD, Doug Jha MD, Troy Ward MD, Anoop Larios MD, Raeann Thibodeaux MD, Jazmine Devine [facility], [facility], CNP, Delfina Soto, CNP, Princess Ruiz, CNS, Romain Zaragoza, CNP, Dina Aiken, CNP, Eliana Carr, CNP, Mel Murphy, CNP, Xiomara Weiner, CNP, Sung Robles PA-C, Yee Wright, DNP, Ellen Carter, [facility], Radha Rg, CNP, Caitlin Mejia, CNP, Severiano Spalding, CNP, John Marsh, CNP, Milagros Camilo, [facility], [facility], [address] [place] [address]    Progress Note    12/24/2021    9:50 AM    Name:   Basilio Oliveira  MRN:     [id number]     Acct:      [de-identified]   Room:   [address] [place] [address] Day:  2  Admit Date:  12/22/2021  5:01 AM    PCP:   ESTHER Pathak CNP  Code Status:  Full Code    Subjective:     C/C:   Chief Complaint   Patient presents with    Shortness of Breath     started Monday, worsening this morning. Interval History Status: Unchanged    Patient feeling much better today, diuresed well last 24 hours, no wheezing on examination. Patient eager to go home but still requiring 50% FiO2. Patient had a couple questions regarding his discharge plan and his elevated troponins. Patient questions were answered to satisfaction    Brief History:     Basilio Oliveira is a 77 y.o. gentleman who initially presented to [facility] for evaluation of shortness of breath on 12/22/2021. Onset of symptoms was approximately 2-3 days ago.  He was diagnosed with influenza A & pneumonia at the urgent care and was placed on antibiotics and tamiflu. His comorbidities include a history of CAD s/p stenting, essential hypertension, TAVR, and DM II. He was noted to have a leukocytosis and elevated creatinine in their ED. BNP noted to be elevated as well as troponin and lactic acidemia. He was placed on bipap therapy due to hypoxia (SpO2 70%) with significant improvement of his shortness of breath. Due to his elevated troponin and symptoms, he was transferred directly from [place] ED to [facility] ED for higher level of care. Prior to transfer, he did receive Lovenox and Lasix.      Since arrival to the emergency department, patient reports he is feeling significantly improved. Remains on 4 L of oxygen via nasal cannula. His wife reports that he looks much better. He states that he wants to go home. Cardiology at bedside. He will be admitted for further work-up and evaluation of NSTEMI. Review of Systems:     Constitutional:  negative for chills, fevers, sweats  Respiratory: Reports persistent shortness of breath; negative for cough, wheezing  Cardiovascular:  negative for chest pain, chest pressure/discomfort, lower extremity edema, palpitations  Gastrointestinal:  negative for abdominal pain, constipation, diarrhea, nausea, vomiting  Neurological:  negative for dizziness, headache    Medications: Allergies:     Allergies   Allergen Reactions    Atorvastatin Other (See Comments)     Muscle pain (myalgias)    Sulfa Antibiotics Swelling     Swelling of retinas    Sulfanilamide Other (See Comments)       Current Meds:   Scheduled Meds:    insulin glargine  10 Units SubCUTAneous Daily    insulin lispro  0-18 Units SubCUTAneous TID WC    insulin lispro  0-9 Units SubCUTAneous Nightly    furosemide  40 mg IntraVENous BID    diphenhydrAMINE  25 mg IntraVENous Once    Vitamin D  1,000 Units Oral BID    atorvastatin  20 mg Oral Daily    clopidogrel  75 mg Oral Daily    amLODIPine  10 mg Oral Daily    [Held by provider] lisinopril  10 mg Oral Daily    oseltamivir  30 mg Oral BID    sodium chloride flush  5-40 mL IntraVENous 2 times per day    levofloxacin  750 mg IntraVENous Q48H    predniSONE  20 mg Oral Daily     Continuous Infusions:    sodium chloride      heparin (PORCINE) Infusion 20 Units/kg/hr (21 0405)    dextrose       PRN Meds: albuterol, sodium chloride flush, sodium chloride, acetaminophen **OR** acetaminophen, heparin (porcine), heparin (porcine), glucose, dextrose, glucagon (rDNA), dextrose    Data:     Past Medical History:   has a past medical history of Acne rosacea, Anemia, Aortic stenosis, BPH (benign prostatic hypertrophy), CAD (coronary artery disease), Carotid stenosis, Cataract of both eyes, CRI (chronic renal insufficiency), Diabetes mellitus, type 2 ([address] Dyslipidemia, Erectile dysfunction, H/O agent Orange exposure, Hearing loss, Hyperlipidemia, Hypertension, Mild nonproliferative diabetic retinopathy ([address] Non-rheumatic mitral regurgitation, RBBB, 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 Social History:   reports that he quit smoking about 52 years ago. He has a 1.00 pack-year smoking history. He has never used smokeless tobacco. He reports current alcohol use. He reports that he does not use drugs. Family History:   Family History   Problem Relation Age of Onset    Heart Attack Father     Coronary Art Dis Sister     Hypertension Mother     Lung Cancer Mother     Stroke Mother     Coronary Art Dis Brother         later in life       Vitals:  /83   Pulse 82   Temp 98.3 °F (36.8 °C) (Oral)   Resp 23   Ht 5' 6\" (1.676 m)   Wt 165 lb 8 oz (75.1 kg)   SpO2 98%   BMI 26.71 kg/m²   Temp (24hrs), Av.4 °F (36.9 °C), Min:98.3 °F (36.8 °C), Max:98.7 °F (37.1 °C)    Recent Labs     21  1125 21  1551 21  2115 21  0816   POCGLU 222* 244* 256* 221*       I/O (24Hr):     Intake/Output Summary (Last 24 hours) at 12/24/2021 0950  Last data filed at 12/24/2021 0945  Gross per 24 hour   Intake 600 ml   Output 3275 ml   Net -2675 ml       Labs:  Hematology:  Recent Labs     12/22/21  0647 12/23/21  0430 12/23/21  0817 12/24/21  0651   WBC 6.3 15.2*  --  10.9   RBC 4.11* 4.41  --  4.41   HGB 10.8* 11.2*  --  11.7*   HCT 33.4* 35.3*  --  34.9*   MCV 81.3* 80.0*  --  79.1*   MCH 26.3 25.4  --  26.5   MCHC 32.3 31.7  --  33.5   RDW 13.8 13.6  --  13.6    299  --  303   MPV 10.7 11.0  --  11.0   INR  --  1.0  --   --    DDIMER  --   --  0.32  --      Chemistry:  Recent Labs     12/21/21  2100 12/21/21  2100 12/21/21  2224 12/22/21  0017 12/22/21  0529 12/23/21  0430 12/23/21  0944 12/24/21  0651   *   < >  --   --  132* 134*  --  134*   K 4.3   < >  --   --  5.2 5.0  --  4.3   CL 98   < >  --   --  100 102  --  100   CO2 19*   < >  --   --  13* 19*  --  24   GLUCOSE 292*   < >  --   --  340* 276*  --  119*   BUN 24*   < >  --   --  34* 37*  --  35*   CREATININE 1.38*   < >  --   --  1.69* 1.55*  --  1.46*   ANIONGAP 14   < >  --   --  19* 13  --  10   LABGLOM 50*   < >  --   --  40* 44*  --  47*   GFRAA >60   < >  --   --  48* 53*  --  57*   CALCIUM 8.7   < >  --   --  8.4* 8.9  --  8.9   PROBNP 5,403*  --   --   --  7,044*  --   --   --    TROPHS  --   --  313* 369* 355*  --   --   --    LACTACIDWB  --   --   --   --   --   --  2.4*  --     < > = values in this interval not displayed.      Recent Labs     12/22/21 2009 12/23/21  0630 12/23/21  1125 12/23/21  1551 12/23/21  2115 12/24/21  0816   POCGLU 285* 307* 222* 244* 256* 221*     ABG:No results found for: POCPH, PHART, PH, POCPCO2, KIE7EFB, PCO2, POCPO2, PO2ART, PO2, POCHCO3, HXU3RZQ, HCO3, NBEA, PBEA, BEART, BE, THGBART, THB, FYC9XYP, VEVS8NLP, W2KWDKLI, O2SAT, FIO2  Lab Results   Component Value Date/Time    SPECIAL NOT REPORTED 12/21/2021 10:29 PM     Lab Results   Component Value Date/Time    CULTURE NO GROWTH 2 DAYS 12/21/2021 10:29 PM       Radiology:  XR CHEST (2 VW)    Result Date: 12/20/2021  Patchy opacities in the right lung and mild septal thickening. Findings are suggestive of interstitial edema. Underlying inflammatory process or infection should also be considered in the appropriate clinical setting. XR CHEST PORTABLE    Result Date: 12/22/2021  Normal examination. Congestive failure evident on yesterday's exam has resolved. XR CHEST PORTABLE    Result Date: 12/21/2021  Interval worsening of cardiomegaly. Interval significant worsening of parahilar densities and increased interstitial marking.   Although finding may be suggestive of pulmonary edema, extensive viral pneumonia should also be placed in differential.       Physical Examination:        General appearance:  alert, cooperative and no distress, obese  gentleman sitting up in bed  Mental Status:  oriented to person, place and time and normal affect  HEENT: EOMI, PERRLA, sclera anti-icteric, simple cannula present  Lungs:  clear to auscultation bilaterally, occasional scattered rhonchi, no wheezing today, normal effort  Heart:  regular rate and rhythm, no murmur  Abdomen:  soft, nontender, nondistended, normal bowel sounds, no masses, hepatomegaly, splenomegaly  Extremities:  no edema, redness, tenderness in the calves  Skin:  no gross lesions, rashes, induration    Assessment:        Hospital Problems           Last Modified POA    * (Principal) Acute respiratory failure with hypoxia ([address] 12/22/2021 Yes    S/P TAVR (transcatheter aortic valve replacement) 12/22/2021 Yes    Influenza A 12/22/2021 Yes    NSTEMI (non-ST elevated myocardial infarction) ([address] 12/22/2021 Yes    BJ (acute kidney injury) ([address] 12/22/2021 Yes    Essential hypertension 12/22/2021 Yes    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 Acute respiratory failure with [id number]-[id number] negative, likely ACHF continue diuresis his kidney function has maintained. Recommended BiPAP the patient refused, dc order  2. Possible superimposed pneumonia on influenza A infection-continue Tamiflu. Continue Levaquin. 3. COPD exacerbation-no wheezing today, continue prednisone to complete course, continue antibiotics. 4. NSTEMI-cardiology following. Await 2D echo. Remains on heparin infusion  5. Acute on chronic systolic heart failure-continue diuresis  6. CKD stage IIIa  7. Normocytic normochromic anemia-hemoglobin stable. Adding on iron studies this morning. Likely anemia chronic disease   8. Severe aortic stenosis status post TAVR  9. Essential hypertension-no changes to blood pressure medications today, hold lisinopril for now,  10. Coronary artery disease status post stents-continue Plavix and statin therapy  11. Type 2 diabetes mellitus-start Lantus 10 units daily, continue sliding scale, discontinue glipizide due to BJ. 12. Disposition: Patient on 50% FiO2 on high flow nasal cannula, continue IV diuresis. Get  2D echocardiogram.  Spoke with cardiology. Okay for discharge if no changes.     Salvador Huizar,   12/24/2021  9:50 AM

## 2021-12-24 NOTE — CONSULTS
Patient - Aleatha Lundborg   MRN -  [id number]   Grady # - [de-identified]   - 1945        Date of evaluation -  2021    REASON FOR THE CONSULTATION:  Chief Complaint   Patient presents with    Shortness of Breath     started Monday, worsening this morning. HISTORY OF PRESENT ILLNESS:    Aleatha Lundborg is a 77y.o. year old male here for evaluation of sob which started 3 days before admission . He has influenza a pneumonia and is on high flow oxygen . On evaluation he is sitting in chair , has cough , sob with ex   No hemoptysis no purulent sputum .   His wheezing is better   Wants to go home           Immunization   Immunization History   Administered Date(s) Administered    COVID-19, Moderna, Primary or Immunocompromised, PF, 100mcg/0.5mL 2021, 2021, 10/26/2021    Influenza 2013    Influenza Virus Vaccine 10/25/2007, 10/11/2012, 10/07/2015, 09/10/2020    Influenza, High Dose (Fluzone 65 yrs and older) 2016, 10/06/2017, 2018, 10/12/2019    Influenza, MDCK Quadv, with preserv IM (Flucelvax 2 yrs and older) 2019    Influenza, Quadv, adjuvanted, 65 yrs +, IM, PF (Fluad) 2021    Pneumococcal Conjugate 13-valent (Woiuipn21) 2015    Pneumococcal Polysaccharide ([id number]) 2006, 2014    Td, unspecified formulation 2018    Tdap (Boostrix, Adacel) 2008    Zoster Live (Zostavax) 2013    Zoster Recombinant (Shingrix) 2019, 2019        Pneumococcal Vaccine     [] Up to date    [] Indicated   [] Refused  [] Contraindicated       Influenza Vaccine   [] Up to date    [] Indicated   [] Refused  [] Contraindicated            Pulmonary Rehab     [] completed    [] Indicated   [] Refused  [] Contraindicated       PAST MEDICAL HISTORY:       Diagnosis Date    Acne rosacea     treated with MetroGel    Anemia     Aortic stenosis     echo 3/18 Mod AS, Mild MR    BPH (benign prostatic hypertrophy)     CAD (coronary artery disease)     Stent x 3 DEVON 8/16    Carotid stenosis     Fairly minimal plaquing on ultrasound 5/14--in the conclusion described as less than 50% stenosis but in the body of the report described as minimal    Cataract of both eyes     CRI (chronic renal insufficiency)     Diabetes mellitus, type 2 (HCC)     1.) Proteinuria, 24 hour total urine protein 1,420 mg/24 hrs, 09/08, creatinine clearance 114, 09/08 2.) Repeat protein/creatinine ratio 2.97, 02/12 3. )24 hr urine collection 1958 mg/24 hrs, 04/12. 4.) Repeat protein/creatinine ratio 1.99, 08/12 a.) Repeat protein/creatinine ratio 2.14, 07/13 5.) Renal u/s ok 02/12  6) retinopathy at [address] 12/16  early mod. nonprolifferative  macular involved    Dyslipidemia     Erectile dysfunction     H/O agent Orange exposure     per patient in the [employer] along with exposure to cresote and naphtha    Hearing loss     Hyperlipidemia     Hypertension     Mild nonproliferative diabetic retinopathy ([address]     Non-rheumatic mitral regurgitation 06/27/2016    RBBB     Status post transcatheter aortic valve replacement (TAVR) using bioprosthesis 01/21/2020    Wears dentures     upper         Family History:       Problem Relation Age of Onset    Heart Attack Father     Coronary Art Dis Sister     Hypertension Mother     Lung Cancer Mother     Stroke Mother     Coronary Art Dis Brother         later in life       SURGICAL HISTORY:   Past Surgical History:   Procedure Laterality Date    AORTIC VALVE SURGERY  01/2020    CARDIAC CATHETERIZATION  12/03/2019    CATARACT REMOVAL Bilateral     CIRCUMCISION  age 49   Brown EYE SURGERY Bilateral     cataract    TYMPANOSTOMY TUBE PLACEMENT      VASECTOMY             SOCIAL AND OCCUPATIONAL HEALTH:      TOBACCO:   reports that he quit smoking about 52 years ago. He has a 1.00 pack-year smoking history. He has never used smokeless tobacco.  ETOH:   reports current alcohol use.           ALLERGIES: Allergies   Allergen Reactions    Atorvastatin Other (See Comments)     Muscle pain (myalgias)    Sulfa Antibiotics Swelling     Swelling of retinas    Sulfanilamide Other (See Comments)       Home Meds:   Prior to Admission medications    Medication Sig Start Date End Date Taking?  Authorizing Provider   oseltamivir (TAMIFLU) 30 MG capsule Take 1 capsule by mouth 2 times daily for 5 days 12/20/21 12/25/21 Yes ESTHER Castillo CNP   levoFLOXacin (LEVAQUIN) 500 MG tablet Take 1 tablet by mouth daily for 7 days 12/20/21 12/27/21 Yes ESTHER Castillo CNP   lisinopril (PRINIVIL;ZESTRIL) 20 MG tablet Take 10 mg by mouth daily   Yes Historical Provider, MD   furosemide (LASIX) 40 MG tablet Take 0.5 tablets by mouth daily Once daily on Monday, Wednesday and Friday 9/30/21  Yes ESTHER Fregoso CNP   amLODIPine (NORVASC) 10 MG tablet Take 1 tablet by mouth daily 1/23/20  Yes Dora Echols MD   clopidogrel (PLAVIX) 75 MG tablet Take 1 tablet by mouth daily Take 1 daily 12/6/19  Yes ESTHER Reardon CNP   simvastatin (ZOCOR) 40 MG tablet Take 10 mg by mouth nightly    Yes Historical Provider, MD   glipiZIDE (GLUCOTROL) 10 MG tablet Take 10 mg by mouth 2 times daily (before meals)   Yes Historical Provider, MD   vitamin D (CHOLECALCIFEROL) 1000 UNIT TABS tablet Take 1,000 Units by mouth 2 times daily    Yes Historical Provider, MD   metFORMIN (GLUCOPHAGE) 1000 MG tablet Take 1 tablet by mouth 2 times daily (with meals) 8/25/16  Yes ESTHER Vega CNP   Handicap Placard [address] [facility] by Does not apply route Duration 2 years 6/2/20   ESTHER Fregoso CNP     CURRENT MEDS :  Scheduled Meds:   insulin glargine  10 Units SubCUTAneous Daily    insulin lispro  0-18 Units SubCUTAneous TID WC    insulin lispro  0-9 Units SubCUTAneous Nightly    furosemide  40 mg IntraVENous BID    diphenhydrAMINE  25 mg IntraVENous Once    Vitamin D  1,000 Units Oral BID    atorvastatin  20 mg Oral Daily    clopidogrel  75 mg Oral Daily    amLODIPine  10 mg Oral Daily    [Held by provider] lisinopril  10 mg Oral Daily    oseltamivir  30 mg Oral BID    sodium chloride flush  5-40 mL IntraVENous 2 times per day    levofloxacin  750 mg IntraVENous Q48H    predniSONE  20 mg Oral Daily       Continuous Infusions:   sodium chloride      heparin (PORCINE) Infusion 20 Units/kg/hr (12/23/21 2650)    dextrose             REVIEW OF SYSTEMS:  CONSTITUTIONAL:  negative for  fevers, chills, sweats, fatigue, malaise, anorexia and weight loss  EYES:  negative for  double vision, blurred vision, dry eyes, eye discharge and redness  HEENT:  negative for  hearing loss, tinnitus, ear drainage, earaches and nasal congestion  RESPIRATORY:  See hpi  CARDIOVASCULAR:  negative for  chest pain, palpitations, orthopnea, PND  GASTROINTESTINAL:  negative for nausea, vomiting, change in bowel habits, diarrhea, constipation, abdominal pain, pruritus, abdominal mass and abdominal distention  GENITOURINARY:  negative for frequency, dysuria, nocturia, urinary incontinence and hesitancy  INTEGUMENT/BREAST:  negative for rash, skin lesion(s), dryness, skin color change, changes in lesion, pruritus and changes in hair  HEMATOLOGIC/LYMPHATIC:  negative for easy bruising, bleeding, lymphadenopathy, petechiae and swelling/edema  ALLERGIC/IMMUNOLOGIC:  negative for recurrent infections, urticaria and drug reactions  ENDOCRINE:  negative for heat intolerance, cold intolerance, tremor, weight changes and change in bowel habits  MUSCULOSKELETAL:  negative for  myalgias, arthralgias, pain, joint swelling, stiff joints and decreased range of motion  NEUROLOGICAL:  negative for headaches, dizziness, seizures, memory problems, speech problems, visual disturbance and coordination problems  BEHAVIOR/PSYCH:  negative for poor appetite, increased appetite, decreased sleep, increased sleep, decreased energy level, increased energy level and poor concentration   skin - no rash no dermatitis     Vitals:  /62   Pulse 82   Temp 98.3 °F (36.8 °C) (Oral)   Resp 18   Ht 5' 6\" (1.676 m)   Wt 165 lb 8 oz (75.1 kg)   SpO2 98%   BMI 26.71 kg/m²     PHYSICAL EXAM:  General Appearance:    Alert, cooperative, no distress, appears stated age   Head:    Normocephalic, without obvious abnormality, atraumatic      Eyes:    PERRL, conjunctiva/corneas clear, EOM's intact   Ears:    Normal TM's and external ear canals, both ears   Nose:   Nares normal, septum midline, mucosa normal, no drainage        or sinus tenderness   Throat:   Lips, mucosa, and tongue normal; teeth and gums normal   Neck:   Supple, symmetrical, trachea midline, no adenopathy;     thyroid:  no enlargement/tenderness/nodules;    Back:     Symmetric, no curvature, ROM normal, no CVA tenderness   Lungs:       Vent all lobes   Dec bases   Rales b/l   Chest Wall:    No tenderness or deformity      Heart:    Regular rate and rhythm, S1 and S2 normal, no murmur, rub        or gallop no rvh                           Abdomen:                                                 Pulses:                              Skin:                  Lymph nodes:                    Neurologic:                  Soft, non-tender, bowel sounds active all four quadrants,     no masses, no organomegaly         2+ and symmetric all extremities     Skin color, texture, turgor normal, no rashes or lesions       Cervical, supraclavicular not enlarged or matted or tender      CNII-XII intact, normal strength 5/5 .   Sensation grossly normal  and reflexes normal 2+  throughout     Clubbing No  Lower ext edema Yes1+   [] , 2 +  [] , 3+   []  Upper ext edema No       Musculoskeletal - no joint swelling or tenderness or synovitis          CBC:   Recent Labs     12/22/21  0647 12/23/21  0430 12/24/21  0651   WBC 6.3 15.2* 10.9   HGB 10.8* 11.2* 11.7*    299 303     BMP:    Recent Labs     12/22/21  0529 12/23/21  0430 12/24/21  0651   * 134* 134*   K 5.2 5.0 4.3    102 100   CO2 13* 19* 24   BUN 34* 37* 35*   CREATININE 1.69* 1.55* 1.46*   GLUCOSE 340* 276* 119*     Hepatic: No results for input(s): AST, ALT, ALB, BILITOT, ALKPHOS in the last 72 hours. Amylase: No results found for: AMYLASE  Lipase: No results found for: LIPASE  Troponin: No results for input(s): TROPONINI in the last 72 hours. BNP: No results for input(s): BNP in the last 72 hours. Lipids: No results for input(s): CHOL, HDL in the last 72 hours. Invalid input(s): LDLCALCU  ABGs: No results found for: PHART, PO2ART, [id number]  INR:   Recent Labs     12/23/21  0430   INR 1.0     Thyroid:   Lab Results   Component Value Date    TSH 1.54 07/31/2021     Urinalysis: No results for input(s): BACTERIA, BLOODU, CLARITYU, COLORU, PHUR, PROTEINU, RBCUA, SPECGRAV, BILIRUBINUR, NITRU, WBCUA, LEUKOCYTESUR, GLUCOSEU in the last 72 hours. XR CHEST (SINGLE VIEW FRONTAL)    Result Date: 12/23/2021  Trace effusions with scattered infiltrates. XR CHEST (2 VW)    Result Date: 12/20/2021  Patchy opacities in the right lung and mild septal thickening. Findings are suggestive of interstitial edema. Underlying inflammatory process or infection should also be considered in the appropriate clinical setting. XR CHEST PORTABLE    Result Date: 12/22/2021  Normal examination. Congestive failure evident on yesterday's exam has resolved. XR CHEST PORTABLE    Result Date: 12/21/2021  Interval worsening of cardiomegaly. Interval significant worsening of parahilar densities and increased interstitial marking.   Although finding may be suggestive of pulmonary edema, extensive viral pneumonia should also be placed in differential.                  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 *       :                PLAN:        Continue tamiflu  Levoquin for 7 days   Prednisone for 5 days   Albuterol as needed   Wean high flow keep sats >92 %     I hope this updates you on my evaluation and clinical thinking. Thank you for allowing me to participate in his care.      Sincerely,      Electronically signed by Doreen Kingsley MD on 12/24/2021 at 1:22 PM

## 2021-12-25 LAB
ALBUMIN SERPL-MCNC: 3.3 G/DL (ref 3.5–5.2)
ANION GAP SERPL CALCULATED.3IONS-SCNC: 11 MMOL/L (ref 9–17)
BUN BLDV-MCNC: 38 MG/DL (ref 8–23)
BUN/CREAT BLD: ABNORMAL (ref 9–20)
CALCIUM SERPL-MCNC: 8.5 MG/DL (ref 8.6–10.4)
CHLORIDE BLD-SCNC: 93 MMOL/L (ref 98–107)
CO2: 28 MMOL/L (ref 20–31)
CREAT SERPL-MCNC: 1.41 MG/DL (ref 0.7–1.2)
CREATININE URINE: 28 MG/DL (ref 39–259)
GFR AFRICAN AMERICAN: 59 ML/MIN
GFR NON-AFRICAN AMERICAN: 49 ML/MIN
GFR SERPL CREATININE-BSD FRML MDRD: ABNORMAL ML/MIN/{1.73_M2}
GFR SERPL CREATININE-BSD FRML MDRD: ABNORMAL ML/MIN/{1.73_M2}
GLUCOSE BLD-MCNC: 137 MG/DL (ref 70–99)
GLUCOSE BLD-MCNC: 161 MG/DL (ref 75–110)
GLUCOSE BLD-MCNC: 243 MG/DL (ref 75–110)
GLUCOSE BLD-MCNC: 267 MG/DL (ref 75–110)
GLUCOSE BLD-MCNC: 288 MG/DL (ref 75–110)
HCT VFR BLD CALC: 35.8 % (ref 40.7–50.3)
HEMOGLOBIN: 12.1 G/DL (ref 13–17)
MAGNESIUM: 2 MG/DL (ref 1.6–2.6)
MCH RBC QN AUTO: 26.4 PG (ref 25.2–33.5)
MCHC RBC AUTO-ENTMCNC: 33.8 G/DL (ref 28.4–34.8)
MCV RBC AUTO: 78.2 FL (ref 82.6–102.9)
NRBC AUTOMATED: 0 PER 100 WBC
PARTIAL THROMBOPLASTIN TIME: 46 SEC (ref 20.5–30.5)
PARTIAL THROMBOPLASTIN TIME: 61.1 SEC (ref 20.5–30.5)
PARTIAL THROMBOPLASTIN TIME: 71.3 SEC (ref 20.5–30.5)
PDW BLD-RTO: 13.5 % (ref 11.8–14.4)
PHOSPHORUS: 4.2 MG/DL (ref 2.5–4.5)
PLATELET # BLD: 297 K/UL (ref 138–453)
PMV BLD AUTO: 10.6 FL (ref 8.1–13.5)
POTASSIUM SERPL-SCNC: 4.4 MMOL/L (ref 3.7–5.3)
RBC # BLD: 4.58 M/UL (ref 4.21–5.77)
SODIUM BLD-SCNC: 132 MMOL/L (ref 135–144)
TOTAL PROTEIN, URINE: 56 MG/DL
WBC # BLD: 10.3 K/UL (ref 3.5–11.3)

## 2021-12-25 PROCEDURE — 6360000002 HC RX W HCPCS: Performed by: INTERNAL MEDICINE

## 2021-12-25 PROCEDURE — 80069 RENAL FUNCTION PANEL: CPT

## 2021-12-25 PROCEDURE — 83735 ASSAY OF MAGNESIUM: CPT

## 2021-12-25 PROCEDURE — 82947 ASSAY GLUCOSE BLOOD QUANT: CPT

## 2021-12-25 PROCEDURE — 84156 ASSAY OF PROTEIN URINE: CPT

## 2021-12-25 PROCEDURE — 2060000000 HC ICU INTERMEDIATE R&B

## 2021-12-25 PROCEDURE — 82570 ASSAY OF URINE CREATININE: CPT

## 2021-12-25 PROCEDURE — 36415 COLL VENOUS BLD VENIPUNCTURE: CPT

## 2021-12-25 PROCEDURE — 85730 THROMBOPLASTIN TIME PARTIAL: CPT

## 2021-12-25 PROCEDURE — 99232 SBSQ HOSP IP/OBS MODERATE 35: CPT | Performed by: INTERNAL MEDICINE

## 2021-12-25 PROCEDURE — 2700000000 HC OXYGEN THERAPY PER DAY

## 2021-12-25 PROCEDURE — 99233 SBSQ HOSP IP/OBS HIGH 50: CPT | Performed by: INTERNAL MEDICINE

## 2021-12-25 PROCEDURE — 6360000002 HC RX W HCPCS: Performed by: NURSE PRACTITIONER

## 2021-12-25 PROCEDURE — 2580000003 HC RX 258: Performed by: NURSE PRACTITIONER

## 2021-12-25 PROCEDURE — 85027 COMPLETE CBC AUTOMATED: CPT

## 2021-12-25 PROCEDURE — 99222 1ST HOSP IP/OBS MODERATE 55: CPT | Performed by: INTERNAL MEDICINE

## 2021-12-25 PROCEDURE — 6360000002 HC RX W HCPCS: Performed by: STUDENT IN AN ORGANIZED HEALTH CARE EDUCATION/TRAINING PROGRAM

## 2021-12-25 PROCEDURE — 6370000000 HC RX 637 (ALT 250 FOR IP): Performed by: NURSE PRACTITIONER

## 2021-12-25 PROCEDURE — 6370000000 HC RX 637 (ALT 250 FOR IP): Performed by: INTERNAL MEDICINE

## 2021-12-25 RX ORDER — MAGNESIUM SULFATE 1 G/100ML
1000 INJECTION INTRAVENOUS ONCE
Status: COMPLETED | OUTPATIENT
Start: 2021-12-25 | End: 2021-12-26

## 2021-12-25 RX ADMIN — AMLODIPINE BESYLATE 10 MG: 10 TABLET ORAL at 08:40

## 2021-12-25 RX ADMIN — CLOPIDOGREL 75 MG: 75 TABLET, FILM COATED ORAL at 08:40

## 2021-12-25 RX ADMIN — Medication 22 UNITS/KG/HR: at 02:54

## 2021-12-25 RX ADMIN — INSULIN LISPRO 6 UNITS: 100 INJECTION, SOLUTION INTRAVENOUS; SUBCUTANEOUS at 11:42

## 2021-12-25 RX ADMIN — HEPARIN SODIUM 2000 UNITS: 1000 INJECTION INTRAVENOUS; SUBCUTANEOUS at 18:28

## 2021-12-25 RX ADMIN — INSULIN GLARGINE 10 UNITS: 100 INJECTION, SOLUTION SUBCUTANEOUS at 08:41

## 2021-12-25 RX ADMIN — INSULIN LISPRO 3 UNITS: 100 INJECTION, SOLUTION INTRAVENOUS; SUBCUTANEOUS at 08:47

## 2021-12-25 RX ADMIN — FUROSEMIDE 40 MG: 10 INJECTION, SOLUTION INTRAVENOUS at 08:40

## 2021-12-25 RX ADMIN — INSULIN LISPRO 5 UNITS: 100 INJECTION, SOLUTION INTRAVENOUS; SUBCUTANEOUS at 21:10

## 2021-12-25 RX ADMIN — Medication 1000 UNITS: at 21:01

## 2021-12-25 RX ADMIN — SODIUM CHLORIDE, PRESERVATIVE FREE 10 ML: 5 INJECTION INTRAVENOUS at 08:40

## 2021-12-25 RX ADMIN — Medication 22 UNITS/KG/HR: at 18:28

## 2021-12-25 RX ADMIN — OSELTAMIVIR PHOSPHATE 30 MG: 30 CAPSULE ORAL at 21:01

## 2021-12-25 RX ADMIN — Medication 1000 UNITS: at 08:40

## 2021-12-25 RX ADMIN — MAGNESIUM SULFATE HEPTAHYDRATE 1000 MG: 1 INJECTION, SOLUTION INTRAVENOUS at 23:43

## 2021-12-25 RX ADMIN — Medication 22 UNITS/KG/HR: at 20:30

## 2021-12-25 RX ADMIN — ATORVASTATIN CALCIUM 20 MG: 20 TABLET, FILM COATED ORAL at 08:40

## 2021-12-25 RX ADMIN — FUROSEMIDE 40 MG: 10 INJECTION, SOLUTION INTRAVENOUS at 17:01

## 2021-12-25 RX ADMIN — SODIUM CHLORIDE, PRESERVATIVE FREE 5 ML: 5 INJECTION INTRAVENOUS at 21:01

## 2021-12-25 RX ADMIN — PREDNISONE 20 MG: 20 TABLET ORAL at 08:40

## 2021-12-25 RX ADMIN — OSELTAMIVIR PHOSPHATE 30 MG: 30 CAPSULE ORAL at 08:40

## 2021-12-25 RX ADMIN — INSULIN LISPRO 9 UNITS: 100 INJECTION, SOLUTION INTRAVENOUS; SUBCUTANEOUS at 16:35

## 2021-12-25 ASSESSMENT — ENCOUNTER SYMPTOMS
GASTROINTESTINAL NEGATIVE: 1
SHORTNESS OF BREATH: 1
WHEEZING: 0
COUGH: 1

## 2021-12-25 NOTE — PLAN OF CARE
Problem: Falls - Risk of:  Goal: Will remain free from falls  Description: Will remain free from falls  12/25/2021 0427 by Mio Sotelo RN  Outcome: Ongoing     Problem: Falls - Risk of:  Goal: Absence of physical injury  Description: Absence of physical injury  12/25/2021 0427 by Mio Sotelo RN  Outcome: Ongoing     Problem: ABCDS Injury Assessment  Goal: Absence of physical injury  12/25/2021 0427 by Mio Sotelo RN  Outcome: Ongoing     Problem: OXYGENATION/RESPIRATORY FUNCTION  Goal: Patient will maintain patent airway  12/25/2021 0427 by Mio Sotelo RN  Outcome: Ongoing     Problem: OXYGENATION/RESPIRATORY FUNCTION  Goal: Patient will achieve/maintain normal respiratory rate/effort  Description: Respiratory rate and effort will be within normal limits for the patient  12/25/2021 0427 by Mio Sotelo RN  Outcome: Ongoing     Problem: HEMODYNAMIC STATUS  Goal: Patient has stable vital signs and fluid balance  12/25/2021 0427 by Mio Sotelo RN  Outcome: Ongoing     Problem: FLUID AND ELECTROLYTE IMBALANCE  Goal: Fluid and electrolyte balance are achieved/maintained  12/25/2021 0427 by Mio Sotelo RN  Outcome: Ongoing     Problem: ACTIVITY INTOLERANCE/IMPAIRED MOBILITY  Goal: Mobility/activity is maintained at optimum level for patient  12/25/2021 0427 by Mio Sotelo RN  Outcome: Ongoing

## 2021-12-25 NOTE — CONSULTS
NEPHROLOGY     REASON FOR CONSULT:     Renal clearance prior to coronary angiogram in a patient with chronic kidney disease stage III with baseline creatinine 1.3-1.5      HISTORY OF PRESENTING ILLNESS                 This is a 77 y.o. male who presents to [address] [place] [address] emergency department for evaluation of shortness of breath on 22 December. He has been symptomatic 2 to 3 days prior to presentation. Reports cough. No history of any orthopnea or proximal nocturnal dyspnea. Reports no hemoptysis. Does complain of wheezing. No fever or GI symptoms. Initial assessment showed hypoxic gentleman in respiratory distress and needed BiPAP. Chest x-ray suggestive of bilateral infiltrate infectious versus pulmonary edema. He was transferred here for further management. Pulmonology consultation was asked for. Diagnosis of influenza pneumonia has been entertained along with decompensated CHF. Echo done showed ejection fraction of 25% with left ventricular diastolic dysfunction. We have been consulted for renal insufficiency and clearance for coronary angiogram being contemplated by cardiologist.    The individual states that he follows up with a nephrologist.  He has got biopsy-proven diabetic nephropathy with baseline creatinine 1.3-1.5 based upon the available data. He follows him every year. He also has nephrotic range proteinuria. He is on ACE inhibitor therapy.   Urine output decent  No history of contrast exposure  No history of hypotension  No history of NSAIDnephrotoxic agents  No history suggestive of obstruction  No history of nausea/vomiting/diarrhoea  No history of BJ in past  No history of recurrent UTI infection/surgeries to KUB          PAST MEDICAL HISTORY         Diagnosis Date    Acne rosacea     treated with MetroGel    Anemia     Aortic stenosis     echo 3/18 Mod AS, Mild MR    BPH (benign prostatic hypertrophy)     CAD (coronary artery disease)     Stent x 3 DEVON 8/16    Carotid stenosis     Fairly minimal plaquing on ultrasound 5/14--in the conclusion described as less than 50% stenosis but in the body of the report described as minimal    Cataract of both eyes     CRI (chronic renal insufficiency)     Diabetes mellitus, type 2 (HCC)     1.) Proteinuria, 24 hour total urine protein 1,420 mg/24 hrs, 09/08, creatinine clearance 114, 09/08 2.) Repeat protein/creatinine ratio 2.97, 02/12 3. )24 hr urine collection 1958 mg/24 hrs, 04/12. 4.) Repeat protein/creatinine ratio 1.99, 08/12 a.) Repeat protein/creatinine ratio 2.14, 07/13 5.) Renal u/s ok 02/12  6) retinopathy at [address] 12/16  early mod. nonprolifferative  macular involved    Dyslipidemia     Erectile dysfunction     H/O agent Orange exposure     per patient in the [employer] along with exposure to cresote and naphtha    Hearing loss     Hyperlipidemia     Hypertension     Mild nonproliferative diabetic retinopathy ([address]     Non-rheumatic mitral regurgitation 06/27/2016    RBBB     Status post transcatheter aortic valve replacement (TAVR) using bioprosthesis 01/21/2020    Wears dentures     upper       PAST SURGICAL HISTORY          Procedure Laterality Date    AORTIC VALVE SURGERY  01/2020    CARDIAC CATHETERIZATION  12/03/2019    CATARACT REMOVAL Bilateral     CIRCUMCISION  age 49   [facility] EYE SURGERY Bilateral     cataract    TYMPANOSTOMY TUBE PLACEMENT      VASECTOMY         MEDICATIONS     Home Meds:                Medications Prior to Admission: oseltamivir (TAMIFLU) 30 MG capsule, Take 1 capsule by mouth 2 times daily for 5 days  levoFLOXacin (LEVAQUIN) 500 MG tablet, Take 1 tablet by mouth daily for 7 days  lisinopril (PRINIVIL;ZESTRIL) 20 MG tablet, Take 10 mg by mouth daily  furosemide (LASIX) 40 MG tablet, Take 0.5 tablets by mouth daily Once daily on Monday, Wednesday and Friday  amLODIPine (NORVASC) 10 MG tablet, Take 1 tablet by mouth daily  clopidogrel (PLAVIX) 75 MG tablet, Take 1 tablet by mouth daily Take 1 daily  simvastatin (ZOCOR) 40 MG tablet, Take 10 mg by mouth nightly   glipiZIDE (GLUCOTROL) 10 MG tablet, Take 10 mg by mouth 2 times daily (before meals)  vitamin D (CHOLECALCIFEROL) 1000 UNIT TABS tablet, Take 1,000 Units by mouth 2 times daily   metFORMIN (GLUCOPHAGE) 1000 MG tablet, Take 1 tablet by mouth 2 times daily (with meals)  Handicap Placard MISC, by Does not apply route Duration 2 years  Scheduled Meds:    insulin glargine  10 Units SubCUTAneous Daily    insulin lispro  0-18 Units SubCUTAneous TID WC    insulin lispro  0-9 Units SubCUTAneous Nightly    furosemide  40 mg IntraVENous BID    diphenhydrAMINE  25 mg IntraVENous Once    Vitamin D  1,000 Units Oral BID    atorvastatin  20 mg Oral Daily    clopidogrel  75 mg Oral Daily    amLODIPine  10 mg Oral Daily    [Held by provider] lisinopril  10 mg Oral Daily    oseltamivir  30 mg Oral BID    sodium chloride flush  5-40 mL IntraVENous 2 times per day    levofloxacin  750 mg IntraVENous Q48H    predniSONE  20 mg Oral Daily     Continuous Infusions:    sodium chloride      heparin (PORCINE) Infusion 20 Units/kg/hr (21 0914)    dextrose       PRN Meds:  albuterol, sodium chloride flush, sodium chloride, acetaminophen **OR** acetaminophen, heparin (porcine), heparin (porcine), glucose, dextrose, glucagon (rDNA), dextrose    ALLERGY     Atorvastatin, Sulfa antibiotics, and Sulfanilamide       SOCIAL HISTORY     Social History     Socioeconomic History    Marital status:      Spouse name: Not on file    Number of children: Not on file    Years of education: Not on file    Highest education level: Not on file   Occupational History    Occupation: soe   Tobacco Use    Smoking status: Former Smoker     Packs/day: 0.50     Years: 2.00     Pack years: 1.00     Quit date: 1970     Years since quittin.0    Smokeless tobacco: Never Used   Vaping Use    Vaping Use: Never used   Substance and Sexual Activity    Alcohol use: Yes     Alcohol/week: 0.0 standard drinks     Comment: RARELY    Drug use: No    Sexual activity: Not on file   Other Topics Concern    Not on file   Social History Narrative    Not on file     Social Determinants of Health     Financial Resource Strain:     Difficulty of Paying Living Expenses: Not on file   Food Insecurity:     Worried About Running Out of Food in the Last Year: Not on file    Gema of Food in the Last Year: Not on file   Transportation Needs:     Lack of Transportation (Medical): Not on file    Lack of Transportation (Non-Medical):  Not on file   Physical Activity:     Days of Exercise per Week: Not on file    Minutes of Exercise per Session: Not on file   Stress:     Feeling of Stress : Not on file   Social Connections:     Frequency of Communication with Friends and Family: Not on file    Frequency of Social Gatherings with Friends and Family: Not on file    Attends [employer] Services: Not on file    Active Member of [address] [place] [address] or Organizations: Not on file    Attends Club or Organization Meetings: Not on file    Marital Status: Not on file   Intimate Partner Violence:     Fear of Current or Ex-Partner: Not on file    Emotionally Abused: Not on file    Physically Abused: Not on file    Sexually Abused: Not on file   Housing Stability:     Unable to Pay for Housing in the Last Year: Not on file    Number of [place] in the Last Year: Not on file    Unstable Housing in the Last Year: Not on file       FAMILY HISTORY     Family History   Problem Relation Age of Onset    Heart Attack Father     Coronary Art Dis Sister     Hypertension Mother     Lung Cancer Mother     Stroke Mother     Coronary Art Dis Brother         later in life          REVIEW OF SYSTEM     Constitutional: No asthenia/weight loss/anorexia    HEENT : No epistaxis/visual blurriness/rhinorrhoea/sorethroat/trauma  Cardiovascular:No chest pain/palpitation/present SOB  Respiratory: No cough/fever/present SOB/Wheezing    Gastrointestinal: No abdominal pain/nausea/vomiting/diarrhoea/constipation  Genitourinary: No dysuria/pyuria/hematuria/incomplete emptying of bladder  Musculoskeletal:  No gait disturbance/weakness or joint complaints  Integumentary: No rash or pruritis. Neurological: No headache/diplopia/change in muscle strength/numbness or tingling. No change in gait, balance, coordination, mood, affect, memory, mentation, behavior. Psychiatric: No anxiety/depression. Endocrine: No temperature intolerance. No excessive thirst, fluid intake, or urination. No tremor. Hematologic/Lymphatic: No abnormal bruising or bleeding, blood clots or swolle lymph nodes. Allergic/Immunologic: No nasal congestion or hives      PHYSICAL EXAM     Vitals:    12/25/21 0839 12/25/21 1125 12/25/21 1130 12/25/21 1507   BP: (!) 145/78  122/88 122/80   Pulse: 84  84 86   Resp: 20 23 24 19   Temp: 98.1 °F (36.7 °C)  98.6 °F (37 °C) 97.7 °F (36.5 °C)   TempSrc: Oral  Oral Oral   SpO2: 94% 92% 94% 92%   Weight:       Height:         24HR INTAKE/OUTPUT:      Intake/Output Summary (Last 24 hours) at 12/25/2021 1526  Last data filed at 12/25/2021 0411  Gross per 24 hour   Intake 1000 ml   Output 1150 ml   Net -150 ml       General appearance:Awake, alert, in no acute distress  Skin: warm and dry, no rash or erythema  Eyes: conjunctivae normal and sclera anicteric  ENT: no thrush no pharyngeal congestion  orodental hygiene   Neck: No JVD, Lymphadenopathty or thyromegaly  Respiratory: vesicular breath sounds,  Cardiovascular: S1 S2 normal,no gallop or organic murmur. No carotid bruit  Abdomen:Non tender/non distended. Bowel sounds present  Extremities: No Cyanosis or Clubbing,Lower extremity edema  Neurological:Alert and oriented. No abnormalities of mood, affect, memory, mentation, or behavior are noted    INVESTIGATIONS      CBC:   Recent Labs     12/23/21  0430 12/24/21  0651 12/25/21  0305   WBC 15.2* 10.9 10.3   RBC 4.41 4.41 4.58   HGB 11.2* 11.7* 12.1*   HCT 35.3* 34.9* 35.8*   MCV 80.0* 79.1* 78.2*   MCH 25.4 26.5 26.4   MCHC 31.7 33.5 33.8   RDW 13.6 13.6 13.5    303 297   MPV 11.0 11.0 10.6      BMP:   Recent Labs     12/23/21  0430 12/24/21  0651 12/25/21  0305   * 134* 132*   K 5.0 4.3 4.4    100 93*   CO2 19* 24 28   BUN 37* 35* 38*   CREATININE 1.55* 1.46* 1.41*   GLUCOSE 276* 119* 137*   CALCIUM 8.9 8.9 8.5*        Phosphorus:    Recent Labs     12/25/21  0305   PHOS 4.2     Magnesium:   Recent Labs     12/25/21  0305   MG 2.0     Albumin:   Recent Labs     12/25/21  0305   LABALBU 3.3*       Radiology:  Reviewed as available. ASSESSMENT     #1 chronic kidney disease stage IIIa secondary to diabetic nephrosclerosis biopsy-proven with baseline creatinine 1.3-1.6. Follows up with Michael. Also has nephrotic range proteinuria from diabetes  #2 admitted with shortness of breath secondary to influenza pneumonia/decompensated systolic congestive heart failure-improving  #3 ischemic cardiomyopathy ejection fraction 25% status post stent placement in the past/left ventricular diastolic dysfunction/status post bioprosthetic aortic valve TAVR  #4 longstanding type 2 diabetes  #5 essential hypertension       PLAN:     #1 discussed with him the risk of PARAG including dialysis and the worst of scenario. Verbalized understanding and willing to proceed. Inform nephrology of the schedule of coronary angiogram showed appropriate PARAG preventive measures can be initiated  #2 continue IV Lasix for today and recommend to transition to p.o. from tomorrow  #3 okay to resume ACE inhibitor therapy  #4 check UPC    Thank you for the consultation. Please do not hesitate to call with questions.     This note is created with the assistance of a speech-recognition program. While intending to generate a document that actually reflects the content of the visit, no guarantees can be provided that every mistake has been identified and corrected by editing.     Jackson Lynne MD MD, MRCP Arden Watters, FACP   12/25/2021 3:26 PM    [facility]

## 2021-12-25 NOTE — PROGRESS NOTES
Patient - Nisha Bolden   MRN -  [id number]   [facility] [[facility]   - 1945        Date of evaluation -  2021    REASON FOR THE CONSULTATION:  Chief Complaint   Patient presents with    Shortness of Breath     started Monday, worsening this morning. HISTORY OF PRESENT ILLNESS:    Nisha Bolden is a 77y.o. year old male here for evaluation of sob which started 3 days before admission . He has influenza a pneumonia and is on high flow oxygen . On evaluation he is sitting in chair , has cough , sob with ex   No hemoptysis no purulent sputum .   His wheezing is better   Wants to go home         2021   On high flow fio2 30 % 20 l   Sitting in chair   Sob with ex   Echo noted   Immunization   Immunization History   Administered Date(s) Administered    COVID-19, Moderna, Primary or Immunocompromised, PF, 100mcg/0.5mL 2021, 2021, 10/26/2021    Influenza 2013    Influenza Virus Vaccine 10/25/2007, 10/11/2012, 10/07/2015, 09/10/2020    Influenza, High Dose (Fluzone 65 yrs and older) 2016, 10/06/2017, 2018, 10/12/2019    Influenza, MDCK Quadv, with preserv IM (Flucelvax 2 yrs and older) 2019    Influenza, Quadv, adjuvanted, 65 yrs +, IM, PF (Fluad) 2021    Pneumococcal Conjugate 13-valent ([id number]) 2015    Pneumococcal Polysaccharide ([id number]) 2006, 2014    Td, unspecified formulation 2018    Tdap (Boostrix, Adacel) 2008    Zoster Live (Zostavax) 2013    Zoster Recombinant (Shingrix) 2019, 2019        Pneumococcal Vaccine     [] Up to date    [] Indicated   [] Refused  [] Contraindicated       Influenza Vaccine   [] Up to date    [] Indicated   [] Refused  [] Contraindicated            Pulmonary Rehab     [] completed    [] Indicated   [] Refused  [] Contraindicated       PAST MEDICAL HISTORY:       Diagnosis Date    Acne rosacea     treated with MetroGel    Anemia     Aortic stenosis     echo 3/18 Mod AS, Mild MR    BPH (benign prostatic hypertrophy)     CAD (coronary artery disease)     Stent x 3 DEVON 8/16    Carotid stenosis     Fairly minimal plaquing on ultrasound 5/14--in the conclusion described as less than 50% stenosis but in the body of the report described as minimal    Cataract of both eyes     CRI (chronic renal insufficiency)     Diabetes mellitus, type 2 (HCC)     1.) Proteinuria, 24 hour total urine protein 1,420 mg/24 hrs, 09/08, creatinine clearance 114, 09/08 2.) Repeat protein/creatinine ratio 2.97, 02/12 3. )24 hr urine collection 1958 mg/24 hrs, 04/12. 4.) Repeat protein/creatinine ratio 1.99, 08/12 a.) Repeat protein/creatinine ratio 2.14, 07/13 5.) Renal u/s ok 02/12  6) retinopathy at [address] 12/16  early mod. nonprolifferative  macular involved    Dyslipidemia     Erectile dysfunction     H/O agent Orange exposure     per patient in the [facility] along with exposure to cresote and naphtha    Hearing loss     Hyperlipidemia     Hypertension     Mild nonproliferative diabetic retinopathy ([address]     Non-rheumatic mitral regurgitation 06/27/2016    RBBB     Status post transcatheter aortic valve replacement (TAVR) using bioprosthesis 01/21/2020    Wears dentures     upper         Family History:       Problem Relation Age of Onset    Heart Attack Father     Coronary Art Dis Sister     Hypertension Mother     Lung Cancer Mother     Stroke Mother     Coronary Art Dis Brother         later in life       SURGICAL HISTORY:   Past Surgical History:   Procedure Laterality Date    AORTIC VALVE SURGERY  01/2020    CARDIAC CATHETERIZATION  12/03/2019    CATARACT REMOVAL Bilateral     CIRCUMCISION  age 49   [facility] EYE SURGERY Bilateral     cataract    TYMPANOSTOMY TUBE PLACEMENT      VASECTOMY             SOCIAL AND OCCUPATIONAL HEALTH:      TOBACCO:   reports that he quit smoking about 52 years ago. He has a 1.00 pack-year smoking history.  He has never used smokeless tobacco.  ETOH:   reports current alcohol use. ALLERGIES:    Allergies   Allergen Reactions    Atorvastatin Other (See Comments)     Muscle pain (myalgias)    Sulfa Antibiotics Swelling     Swelling of retinas    Sulfanilamide Other (See Comments)       Home Meds:   Prior to Admission medications    Medication Sig Start Date End Date Taking?  Authorizing Provider   oseltamivir (TAMIFLU) 30 MG capsule Take 1 capsule by mouth 2 times daily for 5 days 12/20/21 12/25/21 Yes ESTHER Williamson CNP   levoFLOXacin (LEVAQUIN) 500 MG tablet Take 1 tablet by mouth daily for 7 days 12/20/21 12/27/21 Yes ESTHER Williamson CNP   lisinopril (PRINIVIL;ZESTRIL) 20 MG tablet Take 10 mg by mouth daily   Yes Historical Provider, MD   furosemide (LASIX) 40 MG tablet Take 0.5 tablets by mouth daily Once daily on Monday, Wednesday and Friday 9/30/21  Yes ESTHER Joshi CNP   amLODIPine (NORVASC) 10 MG tablet Take 1 tablet by mouth daily 1/23/20  Yes Britney Segovia MD   clopidogrel (PLAVIX) 75 MG tablet Take 1 tablet by mouth daily Take 1 daily 12/6/19  Yes ESTHER Alberts CNP   simvastatin (ZOCOR) 40 MG tablet Take 10 mg by mouth nightly    Yes Historical Provider, MD   glipiZIDE (GLUCOTROL) 10 MG tablet Take 10 mg by mouth 2 times daily (before meals)   Yes Historical Provider, MD   vitamin D (CHOLECALCIFEROL) 1000 UNIT TABS tablet Take 1,000 Units by mouth 2 times daily    Yes Historical Provider, MD   metFORMIN (GLUCOPHAGE) 1000 MG tablet Take 1 tablet by mouth 2 times daily (with meals) 8/25/16  Yes ESTHER Díaz CNP   Handicap Placard MISC by Does not apply route Duration 2 years 6/2/20   ESTHER Joshi CNP     CURRENT MEDS :  Scheduled Meds:   insulin glargine  10 Units SubCUTAneous Daily    insulin lispro  0-18 Units SubCUTAneous TID WC    insulin lispro  0-9 Units SubCUTAneous Nightly    furosemide  40 mg IntraVENous BID    diphenhydrAMINE  25 mg IntraVENous Once    Vitamin D  1,000 Units Oral BID    atorvastatin  20 mg Oral Daily    clopidogrel  75 mg Oral Daily    amLODIPine  10 mg Oral Daily    [Held by provider] lisinopril  10 mg Oral Daily    oseltamivir  30 mg Oral BID    sodium chloride flush  5-40 mL IntraVENous 2 times per day    levofloxacin  750 mg IntraVENous Q48H    predniSONE  20 mg Oral Daily       Continuous Infusions:   sodium chloride      heparin (PORCINE) Infusion 20 Units/kg/hr (12/25/21 0914)    dextrose             Review of Systems   Constitutional: Positive for fatigue. HENT: Negative. Respiratory: Positive for cough and shortness of breath. Negative for wheezing. Cardiovascular: Positive for leg swelling. Gastrointestinal: Negative. Vitals:  /88   Pulse 84   Temp 98.6 °F (37 °C) (Oral)   Resp 24   Ht 5' 6\" (1.676 m)   Wt 165 lb 8 oz (75.1 kg)   SpO2 94%   BMI 26.71 kg/m²     PHYSICAL EXAM:  Head and neck atraumatic, normocephalic    Lymph nodes-no cervical, supraclavicular lymphadenopathy    Neck-no JVP elevation    Lungs rhonchi  and dec bs bases     CVS- S1, S2 regular. No S3 no S4, no murmurs    Abdomen-nontender, nondistended. Bowel sounds are present. No organomegaly    Lower extremity-no edema    Upper extremity-no edema    Neurological-grossly normal cranial nerves. No overt motor deficit       CBC:   Recent Labs     12/23/21  0430 12/24/21  0651 12/25/21  0305   WBC 15.2* 10.9 10.3   HGB 11.2* 11.7* 12.1*    303 297     BMP:    Recent Labs     12/23/21  0430 12/24/21  0651 12/25/21  0305   * 134* 132*   K 5.0 4.3 4.4    100 93*   CO2 19* 24 28   BUN 37* 35* 38*   CREATININE 1.55* 1.46* 1.41*   GLUCOSE 276* 119* 137*     Hepatic: No results for input(s): AST, ALT, ALB, BILITOT, ALKPHOS in the last 72 hours.   Amylase: No results found for: AMYLASE  Lipase: No results found for: LIPASE  Troponin: No results for input(s): TROPONINI in the last 72 hours. BNP: No results for input(s): BNP in the last 72 hours. Lipids: No results for input(s): CHOL, HDL in the last 72 hours. Invalid input(s): LDLCALCU  ABGs: No results found for: PHART, PO2ART, [id number]  INR:   Recent Labs     12/23/21  0430   INR 1.0     Thyroid:   Lab Results   Component Value Date    TSH 1.54 07/31/2021     Urinalysis: No results for input(s): BACTERIA, BLOODU, CLARITYU, COLORU, PHUR, PROTEINU, RBCUA, SPECGRAV, BILIRUBINUR, NITRU, WBCUA, LEUKOCYTESUR, GLUCOSEU in the last 72 hours. XR CHEST (SINGLE VIEW FRONTAL)    Result Date: 12/23/2021  Trace effusions with scattered infiltrates. XR CHEST (2 VW)    Result Date: 12/20/2021  Patchy opacities in the right lung and mild septal thickening. Findings are suggestive of interstitial edema. Underlying inflammatory process or infection should also be considered in the appropriate clinical setting. XR CHEST PORTABLE    Result Date: 12/22/2021  Normal examination. Congestive failure evident on yesterday's exam has resolved. XR CHEST PORTABLE    Result Date: 12/21/2021  Interval worsening of cardiomegaly. Interval significant worsening of parahilar densities and increased interstitial marking. Although finding may be suggestive of pulmonary edema, extensive viral pneumonia should also be placed in differential.                  IMPRESSION:       Principal Problem:    Acute respiratory failure with hypoxia (HCC)  Active Problems:    S/P TAVR (transcatheter aortic valve replacement)    Influenza A    NSTEMI (non-ST elevated myocardial infarction) ([facility])    BJ (acute kidney injury) ([facility]    Essential hypertension    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 ([facility])    Chronic kidney disease, stage 3a ([facility])    COPD exacerbation ([facility])    Acute on chronic systolic heart failure (HCC)    Sepsis ([facility]  Resolved Problems:    * No resolved hospital problems.  *       :                PLAN:      Echo shows dec ef - cath per cv   Continue tamiflu  Levoquin for 7 days   Prednisone for 5 days   Albuterol as needed   Wean high flow keep sats >92 %   D/w primary       Electronically signed by Hitesh Sebastian MD on 12/25/2021 at 1:55 PM

## 2021-12-25 NOTE — PROGRESS NOTES
[facility]  Office: [address], DO, Jesica Press, DO, Osiris Hinesing, DO, Brannon Singh Blood, DO, Danielle Sharma MD, Adriana Dumont MD, Tay Delgado MD, Cayla Tobin MD, Jenny Lopez MD, Susana Cole MD, Betty Singh MD, Tin Drummond, DO, Dariana Payne, DO, Zak Hernadez MD,  Gena Siemens, DO, Francisco Myles MD, Evan Croft MD, Whitney Galvez MD, Lotus Hernandez MD, Audra Schmitz MD, Ella Blood MD, Darrick Farah MD, Chevy Zaman, [facility], [facility], CNP, Libby Garcia, CNP, Emilia March, CNS, Jade Troy, CNP, Mayur Mcelroy, CNP, Ara Oscar, CNP, Nas Livingston, CNP, Katelyn Sorensen, CNP, Roylene Goodell, PA-C, Violette Sánchez, DNP, Raul Thornton, [facility], Eliza Alvarez, CNP, Lo Holley, CNP, Kylie Arroyo, CNP, Joanne Kee, CNP, Eric Velasco, [facility], [facility], [address] [place] [address]    Progress Note    12/25/2021    10:14 AM    Name:   Aleatha Lundborg  MRN:     [id number]     Acct:      [de-identified]   Room:   [address] [place] [address] Day:  3  Admit Date:  12/22/2021  5:01 AM    PCP:   ESTHER Bernal CNP  Code Status:  Full Code    Subjective:     C/C:   Chief Complaint   Patient presents with    Shortness of Breath     started Monday, worsening this morning. Interval History Status: Unchanged    Patient now requiring 35% FiO2, discussed pending echocardiogram results. Patient is still eager for discharge but agrees to stay for improved oxygenation, no changes diuretics    Brief History:     Aleatha Lundborg is a 77 y.o. gentleman who initially presented [facility] for evaluation of shortness of breath on 12/22/2021. Onset of symptoms was approximately 2-3 days ago. He was diagnosed with influenza A & pneumonia at the urgent care and was placed on antibiotics and tamiflu. His comorbidities include a history of CAD s/p stenting, essential hypertension, TAVR, and DM II.  He was noted to have a leukocytosis and elevated creatinine in their ED. BNP noted to be elevated as well as troponin and lactic acidemia. He was placed on bipap therapy due to hypoxia (SpO2 70%) with significant improvement of his shortness of breath. Due to his elevated troponin and symptoms, he was transferred directly from [facility] ED to [facility] for higher level of care. Prior to transfer, he did receive Lovenox and Lasix.      Since arrival to the emergency department, patient reports he is feeling significantly improved. Remains on 4 L of oxygen via nasal cannula. His wife reports that he looks much better. He states that he wants to go home. Cardiology at bedside. He will be admitted for further work-up and evaluation of NSTEMI. Review of Systems:     Constitutional:  negative for chills, fevers, sweats  Respiratory: Reports persistent shortness of breath; negative for cough, wheezing  Cardiovascular:  negative for chest pain, chest pressure/discomfort, lower extremity edema, palpitations  Gastrointestinal:  negative for abdominal pain, constipation, diarrhea, nausea, vomiting  Neurological:  negative for dizziness, headache    Medications: Allergies:     Allergies   Allergen Reactions    Atorvastatin Other (See Comments)     Muscle pain (myalgias)    Sulfa Antibiotics Swelling     Swelling of retinas    Sulfanilamide Other (See Comments)       Current Meds:   Scheduled Meds:    insulin glargine  10 Units SubCUTAneous Daily    insulin lispro  0-18 Units SubCUTAneous TID WC    insulin lispro  0-9 Units SubCUTAneous Nightly    furosemide  40 mg IntraVENous BID    diphenhydrAMINE  25 mg IntraVENous Once    Vitamin D  1,000 Units Oral BID    atorvastatin  20 mg Oral Daily    clopidogrel  75 mg Oral Daily    amLODIPine  10 mg Oral Daily    [Held by provider] lisinopril  10 mg Oral Daily    oseltamivir  30 mg Oral BID    sodium chloride flush  5-40 mL IntraVENous 2 times per day    levofloxacin  750 mg IntraVENous Q48H    predniSONE  20 mg Oral Daily     Continuous Infusions:    sodium chloride      heparin (PORCINE) Infusion 20 Units/kg/hr (21 0914)    dextrose       PRN Meds: albuterol, sodium chloride flush, sodium chloride, acetaminophen **OR** acetaminophen, heparin (porcine), heparin (porcine), glucose, dextrose, glucagon (rDNA), dextrose    Data:     Past Medical History:   has a past medical history of Acne rosacea, Anemia, Aortic stenosis, BPH (benign prostatic hypertrophy), CAD (coronary artery disease), Carotid stenosis, Cataract of both eyes, CRI (chronic renal insufficiency), Diabetes mellitus, type 2 ([address] Dyslipidemia, Erectile dysfunction, H/O agent Orange exposure, Hearing loss, Hyperlipidemia, Hypertension, Mild nonproliferative diabetic retinopathy ([facility] Non-rheumatic mitral regurgitation, RBBB, 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 Social History:   reports that he quit smoking about 52 years ago. He has a 1.00 pack-year smoking history. He has never used smokeless tobacco. He reports current alcohol use. He reports that he does not use drugs. Family History:   Family History   Problem Relation Age of Onset    Heart Attack Father     Coronary Art Dis Sister     Hypertension Mother     Lung Cancer Mother     Stroke Mother     Coronary Art Dis Brother         later in life       Vitals:  BP (!) 145/78   Pulse 84   Temp 98.1 °F (36.7 °C) (Oral)   Resp 20   Ht 5' 6\" (1.676 m)   Wt 165 lb 8 oz (75.1 kg)   SpO2 94%   BMI 26.71 kg/m²   Temp (24hrs), Av.2 °F (36.8 °C), Min:97.7 °F (36.5 °C), Max:98.6 °F (37 °C)    Recent Labs     21  1118 21  1637 21  1908 21  0836   POCGLU 68* 312* 365* 161*       I/O (24Hr):     Intake/Output Summary (Last 24 hours) at 2021 1014  Last data filed at 2021 0411  Gross per 24 hour   Intake 2000 ml Output 1550 ml   Net 450 ml       Labs:  Hematology:  Recent Labs     12/23/21  0430 12/23/21  0817 12/24/21  0651 12/25/21  0305   WBC 15.2*  --  10.9 10.3   RBC 4.41  --  4.41 4.58   HGB 11.2*  --  11.7* 12.1*   HCT 35.3*  --  34.9* 35.8*   MCV 80.0*  --  79.1* 78.2*   MCH 25.4  --  26.5 26.4   MCHC 31.7  --  33.5 33.8   RDW 13.6  --  13.6 13.5     --  303 297   MPV 11.0  --  11.0 10.6   INR 1.0  --   --   --    DDIMER  --  0.32  --   --      Chemistry:  Recent Labs     12/23/21  0430 12/23/21  0944 12/24/21  0651 12/25/21  0305   *  --  134* 132*   K 5.0  --  4.3 4.4     --  100 93*   CO2 19*  --  24 28   GLUCOSE 276*  --  119* 137*   BUN 37*  --  35* 38*   CREATININE 1.55*  --  1.46* 1.41*   MG  --   --   --  2.0   ANIONGAP 13  --  10 11   LABGLOM 44*  --  47* 49*   GFRAA 53*  --  57* 59*   CALCIUM 8.9  --  8.9 8.5*   PHOS  --   --   --  4.2   LACTACIDWB  --  2.4*  --   --      Recent Labs     12/23/21  2115 12/24/21  0816 12/24/21  1118 12/24/21  1637 12/24/21  1908 12/25/21  0305 12/25/21  0836   LABALBU  --   --   --   --   --  3.3*  --    POCGLU 256* 221* 68* 312* 365*  --  161*     ABG:No results found for: POCPH, PHART, PH, POCPCO2, MQI9IZQ, PCO2, POCPO2, PO2ART, PO2, POCHCO3, QJS2YKA, HCO3, NBEA, PBEA, BEART, BE, THGBART, THB, UXM9YLW, ILFV4IYS, C5SREVFW, O2SAT, FIO2  Lab Results   Component Value Date/Time    SPECIAL NOT REPORTED 12/21/2021 10:29 PM     Lab Results   Component Value Date/Time    CULTURE NO GROWTH 3 DAYS 12/21/2021 10:29 PM       Radiology:  XR CHEST (2 VW)    Result Date: 12/20/2021  Patchy opacities in the right lung and mild septal thickening. Findings are suggestive of interstitial edema. Underlying inflammatory process or infection should also be considered in the appropriate clinical setting. XR CHEST PORTABLE    Result Date: 12/22/2021  Normal examination. Congestive failure evident on yesterday's exam has resolved.      XR CHEST PORTABLE    Result Date: 12/21/2021  Interval worsening of cardiomegaly. Interval significant worsening of parahilar densities and increased interstitial marking. Although finding may be suggestive of pulmonary edema, extensive viral pneumonia should also be placed in differential.       Physical Examination:        General appearance:  alert, cooperative and no distress, obese  gentleman sitting up in bed  Mental Status:  oriented to person, place and time and normal affect  HEENT: EOMI, PERRLA, sclera anti-icteric, simple cannula present  Lungs:  clear to auscultation bilaterally, occasional scattered rhonchi, no wheezing today, normal effort  Heart:  regular rate and rhythm, no murmur  Abdomen:  soft, nontender, nondistended, normal bowel sounds, no masses, hepatomegaly, splenomegaly  Extremities:  no edema, redness, tenderness in the calves  Skin:  no gross lesions, rashes, induration    Assessment:        Hospital Problems           Last Modified POA    * (Principal) Acute respiratory failure with hypoxia ([address] 12/22/2021 Yes    S/P TAVR (transcatheter aortic valve replacement) 12/22/2021 Yes    Influenza A 12/22/2021 Yes    NSTEMI (non-ST elevated myocardial infarction) ([address] 12/22/2021 Yes    BJ (acute kidney injury) ([address] 12/22/2021 Yes    Essential hypertension 12/22/2021 Yes    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 Acute respiratory failure with [id number]-[id number] negative, likely ACHF continue diuresis his kidney function has maintained  2. Possible superimposed pneumonia on influenza A infection-continue Tamiflu. Continue Levaquin. 3. COPD exacerbation-no wheezing today, continue prednisone to complete course, continue antibiotics.   4. NSTEMI-cardiology following. Await 2D echo results. Remains on heparin infusion  5. Acute on chronic systolic heart failure-continue diuresis  6. CKD stage IIIa  7. Normocytic normochromic anemia-hemoglobin stable. Adding on iron studies this morning. Likely anemia chronic disease   8. Severe aortic stenosis status post TAVR  9. Essential hypertension-no changes to blood pressure medications today, hold lisinopril for now,  10. Coronary artery disease status post stents-continue Plavix and statin therapy  11. Type 2 diabetes mellitus-start Lantus 10 units daily, continue sliding scale, discontinue glipizide due to BJ. 12. Disposition: wean to NC, await echo results. Increase lantus 10 units BID. DC next 24-48 hours pending oxygen progress.      Sandi Deng,   12/25/2021  10:14 AM

## 2021-12-26 LAB
ALBUMIN SERPL-MCNC: 3.1 G/DL (ref 3.5–5.2)
ANION GAP SERPL CALCULATED.3IONS-SCNC: 15 MMOL/L (ref 9–17)
ANION GAP SERPL CALCULATED.3IONS-SCNC: 16 MMOL/L (ref 9–17)
BUN BLDV-MCNC: 41 MG/DL (ref 8–23)
BUN BLDV-MCNC: 43 MG/DL (ref 8–23)
BUN/CREAT BLD: ABNORMAL (ref 9–20)
BUN/CREAT BLD: ABNORMAL (ref 9–20)
CALCIUM SERPL-MCNC: 8.6 MG/DL (ref 8.6–10.4)
CALCIUM SERPL-MCNC: 8.7 MG/DL (ref 8.6–10.4)
CHLORIDE BLD-SCNC: 94 MMOL/L (ref 98–107)
CHLORIDE BLD-SCNC: 95 MMOL/L (ref 98–107)
CO2: 23 MMOL/L (ref 20–31)
CO2: 25 MMOL/L (ref 20–31)
CREAT SERPL-MCNC: 1.35 MG/DL (ref 0.7–1.2)
CREAT SERPL-MCNC: 1.43 MG/DL (ref 0.7–1.2)
GFR AFRICAN AMERICAN: 58 ML/MIN
GFR AFRICAN AMERICAN: >60 ML/MIN
GFR NON-AFRICAN AMERICAN: 48 ML/MIN
GFR NON-AFRICAN AMERICAN: 52 ML/MIN
GFR SERPL CREATININE-BSD FRML MDRD: ABNORMAL ML/MIN/{1.73_M2}
GLUCOSE BLD-MCNC: 100 MG/DL (ref 70–99)
GLUCOSE BLD-MCNC: 136 MG/DL (ref 70–99)
GLUCOSE BLD-MCNC: 164 MG/DL (ref 75–110)
GLUCOSE BLD-MCNC: 203 MG/DL (ref 75–110)
GLUCOSE BLD-MCNC: 239 MG/DL (ref 75–110)
GLUCOSE BLD-MCNC: 325 MG/DL (ref 75–110)
HCT VFR BLD CALC: 39.6 % (ref 40.7–50.3)
HEMOGLOBIN: 12.9 G/DL (ref 13–17)
MAGNESIUM: 2.6 MG/DL (ref 1.6–2.6)
MCH RBC QN AUTO: 25.7 PG (ref 25.2–33.5)
MCHC RBC AUTO-ENTMCNC: 32.6 G/DL (ref 28.4–34.8)
MCV RBC AUTO: 79 FL (ref 82.6–102.9)
NRBC AUTOMATED: 0 PER 100 WBC
PARTIAL THROMBOPLASTIN TIME: 57.3 SEC (ref 20.5–30.5)
PARTIAL THROMBOPLASTIN TIME: 67.7 SEC (ref 20.5–30.5)
PARTIAL THROMBOPLASTIN TIME: 77.4 SEC (ref 20.5–30.5)
PDW BLD-RTO: 13.3 % (ref 11.8–14.4)
PHOSPHORUS: 4.4 MG/DL (ref 2.5–4.5)
PLATELET # BLD: 318 K/UL (ref 138–453)
PMV BLD AUTO: 10.5 FL (ref 8.1–13.5)
POTASSIUM SERPL-SCNC: 3.7 MMOL/L (ref 3.7–5.3)
POTASSIUM SERPL-SCNC: 3.7 MMOL/L (ref 3.7–5.3)
RBC # BLD: 5.01 M/UL (ref 4.21–5.77)
SODIUM BLD-SCNC: 133 MMOL/L (ref 135–144)
SODIUM BLD-SCNC: 135 MMOL/L (ref 135–144)
WBC # BLD: 10.4 K/UL (ref 3.5–11.3)

## 2021-12-26 PROCEDURE — 6360000002 HC RX W HCPCS: Performed by: STUDENT IN AN ORGANIZED HEALTH CARE EDUCATION/TRAINING PROGRAM

## 2021-12-26 PROCEDURE — 2580000003 HC RX 258: Performed by: NURSE PRACTITIONER

## 2021-12-26 PROCEDURE — 94760 N-INVAS EAR/PLS OXIMETRY 1: CPT

## 2021-12-26 PROCEDURE — 2700000000 HC OXYGEN THERAPY PER DAY

## 2021-12-26 PROCEDURE — 85027 COMPLETE CBC AUTOMATED: CPT

## 2021-12-26 PROCEDURE — 99232 SBSQ HOSP IP/OBS MODERATE 35: CPT | Performed by: INTERNAL MEDICINE

## 2021-12-26 PROCEDURE — 6370000000 HC RX 637 (ALT 250 FOR IP): Performed by: NURSE PRACTITIONER

## 2021-12-26 PROCEDURE — 6370000000 HC RX 637 (ALT 250 FOR IP): Performed by: INTERNAL MEDICINE

## 2021-12-26 PROCEDURE — 82947 ASSAY GLUCOSE BLOOD QUANT: CPT

## 2021-12-26 PROCEDURE — 80048 BASIC METABOLIC PNL TOTAL CA: CPT

## 2021-12-26 PROCEDURE — 6360000002 HC RX W HCPCS: Performed by: NURSE PRACTITIONER

## 2021-12-26 PROCEDURE — 80069 RENAL FUNCTION PANEL: CPT

## 2021-12-26 PROCEDURE — 36415 COLL VENOUS BLD VENIPUNCTURE: CPT

## 2021-12-26 PROCEDURE — 99233 SBSQ HOSP IP/OBS HIGH 50: CPT | Performed by: INTERNAL MEDICINE

## 2021-12-26 PROCEDURE — 83735 ASSAY OF MAGNESIUM: CPT

## 2021-12-26 PROCEDURE — 6360000002 HC RX W HCPCS: Performed by: INTERNAL MEDICINE

## 2021-12-26 PROCEDURE — 2060000000 HC ICU INTERMEDIATE R&B

## 2021-12-26 PROCEDURE — 85730 THROMBOPLASTIN TIME PARTIAL: CPT

## 2021-12-26 RX ORDER — ACETYLCYSTEINE 200 MG/ML
600 SOLUTION ORAL; RESPIRATORY (INHALATION) 2 TIMES DAILY
Status: COMPLETED | OUTPATIENT
Start: 2021-12-26 | End: 2021-12-28

## 2021-12-26 RX ORDER — SODIUM CHLORIDE 9 MG/ML
INJECTION, SOLUTION INTRAVENOUS CONTINUOUS
Status: DISCONTINUED | OUTPATIENT
Start: 2021-12-27 | End: 2021-12-27

## 2021-12-26 RX ORDER — POTASSIUM CHLORIDE 20 MEQ/1
40 TABLET, EXTENDED RELEASE ORAL ONCE
Status: COMPLETED | OUTPATIENT
Start: 2021-12-26 | End: 2021-12-26

## 2021-12-26 RX ADMIN — SODIUM CHLORIDE, PRESERVATIVE FREE 5 ML: 5 INJECTION INTRAVENOUS at 21:38

## 2021-12-26 RX ADMIN — FUROSEMIDE 40 MG: 10 INJECTION, SOLUTION INTRAVENOUS at 07:56

## 2021-12-26 RX ADMIN — INSULIN LISPRO 3 UNITS: 100 INJECTION, SOLUTION INTRAVENOUS; SUBCUTANEOUS at 07:50

## 2021-12-26 RX ADMIN — FUROSEMIDE 40 MG: 10 INJECTION, SOLUTION INTRAVENOUS at 17:57

## 2021-12-26 RX ADMIN — AMLODIPINE BESYLATE 10 MG: 10 TABLET ORAL at 07:55

## 2021-12-26 RX ADMIN — INSULIN LISPRO 6 UNITS: 100 INJECTION, SOLUTION INTRAVENOUS; SUBCUTANEOUS at 16:15

## 2021-12-26 RX ADMIN — INSULIN LISPRO 6 UNITS: 100 INJECTION, SOLUTION INTRAVENOUS; SUBCUTANEOUS at 21:40

## 2021-12-26 RX ADMIN — OSELTAMIVIR PHOSPHATE 30 MG: 30 CAPSULE ORAL at 21:38

## 2021-12-26 RX ADMIN — OSELTAMIVIR PHOSPHATE 30 MG: 30 CAPSULE ORAL at 07:55

## 2021-12-26 RX ADMIN — Medication 20 UNITS/KG/HR: at 14:06

## 2021-12-26 RX ADMIN — Medication 1000 UNITS: at 07:54

## 2021-12-26 RX ADMIN — INSULIN GLARGINE 10 UNITS: 100 INJECTION, SOLUTION SUBCUTANEOUS at 07:51

## 2021-12-26 RX ADMIN — CLOPIDOGREL 75 MG: 75 TABLET, FILM COATED ORAL at 07:54

## 2021-12-26 RX ADMIN — POTASSIUM CHLORIDE 40 MEQ: 1500 TABLET, EXTENDED RELEASE ORAL at 11:24

## 2021-12-26 RX ADMIN — Medication 1000 UNITS: at 21:38

## 2021-12-26 RX ADMIN — INSULIN LISPRO 6 UNITS: 100 INJECTION, SOLUTION INTRAVENOUS; SUBCUTANEOUS at 11:30

## 2021-12-26 RX ADMIN — Medication 600 MG: at 23:53

## 2021-12-26 RX ADMIN — PREDNISONE 20 MG: 20 TABLET ORAL at 07:54

## 2021-12-26 RX ADMIN — LEVOFLOXACIN 750 MG: 5 INJECTION, SOLUTION INTRAVENOUS at 06:29

## 2021-12-26 RX ADMIN — ATORVASTATIN CALCIUM 20 MG: 20 TABLET, FILM COATED ORAL at 07:55

## 2021-12-26 ASSESSMENT — ENCOUNTER SYMPTOMS
GASTROINTESTINAL NEGATIVE: 1
SHORTNESS OF BREATH: 1
COUGH: 1
WHEEZING: 0

## 2021-12-26 NOTE — PLAN OF CARE
Problem: OXYGENATION/RESPIRATORY FUNCTION  Goal: Patient will achieve/maintain normal respiratory rate/effort  Description: Respiratory rate and effort will be within normal limits for the patient  12/26/2021 7207 by Munod Marti RCP  Outcome: Ongoing     Problem: OXYGENATION/RESPIRATORY FUNCTION  Goal: Patient will maintain patent airway  12/26/2021 0837 by Mundo Marti RCP  Outcome: Ongoing

## 2021-12-26 NOTE — PLAN OF CARE
Problem: Falls - Risk of:  Goal: Will remain free from falls  Description: Will remain free from falls  12/26/2021 0218 by Armando Rodriguez RN  Outcome: Ongoing    Goal: Absence of physical injury  Description: Absence of physical injury  12/26/2021 0218 by Armando Rodriguez RN  Outcome: Ongoing       Problem: ABCDS Injury Assessment  Goal: Absence of physical injury  12/26/2021 0218 by Armando Rodriguez RN  Outcome: Ongoing       Problem: OXYGENATION/RESPIRATORY FUNCTION  Goal: Patient will maintain patent airway  12/26/2021 0218 by Armando Rodriguez RN  Outcome: Ongoing    Goal: Patient will achieve/maintain normal respiratory rate/effort  Description: Respiratory rate and effort will be within normal limits for the patient  12/26/2021 0218 by Armando Rodriguez RN  Outcome: Ongoing    Problem: HEMODYNAMIC STATUS  Goal: Patient has stable vital signs and fluid balance  12/26/2021 0218 by Armando Rodriguez RN  Outcome: Ongoing    Problem: FLUID AND ELECTROLYTE IMBALANCE  Goal: Fluid and electrolyte balance are achieved/maintained  12/26/2021 0218 by Armando Rodriguez RN  Outcome: Ongoing       Problem: ACTIVITY INTOLERANCE/IMPAIRED MOBILITY  Goal: Mobility/activity is maintained at optimum level for patient  12/26/2021 1627 by Armando Rodriguez RN  Outcome: Ongoing       Problem: Skin Integrity:  Goal: Will show no infection signs and symptoms  Description: Will show no infection signs and symptoms  12/26/2021 0218 by Armando Rodriguez RN  Outcome: Ongoing    Goal: Absence of new skin breakdown  Description: Absence of new skin breakdown  12/26/2021 0218 by Armando Rodriguez RN  Outcome: Ongoing

## 2021-12-26 NOTE — PROGRESS NOTES
Patient - Elisha Chaudhry   MRN -  [id number]   [facility] # - [de-identified]   - 1945        Date of evaluation -  2021    REASON FOR THE CONSULTATION:  Chief Complaint   Patient presents with    Shortness of Breath     started Monday, worsening this morning. HISTORY OF PRESENT ILLNESS:    Elisha Chaudhry is a 77y.o. year old male here for evaluation of sob which started 3 days before admission . He has influenza a pneumonia and is on high flow oxygen . On evaluation he is sitting in chair , has cough , sob with ex   No hemoptysis no purulent sputum .   His wheezing is better   Wants to go home         2021   Off high flow   Good diuresis   Completed steroids     Immunization   Immunization History   Administered Date(s) Administered    COVID-19, Moderna, Primary or Immunocompromised, PF, 100mcg/0.5mL 2021, 2021, 10/26/2021    Influenza 2013    Influenza Virus Vaccine 10/25/2007, 10/11/2012, 10/07/2015, 09/10/2020    Influenza, High Dose (Fluzone 65 yrs and older) 2016, 10/06/2017, 2018, 10/12/2019    Influenza, MDCK Quadv, with preserv IM (Flucelvax 2 yrs and older) 2019    Influenza, Quadv, adjuvanted, 65 yrs +, IM, PF (Fluad) 2021    Pneumococcal Conjugate 13-valent ([id number]) 2015    Pneumococcal Polysaccharide ([id number]) 2006, 2014    Td, unspecified formulation 2018    Tdap (Boostrix, Adacel) 2008    Zoster Live (Zostavax) 2013    Zoster Recombinant (Shingrix) 2019, 2019        Pneumococcal Vaccine     [] Up to date    [] Indicated   [] Refused  [] Contraindicated       Influenza Vaccine   [] Up to date    [] Indicated   [] Refused  [] Contraindicated            Pulmonary Rehab     [] completed    [] Indicated   [] Refused  [] Contraindicated       PAST MEDICAL HISTORY:       Diagnosis Date    Acne rosacea     treated with MetroGel    Anemia     Aortic stenosis     echo 3/18 Mod AS, Mild MR    BPH (benign prostatic hypertrophy)     CAD (coronary artery disease)     Stent x 3 DEVON 8/16    Carotid stenosis     Fairly minimal plaquing on ultrasound 5/14--in the conclusion described as less than 50% stenosis but in the body of the report described as minimal    Cataract of both eyes     CRI (chronic renal insufficiency)     Diabetes mellitus, type 2 (HCC)     1.) Proteinuria, 24 hour total urine protein 1,420 mg/24 hrs, 09/08, creatinine clearance 114, 09/08 2.) Repeat protein/creatinine ratio 2.97, 02/12 3. )24 hr urine collection 1958 mg/24 hrs, 04/12. 4.) Repeat protein/creatinine ratio 1.99, 08/12 a.) Repeat protein/creatinine ratio 2.14, 07/13 5.) Renal u/s ok 02/12  6) retinopathy at [address] 12/16  early mod. nonprolifferative  macular involved    Dyslipidemia     Erectile dysfunction     H/O agent Orange exposure     per patient in the [employer] along with exposure to cresote and naphtha    Hearing loss     Hyperlipidemia     Hypertension     Mild nonproliferative diabetic retinopathy ([address]     Non-rheumatic mitral regurgitation 06/27/2016    RBBB     Status post transcatheter aortic valve replacement (TAVR) using bioprosthesis 01/21/2020    Wears dentures     upper         Family History:       Problem Relation Age of Onset    Heart Attack Father     Coronary Art Dis Sister     Hypertension Mother     Lung Cancer Mother     Stroke Mother     Coronary Art Dis Brother         later in life       SURGICAL HISTORY:   Past Surgical History:   Procedure Laterality Date    AORTIC VALVE SURGERY  01/2020    CARDIAC CATHETERIZATION  12/03/2019    CATARACT REMOVAL Bilateral     CIRCUMCISION  age 49   Rebbecca Hinders EYE SURGERY Bilateral     cataract    TYMPANOSTOMY TUBE PLACEMENT      VASECTOMY             SOCIAL AND OCCUPATIONAL HEALTH:      TOBACCO:   reports that he quit smoking about 52 years ago. He has a 1.00 pack-year smoking history.  He has never used smokeless tobacco.  ETOH:   reports current alcohol use. ALLERGIES:    Allergies   Allergen Reactions    Atorvastatin Other (See Comments)     Muscle pain (myalgias)    Sulfa Antibiotics Swelling     Swelling of retinas    Sulfanilamide Other (See Comments)       Home Meds:   Prior to Admission medications    Medication Sig Start Date End Date Taking?  Authorizing Provider   levoFLOXacin (LEVAQUIN) 500 MG tablet Take 1 tablet by mouth daily for 7 days 12/20/21 12/27/21 Yes ESTHER Lakhani CNP   lisinopril (PRINIVIL;ZESTRIL) 20 MG tablet Take 10 mg by mouth daily   Yes Historical Provider, MD   furosemide (LASIX) 40 MG tablet Take 0.5 tablets by mouth daily Once daily on Monday, Wednesday and Friday 9/30/21  Yes ESTHER Silva CNP   amLODIPine (NORVASC) 10 MG tablet Take 1 tablet by mouth daily 1/23/20  Yes Wilson Partida MD   clopidogrel (PLAVIX) 75 MG tablet Take 1 tablet by mouth daily Take 1 daily 12/6/19  Yes ESTHER Alfredo CNP   simvastatin (ZOCOR) 40 MG tablet Take 10 mg by mouth nightly    Yes Historical Provider, MD   glipiZIDE (GLUCOTROL) 10 MG tablet Take 10 mg by mouth 2 times daily (before meals)   Yes Historical Provider, MD   vitamin D (CHOLECALCIFEROL) 1000 UNIT TABS tablet Take 1,000 Units by mouth 2 times daily    Yes Historical Provider, MD   metFORMIN (GLUCOPHAGE) 1000 MG tablet Take 1 tablet by mouth 2 times daily (with meals) 8/25/16  Yes ESTHER Carroll CNP   Handicap Placard MISC by Does not apply route Duration 2 years 6/2/20   ESTHER Silva CNP     CURRENT MEDS :  Scheduled Meds:   insulin glargine  10 Units SubCUTAneous Daily    insulin lispro  0-18 Units SubCUTAneous TID WC    insulin lispro  0-9 Units SubCUTAneous Nightly    furosemide  40 mg IntraVENous BID    diphenhydrAMINE  25 mg IntraVENous Once    Vitamin D  1,000 Units Oral BID    atorvastatin  20 mg Oral Daily    clopidogrel  75 mg Oral Daily    amLODIPine  10 mg Oral Daily    lisinopril  10 mg Oral Daily    oseltamivir  30 mg Oral BID    sodium chloride flush  5-40 mL IntraVENous 2 times per day    levofloxacin  750 mg IntraVENous Q48H       Continuous Infusions:   sodium chloride      heparin (PORCINE) Infusion 20 Units/kg/hr (12/26/21 2163)    dextrose             Review of Systems   Constitutional: Positive for fatigue. HENT: Negative. Respiratory: Positive for cough and shortness of breath. Negative for wheezing. Cardiovascular: Positive for leg swelling. Gastrointestinal: Negative. Vitals:  /76   Pulse 85   Temp 98.9 °F (37.2 °C) (Temporal)   Resp 22   Ht 5' 6\" (1.676 m)   Wt 154 lb 5.2 oz (70 kg)   SpO2 97%   BMI 24.91 kg/m²     PHYSICAL EXAM:  Head and neck atraumatic, normocephalic    Lymph nodes-no cervical, supraclavicular lymphadenopathy    Neck-no JVP elevation    Lungs rhonchi  and dec bs bases     CVS- S1, S2 regular. No S3 no S4, no murmurs    Abdomen-nontender, nondistended. Bowel sounds are present. No organomegaly    Lower extremity-no edema    Upper extremity-no edema    Neurological-grossly normal cranial nerves. No overt motor deficit       CBC:   Recent Labs     12/24/21  0651 12/25/21  0305 12/26/21  0559   WBC 10.9 10.3 10.4   HGB 11.7* 12.1* 12.9*    297 318     BMP:    Recent Labs     12/25/21  0305 12/26/21  0012 12/26/21  0559   * 135 133*   K 4.4 3.7 3.7   CL 93* 94* 95*   CO2 28 25 23   BUN 38* 43* 41*   CREATININE 1.41* 1.43* 1.35*   GLUCOSE 137* 100* 136*     Hepatic: No results for input(s): AST, ALT, ALB, BILITOT, ALKPHOS in the last 72 hours. Amylase: No results found for: AMYLASE  Lipase: No results found for: LIPASE  Troponin: No results for input(s): TROPONINI in the last 72 hours. BNP: No results for input(s): BNP in the last 72 hours. Lipids: No results for input(s): CHOL, HDL in the last 72 hours.     Invalid input(s): LDLCALCU  ABGs: No results found for: PHART, PO2ART, JQX3BSV  INR:   No results for input(s): INR in the last 72 hours. Thyroid:   Lab Results   Component Value Date    TSH 1.54 07/31/2021     Urinalysis: No results for input(s): BACTERIA, BLOODU, CLARITYU, COLORU, PHUR, PROTEINU, RBCUA, SPECGRAV, BILIRUBINUR, NITRU, WBCUA, LEUKOCYTESUR, GLUCOSEU in the last 72 hours. XR CHEST (SINGLE VIEW FRONTAL)    Result Date: 12/23/2021  Trace effusions with scattered infiltrates. XR CHEST (2 VW)    Result Date: 12/20/2021  Patchy opacities in the right lung and mild septal thickening. Findings are suggestive of interstitial edema. Underlying inflammatory process or infection should also be considered in the appropriate clinical setting. XR CHEST PORTABLE    Result Date: 12/22/2021  Normal examination. Congestive failure evident on yesterday's exam has resolved. XR CHEST PORTABLE    Result Date: 12/21/2021  Interval worsening of cardiomegaly. Interval significant worsening of parahilar densities and increased interstitial marking. Although finding may be suggestive of pulmonary edema, extensive viral pneumonia should also be placed in differential.                  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  *       :                PLAN:      Echo shows dec ef - cath per cv   Continue tamiflu  Levoquin for 7 days   Prednisone for 5 days completed   Albuterol as needed         Electronically signed by Yon Bettencourt MD on 12/26/2021 at 1:08 PM

## 2021-12-26 NOTE — PROGRESS NOTES
[facility]  Office: [address], DO, Nely Mandy, DO, Juan Vuong, DO, Jade Figueroas Blood, DO, Ana Byrnes MD, Loc Herbert MD, Anastasia Alexis MD, Samuel Owen MD, Mireya Pradhan MD, Geovani Simpson MD, Erica Lovell MD, Anisa Lazo, DO, Heidi Pozo, DO, Brenda Aguila MD,  Pat Gonzalez DO, Dietrich Halsted, MD, Steph Andre MD, Saranya Kinney MD, Aiyana Harris MD, Caitlin Quiñonez MD, Praful Avila MD, Ana Giles MD, [facility], [facility] Nathanielleigha, CNP, Arya Beck, CNP, Christopher Brito, CNS, Booker Riggins, CNP, Rodo Larios, CNP, Anshu Perez, CNP, Tyrell Guzman, CNP, Ban Ngo, CNP, Adam Woo, PA-C, Kam Cuevas, DNP, Yuly Aldridge DNP, Charles Crandall, CNP, Albania Muniz CNP, Uvaldo Hawkins, [facility], Dilma Maxwell, CNP, Jesusita Melendez, CNP, Nicolasa Li, [address] [place] [address]    Progress Note    12/26/2021    11:06 AM    Name:   Sander Moreno  MRN:     [id number]     Acct:      [de-identified]   Room:   [address] [place] [address] Day:  4  Admit Date:  12/22/2021  5:01 AM    PCP:   ESTHER Estevez CNP  Code Status:  Full Code    Subjective:     C/C:   Chief Complaint   Patient presents with    Shortness of Breath     started Monday, worsening this morning. Interval History Status: Unchanged    Patient was on 30% FiO2 on high flow, discussed transitioning to nasal cannula. Discussed with nurse practitioner from cardiology and plan for stress test on Monday. Patient agreeable with plan    Brief History:     Sander Moreno is a 77 y.o. gentleman who initially presented to [facility] ED for evaluation of shortness of breath on 12/22/2021. Onset of symptoms was approximately 2-3 days ago. He was diagnosed with influenza A & pneumonia at the urgent care and was placed on antibiotics and tamiflu.  His comorbidities include a history of CAD s/p stenting, essential hypertension, TAVR, and DM II. He was noted to have a leukocytosis and elevated creatinine in their ED. BNP noted to be elevated as well as troponin and lactic acidemia. He was placed on bipap therapy due to hypoxia (SpO2 70%) with significant improvement of his shortness of breath. Due to his elevated troponin and symptoms, he was transferred directly from [facility] ED to [facility] for higher level of care. Prior to transfer, he did receive Lovenox and Lasix.      Since arrival to the emergency department, patient reports he is feeling significantly improved. Remains on 4 L of oxygen via nasal cannula. His wife reports that he looks much better. He states that he wants to go home. Cardiology at bedside. He will be admitted for further work-up and evaluation of NSTEMI. Review of Systems:     Constitutional:  negative for chills, fevers, sweats  Respiratory: Reports persistent shortness of breath; negative for cough, wheezing  Cardiovascular:  negative for chest pain, chest pressure/discomfort, lower extremity edema, palpitations  Gastrointestinal:  negative for abdominal pain, constipation, diarrhea, nausea, vomiting  Neurological:  negative for dizziness, headache    Medications: Allergies:     Allergies   Allergen Reactions    Atorvastatin Other (See Comments)     Muscle pain (myalgias)    Sulfa Antibiotics Swelling     Swelling of retinas    Sulfanilamide Other (See Comments)       Current Meds:   Scheduled Meds:    potassium chloride  40 mEq Oral Once    insulin glargine  10 Units SubCUTAneous Daily    insulin lispro  0-18 Units SubCUTAneous TID WC    insulin lispro  0-9 Units SubCUTAneous Nightly    furosemide  40 mg IntraVENous BID    diphenhydrAMINE  25 mg IntraVENous Once    Vitamin D  1,000 Units Oral BID    atorvastatin  20 mg Oral Daily    clopidogrel  75 mg Oral Daily    amLODIPine  10 mg Oral Daily    [Held by provider] lisinopril  10 mg Oral Daily    oseltamivir  30 mg Oral BID    sodium chloride flush  5-40 mL IntraVENous 2 times per day    levofloxacin  750 mg IntraVENous Q48H     Continuous Infusions:    sodium chloride      heparin (PORCINE) Infusion 20 Units/kg/hr (21 0653)    dextrose       PRN Meds: albuterol, sodium chloride flush, sodium chloride, acetaminophen **OR** acetaminophen, heparin (porcine), heparin (porcine), glucose, dextrose, glucagon (rDNA), dextrose    Data:     Past Medical History:   has a past medical history of Acne rosacea, Anemia, Aortic stenosis, BPH (benign prostatic hypertrophy), CAD (coronary artery disease), Carotid stenosis, Cataract of both eyes, CRI (chronic renal insufficiency), Diabetes mellitus, type 2 ([address] Dyslipidemia, Erectile dysfunction, H/O agent Orange exposure, Hearing loss, Hyperlipidemia, Hypertension, Mild nonproliferative diabetic retinopathy ([address] Non-rheumatic mitral regurgitation, RBBB, 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 Social History:   reports that he quit smoking about 52 years ago. He has a 1.00 pack-year smoking history. He has never used smokeless tobacco. He reports current alcohol use. He reports that he does not use drugs. Family History:   Family History   Problem Relation Age of Onset    Heart Attack Father     Coronary Art Dis Sister     Hypertension Mother     Lung Cancer Mother     Stroke Mother     Coronary Art Dis Brother         later in life       Vitals:  BP (!) 133/91   Pulse 78   Temp 98 °F (36.7 °C) (Temporal)   Resp 23   Ht 5' 6\" (1.676 m)   Wt 154 lb 5.2 oz (70 kg)   SpO2 94%   BMI 24.91 kg/m²   Temp (24hrs), Av.2 °F (36.8 °C), Min:97.7 °F (36.5 °C), Max:98.6 °F (37 °C)    Recent Labs     21  1127 21  1630 21  2100 21  0746   POCGLU 243* 288* 267* 164*       I/O (24Hr):     Intake/Output Summary (Last 24 hours) at 2021 1106  Last data filed at 2021 1042  Gross per 24 hour   Intake 1559 ml   Output 3825 ml   Net -2266 ml       Labs:  Hematology:  Recent Labs     12/24/21  0651 12/25/21  0305 12/26/21  0559   WBC 10.9 10.3 10.4   RBC 4.41 4.58 5.01   HGB 11.7* 12.1* 12.9*   HCT 34.9* 35.8* 39.6*   MCV 79.1* 78.2* 79.0*   MCH 26.5 26.4 25.7   MCHC 33.5 33.8 32.6   RDW 13.6 13.5 13.3    297 318   MPV 11.0 10.6 10.5     Chemistry:  Recent Labs     12/25/21  0305 12/26/21  0012 12/26/21  0559   * 135 133*   K 4.4 3.7 3.7   CL 93* 94* 95*   CO2 28 25 23   GLUCOSE 137* 100* 136*   BUN 38* 43* 41*   CREATININE 1.41* 1.43* 1.35*   MG 2.0  --  2.6   ANIONGAP 11 16 15   LABGLOM 49* 48* 52*   GFRAA 59* 58* >60   CALCIUM 8.5* 8.7 8.6   PHOS 4.2  --  4.4     Recent Labs     12/24/21  1908 12/25/21  0305 12/25/21  0836 12/25/21  1127 12/25/21  1630 12/25/21  2100 12/26/21  0559 12/26/21  0746   LABALBU  --  3.3*  --   --   --   --  3.1*  --    POCGLU 365*  --  161* 243* 288* 267*  --  164*     ABG:No results found for: POCPH, PHART, PH, POCPCO2, IWP7HFF, PCO2, POCPO2, PO2ART, PO2, POCHCO3, AIB0GDO, HCO3, NBEA, PBEA, BEART, BE, THGBART, THB, RYH6XVM, BJDU7CSE, U2LJGKFW, O2SAT, FIO2  Lab Results   Component Value Date/Time    SPECIAL NOT REPORTED 12/21/2021 10:29 PM     Lab Results   Component Value Date/Time    CULTURE NO GROWTH 3 DAYS 12/21/2021 10:29 PM       Radiology:  XR CHEST (2 VW)    Result Date: 12/20/2021  Patchy opacities in the right lung and mild septal thickening. Findings are suggestive of interstitial edema. Underlying inflammatory process or infection should also be considered in the appropriate clinical setting. XR CHEST PORTABLE    Result Date: 12/22/2021  Normal examination. Congestive failure evident on yesterday's exam has resolved. XR CHEST PORTABLE    Result Date: 12/21/2021  Interval worsening of cardiomegaly. Interval significant worsening of parahilar densities and increased interstitial marking.   Although finding may be suggestive of pulmonary edema, extensive viral pneumonia should also be placed in differential.       Physical Examination:        General appearance:  alert, cooperative and no distress, obese  gentleman sitting up in bed  Mental Status:  oriented to person, place and time and normal affect  HEENT: EOMI, PERRLA, sclera anti-icteric, simple cannula present  Lungs:  clear to auscultation bilaterally, occasional scattered rhonchi, no wheezing today, normal effort  Heart:  regular rate and rhythm, no murmur  Abdomen:  soft, nontender, nondistended, normal bowel sounds, no masses, hepatomegaly, splenomegaly  Extremities:  no edema, redness, tenderness in the calves  Skin:  no gross lesions, rashes, induration    Assessment:        Hospital Problems           Last Modified POA    * (Principal) Acute respiratory failure with hypoxia ([address] 12/22/2021 Yes    S/P TAVR (transcatheter aortic valve replacement) 12/22/2021 Yes    Influenza A 12/22/2021 Yes    NSTEMI (non-ST elevated myocardial infarction) ([address] 12/22/2021 Yes    BJ (acute kidney injury) ([address] 12/22/2021 Yes    Essential hypertension 12/22/2021 Yes    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 Acute respiratory failure with lbolmaa-M-eaznm negative, likely ACHF continue diuresis his kidney function has maintained  2. Possible superimposed pneumonia on influenza A infection-continue Tamiflu. Continue Levaquin. 3. COPD exacerbation-no wheezing today, continue prednisone to complete course, continue antibiotics. 4. NSTEMI-cardiology following. Echocardiogram showing reduced ejection fraction, stress test on Monday and if abnormal will proceed with cardiac cath, otherwise no further work-up.   5. Acute on chronic systolic heart failure-continue diuresis  6. CKD stage IIIa  7. Normocytic normochromic anemia-hemoglobin stable. Likely anemia chronic disease   8. Severe aortic stenosis status post TAVR  9. Essential hypertension-no changes to blood pressure medications today, hold lisinopril for now,  10. Coronary artery disease status post stents-continue Plavix and statin therapy  11. Type 2 diabetes mellitus-no changes to insulin today   12. Disposition: wean to NC, continue Lantus at current dosing, continue heparin drip. No changes diuretics, nephrology following for possible cardiac cath, stress test tomorrow and if okay can discharge home.     Ashley Ramirez DO  12/26/2021  11:06 AM

## 2021-12-26 NOTE — PROGRESS NOTES
[facility]   Progress Note                   Date:   12/26/2021  Patient name: Marylu Albert  Date of admission:  12/22/2021  5:01 AM  MRN:   [id number]  YOB: 1945  PCP: ESTHER Campos - CNP    Reason for Admission: Septicemia ([facility] [A41.9]  Hypoxia [R09.02]  Influenza with respiratory manifestation other than pneumonia [J11.1]  NSTEMI (non-ST elevated myocardial infarction) [facility] [I21.4]  Sepsis ([facility] [A41.9]  Leukocytosis, unspecified type [D72.829]  Acute on chronic congestive heart failure, unspecified heart failure type ([facility] [I50.9]    Subjective:       Clinical Changes / Abnormalities: Pt seen and examined in the room. Remains on 02 no longer on high flow. Discussed with Dr. Hailey John, labs and medications reviewed.        -3.9 L since admission   Medications:   Scheduled Meds:   insulin glargine  10 Units SubCUTAneous Daily    insulin lispro  0-18 Units SubCUTAneous TID WC    insulin lispro  0-9 Units SubCUTAneous Nightly    furosemide  40 mg IntraVENous BID    diphenhydrAMINE  25 mg IntraVENous Once    Vitamin D  1,000 Units Oral BID    atorvastatin  20 mg Oral Daily    clopidogrel  75 mg Oral Daily    amLODIPine  10 mg Oral Daily    [Held by provider] lisinopril  10 mg Oral Daily    oseltamivir  30 mg Oral BID    sodium chloride flush  5-40 mL IntraVENous 2 times per day    levofloxacin  750 mg IntraVENous Q48H     Continuous Infusions:   sodium chloride      heparin (PORCINE) Infusion 20 Units/kg/hr (12/26/21 0653)    dextrose       CBC:   Recent Labs     12/24/21  0651 12/25/21  0305 12/26/21  0559   WBC 10.9 10.3 10.4   HGB 11.7* 12.1* 12.9*    297 318     BMP:    Recent Labs     12/25/21  0305 12/26/21  0012 12/26/21  0559   * 135 133*   K 4.4 3.7 3.7   CL 93* 94* 95*   CO2 28 25 23   BUN 38* 43* 41*   CREATININE 1.41* 1.43* 1.35*   GLUCOSE 137* 100* 136*     Hepatic: No results for input(s): AST, ALT, ALB, BILITOT, ALKPHOS in the last 72 hours. Troponin:   No results for input(s): TROPHS in the last 72 hours. BNP: No results for input(s): BNP in the last 72 hours. Lipids: No results for input(s): CHOL, HDL in the last 72 hours. Invalid input(s): LDLCALCU  INR:   No results for input(s): INR in the last 72 hours. Cardiac Investigations:  EKG:     ECHO 12/25/21  Summary  Dilated left ventricle with severely reduced systolic function. Estimated EF  25-30%  Severe hypokinesis of inferior, inferolateral and inferoseptal walls. Grade I (mild) left ventricular diastolic dysfunction. Normal right ventricular size and function. Bioprosthetic aortic valve (TAVR) is noted in position, functioning normally  with no significant stenosis/regurgitation or perivalvular leak (Mean  gradient 5 mm hg)  Mild mitral regurgitation. Mild tricuspid regurgitation. Estimated right ventricular systolic pressure is 41 mmHg. No significant pericardial effusion is seen.      Rhythm: normal sinus   Rate: normal  Axis: Left  Ectopy: none  Conduction: First-degree AV block  ST Segments: no acute change  T Waves: no acute change  Q Waves: Inferior, septal     Clinical Impression: Abnormal EKG, sinus rhythm with first-degree AV block PACs,.  When compared to previous EKG no acute findings. ECHO 3/16/2021: EF 45%, grade I DD, LA is severely dilated, bio-AV with peak gradient gradient 12 mmHg and mean gradient of 6 mmHg, mild MR.      CATH 12/3/19:    Severe aortic stenosis.   Mild LV systolic dysfunction.   Patent mid LAD, proximal D1 and mid LCX stents. Focal distal LAD 70% stenosis.     CATH 8/23/16: DEVON to LAD, D and mid LCX     Previous office/hospital visit:   Dr. Gianni Chinchilla 8/3/2021:   1. CAD with h/o DEVON to LAD, D and mid LCX 8/23/16. Cardiac cath for NSTEMI 12/3/19 showed patent stents with chronic RDPA occlusion with focal distal LAD 70%; LVEF 40%. Stable on current medical treatment. Continue ASA/plavix  2.  Severe aortic stenosis s/p TAVR on 1/21/2020 functioning normally on echo post TAVR as above. Follow with TTEs. Antibiotics prior to dental work. 3. Carotid artery disease. Follows with vascular surgery. 4. HTN- with white coat HTN. Continue current therapy. Patient to check BP at home and to report if > 130/85  5. Ischemic / valvular cardiomyopathy EF of 45% currently without signs of volume overload. 6. Hyperlipidemia- Last  on 5/21/2020. Patient is on low dose simvastatin which he is able to tolerate. Any increase in dose or switch has caused him severe myalgia. I recommended him addition of zetia. He does not want to take this new medication. Repeat lipid panel  7. H/o agent orange exposure. 8. CKD- Has been referred to nephrology by PCP  9. Right lower ext numbess. Seeing neurology and vascular surgery. 10. RTC 6 months    Objective:   Vitals: BP (!) 133/91   Pulse 78   Temp 98 °F (36.7 °C) (Temporal)   Resp 23   Ht 5' 6\" (1.676 m)   Wt 154 lb 5.2 oz (70 kg)   SpO2 94%   BMI 24.91 kg/m²   General appearance: alert and cooperative with exam  HEENT: Head: Normocephalic, no lesions, without obvious abnormality.   Neck: no JVD, trachea midline, no adenopathy  Lungs: Clear to auscultation, on high flow   Heart: Regular rate and rhythm, s1/s2 auscultated, no murmurs  Abdomen: soft, non-tender, bowel sounds active  Extremities: +1  edema  Neurologic: not done        Assessment / Acute Cardiac Problems:   NSTEMI  Severe aortic stenosis status post TAVR  CAD with history of DEVON to LAD distal and mid left circumflex 8/23/2016  Hypertension  Ischemic/valvular cardiomyopathy EF of 45%  Hyperlipidemia  CKD    Patient Active Problem List:     Essential hypertension     Chronic kidney disease     Unilateral sensorineural hearing loss     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)     Coronary artery disease involving native coronary artery of native heart without angina pectoris     Controlled type 2 diabetes mellitus with mild nonproliferative retinopathy and macular edema, without long-term current use of insulin ([facility])     Carotid stenosis     Chronic kidney disease, stage 3a ([facility])     COPD exacerbation ([facility])     Pneumonia of right lower lobe due to infectious organism     Aortic stenosis, severe     Hypertension     Hyperlipidemia     H/O agent Orange exposure     Diabetes mellitus, type 2 ([facility])     CAD (coronary artery disease)     Anemia     Proteinuria     Wears hearing aid in both ears     Acute on chronic systolic heart failure (HCC)     S/P TAVR (transcatheter aortic valve replacement)     Second hand smoke exposure     Peripheral arterial disease ([facility])     Heavy sensation of lower extremity     Impaired ambulation     Pneumonia     Sepsis ([facility]     Influenza A     Acute respiratory failure with hypoxia ([facility])     NSTEMI (non-ST elevated myocardial infarction) ([facility]     BJ (acute kidney injury) ([facility]      Plan of Treatment:   1. Elevated trops, likely type II secondary to demand ischemia. No chest pain. ECHO reviewed. Decrease in EF from prior. Due to CKD, Discussed with dr. Elen Lopez and Dr Sandeep Gant. will proceed with stress test in the am to ensure no ischemia. 2. CHF. On IV lasix. Ok for PO in am ACE on hold due to CKD   3. NPO after midnight.     4. If stress negative for ischemia, ok to d/c and follow up as outpt       Electronically signed by ESTHER Louis CNP on 12/26/2021 at [address] [place] [address] Rd.  [phone]

## 2021-12-26 NOTE — CARE COORDINATION
Transitional Planning     Patient's O2 needs improved and is now on NC; follow for home O2 needs. Patient declined HC needs. Home goal with wife. Has transportation.

## 2021-12-26 NOTE — PROGRESS NOTES
NEPHROLOGY PROGRESS NOTE      SUBJECTIVE     Excellent diuresis with IV Lasix. Shortness of breath improving. Has CHF along with pneumonia. Blood pressure stable. Plans for stress test tomorrow followed by decision to do coronary angiogram based upon the results. Renal function is at baseline. OBJECTIVE     Vitals:    12/26/21 0544 12/26/21 0735 12/26/21 0819 12/26/21 1120   BP:  (!) 133/91  123/76   Pulse:  78  85   Resp:  17 23 22   Temp:  98 °F (36.7 °C)  98.9 °F (37.2 °C)   TempSrc:  Temporal  Temporal   SpO2:  94%  97%   Weight: 154 lb 5.2 oz (70 kg)      Height:         24HR INTAKE/OUTPUT:      Intake/Output Summary (Last 24 hours) at 12/26/2021 1250  Last data filed at 12/26/2021 1247  Gross per 24 hour   Intake 1709 ml   Output 4175 ml   Net -2466 ml       General appearance:Awake, alert,  HEENT: PERRLA  Respiratory::vesicular breath sounds, present wheeze  Cardiovascular:S1 S2 normal,no gallop or organic murmur. Abdomen:Non tender/non distended. Bowel sounds present  Extremities: No Cyanosis or Clubbing,Lower extremity edema  Neurological:Alert and oriented. No abnormalities of mood, affect, memory, mentation, or behavior are noted      MEDICATIONS     Scheduled Meds:    insulin glargine  10 Units SubCUTAneous Daily    insulin lispro  0-18 Units SubCUTAneous TID WC    insulin lispro  0-9 Units SubCUTAneous Nightly    furosemide  40 mg IntraVENous BID    diphenhydrAMINE  25 mg IntraVENous Once    Vitamin D  1,000 Units Oral BID    atorvastatin  20 mg Oral Daily    clopidogrel  75 mg Oral Daily    amLODIPine  10 mg Oral Daily    lisinopril  10 mg Oral Daily    oseltamivir  30 mg Oral BID    sodium chloride flush  5-40 mL IntraVENous 2 times per day    levofloxacin  750 mg IntraVENous Q48H     Continuous Infusions:    sodium chloride      heparin (PORCINE) Infusion 20 Units/kg/hr (12/26/21 0653)    dextrose       PRN Meds:  albuterol, sodium chloride flush, sodium chloride, acetaminophen **OR** acetaminophen, heparin (porcine), heparin (porcine), glucose, dextrose, glucagon (rDNA), dextrose  Home Meds:                Medications Prior to Admission: [] oseltamivir (TAMIFLU) 30 MG capsule, Take 1 capsule by mouth 2 times daily for 5 days  levoFLOXacin (LEVAQUIN) 500 MG tablet, Take 1 tablet by mouth daily for 7 days  lisinopril (PRINIVIL;ZESTRIL) 20 MG tablet, Take 10 mg by mouth daily  furosemide (LASIX) 40 MG tablet, Take 0.5 tablets by mouth daily Once daily on Monday, Wednesday and Friday  amLODIPine (NORVASC) 10 MG tablet, Take 1 tablet by mouth daily  clopidogrel (PLAVIX) 75 MG tablet, Take 1 tablet by mouth daily Take 1 daily  simvastatin (ZOCOR) 40 MG tablet, Take 10 mg by mouth nightly   glipiZIDE (GLUCOTROL) 10 MG tablet, Take 10 mg by mouth 2 times daily (before meals)  vitamin D (CHOLECALCIFEROL) 1000 UNIT TABS tablet, Take 1,000 Units by mouth 2 times daily   metFORMIN (GLUCOPHAGE) 1000 MG tablet, Take 1 tablet by mouth 2 times daily (with meals)  Handicap Placard MISC, by Does not apply route Duration 2 years    INVESTIGATIONS     Last 3 CMP:    Recent Labs     21  0305 21  0012 21  0559   * 135 133*   K 4.4 3.7 3.7   CL 93* 94* 95*   CO2 28 25 23   BUN 38* 43* 41*   CREATININE 1.41* 1.43* 1.35*   CALCIUM 8.5* 8.7 8.6   LABALBU 3.3*  --  3.1*       Last 3 CBC:  Recent Labs     21  0651 21  0305 21  0559   WBC 10.9 10.3 10.4   RBC 4.41 4.58 5.01   HGB 11.7* 12.1* 12.9*   HCT 34.9* 35.8* 39.6*   MCV 79.1* 78.2* 79.0*   MCH 26.5 26.4 25.7   MCHC 33.5 33.8 32.6   RDW 13.6 13.5 13.3    297 318   MPV 11.0 10.6 10.5       ASSESSMENT     #1 chronic kidney disease stage IIIa secondary to diabetic nephrosclerosis biopsy-proven with baseline creatinine 1.3-1.6. Follows up with Michael.   Also has proteinuria from diabetes  #2 admitted with shortness of breath secondary to influenza pneumonia/decompensated systolic congestive heart failure-improving  #3 ischemic cardiomyopathy ejection fraction 25% status post stent placement in the past/left ventricular diastolic dysfunction/status post bioprosthetic aortic valve TAVR  #4 longstanding type 2 diabetes  #5 essential hypertension    PLAN     #1 plans for stress test and decide on coronary angiogram.  Coronary angiogram being considered I had a long discussion with the patient yesterday in regards to PARAG and he has consented for that. Follow the recommendations prior to coronary angiogram  P.o.  Mucomyst 600 mg twice a day orally for 4 doses starting 1 day prior  IV fluids isotonic saline 50 mL an hour 6 hours pre and post procedure if volume status permits  Minimize volume overload  #2 discussed above with DR ABREU  #3 we will sign off      Please do not hesitate to call with questions    This note is created with the assistance of a speech-recognition program. While intending to generate a document that actually reflects the content of the visit, no guarantees can be provided that every mistake has been identified and corrected by editing    Diana Washington MD MD, MRCP Casey Davila), [address]   12/26/2021 12:50 PM  [facility]

## 2021-12-26 NOTE — PROGRESS NOTES
Physical Therapy        Physical Therapy Cancel Note      DATE: 2021    NAME: Elizabeth Le  MRN: [id number]   : 1945      Patient not seen this date for Physical Therapy due to: Other: RN requested pt not be seen today as pt has increased HR and is having a stress test tomorrow.       Electronically signed by Galo Smith on 2021 at 11:35 AM

## 2021-12-27 ENCOUNTER — APPOINTMENT (OUTPATIENT)
Dept: NUCLEAR MEDICINE | Age: 76
DRG: 871 | End: 2021-12-27
Payer: MEDICARE

## 2021-12-27 ENCOUNTER — APPOINTMENT (OUTPATIENT)
Dept: CARDIAC CATH/INVASIVE PROCEDURES | Age: 76
DRG: 871 | End: 2021-12-27
Payer: MEDICARE

## 2021-12-27 LAB
ALBUMIN SERPL-MCNC: 3.2 G/DL (ref 3.5–5.2)
ANION GAP SERPL CALCULATED.3IONS-SCNC: 12 MMOL/L (ref 9–17)
BUN BLDV-MCNC: 42 MG/DL (ref 8–23)
BUN/CREAT BLD: ABNORMAL (ref 9–20)
CALCIUM SERPL-MCNC: 8.8 MG/DL (ref 8.6–10.4)
CHLORIDE BLD-SCNC: 98 MMOL/L (ref 98–107)
CO2: 26 MMOL/L (ref 20–31)
CREAT SERPL-MCNC: 1.48 MG/DL (ref 0.7–1.2)
CULTURE: NORMAL
CULTURE: NORMAL
EKG ATRIAL RATE: 91 BPM
EKG P AXIS: 43 DEGREES
EKG P-R INTERVAL: 228 MS
EKG Q-T INTERVAL: 376 MS
EKG QRS DURATION: 104 MS
EKG QTC CALCULATION (BAZETT): 462 MS
EKG R AXIS: -34 DEGREES
EKG T AXIS: 78 DEGREES
EKG VENTRICULAR RATE: 91 BPM
GFR AFRICAN AMERICAN: 56 ML/MIN
GFR NON-AFRICAN AMERICAN: 46 ML/MIN
GFR SERPL CREATININE-BSD FRML MDRD: ABNORMAL ML/MIN/{1.73_M2}
GFR SERPL CREATININE-BSD FRML MDRD: ABNORMAL ML/MIN/{1.73_M2}
GLUCOSE BLD-MCNC: 137 MG/DL (ref 75–110)
GLUCOSE BLD-MCNC: 149 MG/DL (ref 70–99)
GLUCOSE BLD-MCNC: 176 MG/DL (ref 75–110)
GLUCOSE BLD-MCNC: 319 MG/DL (ref 75–110)
GLUCOSE BLD-MCNC: 329 MG/DL (ref 75–110)
HCT VFR BLD CALC: 39.4 % (ref 40.7–50.3)
HEMOGLOBIN: 13.1 G/DL (ref 13–17)
Lab: NORMAL
Lab: NORMAL
MAGNESIUM: 2.3 MG/DL (ref 1.6–2.6)
MCH RBC QN AUTO: 25.8 PG (ref 25.2–33.5)
MCHC RBC AUTO-ENTMCNC: 33.2 G/DL (ref 28.4–34.8)
MCV RBC AUTO: 77.7 FL (ref 82.6–102.9)
NRBC AUTOMATED: 0 PER 100 WBC
PARTIAL THROMBOPLASTIN TIME: 56.7 SEC (ref 20.5–30.5)
PDW BLD-RTO: 13.3 % (ref 11.8–14.4)
PHOSPHORUS: 4.7 MG/DL (ref 2.5–4.5)
PLATELET # BLD: 358 K/UL (ref 138–453)
PMV BLD AUTO: 10.3 FL (ref 8.1–13.5)
POTASSIUM SERPL-SCNC: 4 MMOL/L (ref 3.7–5.3)
RBC # BLD: 5.07 M/UL (ref 4.21–5.77)
SODIUM BLD-SCNC: 136 MMOL/L (ref 135–144)
SPECIMEN DESCRIPTION: NORMAL
SPECIMEN DESCRIPTION: NORMAL
WBC # BLD: 13 K/UL (ref 3.5–11.3)

## 2021-12-27 PROCEDURE — 99233 SBSQ HOSP IP/OBS HIGH 50: CPT | Performed by: INTERNAL MEDICINE

## 2021-12-27 PROCEDURE — 2580000003 HC RX 258: Performed by: STUDENT IN AN ORGANIZED HEALTH CARE EDUCATION/TRAINING PROGRAM

## 2021-12-27 PROCEDURE — C1769 GUIDE WIRE: HCPCS

## 2021-12-27 PROCEDURE — 6360000002 HC RX W HCPCS

## 2021-12-27 PROCEDURE — 2060000000 HC ICU INTERMEDIATE R&B

## 2021-12-27 PROCEDURE — 4A023N7 MEASUREMENT OF CARDIAC SAMPLING AND PRESSURE, LEFT HEART, PERCUTANEOUS APPROACH: ICD-10-PCS | Performed by: INTERNAL MEDICINE

## 2021-12-27 PROCEDURE — 6370000000 HC RX 637 (ALT 250 FOR IP): Performed by: INTERNAL MEDICINE

## 2021-12-27 PROCEDURE — B2111ZZ FLUOROSCOPY OF MULTIPLE CORONARY ARTERIES USING LOW OSMOLAR CONTRAST: ICD-10-PCS | Performed by: INTERNAL MEDICINE

## 2021-12-27 PROCEDURE — 6360000004 HC RX CONTRAST MEDICATION

## 2021-12-27 PROCEDURE — C1894 INTRO/SHEATH, NON-LASER: HCPCS

## 2021-12-27 PROCEDURE — 6370000000 HC RX 637 (ALT 250 FOR IP): Performed by: NURSE PRACTITIONER

## 2021-12-27 PROCEDURE — 82947 ASSAY GLUCOSE BLOOD QUANT: CPT

## 2021-12-27 PROCEDURE — 93458 L HRT ARTERY/VENTRICLE ANGIO: CPT

## 2021-12-27 PROCEDURE — C1887 CATHETER, GUIDING: HCPCS

## 2021-12-27 PROCEDURE — 6360000002 HC RX W HCPCS: Performed by: INTERNAL MEDICINE

## 2021-12-27 PROCEDURE — C1760 CLOSURE DEV, VASC: HCPCS

## 2021-12-27 PROCEDURE — 6360000002 HC RX W HCPCS: Performed by: STUDENT IN AN ORGANIZED HEALTH CARE EDUCATION/TRAINING PROGRAM

## 2021-12-27 PROCEDURE — 2580000003 HC RX 258: Performed by: NURSE PRACTITIONER

## 2021-12-27 PROCEDURE — 2500000003 HC RX 250 WO HCPCS

## 2021-12-27 PROCEDURE — 85730 THROMBOPLASTIN TIME PARTIAL: CPT

## 2021-12-27 PROCEDURE — B2151ZZ FLUOROSCOPY OF LEFT HEART USING LOW OSMOLAR CONTRAST: ICD-10-PCS | Performed by: INTERNAL MEDICINE

## 2021-12-27 PROCEDURE — 2709999900 HC NON-CHARGEABLE SUPPLY

## 2021-12-27 PROCEDURE — 2580000003 HC RX 258: Performed by: INTERNAL MEDICINE

## 2021-12-27 PROCEDURE — 83735 ASSAY OF MAGNESIUM: CPT

## 2021-12-27 PROCEDURE — 36415 COLL VENOUS BLD VENIPUNCTURE: CPT

## 2021-12-27 PROCEDURE — 99232 SBSQ HOSP IP/OBS MODERATE 35: CPT | Performed by: INTERNAL MEDICINE

## 2021-12-27 PROCEDURE — 6360000002 HC RX W HCPCS: Performed by: NURSE PRACTITIONER

## 2021-12-27 PROCEDURE — 80069 RENAL FUNCTION PANEL: CPT

## 2021-12-27 PROCEDURE — 85027 COMPLETE CBC AUTOMATED: CPT

## 2021-12-27 RX ORDER — AMINOPHYLLINE DIHYDRATE 25 MG/ML
50 INJECTION, SOLUTION INTRAVENOUS PRN
Status: DISCONTINUED | OUTPATIENT
Start: 2021-12-27 | End: 2021-12-27 | Stop reason: ALTCHOICE

## 2021-12-27 RX ORDER — NITROGLYCERIN 0.4 MG/1
0.4 TABLET SUBLINGUAL EVERY 5 MIN PRN
Status: DISCONTINUED | OUTPATIENT
Start: 2021-12-27 | End: 2021-12-27 | Stop reason: ALTCHOICE

## 2021-12-27 RX ORDER — SODIUM CHLORIDE 9 MG/ML
25 INJECTION, SOLUTION INTRAVENOUS PRN
Status: DISCONTINUED | OUTPATIENT
Start: 2021-12-27 | End: 2021-12-28 | Stop reason: HOSPADM

## 2021-12-27 RX ORDER — ACETAMINOPHEN 325 MG/1
650 TABLET ORAL EVERY 4 HOURS PRN
Status: DISCONTINUED | OUTPATIENT
Start: 2021-12-27 | End: 2021-12-28 | Stop reason: HOSPADM

## 2021-12-27 RX ORDER — SODIUM CHLORIDE 9 MG/ML
INJECTION, SOLUTION INTRAVENOUS CONTINUOUS
Status: DISCONTINUED | OUTPATIENT
Start: 2021-12-27 | End: 2021-12-28

## 2021-12-27 RX ORDER — SODIUM CHLORIDE 0.9 % (FLUSH) 0.9 %
5-40 SYRINGE (ML) INJECTION PRN
Status: DISCONTINUED | OUTPATIENT
Start: 2021-12-27 | End: 2021-12-27 | Stop reason: ALTCHOICE

## 2021-12-27 RX ORDER — FUROSEMIDE 40 MG/1
40 TABLET ORAL DAILY
Qty: 30 TABLET | Refills: 1 | Status: SHIPPED | OUTPATIENT
Start: 2021-12-28 | End: 2022-02-21

## 2021-12-27 RX ORDER — FUROSEMIDE 40 MG/1
40 TABLET ORAL DAILY
Status: DISCONTINUED | OUTPATIENT
Start: 2021-12-28 | End: 2021-12-28 | Stop reason: HOSPADM

## 2021-12-27 RX ORDER — SODIUM CHLORIDE 0.9 % (FLUSH) 0.9 %
5-40 SYRINGE (ML) INJECTION EVERY 12 HOURS SCHEDULED
Status: DISCONTINUED | OUTPATIENT
Start: 2021-12-27 | End: 2021-12-28 | Stop reason: HOSPADM

## 2021-12-27 RX ORDER — SODIUM CHLORIDE 0.9 % (FLUSH) 0.9 %
5-40 SYRINGE (ML) INJECTION PRN
Status: DISCONTINUED | OUTPATIENT
Start: 2021-12-27 | End: 2021-12-28 | Stop reason: HOSPADM

## 2021-12-27 RX ORDER — INSULIN GLARGINE 100 [IU]/ML
15 INJECTION, SOLUTION SUBCUTANEOUS DAILY
Status: DISCONTINUED | OUTPATIENT
Start: 2021-12-28 | End: 2021-12-28 | Stop reason: HOSPADM

## 2021-12-27 RX ORDER — SODIUM CHLORIDE 9 MG/ML
500 INJECTION, SOLUTION INTRAVENOUS CONTINUOUS PRN
Status: DISCONTINUED | OUTPATIENT
Start: 2021-12-27 | End: 2021-12-27 | Stop reason: ALTCHOICE

## 2021-12-27 RX ORDER — METOPROLOL TARTRATE 5 MG/5ML
5 INJECTION INTRAVENOUS EVERY 5 MIN PRN
Status: DISCONTINUED | OUTPATIENT
Start: 2021-12-27 | End: 2021-12-27 | Stop reason: ALTCHOICE

## 2021-12-27 RX ORDER — ATROPINE SULFATE 0.1 MG/ML
0.5 INJECTION INTRAVENOUS EVERY 5 MIN PRN
Status: DISCONTINUED | OUTPATIENT
Start: 2021-12-27 | End: 2021-12-27 | Stop reason: ALTCHOICE

## 2021-12-27 RX ORDER — ALBUTEROL SULFATE 90 UG/1
2 AEROSOL, METERED RESPIRATORY (INHALATION) PRN
Status: DISCONTINUED | OUTPATIENT
Start: 2021-12-27 | End: 2021-12-27 | Stop reason: ALTCHOICE

## 2021-12-27 RX ADMIN — Medication 600 MG: at 21:14

## 2021-12-27 RX ADMIN — INSULIN LISPRO 12 UNITS: 100 INJECTION, SOLUTION INTRAVENOUS; SUBCUTANEOUS at 17:10

## 2021-12-27 RX ADMIN — INSULIN GLARGINE 10 UNITS: 100 INJECTION, SOLUTION SUBCUTANEOUS at 08:35

## 2021-12-27 RX ADMIN — SODIUM CHLORIDE, PRESERVATIVE FREE 10 ML: 5 INJECTION INTRAVENOUS at 11:31

## 2021-12-27 RX ADMIN — Medication 1000 UNITS: at 21:14

## 2021-12-27 RX ADMIN — FUROSEMIDE 40 MG: 10 INJECTION, SOLUTION INTRAVENOUS at 08:35

## 2021-12-27 RX ADMIN — SODIUM CHLORIDE, PRESERVATIVE FREE 10 ML: 5 INJECTION INTRAVENOUS at 08:36

## 2021-12-27 RX ADMIN — INSULIN LISPRO 6 UNITS: 100 INJECTION, SOLUTION INTRAVENOUS; SUBCUTANEOUS at 21:15

## 2021-12-27 RX ADMIN — Medication 600 MG: at 08:35

## 2021-12-27 RX ADMIN — SODIUM CHLORIDE: 9 INJECTION, SOLUTION INTRAVENOUS at 05:54

## 2021-12-27 RX ADMIN — SODIUM CHLORIDE, PRESERVATIVE FREE 10 ML: 5 INJECTION INTRAVENOUS at 21:14

## 2021-12-27 RX ADMIN — Medication 20 UNITS/KG/HR: at 06:32

## 2021-12-27 RX ADMIN — INSULIN LISPRO 3 UNITS: 100 INJECTION, SOLUTION INTRAVENOUS; SUBCUTANEOUS at 11:38

## 2021-12-27 RX ADMIN — SODIUM CHLORIDE: 9 INJECTION, SOLUTION INTRAVENOUS at 11:30

## 2021-12-27 ASSESSMENT — PAIN SCALES - GENERAL: PAINLEVEL_OUTOF10: 0

## 2021-12-27 NOTE — CARE COORDINATION
Transitional Planning    Spoke with Anisa Reardon at [facility], they have received order but she states they cannot fit patient until tomorrow

## 2021-12-27 NOTE — PLAN OF CARE
Problem: Falls - Risk of:  Goal: Will remain free from falls  Description: Will remain free from falls  12/27/2021 1327 by Ronit Brownlee RN  Outcome: Ongoing  12/27/2021 0203 by Armando Rodriguez RN  Outcome: Ongoing  Goal: Absence of physical injury  Description: Absence of physical injury  12/27/2021 1327 by Ronit Brownlee RN  Outcome: Ongoing  12/27/2021 0203 by Armando Rodriguez RN  Outcome: Ongoing     Problem: ABCDS Injury Assessment  Goal: Absence of physical injury  12/27/2021 1327 by Ronit Brownlee RN  Outcome: Ongoing  12/27/2021 0203 by Armando Rodriguez RN  Outcome: Ongoing     Problem: OXYGENATION/RESPIRATORY FUNCTION  Goal: Patient will maintain patent airway  12/27/2021 1327 by Ronit Brownlee RN  Outcome: Ongoing  12/27/2021 0203 by Armando Rodriguez RN  Outcome: Ongoing  Goal: Patient will achieve/maintain normal respiratory rate/effort  Description: Respiratory rate and effort will be within normal limits for the patient  12/27/2021 1327 by Ronit Brownlee RN  Outcome: Ongoing  12/27/2021 0203 by Armando Rodriguez RN  Outcome: Ongoing     Problem: HEMODYNAMIC STATUS  Goal: Patient has stable vital signs and fluid balance  12/27/2021 1327 by Ronit Brownlee RN  Outcome: Ongoing  12/27/2021 0203 by Armando Rodriguez RN  Outcome: Ongoing     Problem: FLUID AND ELECTROLYTE IMBALANCE  Goal: Fluid and electrolyte balance are achieved/maintained  12/27/2021 1327 by Ronit Brownlee RN  Outcome: Ongoing  12/27/2021 0203 by Armando Rodriguez RN  Outcome: Ongoing     Problem: ACTIVITY INTOLERANCE/IMPAIRED MOBILITY  Goal: Mobility/activity is maintained at optimum level for patient  12/27/2021 1327 by Ronit Brownlee RN  Outcome: Ongoing  12/27/2021 0203 by Armando Rodriguez RN  Outcome: Ongoing     Problem: Skin Integrity:  Goal: Will show no infection signs and symptoms  Description: Will show no infection signs and symptoms  12/27/2021 1327 by Ronit Brownlee RN  Outcome: Ongoing  12/27/2021 0203 by Phyllis Cameron Molly Chaves RN  Outcome: Ongoing  Goal: Absence of new skin breakdown  Description: Absence of new skin breakdown  12/27/2021 1327 by Billy Sahu RN  Outcome: Ongoing  12/27/2021 0203 by Brando Yeboah RN  Outcome: Ongoing

## 2021-12-27 NOTE — OP NOTE
[facility]    CARDIAC CATHETERIZATION    Date:   12/27/2021  Patient name:  Elizbaeth Le  Date of admission:  12/22/2021  5:01 AM  MRN:   [id number]  YOB: 1945    Operators:  Primary:   Beatrice Merida MD (Attending Physician)    Procedure performed:   [x] Left Heart Catheterization. [] Graft Angiography.  [] Left Ventriculography. [] Right Heart Catheterization. [x] Coronary Angiography. [] Aortic Valve Studies. [] PCI       [] Other:       Pre Procedure Conscious Sedation Data:  ASA Class:    [] I [x] II [] III [] IV    Mallampati Class:  [] I [x] II [] III [] IV      Indication:  [] STEMI      [] + Stress test  [x] ACS      [] + EKG Changes  [] Non Q MI       [] Significant Risk Factors  [] Recurrent Angina             [] Diabetes Mellitus    [] New LBBB      [] Other.  [] CHF / Low EF changes     [] Abnormal CTA / Ca Score      Procedure:  Access:  [x] Femoral  [] Radial  artery       [x] Right  [] Left    Procedure: After informed consent was obtained with explanation of the risks and benefits, patient was brought to the cath lab. The access area was prepped and draped in sterile fashion. 1% lidocaine was used for local block. The artery was cannulated with 6  Fr sheath with brisk arterial blood return. The side port was frequently flushed and aspirated with normal saline. Estimated Blood Loss:  [x] Minimal < 25 cc [] Moderate 25-50 cc  [] >50 cc    Findings:  LMCA: Normal 0% stenosis.      LAD: Patent mid stent area   Distal 50% stenosis (Same as 2019)   D1: Stent is patent     LCx: Patent mid stent area     RCA: Patent with mid 25% stenosis      Coronary Tree Dominance: Right     Conclusions:  Patent LAD, D, and LCX stents       Recommendations   Medical treatment   Life vest for few weeks to re assess LV function after maximum medical treatment     Electronically signed on 12/27/2021 at 10:13 AM by:    Roger Freeman MD  Fellow, [facility]. [facility]      I have reviewed the case / procedure with resident / fellow  I have examined the patient personally  Patient agree with treatment plan as discussed before, final arrangement based on my evaluation and exam.    Risk and benefit of procedure planned were explained in details. Procedure was performed by me personally, with all aspect of the procedure being done using standard protocol. Note was modified based on my own assessment and treatment.     Melba Ferrara MD  [facility]

## 2021-12-27 NOTE — PROGRESS NOTES
as well as troponin and lactic acidemia. He was placed on bipap therapy due to hypoxia (SpO2 70%) with significant improvement of his shortness of breath. Due to his elevated troponin and symptoms, he was transferred directly from [facility] ED to [facility] for higher level of care. Prior to transfer, he did receive Lovenox and Lasix.      Since arrival to the emergency department, patient reports he is feeling significantly improved. Remains on 4 L of oxygen via nasal cannula. His wife reports that he looks much better. He states that he wants to go home. Cardiology at bedside. He will be admitted for further work-up and evaluation of NSTEMI. Review of Systems:     Constitutional:  negative for chills, fevers, sweats  Respiratory: Reports persistent shortness of breath; negative for cough, wheezing  Cardiovascular:  negative for chest pain, chest pressure/discomfort, lower extremity edema, palpitations  Gastrointestinal:  negative for abdominal pain, constipation, diarrhea, nausea, vomiting  Neurological:  negative for dizziness, headache    Medications: Allergies:     Allergies   Allergen Reactions    Atorvastatin Other (See Comments)     Muscle pain (myalgias)    Sulfa Antibiotics Swelling     Swelling of retinas    Sulfanilamide Other (See Comments)       Current Meds:   Scheduled Meds:    [START ON 12/28/2021] furosemide  40 mg Oral Daily    acetylcysteine  600 mg Oral BID    insulin glargine  10 Units SubCUTAneous Daily    insulin lispro  0-18 Units SubCUTAneous TID WC    insulin lispro  0-9 Units SubCUTAneous Nightly    diphenhydrAMINE  25 mg IntraVENous Once    Vitamin D  1,000 Units Oral BID    atorvastatin  20 mg Oral Daily    clopidogrel  75 mg Oral Daily    amLODIPine  10 mg Oral Daily    lisinopril  10 mg Oral Daily    sodium chloride flush  5-40 mL IntraVENous 2 times per day    levofloxacin  750 mg IntraVENous Q48H     Continuous Infusions:    sodium chloride      heparin (PORCINE) Infusion 20 Units/kg/hr (21 0656)    dextrose       PRN Meds: albuterol, sodium chloride flush, sodium chloride, acetaminophen **OR** acetaminophen, heparin (porcine), heparin (porcine), glucose, dextrose, glucagon (rDNA), dextrose    Data:     Past Medical History:   has a past medical history of Acne rosacea, Anemia, Aortic stenosis, BPH (benign prostatic hypertrophy), CAD (coronary artery disease), Carotid stenosis, Cataract of both eyes, CRI (chronic renal insufficiency), Diabetes mellitus, type 2 ([address] Dyslipidemia, Erectile dysfunction, H/O agent Orange exposure, Hearing loss, Hyperlipidemia, Hypertension, Mild nonproliferative diabetic retinopathy ([address] Non-rheumatic mitral regurgitation, RBBB, 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 Social History:   reports that he quit smoking about 52 years ago. He has a 1.00 pack-year smoking history. He has never used smokeless tobacco. He reports current alcohol use. He reports that he does not use drugs. Family History:   Family History   Problem Relation Age of Onset    Heart Attack Father     Coronary Art Dis Sister     Hypertension Mother     Lung Cancer Mother     Stroke Mother     Coronary Art Dis Brother         later in life       Vitals:  /71   Pulse 71   Temp 97.6 °F (36.4 °C) (Oral)   Resp 22   Ht 5' 6\" (1.676 m)   Wt 150 lb 12.7 oz (68.4 kg)   SpO2 91%   BMI 24.34 kg/m²   Temp (24hrs), Av.2 °F (36.8 °C), Min:97.6 °F (36.4 °C), Max:98.9 °F (37.2 °C)    Recent Labs     21  1123 21  1603 21  2139 21  0536   POCGLU 203* 239* 325* 137*       I/O (24Hr):     Intake/Output Summary (Last 24 hours) at 2021 0941  Last data filed at 2021 0836  Gross per 24 hour   Intake 1234 ml   Output 1450 ml   Net -216 ml       Labs:  Hematology:  Recent Labs     21  0305 21  0559 21  0534 WBC 10.3 10.4 13.0*   RBC 4.58 5.01 5.07   HGB 12.1* 12.9* 13.1   HCT 35.8* 39.6* 39.4*   MCV 78.2* 79.0* 77.7*   MCH 26.4 25.7 25.8   MCHC 33.8 32.6 33.2   RDW 13.5 13.3 13.3    318 358   MPV 10.6 10.5 10.3     Chemistry:  Recent Labs     12/25/21  0305 12/25/21  0305 12/26/21  0012 12/26/21  0559 12/27/21  0534   *   < > 135 133* 136   K 4.4   < > 3.7 3.7 4.0   CL 93*   < > 94* 95* 98   CO2 28   < > 25 23 26   GLUCOSE 137*   < > 100* 136* 149*   BUN 38*   < > 43* 41* 42*   CREATININE 1.41*   < > 1.43* 1.35* 1.48*   MG 2.0  --   --  2.6 2.3   ANIONGAP 11   < > 16 15 12   LABGLOM 49*   < > 48* 52* 46*   GFRAA 59*   < > 58* >60 56*   CALCIUM 8.5*   < > 8.7 8.6 8.8   PHOS 4.2  --   --  4.4 4.7*    < > = values in this interval not displayed. Recent Labs     12/25/21  0305 12/25/21  0836 12/25/21  2100 12/26/21  0559 12/26/21  0746 12/26/21  1123 12/26/21  1603 12/26/21  2139 12/27/21  0534 12/27/21  0536   LABALBU 3.3*  --   --  3.1*  --   --   --   --  3.2*  --    POCGLU  --    < > 267*  --  164* 203* 239* 325*  --  137*    < > = values in this interval not displayed. ABG:No results found for: POCPH, PHART, PH, POCPCO2, BKZ6IIS, PCO2, POCPO2, PO2ART, PO2, POCHCO3, MKW9MOD, HCO3, NBEA, PBEA, BEART, BE, THGBART, THB, WCS8HIG, RAFL7WUB, N8XRBVCP, O2SAT, FIO2  Lab Results   Component Value Date/Time    SPECIAL NOT REPORTED 12/21/2021 10:29 PM     Lab Results   Component Value Date/Time    CULTURE NO GROWTH 5 DAYS 12/21/2021 10:29 PM       Radiology:  XR CHEST (2 VW)    Result Date: 12/20/2021  Patchy opacities in the right lung and mild septal thickening. Findings are suggestive of interstitial edema. Underlying inflammatory process or infection should also be considered in the appropriate clinical setting. XR CHEST PORTABLE    Result Date: 12/22/2021  Normal examination. Congestive failure evident on yesterday's exam has resolved.      XR CHEST PORTABLE    Result Date: 12/21/2021  Interval worsening of cardiomegaly. Interval significant worsening of parahilar densities and increased interstitial marking. Although finding may be suggestive of pulmonary edema, extensive viral pneumonia should also be placed in differential.       Physical Examination:        General appearance:  alert, cooperative and no distress, obese  gentleman sitting up in bed  Mental Status:  oriented to person, place and time and normal affect  HEENT: EOMI, PERRLA, sclera anti-icteric, simple cannula present  Lungs:  clear to auscultation bilaterally, occasional scattered rhonchi, no wheezing today, normal effort  Heart:  regular rate and rhythm, no murmur  Abdomen:  soft, nontender, nondistended, normal bowel sounds, no masses, hepatomegaly, splenomegaly  Extremities:  no edema, redness, tenderness in the calves  Skin:  no gross lesions, rashes, induration    Assessment:        Hospital Problems           Last Modified POA    * (Principal) Acute respiratory failure with hypoxia ([address] 12/22/2021 Yes    S/P TAVR (transcatheter aortic valve replacement) 12/22/2021 Yes    Influenza A 12/22/2021 Yes    NSTEMI (non-ST elevated myocardial infarction) ([address] 12/22/2021 Yes    BJ (acute kidney injury) ([address] 12/22/2021 Yes    Essential hypertension 12/22/2021 Yes    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 Acute respiratory failure with iclcwmh-R-kgymw negative, likely ACHF continue diuresis his kidney function has maintained  2. Possible superimposed pneumonia on influenza A infection-continue Tamiflu. Continue Levaquin. 3. COPD exacerbation-no wheezing today, continue prednisone to complete course, continue antibiotics. 4. NSTEMI-cardiology following.     5. Acute on chronic systolic heart failure-continue diuresis  6. CKD stage IIIa  7. Normocytic normochromic anemia-hemoglobin stable. Likely anemia chronic disease   8. Severe aortic stenosis status post TAVR  9. Essential hypertension-no changes to blood pressure medications today, hold lisinopril for now,  10. Coronary artery disease status post stents-continue Plavix and statin therapy  11. Type 2 diabetes mellitus-no changes to insulin today   12. Disposition: Continue heparin drip, cardiac catheterization today.   Off oxygen now    Allison Leung DO  12/27/2021  9:41 AM

## 2021-12-27 NOTE — PROGRESS NOTES
Cath reviewed. ICMP without need for further PCI. Continue GDMT for 90 days along with Lifevest and will reassess limited echo after that time for determination of need for AICD.     F/U in clinic in 2 weeks

## 2021-12-27 NOTE — DISCHARGE SUMMARY
[facility]  Office: [address], DO, Jacob Crocker, DO, Miriam Rendon, DO, Lety Ochoa, DO, Theodora Coleman MD, Leslee Barnes MD, Nora Freeman MD, Trip Wilhelm MD, Brett Diaz MD, Federico Mcguire MD, Gita Frye MD, Min Joaquin, DO, Silvio Jordan DO, Lawson Webber MD,  Jad Coombs, DO, Gen Morley MD, Vincent Joaquin MD, Shagufta Luong MD, Jaime Fernandez MD, Olga Freeman MD, Maddi Massey MD, Christopher Soto MD, [facility], [facility] Nathanieloss, CNP, Penny Cancel, CNP, Honey Fret, CNS, Cheyenne Shayne, CNP, Carolyne Brain, CNP, Karson Cosme, CNP, Leyla Chaidez, CNP, Jaime Bo, CNP, Geraldo Boyd PA-C, Tyra Donovan, [facility], Jim Rodriguez, [facility], Mary Richard, CNP, Marlen Chowdhury, CNP, Anshu Yung, CNP, Francisco Mccarthy, CNP, Monica Aguero, CNP, Shannon Champion, [facility]    Discharge Summary     Patient ID: Gerson Barclay  :  1945   MRN: [id number]     ACCOUNT:  [de-identified]   Patient's PCP: ESTHER Ozuna CNP  Admit Date: 2021   Discharge Date: 2021     Length of Stay: 5  Code Status:  Full Code  Admitting Physician: No admitting provider for patient encounter. 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  *      Admission Condition:  good     Discharged Condition: good    Hospital Stay:     Hospital Course:  Charline Verma is a 77 y.o. male who was admitted for the management of   Acute respiratory failure with hypoxia [facility]) , presented to ER with Shortness of Breath (started Monday, worsening this morning.  )    Patient evaluated by emergency department, found to be positive for influenza A with superimposed pneumonia. Was started on Levaquin and Tamiflu. Patient was also diuresed with Lasix, seen by cardiology recommending cardiac catheterization. Patient underwent cardiac cath, nonobstructive coronary disease, patient eventually was weaned to room air, recommended follow-up as patient ejection fraction had decreased. EF currently 25%, patient was ordered a LifeVest which he was fitted for. We will follow-up with cardiology in 2 weeks for repeat echocardiogram, completed course of Levaquin and Tamiflu. Discharged on Lasix      Significant therapeutic interventions:  Cardiac Cath 12/27/2021  Findings:  LMCA: Normal 0% stenosis.    LAD: Patent mid stent area   Distal 50% stenosis (Same as 2019)   D1: Stent is patent   LCx: Patent mid stent area   RCA: Patent with mid 25% stenosis   Coronary Tree Dominance: Right      Conclusions:  Patent LAD, D, and LCX stents     Significant Diagnostic Studies:   Labs / Micro:  CBC:   Lab Results   Component Value Date    WBC 12.0 12/28/2021    RBC 5.01 12/28/2021    HGB 12.9 12/28/2021    HCT 39.9 12/28/2021    MCV 79.6 12/28/2021    MCH 25.7 12/28/2021    MCHC 32.3 12/28/2021    RDW 13.7 12/28/2021     12/28/2021     BMP:    Lab Results   Component Value Date    GLUCOSE 137 12/28/2021     12/28/2021    K 4.1 12/28/2021    CL 95 12/28/2021    CO2 23 12/28/2021    ANIONGAP 14 12/28/2021    BUN 41 12/28/2021    CREATININE 1.48 12/28/2021    BUNCRER NOT REPORTED 12/28/2021    CALCIUM 8.9 12/28/2021    LABGLOM 46 12/28/2021    GFRAA 56 12/28/2021    GFR      12/28/2021    GFR NOT REPORTED 12/28/2021     HFP:    Lab Results   Component Value Date    PROT 6.9 07/22/2021    PROT 7.3 02/06/2012     CMP:    Lab Results   Component Value Date    GLUCOSE 137 12/28/2021     12/28/2021    K 4.1 12/28/2021    CL 95 12/28/2021    CO2 23 12/28/2021    BUN 41 12/28/2021    CREATININE 1.48 12/28/2021    ANIONGAP 14 12/28/2021    ALKPHOS 80 07/22/2021    ALT 10 07/22/2021    AST 22 07/22/2021    BILITOT 0.26 07/22/2021    LABALBU 3.1 12/28/2021    ALBUMIN 1.2 07/22/2021    LABGLOM 46 12/28/2021    GFRAA 56 12/28/2021    GFR      12/28/2021    GFR NOT REPORTED 12/28/2021    PROT 6.9 07/22/2021    PROT 7.3 02/06/2012    CALCIUM 8.9 12/28/2021     PT/INR:    Lab Results   Component Value Date    PROTIME 10.7 12/23/2021    INR 1.0 12/23/2021     PTT:   Lab Results   Component Value Date    APTT 56.7 12/27/2021     FLP:    Lab Results   Component Value Date    CHOL 157 07/22/2021    TRIG 102 07/22/2021    HDL 42 07/22/2021        Radiology:  XR CHEST (SINGLE VIEW FRONTAL)    Result Date: 12/23/2021  Trace effusions with scattered infiltrates. XR CHEST PORTABLE    Result Date: 12/22/2021  Normal examination. Congestive failure evident on yesterday's exam has resolved. XR CHEST PORTABLE    Result Date: 12/21/2021  Interval worsening of cardiomegaly. Interval significant worsening of parahilar densities and increased interstitial marking. Although finding may be suggestive of pulmonary edema, extensive viral pneumonia should also be placed in differential.       Consultations:    Consults:     Final Specialist Recommendations/Findings:   IP CONSULT TO CARDIOLOGY  IP CONSULT TO CARDIOLOGY  IP CONSULT TO PULMONOLOGY  IP CONSULT TO NEPHROLOGY      The patient was seen and examined on day of discharge and this discharge summary is in conjunction with any daily progress note from day of discharge.     Discharge plan:     Disposition: Home    Physician Follow Up:     [facility] Fregoso - [place]  [facility]  [phone]    Schedule an appointment as soon as possible for a visit in 1 week  hospital followup    Emerita Austin, [facility] Cristina Rodas [address] [place] [address]  [phone]    Schedule an appointment as soon as possible for a visit in 2 weeks  hospital followup, cardiology       Requiring Further Evaluation/Follow Up POST HOSPITALIZATION/Incidental Findings: none    Diet: regular diet    Activity: As tolerated    Instructions to Patient: none    Discharge Medications:      Medication List      CHANGE how you take these medications    furosemide 40 MG tablet  Commonly known as: LASIX  Take 1 tablet by mouth daily  Start taking on: December 28, 2021  What changed:   · how much to take  · additional instructions        CONTINUE taking these medications    amLODIPine 10 MG tablet  Commonly known as: NORVASC  Take 1 tablet by mouth daily     clopidogrel 75 MG tablet  Commonly known as: Plavix  Take 1 tablet by mouth daily Take 1 daily     glipiZIDE 10 MG tablet  Commonly known as: GLUCOTROL     Handicap Placard Misc  by Does not apply route Duration 2 years     lisinopril 20 MG tablet  Commonly known as: PRINIVIL;ZESTRIL     metFORMIN 1000 MG tablet  Commonly known as: GLUCOPHAGE  Take 1 tablet by mouth 2 times daily (with meals)     simvastatin 40 MG tablet  Commonly known as: ZOCOR     vitamin D 1000 UNIT Tabs tablet  Commonly known as: CHOLECALCIFEROL        STOP taking these medications    levoFLOXacin 500 MG tablet  Commonly known as: LEVAQUIN     oseltamivir 30 MG capsule  Commonly known as: Tamiflu           Where to Get Your Medications      These medications were sent to [facility] [address] [facility], [address], [facility] [address]    Phone: [phone]   · furosemide 40 MG tablet         No discharge procedures on file.     Time Spent on discharge is  38 mins in patient examination, evaluation, counseling as well as medication reconciliation, prescriptions for required medications, discharge plan and follow up. Electronically signed by   Josiah Albrecht DO  12/27/2021  1:27 PM      Thank you ESTHER Lee - [place] for the opportunity to be involved in this patient's care.

## 2021-12-27 NOTE — PLAN OF CARE
Problem: Falls - Risk of:  Goal: Will remain free from falls  Description: Will remain free from falls  Outcome: Ongoing  Goal: Absence of physical injury  Description: Absence of physical injury  Outcome: Ongoing     Problem: ABCDS Injury Assessment  Goal: Absence of physical injury  Outcome: Ongoing     Problem: OXYGENATION/RESPIRATORY FUNCTION  Goal: Patient will maintain patent airway  Outcome: Ongoing  Goal: Patient will achieve/maintain normal respiratory rate/effort  Description: Respiratory rate and effort will be within normal limits for the patient  Outcome: Ongoing     Problem: HEMODYNAMIC STATUS  Goal: Patient has stable vital signs and fluid balance  Outcome: Ongoing     Problem: FLUID AND ELECTROLYTE IMBALANCE  Goal: Fluid and electrolyte balance are achieved/maintained  Outcome: Ongoing     Problem: ACTIVITY INTOLERANCE/IMPAIRED MOBILITY  Goal: Mobility/activity is maintained at optimum level for patient  Outcome: Ongoing     Problem: Skin Integrity:  Goal: Will show no infection signs and symptoms  Description: Will show no infection signs and symptoms  Outcome: Ongoing  Goal: Absence of new skin breakdown  Description: Absence of new skin breakdown  Outcome: Ongoing

## 2021-12-27 NOTE — PROGRESS NOTES
Congestive Heart Failure Education completed and charted. CHF booklet given. Patient was receptive to education. Discussed the  importance of medication compliance. Discussed the importance of a heart healthy diet. Discussed 2000 mg sodium-restricted daily diet. Patient instructed to limit fluid intake to  1.5 to 2 liters per day. Patient instructed to weigh self at the same time of each day each morning, reinforced teaching to monitor for 3-5 lb weight increase over 1-2 days notify physician if change noted. Signs and symptoms of CHF discussed with patient, such as feeling more tired than normal, feeling short of breath, coughing that increases when lying down, sudden weight gain, swelling of the feet, legs or belly. Patient verbalized understanding to notify physician office if these symptoms occur. Discussed all with patient and his wife. They live in [facility] and suggested the CHF clinic in [facility] for patient. They said they would consider this.

## 2021-12-27 NOTE — PROGRESS NOTES
CLINICAL PHARMACY NOTE: MEDS TO BEDS    Total # of Prescriptions Filled: 1   The following medications were delivered to the patient:  · Lasix 40mg tablet    Additional Documentation: medication delivered to the pt in room 401 on 12.27.21 at 15:07 by vinay Card pay $13.98, paid with a check

## 2021-12-27 NOTE — PROGRESS NOTES
[facility]   Progress Note                   Date:   12/27/2021  Patient name: Sander Moreno  Date of admission:  12/22/2021  5:01 AM  MRN:   [id number]  YOB: 1945  PCP: ESTHER Estevez CNP    Reason for Admission: Septicemia ([facility] [A41.9]  Hypoxia [R09.02]  Influenza with respiratory manifestation other than pneumonia [J11.1]  NSTEMI (non-ST elevated myocardial infarction) ([facility] [I21.4]  Sepsis ([facility] [A41.9]  Leukocytosis, unspecified type [D72.829]  Acute on chronic congestive heart failure, unspecified heart failure type ([facility] [I50.9]    Subjective:       Clinical Changes / Abnormalities: Pt seen and examined in the room with RN at bedside. No acute CV issues/concerns overnight. Breathing stable. Vitals, labs and medications reviewed.    NPO presently     -4 L since admission   Medications:   Scheduled Meds:   acetylcysteine  600 mg Oral BID    insulin glargine  10 Units SubCUTAneous Daily    insulin lispro  0-18 Units SubCUTAneous TID WC    insulin lispro  0-9 Units SubCUTAneous Nightly    furosemide  40 mg IntraVENous BID    diphenhydrAMINE  25 mg IntraVENous Once    Vitamin D  1,000 Units Oral BID    atorvastatin  20 mg Oral Daily    clopidogrel  75 mg Oral Daily    amLODIPine  10 mg Oral Daily    lisinopril  10 mg Oral Daily    sodium chloride flush  5-40 mL IntraVENous 2 times per day    levofloxacin  750 mg IntraVENous Q48H     Continuous Infusions:   sodium chloride      heparin (PORCINE) Infusion 20 Units/kg/hr (12/27/21 0656)    dextrose       CBC:   Recent Labs     12/25/21  0305 12/26/21  0559 12/27/21  0534   WBC 10.3 10.4 13.0*   HGB 12.1* 12.9* 13.1    318 358     BMP:    Recent Labs     12/26/21  0012 12/26/21  0559 12/27/21  0534    133* 136   K 3.7 3.7 4.0   CL 94* 95* 98   CO2 25 23 26   BUN 43* 41* 42*   CREATININE 1.43* 1.35* 1.48*   GLUCOSE 100* 136* 149*     Hepatic: No results for input(s): AST, ALT, ALB, BILITOT, ALKPHOS in the last 72 hours. Troponin:   No results for input(s): TROPHS in the last 72 hours. BNP: No results for input(s): BNP in the last 72 hours. Lipids: No results for input(s): CHOL, HDL in the last 72 hours. Invalid input(s): LDLCALCU  INR:   No results for input(s): INR in the last 72 hours. Cardiac Investigations:  EKG:     ECHO 12/25/21  Summary  Dilated left ventricle with severely reduced systolic function. Estimated EF  25-30%  Severe hypokinesis of inferior, inferolateral and inferoseptal walls. Grade I (mild) left ventricular diastolic dysfunction. Normal right ventricular size and function. Bioprosthetic aortic valve (TAVR) is noted in position, functioning normally  with no significant stenosis/regurgitation or perivalvular leak (Mean  gradient 5 mm hg)  Mild mitral regurgitation. Mild tricuspid regurgitation. Estimated right ventricular systolic pressure is 41 mmHg. No significant pericardial effusion is seen.      Rhythm: normal sinus   Rate: normal  Axis: Left  Ectopy: none  Conduction: First-degree AV block  ST Segments: no acute change  T Waves: no acute change  Q Waves: Inferior, septal     Clinical Impression: Abnormal EKG, sinus rhythm with first-degree AV block PACs,.  When compared to previous EKG no acute findings. ECHO 3/16/2021: EF 45%, grade I DD, LA is severely dilated, bio-AV with peak gradient gradient 12 mmHg and mean gradient of 6 mmHg, mild MR.      CATH 12/3/19:    Severe aortic stenosis.   Mild LV systolic dysfunction.   Patent mid LAD, proximal D1 and mid LCX stents. Focal distal LAD 70% stenosis.     CATH 8/23/16: DEVON to LAD, D and mid LCX     Previous office/hospital visit:   Dr. Edu Estes 8/3/2021:   1. CAD with h/o DEVON to LAD, D and mid LCX 8/23/16. Cardiac cath for NSTEMI 12/3/19 showed patent stents with chronic RDPA occlusion with focal distal LAD 70%; LVEF 40%. Stable on current medical treatment. Continue ASA/plavix  2.  Severe aortic stenosis s/p TAVR on 1/21/2020 functioning normally on echo post TAVR as above. Follow with TTEs. Antibiotics prior to dental work. 3. Carotid artery disease. Follows with vascular surgery. 4. HTN- with white coat HTN. Continue current therapy. Patient to check BP at home and to report if > 130/85  5. Ischemic / valvular cardiomyopathy EF of 45% currently without signs of volume overload. 6. Hyperlipidemia- Last  on 5/21/2020. Patient is on low dose simvastatin which he is able to tolerate. Any increase in dose or switch has caused him severe myalgia. I recommended him addition of zetia. He does not want to take this new medication. Repeat lipid panel  7. H/o agent orange exposure. 8. CKD- Has been referred to nephrology by PCP  9. Right lower ext numbess. Seeing neurology and vascular surgery. 10. RTC 6 months    Objective:   Vitals: /71   Pulse 71   Temp 97.6 °F (36.4 °C) (Oral)   Resp 22   Ht 5' 6\" (1.676 m)   Wt 150 lb 12.7 oz (68.4 kg)   SpO2 91%   BMI 24.34 kg/m²   General appearance: alert and cooperative with exam  HEENT: Head: Normocephalic, no lesions, without obvious abnormality. Neck: no JVD, trachea midline, no adenopathy  Lungs: Diminished to auscultation throughout.  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)     Non-rheumatic mitral regurgitation     Nonrheumatic aortic valve stenosis Uncontrolled type 2 diabetes mellitus with nephropathy ([facility]     Coronary artery disease involving native coronary artery of native heart without angina pectoris     Controlled type 2 diabetes mellitus with mild nonproliferative retinopathy and macular edema, without long-term current use of insulin ([facility])     Carotid stenosis     Chronic kidney disease, stage 3a (HCC)     COPD exacerbation (HCC)     Pneumonia of right lower lobe due to infectious organism     Aortic stenosis, severe     Hypertension     Hyperlipidemia     H/O agent Orange exposure     Diabetes mellitus, type 2 ([facility])     CAD (coronary artery disease)     Anemia     Proteinuria     Wears hearing aid in both ears     Acute on chronic systolic heart failure (HCC)     S/P TAVR (transcatheter aortic valve replacement)     Second hand smoke exposure     Peripheral arterial disease ([facility])     Heavy sensation of lower extremity     Impaired ambulation     Pneumonia     Sepsis ([facility]     Influenza A     Acute respiratory failure with hypoxia ([facility])     NSTEMI (non-ST elevated myocardial infarction) ([facility]     BJ (acute kidney injury) ([facility]      Plan of Treatment:   1. Elevated trops,   ECHO reviewed. Decrease in EF from prior. Due to CKD, Discussed with Dr. Cynthia Brewer and will proceed with cardiac cath today. I have discussed risks (including but not limited to vascular injury, infection, hematoma, contrast induced kidney dysfunction, CVA and MI), benefits, alternatives in detail. All questions answered. Patient agrees to proceed. 2. CHF - improving. Appreciate nephrology assistance with diuretics. ACE resumed. Will switch Lasix to oral  3. Received mucomyst pre-cath   4. Further POC pending cath findings.         Electronically signed by ESTHER Looney CNP on 12/27/2021 at 9:16 AM  [address]  [phone]

## 2021-12-27 NOTE — DISCHARGE INSTR - COC
Continuity of Care Form    Patient Name: Michael Callejas   :  1945  MRN:  [id number]    [facility] date:  2021  Discharge date:  ***    Code Status Order: Full Code   Advance Directives:      Admitting Physician:  No admitting provider for patient encounter. PCP: ESTHER Victor CNP    Discharging Nurse: [facility] Unit/Room#: 3644/6178-53  Discharging Unit Phone Number: ***    Emergency Contact:   Extended Emergency Contact Information  Primary Emergency Contact: Sarah Beth Rodas  Address: [address] [place]  Home Phone: [phone]  Mobile Phone: [phone]  Relation: Spouse  Preferred language: English   needed?  No    Past Surgical History:  Past Surgical History:   Procedure Laterality Date    AORTIC VALVE SURGERY  2020    CARDIAC CATHETERIZATION  2019    CATARACT REMOVAL Bilateral     CIRCUMCISION  age 49    EYE SURGERY Bilateral     cataract    TYMPANOSTOMY TUBE PLACEMENT      VASECTOMY         Immunization History:   Immunization History   Administered Date(s) Administered    COVID-19, Nadiya Bowles, Primary or Immunocompromised, PF, 100mcg/0.5mL 2021, 2021, 10/26/2021    Influenza 2013    Influenza Virus Vaccine 10/25/2007, 10/11/2012, 10/07/2015, 09/10/2020    Influenza, High Dose (Fluzone 65 yrs and older) 2016, 10/06/2017, 2018, 10/12/2019    Influenza, MDCK Quadv, with preserv IM (Flucelvax 2 yrs and older) 2019    Influenza, Quadv, adjuvanted, 65 yrs +, IM, PF (Fluad) 2021    Pneumococcal Conjugate 13-valent ([id number]) 2015    Pneumococcal Polysaccharide ([id number]) 2006, 2014    Td, unspecified formulation 2018    Tdap (Boostrix, Adacel) 2008    Zoster Live (Zostavax) 2013    Zoster Recombinant (Shingrix) 2019, 2019       Active Problems:  Patient Active Problem List   Diagnosis Code    Essential hypertension I10    Chronic kidney disease N18.9    Unilateral sensorineural hearing loss H90.5    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 ([facility] E11.21    Non-rheumatic mitral regurgitation I34.0    Nonrheumatic aortic valve stenosis I35.0    Uncontrolled type 2 diabetes mellitus with nephropathy ([facility]) E11.21, E11.65    Coronary artery disease involving native coronary artery of native heart without angina pectoris I25.10    Controlled type 2 diabetes mellitus with mild nonproliferative retinopathy and macular edema, without long-term current use of insulin ([facility] A76.2537    Carotid stenosis I65.29    Chronic kidney disease, stage 3a ([facility]) N18.31    COPD exacerbation ([facility]) J44.1    Pneumonia of right lower lobe due to infectious organism J18.9    Aortic stenosis, severe I35.0    Hypertension I10    Hyperlipidemia E78.5    H/O agent [place] exposure Z77.098    Diabetes mellitus, type 2 ([facility] E11.9    CAD (coronary artery disease) I25.10    Anemia D64.9    Proteinuria R80.9    Wears hearing aid in both ears Z97.4    Acute on chronic systolic heart failure ([facility]) I50.23    S/P TAVR (transcatheter aortic valve replacement) Z95.2    Second hand smoke exposure Z77.22    Peripheral arterial disease ([facility]) I73.9    Heavy sensation of lower extremity R29.898    Impaired ambulation R26.2    Pneumonia J18.9    Sepsis ([facility]) A41.9    Influenza A J10.1    Acute respiratory failure with hypoxia ([facility]) J96.01    NSTEMI (non-ST elevated myocardial infarction) ([facility] I21.4    BJ (acute kidney injury) ([facility] N17.9       Isolation/Infection:   Isolation            Droplet          Patient Infection Status       Infection Onset Added Last Indicated Last Indicated By Review Planned Expiration Resolved Resolved By    None active    Resolved    COVID-19 (Rule Out) 12/21/21 12/21/21 12/21/21 COVID-19, Rapid (Ordered)   12/21/21 Rule-Out Test Resulted    COVID-19 (Rule Out) 07/31/21 07/31/21 07/31/21 COVID-19, Rapid (Ordered)   07/31/21 Rule-Out Test Resulted            Nurse Assessment:  Last Vital Signs: BP (!) 141/80   Pulse 77   Temp 97.9 °F (36.6 °C) (Oral)   Resp 12   Ht 5' 6\" (1.676 m)   Wt 150 lb 12.7 oz (68.4 kg)   SpO2 91%   BMI 24.34 kg/m²     Last documented pain score (0-10 scale): Pain Level: 0  Last Weight:   Wt Readings from Last 1 Encounters:   21 150 lb 12.7 oz (68.4 kg)     Mental Status:  {IP PT MENTAL STATUS:}    IV Access:  { EDMUND IV ACCESS:482924637}    Nursing Mobility/ADLs:  Walking   {CHP DME JBW}  Transfer  {CHP DME QESY:059536107}  Bathing  {CHP DME HXVV:865009609}  Dressing  {CHP DME [facility]:516644150}  Toileting  {CHP DME XAWQ:519457826}  Feeding  {P DME ABYH:284552952}  Med Admin  {P DME TNSX:927991372}  Med Delivery   { EDMUND MED Delivery:905700095}    Wound Care Documentation and Therapy:        Elimination:  Continence: Bowel: {YES / BQ:22298}  Bladder: {YES / DOROTEO:18109}  Urinary Catheter: {Urinary Catheter:075198588}   Colostomy/Ileostomy/Ileal Conduit: {YES / CY:}       Date of Last BM: ***    Intake/Output Summary (Last 24 hours) at 2021 1324  Last data filed at 2021 1131  Gross per 24 hour   Intake 1244 ml   Output 750 ml   Net 494 ml     I/O last 3 completed shifts: In: 5122 [P.O.:832;  I.V.:392; IV Piggyback:150]  Out: 2656 [Urine:1875]    Safety Concerns:     [address] [employer] Safety Concerns:292550933}    Impairments/Disabilities:      { EDMUND Impairments/Disabilities:640291594}    Nutrition Therapy:  Current Nutrition Therapy:   508 [employer] EDMUND Diet List:597185058}    Routes of Feeding: {P DME Other Feedings:344233473}  Liquids: {Slp liquid thickness:88995}  Daily Fluid Restriction: {CHP DME Yes amt example:454918901}  Last Modified Barium Swallow with Video (Video Swallowing Test): {Done Not Done OOCM:531608195}    Treatments at the Time of Hospital Discharge:   Respiratory Treatments: ***  Oxygen Therapy:  {Therapy; copd oxygen:79988}  Ventilator:    { CC Vent BJUZ:159485791}    Rehab Therapies: {THERAPEUTIC INTERVENTION:1283756765}  Weight Bearing Status/Restrictions: [address] Alanna Goss CC Weight Bearin}  Other Medical Equipment (for information only, NOT a DME order):  {EQUIPMENT:139404880}  Other Treatments: ***    Patient's personal belongings (please select all that are sent with patient):  {CHP DME Belongings:103225047}    RN SIGNATURE:  {Esignature:824448776}    CASE MANAGEMENT/SOCIAL WORK SECTION    Inpatient Status Date: ***    Readmission Risk Assessment Score:  Readmission Risk              Risk of Unplanned Readmission:  23           Discharging to Facility/ Agency   Name:   Address:  Phone:  Fax:    Dialysis Facility (if applicable)   Name:  Address:  Dialysis Schedule:  Phone:  Fax:    / signature: {Esignature:449393388}    PHYSICIAN SECTION    Prognosis: Good    Condition at Discharge: Stable    Rehab Potential (if transferring to Rehab): Good    Recommended Labs or Other Treatments After Discharge: none    Physician Certification: I certify the above information and transfer of Aleatha Lundborg  is necessary for the continuing treatment of the diagnosis listed and that he requires Home Care for greater 30 days.      Update Admission H&P: No change in H&P    PHYSICIAN SIGNATURE:  Electronically signed by Dariana Payne DO on 21 at 1:24 PM EST

## 2021-12-28 VITALS
WEIGHT: 151.24 LBS | RESPIRATION RATE: 15 BRPM | HEART RATE: 68 BPM | BODY MASS INDEX: 24.31 KG/M2 | HEIGHT: 66 IN | SYSTOLIC BLOOD PRESSURE: 144 MMHG | TEMPERATURE: 97.9 F | DIASTOLIC BLOOD PRESSURE: 73 MMHG | OXYGEN SATURATION: 92 %

## 2021-12-28 LAB
ALBUMIN SERPL-MCNC: 3.1 G/DL (ref 3.5–5.2)
ANION GAP SERPL CALCULATED.3IONS-SCNC: 14 MMOL/L (ref 9–17)
BUN BLDV-MCNC: 41 MG/DL (ref 8–23)
BUN/CREAT BLD: ABNORMAL (ref 9–20)
CALCIUM SERPL-MCNC: 8.9 MG/DL (ref 8.6–10.4)
CHLORIDE BLD-SCNC: 95 MMOL/L (ref 98–107)
CO2: 23 MMOL/L (ref 20–31)
CREAT SERPL-MCNC: 1.48 MG/DL (ref 0.7–1.2)
GFR AFRICAN AMERICAN: 56 ML/MIN
GFR NON-AFRICAN AMERICAN: 46 ML/MIN
GFR SERPL CREATININE-BSD FRML MDRD: ABNORMAL ML/MIN/{1.73_M2}
GFR SERPL CREATININE-BSD FRML MDRD: ABNORMAL ML/MIN/{1.73_M2}
GLUCOSE BLD-MCNC: 135 MG/DL (ref 75–110)
GLUCOSE BLD-MCNC: 137 MG/DL (ref 70–99)
HCT VFR BLD CALC: 39.9 % (ref 40.7–50.3)
HEMOGLOBIN: 12.9 G/DL (ref 13–17)
MAGNESIUM: 2.3 MG/DL (ref 1.6–2.6)
MCH RBC QN AUTO: 25.7 PG (ref 25.2–33.5)
MCHC RBC AUTO-ENTMCNC: 32.3 G/DL (ref 28.4–34.8)
MCV RBC AUTO: 79.6 FL (ref 82.6–102.9)
NRBC AUTOMATED: 0 PER 100 WBC
PDW BLD-RTO: 13.7 % (ref 11.8–14.4)
PHOSPHORUS: 4.5 MG/DL (ref 2.5–4.5)
PLATELET # BLD: 369 K/UL (ref 138–453)
PMV BLD AUTO: 10.2 FL (ref 8.1–13.5)
POTASSIUM SERPL-SCNC: 4.1 MMOL/L (ref 3.7–5.3)
RBC # BLD: 5.01 M/UL (ref 4.21–5.77)
SODIUM BLD-SCNC: 132 MMOL/L (ref 135–144)
WBC # BLD: 12 K/UL (ref 3.5–11.3)

## 2021-12-28 PROCEDURE — 6360000002 HC RX W HCPCS: Performed by: STUDENT IN AN ORGANIZED HEALTH CARE EDUCATION/TRAINING PROGRAM

## 2021-12-28 PROCEDURE — 99238 HOSP IP/OBS DSCHRG MGMT 30/<: CPT | Performed by: INTERNAL MEDICINE

## 2021-12-28 PROCEDURE — 6370000000 HC RX 637 (ALT 250 FOR IP): Performed by: INTERNAL MEDICINE

## 2021-12-28 PROCEDURE — 85027 COMPLETE CBC AUTOMATED: CPT

## 2021-12-28 PROCEDURE — 80069 RENAL FUNCTION PANEL: CPT

## 2021-12-28 PROCEDURE — 83735 ASSAY OF MAGNESIUM: CPT

## 2021-12-28 PROCEDURE — 82947 ASSAY GLUCOSE BLOOD QUANT: CPT

## 2021-12-28 PROCEDURE — 36415 COLL VENOUS BLD VENIPUNCTURE: CPT

## 2021-12-28 PROCEDURE — 6370000000 HC RX 637 (ALT 250 FOR IP): Performed by: NURSE PRACTITIONER

## 2021-12-28 PROCEDURE — 6360000002 HC RX W HCPCS: Performed by: INTERNAL MEDICINE

## 2021-12-28 RX ADMIN — CLOPIDOGREL 75 MG: 75 TABLET, FILM COATED ORAL at 08:40

## 2021-12-28 RX ADMIN — INSULIN GLARGINE 15 UNITS: 100 INJECTION, SOLUTION SUBCUTANEOUS at 08:40

## 2021-12-28 RX ADMIN — FUROSEMIDE 40 MG: 40 TABLET ORAL at 08:56

## 2021-12-28 RX ADMIN — ATORVASTATIN CALCIUM 20 MG: 20 TABLET, FILM COATED ORAL at 08:40

## 2021-12-28 RX ADMIN — Medication 600 MG: at 08:40

## 2021-12-28 RX ADMIN — LISINOPRIL 10 MG: 10 TABLET ORAL at 08:40

## 2021-12-28 RX ADMIN — LEVOFLOXACIN 750 MG: 5 INJECTION, SOLUTION INTRAVENOUS at 06:17

## 2021-12-28 RX ADMIN — AMLODIPINE BESYLATE 10 MG: 10 TABLET ORAL at 08:40

## 2021-12-28 RX ADMIN — Medication 1000 UNITS: at 08:40

## 2021-12-28 ASSESSMENT — ENCOUNTER SYMPTOMS
SHORTNESS OF BREATH: 1
COUGH: 0
WHEEZING: 0
VOMITING: 0
CONSTIPATION: 0
NAUSEA: 0
SORE THROAT: 0
TROUBLE SWALLOWING: 0
ABDOMINAL PAIN: 0
DIARRHEA: 0

## 2021-12-28 NOTE — PROGRESS NOTES
[facility]  Office: [address], DO, Nely Mandy, DO, Dilciaalejandra Vuong, DO, Jade Figueroaирина Ochoa, DO, Ana Byrnes MD, Loc Herbert MD, Anastasia Alexis MD, Samuel Owen MD, Mireya Pradhan MD, Geovani Simpson MD, Erica Lovell MD, Anisa Lazo, DO, Heidi Pozo, DO, Brenda Aguila MD,  Pat Gonzalez DO, Dietrich Halsted, MD, Steph Andre MD, Saranya Kinney MD, Aiyana Harris MD, Caitlin Quiñonez MD, Praful Avila MD, Ana Giles MD, Bruce Landry CNP, [facility] Re, CNP, Arya Beck, CNP, Christopher Brito, CNS, Booker Riggins, CNP, Rodo Larios, CNP, Anshu Perez, CNP, Tyrell Guzman, CNP, Ban Ngo, CNP, Adam Woo, PA-C, Kam Cuevas, DNP, Yuly Aldridge, DNP, Charles Crandall, CNP, Albania Muniz CNP, Uvaldo Hawkins [facility], Dilma Maxwell, CNP, Jesusita Melendez CNP, Nicolasa Li, [address] [place] [address]    Progress Note    12/28/2021    10:06 AM    Name:   Sander Moreno  MRN:     [id number]     Acct:      [de-identified]   Room:   [address] [place] [address] Day:  6  Admit Date:  12/22/2021  5:01 AM    PCP:   ESTHER Estevez CNP  Code Status:  Full Code    Subjective:     C/C:   Chief Complaint   Patient presents with    Shortness of Breath     started Monday, worsening this morning. Interval History Status: Unchanged    Extensive discussion about ZOLL LifeVest.  Discussed daily weights, follow-up with cardiology, low-salt diet. Patient had all his questions answered, discharge today    Brief History:     Sander Moreno is a 77 y.o. gentleman who initially presented to [facility] ED for evaluation of shortness of breath on 12/22/2021. Onset of symptoms was approximately 2-3 days ago. He was diagnosed with influenza A & pneumonia at the urgent care and was placed on antibiotics and tamiflu. His comorbidities include a history of CAD s/p stenting, essential hypertension, TAVR, and DM II.  He was noted to times per day     Continuous Infusions:    sodium chloride      sodium chloride      dextrose       PRN Meds: sodium chloride flush, sodium chloride, acetaminophen, albuterol, sodium chloride flush, sodium chloride, acetaminophen **OR** acetaminophen, glucose, dextrose, glucagon (rDNA), dextrose    Data:     Past Medical History:   has a past medical history of Acne rosacea, Anemia, Aortic stenosis, BPH (benign prostatic hypertrophy), CAD (coronary artery disease), Carotid stenosis, Cataract of both eyes, CRI (chronic renal insufficiency), Diabetes mellitus, type 2 ([address] Dyslipidemia, Erectile dysfunction, H/O agent Orange exposure, Hearing loss, Hyperlipidemia, Hypertension, Mild nonproliferative diabetic retinopathy ([address] Non-rheumatic mitral regurgitation, RBBB, 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 Social History:   reports that he quit smoking about 52 years ago. He has a 1.00 pack-year smoking history. He has never used smokeless tobacco. He reports current alcohol use. He reports that he does not use drugs. Family History:   Family History   Problem Relation Age of Onset    Heart Attack Father     Coronary Art Dis Sister     Hypertension Mother     Lung Cancer Mother     Stroke Mother     Coronary Art Dis Brother         later in life       Vitals:  BP (!) 144/73   Pulse 68   Temp 97.9 °F (36.6 °C) (Oral)   Resp 15   Ht 5' 6\" (1.676 m)   Wt 151 lb 3.8 oz (68.6 kg)   SpO2 92%   BMI 24.41 kg/m²   Temp (24hrs), Av.9 °F (36.6 °C), Min:97.8 °F (36.6 °C), Max:98.3 °F (36.8 °C)    Recent Labs     21  1136 21  1614 21  1935 21  0727   POCGLU 176* 319* 329* 135*       I/O (24Hr):     Intake/Output Summary (Last 24 hours) at 2021 1006  Last data filed at 2021 0734  Gross per 24 hour   Intake 210 ml   Output 1220 ml   Net -1010 ml       Labs:  Hematology:  Recent Labs     21  0512 12/27/21  0534 12/28/21  0657   WBC 10.4 13.0* 12.0*   RBC 5.01 5.07 5.01   HGB 12.9* 13.1 12.9*   HCT 39.6* 39.4* 39.9*   MCV 79.0* 77.7* 79.6*   MCH 25.7 25.8 25.7   MCHC 32.6 33.2 32.3   RDW 13.3 13.3 13.7    358 369   MPV 10.5 10.3 10.2     Chemistry:  Recent Labs     12/26/21  0559 12/27/21  0534 12/28/21  0657   * 136 132*   K 3.7 4.0 4.1   CL 95* 98 95*   CO2 23 26 23   GLUCOSE 136* 149* 137*   BUN 41* 42* 41*   CREATININE 1.35* 1.48* 1.48*   MG 2.6 2.3 2.3   ANIONGAP 15 12 14   LABGLOM 52* 46* 46*   GFRAA >60 56* 56*   CALCIUM 8.6 8.8 8.9   PHOS 4.4 4.7* 4.5     Recent Labs     12/26/21  0559 12/26/21  0746 12/26/21  2139 12/27/21  0534 12/27/21  0536 12/27/21  1136 12/27/21  1614 12/27/21  1935 12/28/21  0657 12/28/21  0727   LABALBU 3.1*  --   --  3.2*  --   --   --   --  3.1*  --    POCGLU  --    < > 325*  --  137* 176* 319* 329*  --  135*    < > = values in this interval not displayed. ABG:No results found for: POCPH, PHART, PH, POCPCO2, FNZ3UXP, PCO2, POCPO2, PO2ART, PO2, POCHCO3, ICB3HKS, HCO3, NBEA, PBEA, BEART, BE, THGBART, THB, BGD2ZVU, NTYQ5GOW, P2AIJMQF, O2SAT, FIO2  Lab Results   Component Value Date/Time    SPECIAL NOT REPORTED 12/21/2021 10:29 PM     Lab Results   Component Value Date/Time    CULTURE NO GROWTH 5 DAYS 12/21/2021 10:29 PM       Radiology:  XR CHEST (2 VW)    Result Date: 12/20/2021  Patchy opacities in the right lung and mild septal thickening. Findings are suggestive of interstitial edema. Underlying inflammatory process or infection should also be considered in the appropriate clinical setting. XR CHEST PORTABLE    Result Date: 12/22/2021  Normal examination. Congestive failure evident on yesterday's exam has resolved. XR CHEST PORTABLE    Result Date: 12/21/2021  Interval worsening of cardiomegaly. Interval significant worsening of parahilar densities and increased interstitial marking.   Although finding may be suggestive of pulmonary edema, extensive viral pneumonia should also be placed in differential.       Physical Examination:        General appearance:  alert, cooperative and no distress, obese  gentleman sitting up in bed  Mental Status:  oriented to person, place and time and normal affect  HEENT: EOMI, PERRLA, sclera anti-icteric, simple cannula present  Lungs:  clear to auscultation bilaterally, occasional scattered rhonchi, no wheezing today, normal effort  Heart:  regular rate and rhythm, no murmur  Abdomen:  soft, nontender, nondistended, normal bowel sounds, no masses, hepatomegaly, splenomegaly  Extremities:  no edema, redness, tenderness in the calves  Skin:  no gross lesions, rashes, induration    Assessment:        Hospital Problems           Last Modified POA    * (Principal) Acute respiratory failure with hypoxia ([address] 12/22/2021 Yes    S/P TAVR (transcatheter aortic valve replacement) 12/22/2021 Yes    Influenza A 12/22/2021 Yes    NSTEMI (non-ST elevated myocardial infarction) ([address] 12/22/2021 Yes    BJ (acute kidney injury) ([address] 12/22/2021 Yes    Essential hypertension 12/22/2021 Yes    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 Acute respiratory failure with [id number] negative, likely ACHF continue diuresis his kidney function has maintained  2. Possible superimposed pneumonia on influenza A infection-continue Tamiflu. Continue Levaquin. 3. COPD exacerbation-no wheezing today, continue prednisone to complete course, continue antibiotics. 4. NSTEMI-cardiology following. 5. Acute on chronic systolic heart failure-continue diuresis  6. CKD stage IIIa  7. Normocytic normochromic anemia-hemoglobin stable. Likely anemia chronic disease   8.  Severe aortic stenosis status post TAVR  9. Essential hypertension-no changes to blood pressure medications today, hold lisinopril for now,  10. Coronary artery disease status post stents-continue Plavix and statin therapy  11. Type 2 diabetes mellitus-no changes to insulin today   12.  Disposition: Discharge today    Timi Segovia DO  12/28/2021  10:06 AM

## 2021-12-28 NOTE — PLAN OF CARE
Problem: Falls - Risk of:  Goal: Will remain free from falls  Description: Will remain free from falls  12/27/2021 2033 by Angeles Torres RN  Outcome: Ongoing  12/27/2021 1327 by Beverley Livingston RN  Outcome: Ongoing  Goal: Absence of physical injury  Description: Absence of physical injury  12/27/2021 2033 by Angeles Torres RN  Outcome: Ongoing  12/27/2021 1327 by Beverley Livingston RN  Outcome: Ongoing     Problem: ABCDS Injury Assessment  Goal: Absence of physical injury  12/27/2021 2033 by Angeles Torres RN  Outcome: Ongoing  12/27/2021 1327 by Beverley Livingston RN  Outcome: Ongoing     Problem: OXYGENATION/RESPIRATORY FUNCTION  Goal: Patient will maintain patent airway  12/27/2021 2033 by Angeles Torres RN  Outcome: Ongoing  12/27/2021 1327 by Beverley Livingston RN  Outcome: Ongoing  Goal: Patient will achieve/maintain normal respiratory rate/effort  Description: Respiratory rate and effort will be within normal limits for the patient  12/27/2021 2033 by Angeles Torres RN  Outcome: Ongoing  12/27/2021 1327 by Beverley Livingston RN  Outcome: Ongoing     Problem: HEMODYNAMIC STATUS  Goal: Patient has stable vital signs and fluid balance  12/27/2021 2033 by Angeles Torres RN  Outcome: Ongoing  12/27/2021 1327 by Beverley Livingston RN  Outcome: Ongoing     Problem: FLUID AND ELECTROLYTE IMBALANCE  Goal: Fluid and electrolyte balance are achieved/maintained  12/27/2021 2033 by Angeles Torres RN  Outcome: Ongoing  12/27/2021 1327 by Beverley Livingston RN  Outcome: Ongoing     Problem: ACTIVITY INTOLERANCE/IMPAIRED MOBILITY  Goal: Mobility/activity is maintained at optimum level for patient  12/27/2021 2033 by Angeles Torres RN  Outcome: Ongoing  12/27/2021 1327 by Beverley Livingston RN  Outcome: Ongoing     Problem: Skin Integrity:  Goal: Will show no infection signs and symptoms  Description: Will show no infection signs and symptoms  12/27/2021 2033 by Angeles Torres RN  Outcome: Ongoing  12/27/2021 1327 by Beverley Livingston RN  Outcome: Ongoing  Goal: Absence of new skin breakdown  Description: Absence of new skin breakdown  12/27/2021 2033 by Yadira Sorensen RN  Outcome: Ongoing  12/27/2021 1327 by Gavi Edge RN  Outcome: Ongoing

## 2021-12-28 NOTE — PLAN OF CARE
Problem: Falls - Risk of:  Goal: Will remain free from falls  Description: Will remain free from falls  Outcome: Completed  Goal: Absence of physical injury  Description: Absence of physical injury  Outcome: Completed     Problem: ABCDS Injury Assessment  Goal: Absence of physical injury  Outcome: Completed     Problem: OXYGENATION/RESPIRATORY FUNCTION  Goal: Patient will maintain patent airway  Outcome: Completed  Goal: Patient will achieve/maintain normal respiratory rate/effort  Description: Respiratory rate and effort will be within normal limits for the patient  Outcome: Completed     Problem: HEMODYNAMIC STATUS  Goal: Patient has stable vital signs and fluid balance  Outcome: Completed     Problem: FLUID AND ELECTROLYTE IMBALANCE  Goal: Fluid and electrolyte balance are achieved/maintained  Outcome: Completed     Problem: ACTIVITY INTOLERANCE/IMPAIRED MOBILITY  Goal: Mobility/activity is maintained at optimum level for patient  Outcome: Completed     Problem: Skin Integrity:  Goal: Will show no infection signs and symptoms  Description: Will show no infection signs and symptoms  Outcome: Completed  Goal: Absence of new skin breakdown  Description: Absence of new skin breakdown  Outcome: Completed

## 2021-12-28 NOTE — PROGRESS NOTES
PULMONARY & CRITICAL CARE MEDICINE PROGRESS NOTE     Patient:  Oleksandr Padilla  MRN: [id number]  [facility] date: 2021  Primary Care Physician: Sherryle Breeze, APRN - CNP  Consulting Physician: Festus Hermosillo DO  CODE Status: Full Code  LOS: 5     SUBJECTIVE     CHIEF COMPLAINT/REASON FOR INITIAL CONSULT: Shortness of breath    BRIEF HOSPITAL COURSE:   The patient is a 77 y.o. male initially presented to [facility] emergency room with shortness of breath on  with symptoms for few days. He was diagnosed to have influenza A and pneumonia at urgent care and received Tamiflu and antibiotics. He has significant comorbidities including hypertension, diabetes, coronary artery disease, TAVR. During evaluation he was noted to have elevated troponins. He was hypoxemic and required BiPAP. He was transferred to [facility] for further management. INTERVAL HISTORY:  21  Patient is nasal cannula at O2 Flow Rate (L/min)  Av L/min  Min: 2 L/min  Max: 2 L/min  Afebrile, hemodynamically stable  Cardiac cath showed patent stent    REVIEW OF SYSTEMS:  Review of Systems   Constitutional: Negative for appetite change, chills, fever and unexpected weight change. HENT: Negative for congestion, postnasal drip, sore throat and trouble swallowing. Eyes: Negative for visual disturbance. Respiratory: Positive for shortness of breath. Negative for cough and wheezing. Cardiovascular: Negative for chest pain, palpitations and leg swelling. Gastrointestinal: Negative for abdominal pain, constipation, diarrhea, nausea and vomiting. Genitourinary: Negative for difficulty urinating, dysuria and frequency. Musculoskeletal: Negative for arthralgias and joint swelling. Skin: Negative for rash. Allergic/Immunologic: Negative for immunocompromised state. Neurological: Positive for weakness. Negative for dizziness, speech difficulty and headaches. Hematological: Negative for adenopathy. Psychiatric/Behavioral: Negative for behavioral problems and sleep disturbance. OBJECTIVE     PaO2/FiO2 RATIO:  No results for input(s): POCPO2 in the last 72 hours.    FiO2 : 30 %     VITAL SIGNS:   LAST:  /61   Pulse 63   Temp 97.8 °F (36.6 °C) (Oral)   Resp 21   Ht 5' 6\" (1.676 m)   Wt 150 lb 12.7 oz (68.4 kg)   SpO2 91%   BMI 24.34 kg/m²   8-24 HR RANGE:  TEMP Temp  Av.9 °F (36.6 °C)  Min: 97.6 °F (36.4 °C)  Max: 98.3 °F (60.6 °C)   BP Systolic (27XNO), HAF:912 , Min:116 , EFT:463      Diastolic (53GYB), LLB:24, Min:61, Max:87     PULSE Pulse  Av.4  Min: 61  Max: 77   RR Resp  Av  Min: 18  Max: 21   O2 SAT SpO2  Av.7 %  Min: 91 %  Max: 92 %   OXYGEN DELIVERY O2 Flow Rate (L/min)  Av L/min  Min: 2 L/min  Max: 2 L/min        SYSTEMIC EXAMINATION:   General appearance -comfortable, no acute distress  Mental status - awake & alert, follows commands  Eyes - pupils equal and reactive, sclera anicteric  Mouth - mucous membranes moist  Neck - supple  Chest - Breath sounds bilaterally clear to auscultation  Heart - normal rate, regular rhythm, normal S1, S2, no murmurs, rubs, clicks or gallops  Abdomen - soft, nontender, nondistended, no masses or organomegaly  Neurological - non-focal  Extremities - peripheral pulses normal, no pedal edema, no clubbing or cyanosis  Skin - normal coloration and turgor, no rashes, no suspicious skin lesions noted     DATA REVIEW     Medications: Current Inpatient  Scheduled Meds:   [Held by provider] furosemide  40 mg Oral Daily    [START ON 2021] insulin glargine  15 Units SubCUTAneous Daily    sodium chloride flush  5-40 mL IntraVENous 2 times per day    acetylcysteine  600 mg Oral BID    insulin lispro  0-18 Units SubCUTAneous TID WC    insulin lispro  0-9 Units SubCUTAneous Nightly    diphenhydrAMINE  25 mg IntraVENous Once    Vitamin D  1,000 Units Oral BID    atorvastatin  20 mg Oral Daily    clopidogrel  75 mg Oral Daily  amLODIPine  10 mg Oral Daily    lisinopril  10 mg Oral Daily    sodium chloride flush  5-40 mL IntraVENous 2 times per day    levofloxacin  750 mg IntraVENous Q48H     Continuous Infusions:   sodium chloride Stopped (12/27/21 1929)    sodium chloride      sodium chloride      heparin (PORCINE) Infusion 20 Units/kg/hr (12/27/21 0656)    dextrose         INPUT/OUTPUT:  In: 509 [P.O.:250; I.V.:259]  Out: 1320 [Urine:1320]  Date 12/27/21 0000 - 12/27/21 2359   Shift 5064-5920 7372-3306 3797-8819 24 Hour Total   INTAKE   P.O.(mL/kg/hr) 0(0)   0   I. V.(mL/kg) 239(3.5) 20(0.3)  259(3.8)   Shift Total(mL/kg) 239(3.5) 20(0.3)  259(3.8)   OUTPUT   Urine(mL/kg/hr) 500(0.9)  820 1320   Shift Total(mL/kg) 500(7.3)  820(12) 1320(19.3)   Weight (kg) 68.4 68.4 68.4 68.4        LABS:  ABGs:   No results for input(s): POCPH, POCPCO2, POCPO2, POCHCO3, VWZV7HHW in the last 72 hours. CBC:   Recent Labs     12/25/21 0305 12/26/21  0559 12/27/21  0534   WBC 10.3 10.4 13.0*   HGB 12.1* 12.9* 13.1   HCT 35.8* 39.6* 39.4*   MCV 78.2* 79.0* 77.7*    318 358   RBC 4.58 5.01 5.07   MCH 26.4 25.7 25.8   MCHC 33.8 32.6 33.2   RDW 13.5 13.3 13.3     CRP:   No results for input(s): CRP in the last 72 hours. LDH:   No results for input(s): LDH in the last 72 hours. BMP:   Recent Labs     12/25/21  0305 12/26/21  0012 12/26/21  0559 12/27/21  0534   * 135 133* 136   K 4.4 3.7 3.7 4.0   CL 93* 94* 95* 98   CO2 28 25 23 26   BUN 38* 43* 41* 42*   CREATININE 1.41* 1.43* 1.35* 1.48*   GLUCOSE 137* 100* 136* 149*   PHOS 4.2  --  4.4 4.7*     Liver Function Test:   Recent Labs     12/25/21  0305 12/26/21  0559 12/27/21  0534   LABALBU 3.3* 3.1* 3.2*     Coagulation Profile:   Recent Labs     12/25/21  1735 12/26/21  0012 12/26/21  0559 12/26/21  1230 12/27/21  0554   APTT 46.0* 77.4* 67.7* 57.3* 56.7*     D-Dimer:  No results for input(s): DDIMER in the last 72 hours.   Lactic Acid:  No results for input(s): LACTA in the last 72 hours. Cardiac Enzymes:  No results for input(s): CKTOTAL, CKMB, CKMBINDEX, TROPONINI in the last 72 hours. Invalid input(s): TROPONIN, HSTROP  BNP/ProBNP:   No results for input(s): BNP, PROBNP in the last 72 hours. Triglycerides:  No results for input(s): TRIG in the last 72 hours. Microbiology:  Urine Culture:  No components found for: CURINE  Blood Culture:  No components found for: CBLOOD, CFUNGUSBL  Sputum Culture:  No components found for: CSPUTUM  No results for input(s): SPECDESC, SPECIAL, CULTURE, STATUS, ORG, CDIFFTOXPCR, CAMPYLOBPCR, SALMONELLAPC, SHIGAPCR, SHIGELLAPCR, MPNEUG, MPNEUM, LACTOQL in the last 72 hours. No results for input(s): SPUTUM, SPECDESC, SPECIAL, CULTURE, STATUS, ORG, CDIFFTOXPCR, MPNEUM, MPNEUG in the last 72 hours. Invalid input(s): CURINE, CBLOOD, CFUNGUSBL     Pathology:    Radiology Reports:  XR CHEST (SINGLE VIEW FRONTAL)   Final Result   Trace effusions with scattered infiltrates. XR CHEST PORTABLE   Final Result   Normal examination. Congestive failure evident on yesterday's exam has   resolved. Echocardiogram:   Results for orders placed during the hospital encounter of 12/22/21    ECHO Complete 2D W Doppler W Color    Summary  Dilated left ventricle with severely reduced systolic function. Estimated EF  25-30%  Severe hypokinesis of inferior, inferolateral and inferoseptal walls. Grade I (mild) left ventricular diastolic dysfunction. Normal right ventricular size and function. Bioprosthetic aortic valve (TAVR) is noted in position, functioning normally  with no significant stenosis/regurgitation or perivalvular leak (Mean  gradient 5 mm hg)  Mild mitral regurgitation. Mild tricuspid regurgitation. Estimated right ventricular systolic pressure is 41 mmHg. No significant pericardial effusion is seen.        ASSESSMENT AND PLAN     Assessment:    // Acute hypoxic respiratory failure  // Influenza A, superimposed pneumonia  // COPD, severity to be determined  // Non-ST elevation MI  // History of coronary artery disease, s/p stents  // Acute on chronic systolic heart failure  // S/p TAVR  // Essential hypertension  // Diabetes mellitus    Plan:    I personally interviewed/examined the patient; reviewed interval history, interpreted all available radiographic and laboratory data at the time of service.  Patient is hemodynamically stable and is currently saturating well on nasal cannula   Continue supplemental oxygen to keep oxygen saturation >90%   Encourage incentive spirometry   Continue pulmonary toilet, aspiration precautions and bronchodilators   Tolerating oral diet   Stress ulcer prophylaxis   Chemical DVT prophylaxis as per protocol   Antimicrobials reviewed; continue Levaquin for 5 days, s/p Tamiflu   Continue prednisone taper   Physical/occupational therapy   Okay to discharge from pulmonary standpoint    I would like to thank you for allowing me to participate in the care of this patient. Please feel free to call with any further questions or concerns. Abel Little MD  Pulmonary and Critical Care Medicine           12/27/2021,    Please note that this chart was generated using voice recognition Dragon dictation software. Although every effort was made to ensure the accuracy of this automated transcription, some errors in transcription may have occurred.

## 2021-12-29 ENCOUNTER — CARE COORDINATION (OUTPATIENT)
Dept: CASE MANAGEMENT | Age: 76
End: 2021-12-29

## 2021-12-29 DIAGNOSIS — J10.1 INFLUENZA A: Primary | ICD-10-CM

## 2021-12-29 PROCEDURE — 1111F DSCHRG MED/CURRENT MED MERGE: CPT | Performed by: NURSE PRACTITIONER

## 2021-12-29 NOTE — CARE COORDINATION
BPCI-A Medical Bundle Patient:  Working DRG: Sepsis- DRG [address] Location: [place]   Adm Date: 2021  Date of Discharge: 2021- home  Bundle End Date: 3/28/2022        [address] ([address]) Transitions Initial Follow Up Call    Call within 2 business days of discharge: Yes    Patient: Gerson Barclay   Patient : 1945   MRN: [id number]    Reason for Admission: Influenza A, coronary stents patent, CHF, LVD - EF 25-30%  Discharge Date: 21   RARS: Readmission Risk Score: 14.7 ( )      Last Discharge [facility]       Complaint Diagnosis Description Type Department Provider    21 Shortness of Breath Acute on chronic congestive heart failure, unspecified heart failure type ([facility] [address] . .. ED to Hosp-Admission (Discharged) (ADMITTED) [facility] 4A Silvio Jordan, DO; Billie Gonzalez DO. .. Spoke with: patient - reports doing better - denies SOB with or without exertion. Has a harsh cough - no expectorant. No swelling. Lasix dose was increased from 20mg every other day to 40mg daily. Urinating more. Weighing daily - 150 lbs today. Zoll Life Vest in place. Has cardio appt  to discuss further plan - note in chart indicates that another echo will be done after resolution of influenza A & CHF. Clinical pharmacy referral per CHF protocol. Has hopital f/u appt scheduled for this week - . Negative covid testing on . Informed of BPCI & care transitions f/u, CTN contact number.        Facility: [place]    Non-face-to-face services provided:  Scheduled appointment with PCP-  Scheduled appointment with Specialist-cardio   Obtained and reviewed discharge summary and/or continuity of care documents    Care Transitions 24 Hour Call    Schedule Follow Up Appointment with PCP: Completed  Do you have any ongoing symptoms?: Yes  Patient-reported symptoms: Cough  Interventions for patient-reported symptoms: Other (Comment: lasix, treatment of flu)  Do you have a copy of your discharge instructions?: Yes  Do you have all of your prescriptions and are they filled?: Yes (Comment: no new medications - has all meds prescribed)  Have you been contacted by a [address]?: No  Have you scheduled your follow up appointment?: Yes  How are you going to get to your appointment?: Car - family or friend to transport  Were you discharged with any Home Care or Post Acute Services: No  Do you feel like you have everything you need to keep you well at home?: Yes  Care Transitions Interventions         Transitions of Care Initial Call    Was this an external facility discharge? No     Challenges to be reviewed by the provider   Additional needs identified to be addressed with provider: No  none             Method of communication with provider : none      Advance Care Planning:   Does patient have an Advance Directive: not on file. Was this a readmission? No  Patient stated reason for admission: influenza, CHF  Patients top risk factors for readmission: medical condition-CHF, flu    Care Transition Nurse (CTN) contacted the patient by telephone to perform post hospital discharge assessment. Verified name and  with patient as identifiers. Provided introduction to self, and explanation of the CTN role. CTN reviewed discharge instructions, medical action plan and red flags with patient who verbalized understanding. Patient given an opportunity to ask questions and does not have any further questions or concerns at this time. Were discharge instructions available to patient? Yes. Reviewed appropriate site of care based on symptoms and resources available to patient including: PCP, Specialist and CTN. The patient agrees to contact the PCP office for questions related to their healthcare. Medication reconciliation was performed with patient, who verbalizes understanding of administration of home medications.    Covid Risk Education     Educated patient about risk for severe COVID-19 due to risk factors according to CDC guidelines. CTN reviewed discharge instructions, medical action plan and red flag symptoms with the patient who verbalized understanding. Discussed COVID vaccination status: Yes. VACCINATED + BOOSTER   Education provided on COVID-19 vaccination as appropriate. Discussed exposure protocols and quarantine with CDC Guidelines. Patient was given an opportunity to verbalize any questions and concerns and agrees to contact CTN or health care provider for questions related to their healthcare. Reviewed and educated patient on any new and changed medications related to discharge diagnosis. Was patient discharged with a pulse oximeter? No - plans to purchase one, Discussed and confirmed pulse oximeter discharge instructions and when to notify provider or seek emergency care. CTN provided contact information. Plan for follow-up call in 5-7 days based on severity of symptoms and risk factors.   Plan for next call: symptom management-reassess  follow up appointment-review        Follow Up  Future Appointments   Date Time Provider Julio Ratliff   12/30/2021  1:00 PM ESTHER Ozuna CNP [facility]   1/18/2022  3:15 PM DO STEVE Fitzgerald [facility]   3/30/2022 10:00 AM ESTHER Ozuna CNP [facility]       Vladislav Padilla RN

## 2021-12-29 NOTE — CARE COORDINATION
BPCI-A Medical Bundle Patient:  Working DRG: Sepsis-   Admitting Location: [facility]   Adm Date: 12/22/2021  Date of Discharge: 12/28/2021    Bundle End Date: 3/28/2022    90-day post-acute outreach and tracking with qualifying DRG.        Found after discharge

## 2021-12-30 ENCOUNTER — HOSPITAL ENCOUNTER (OUTPATIENT)
Dept: LAB | Age: 76
Discharge: HOME | End: 2021-12-30
Payer: MEDICARE

## 2021-12-30 ENCOUNTER — OFFICE VISIT (OUTPATIENT)
Dept: INTERNAL MEDICINE | Age: 76
End: 2021-12-30
Payer: MEDICARE

## 2021-12-30 ENCOUNTER — TELEPHONE (OUTPATIENT)
Dept: PHARMACY | Facility: CLINIC | Age: 76
End: 2021-12-30

## 2021-12-30 VITALS
HEART RATE: 76 BPM | BODY MASS INDEX: 25.23 KG/M2 | WEIGHT: 157 LBS | OXYGEN SATURATION: 98 % | RESPIRATION RATE: 16 BRPM | DIASTOLIC BLOOD PRESSURE: 60 MMHG | HEIGHT: 66 IN | SYSTOLIC BLOOD PRESSURE: 112 MMHG

## 2021-12-30 DIAGNOSIS — I21.4 NSTEMI (NON-ST ELEVATED MYOCARDIAL INFARCTION) (HCC): ICD-10-CM

## 2021-12-30 DIAGNOSIS — Z95.2 S/P TAVR (TRANSCATHETER AORTIC VALVE REPLACEMENT): ICD-10-CM

## 2021-12-30 DIAGNOSIS — I50.23 ACUTE ON CHRONIC SYSTOLIC HEART FAILURE (HCC): ICD-10-CM

## 2021-12-30 DIAGNOSIS — I50.23 ACUTE ON CHRONIC SYSTOLIC HEART FAILURE (HCC): Primary | ICD-10-CM

## 2021-12-30 DIAGNOSIS — I10 ESSENTIAL HYPERTENSION: ICD-10-CM

## 2021-12-30 DIAGNOSIS — N17.9 AKI (ACUTE KIDNEY INJURY) (HCC): ICD-10-CM

## 2021-12-30 DIAGNOSIS — I35.0 AORTIC STENOSIS, SEVERE: ICD-10-CM

## 2021-12-30 DIAGNOSIS — J10.1 INFLUENZA A: ICD-10-CM

## 2021-12-30 LAB
ABSOLUTE EOS #: 0.22 K/UL (ref 0–0.44)
ABSOLUTE IMMATURE GRANULOCYTE: 0.37 K/UL (ref 0–0.3)
ABSOLUTE LYMPH #: 1.21 K/UL (ref 1.1–3.7)
ABSOLUTE MONO #: 1.15 K/UL (ref 0.1–1.2)
ANION GAP SERPL CALCULATED.3IONS-SCNC: 10 MMOL/L (ref 9–17)
BASOPHILS # BLD: 1 % (ref 0–2)
BASOPHILS ABSOLUTE: 0.09 K/UL (ref 0–0.2)
BUN BLDV-MCNC: 48 MG/DL (ref 8–23)
BUN/CREAT BLD: 29 (ref 9–20)
CALCIUM SERPL-MCNC: 9.3 MG/DL (ref 8.6–10.4)
CHLORIDE BLD-SCNC: 94 MMOL/L (ref 98–107)
CO2: 26 MMOL/L (ref 20–31)
CREAT SERPL-MCNC: 1.64 MG/DL (ref 0.7–1.2)
DIFFERENTIAL TYPE: ABNORMAL
EOSINOPHILS RELATIVE PERCENT: 2 % (ref 1–4)
GFR AFRICAN AMERICAN: 50 ML/MIN
GFR NON-AFRICAN AMERICAN: 41 ML/MIN
GFR SERPL CREATININE-BSD FRML MDRD: ABNORMAL ML/MIN/{1.73_M2}
GFR SERPL CREATININE-BSD FRML MDRD: ABNORMAL ML/MIN/{1.73_M2}
GLUCOSE BLD-MCNC: 298 MG/DL (ref 70–99)
HCT VFR BLD CALC: 39.4 % (ref 40.7–50.3)
HEMOGLOBIN: 12.8 G/DL (ref 13–17)
IMMATURE GRANULOCYTES: 3 %
LYMPHOCYTES # BLD: 10 % (ref 24–43)
MCH RBC QN AUTO: 26.1 PG (ref 25.2–33.5)
MCHC RBC AUTO-ENTMCNC: 32.5 G/DL (ref 25.2–33.5)
MCV RBC AUTO: 80.4 FL (ref 82.6–102.9)
MONOCYTES # BLD: 9 % (ref 3–12)
NRBC AUTOMATED: 0 PER 100 WBC
PDW BLD-RTO: 13.9 % (ref 11.8–14.4)
PLATELET # BLD: 378 K/UL (ref 138–453)
PLATELET ESTIMATE: ABNORMAL
PMV BLD AUTO: 9.8 FL (ref 8.1–13.5)
POTASSIUM SERPL-SCNC: 4.9 MMOL/L (ref 3.7–5.3)
RBC # BLD: 4.9 M/UL (ref 4.21–5.77)
RBC # BLD: ABNORMAL 10*6/UL
SEG NEUTROPHILS: 76 % (ref 36–65)
SEGMENTED NEUTROPHILS ABSOLUTE COUNT: 9.42 K/UL (ref 1.5–8.1)
SODIUM BLD-SCNC: 130 MMOL/L (ref 135–144)
WBC # BLD: 12.5 K/UL (ref 3.5–11.3)
WBC # BLD: ABNORMAL 10*3/UL

## 2021-12-30 PROCEDURE — 80048 BASIC METABOLIC PNL TOTAL CA: CPT

## 2021-12-30 PROCEDURE — 85025 COMPLETE CBC W/AUTO DIFF WBC: CPT

## 2021-12-30 PROCEDURE — 36415 COLL VENOUS BLD VENIPUNCTURE: CPT

## 2021-12-30 PROCEDURE — 99495 TRANSJ CARE MGMT MOD F2F 14D: CPT | Performed by: NURSE PRACTITIONER

## 2021-12-30 NOTE — PROGRESS NOTES
Transition Care Office Visit    Date of Face-to-Face: 12/30/2021  Persons at visit: Wife  Date of interactive contact/ attempted contact with the patient and/or caregiver: 12/29/2021-See transitional care telephone note. HPI   81-year-old patient being seen for post hospital follow-up from [facility]. [facility] where he was admitted 12/22/2021 through 12/27/2021 for acute respiratory failure, non-STEMI, positive influenza A, acute kidney injury on chronic kidney disease. Patient initially presented to [facility] positive influenza A with superimposed pneumonia. Was treated with Levaquin and Tamiflu. Patient also was diuresed. Seen by cardiology due to elevated troponins. Underwent cardiac catheterization that showed nonobstructive CAD however patient with significantly reduced EF at 25%. Patient was placed in a LifeVest and does have a cardiology appointment next week. Was discharged on increased dose of Lasix. Wife states swelling is significantly improved. His breathing is significantly improved was able to be discharged home from the hospital without oxygen. Also of note patient status post TAVR from previous hospitalization. Echo showed valve functioning without difficulty. Patient with improving cough. Has been afebrile. Activity: activity as tolerated patient to complete activity as tolerated. Nothing strenuous due to low EF. Any medication changes since post-hospitalization phone call? No  Any treatment changes since post-hospitalization phone call? No  Any further education needed on medications/treatment plan?  No    Current Medications   Outpatient Medications Marked as Taking for the 12/30/21 encounter (Office Visit) with ESTHER Robertson - CNP   Medication Sig Dispense Refill    furosemide (LASIX) 40 MG tablet Take 1 tablet by mouth daily (Patient taking differently: Take 20 mg by mouth daily ) 30 tablet 1    lisinopril (PRINIVIL;ZESTRIL) 20 MG tablet Take 10 Days of Exercise per Week: Not on file    Minutes of Exercise per Session: Not on file   Stress:     Feeling of Stress : Not on file   Social Connections:     Frequency of Communication with Friends and Family: Not on file    Frequency of Social Gatherings with Friends and Family: Not on file    Attends Yazidi Services: Not on file    Active Member of [address] [place] [address] [employer] or Organizations: Not on file    Attends Club or Organization Meetings: Not on file    Marital Status: Not on file   Intimate Partner Violence:     Fear of Current or Ex-Partner: Not on file    Emotionally Abused: Not on file    Physically Abused: Not on file    Sexually Abused: Not on file   Housing Stability:     Unable to Pay for Housing in the Last Year: Not on file    Number of [place] in the Last Year: Not on file    Unstable Housing in the Last Year: Not on file       Past Medical History:   Diagnosis Date    Acne rosacea     treated with MetroGel    Anemia     Aortic stenosis     echo 3/18 Mod AS, Mild MR    BPH (benign prostatic hypertrophy)     CAD (coronary artery disease)     Stent x 3 DEVON 8/16    Carotid stenosis     Fairly minimal plaquing on ultrasound 5/14--in the conclusion described as less than 50% stenosis but in the body of the report described as minimal    Cataract of both eyes     CRI (chronic renal insufficiency)     Diabetes mellitus, type 2 ([facility] [address]     1.) Proteinuria, 24 hour total urine protein 1,420 mg/24 hrs, 09/08, creatinine clearance 114, 09/08 2.) Repeat protein/creatinine ratio 2.97, 02/12 3. )24 hr urine collection 1958 mg/24 hrs, 04/12. 4.) Repeat protein/creatinine ratio 1.99, 08/12 a.) Repeat protein/creatinine ratio 2.14, 07/13 5.) Renal u/s ok 02/12  6) retinopathy at [address] [address] 12/16  early mod. nonprolifferative  macular involved    Dyslipidemia     Erectile dysfunction     H/O agent Orange exposure     per patient in the [facility] along with exposure to cresote and naphtha    Hearing loss  Hyperlipidemia     Hypertension     Mild nonproliferative diabetic retinopathy ([facility] [address]     Non-rheumatic mitral regurgitation 06/27/2016    RBBB     Status post transcatheter aortic valve replacement (TAVR) using bioprosthesis 01/21/2020    Wears dentures     upper       Family History   Problem Relation Age of Onset    Heart Attack Father     Coronary Art Dis Sister     Hypertension Mother     Lung Cancer Mother     Stroke Mother     Coronary Art Dis Brother         later in life       [facility]    Allergies   Allergen Reactions    Atorvastatin Other (See Comments)     Muscle pain (myalgias)    Sulfa Antibiotics Swelling     Swelling of retinas    Sulfanilamide Other (See Comments)       ROS   General: Denies fever, chills, night sweats, or recent weight changes. Improving fatigue  Skin: Denies any rashes/wounds. HEENT: Denies any headaches. Denies any blurred vision, double vision, or eye pain. Denies any tinnitus, vertigo, or dizziness. Denies discharge, stuffiness, obstruction, or epistaxis. Denies any hoarseness, dysphagia/ pain. Cardiovascular: Denies any chest pain, shortness of breath, dyspnea on exertion, orthopnea, or palpations. Respiratory: Denies sputum production, hemoptysis, or wheezing. Positive cough, improving  Gastrointestinal: Denies any nausea, vomiting, diarrhea, constipation, or melena. Genitourinary: Denies any dysuria, hematuria, frequency, urgency, or hesitancy. Endocrine:  Denies any heat or cold intolerances, polydipsia, polyuria, or polyphagia. Musculoskeletal: Denies any arthritis, arthralagias, myalgias, or muscle weakness. Neuropsychiatric: Denies any depression, syncope, tremors, seizures, anxiety or nervousness.      Physical Exam:  Vitals /60 (Site: Right Upper Arm, Position: Sitting, Cuff Size: Large Adult)   Pulse 76   Resp 16   Ht 5' 6\" (1.676 m)   Wt 157 lb (71.2 kg)   SpO2 98%   BMI 25.34 kg/m²   General Appearance: Alert, no distress; vital signs were reviewed today  Head: Normocephalic, atraumatic. Eyes:  Sclera clear, no drainage  Ears:  External auditory canals patent, no drainage  Nose:  Septum midline, mucosa normal, no drainage or sinus tenderness  Throat: pharynx moist and pink, no exudate  Neck: Supple, trachea midline, no adenopathy; Thyroid: No enlargement/tenderness/nodules;  Back: Symmetric, no tenderness  Lungs: Chest rises and falls symmetrically, no use of accessory muscles, Lungs clear throughout  Chest wall: No tenderness  Heart[de-identified] Regular rate and rhythm, S1 and S2 normal, no murmur or gallop  Abdomen: Nontender, bowel sounds active in all 4 quadrants, no masses  Extremities: Extremities without clubbing,cyanosis or edema  Skin: Skin color normal, no rashes or lesions  Neurological: Cranial nerves II-XII appears grossly intact- no focal deficit  Psychiatric: mood and affect within normal limits    Assessment/Plan    1. Acute on chronic systolic heart failure (HCC)  Continue on Lasix, lisinopril. We will draw a BMP today to monitor kidney functions with recent increased dose of Lasix. Has a follow-up with cardiology  Continue with LifeVest.  - Basic Metabolic Panel; Future  - CBC Auto Differential; Future    2. NSTEMI (non-ST elevated myocardial infarction) [facility])  Ongoing follow-up with cardiology-discussion of beta-blocker per cardiology recommendation    3. BJ (acute kidney injury) ([facility] [address]  BMP today    4. Influenza A  Patient did not  Tamiflu. Out of window. Patient symptoms improving    5. S/P TAVR (transcatheter aortic valve replacement)  6. Aortic stenosis, severe  Most recent cardiac cath echo with functioning valve. Ongoing follow-up with cardiology    7. Essential hypertension  Stable in office        Decision making of moderate complexity   Hospital records reviewed.    Follow up for routine visit, call sooner with any concerns prior    Electronically signed by Hulda Habermann Sarahy Hill CNP on 12/30/2021 at 4:33 PM

## 2021-12-30 NOTE — TELEPHONE ENCOUNTER
Received a referral: Care Coordinator  to review patients medications. Called patient to schedule a time to speak with a pharmacist over the telephone. Spoke to patient and advised them of the above message. Stated that he just came out of [facility] and did not need to speak with our pharmacists. Nyla Blood [facility].    [address] [facility], toll free: [phone]

## 2022-01-06 ENCOUNTER — HOSPITAL ENCOUNTER (OUTPATIENT)
Age: 77
Setting detail: SPECIMEN
Discharge: HOME | End: 2022-01-06
Payer: MEDICARE

## 2022-01-06 ENCOUNTER — TELEPHONE (OUTPATIENT)
Dept: INTERNAL MEDICINE | Age: 77
End: 2022-01-06

## 2022-01-06 ENCOUNTER — OFFICE VISIT (OUTPATIENT)
Dept: PRIMARY CARE CLINIC | Age: 77
End: 2022-01-06
Payer: MEDICARE

## 2022-01-06 ENCOUNTER — HOSPITAL ENCOUNTER (OUTPATIENT)
Dept: LAB | Age: 77
Discharge: HOME | End: 2022-01-06
Payer: MEDICARE

## 2022-01-06 VITALS
HEART RATE: 64 BPM | OXYGEN SATURATION: 97 % | TEMPERATURE: 98.2 F | BODY MASS INDEX: 25.41 KG/M2 | DIASTOLIC BLOOD PRESSURE: 74 MMHG | WEIGHT: 157.4 LBS | SYSTOLIC BLOOD PRESSURE: 126 MMHG

## 2022-01-06 DIAGNOSIS — E87.5 HYPERKALEMIA: ICD-10-CM

## 2022-01-06 DIAGNOSIS — I50.23 ACUTE ON CHRONIC SYSTOLIC HEART FAILURE (HCC): ICD-10-CM

## 2022-01-06 DIAGNOSIS — E87.5 HYPERKALEMIA: Primary | ICD-10-CM

## 2022-01-06 LAB
ABSOLUTE EOS #: 0.24 K/UL (ref 0–0.44)
ABSOLUTE IMMATURE GRANULOCYTE: 0.13 K/UL (ref 0–0.3)
ABSOLUTE LYMPH #: 1.74 K/UL (ref 1.1–3.7)
ABSOLUTE MONO #: 0.84 K/UL (ref 0.1–1.2)
ANION GAP SERPL CALCULATED.3IONS-SCNC: 12 MMOL/L (ref 9–17)
BASOPHILS # BLD: 1 % (ref 0–2)
BASOPHILS ABSOLUTE: 0.13 K/UL (ref 0–0.2)
BUN BLDV-MCNC: 55 MG/DL (ref 8–23)
BUN/CREAT BLD: 37 (ref 9–20)
CALCIUM SERPL-MCNC: 9.9 MG/DL (ref 8.6–10.4)
CHLORIDE BLD-SCNC: 103 MMOL/L (ref 98–107)
CO2: 18 MMOL/L (ref 20–31)
CREAT SERPL-MCNC: 1.48 MG/DL (ref 0.7–1.2)
DIFFERENTIAL TYPE: ABNORMAL
EOSINOPHILS RELATIVE PERCENT: 2 % (ref 1–4)
GFR AFRICAN AMERICAN: 56 ML/MIN
GFR NON-AFRICAN AMERICAN: 46 ML/MIN
GFR SERPL CREATININE-BSD FRML MDRD: ABNORMAL ML/MIN/{1.73_M2}
GFR SERPL CREATININE-BSD FRML MDRD: ABNORMAL ML/MIN/{1.73_M2}
GLUCOSE BLD-MCNC: 121 MG/DL (ref 70–99)
HCT VFR BLD CALC: 41.5 % (ref 40.7–50.3)
HEMOGLOBIN: 12.9 G/DL (ref 13–17)
IMMATURE GRANULOCYTES: 1 %
LYMPHOCYTES # BLD: 17 % (ref 24–43)
MCH RBC QN AUTO: 26 PG (ref 25.2–33.5)
MCHC RBC AUTO-ENTMCNC: 31.1 G/DL (ref 25.2–33.5)
MCV RBC AUTO: 83.5 FL (ref 82.6–102.9)
MONOCYTES # BLD: 8 % (ref 3–12)
NRBC AUTOMATED: 0 PER 100 WBC
PDW BLD-RTO: 14.1 % (ref 11.8–14.4)
PLATELET # BLD: 338 K/UL (ref 138–453)
PLATELET ESTIMATE: ABNORMAL
PMV BLD AUTO: 9.9 FL (ref 8.1–13.5)
POTASSIUM SERPL-SCNC: 5.6 MMOL/L (ref 3.7–5.3)
POTASSIUM SERPL-SCNC: 6.3 MMOL/L (ref 3.7–5.3)
RBC # BLD: 4.97 M/UL (ref 4.21–5.77)
RBC # BLD: ABNORMAL 10*6/UL
SEG NEUTROPHILS: 71 % (ref 36–65)
SEGMENTED NEUTROPHILS ABSOLUTE COUNT: 6.96 K/UL (ref 1.5–8.1)
SODIUM BLD-SCNC: 133 MMOL/L (ref 135–144)
WBC # BLD: 10 K/UL (ref 3.5–11.3)
WBC # BLD: ABNORMAL 10*3/UL

## 2022-01-06 PROCEDURE — 99212 OFFICE O/P EST SF 10 MIN: CPT | Performed by: FAMILY MEDICINE

## 2022-01-06 PROCEDURE — 1036F TOBACCO NON-USER: CPT | Performed by: FAMILY MEDICINE

## 2022-01-06 PROCEDURE — 85025 COMPLETE CBC W/AUTO DIFF WBC: CPT

## 2022-01-06 PROCEDURE — 80048 BASIC METABOLIC PNL TOTAL CA: CPT

## 2022-01-06 PROCEDURE — G8427 DOCREV CUR MEDS BY ELIG CLIN: HCPCS | Performed by: FAMILY MEDICINE

## 2022-01-06 PROCEDURE — 36415 COLL VENOUS BLD VENIPUNCTURE: CPT

## 2022-01-06 PROCEDURE — 93010 ELECTROCARDIOGRAM REPORT: CPT | Performed by: FAMILY MEDICINE

## 2022-01-06 PROCEDURE — 4040F PNEUMOC VAC/ADMIN/RCVD: CPT | Performed by: FAMILY MEDICINE

## 2022-01-06 PROCEDURE — 84132 ASSAY OF SERUM POTASSIUM: CPT

## 2022-01-06 PROCEDURE — 1123F ACP DISCUSS/DSCN MKR DOCD: CPT | Performed by: FAMILY MEDICINE

## 2022-01-06 PROCEDURE — 1111F DSCHRG MED/CURRENT MED MERGE: CPT | Performed by: FAMILY MEDICINE

## 2022-01-06 PROCEDURE — 99204 OFFICE O/P NEW MOD 45 MIN: CPT | Performed by: FAMILY MEDICINE

## 2022-01-06 PROCEDURE — 93005 ELECTROCARDIOGRAM TRACING: CPT | Performed by: FAMILY MEDICINE

## 2022-01-06 PROCEDURE — G8417 CALC BMI ABV UP PARAM F/U: HCPCS | Performed by: FAMILY MEDICINE

## 2022-01-06 PROCEDURE — G8484 FLU IMMUNIZE NO ADMIN: HCPCS | Performed by: FAMILY MEDICINE

## 2022-01-06 PROCEDURE — 3017F COLORECTAL CA SCREEN DOC REV: CPT | Performed by: FAMILY MEDICINE

## 2022-01-06 ASSESSMENT — PATIENT HEALTH QUESTIONNAIRE - PHQ9
SUM OF ALL RESPONSES TO PHQ9 QUESTIONS 1 & 2: 0
SUM OF ALL RESPONSES TO PHQ QUESTIONS 1-9: 0
1. LITTLE INTEREST OR PLEASURE IN DOING THINGS: 0
2. FEELING DOWN, DEPRESSED OR HOPELESS: 0
SUM OF ALL RESPONSES TO PHQ QUESTIONS 1-9: 0

## 2022-01-06 ASSESSMENT — ENCOUNTER SYMPTOMS
CHEST TIGHTNESS: 0
WHEEZING: 0
SHORTNESS OF BREATH: 0
COUGH: 0

## 2022-01-06 NOTE — TELEPHONE ENCOUNTER
Spoke with the patient regarding his labs and advised that he go into UC right away per Dr Stein Reusing request. Patient voiced understanding and said he was heading to UC now.

## 2022-01-06 NOTE — PROGRESS NOTES
[facility] [address] [place] [address] [address]  [address] [address]  Dept: [phone]  Dept Fax: [phone]    Varun Anthony is a 77 y.o. male who presents today for his medical conditions/complaints as noted below. Varun Anthony is c/o of   Chief Complaint   Patient presents with    Discuss Labs     no complaints        HPI:     HPI Here today for elevated potassium. He was found to have a very high potassium today. He is not having any issues with palpitations. No issues with chest pain. No issues with shortness of breath. His life vest has not gone off. His lasix was decreased about a week ago. He is not on any insulin. No issues with diarrhea. He has recently had some heart issues that required a heart cath and he has issues with acute CHF exacerbation after having the flu. Past Medical History:   Diagnosis Date    Acne rosacea     treated with MetroGel    Anemia     Aortic stenosis     echo 3/18 Mod AS, Mild MR    BPH (benign prostatic hypertrophy)     CAD (coronary artery disease)     Stent x 3 DEVON 8/16    Carotid stenosis     Fairly minimal plaquing on ultrasound 5/14--in the conclusion described as less than 50% stenosis but in the body of the report described as minimal    Cataract of both eyes     CRI (chronic renal insufficiency)     Diabetes mellitus, type 2 (HCC)     1.) Proteinuria, 24 hour total urine protein 1,420 mg/24 hrs, 09/08, creatinine clearance 114, 09/08 2.) Repeat protein/creatinine ratio 2.97, 02/12 3. )24 hr urine collection 1958 mg/24 hrs, 04/12. 4.) Repeat protein/creatinine ratio 1.99, 08/12 a.) Repeat protein/creatinine ratio 2.14, 07/13 5.) Renal u/s ok 02/12  6) retinopathy at [address] [facility] 12/16  early mod. nonprolifferative  macular involved    Dyslipidemia     Erectile dysfunction     H/O agent Orange exposure     per patient in the [facility] along with exposure to cresote and naphtha    Hearing loss     Hyperlipidemia     Hypertension     Mild nonproliferative diabetic retinopathy ([address]     Non-rheumatic mitral regurgitation 2016    RBBB     Status post transcatheter aortic valve replacement (TAVR) using bioprosthesis 2020    Wears dentures     upper          Social History     Tobacco Use    Smoking status: Former Smoker     Packs/day: 0.50     Years: 2.00     Pack years: 1.00     Quit date: 1970     Years since quittin.0    Smokeless tobacco: Never Used   Substance Use Topics    Alcohol use: Yes     Alcohol/week: 0.0 standard drinks     Comment: RARELY     Current Outpatient Medications   Medication Sig Dispense Refill    furosemide (LASIX) 40 MG tablet Take 1 tablet by mouth daily (Patient taking differently: Take 20 mg by mouth daily ) 30 tablet 1    lisinopril (PRINIVIL;ZESTRIL) 20 MG tablet Take 10 mg by mouth daily      Handicap Placard MISC by Does not apply route Duration 2 years 1 each 0    amLODIPine (NORVASC) 10 MG tablet Take 1 tablet by mouth daily 30 tablet 5    clopidogrel (PLAVIX) 75 MG tablet Take 1 tablet by mouth daily Take 1 daily 60 tablet 3    simvastatin (ZOCOR) 40 MG tablet Take 10 mg by mouth nightly       glipiZIDE (GLUCOTROL) 10 MG tablet Take 10 mg by mouth 2 times daily (before meals)      vitamin D (CHOLECALCIFEROL) 1000 UNIT TABS tablet Take 1,000 Units by mouth 2 times daily       metFORMIN (GLUCOPHAGE) 1000 MG tablet Take 1 tablet by mouth 2 times daily (with meals) 60 tablet 3     No current facility-administered medications for this visit. Allergies   Allergen Reactions    Atorvastatin Other (See Comments)     Muscle pain (myalgias)    Sulfa Antibiotics Swelling     Swelling of retinas    Sulfanilamide Other (See Comments)       Subjective:     Review of Systems   Constitutional: Negative for activity change, appetite change, chills, fatigue and fever.    Eyes: Negative for visual disturbance. Respiratory: Negative for cough, chest tightness, shortness of breath and wheezing. Cardiovascular: Negative for chest pain, palpitations and leg swelling. Genitourinary: Negative for difficulty urinating. Skin: Negative for rash. Neurological: Negative for dizziness, syncope, weakness, light-headedness and headaches. Objective:      Physical Exam  Vitals and nursing note reviewed. Constitutional:       General: He is not in acute distress. Appearance: He is well-developed. Eyes:      Conjunctiva/sclera: Conjunctivae normal.   Cardiovascular:      Rate and Rhythm: Normal rate and regular rhythm. Heart sounds: Normal heart sounds. No murmur heard. Pulmonary:      Effort: Pulmonary effort is normal. No respiratory distress. Breath sounds: Normal breath sounds. No wheezing or rales. Musculoskeletal:         General: Normal range of motion. Cervical back: Normal range of motion and neck supple. Lymphadenopathy:      Cervical: No cervical adenopathy. Skin:     General: Skin is warm and dry. Findings: No rash. Neurological:      Mental Status: He is alert and oriented to person, place, and time. /74 (Site: Right Upper Arm, Position: Sitting, Cuff Size: Large Adult)   Pulse 64   Temp 98.2 °F (36.8 °C) (Tympanic)   Wt 157 lb 6.4 oz (71.4 kg)   SpO2 97%   BMI 25.41 kg/m²     Assessment:       Diagnosis Orders   1.  Hyperkalemia  EKG 12 Lead    Potassium      Hospital Outpatient Visit on 01/06/2022   Component Date Value Ref Range Status    Potassium 01/06/2022 5.6* 3.7 - 5.3 mmol/L Final   Hospital Outpatient Visit on 01/06/2022   Component Date Value Ref Range Status    WBC 01/06/2022 10.0  3.5 - 11.3 k/uL Final    RBC 01/06/2022 4.97  4.21 - 5.77 m/uL Final    Hemoglobin 01/06/2022 12.9* 13.0 - 17.0 g/dL Final    Hematocrit 01/06/2022 41.5  40.7 - 50.3 % Final    MCV 01/06/2022 83.5  82.6 - 102.9 fL Final   [facility] 01/06/2022 26.0  25.2 - 33.5 pg Final    MCHC 01/06/2022 31.1  25.2 - 33.5 g/dL Final    RDW 01/06/2022 14.1  11.8 - 14.4 % Final    Platelets 33/67/5796 338  138 - 453 k/uL Final    MPV 01/06/2022 9.9  8.1 - 13.5 fL Final    NRBC Automated 01/06/2022 0.0  0.0 per 100 WBC Final    Differential Type 01/06/2022 NOT REPORTED   Final    Seg Neutrophils 01/06/2022 71* 36 - 65 % Final    Lymphocytes 01/06/2022 17* 24 - 43 % Final    Monocytes 01/06/2022 8  3 - 12 % Final    Eosinophils % 01/06/2022 2  1 - 4 % Final    Basophils 01/06/2022 1  0 - 2 % Final    Immature Granulocytes 01/06/2022 1* 0 % Final    Segs Absolute 01/06/2022 6.96  1.50 - 8.10 k/uL Final    Absolute Lymph # 01/06/2022 1.74  1.10 - 3.70 k/uL Final    Absolute Mono # 01/06/2022 0.84  0.10 - 1.20 k/uL Final    Absolute Eos # 01/06/2022 0.24  0.00 - 0.44 k/uL Final    Basophils Absolute 01/06/2022 0.13  0.00 - 0.20 k/uL Final    Absolute Immature Granulocyte 01/06/2022 0.13  0.00 - 0.30 k/uL Final    WBC Morphology 01/06/2022 NOT REPORTED   Final    RBC Morphology 01/06/2022 NOT REPORTED   Final    Platelet Estimate 05/57/4267 NOT REPORTED   Final    Glucose 01/06/2022 121* 70 - 99 mg/dL Final    BUN 01/06/2022 55* 8 - 23 mg/dL Final    CREATININE 01/06/2022 1.48* 0.70 - 1.20 mg/dL Final    Bun/Cre Ratio 01/06/2022 37* 9 - 20 Final    Calcium 01/06/2022 9.9  8.6 - 10.4 mg/dL Final    Sodium 01/06/2022 133* 135 - 144 mmol/L Final    Potassium 01/06/2022 6.3* 3.7 - 5.3 mmol/L Final    Chloride 01/06/2022 103  98 - 107 mmol/L Final    CO2 01/06/2022 18* 20 - 31 mmol/L Final    Anion Gap 01/06/2022 12  9 - 17 mmol/L Final    GFR Non- 01/06/2022 46* >60 mL/min Final    GFR  01/06/2022 56* >60 mL/min Final    GFR Comment 01/06/2022        Final    GFR Staging 01/06/2022 NOT REPORTED   Final            Plan:        Hyperkalemia: new; K level improved on recheck so I advised him to take one exra dose of lasix tomorrow and I will have his pcp follow up with further suggestions. EKG was not concerning in the office today. Return if symptoms worsen or fail to improve. Orders Placed This Encounter   Procedures    Potassium     Standing Status:   Future     Number of Occurrences:   1     Standing Expiration Date:   1/6/2023    EKG 12 Lead     Order Specific Question:   Reason for Exam?     Answer: Other     Comments:   hyperkalemia         Patientgiven educational materials - see patient instructions. Discussed use, benefit,and side effects of prescribed medications. All patient questions answered. Ptvoiced understanding. Reviewed health maintenance. Instructed to continue currentmedications, diet and exercise. Patient agreed with treatment plan. Follow up asdirected.      Electronically signed by Surendra Luong MD on 1/6/2022 at 8:21 PM

## 2022-01-06 NOTE — TELEPHONE ENCOUNTER
Call and tell patient at this very late hour to come back and go to urgent care for repeat potassium and for EKG. He Should check into to urgent care for a patient evaluation appointment , because of the very high potassium.

## 2022-01-07 NOTE — TELEPHONE ENCOUNTER
Patient seen in . EKG was okay. Repeat K was 5.6. Patient was advised to take one extra dose of lasix tomorrow.  Will forward to pcp for recommendations going forward

## 2022-01-10 ENCOUNTER — HOSPITAL ENCOUNTER (OUTPATIENT)
Dept: LAB | Age: 77
Discharge: HOME | End: 2022-01-10
Payer: MEDICARE

## 2022-01-10 DIAGNOSIS — E87.5 HYPERKALEMIA: ICD-10-CM

## 2022-01-10 DIAGNOSIS — E87.5 HYPERKALEMIA: Primary | ICD-10-CM

## 2022-01-10 LAB
ANION GAP SERPL CALCULATED.3IONS-SCNC: 11 MMOL/L (ref 9–17)
BUN BLDV-MCNC: 67 MG/DL (ref 8–23)
BUN/CREAT BLD: 41 (ref 9–20)
CALCIUM SERPL-MCNC: 9.8 MG/DL (ref 8.6–10.4)
CHLORIDE BLD-SCNC: 102 MMOL/L (ref 98–107)
CO2: 21 MMOL/L (ref 20–31)
CREAT SERPL-MCNC: 1.64 MG/DL (ref 0.7–1.2)
GFR AFRICAN AMERICAN: 50 ML/MIN
GFR NON-AFRICAN AMERICAN: 41 ML/MIN
GFR SERPL CREATININE-BSD FRML MDRD: ABNORMAL ML/MIN/{1.73_M2}
GFR SERPL CREATININE-BSD FRML MDRD: ABNORMAL ML/MIN/{1.73_M2}
GLUCOSE BLD-MCNC: 190 MG/DL (ref 70–99)
POTASSIUM SERPL-SCNC: 5.4 MMOL/L (ref 3.7–5.3)
SODIUM BLD-SCNC: 134 MMOL/L (ref 135–144)

## 2022-01-10 PROCEDURE — 80048 BASIC METABOLIC PNL TOTAL CA: CPT

## 2022-01-10 PROCEDURE — 36415 COLL VENOUS BLD VENIPUNCTURE: CPT

## 2022-01-10 RX ORDER — LISINOPRIL 5 MG/1
5 TABLET ORAL DAILY
Qty: 30 TABLET | Refills: 0 | Status: SHIPPED | OUTPATIENT
Start: 2022-01-10 | End: 2022-01-25 | Stop reason: SDUPTHER

## 2022-01-13 ENCOUNTER — CARE COORDINATION (OUTPATIENT)
Dept: CASE MANAGEMENT | Age: 77
End: 2022-01-13

## 2022-01-13 NOTE — CARE COORDINATION
[facility] Transitions Follow Up Call    2022    Patient: Mandy Diallo  Patient : 1945   MRN: <[id number]>  Reason for Admission: Influenza A, coronary stents patent, CHF, LVD - EF 25-30%  Discharge Date: 21 RARS: Readmission Risk Score: 14.7 ( )     Attempted to contact patient for a BPCI-A f/u. Caller left a Hip[employer] compliant message on home phone listed. Called mobile number. Spouse states patient was taking number from answering machine to return call. Caller held while patient took the phone. Patient states he is doing great. Denies sob, cp, cough, swelling. Appetite good. He is taking medications as prescribed. States he walks 15-20 min with spouse. He is expected to have a potassium level drawn d/t last level  5.6. Per review of chart patient seen by primary care  Plan K-level improved. Extra dose of Lasix the next day advised. States his next cardiology appointment 22. He states his wife is to expected to have a knee replacement same day. Patient had questions regarding length of walks and life vest questions. Patient advised to write down questions for physician. Will continue to monitor. Spoke with: Kacey [facility] Transitions Subsequent and Final Call    Subsequent and Final Calls  Care Transitions Interventions  Other Interventions: Follow Up  Future Appointments   Date Time Provider Julio Ratliff   2022  3:15 PM MD STEVE Fleming [facility]   3/30/2022 10:00 AM ESTHER Salinas - CNP [facility]   Care Transitions Follow Up Call    Needs to be reviewed by the provider   Additional needs identified to be addressed with provider: Yes  questions regarding length of walks, life vest questions and status of potassium level             Method of communication with provider : none      Care Transition Nurse (CTN) contacted the patient by telephone to follow up after admission on .  Verified name and  with patient as identifiers. CTN reviewed discharge instructions, medical action plan and red flags with patient and discussed any barriers to care and/or understanding of plan of care after discharge. Discussed appropriate site of care based on symptoms and resources available to patient including: PCP, Specialist and When to call 911. The patient agrees to contact the PCP office for questions related to their healthcare. Patients top risk factors for readmission: medical condition-Cardiac/CHF  Interventions to address risk factors: Questions of concerns to address with physicians. Advised to write questions down. CTN provided contact information for future needs. Plan for follow-up call in 5-7 days based on severity of symptoms and risk factors.   Plan for next call: symptom management-Continued progress and new concerns          Parag Chapa RN

## 2022-01-14 ENCOUNTER — HOSPITAL ENCOUNTER (OUTPATIENT)
Dept: LAB | Age: 77
Discharge: HOME | End: 2022-01-14
Payer: MEDICARE

## 2022-01-14 DIAGNOSIS — E87.5 HYPERKALEMIA: ICD-10-CM

## 2022-01-14 LAB
ANION GAP SERPL CALCULATED.3IONS-SCNC: 9 MMOL/L (ref 9–17)
BUN BLDV-MCNC: 34 MG/DL (ref 8–23)
BUN/CREAT BLD: 26 (ref 9–20)
CALCIUM SERPL-MCNC: 9.8 MG/DL (ref 8.6–10.4)
CHLORIDE BLD-SCNC: 102 MMOL/L (ref 98–107)
CO2: 24 MMOL/L (ref 20–31)
CREAT SERPL-MCNC: 1.32 MG/DL (ref 0.7–1.2)
GFR AFRICAN AMERICAN: >60 ML/MIN
GFR NON-AFRICAN AMERICAN: 53 ML/MIN
GFR SERPL CREATININE-BSD FRML MDRD: ABNORMAL ML/MIN/{1.73_M2}
GFR SERPL CREATININE-BSD FRML MDRD: ABNORMAL ML/MIN/{1.73_M2}
GLUCOSE BLD-MCNC: 160 MG/DL (ref 70–99)
POTASSIUM SERPL-SCNC: 5.2 MMOL/L (ref 3.7–5.3)
SODIUM BLD-SCNC: 135 MMOL/L (ref 135–144)

## 2022-01-14 PROCEDURE — 80048 BASIC METABOLIC PNL TOTAL CA: CPT

## 2022-01-14 PROCEDURE — 36415 COLL VENOUS BLD VENIPUNCTURE: CPT

## 2022-01-18 ENCOUNTER — OFFICE VISIT (OUTPATIENT)
Dept: CARDIOLOGY | Age: 77
End: 2022-01-18
Payer: MEDICARE

## 2022-01-18 VITALS
OXYGEN SATURATION: 98 % | HEIGHT: 66 IN | HEART RATE: 64 BPM | BODY MASS INDEX: 25.84 KG/M2 | SYSTOLIC BLOOD PRESSURE: 128 MMHG | DIASTOLIC BLOOD PRESSURE: 68 MMHG | WEIGHT: 160.8 LBS

## 2022-01-18 DIAGNOSIS — I10 HYPERTENSION, ESSENTIAL: ICD-10-CM

## 2022-01-18 DIAGNOSIS — I50.22 CHRONIC SYSTOLIC CHF (CONGESTIVE HEART FAILURE) (HCC): Primary | ICD-10-CM

## 2022-01-18 DIAGNOSIS — I25.10 CORONARY ARTERY DISEASE INVOLVING NATIVE CORONARY ARTERY OF NATIVE HEART WITHOUT ANGINA PECTORIS: ICD-10-CM

## 2022-01-18 DIAGNOSIS — E78.5 HYPERLIPIDEMIA, UNSPECIFIED HYPERLIPIDEMIA TYPE: ICD-10-CM

## 2022-01-18 DIAGNOSIS — Z95.2 S/P TAVR (TRANSCATHETER AORTIC VALVE REPLACEMENT): ICD-10-CM

## 2022-01-18 DIAGNOSIS — I35.0 AORTIC STENOSIS, SEVERE: ICD-10-CM

## 2022-01-18 PROCEDURE — G8484 FLU IMMUNIZE NO ADMIN: HCPCS | Performed by: INTERNAL MEDICINE

## 2022-01-18 PROCEDURE — 1123F ACP DISCUSS/DSCN MKR DOCD: CPT | Performed by: INTERNAL MEDICINE

## 2022-01-18 PROCEDURE — 1036F TOBACCO NON-USER: CPT | Performed by: INTERNAL MEDICINE

## 2022-01-18 PROCEDURE — 99213 OFFICE O/P EST LOW 20 MIN: CPT | Performed by: INTERNAL MEDICINE

## 2022-01-18 PROCEDURE — 99214 OFFICE O/P EST MOD 30 MIN: CPT | Performed by: INTERNAL MEDICINE

## 2022-01-18 PROCEDURE — 1111F DSCHRG MED/CURRENT MED MERGE: CPT | Performed by: INTERNAL MEDICINE

## 2022-01-18 PROCEDURE — 4040F PNEUMOC VAC/ADMIN/RCVD: CPT | Performed by: INTERNAL MEDICINE

## 2022-01-18 PROCEDURE — G8427 DOCREV CUR MEDS BY ELIG CLIN: HCPCS | Performed by: INTERNAL MEDICINE

## 2022-01-18 PROCEDURE — G8417 CALC BMI ABV UP PARAM F/U: HCPCS | Performed by: INTERNAL MEDICINE

## 2022-01-18 PROCEDURE — 3017F COLORECTAL CA SCREEN DOC REV: CPT | Performed by: INTERNAL MEDICINE

## 2022-01-18 RX ORDER — METOPROLOL SUCCINATE 25 MG/1
25 TABLET, EXTENDED RELEASE ORAL DAILY
Qty: 90 TABLET | Refills: 1 | Status: SHIPPED | OUTPATIENT
Start: 2022-01-18 | End: 2022-01-25 | Stop reason: SDUPTHER

## 2022-01-18 RX ORDER — SPIRONOLACTONE 25 MG/1
12.5 TABLET ORAL DAILY
Qty: 45 TABLET | Refills: 1 | Status: SHIPPED | OUTPATIENT
Start: 2022-01-18 | End: 2022-01-25 | Stop reason: SDUPTHER

## 2022-01-18 NOTE — PROGRESS NOTES
Today's Date: 1/18/2022  Patient's Name: Nate Avendano  Patient's age: 77 y.o., 1945    CC: follow up for CAD, AS, TAVR    HPI  Nate Avendano  Is here for follow up for CAD, AS, TAVR. Was hospitalized recently. Underwent a 2D echo that revealed a decline in LVEF. Underwent a cardiac catheterization which revealed stable nonobstructive coronary artery disease with patent stents. Was sent home with a LifeVest.  Denies any complaints of chest pain, shortness of breath orthopnea, PND, lower extremity. Is not liking the lifevest, wants to take it off. Past Medical History:   has a past medical history of Acne rosacea, Anemia, Aortic stenosis, BPH (benign prostatic hypertrophy), CAD (coronary artery disease), Carotid stenosis, Cataract of both eyes, CRI (chronic renal insufficiency), Diabetes mellitus, type 2 ([address] Dyslipidemia, Erectile dysfunction, H/O agent Orange exposure, Hearing loss, Hyperlipidemia, Hypertension, Mild nonproliferative diabetic retinopathy ([address] Non-rheumatic mitral regurgitation, RBBB, 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 Past Surgical History:   has a past surgical history that includes Circumcision (age 49); Vasectomy; Tympanostomy tube placement; Cataract removal (Bilateral); eye surgery (Bilateral); Cardiac catheterization (12/03/2019); Aortic valve surgery (01/2020); and Cardiac catheterization (12/27/2021). Home Medications:  Prior to Admission medications    Medication Sig Start Date End Date Taking?  Authorizing Provider   lisinopril (PRINIVIL;ZESTRIL) 5 MG tablet Take 1 tablet by mouth daily 1/10/22  Yes ESTHER Souza CNP   furosemide (LASIX) 40 MG tablet Take 1 tablet by mouth daily  Patient taking differently: Take 20 mg by mouth daily  12/28/21 2/26/22 Yes Awilda Zaman, DO [facility] Placard MISC by Does not apply route Duration 2 years 6/2/20  Yes ESTHER Souza CNP amLODIPine (NORVASC) 10 MG tablet Take 1 tablet by mouth daily 1/23/20  Yes Jonathan Brewer MD   clopidogrel (PLAVIX) 75 MG tablet Take 1 tablet by mouth daily Take 1 daily 12/6/19  Yes ESTHER Abreu CNP   simvastatin (ZOCOR) 40 MG tablet Take 10 mg by mouth nightly    Yes Historical Provider, MD   glipiZIDE (GLUCOTROL) 10 MG tablet Take 10 mg by mouth 2 times daily (before meals)   Yes Historical Provider, MD   vitamin D (CHOLECALCIFEROL) 1000 UNIT TABS tablet Take 1,000 Units by mouth 2 times daily    Yes Historical Provider, MD   metFORMIN (GLUCOPHAGE) 1000 MG tablet Take 1 tablet by mouth 2 times daily (with meals) 8/25/16  Yes ESTHER Lopes CNP       Allergies:  Atorvastatin, Sulfa antibiotics, and Sulfanilamide    Social History:   reports that he quit smoking about 52 years ago. He has a 1.00 pack-year smoking history. He has never used smokeless tobacco. He reports current alcohol use. He reports that he does not use drugs. Family History: family history includes Coronary Art Dis in his brother and sister; Heart Attack in his father; Hypertension in his mother; Negra Kisha in his mother; Stroke in his mother. No h/o sudden cardiac death. No for premature CAD    REVIEW OF SYSTEMS:    · Constitutional: there has been no unanticipated weight loss. There's been No change in energy level, No change in activity level. · Eyes: No visual changes or diplopia. No scleral icterus. · ENT: No Headaches, hearing loss or vertigo. No mouth sores or sore throat. · Cardiovascular: see above  · Respiratory: see above  · Gastrointestinal: No abdominal pain, appetite loss, blood in stools. · Genitourinary: No dysuria, trouble voiding, or hematuria. · Musculoskeletal:  No gait disturbance, No weakness or joint complaints. · Integumentary: No rash or pruritis. · Neurological: No headache or diplopia. No tingling  · Psychiatric: No anxiety, or depression.   · Endocrine: No temperature intolerance. · Hematologic/Lymphatic: No abnormal bruising or bleeding, blood clots or swollen lymph nodes. · Allergic/Immunologic: No nasal congestion or hives. PHYSICAL EXAM:      /68   Pulse 64   Ht 5' 6\" (1.676 m)   Wt 160 lb 12.8 oz (72.9 kg)   SpO2 98%   BMI 25.95 kg/m²   General appearance: alert and cooperative with exam  HEENT: Head: Normocephalic, no lesions, without obvious abnormality. Eyes: PERRL, EOMI  Ears: Not obvious deformations or lack of hearing  Neck: no carotid bruit, no JVD  Lungs: clear to auscultation bilaterally  Heart: regular rate and rhythm, S1, S2 normal, no murmur, click, rub or gallop  Abdomen: soft, non-tender; bowel sounds normal; no masses,  no organomegaly  Extremities: extremities normal, atraumatic, no cyanosis or edema  Neurologic: Mental status: Alert, oriented, thought content appropriate  Skin: WNL for age and condition, no obvious rashes or leasions    Labs:     Lab Results   Component Value Date    CHOL 157 07/22/2021    TRIG 102 07/22/2021    HDL 42 07/22/2021    LDLCHOLESTEROL 95 07/22/2021    VLDL NOT REPORTED 07/22/2021    CHOLHDLRATIO 3.7 07/22/2021     Lab Results   Component Value Date     01/14/2022    K 5.2 01/14/2022     01/14/2022    CO2 24 01/14/2022    BUN 34 (H) 01/14/2022    CREATININE 1.32 (H) 01/14/2022    GLUCOSE 160 (H) 01/14/2022    CALCIUM 9.8 01/14/2022    PROT 6.9 07/22/2021    LABALBU 3.1 (L) 12/28/2021    BILITOT 0.26 (L) 07/22/2021    ALKPHOS 80 07/22/2021    AST 22 07/22/2021    ALT 10 07/22/2021    LABGLOM 53 (L) 01/14/2022    GFRAA >60 01/14/2022     Cardiac Data:  EKG: Sinus  Rhythm  -First degree A-V block   Ml = 250  -Left axis -anterior fascicular block. - Extensive anterior-lateral infarct  Old.    -  Nonspecific T-abnormality. TTE 12/3/19  Summary  Left ventricle is normal in size. Global left ventricular systolic function  is moderately reduced. Calculated EF via heart model is 31 %.   Grade I (mild) left ventricular diastolic dysfunction. Left atrium is mildly dilated. Right atrial dilatation. Mildly dilated right ventricular cavity. Right ventricular function appears normal .  The aortic valve is calcified with restricted cusp mobility. Moderate Aortic Stenosis, however, maybe underestimated due to poor LVEF. Mild mitral regurgitation. Trivial tricuspid regurgitation. Estimated right ventricular systolic  pressure is 27 mmHg.     Cardiac cath 12/3/19   Procedure Summary      Severe aortic stenosis.   Mild LV systolic dysfunction.   Patent mid LAD, proximal D1 and mid LCX stents. Focal distal LAD 70%  stenosis.      Recommendations      Medical therapy as needed.   Risk factor modification.   Elective CV surgical consultation for AVR/ CABG     Echo 01/23/2020:  Left ventricle is mildly enlarged, basal septal hypertrophy, global left  ventricular systolic function is moderately to severely reduced, calculated  ejection fraction is 36%. Grade I (mild) left ventricular diastolic dysfunction. Left atrium is moderately dilated. Normal right ventricular size and function. Bioprosthetic valve is seated in the aortic position, appears to be  functioning normally with no obvious regrugitation or stenosis. Trivial perivalvular leak noted. Peak instantaneous gradient 17 mmHg and mean gradient 9 mmHg. Mitral valve sclerosis without stenosis. Mild mitral regurgitation. Tricuspid valve was not well visualized. Trivial tricuspid regurgitation.     S/p TAVR 1/21/2020   34 mm CoreValve EVOLUT via right Femoral approach. Serial # [id number]     TTE 2/27/2020  Summary  Thinned and severely hypokinetic inferolateral wall. Left ventricular systolic function is mildly reduced. Left ventricular ejection fraction 45 %. Grade II (moderate) left ventricular diastolic dysfunction. Right ventricular dilatation with normal systolic function.   Bioprosthetic aortic valve( TAVR) that is normal in appearance and function. Peak instantaneous gradient 9 mmHg and mean gradient 5 mmHg. Trace aortic insufficiency. Mitral leaflets are mildly thickened without stenosis. Mild mitral regurgitation. No pericardial effusion. Echo: 3/16/21  Normal left ventricular diameter. Mildly thickened interventricular septum. Thinned and akinetic inferolateral wall. Left ventricular systolic function is mildly reduced. Left ventricular ejection fraction 45 %. Grade I (mild) left ventricular diastolic dysfunction. Left atrium is severely dilated. Right atrial dilatation. Right ventricular dilatation with normal systolic function. Bioprosthetic aortic valve (per TAVR) that is normal in appearance and  function. Peak instantaneous gradient 12 mmHg and mean gradient 6 mmHg. Mitral annular calcification. Mild mitral regurgitation. Normal function of other valves. No pericardial effusion.     Echo 12/21:   Dilated left ventricle with severely reduced systolic function. Estimated EF 25-30%  Severe hypokinesis of inferior, inferolateral and inferoseptal walls. Grade I (mild) left ventricular diastolic dysfunction. Normal right ventricular size and function. Bioprosthetic aortic valve (TAVR) is noted in position, functioning normally  with no significant stenosis/regurgitation or perivalvular leak (Mean  gradient 5 mm hg)  Mild mitral regurgitation. Mild tricuspid regurgitation. Estimated right ventricular systolic pressure is 41 mmHg. No significant pericardial effusion is seen. Cath 12/21:  Conclusions:  Patent LAD, D, and LCX stents     Assessment and Plan:     1. Non ischemic CM- recently worsened with drop in LVEF to 25-30% on Echo 12/21. Continue ACE. Add toprol. Add aldactone. On lifevest. He wants to take off his lifevest, after discussion, we agreed to pursue a MUGA scan first. Eventually, if LVEF < 35% consider AICD.    2. Cath on 12/21- patent stents with stable non obstructive disease (after drop in LVEF)  3. Albert Matthews CAD with h/o DEVON to LAD, D and mid LCX 8/23/16- patent stent 12/21. Continue current meds  4. Severe aortic stenosis s/p TAVR on 1/21/2020 - normal function Echo 12/21. 5. Carotid artery disease. Follows with vascular surgery. 6. HTN-   7. Hyperlipidemia- Patient is on low dose simvastatin which he is able to tolerate. Any increase in dose or switch has caused him severe myalgia. I recommended him addition of zetia. He does not want to take this new medication. 8. H/o agent orange exposure. 9. CKD- Has been referred to nephrology by PCP  10. Right lower ext numbess. Seeing neurology and vascular surgery. The patient is to continue heart healthy diet, weight loss and exercise as tolerated. Patient's medications and side effects were discussed. Medication refills were provided if needed. Follow up appointment timing was discussed. All questions and concerns were addressed to patient's satisfaction. The patient is to follow up in 6 months or sooner if necessary. Thank you for allowing me to participate in the care of this patient, please do not hesitate to call if you have any questions. Padmini Abdul DO, [facility] [place], [address], [address] [facility]  [facility]. [facility]  [phone]

## 2022-01-19 ENCOUNTER — CARE COORDINATION (OUTPATIENT)
Dept: CASE MANAGEMENT | Age: 77
End: 2022-01-19

## 2022-01-19 NOTE — CARE COORDINATION
[facility] Transitions Follow Up Call    2022    Patient: Shin Rodas  Patient : 1945   MRN: <[id number]>  Reason for Admission: Sepsis, Acute Respiratory Failure w/Hypoxia  Discharge Date: 21 RARS: Readmission Risk Score: 14.7 ( )         Spoke with: Hampton Moritz, patient    Contacted patient for BPCI-A follow up. Mr. Radha Woods states that he is feeling great. Denies having any c/o chest pain/discomfort, pressure, tightness, shortness of breath, fever, chills, swelling or weight gain. States that he started walking 30 minutes a day. Reports alarm on his Life Vest goes off at times when walking. He discussed issue with his cardiologist yesterday and was told it may not be on tight enough. He does not want to tighten it anymore. Mr. Radha Woods states that he plans to contact the [place] [address] about the alarm. He is eating and monitoring his salt intake. He declines referral to dietician at this time. He is aware of when to contact his provider with any new or worsening symptoms. He does not have any questions or concerns at this time. Will continue to follow. Care Transitions Follow Up Call    Needs to be reviewed by the provider   Additional needs identified to be addressed with provider: No  none             Method of communication with provider : none      Care Transition Nurse (CTN) contacted the patient by telephone to follow up after admission on 21-21. Verified name and  with patient as identifiers. CTN reviewed discharge instructions, medical action plan and red flags with patient and discussed any barriers to care and/or understanding of plan of care after discharge. Discussed appropriate site of care based on symptoms and resources available to patient including: PCP and Specialist. The patient agrees to contact the PCP office for questions related to their healthcare.      Patients top risk factors for readmission: medical condition-CHF, DM, Sepsis  Interventions to address risk factors: Education of patient/family/caregiver/guardian to support self-management-when to contact provider with any new or worsening symptoms    CTN provided contact information for future needs. Plan for follow-up call in 7-10 days based on severity of symptoms and risk factors. Plan for next call: symptom management-any new or worsening symptoms    Care Transitions Subsequent and Final Call    Subsequent and Final Calls  Do you have any ongoing symptoms?: No  Have your medications changed?: No  Do you have any questions related to your medications?: No  Do you currently have any active services?: No  Do you have any needs or concerns that I can assist you with?: No  Care Transitions Interventions  Other Interventions:            Follow Up  Future Appointments   Date Time Provider Julio Ratliff   1/20/2022  3:30 PM Madeleine Gallardo [facility] [place]   3/30/2022 10:00 AM ESTHER Chan - CNP [facility]   7/19/2022  2:15 PM DO STEVE Tejeda [facility]       Susana Celaya RN

## 2022-01-20 ENCOUNTER — HOSPITAL ENCOUNTER (OUTPATIENT)
Dept: NUCLEAR MEDICINE | Age: 77
Discharge: HOME | End: 2022-01-22
Payer: MEDICARE

## 2022-01-20 DIAGNOSIS — I50.22 CHRONIC SYSTOLIC CHF (CONGESTIVE HEART FAILURE) (HCC): ICD-10-CM

## 2022-01-20 LAB
LV EF: 32 %
LVEF MODALITY: NORMAL

## 2022-01-20 PROCEDURE — 6360000002 HC RX W HCPCS

## 2022-01-20 PROCEDURE — A9560 TC99M LABELED RBC: HCPCS | Performed by: INTERNAL MEDICINE

## 2022-01-20 PROCEDURE — 78472 GATED HEART PLANAR SINGLE: CPT

## 2022-01-20 PROCEDURE — 3430000000 HC RX DIAGNOSTIC RADIOPHARMACEUTICAL: Performed by: INTERNAL MEDICINE

## 2022-01-20 RX ADMIN — Medication 25 MILLICURIE: at 14:32

## 2022-01-21 PROBLEM — J10.1 INFLUENZA A: Status: RESOLVED | Noted: 2021-12-22 | Resolved: 2022-01-21

## 2022-01-24 ENCOUNTER — TELEPHONE (OUTPATIENT)
Dept: CARDIOLOGY | Age: 77
End: 2022-01-24

## 2022-01-24 NOTE — TELEPHONE ENCOUNTER
Pt called stating his prescriptions should have gone to [place] last week at his appt and the [place] has not received them. The doctor had printed them and then said he would have the staff fax them for him. Pt asking someone call [place] pharmacy @ [phone] to order metoprolol, lisinopril and spirinolactone. He also would like a 1 week supply  sent to [facility] in [place], he had not had any for about 10 days. Pt then wants a call to let him know it was completed. When calling the [place] pharmacy, pt stated they will need the following information. .. Last 4 of SSN- [id number]  Name         Pharmacy fax  [phone]    Pt is wearing a lifevest and would like to DC it. Pt needed to wait until he had a MUGA completed which he has. Pt is awaiting results. Please advise.     [phone]

## 2022-01-25 RX ORDER — METOPROLOL SUCCINATE 25 MG/1
25 TABLET, EXTENDED RELEASE ORAL DAILY
Qty: 30 TABLET | Refills: 0 | Status: SHIPPED | OUTPATIENT
Start: 2022-01-25 | End: 2022-01-25 | Stop reason: SDUPTHER

## 2022-01-25 RX ORDER — SPIRONOLACTONE 25 MG/1
12.5 TABLET ORAL DAILY
Qty: 15 TABLET | Refills: 0 | Status: SHIPPED | OUTPATIENT
Start: 2022-01-25 | End: 2022-01-25 | Stop reason: SDUPTHER

## 2022-01-25 NOTE — TELEPHONE ENCOUNTER
[facility] [place] [facility]  And, A 30 day supply to [facility] in [place]

## 2022-01-25 NOTE — PROGRESS NOTES
Patient was in with wife. Was asking cardiology for a short-term prescription of his Aldactone and his Toprol that was recently started at his last cardiology appointment. Note reviewed. [facility]. Asking for short-term supply sent to [facility] here locally so he can start medication.   States he called and asked cardiology however I do not see any telephone calls regarding this so we will send short-term supply over

## 2022-01-26 RX ORDER — METOPROLOL SUCCINATE 25 MG/1
25 TABLET, EXTENDED RELEASE ORAL DAILY
Qty: 90 TABLET | Refills: 3 | Status: ON HOLD | OUTPATIENT
Start: 2022-01-26 | End: 2022-03-30 | Stop reason: HOSPADM

## 2022-01-26 RX ORDER — LISINOPRIL 5 MG/1
5 TABLET ORAL DAILY
Qty: 30 TABLET | Refills: 0 | Status: SHIPPED | OUTPATIENT
Start: 2022-01-26 | End: 2022-03-17 | Stop reason: SDUPTHER

## 2022-01-26 RX ORDER — SPIRONOLACTONE 25 MG/1
12.5 TABLET ORAL DAILY
Qty: 45 TABLET | Refills: 3 | Status: SHIPPED | OUTPATIENT
Start: 2022-01-26 | End: 2022-04-14

## 2022-01-26 RX ORDER — LISINOPRIL 5 MG/1
5 TABLET ORAL DAILY
Qty: 90 TABLET | Refills: 3 | Status: ON HOLD | OUTPATIENT
Start: 2022-01-26 | End: 2022-03-30 | Stop reason: HOSPADM

## 2022-01-26 RX ORDER — SPIRONOLACTONE 25 MG/1
12.5 TABLET ORAL DAILY
Qty: 15 TABLET | Refills: 0 | Status: SHIPPED | OUTPATIENT
Start: 2022-01-26 | End: 2022-03-17 | Stop reason: SDUPTHER

## 2022-01-26 RX ORDER — METOPROLOL SUCCINATE 25 MG/1
25 TABLET, EXTENDED RELEASE ORAL DAILY
Qty: 30 TABLET | Refills: 0 | Status: SHIPPED | OUTPATIENT
Start: 2022-01-26 | End: 2022-03-17 | Stop reason: SDUPTHER

## 2022-01-27 ENCOUNTER — CARE COORDINATION (OUTPATIENT)
Dept: CASE MANAGEMENT | Age: 77
End: 2022-01-27

## 2022-01-27 NOTE — CARE COORDINATION
[address] [facility] Transitions Bundled Payments for Care Improvement (BPCI) Follow Up Call  Qualifying Diagnosis of Qualifying Diagnosis Sepsis:  Influenza A, coronary stents patent, CHF, LVD - EF 25-30%  1/27/2022  Patient Name:  Oksana Glover   YOB: 1945  Discharge Date:  12/28/21  RARS:  Readmission Risk Score: 14.7 ( )    PCP:  ESTHER Chang CNP    Assessment:   Disorientation/Confusion: denies   Nausea, Vomiting, or Diarrhea: denies   SOB: denies   CP, Palpitations: denies   BS:  WNL's, states does not test everyday   Edema:  denies   Appetite: OK, has eliminated added salt, is weighing himself regularly, current weight is 157#   Difficulty Sleeping: denies   Swelling of lower legs, feet: denies   Muscle Pain or Cramping: denies   Weakness, Fatigue: denies   Dizziness or Fainting: denies   Decrease in Urination: denies   Fever >101 degrees F: denies  [address],  Active: denies need   Medication needs/Questions: denies. States that he received the results of the MUGA scan and was prescribed aldactone, Toprol XL, lisinopril. Wants to stop using the life vest ASAP. Reviewed the need to have the ECHO done as scheduled and take the new medications while awaiting this test before we can be confident he will be OK without the vest.  V/U   Transportation Need:  drives   Live Alone:  spouse   Ability to [employer], [place]: good   Follow Up Appointment Scheduled: completed   Do you feel like you have everything you need to stay well at home?  yes   Agreeable to ongoing Care Transition Communications: yes  Care Transitions will continue to follow per BPCI Program.  Yanira An, RN, CTN    Follow Up Concerns:  Future Appointments   Date Time Provider Julio Ratliff   3/23/2022 10:00 AM SCHEDULE, ECHO [facility] [place]   3/30/2022 10:00 AM [facility]   7/19/2022  2:15 PM Rommel Luna, DO STEVE [facility]

## 2022-02-14 ENCOUNTER — CARE COORDINATION (OUTPATIENT)
Dept: CASE MANAGEMENT | Age: 77
End: 2022-02-14

## 2022-02-14 NOTE — CARE COORDINATION
[facility] Transitions Initial Follow Up Call    Call within 2 business days of discharge: Yes    Patient: Sabine Don Patient : 1945   MRN: <[id number]>  Reason for Admission: Influenza A, coronary stents patent, CHF, LVD - EF 25-30%  Discharge Date: 21 RARS: Readmission Risk Score: 14.7 ( )  BPCI    Last Discharge [facility]       Complaint Diagnosis Description Type Department Provider    21 Shortness of Breath Acute on chronic congestive heart failure, unspecified heart failure type ([facility] [address] [address] . .. ED to Hosp-Admission (Discharged) (ADMITTED) [facility] Roberto Peter DO; Devang Mcintyre DO. .. Care Transitions Follow Up Call    Needs to be reviewed by the provider   Additional needs identified to be addressed with provider: No  none             Method of communication with provider : none      Care Transition Nurse (CTN) contacted the family by telephone to follow up after admission. Verified name and  with family as identifiers. Addressed changes since last contact: none. Spouse/Sarah Beth oh pt is at [employer]. He is wearing his external defibrillator con't and only takes off for showers. No chest pain/pressure, palpitations, dizziness, edema, or SOB complaints. Following low sodium/no added salts diet. Has been walking at indoor track [employer] and tolerates well. No wt gains or edema development. Has meds/taking as directed. Denies any HHC or med refill needs. Discussed follow-up appointments. If no appointment was previously scheduled, appointment scheduling offered: Yes. Is follow up appointment scheduled within 7 days of discharge? Yes. Advance Care Planning:   Does patient have an Advance Directive: Rohith Lovellberto spouse primary decision maker [phone]   CTN reviewed discharge instructions, medical action plan and red flags with family and discussed any barriers to care and/or understanding of plan of care after discharge.  Discussed appropriate site of care based on symptoms and resources available to patient including: When to call 911. The family agrees to contact the PCP office for questions related to their healthcare. Patients top risk factors for readmission: MI  Interventions to address risk factors: Reviewed s/s acute MI to call 911, v/u.      Non-[facility] follow up appointment(s):     CTN provided contact information for future needs. Plan for follow-up call in 7-10 days based on severity of symptoms and risk factors. Plan for next call: BPCI FU, External defibulator, Pending ICD.     Care Transitions 24 Hour Call    Do you have any ongoing symptoms?: No  Do you have all of your prescriptions and are they filled?: Yes (Comment: no new medications - has all meds prescribed)  Were you discharged with any Home Care or Post Acute Services: No  Care Transitions Interventions         Follow Up  Future Appointments   Date Time Provider Julio Ratliff   3/23/2022 10:00 AM SCHEDULE, ECHO [facility] ECHO [place]   3/30/2022 10:00 AM ESTHER Davidson - CNP [facility]   7/19/2022  2:15 PM DO STEVE Sandoval [facility]       Elissa March RN

## 2022-02-21 ENCOUNTER — TELEPHONE (OUTPATIENT)
Dept: INTERNAL MEDICINE | Age: 77
End: 2022-02-21

## 2022-02-21 RX ORDER — FUROSEMIDE 20 MG/1
20 TABLET ORAL DAILY
Qty: 90 TABLET | Refills: 3 | Status: ON HOLD | OUTPATIENT
Start: 2022-02-21 | End: 2022-03-30 | Stop reason: HOSPADM

## 2022-02-21 NOTE — TELEPHONE ENCOUNTER
When patient was in hospital they added  Metoprolol and Spironalactone to medications. Also added water pill Furosemide. Is he to continue on the Furosemide? If so, he will need a new script for it sent to [facility].   Let him know [phone]

## 2022-02-21 NOTE — TELEPHONE ENCOUNTER
Please check to see how much pt taking and if he would like the script to [place]- yes should continue on med

## 2022-02-21 NOTE — TELEPHONE ENCOUNTER
Patient is taking 20mg daily (1/2 of a 40mg tab). He is in agreement to getting script for 20mg tabs. He requests to be sent to [facility] as it takes too long to get from the [place].

## 2022-02-24 ENCOUNTER — CARE COORDINATION (OUTPATIENT)
Dept: CASE MANAGEMENT | Age: 77
End: 2022-02-24

## 2022-02-24 NOTE — CARE COORDINATION
[facility] Transitions Follow Up Call    2022    Patient: Jacob David  Patient : 1945   MRN: [id number]  Reason for Admission: Influenza A, coronary stents patent, CHF, LVD - EF 25-30%  Discharge Date: 21 RARS: Readmission Risk Score: 14.7 ( )         Spoke with: [facility] Transitions Subsequent and Final Call    Subsequent and Final Calls  Do you have any ongoing symptoms?: No  Have your medications changed?: No  Do you have any questions related to your medications?: No  Do you currently have any active services?: No  Do you have any needs or concerns that I can assist you with?: No  Care Transitions Interventions  Other Interventions:       Denies CP, palpitations, sweating, nause, dizziness. States eating and sleeping well. States that the life vest, \"buzzed yesterday when I was doing something with my arms above my head\". Denies pain or injury. Is anxious to get the vest removed. Will have an ECHO done 3/23/22 and F/U with his Cardiologist on 3/29/22.       Follow Up  Future Appointments   Date Time Provider Julio Ratliff   3/23/2022 10:00 AM SCHEDULE, ECHO 238 [facility] ECHO [place]   3/29/2022 11:15 AM DO STEVE Harmon KACI   3/30/2022 10:00 AM EST[facility]ave [facility]       Rimma Mora RN

## 2022-03-03 ENCOUNTER — CARE COORDINATION (OUTPATIENT)
Dept: CASE MANAGEMENT | Age: 77
End: 2022-03-03

## 2022-03-03 NOTE — CARE COORDINATION
[facility] Transitions Follow Up Call    3/3/2022    Patient: Pamela Ro  Patient : 1945   MRN: [id number]  Reason for Admission: Sepsis  Discharge Date: 21 RARS: Readmission Risk Score: 14.7 ( )       Message left in compliance with HIPPA. Stated who I was, representing the [facility] SPECIALTY [facility], Care Transitions Team. Requested to place a call to their Physician with any immediate health needs/questions/concerns.       Future Appointments   Date Time Provider Julio Ratliff   3/23/2022 10:00 AM SCHEDULE, ECHO 238 [facility] ECHO [place]   3/29/2022 11:15 AM DO STEVE Herr DPP   3/30/2022 10:00 AM ESTHER Corcoran - CNP DINT DPP       Wilmer Holder RN

## 2022-03-04 ENCOUNTER — CARE COORDINATION (OUTPATIENT)
Dept: CASE MANAGEMENT | Age: 77
End: 2022-03-04

## 2022-03-04 NOTE — CARE COORDINATION
[facility] Transitions Follow Up Call    3/4/2022    Patient: Maribeth Oakley  Patient : 1945   MRN: [id number]  Reason for Admission: acute resp failure  Discharge Date: 21 RARS: Readmission Risk Score: 14.7 ( )         Spoke with: Subsequent BPCI call. No answer. Unable to leave message. Will re attempt    Care Transitions Subsequent and Final Call    Subsequent and Final Calls  Care Transitions Interventions  Other Interventions:            Follow Up  Future Appointments   Date Time Provider Julio Ratliff   3/23/2022 10:00 AM SCHEDULE, ECHO 238 [facility] ECHO [place]   3/29/2022 11:15 AM DO STEVE Gómez MARIELLAP   3/30/2022 10:00 AM ESTHER Irwin CNP DPP       Isa Carrillo, [address] [place] [address] Care Transitions Nurse   [phone]

## 2022-03-08 ENCOUNTER — CARE COORDINATION (OUTPATIENT)
Dept: CASE MANAGEMENT | Age: 77
End: 2022-03-08

## 2022-03-08 NOTE — CARE COORDINATION
[facility] Transitions Follow Up Call    3/8/2022    Patient: Queta Li  Patient : 1945   MRN: [id number]  Reason for Admission: Acute on chronic CHF  Discharge Date: 21 RARS: Readmission Risk Score: 14.7 ( )       Attempted to contact patient for BPCI-A call. Contact information left to  requesting call back at the earliest convenience. Pt called back, stated his weigh has been stable at 157# with life vest on. Denies edema, SOB, CP/Pressure, palpitations. Taking medications as prescribed. Pt voiced frustration with life vest. Stated it alerted once in past 5 days but did not send shock. Stated it alarms periodically without shock. Pt called company and did not get answers to why vest keeps alarming. Pt has ECHO on 3/23/22 and Cardiology on 3/29/22. Stated he does not plan on wearing life vest after f/u with Cardiology d/t discomfort. Pt has been walking for exercise, avoiding strenuous activities such as shoveling. Stated he doesn't like to ask his kids to help, is hoping to be able to return to regular activities once cleared by . No immediate needs. Pt not interest in CHF clinic, stated he attended in past. Will continue to follow for BPCI.        Follow Up  Future Appointments   Date Time Provider Julio Ratliff   3/23/2022 10:00 AM SCHEDULE, ECHO 238 [facility] ECHO [place]   3/29/2022 11:15 AM MD STEVE Coyle   3/30/2022 10:00 AM ESTHER Walton - CNP DINT DPP       Anibal Kumar RN

## 2022-03-17 ENCOUNTER — CARE COORDINATION (OUTPATIENT)
Dept: CASE MANAGEMENT | Age: 77
End: 2022-03-17

## 2022-03-17 ENCOUNTER — HOSPITAL ENCOUNTER (INPATIENT)
Age: 77
LOS: 1 days | Discharge: HOME | DRG: 291 | End: 2022-03-17
Attending: EMERGENCY MEDICINE | Admitting: INTERNAL MEDICINE
Payer: MEDICARE

## 2022-03-17 ENCOUNTER — APPOINTMENT (OUTPATIENT)
Dept: GENERAL RADIOLOGY | Age: 77
DRG: 291 | End: 2022-03-17
Payer: MEDICARE

## 2022-03-17 VITALS
HEART RATE: 60 BPM | HEIGHT: 66 IN | TEMPERATURE: 96.7 F | OXYGEN SATURATION: 96 % | WEIGHT: 156.1 LBS | SYSTOLIC BLOOD PRESSURE: 124 MMHG | DIASTOLIC BLOOD PRESSURE: 89 MMHG | BODY MASS INDEX: 25.09 KG/M2 | RESPIRATION RATE: 14 BRPM

## 2022-03-17 DIAGNOSIS — K42.0 INCARCERATED UMBILICAL HERNIA: Primary | ICD-10-CM

## 2022-03-17 DIAGNOSIS — I50.30 DIASTOLIC CONGESTIVE HEART FAILURE, UNSPECIFIED HF CHRONICITY (HCC): ICD-10-CM

## 2022-03-17 PROBLEM — A41.9 SEPSIS (HCC): Status: RESOLVED | Noted: 2021-12-22 | Resolved: 2022-03-17

## 2022-03-17 PROBLEM — E66.9 OBESITY: Status: ACTIVE | Noted: 2022-03-17

## 2022-03-17 PROBLEM — R09.02 HYPOXIA: Status: ACTIVE | Noted: 2022-03-17

## 2022-03-17 PROBLEM — J18.9 PNEUMONIA: Status: RESOLVED | Noted: 2021-07-31 | Resolved: 2022-03-17

## 2022-03-17 LAB
-: ABNORMAL
ABSOLUTE EOS #: 0.78 K/UL (ref 0–0.4)
ABSOLUTE IMMATURE GRANULOCYTE: 0.39 K/UL (ref 0–0.3)
ABSOLUTE LYMPH #: 2.13 K/UL (ref 1–4.8)
ABSOLUTE MONO #: 1.75 K/UL (ref 0.1–1.2)
ANION GAP SERPL CALCULATED.3IONS-SCNC: 13 MMOL/L (ref 9–17)
BACTERIA: ABNORMAL
BASOPHILS # BLD: 0 % (ref 0–2)
BASOPHILS ABSOLUTE: 0 K/UL (ref 0–0.2)
BILIRUBIN URINE: NEGATIVE
BUN BLDV-MCNC: 44 MG/DL (ref 8–23)
BUN/CREAT BLD: 27 (ref 9–20)
CALCIUM SERPL-MCNC: 9.7 MG/DL (ref 8.6–10.4)
CASTS UA: ABNORMAL /LPF (ref 0–2)
CASTS UA: ABNORMAL /LPF (ref 0–2)
CHLORIDE BLD-SCNC: 96 MMOL/L (ref 98–107)
CHOLESTEROL/HDL RATIO: 4.1
CHOLESTEROL: 142 MG/DL
CO2: 22 MMOL/L (ref 20–31)
CREAT SERPL-MCNC: 1.61 MG/DL (ref 0.7–1.2)
EOSINOPHILS RELATIVE PERCENT: 4 % (ref 1–8)
EPITHELIAL CELLS UA: ABNORMAL /HPF (ref 0–5)
GFR AFRICAN AMERICAN: 51 ML/MIN
GFR NON-AFRICAN AMERICAN: 42 ML/MIN
GFR SERPL CREATININE-BSD FRML MDRD: ABNORMAL ML/MIN/{1.73_M2}
GLUCOSE BLD-MCNC: 127 MG/DL (ref 75–110)
GLUCOSE BLD-MCNC: 198 MG/DL (ref 70–99)
GLUCOSE BLD-MCNC: 217 MG/DL (ref 75–110)
GLUCOSE URINE: NEGATIVE
HCT VFR BLD CALC: 36.5 % (ref 40.7–50.3)
HDLC SERPL-MCNC: 35 MG/DL
HEMOGLOBIN: 12.2 G/DL (ref 13–17)
IMMATURE GRANULOCYTES: 2 %
KETONES, URINE: NEGATIVE
LACTIC ACID, SEPSIS: 1.9 MMOL/L (ref 0.5–1.9)
LDL CHOLESTEROL: 85 MG/DL (ref 0–130)
LEUKOCYTE ESTERASE, URINE: NEGATIVE
LV EF: 35 %
LVEF MODALITY: NORMAL
LYMPHOCYTES # BLD: 11 % (ref 15–43)
MCH RBC QN AUTO: 26.6 PG (ref 25.2–33.5)
MCHC RBC AUTO-ENTMCNC: 33.4 G/DL (ref 25.2–33.5)
MCV RBC AUTO: 79.5 FL (ref 82.6–102.9)
MONOCYTES # BLD: 9 % (ref 6–14)
MORPHOLOGY: ABNORMAL
MUCUS: ABNORMAL
NITRITE, URINE: NEGATIVE
NRBC AUTOMATED: 0 PER 100 WBC
PDW BLD-RTO: 14.2 % (ref 11.8–14.4)
PH UA: 5.5 (ref 5–6)
PLATELET # BLD: 485 K/UL (ref 138–453)
PMV BLD AUTO: 10.1 FL (ref 8.1–13.5)
POTASSIUM SERPL-SCNC: 5 MMOL/L (ref 3.7–5.3)
PRO-BNP: 7092 PG/ML
PROTEIN UA: ABNORMAL
RBC # BLD: 4.59 M/UL (ref 4.21–5.77)
RBC UA: ABNORMAL /HPF (ref 0–4)
SARS-COV-2, RAPID: NOT DETECTED
SEG NEUTROPHILS: 74 % (ref 44–74)
SEGMENTED NEUTROPHILS ABSOLUTE COUNT: 14.35 K/UL (ref 1.5–8.1)
SODIUM BLD-SCNC: 131 MMOL/L (ref 135–144)
SPECIFIC GRAVITY UA: 1.03 (ref 1.01–1.02)
SPECIMEN DESCRIPTION: NORMAL
TRIGL SERPL-MCNC: 112 MG/DL
TROPONIN, HIGH SENSITIVITY: 52 NG/L (ref 0–22)
TROPONIN, HIGH SENSITIVITY: 52 NG/L (ref 0–22)
URINE HGB: ABNORMAL
UROBILINOGEN, URINE: NORMAL
WBC # BLD: 19.4 K/UL (ref 3.5–11.3)
WBC UA: ABNORMAL /HPF (ref 0–4)

## 2022-03-17 PROCEDURE — 93306 TTE W/DOPPLER COMPLETE: CPT

## 2022-03-17 PROCEDURE — 71045 X-RAY EXAM CHEST 1 VIEW: CPT

## 2022-03-17 PROCEDURE — 96374 THER/PROPH/DIAG INJ IV PUSH: CPT

## 2022-03-17 PROCEDURE — APPSS45 APP SPLIT SHARED TIME 31-45 MINUTES: Performed by: NURSE PRACTITIONER

## 2022-03-17 PROCEDURE — 84484 ASSAY OF TROPONIN QUANT: CPT

## 2022-03-17 PROCEDURE — 93005 ELECTROCARDIOGRAM TRACING: CPT | Performed by: EMERGENCY MEDICINE

## 2022-03-17 PROCEDURE — 36415 COLL VENOUS BLD VENIPUNCTURE: CPT

## 2022-03-17 PROCEDURE — 85025 COMPLETE CBC W/AUTO DIFF WBC: CPT

## 2022-03-17 PROCEDURE — 6370000000 HC RX 637 (ALT 250 FOR IP): Performed by: NURSE PRACTITIONER

## 2022-03-17 PROCEDURE — 80048 BASIC METABOLIC PNL TOTAL CA: CPT

## 2022-03-17 PROCEDURE — 83605 ASSAY OF LACTIC ACID: CPT

## 2022-03-17 PROCEDURE — 82947 ASSAY GLUCOSE BLOOD QUANT: CPT

## 2022-03-17 PROCEDURE — 99284 EMERGENCY DEPT VISIT MOD MDM: CPT

## 2022-03-17 PROCEDURE — 6360000002 HC RX W HCPCS: Performed by: EMERGENCY MEDICINE

## 2022-03-17 PROCEDURE — 83880 ASSAY OF NATRIURETIC PEPTIDE: CPT

## 2022-03-17 PROCEDURE — 87635 SARS-COV-2 COVID-19 AMP PRB: CPT

## 2022-03-17 PROCEDURE — 80061 LIPID PANEL: CPT

## 2022-03-17 PROCEDURE — 2060000000 HC ICU INTERMEDIATE R&B

## 2022-03-17 PROCEDURE — 6360000002 HC RX W HCPCS: Performed by: NURSE PRACTITIONER

## 2022-03-17 PROCEDURE — 2580000003 HC RX 258: Performed by: NURSE PRACTITIONER

## 2022-03-17 PROCEDURE — 81001 URINALYSIS AUTO W/SCOPE: CPT

## 2022-03-17 PROCEDURE — 99238 HOSP IP/OBS DSCHRG MGMT 30/<: CPT | Performed by: INTERNAL MEDICINE

## 2022-03-17 RX ORDER — GLIPIZIDE 5 MG/1
10 TABLET ORAL
Status: DISCONTINUED | OUTPATIENT
Start: 2022-03-17 | End: 2022-03-17 | Stop reason: HOSPADM

## 2022-03-17 RX ORDER — METOPROLOL SUCCINATE 25 MG/1
25 TABLET, EXTENDED RELEASE ORAL DAILY
Status: DISCONTINUED | OUTPATIENT
Start: 2022-03-17 | End: 2022-03-17 | Stop reason: HOSPADM

## 2022-03-17 RX ORDER — SODIUM CHLORIDE 0.9 % (FLUSH) 0.9 %
5-40 SYRINGE (ML) INJECTION PRN
Status: DISCONTINUED | OUTPATIENT
Start: 2022-03-17 | End: 2022-03-17 | Stop reason: HOSPADM

## 2022-03-17 RX ORDER — LISINOPRIL 5 MG/1
5 TABLET ORAL DAILY
Status: DISCONTINUED | OUTPATIENT
Start: 2022-03-17 | End: 2022-03-17 | Stop reason: HOSPADM

## 2022-03-17 RX ORDER — ACETAMINOPHEN 325 MG/1
650 TABLET ORAL EVERY 6 HOURS PRN
Status: DISCONTINUED | OUTPATIENT
Start: 2022-03-17 | End: 2022-03-17 | Stop reason: HOSPADM

## 2022-03-17 RX ORDER — VITAMIN B COMPLEX
1000 TABLET ORAL 2 TIMES DAILY
Status: DISCONTINUED | OUTPATIENT
Start: 2022-03-17 | End: 2022-03-17 | Stop reason: HOSPADM

## 2022-03-17 RX ORDER — DEXTROSE MONOHYDRATE 25 G/50ML
12.5 INJECTION, SOLUTION INTRAVENOUS PRN
Status: DISCONTINUED | OUTPATIENT
Start: 2022-03-17 | End: 2022-03-17 | Stop reason: HOSPADM

## 2022-03-17 RX ORDER — ONDANSETRON 2 MG/ML
4 INJECTION INTRAMUSCULAR; INTRAVENOUS EVERY 6 HOURS PRN
Status: DISCONTINUED | OUTPATIENT
Start: 2022-03-17 | End: 2022-03-17 | Stop reason: HOSPADM

## 2022-03-17 RX ORDER — SODIUM CHLORIDE 9 MG/ML
25 INJECTION, SOLUTION INTRAVENOUS PRN
Status: DISCONTINUED | OUTPATIENT
Start: 2022-03-17 | End: 2022-03-17 | Stop reason: HOSPADM

## 2022-03-17 RX ORDER — ONDANSETRON 4 MG/1
4 TABLET, ORALLY DISINTEGRATING ORAL EVERY 8 HOURS PRN
Status: DISCONTINUED | OUTPATIENT
Start: 2022-03-17 | End: 2022-03-17 | Stop reason: HOSPADM

## 2022-03-17 RX ORDER — ALBUTEROL SULFATE 2.5 MG/3ML
2.5 SOLUTION RESPIRATORY (INHALATION)
Status: DISCONTINUED | OUTPATIENT
Start: 2022-03-17 | End: 2022-03-17 | Stop reason: HOSPADM

## 2022-03-17 RX ORDER — SPIRONOLACTONE 25 MG/1
12.5 TABLET ORAL DAILY
Status: DISCONTINUED | OUTPATIENT
Start: 2022-03-17 | End: 2022-03-17 | Stop reason: HOSPADM

## 2022-03-17 RX ORDER — SODIUM CHLORIDE 0.9 % (FLUSH) 0.9 %
5-40 SYRINGE (ML) INJECTION EVERY 12 HOURS SCHEDULED
Status: DISCONTINUED | OUTPATIENT
Start: 2022-03-17 | End: 2022-03-17 | Stop reason: HOSPADM

## 2022-03-17 RX ORDER — POLYETHYLENE GLYCOL 3350 17 G/17G
17 POWDER, FOR SOLUTION ORAL DAILY PRN
Status: DISCONTINUED | OUTPATIENT
Start: 2022-03-17 | End: 2022-03-17 | Stop reason: HOSPADM

## 2022-03-17 RX ORDER — FUROSEMIDE 10 MG/ML
20 INJECTION INTRAMUSCULAR; INTRAVENOUS DAILY
Status: DISCONTINUED | OUTPATIENT
Start: 2022-03-17 | End: 2022-03-17 | Stop reason: HOSPADM

## 2022-03-17 RX ORDER — AMLODIPINE BESYLATE 5 MG/1
10 TABLET ORAL DAILY
Status: DISCONTINUED | OUTPATIENT
Start: 2022-03-17 | End: 2022-03-17 | Stop reason: HOSPADM

## 2022-03-17 RX ORDER — CLOPIDOGREL BISULFATE 75 MG/1
75 TABLET ORAL DAILY
Status: DISCONTINUED | OUTPATIENT
Start: 2022-03-17 | End: 2022-03-17 | Stop reason: HOSPADM

## 2022-03-17 RX ORDER — SIMVASTATIN 10 MG
10 TABLET ORAL NIGHTLY
Status: DISCONTINUED | OUTPATIENT
Start: 2022-03-17 | End: 2022-03-17 | Stop reason: HOSPADM

## 2022-03-17 RX ORDER — FUROSEMIDE 10 MG/ML
20 INJECTION INTRAMUSCULAR; INTRAVENOUS ONCE
Status: COMPLETED | OUTPATIENT
Start: 2022-03-17 | End: 2022-03-17

## 2022-03-17 RX ORDER — ACETAMINOPHEN 650 MG/1
650 SUPPOSITORY RECTAL EVERY 6 HOURS PRN
Status: DISCONTINUED | OUTPATIENT
Start: 2022-03-17 | End: 2022-03-17 | Stop reason: HOSPADM

## 2022-03-17 RX ORDER — SODIUM CHLORIDE FOR INHALATION 0.9 %
3 VIAL, NEBULIZER (ML) INHALATION
Status: DISCONTINUED | OUTPATIENT
Start: 2022-03-17 | End: 2022-03-17 | Stop reason: HOSPADM

## 2022-03-17 RX ORDER — NICOTINE POLACRILEX 4 MG
15 LOZENGE BUCCAL PRN
Status: DISCONTINUED | OUTPATIENT
Start: 2022-03-17 | End: 2022-03-17 | Stop reason: HOSPADM

## 2022-03-17 RX ORDER — DEXTROSE MONOHYDRATE 50 MG/ML
100 INJECTION, SOLUTION INTRAVENOUS PRN
Status: DISCONTINUED | OUTPATIENT
Start: 2022-03-17 | End: 2022-03-17 | Stop reason: HOSPADM

## 2022-03-17 RX ADMIN — FUROSEMIDE 20 MG: 10 INJECTION, SOLUTION INTRAMUSCULAR; INTRAVENOUS at 08:08

## 2022-03-17 RX ADMIN — GLIPIZIDE 10 MG: 5 TABLET ORAL at 06:39

## 2022-03-17 RX ADMIN — AMLODIPINE BESYLATE 10 MG: 5 TABLET ORAL at 08:09

## 2022-03-17 RX ADMIN — SODIUM CHLORIDE, PRESERVATIVE FREE 10 ML: 5 INJECTION INTRAVENOUS at 08:08

## 2022-03-17 RX ADMIN — Medication 1000 UNITS: at 08:09

## 2022-03-17 RX ADMIN — SPIRONOLACTONE 12.5 MG: 25 TABLET ORAL at 08:09

## 2022-03-17 RX ADMIN — CLOPIDOGREL BISULFATE 75 MG: 75 TABLET ORAL at 08:09

## 2022-03-17 RX ADMIN — METOPROLOL SUCCINATE 25 MG: 25 TABLET, EXTENDED RELEASE ORAL at 08:09

## 2022-03-17 RX ADMIN — LISINOPRIL 5 MG: 5 TABLET ORAL at 08:09

## 2022-03-17 RX ADMIN — INSULIN LISPRO 2 UNITS: 100 INJECTION, SOLUTION INTRAVENOUS; SUBCUTANEOUS at 08:09

## 2022-03-17 RX ADMIN — FUROSEMIDE 20 MG: 10 INJECTION, SOLUTION INTRAMUSCULAR; INTRAVENOUS at 02:51

## 2022-03-17 RX ADMIN — ENOXAPARIN SODIUM 40 MG: 40 INJECTION SUBCUTANEOUS at 08:09

## 2022-03-17 ASSESSMENT — PAIN SCALES - GENERAL
PAINLEVEL_OUTOF10: 0
PAINLEVEL_OUTOF10: 6
PAINLEVEL_OUTOF10: 0

## 2022-03-17 ASSESSMENT — PAIN - FUNCTIONAL ASSESSMENT: PAIN_FUNCTIONAL_ASSESSMENT: 0-10

## 2022-03-17 NOTE — FLOWSHEET NOTE
rounding on PCU. Assessment: Patient welcomed . Seems very positive. Intervention: Engaged in conversation and prayer. Patient expressed gratitude for visit and offer of continued prayer. Plan: Chaplains are available 24/7 to help with spiritual and emotional concerns. 03/17/22 0387   Encounter Summary   Services provided to: Patient;Significant other   Referral/Consult From: [address] [place] [address] Family members; Faith/dulce community   Place of [address] [place] [address] Visiting   (3/17/2022)   Volunteer Visit Yes   Complexity of Encounter Low   Length of Encounter 15 minutes   Spiritual/Voodoo   Type Spiritual support   Assessment Calm; Approachable   Intervention Prayer;Sustaining presence/ Ministry of presence   Outcome Comfort;Expressed gratitude

## 2022-03-17 NOTE — CONSULTS
[facility]  CONSULT NOTE                  Date:   3/17/2022  Patient name: Jacob David  Date of admission:  3/17/2022 12:12 AM  MRN:   [id number]  YOB: 1945    Reason for Admission: hypoxia     CHIEF COMPLAINT:   Abdominal pain     History Obtained From:  Patient and chart review     HISTORY OF PRESENT ILLNESS:    The patient is a 77 y.o. male with known history of CAD, ischemic cardiomyopathy with last LVEF 25 to 30% in December 21, aortic stenosis status post TAVR, presented to the ER with c/o abdominal pain -- was found to have an incarcerated umbilical hernia -- which was reduced in ER and will require outpatient follow up. However, during his ER evaluation he was found to be hypoxic at 83% on room air and required 3L nasal cannula oxygen to maintain an oxygen saturation of 91%. He denies fevers, cough, chest pain, or shortness of breath. He does have underlying COPD and CHF. He is wearing a LifeVest.  No lower extremity edema or palpitations. Past Medical History:   has a past medical history of Acne rosacea, Anemia, Aortic stenosis, BPH (benign prostatic hypertrophy), CAD (coronary artery disease), Carotid stenosis, Cataract of both eyes, CRI (chronic renal insufficiency), Diabetes mellitus, type 2 ([address] Dyslipidemia, Erectile dysfunction, H/O agent Orange exposure, Hearing loss, Hyperlipidemia, Hypertension, Mild nonproliferative diabetic retinopathy ([address] Non-rheumatic mitral regurgitation, Pneumonia, Pneumonia of right lower lobe due to infectious organism, RBBB, Sepsis ([address] 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 Past Surgical History:   has a past surgical history that includes Circumcision (age 49); Vasectomy; Tympanostomy tube placement; Cataract removal (Bilateral); eye surgery (Bilateral); Cardiac catheterization (12/03/2019);  Aortic valve surgery (01/2020); and Cardiac catheterization (12/27/2021). Home Medications:    Prior to Admission medications    Medication Sig Start Date End Date Taking? Authorizing Provider   furosemide (LASIX) 20 MG tablet Take 1 tablet by mouth daily 2/21/22   ESTHER Hernandez CNP   metoprolol succinate (TOPROL XL) 25 MG extended release tablet Take 1 tablet by mouth daily 1/26/22   Eve Woodson MD   spironolactone (ALDACTONE) 25 MG tablet Take 0.5 tablets by mouth daily 1/26/22   Eve Woodson MD   lisinopril (PRINIVIL;ZESTRIL) 5 MG tablet Take 1 tablet by mouth daily 1/26/22   Eve Woodson MD   Handicap Placard MISC by Does not apply route Duration 2 years 6/2/20   ESTHER Hernandez CNP   amLODIPine (NORVASC) 10 MG tablet Take 1 tablet by mouth daily 1/23/20   Iram San MD   clopidogrel (PLAVIX) 75 MG tablet Take 1 tablet by mouth daily Take 1 daily 12/6/19   ESTHER Trinidad CNP   simvastatin (ZOCOR) 40 MG tablet Take 10 mg by mouth nightly     Historical Provider, MD   glipiZIDE (GLUCOTROL) 10 MG tablet Take 10 mg by mouth 2 times daily (before meals)    Historical Provider, MD   vitamin D (CHOLECALCIFEROL) 1000 UNIT TABS tablet Take 1,000 Units by mouth 2 times daily     Historical Provider, MD   metFORMIN (GLUCOPHAGE) 1000 MG tablet Take 1 tablet by mouth 2 times daily (with meals) 8/25/16   ESTHER Rivera CNP       Allergies:  Atorvastatin, Sulfa antibiotics, and Sulfanilamide    Social History:   reports that he quit smoking about 52 years ago. He has a 1.00 pack-year smoking history. He has never used smokeless tobacco. He reports current alcohol use. He reports that he does not use drugs. Family History:   negative for early CAD    REVIEW OF SYSTEMS:    · Constitutional: there has been no unanticipated weight loss. No change in functional capacity. · Eyes: No visual changes or diplopia. · ENT: No Headaches, hearing loss or vertigo. No mouth sores or sore throat.   · Cardiovascular: Denies chest pain as mentioned above, dyspnea, orthopnea, palpitations or pre syncope  · Respiratory: No hx of productive cough, pleuritic chest pain   · Gastrointestinal: No abdominal pain, appetite loss, blood in stools. No change in bowel habits. · Genitourinary: No dysuria, trouble voiding, or hematuria. · Musculoskeletal:  No gait disturbance, No weakness or joint complaints. · Integumentary: No rash or pruritis. · Neurological: No headache, weakness, numbness or tingling. No change in gait, balance, coordination. · Psychiatric: No anxiety, or depression. · Endocrine: No temperature intolerance. No excessive thirst, fluid intake, or urination. No tremor. · Hematologic/Lymphatic: No abnormal bruising or bleeding, blood clots or swollen lymph nodes. · Allergic/Immunologic: No nasal congestion or hives. PHYSICAL EXAM:    Physical Examination:    /89   Pulse 60   Temp 96.7 °F (35.9 °C) (Tympanic)   Resp 14   Ht 5' 6\" (1.676 m)   Wt 156 lb 1.6 oz (70.8 kg)   SpO2 96%   BMI 25.20 kg/m²    Constitutional and General Appearance: alert, cooperative, in no distress   HEENT: PERRL, no cervical lymphadenopathy. Normal oral mucosa  Respiratory:  · Normal excursion and expansion without use of accessory muscles  · Resp Auscultation: Good respiratory effort. No for increased work of breathing. On auscultation: clear to auscultation bilaterally, no wheezing, no rales.    Cardiovascular:  · The apical impulse is not displaced  · Heart tones are crisp and normal. regular S1 and S2. Murmurs: None   · Jugular venous pulsation Normal  · Thre is no carotid bruit   · Peripheral pulses are symmetrical and full   Abdomen:  · No masses or tenderness  · Bowel sounds present  Extremities:  ·  No Cyanosis or Clubbing  ·  Lower extremity edema: No edema   ·  Skin: Warm and dry  Neurological:  · Not done     DATA:    Diagnostics:      EKG: Normal sinus rhythm, left anterior fascicle block        Previous cardiac testing:    Echo 12/21:   Dilated left ventricle with severely reduced systolic function. Estimated EF 25-30%  Severe hypokinesis of inferior, inferolateral and inferoseptal walls. Grade I (mild) left ventricular diastolic dysfunction. Normal right ventricular size and function. Bioprosthetic aortic valve (TAVR) is noted in position, functioning normally  with no significant stenosis/regurgitation or perivalvular leak (Mean  gradient 5 mm hg)  Mild mitral regurgitation. Mild tricuspid regurgitation. Estimated right ventricular systolic pressure is 41 mmHg. No significant pericardial effusion is seen.     Cath 12/21:  Conclusions:  Patent LAD, D, and LCX stents     Labs:     CBC:   Recent Labs     03/17/22  0022   WBC 19.4*   HGB 12.2*   HCT 36.5*   *     BMP:   Recent Labs     03/17/22 0022   *   K 5.0   CO2 22   BUN 44*   CREATININE 1.61*   LABGLOM 42*   GLUCOSE 198*     BNP: No results for input(s): BNP in the last 72 hours. PT/INR: No results for input(s): PROTIME, INR in the last 72 hours. APTT:No results for input(s): APTT in the last 72 hours. CARDIAC ENZYMES:No results for input(s): CKTOTAL, CKMB, CKMBINDEX, TROPONINI in the last 72 hours. FASTING LIPID PANEL:  Lab Results   Component Value Date    HDL 42 07/22/2021    TRIG 102 07/22/2021         IMPRESSION and PLAN:    1. Incarcerated abdominal hernia, continue management per general surgery    2. Non ischemic CM with drop in LVEF to 25-30% on Echo 12/21. Clinically well compensated with no signs of volume overload on examination. Continue home dose Lasix, Toprol-XL, lisinopril and Aldactone. Repeat limited echocardiogram to reassess LVEF. If greater than 35%, will discontinue LifeVest.    3.  CAD with recent coronary angiography in 12/21, patent stents in LAD/Diag and mid LCX, as above, no angina, maintained on plavix, statin and BB     4. Severe aortic stenosis s/p TAVR on 1/21/2020 - normal function Echo 12/21.      5. Carotid artery disease. Follows with vascular surgery. No objection to discharge home from cardiac standpoint with outpatient follow-up as scheduled. Discussed with patient and nursing.       Kye Dalton MD, [facility]

## 2022-03-17 NOTE — DISCHARGE INSTR - DIET

## 2022-03-17 NOTE — CARE COORDINATION
Care Transition    Noted patient is admitted 3/17 for incarcerated umbilical hernia during 90 day BPCI sepsis episode. BPCI episode is due to end on 3/28/22. Encounter opened in order to change next out reach date for follow up.

## 2022-03-17 NOTE — ED PROVIDER NOTES
eMERGENCY dEPARTMENT eNCOUnter      Pt Name: Brendan Castro  MRN: [id number]  [place] 1945  Date of evaluation: 3/17/2022      CHIEF COMPLAINT       Chief Complaint   Patient presents with    Abdominal Pain         HISTORY OF PRESENT ILLNESS    Brendan Castro is a 77 y.o. male who presents with abdominal pain. Patient states since about dinnertime which is about 6 PM he has been having this what he describes fairly severe abdominal pain rates as about a 5-6 out of 10 says it just hurts points around his umbilicus denies associated nausea vomiting fever chills chest pain or shortness of breath however did come in with sats in the low 80s. Patient has no history of cardiac disease according to wife he developed Covid ended up with a very low ejection fraction is currently on a LifeVest has appointment with family physician next week had a prior history of CHF in the past        REVIEW OF SYSTEMS       Review of systems are all reviewed and negative except stated above in HPI    [address]    has a past medical history of Acne rosacea, Anemia, Aortic stenosis, BPH (benign prostatic hypertrophy), CAD (coronary artery disease), Carotid stenosis, Cataract of both eyes, CRI (chronic renal insufficiency), Diabetes mellitus, type 2 ([address] Dyslipidemia, Erectile dysfunction, H/O agent Orange exposure, Hearing loss, Hyperlipidemia, Hypertension, Mild nonproliferative diabetic retinopathy ([address] Non-rheumatic mitral regurgitation, Pneumonia, Pneumonia of right lower lobe due to infectious organism, RBBB, Sepsis ([address] 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 SURGICAL HISTORY      has a past surgical history that includes Circumcision (age 49); Vasectomy; Tympanostomy tube placement; Cataract removal (Bilateral); eye surgery (Bilateral); Cardiac catheterization (12/03/2019);  Aortic valve surgery (01/2020); and Cardiac catheterization (2021). CURRENT MEDICATIONS       Previous Medications    AMLODIPINE (NORVASC) 10 MG TABLET    Take 1 tablet by mouth daily    CLOPIDOGREL (PLAVIX) 75 MG TABLET    Take 1 tablet by mouth daily Take 1 daily    FUROSEMIDE (LASIX) 20 MG TABLET    Take 1 tablet by mouth daily    GLIPIZIDE (GLUCOTROL) 10 MG TABLET    Take 10 mg by mouth 2 times daily (before meals)    HANDICAP PLACARD MISC    by Does not apply route Duration 2 years    LISINOPRIL (PRINIVIL;ZESTRIL) 5 MG TABLET    Take 1 tablet by mouth daily    LISINOPRIL (PRINIVIL;ZESTRIL) 5 MG TABLET    Take 1 tablet by mouth daily    METFORMIN (GLUCOPHAGE) 1000 MG TABLET    Take 1 tablet by mouth 2 times daily (with meals)    METOPROLOL SUCCINATE (TOPROL XL) 25 MG EXTENDED RELEASE TABLET    Take 1 tablet by mouth daily    METOPROLOL SUCCINATE (TOPROL XL) 25 MG EXTENDED RELEASE TABLET    Take 1 tablet by mouth daily    SIMVASTATIN (ZOCOR) 40 MG TABLET    Take 10 mg by mouth nightly     SPIRONOLACTONE (ALDACTONE) 25 MG TABLET    Take 0.5 tablets by mouth daily    SPIRONOLACTONE (ALDACTONE) 25 MG TABLET    Take 0.5 tablets by mouth daily    VITAMIN D (CHOLECALCIFEROL) 1000 UNIT TABS TABLET    Take 1,000 Units by mouth 2 times daily        ALLERGIES     is allergic to atorvastatin, sulfa antibiotics, and sulfanilamide. FAMILY HISTORY     He indicated that the status of his mother is unknown. He indicated that his father is . He indicated that his sister is . He indicated that the status of his brother is unknown.     family history includes Coronary Art Dis in his brother and sister; Heart Attack in his father; Hypertension in his mother; Joyce  in his mother; Stroke in his mother. SOCIAL HISTORY      reports that he quit smoking about 52 years ago. He has a 1.00 pack-year smoking history. He has never used smokeless tobacco. He reports current alcohol use. He reports that he does not use drugs.     PHYSICAL EXAM     INITIAL VITALS: height is 5' 6\" (1.676 m) and weight is 157 lb (71.2 kg). His tympanic temperature is 96.4 °F (35.8 °C). His blood pressure is 132/61 and his pulse is 58. His respiration is 24 and oxygen saturation is 96%. General: Patient alert nontoxic-appearing male in no apparent distress  HEENT: Head is atraumatic conjunctiva clear  Neck: Supple  Respiratory: Lung sounds show some crackles throughout  Cardiac: Heart is regular rate and rhythm without appreciable murmurs  GI: Abdomen is mildly obese he has an obviously umbilical hernia which appears to be incarcerated mild tenderness around the area bowel sounds are positive  Peripheral exam no cyanosis or edema  Neuro: Patient has no gross focal neurological deficits at bedside exam    DIFFERENTIAL DIAGNOSIS/ MDM:     Hypoxia cardiac work-up incarcerated hernia    DIAGNOSTIC RESULTS     EKG: All EKG's are interpreted by the Emergency Department Physician who either signs or Co-signs this chart in the absence of a cardiologist.    EKG interpreted by me reveals a sinus bradycardia rate of 59 first-degree AV block OK of 246 QRS is normal at 98 he has a rare PAC left axis deviation with anterior infarct I did compare this to previous EKG from 1 6 of 22 which was similar    RADIOLOGY:   I directly visualized the following  images and reviewed the radiologist interpretations:  XR CHEST PORTABLE   Preliminary Result   1. Low lung volumes with mild pulmonary vascular congestion, not appreciably   changed.                LABS:  Labs Reviewed   CBC WITH AUTO DIFFERENTIAL - Abnormal; Notable for the following components:       Result Value    WBC 19.4 (*)     Hemoglobin 12.2 (*)     Hematocrit 36.5 (*)     MCV 79.5 (*)     Platelets 310 (*)     Lymphocytes 11 (*)     Immature Granulocytes 2 (*)     Absolute Mono # 1.75 (*)     Segs Absolute 14.35 (*)     Absolute Eos # 0.78 (*)     Absolute Immature Granulocyte 0.39 (*)     All other components within normal limits   BASIC METABOLIC PANEL - Abnormal; Notable for the following components:    Glucose 198 (*)     BUN 44 (*)     CREATININE 1.61 (*)     Bun/Cre Ratio 27 (*)     Sodium 131 (*)     Chloride 96 (*)     GFR Non- 42 (*)     GFR  51 (*)     All other components within normal limits   URINALYSIS WITH REFLEX TO CULTURE - Abnormal; Notable for the following components:    Specific Gravity, UA 1.030 (*)     Urine Hgb TRACE (*)     Protein, UA 2+ (*)     All other components within normal limits   BRAIN NATRIURETIC PEPTIDE - Abnormal; Notable for the following components:    Pro-BNP 7,092 (*)     All other components within normal limits   TROPONIN - Abnormal; Notable for the following components:    Troponin, High Sensitivity 52 (*)     All other components within normal limits   MICROSCOPIC URINALYSIS - Abnormal; Notable for the following components:    Bacteria, UA 1+ (*)     Mucus, UA 1+ (*)     All other components within normal limits   COVID-19, RAPID   LACTATE, SEPSIS         EMERGENCY DEPARTMENT COURSE:   Vitals:    Vitals:    03/17/22 0100 03/17/22 0130 03/17/22 0200 03/17/22 0230   BP: (!) 116/55 130/66 116/63 132/61   Pulse: 60 70 54 58   Resp: 23 22 18 24   Temp:       TempSrc:       SpO2: 90% 93% 93% 96%   Weight:       Height:         -------------------------  BP: 132/61, Temp: 96.4 °F (35.8 °C), Pulse: 58, Resp: 24    Orders Placed This Encounter   Medications    furosemide (LASIX) injection 20 mg           Re-evaluation Notes    During exam after a brief time was able to reduce his hernia he was pain-free shortly afterwards he was placed on oxygen upon arrival at 3 L sats were in the low 90s. Laboratory results came back with an elevated proBNP at nearly 8000 troponin was 52 which was the lowest he has had in quite some time he has elevation of BUN/creatinine which is chronic for him.   This time I did speak with cardiology who felt he was available to stay here I did speak with nurse practitioner on call Shanice Modi who is agreeable to admit  Should be noted lactic acid was normal did have an elevation white blood cell count no indication of dead bowel based on exam at this time    CRITICAL CARE:   None      CONSULTS:      PROCEDURES:  None    FINAL IMPRESSION      1. Incarcerated umbilical hernia    2. Diastolic congestive heart failure, unspecified HF chronicity ([facility]          DISPOSITION/PLAN   DISPOSITION Decision To Admit 03/17/2022 02:36:36 AM      Condition on Disposition    Stable    PATIENT REFERRED TO:  No follow-up provider specified. DISCHARGE MEDICATIONS:  New Prescriptions    No medications on file       (Please note that portions of this note were completed with a voice recognition program.  Efforts were made to edit the dictations but occasionally words are mis-transcribed.)    Alexei Cruz MD,, MD, F.A.C.E.P.   Attending Emergency Physician        Alexei Cruz MD  03/17/22 5888

## 2022-03-17 NOTE — PROGRESS NOTES
DISCHARGE BARRIERS       Reason for Referral:  SW completed a Psychosocial Assessment for evaluation of patient's mental health, social status, and functional capacity within the community. Briana Garzon is a 77 y.o. male admitted due to Hypoxia. Patient was accompanied by his spouse. Patient was alert, oriented, and engaging during assessment. Mental Status:  Alert, oriented, and engaging during assessment. Decision Making:  Makes own decisions. Family/Social/Home Environment: Lives with spouse in [address]: Discussed a good social support network     Current Services: None    Current DMEs: None       PCP: ESTHER Felder CNP and repots no issues affording medication.  status:  [employer]     ADLs and means of transportation: Independent in ADLs prior to hospitalization and able to transport himself. Food insecurity or needed financial assistance: Denies any food insecurity or financial concerns at this time. ACP and Code Status:  Briana Garzon is a Full Code status and does not have an Advance Directive. Collaborative List of SNF/ECF/HH were provided: Declined No discharge order at this time. Anticipated Needs/Discharge Plan:  Spoke with patient/family/representative about discharge plan. Patient/Family/Representative verbalizes understanding of the plan of care and denies discharge needs or further services at this time. SW provided business card. SW will continue to monitor needs and assist as appropriate.           Electronically signed by TYRESE Spring on 3/17/2022 at 12:21 PM

## 2022-03-17 NOTE — H&P
HOSPITALIST ADMISSION H&P      REASON FOR ADMISSION:  Hypoxia -- acute on chronic systolic CHF  ESTIMATED LENGTH OF STAY:>2 midnights, 3-4 days    ATTENDING/ADMITTING PHYSICIAN: Beka Kauffman MD  PCP: ESTEHR Parnell CNP    HISTORY OF PRESENT ILLNESS:      The patient is a 77 y.o. male patient of ESTHER Parnell CNP who presented to the ER with c/o abdominal pain -- was found to have an incarcerated umbilical hernia -- which was reduced in ER and will require outpatient follow up. However, during his ER evaluation he was found to be hypoxic at 83% on room air and required 3L nasal cannula oxygen to maintain an oxygen saturation of 91%. He denies fevers, cough, chest pain, or shortness of breath. He does have underlying COPD and CHF. He is wearing a LifeVest.     In ER, chest xray Impression:  1. Low lung volumes with mild pulmonary vascular congestion. ProBNP 7,092. Troponin 52. EKG not available in EMR at this time. Afebrile, WBC 19.4, lactic acid 1.9. He received 20 mg IV lasix in ER. His last cardiac cath was in 12/2021 showing nonobstructive CAD. 2D echo 12/24/2021 showed EF 25-30%, gr1DD, mild MR, mild TR, and RVSP 41. MUGA scan 01/20/2022 showed EF 32%. CKD stage 3a -- overall stable    Chronic hyponatremia -- 131    DMII with hyperglycemia -- glucose 198 in ER -- A1C 7.1    Chronic anemia -- stable with Hgb 12.2    Hypertension -- stable     See below for additional PMH. Patient fadj-kpcacaegqt-nehxvaqm-available records reviewed, including, but not limited to ER records, imaging results, lab results, office records, personal records, and OARRS -- no signs of abuse or diversion.      Past Medical History:   Diagnosis Date    Acne rosacea     treated with MetroGel    Anemia     Aortic stenosis     echo 3/18 Mod AS, Mild MR    BPH (benign prostatic hypertrophy)     CAD (coronary artery disease)     Stent x 3 DEVON 8/16    Carotid stenosis     Fairly minimal plaquing on ultrasound 5/14--in the conclusion described as less than 50% stenosis but in the body of the report described as minimal    Cataract of both eyes     CRI (chronic renal insufficiency)     Diabetes mellitus, type 2 (HCC)     1.) Proteinuria, 24 hour total urine protein 1,420 mg/24 hrs, 09/08, creatinine clearance 114, 09/08 2.) Repeat protein/creatinine ratio 2.97, 02/12 3. )24 hr urine collection 1958 mg/24 hrs, 04/12. 4.) Repeat protein/creatinine ratio 1.99, 08/12 a.) Repeat protein/creatinine ratio 2.14, 07/13 5.) Renal u/s ok 02/12  6) retinopathy at [address] [facility] [address] 12/16  early mod. 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 He has a 1.00 pack-year smoking history. He has never used smokeless tobacco. He reports current alcohol use. He reports that he does not use drugs. Family History:   family history includes Coronary Art Dis in his brother and sister;  Heart Attack in his father; Hypertension in his mother; Tsering Outlaw in his mother; Stroke in his mother. REVIEW OF SYSTEMS:  See HPI and problem list; otherwise no other new complaints with respect to eyes, ENT, neck, pulmonary, coronary, chest, GI, , endocrine, musculoskeletal, hematologic, lymphatic, allergic/immunologic, neurologic, psychiatric, or skin. Code status: patient/family wishes for Full Code at this time. PHYSICAL EXAM:  Vitals:  /61   Pulse 58   Temp 96.4 °F (35.8 °C) (Tympanic)   Resp 24   Ht 5' 6\" (1.676 m)   Wt 157 lb (71.2 kg)   SpO2 96%   BMI 25.34 kg/m²     General: awake, alert and cooperative  HEENT: Mucosa Pink, Moist, EMOI, External nose normal, Normocephalic, Atraumatic and Neck with full ROM  Neck: Supple, No Bruits and No Masses, Tenderness, Nodularity  Chest/Lungs: crackles bilateral bases, Clear to Auscultation without Rales, Rhonchi, or Wheezes and Respirations even and unlabored  Cardiac: regular rhythm, bradycardic rate and Pedal Pulses Palpable Bilaterally  GI/Abdomen:  Bowel Sounds Present and Soft, Non-tender, without Guarding or Rebound Tenderness  : Not examined  Extremities/Musculoskeletal: BLE with trace edema and All four extremities with 5/5 strength  Skin: No Cyanosis, No rash and Skin warm and dry  Neuro: [place], Alert and Oriented, to Person, to Time, to Place, to Situation and No Localizing Signs/Symptoms except chronic RLE numbness  Psychiatric: Normal mood and affect      LABS:    CBC with Differential:    Lab Results   Component Value Date    WBC 19.4 03/17/2022    RBC 4.59 03/17/2022    HGB 12.2 03/17/2022    HCT 36.5 03/17/2022     03/17/2022    MCV 79.5 03/17/2022    MCH 26.6 03/17/2022    MCHC 33.4 03/17/2022    RDW 14.2 03/17/2022    LYMPHOPCT 11 03/17/2022    MONOPCT 9 03/17/2022    BASOPCT 0 03/17/2022    MONOSABS 1.75 03/17/2022    LYMPHSABS 2.13 03/17/2022    EOSABS 0.78 03/17/2022    BASOSABS 0.00 03/17/2022    DIFFTYPE NOT REPORTED 01/06/2022     CMP:    Lab Results Component Value Date     03/17/2022    K 5.0 03/17/2022    CL 96 03/17/2022    CO2 22 03/17/2022    BUN 44 03/17/2022    CREATININE 1.61 03/17/2022    GFRAA 51 03/17/2022    LABGLOM 42 03/17/2022    GLUCOSE 198 03/17/2022    PROT 6.9 07/22/2021    PROT 7.3 02/06/2012    LABALBU 3.1 12/28/2021    CALCIUM 9.7 03/17/2022    BILITOT 0.26 07/22/2021    ALKPHOS 80 07/22/2021    AST 22 07/22/2021    ALT 10 07/22/2021       ASSESSMENT:      Patient Active Problem List   Diagnosis    Essential hypertension    Unilateral sensorineural hearing loss    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 ([facility])    Non-rheumatic mitral regurgitation    Nonrheumatic aortic valve stenosis    Uncontrolled type 2 diabetes mellitus with nephropathy ([address]    Coronary artery disease involving native coronary artery of native heart without angina pectoris    Controlled type 2 diabetes mellitus with mild nonproliferative retinopathy and macular edema, without long-term current use of insulin ([facility])    Carotid stenosis    Chronic kidney disease, stage 3a ([address]    COPD exacerbation ([address]    Aortic stenosis, severe    Hyperlipidemia    H/O agent Orange exposure    Diabetes mellitus, type 2 ([address]    CAD (coronary artery disease)    Anemia    Proteinuria    Wears hearing aid in both ears    Acute on chronic systolic heart failure (HCC)    S/P TAVR (transcatheter aortic valve replacement)    Second hand smoke exposure    Peripheral arterial disease ([facility])    Heavy sensation of lower extremity    Impaired ambulation    Acute respiratory failure with hypoxia ([facility])    NSTEMI (non-ST elevated myocardial infarction) ([address]    BJ (acute kidney injury) ([address]    Obesity    Hypoxia   Patient jjdv-fvexlwdbik-wcukelim-available records reviewed, including, but not limited to,  ER reports---labs---imaging---office records---personal notes    Attending Supervising Physician's Attestation Statement  I performed a history and physical examination on the patient and discussed the management with the nurse practitioner. I reviewed and agree with the findings and plan as documented in her note . Electronically signed by Hermila Overton on 3/17/22 at 2:33 PM EDT      PLAN:    1. Hypoxia -- RT protocols, supplemental oxygen prn, con't pulse oximetry, AM CXR  2. Acute on chronic CHF -- telemetry monitoring, troponins, cardiology consult, 2D echo, daily weight, I&Os   3. CKD stage 3a -- stable overall -- monitor I&O and renal function  4. DMII -- carb controlled diet when taking PO, con't home glipizide, ISS, monitor and titrate prn  5. Chronic anemia -- stable, monitor  6. Hypertension -- stable, con't home medications and monitor  7. Home medications reviewed  8. See orders   9.  Umbilical hernia -- outpatient follow up    Note that over 50 minutes was spent in evaluation of the patient, review of the chart and pertinent records, discussion with family/staff, etc.    Mariya Rico, APRN - CNP, FNP-BC  3:24 AM  3/17/2022    Add:  Patient seen by Cardiology            2D ECHO now shows LVEF ~ 35-40%----LifeVest dc'd by Cardiology            No medications changes            Umbilical hernia----reducible, but needs repair----General Surgery outpatient---instructions given to self-reduce the hernia             Home----3.17.2022            See orders

## 2022-03-17 NOTE — FLOWSHEET NOTE
rounding on PCU    Assessment: Volunteer  had made visit. 's visit is to bring communion. Intervention: Engaged in conversation. Patient expressed appreciation for visit and offer of continued prayer.     Plan: Chaplains are available on site or on call 24/7 for spiritual and emotional support.     03/17/22 1424   Encounter Summary   Services provided to: Patient and family together   Referral/Consult From: Rounding  (for Communion.)   Complexity of Encounter Low   Length of Encounter 15 minutes   Spiritual/Taoism   Type Spiritual support   Assessment Approachable   Intervention Nurtured hope;Communion   Outcome Expressed gratitude;Engaged in conversation

## 2022-03-17 NOTE — PROGRESS NOTES
Discharge instructions given to patient and spouse. Medications reviewed and informed of next times due. Instructed on follow up appointments with PCP, cardiology, and surgery. Reminded pt about weighing daily for CHF, both pt and spouse state they will keep doing what they have been doing. Informed that ECHO results today showed EF of 35-45% and cardiology states can remove Lifevest. Pt's spouse states they have all the information to return [address]. Pt and spouse denies any further questions re: discharge.

## 2022-03-18 ENCOUNTER — CARE COORDINATION (OUTPATIENT)
Dept: CASE MANAGEMENT | Age: 77
End: 2022-03-18

## 2022-03-18 DIAGNOSIS — K42.0 INCARCERATED UMBILICAL HERNIA: Primary | ICD-10-CM

## 2022-03-18 LAB
EKG ATRIAL RATE: 59 BPM
EKG P AXIS: 54 DEGREES
EKG P-R INTERVAL: 246 MS
EKG Q-T INTERVAL: 412 MS
EKG QRS DURATION: 98 MS
EKG QTC CALCULATION (BAZETT): 407 MS
EKG R AXIS: -49 DEGREES
EKG T AXIS: 66 DEGREES
EKG VENTRICULAR RATE: 59 BPM

## 2022-03-18 PROCEDURE — 1111F DSCHRG MED/CURRENT MED MERGE: CPT | Performed by: NURSE PRACTITIONER

## 2022-03-18 NOTE — CARE COORDINATION
[address] (871 sepsis) - Initial Follow Up Call - (readmit within BPCI 90 days, but not within 30 days for readmit)  Patient did not answer, spouse answered call at home while patient was out    Call within 2 business days of discharge: Yes    Patient: Gorge Lara   Patient : 1945   MRN: [id number]    Reason for Admission  -  :  Influenza A, coronary stents patent, CHF, LVD - EF 25-30%, Life Vest  Admission 3/17: Incarcerated umbilical hernia, CHF with decreased oxygen sat - improved with 20mg IV lasix in ER & discharged on same home dose of 20mg daily    Discharge Date: 3/17/22   RARS: Readmission Risk Score: 20.4 ( )      Last Discharge [facility]       Complaint Diagnosis Description Type Department Provider    3/17/22 Abdominal Pain Incarcerated umbilical hernia . .. ED to Hosp-Admission (Discharged) (ADMITTED) Chris Grover MD           Facility: [place]  Non-face-to-face services provided:  Scheduled appointment with PCP-3/24  Scheduled appointment with AMINA LOW[facility] 3/29, surgeon 3/29  Obtained and reviewed discharge summary and/or continuity of care documents     Spoke with: spouse - patient is \"out running around\"   He's so happy to have Life Vest removed - both happy that LV function has improved per echo. He will continue to follow with cardio - 3/29 & has PCP hospital f/u scheduled for 3/24. Spouse reports that patient has done daily weights, follows closely & had no swelling or SOB prior to his ER admission for ABD pain/hernia. His weights are stable at 156. She suspects he was having so much anxiety with the ABD pain that it led to oxygen level change. He did receive one dose of 20mg IV lasix & continues his lasix 20mg daily as he has been one.   Explained that patient is prone to CHF can go into heart failure with any alteration of normal for that patient - ex - flu, diet change, URI, illness, stress ,etc.  Patient plans to let cardiologist know that he has had a cough since he started lisinopril - recognize that cough can be from CHF & hx influenza A. Plan is to check with cardiology re: CHF clinic referral for more education & preventive measures. Will route dietary referral to dietician for CHF diet information. Checking with [facility] to address CHF clinic at visit on 3/29. Plan is to follow patient until after upcoming appts & refer to ACM. Spouse denies that patient is having any issues or symtptoms. He also has appt scheduled with surgeon 4/21 to address plans to hernia. Spouse has CTN contact info    Care Transitions 24 Hour Call    Schedule Follow Up Appointment with PCP: Completed  Do you have any ongoing symptoms?: No  Do you have a copy of your discharge instructions?: Yes  Do you have all of your prescriptions and are they filled?: Yes (Comment: no new medications - has all meds prescribed)  Have you been contacted by a [address]?: No  Have you scheduled your follow up appointment?: Yes (Comment: 3/24 PCP, 3/29 cardio, 4/21 surgeon)  How are you going to get to your appointment?: Car - drive self  Were you discharged with any Home Care or Post Acute Services: No  Do you feel like you have everything you need to keep you well at home?: Yes  Care Transitions Interventions           Was this an external facility discharge? No     Challenges to be reviewed by the provider   Additional needs identified to be addressed with provider: No  none             Method of communication with provider : none      Advance Care Planning:   Does patient have an Advance Directive: not on file. Was this a readmission?  Yes in 90 day BPCI episode, but not within 30 days of last admission  Patient stated reason for admission: ABD pain - incarcerated umbilical hernia - reduced in ER  Patients top risk factors for readmission: medical condition-hernia, CHF    Care Transition Nurse (CTN) contacted the spouse by telephone to perform post hospital discharge assessment. Verified name and  with family as identifiers. Provided introduction to self, and explanation of the CTN role. CTN reviewed discharge instructions, medical action plan and red flags with family who verbalized understanding. Family given an opportunity to ask questions and does not have any further questions or concerns at this time. Were discharge instructions available to patient? Yes. Reviewed appropriate site of care based on symptoms and resources available to patient including: PCP, Specialist and CTN. The family agrees to contact the PCP office for questions related to their healthcare. Medication reconciliation was performed with patient, who verbalizes understanding of administration of home medications. Covid Risk Education - negative 3/17 - fully vaccinated     Educated patient about risk for severe COVID-19 due to risk factors according to CDC guidelines. CTN reviewed discharge instructions, medical action plan and red flag symptoms with the family who verbalized understanding. Discussed COVID vaccination status: Yes. Education provided on COVID-19 vaccination as appropriate. Discussed exposure protocols and quarantine with CDC Guidelines. Family was given an opportunity to verbalize any questions and concerns and agrees to contact CTN or health care provider for questions related to their healthcare. Reviewed and educated family on any new and changed medications related to discharge diagnosis. Was patient discharged with a pulse oximeter? No     CTN provided contact information. Plan for follow-up call in 5-7 days based on severity of symptoms and risk factors.   Plan for next call: symptom management-reassess  follow up appointment-review PCP 3/24, cardio 3/29  medication management-reassess any changes          Follow Up  Future Appointments   Date Time Provider Julio Ratliff   3/24/2022 12:30 PM Dory MATOS Deven Conner - CNP [facility]   3/29/2022 11:15 AM Denita Barthel, MD [facility]   4/21/2022  2:50 PM Carole Estrada MD [address] [place] [address]       Theresa Suárez RN

## 2022-03-19 NOTE — DISCHARGE SUMMARY
[address]                 [address] [place] [address], [address] [place] [address]                               DISCHARGE SUMMARY    PATIENT NAME: Tre Gonzalez                   :        1945  MED REC NO:   [id number]                             ROOM:       0205  ACCOUNT NO:   [de-identified]                           ADMIT DATE: 2022  PROVIDER:     Aly Rivera. Marietta Victor MD                  DISCHARGE DATE:  2022    ATTENDING PHYSICIAN OF HOSPITALIZATION:  Danilea Vargas MD    PERSONAL CARE PROVIDER:  Indira Neal, nurse practitioner, [facility] Internal Medicine. The patient is seen by [facility], [facility]. DIAGNOSES:  1. Hypoxia, corrected. 2.  Acute on chronic congestive heart failure. 3.  Chronic kidney disease, stage IIIA. 4.  Diabetes mellitus type 2.  5.  Chronic anemia. 6.  Hypertension. 7.  Umbilical hernia, reducible. HISTORY OF PRESENT ILLNESS AND HOSPITAL COURSE:  The patient is a  78-year-old white male, patient of Jacquelin Cleary, nurse practitioner. The patient presented with abdominal pain, incarcerated umbilical  hernia, reducible. The patient also was given information regarding how  to self-reduce the hernia if it pushes back out again. The patient initially with hypoxia; congestive heart failure, acute on  chronic systolic, received diuretics. The patient then no longer  required supplemental oxygen. The patient has nonischemic cardiomyopathy for which he has been on a  LifeVest.  The patient underwent a 2D echo during this hospitalization  demonstrating an ejection fraction now within the range of 35% to 40%,  hence the LifeVest was discontinued by Cardiology. Previous 2D echo of  2021 had an EF of 25% to 30%. The patient had a MUGA scan also on  2022 with an EF of 32, and MUGA scan will be performed in the  outpatient setting in followup.     The patient stabilized and able to be discharged to home on 03/17/2022. LABORATORY DATA:  Around the time of discharge, white cell count 19.4,  hemoglobin 12.2, hematocrit 36.5, platelets 283,012. Sodium 131,  potassium 5.0, chloride 96, CO2 22, BUN 44, creatinine of 1.61, glucose  was 217, BNP 7098, calcium 9.7, GFR 42. Troponin 52, 52, flat peak  pattern consistent with the patient's renal disease together with  congestive heart failure. IMAGING STUDIES:  Low lung volumes with mild pulmonary vascular  congestion at the time of admission. The patient's elevated white cell count is deemed noninfectious. DISCHARGE INSTRUCTIONS/FOLLOWUP:  Discharged to home on 03/17/2022. DIET:  Cardiac, diabetic. ACTIVITY:  As tolerated. CONDITION AT DISCHARGE:  Fair, improved. LifeVest discontinued. MEDICATIONS:  No new medications. FOLLOWING MEDICATIONS CONTINUED:  Amlodipine (Norvasc) 10 mg p.o. daily;  clopidogrel (Plavix) 75 mg p.o. daily; Lasix (furosemide) 20 mg p.o.  daily; glipizide (Glucotrol) 10 mg twice daily before meals; lisinopril  5 mg p.o. daily; metformin (Glucophage) 1000 mg twice daily, reinitiated  with meals; metoprolol succinate (Toprol XL) 25 mg p.o. daily;  simvastatin (Zocor) 10 mg p.o. nightly; spironolactone 12.5 mg p.o.  daily; vitamin D 1000 units p.o. twice daily. Follow up with the patient's personal care provider, Trudi Hilliard,  nurse practitioner, Internal Medicine, [facility]. Follow up  with [facility], [facility], planned  outpatient MUGA scan. Any aspect of the patient's care not discussed in the chart and/or  dictation will be addressed and treated as an outpatient. The patient's medications have been reviewed including, but not limited  to, pre-hospital, hospital and discharge medications.   The patient  and/or the patient's personal representatives were specifically advised  the only medications to be taken are those set forth in the discharge  orders and no other medications should be taken. Any prior medications  not on the discharge orders are specifically discontinued.         Lacy Bañuelos MD    D: 03/18/2022 7:26:13       T: 03/18/2022 7:28:33     [email or web address]  Job#: [id number]     Doc#: [id number]    CC:

## 2022-03-21 ENCOUNTER — APPOINTMENT (OUTPATIENT)
Dept: GENERAL RADIOLOGY | Age: 77
End: 2022-03-21
Payer: MEDICARE

## 2022-03-21 ENCOUNTER — CARE COORDINATION (OUTPATIENT)
Dept: CASE MANAGEMENT | Age: 77
End: 2022-03-21

## 2022-03-21 ENCOUNTER — HOSPITAL ENCOUNTER (EMERGENCY)
Age: 77
Discharge: ANOTHER HOSPITAL | End: 2022-03-22
Attending: EMERGENCY MEDICINE
Payer: MEDICARE

## 2022-03-21 DIAGNOSIS — I50.33 ACUTE ON CHRONIC DIASTOLIC CONGESTIVE HEART FAILURE (HCC): Primary | ICD-10-CM

## 2022-03-21 LAB
ABSOLUTE EOS #: 0.77 K/UL (ref 0–0.44)
ABSOLUTE IMMATURE GRANULOCYTE: 0.21 K/UL (ref 0–0.3)
ABSOLUTE LYMPH #: 4.46 K/UL (ref 1.1–3.7)
ABSOLUTE MONO #: 1.72 K/UL (ref 0.1–1.2)
ALBUMIN SERPL-MCNC: 4 G/DL (ref 3.5–5.2)
ALBUMIN/GLOBULIN RATIO: 1 (ref 1–2.5)
ALP BLD-CCNC: 130 U/L (ref 40–129)
ALT SERPL-CCNC: 15 U/L (ref 5–41)
ANION GAP SERPL CALCULATED.3IONS-SCNC: 16 MMOL/L (ref 9–17)
AST SERPL-CCNC: 27 U/L
BASOPHILS # BLD: 1 % (ref 0–2)
BASOPHILS ABSOLUTE: 0.22 K/UL (ref 0–0.2)
BILIRUB SERPL-MCNC: 0.27 MG/DL (ref 0.3–1.2)
BUN BLDV-MCNC: 50 MG/DL (ref 8–23)
BUN/CREAT BLD: 26 (ref 9–20)
CALCIUM SERPL-MCNC: 9.7 MG/DL (ref 8.6–10.4)
CHLORIDE BLD-SCNC: 97 MMOL/L (ref 98–107)
CO2: 20 MMOL/L (ref 20–31)
CREAT SERPL-MCNC: 1.89 MG/DL (ref 0.7–1.2)
EOSINOPHILS RELATIVE PERCENT: 4 % (ref 1–4)
GFR AFRICAN AMERICAN: 42 ML/MIN
GFR NON-AFRICAN AMERICAN: 35 ML/MIN
GFR SERPL CREATININE-BSD FRML MDRD: ABNORMAL ML/MIN/{1.73_M2}
GLUCOSE BLD-MCNC: 394 MG/DL (ref 70–99)
HCT VFR BLD CALC: 39.7 % (ref 40.7–50.3)
HEMOGLOBIN: 12.5 G/DL (ref 13–17)
IMMATURE GRANULOCYTES: 1 %
INR BLD: 1.1
LYMPHOCYTES # BLD: 20 % (ref 24–43)
MCH RBC QN AUTO: 25.7 PG (ref 25.2–33.5)
MCHC RBC AUTO-ENTMCNC: 31.5 G/DL (ref 25.2–33.5)
MCV RBC AUTO: 81.7 FL (ref 82.6–102.9)
MONOCYTES # BLD: 8 % (ref 3–12)
NRBC AUTOMATED: 0 PER 100 WBC
PDW BLD-RTO: 14.1 % (ref 11.8–14.4)
PLATELET # BLD: 603 K/UL (ref 138–453)
PMV BLD AUTO: 9.8 FL (ref 8.1–13.5)
POTASSIUM SERPL-SCNC: 5.1 MMOL/L (ref 3.7–5.3)
PRO-BNP: 8537 PG/ML
PROTHROMBIN TIME: 13.5 SEC (ref 11.5–14.2)
RBC # BLD: 4.86 M/UL (ref 4.21–5.77)
SEG NEUTROPHILS: 66 % (ref 36–65)
SEGMENTED NEUTROPHILS ABSOLUTE COUNT: 14.5 K/UL (ref 1.5–8.1)
SODIUM BLD-SCNC: 133 MMOL/L (ref 135–144)
TOTAL PROTEIN: 7.9 G/DL (ref 6.4–8.3)
TROPONIN, HIGH SENSITIVITY: 59 NG/L (ref 0–22)
WBC # BLD: 21.9 K/UL (ref 3.5–11.3)

## 2022-03-21 PROCEDURE — 71045 X-RAY EXAM CHEST 1 VIEW: CPT

## 2022-03-21 PROCEDURE — 80053 COMPREHEN METABOLIC PANEL: CPT

## 2022-03-21 PROCEDURE — 85610 PROTHROMBIN TIME: CPT

## 2022-03-21 PROCEDURE — 94660 CPAP INITIATION&MGMT: CPT

## 2022-03-21 PROCEDURE — 85025 COMPLETE CBC W/AUTO DIFF WBC: CPT

## 2022-03-21 PROCEDURE — 84484 ASSAY OF TROPONIN QUANT: CPT

## 2022-03-21 PROCEDURE — 99285 EMERGENCY DEPT VISIT HI MDM: CPT

## 2022-03-21 PROCEDURE — 83880 ASSAY OF NATRIURETIC PEPTIDE: CPT

## 2022-03-21 PROCEDURE — 93005 ELECTROCARDIOGRAM TRACING: CPT | Performed by: EMERGENCY MEDICINE

## 2022-03-21 PROCEDURE — 2700000000 HC OXYGEN THERAPY PER DAY

## 2022-03-21 PROCEDURE — 36569 INSJ PICC 5 YR+ W/O IMAGING: CPT

## 2022-03-21 ASSESSMENT — ENCOUNTER SYMPTOMS
BACK PAIN: 0
DIARRHEA: 0
BLOOD IN STOOL: 0
TROUBLE SWALLOWING: 0
SHORTNESS OF BREATH: 1
SORE THROAT: 0
VOMITING: 0
CONSTIPATION: 0
WHEEZING: 0
COUGH: 1
ABDOMINAL PAIN: 0
NAUSEA: 0

## 2022-03-21 NOTE — ED PROVIDER NOTES
[facility]  eMERGENCY dEPARTMENT eNCOUnter      Pt Name: Queta Li  MRN: [id number]  [place] 1945  Date of evaluation: 3/21/2022      CHIEF COMPLAINT       Chief Complaint   Patient presents with    Shortness of Breath     40% on room air         [address] Brooke Mcclellan is a 77 y.o. male who presents chief complaint of shortness of breath, patient shows up he is diaphoretic initial sat is 40%. Patient is unable to talk because of shortness of breath. Patient has a history of chronic CHF he easily has exacerbations he had a hernia that was bothering him he came in last week and had to be admitted because he became so short of breath he was on BiPAP briefly at that time patient said he felt a little shortness of breath yesterday but today after looking at the [facility] at the day he became acutely short of breath there is some chest heaviness with it he says is happened multiple times in the past is really been recent hospitalization but there is been no recent travel no fevers chills or leg swelling      REVIEW OF SYSTEMS         Review of Systems   Constitutional: Positive for diaphoresis and fatigue. Negative for chills and fever. HENT: Negative for congestion, dental problem, sore throat and trouble swallowing. Eyes: Negative for visual disturbance. Respiratory: Positive for cough and shortness of breath. Negative for wheezing. Cardiovascular: Positive for chest pain. Negative for palpitations and leg swelling. Gastrointestinal: Negative for abdominal pain, blood in stool, constipation, diarrhea, nausea and vomiting. Genitourinary: Negative for difficulty urinating, dysuria and testicular pain. Musculoskeletal: Negative for back pain, joint swelling and neck pain. Skin: Negative for rash. Neurological: Negative for dizziness, syncope, weakness and headaches. Hematological: Negative for adenopathy. Does not bruise/bleed easily. Psychiatric/Behavioral: Negative for confusion and suicidal ideas. 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 SURGICAL HISTORY      has a past surgical history that includes Circumcision (age 49); Vasectomy; Tympanostomy tube placement; Cataract removal (Bilateral); eye surgery (Bilateral); Cardiac catheterization (12/03/2019); Aortic valve surgery (01/2020); and Cardiac catheterization (12/27/2021).     CURRENT MEDICATIONS       Previous Medications    AMLODIPINE (NORVASC) 10 MG TABLET    Take 1 tablet by mouth daily    CLOPIDOGREL (PLAVIX) 75 MG TABLET    Take 1 tablet by mouth daily Take 1 daily    FUROSEMIDE (LASIX) 20 MG TABLET    Take 1 tablet by mouth daily    GLIPIZIDE (GLUCOTROL) 10 MG TABLET    Take 10 mg by mouth 2 times daily (before meals)    HANDICAP PLACARD MISC    by Does not apply route Duration 2 years    LISINOPRIL (PRINIVIL;ZESTRIL) 5 MG TABLET    Take 1 tablet by mouth daily    METFORMIN (GLUCOPHAGE) 1000 MG TABLET    Take 1 tablet by mouth 2 times daily (with meals)    METOPROLOL SUCCINATE (TOPROL XL) 25 MG EXTENDED RELEASE TABLET    Take 1 tablet by mouth daily    SIMVASTATIN (ZOCOR) 40 MG TABLET    Take 10 mg by mouth nightly     SPIRONOLACTONE (ALDACTONE) 25 MG TABLET    Take 0.5 tablets by mouth daily    VITAMIN D (CHOLECALCIFEROL) 1000 UNIT TABS TABLET    Take 1,000 Units by mouth 2 times daily        ALLERGIES     is allergic to atorvastatin, sulfa antibiotics, and sulfanilamide. FAMILY HISTORY     He indicated that the status of his mother is unknown. He indicated that his father is . He indicated that his sister is . He indicated that the status of his brother is unknown.     family history includes Coronary Art Dis in his brother and sister; Heart Attack in his father; Hypertension in his mother; Beckie Sidney in his mother; Stroke in his mother. SOCIAL HISTORY      reports that he quit smoking about 52 years ago. He has a 1.00 pack-year smoking history. He has never used smokeless tobacco. He reports current alcohol use. He reports that he does not use drugs. PHYSICAL EXAM     INITIAL VITALS:  height is 5' 6\" (1.676 m) and weight is 156 lb (70.8 kg). His pulse is 95. His respiration is 32 (abnormal) and oxygen saturation is 40% (abnormal). Physical Exam  Constitutional:       General: He is in acute distress. Appearance: He is well-developed. He is diaphoretic. HENT:      Head: Normocephalic and atraumatic. Right Ear: External ear normal.      Left Ear: External ear normal.   Eyes:      Pupils: Pupils are equal, round, and reactive to light. Cardiovascular:      Rate and Rhythm: Normal rate and regular rhythm. Pulmonary:      Effort: Pulmonary effort is normal.      Breath sounds: Decreased breath sounds present. Abdominal:      General: Bowel sounds are normal.      Palpations: Abdomen is soft. Musculoskeletal:         General: Normal range of motion. Cervical back: Normal range of motion and neck supple. Skin:     General: Skin is warm. Neurological:      General: No focal deficit present. Mental Status: He is alert and oriented to person, place, and time.    Psychiatric:         Behavior: Behavior normal.           DIFFERENTIAL DIAGNOSIS/ MDM:     Acute exacerbation of chronic CHF respiratory failure we will place him on BiPAP    DIAGNOSTIC RESULTS     EKG: All EKG's are interpreted by the Emergency Department Physician who either signs or Co-signs this chart in the absence of a cardiologist.        RADIOLOGY:   I directly visualized the following  images and reviewed the radiologist interpretations:          ED BEDSIDE ULTRASOUND:       LABS:  Labs Reviewed   CBC WITH AUTO DIFFERENTIAL   COMPREHENSIVE METABOLIC PANEL   PROTIME-INR   TROPONIN   BRAIN NATRIURETIC PEPTIDE           EMERGENCY DEPARTMENT COURSE:   Vitals:    Vitals:    03/21/22 1925   Pulse: 95   Resp: (!) 32   SpO2: (!) 40%   Weight: 156 lb (70.8 kg)   Height: 5' 6\" (1.676 m)     -------------------------   ,  , Pulse: 95, Resp: (!) 32        Re-evaluation Notes        CRITICAL CARE:   None        CONSULTS:      PROCEDURES:  None    FINAL IMPRESSION    No diagnosis found. DISPOSITION/PLAN   DISPOSITION Care is passed to the oncoming physician at the end of my shift 1930 hrs. Condition on Disposition    Stable    PATIENT REFERRED TO:  No follow-up provider specified. DISCHARGE MEDICATIONS:  New Prescriptions    No medications on file       (Please note that portions of this note were completed with a voice recognition program.  Efforts were made to edit the dictations but occasionally words are mis-transcribed.)    Gi Kang MD,, MD, F.A.A.E.M.   Attending Emergency Physician                          Gi Kang MD  03/21/22 0788

## 2022-03-22 ENCOUNTER — APPOINTMENT (OUTPATIENT)
Dept: GENERAL RADIOLOGY | Age: 77
End: 2022-03-22
Payer: MEDICARE

## 2022-03-22 ENCOUNTER — TELEPHONE (OUTPATIENT)
Dept: OTHER | Facility: CLINIC | Age: 77
End: 2022-03-22

## 2022-03-22 VITALS
HEIGHT: 66 IN | WEIGHT: 156 LBS | BODY MASS INDEX: 25.07 KG/M2 | DIASTOLIC BLOOD PRESSURE: 68 MMHG | RESPIRATION RATE: 25 BRPM | SYSTOLIC BLOOD PRESSURE: 137 MMHG | OXYGEN SATURATION: 95 % | TEMPERATURE: 98.8 F | HEART RATE: 66 BPM

## 2022-03-22 LAB
ABSOLUTE EOS #: 0.92 K/UL (ref 0–0.44)
ABSOLUTE IMMATURE GRANULOCYTE: 0.06 K/UL (ref 0–0.3)
ABSOLUTE LYMPH #: 1.24 K/UL (ref 1.1–3.7)
ABSOLUTE MONO #: 1.15 K/UL (ref 0.1–1.2)
ANION GAP SERPL CALCULATED.3IONS-SCNC: 13 MMOL/L (ref 9–17)
BASOPHILS # BLD: 1 % (ref 0–2)
BASOPHILS ABSOLUTE: 0.11 K/UL (ref 0–0.2)
BUN BLDV-MCNC: 46 MG/DL (ref 8–23)
BUN/CREAT BLD: 32 (ref 9–20)
CALCIUM SERPL-MCNC: 9.5 MG/DL (ref 8.6–10.4)
CHLORIDE BLD-SCNC: 100 MMOL/L (ref 98–107)
CO2: 22 MMOL/L (ref 20–31)
CREAT SERPL-MCNC: 1.42 MG/DL (ref 0.7–1.2)
EKG ATRIAL RATE: 70 BPM
EKG P AXIS: 69 DEGREES
EKG P-R INTERVAL: 262 MS
EKG Q-T INTERVAL: 392 MS
EKG QRS DURATION: 98 MS
EKG QTC CALCULATION (BAZETT): 423 MS
EKG R AXIS: -48 DEGREES
EKG T AXIS: 73 DEGREES
EKG VENTRICULAR RATE: 70 BPM
EOSINOPHILS RELATIVE PERCENT: 6 % (ref 1–4)
GFR AFRICAN AMERICAN: 59 ML/MIN
GFR NON-AFRICAN AMERICAN: 49 ML/MIN
GFR SERPL CREATININE-BSD FRML MDRD: ABNORMAL ML/MIN/{1.73_M2}
GLUCOSE BLD-MCNC: 114 MG/DL (ref 75–110)
GLUCOSE BLD-MCNC: 143 MG/DL (ref 75–110)
GLUCOSE BLD-MCNC: 196 MG/DL (ref 75–110)
GLUCOSE BLD-MCNC: 304 MG/DL (ref 70–99)
HCT VFR BLD CALC: 35.7 % (ref 40.7–50.3)
HEMOGLOBIN: 11.7 G/DL (ref 13–17)
IMMATURE GRANULOCYTES: 0 %
LYMPHOCYTES # BLD: 9 % (ref 24–43)
MCH RBC QN AUTO: 26.4 PG (ref 25.2–33.5)
MCHC RBC AUTO-ENTMCNC: 32.8 G/DL (ref 25.2–33.5)
MCV RBC AUTO: 80.6 FL (ref 82.6–102.9)
MONOCYTES # BLD: 8 % (ref 3–12)
NRBC AUTOMATED: 0 PER 100 WBC
PDW BLD-RTO: 14.1 % (ref 11.8–14.4)
PLATELET # BLD: 424 K/UL (ref 138–453)
PMV BLD AUTO: 9.8 FL (ref 8.1–13.5)
POTASSIUM SERPL-SCNC: 4.9 MMOL/L (ref 3.7–5.3)
PRO-BNP: 7473 PG/ML
RBC # BLD: 4.43 M/UL (ref 4.21–5.77)
RBC # BLD: ABNORMAL 10*6/UL
SARS-COV-2, RAPID: NOT DETECTED
SEG NEUTROPHILS: 76 % (ref 36–65)
SEGMENTED NEUTROPHILS ABSOLUTE COUNT: 10.79 K/UL (ref 1.5–8.1)
SODIUM BLD-SCNC: 135 MMOL/L (ref 135–144)
SPECIMEN DESCRIPTION: NORMAL
TROPONIN, HIGH SENSITIVITY: 71 NG/L (ref 0–22)
WBC # BLD: 14.3 K/UL (ref 3.5–11.3)

## 2022-03-22 PROCEDURE — 96374 THER/PROPH/DIAG INJ IV PUSH: CPT

## 2022-03-22 PROCEDURE — 71045 X-RAY EXAM CHEST 1 VIEW: CPT

## 2022-03-22 PROCEDURE — 96372 THER/PROPH/DIAG INJ SC/IM: CPT

## 2022-03-22 PROCEDURE — 36415 COLL VENOUS BLD VENIPUNCTURE: CPT

## 2022-03-22 PROCEDURE — 87635 SARS-COV-2 COVID-19 AMP PRB: CPT

## 2022-03-22 PROCEDURE — 6370000000 HC RX 637 (ALT 250 FOR IP): Performed by: EMERGENCY MEDICINE

## 2022-03-22 PROCEDURE — 84484 ASSAY OF TROPONIN QUANT: CPT

## 2022-03-22 PROCEDURE — 83880 ASSAY OF NATRIURETIC PEPTIDE: CPT

## 2022-03-22 PROCEDURE — 80048 BASIC METABOLIC PNL TOTAL CA: CPT

## 2022-03-22 PROCEDURE — 85025 COMPLETE CBC W/AUTO DIFF WBC: CPT

## 2022-03-22 PROCEDURE — 82947 ASSAY GLUCOSE BLOOD QUANT: CPT

## 2022-03-22 PROCEDURE — 93005 ELECTROCARDIOGRAM TRACING: CPT | Performed by: EMERGENCY MEDICINE

## 2022-03-22 PROCEDURE — 6360000002 HC RX W HCPCS: Performed by: EMERGENCY MEDICINE

## 2022-03-22 RX ORDER — ATORVASTATIN CALCIUM 10 MG/1
10 TABLET, FILM COATED ORAL DAILY
Status: DISCONTINUED | OUTPATIENT
Start: 2022-03-22 | End: 2022-03-22

## 2022-03-22 RX ORDER — CLOPIDOGREL BISULFATE 75 MG/1
75 TABLET ORAL DAILY
Status: DISCONTINUED | OUTPATIENT
Start: 2022-03-22 | End: 2022-03-22 | Stop reason: HOSPADM

## 2022-03-22 RX ORDER — LISINOPRIL 5 MG/1
5 TABLET ORAL DAILY
Status: DISCONTINUED | OUTPATIENT
Start: 2022-03-22 | End: 2022-03-22 | Stop reason: HOSPADM

## 2022-03-22 RX ORDER — FUROSEMIDE 20 MG/1
20 TABLET ORAL DAILY
Status: DISCONTINUED | OUTPATIENT
Start: 2022-03-22 | End: 2022-03-22 | Stop reason: HOSPADM

## 2022-03-22 RX ORDER — GLIPIZIDE 5 MG/1
10 TABLET ORAL
Status: DISCONTINUED | OUTPATIENT
Start: 2022-03-22 | End: 2022-03-22 | Stop reason: HOSPADM

## 2022-03-22 RX ORDER — FUROSEMIDE 10 MG/ML
20 INJECTION INTRAMUSCULAR; INTRAVENOUS ONCE
Status: COMPLETED | OUTPATIENT
Start: 2022-03-22 | End: 2022-03-22

## 2022-03-22 RX ORDER — SPIRONOLACTONE 25 MG/1
12.5 TABLET ORAL DAILY
Status: DISCONTINUED | OUTPATIENT
Start: 2022-03-22 | End: 2022-03-22 | Stop reason: HOSPADM

## 2022-03-22 RX ORDER — VITAMIN B COMPLEX
1000 TABLET ORAL 2 TIMES DAILY
Status: DISCONTINUED | OUTPATIENT
Start: 2022-03-22 | End: 2022-03-22 | Stop reason: HOSPADM

## 2022-03-22 RX ORDER — AMLODIPINE BESYLATE 5 MG/1
10 TABLET ORAL DAILY
Status: DISCONTINUED | OUTPATIENT
Start: 2022-03-22 | End: 2022-03-22 | Stop reason: HOSPADM

## 2022-03-22 RX ORDER — FUROSEMIDE 10 MG/ML
20 INJECTION INTRAMUSCULAR; INTRAVENOUS ONCE
Status: DISCONTINUED | OUTPATIENT
Start: 2022-03-22 | End: 2022-03-22 | Stop reason: HOSPADM

## 2022-03-22 RX ORDER — METOPROLOL SUCCINATE 25 MG/1
25 TABLET, EXTENDED RELEASE ORAL DAILY
Status: DISCONTINUED | OUTPATIENT
Start: 2022-03-22 | End: 2022-03-22 | Stop reason: HOSPADM

## 2022-03-22 RX ADMIN — GLIPIZIDE 10 MG: 5 TABLET ORAL at 09:37

## 2022-03-22 RX ADMIN — FUROSEMIDE 20 MG: 10 INJECTION, SOLUTION INTRAMUSCULAR; INTRAVENOUS at 00:58

## 2022-03-22 RX ADMIN — ENOXAPARIN SODIUM 70 MG: 100 INJECTION SUBCUTANEOUS at 11:43

## 2022-03-22 RX ADMIN — FUROSEMIDE 20 MG: 20 TABLET ORAL at 09:38

## 2022-03-22 RX ADMIN — SPIRONOLACTONE 12.5 MG: 25 TABLET ORAL at 09:38

## 2022-03-22 RX ADMIN — CLOPIDOGREL BISULFATE 75 MG: 75 TABLET ORAL at 09:39

## 2022-03-22 RX ADMIN — AMLODIPINE BESYLATE 10 MG: 5 TABLET ORAL at 09:37

## 2022-03-22 RX ADMIN — INSULIN HUMAN 10 UNITS: 100 INJECTION, SOLUTION PARENTERAL at 09:40

## 2022-03-22 RX ADMIN — LISINOPRIL 5 MG: 5 TABLET ORAL at 15:47

## 2022-03-22 RX ADMIN — METOPROLOL SUCCINATE 25 MG: 25 TABLET, EXTENDED RELEASE ORAL at 09:38

## 2022-03-22 RX ADMIN — GLIPIZIDE 10 MG: 5 TABLET ORAL at 15:47

## 2022-03-22 RX ADMIN — CHOLECALCIFEROL TAB 25 MCG (1000 UNIT) 1000 UNITS: 25 TAB at 09:39

## 2022-03-22 NOTE — ED PROVIDER NOTES
[facility] ED  [address]  Phone: [phone]        ADDENDUM:      Care of this patient was assumed from Dr. Emma Brown. The patient was seen for Shortness of Breath (40% on room air)  . The patient's initial evaluation and plan have been discussed with the prior provider who initially evaluated the patient. Nursing Notes, Past Medical Hx, Past Surgical Hx, Allergies, were all reviewed. 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 SURGICAL HISTORY      has a past surgical history that includes Circumcision (age 49); Vasectomy; Tympanostomy tube placement; Cataract removal (Bilateral); eye surgery (Bilateral); Cardiac catheterization (12/03/2019); Aortic valve surgery (01/2020); and Cardiac catheterization (12/27/2021).     CURRENT MEDICATIONS       Previous Medications    AMLODIPINE (NORVASC) 10 MG TABLET    Take 1 tablet by mouth daily    CLOPIDOGREL (PLAVIX) 75 MG TABLET    Take 1 tablet by mouth daily Take 1 daily    FUROSEMIDE (LASIX) 20 MG TABLET    Take 1 tablet by mouth daily    GLIPIZIDE (GLUCOTROL) 10 MG TABLET    Take 10 mg by mouth 2 times daily (before meals)    HANDICAP PLACARD MISC    by Does not apply route Duration 2 years    LISINOPRIL (PRINIVIL;ZESTRIL) 5 MG TABLET    Take 1 tablet by mouth daily    METFORMIN (GLUCOPHAGE) 1000 MG TABLET    Take 1 tablet by mouth 2 times daily (with meals)    METOPROLOL SUCCINATE (TOPROL XL) 25 MG EXTENDED RELEASE TABLET    Take 1 tablet by mouth daily    SIMVASTATIN (ZOCOR) 40 MG TABLET    Take 10 mg by mouth nightly     SPIRONOLACTONE (ALDACTONE) 25 MG TABLET    Take 0.5 tablets by mouth daily    VITAMIN D (CHOLECALCIFEROL) 1000 UNIT TABS TABLET    Take 1,000 Units by mouth 2 times daily        ALLERGIES     is allergic to atorvastatin, sulfa antibiotics, and sulfanilamide.       Diagnostic Results     EKG: All EKG's are interpreted by the Emergency Department Physician who either signs or Co-signs this chart in the absenceof a cardiologist.    EKG interpreted by me reveals a sinus rhythm at a rate of 70 with first-degree AV block [place] of 262 QRS normal at 98 occasional premature supraventricular ventricular complex left axis deviation no acute ST elevations or depression    LABS:   Results for orders placed or performed during the hospital encounter of 03/21/22   CBC with Auto Differential   Result Value Ref Range    WBC 21.9 (H) 3.5 - 11.3 k/uL    RBC 4.86 4.21 - 5.77 m/uL    Hemoglobin 12.5 (L) 13.0 - 17.0 g/dL    Hematocrit 39.7 (L) 40.7 - 50.3 %    MCV 81.7 (L) 82.6 - 102.9 fL    MCH 25.7 25.2 - 33.5 pg    MCHC 31.5 25.2 - 33.5 g/dL    RDW 14.1 11.8 - 14.4 %    Platelets 383 (H) 686 - 453 k/uL    MPV 9.8 8.1 - 13.5 fL    NRBC Automated 0.0 0.0 per 100 WBC    Seg Neutrophils 66 (H) 36 - 65 %    Lymphocytes 20 (L) 24 - 43 %    Monocytes 8 3 - 12 %    Eosinophils % 4 1 - 4 %    Basophils 1 0 - 2 %    Immature Granulocytes 1 (H) 0 %    Segs Absolute 14.50 (H) 1.50 - 8.10 k/uL    Absolute Lymph # 4.46 (H) 1.10 - 3.70 k/uL    Absolute Mono # 1.72 (H) 0.10 - 1.20 k/uL    Absolute Eos # 0.77 (H) 0.00 - 0.44 k/uL    Basophils Absolute 0.22 (H) 0.00 - 0.20 k/uL    Absolute Immature Granulocyte 0.21 0.00 - 0.30 k/uL   Comprehensive Metabolic Panel   Result Value Ref Range    Glucose 394 (H) 70 - 99 mg/dL    BUN 50 (H) 8 - 23 mg/dL CREATININE 1.89 (H) 0.70 - 1.20 mg/dL    Bun/Cre Ratio 26 (H) 9 - 20    Calcium 9.7 8.6 - 10.4 mg/dL    Sodium 133 (L) 135 - 144 mmol/L    Potassium 5.1 3.7 - 5.3 mmol/L    Chloride 97 (L) 98 - 107 mmol/L    CO2 20 20 - 31 mmol/L    Anion Gap 16 9 - 17 mmol/L    Alkaline Phosphatase 130 (H) 40 - 129 U/L    ALT 15 5 - 41 U/L    AST 27 <40 U/L    Total Bilirubin 0.27 (L) 0.3 - 1.2 mg/dL    Total Protein 7.9 6.4 - 8.3 g/dL    Albumin 4.0 3.5 - 5.2 g/dL    Albumin/Globulin Ratio 1.0 1.0 - 2.5    GFR Non- 35 (L) >60 mL/min    GFR  42 (L) >60 mL/min    GFR Comment         Protime-INR   Result Value Ref Range    Protime 13.5 11.5 - 14.2 sec    INR 1.1    Troponin   Result Value Ref Range    Troponin, High Sensitivity 59 (HH) 0 - 22 ng/L   Brain Natriuretic Peptide   Result Value Ref Range    Pro-BNP 8,537 (H) <300 pg/mL       RADIOLOGY:  XR CHEST PORTABLE   Final Result   Cardiomegaly with suspected interstitial edema. RECENT VITALS:  BP: (!) 102/56,  , Pulse: 68, Resp: 23     ED Course     The patient was given the following medications:  No orders of the defined types were placed in this encounter. Medical Decision Making               EMERGENCY DEPARTMENT COURSE:   Vitals:    Vitals:    03/21/22 1925 03/21/22 1933 03/21/22 2000 03/21/22 2015   BP:  (!) 146/75 124/61 (!) 102/56   Pulse: 95 87 78 68   Resp: (!) 32 (!) 32 25 23   SpO2:  99% 99% 98%   Weight: 156 lb (70.8 kg)      Height: 5' 6\" (1.676 m)        -------------------------  BP: (!) 102/56,  , Pulse: 68, Resp: 23      RE-EVALUATION:  Patient is much better on BiPAP at this time based on his symptoms and his x-ray and labs he does have congestive heart failure he does need to be admitted we have no beds here they are requesting go to [facility] I did speak with nurse practitioner Soraya Leon is agreeable to accept    CONSULTS:      PROCEDURES:  None    FINAL IMPRESSION      1.  Acute on chronic diastolic congestive heart failure [facility])          DISPOSITION/PLAN   DISPOSITION Decision To Transfer 03/21/2022 08:25:20 PM      CONDITION ON DISPOSITION:   Fair    PATIENT REFERRED TO:  No follow-up provider specified.     DISCHARGE MEDICATIONS:  New Prescriptions    No medications on file       (Please note that portions of this note were completed with a voicerecognition program.  Efforts were made to edit the dictations but occasionally words are mis-transcribed.)    Bandar Shah MD  Attending Emergency Medicine Physician                    Bandar Shah MD  03/21/22 2051

## 2022-03-22 NOTE — ED PROVIDER NOTES
[facility] ED  [address]  Phone: [phone]        ADDENDUM:      Care of this patient was assumed from Dr. Preston Villasenor. The patient was seen for Shortness of Breath (40% on room air)  . The patient's initial evaluation and plan have been discussed with the prior provider who initially evaluated the patient. Nursing Notes, Past Medical Hx, Past Surgical Hx, Allergies, were all reviewed. 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 SURGICAL HISTORY      has a past surgical history that includes Circumcision (age 49); Vasectomy; Tympanostomy tube placement; Cataract removal (Bilateral); eye surgery (Bilateral); Cardiac catheterization (12/03/2019); Aortic valve surgery (01/2020); and Cardiac catheterization (12/27/2021).     CURRENT MEDICATIONS       Previous Medications    AMLODIPINE (NORVASC) 10 MG TABLET    Take 1 tablet by mouth daily    CLOPIDOGREL (PLAVIX) 75 MG TABLET    Take 1 tablet by mouth daily Take 1 daily    FUROSEMIDE (LASIX) 20 MG TABLET    Take 1 tablet by mouth daily    GLIPIZIDE (GLUCOTROL) 10 MG TABLET    Take 10 mg by mouth 2 times daily (before meals)    HANDICAP PLACARD MISC    by Does not apply route Duration 2 years    LISINOPRIL (PRINIVIL;ZESTRIL) 5 MG TABLET    Take 1 tablet by mouth daily    METFORMIN (GLUCOPHAGE) 1000 MG TABLET    Take 1 tablet by mouth 2 times daily (with meals)    METOPROLOL SUCCINATE (TOPROL XL) 25 MG EXTENDED RELEASE TABLET    Take 1 tablet by mouth daily    SIMVASTATIN (ZOCOR) 40 MG TABLET    Take 10 mg by mouth nightly     SPIRONOLACTONE (ALDACTONE) 25 MG TABLET    Take 0.5 tablets by mouth daily    VITAMIN D (CHOLECALCIFEROL) 1000 UNIT TABS TABLET    Take 1,000 Units by mouth 2 times daily        ALLERGIES     is allergic to atorvastatin, sulfa antibiotics, and sulfanilamide. Diagnostic Results       Interpreted by Christi Rivera MD     Rhythm: normal sinus   Rate: 68  Axis: -54  Ectopy: Occasional premature atrial contractions  Conduction: First-degree AV block  ST Segments: no acute change  T Waves: no acute change  Q Waves: none    Clinical Impression: normal sinus rhythm with no acute changes/normal EKG. No acute infarction/ischemia noted.       LABS:   Results for orders placed or performed during the hospital encounter of 03/21/22   CBC with Auto Differential   Result Value Ref Range    WBC 21.9 (H) 3.5 - 11.3 k/uL    RBC 4.86 4.21 - 5.77 m/uL    Hemoglobin 12.5 (L) 13.0 - 17.0 g/dL    Hematocrit 39.7 (L) 40.7 - 50.3 %    MCV 81.7 (L) 82.6 - 102.9 fL    MCH 25.7 25.2 - 33.5 pg    MCHC 31.5 25.2 - 33.5 g/dL    RDW 14.1 11.8 - 14.4 %    Platelets 307 (H) 652 - 453 k/uL    MPV 9.8 8.1 - 13.5 fL    NRBC Automated 0.0 0.0 per 100 WBC    Seg Neutrophils 66 (H) 36 - 65 %    Lymphocytes 20 (L) 24 - 43 %    Monocytes 8 3 - 12 %    Eosinophils % 4 1 - 4 %    Basophils 1 0 - 2 %    Immature Granulocytes 1 (H) 0 %    Segs Absolute 14.50 (H) 1.50 - 8.10 k/uL    Absolute Lymph # 4.46 (H) 1.10 - 3.70 k/uL    Absolute Mono # 1.72 (H) 0.10 - 1.20 k/uL    Absolute Eos # 0.77 (H) 0.00 - 0.44 k/uL    Basophils Absolute 0.22 (H) 0.00 - 0.20 k/uL    Absolute Immature Granulocyte 0.21 0.00 - 0.30 k/uL   Comprehensive Metabolic Panel   Result Value Ref Range    Glucose 394 (H) 70 - 99 mg/dL    BUN 50 (H) 8 - 23 mg/dL    CREATININE Sodium 135 135 - 144 mmol/L    Potassium 4.9 3.7 - 5.3 mmol/L    Chloride 100 98 - 107 mmol/L    CO2 22 20 - 31 mmol/L    Anion Gap 13 9 - 17 mmol/L    GFR Non-African American 49 (L) >60 mL/min    GFR  59 (L) >60 mL/min    GFR Comment         Troponin   Result Value Ref Range    Troponin, High Sensitivity 71 (HH) 0 - 22 ng/L   Brain Natriuretic Peptide   Result Value Ref Range    Pro-BNP 7,473 (H) <300 pg/mL   EKG 12 Lead   Result Value Ref Range    Ventricular Rate 70 BPM    Atrial Rate 70 BPM    P-R Interval 262 ms    QRS Duration 98 ms    Q-T Interval 392 ms    QTc Calculation (Bazett) 423 ms    P Axis 69 degrees    R Axis -48 degrees    T Axis 73 degrees   EKG 12 Lead   Result Value Ref Range    Ventricular Rate 68 BPM    Atrial Rate 68 BPM    P-R Interval 242 ms    QRS Duration 110 ms    Q-T Interval 416 ms    QTc Calculation (Bazett) 442 ms    P Axis 86 degrees    R Axis -55 degrees    T Axis 73 degrees       RADIOLOGY:  XR CHEST PORTABLE   Final Result   Improved interstitial pleura. No other interval change. XR CHEST PORTABLE   Final Result   Cardiomegaly with suspected interstitial edema.              RECENT VITALS:  BP: 127/61, Temp: 98.8 °F (37.1 °C), Pulse: 64, Resp: 16     ED Course     The patient was given the following medications:  Orders Placed This Encounter   Medications    furosemide (LASIX) injection 20 mg    amLODIPine (NORVASC) tablet 10 mg    clopidogrel (PLAVIX) tablet 75 mg    furosemide (LASIX) tablet 20 mg    glipiZIDE (GLUCOTROL) tablet 10 mg    lisinopril (PRINIVIL;ZESTRIL) tablet 5 mg    metoprolol succinate (TOPROL XL) extended release tablet 25 mg    DISCONTD: metFORMIN (GLUCOPHAGE) tablet 1,000 mg    DISCONTD: atorvastatin (LIPITOR) tablet 10 mg    spironolactone (ALDACTONE) tablet 12.5 mg    vitamin D (CHOLECALCIFEROL) tablet 1,000 Units    furosemide (LASIX) injection 20 mg    insulin regular (HUMULIN R;NOVOLIN R) injection 10 Units    enoxaparin (LOVENOX) injection 70 mg       Medical Decision Making           The patient was seen for shortness of breath starting yesterday getting worse throughout the day upon his initial presentation the emergency department he had a pulse oximetry of 40% with difficulty breathing was placed on BiPAP which gave him instant relief he was given Lasix and was able to be transitioned over to nasal cannula oxygen the patient denies any chest pain states he does have a cough he has had this for quite some time no fever no chills no unusual swelling of the arms or legs he is on a diuretic which he states he has been taken no abdominal pain no vomiting no diarrhea  On physical examination the patient is noted to have rales two thirds of the way up each lung field so we will go ahead and repeat labs chest x-ray EKG I will give the patient some additional Lasix this morning    The patient is noted to have some slight improvement in his BNP his high-sensitivity troponin did increase so we will consult pharmacy for a dose of Lovenox pending admission given the dyspnea and the hypoxia the patient's kidney functions actually improving from his initial draw he was given some insulin for his hyperglycemia the patient has remained stable while on nasal cannula oxygen    EMERGENCY DEPARTMENT COURSE:   Vitals:    Vitals:    03/22/22 0615 03/22/22 0745 03/22/22 0753 03/22/22 0937   BP:    127/61   Pulse: 58   64   Resp: 16      Temp:       TempSrc:       SpO2: 95% (S) (!) 88% 95%    Weight:       Height:         -------------------------  BP: 127/61, Temp: 98.8 °F (37.1 °C), Pulse: 64, Resp: 16      RE-EVALUATION:  The patient presents with dyspnea that has improved after being placed on BiPAP and given Lasix he is on nasal cannula he is resting comfortably I do feel he will warrants further work-up and evaluation which the patient is agreeable to we have no beds available here at [facility]. Michelle is also discharge dependent the hospitalist at [facility] is kind enough to accept the patient to his service    CONSULTS:  Hospitalist service at [facility] is kind enough to accept the patient to their service for further work-up and treatment of his dyspnea  [facility] had a call of on nursing staff and can no longer accept the patient so we did contact [facility] and they also have no beds, we then contacted [facility] and their hospitalist is kind enough to accept the patient to her service    PROCEDURES:  None    FINAL IMPRESSION      1. Acute on chronic diastolic congestive heart failure [facility])          DISPOSITION/PLAN   DISPOSITION Decision To Transfer 03/21/2022 08:25:20 PM      CONDITION ON DISPOSITION:   Stable    PATIENT REFERRED TO:  No follow-up provider specified.     DISCHARGE MEDICATIONS:  New Prescriptions    No medications on file       (Please note that portions of this note were completed with a voicerecognition program.  Efforts were made to edit the dictations but occasionally words are mis-transcribed.)    Bo Melton MD  Attending Emergency Medicine Physician                    Bo Melton MD  03/22/22 4201

## 2022-03-22 NOTE — FLOWSHEET NOTE
rounding in ED. Assessment:  is aquainted with patient. Patient was doing better and waiting for transfer. Patient is dealing with ongoing issue. Patient has good family and Taoism support. Intervention: Engaged in conversation and active listening with patient and wife. Prayed with patient. Outcome: Patient expressed appreciation for visit and offer of continued prayer. Plan: Chaplains are available on site or on call 24/7 for spiritual and emotional support.       03/22/22 1047   Encounter Summary   Services provided to: Patient   Referral/Consult From: Rounding   Support System Spouse   Place of [facility] No   Continue Visiting   (3-22-22)   Complexity of Encounter Low   Length of Encounter 15 minutes   Spiritual Assessment Completed Yes   Routine   Type Initial   Assessment Anxious; Approachable;Coping   Intervention Active listening;Prayer   Outcome Engaged in conversation;Expressed gratitude     Electronically signed by Rebekah Welch on 3/22/2022 at 10:48 AM

## 2022-03-23 LAB
EKG ATRIAL RATE: 68 BPM
EKG P AXIS: 86 DEGREES
EKG P-R INTERVAL: 242 MS
EKG Q-T INTERVAL: 416 MS
EKG QRS DURATION: 110 MS
EKG QTC CALCULATION (BAZETT): 442 MS
EKG R AXIS: -55 DEGREES
EKG T AXIS: 73 DEGREES
EKG VENTRICULAR RATE: 68 BPM

## 2022-03-28 ENCOUNTER — TELEPHONE (OUTPATIENT)
Dept: INTERNAL MEDICINE | Age: 77
End: 2022-03-28

## 2022-03-28 ENCOUNTER — APPOINTMENT (OUTPATIENT)
Dept: GENERAL RADIOLOGY | Age: 77
DRG: 291 | End: 2022-03-28
Payer: MEDICARE

## 2022-03-28 ENCOUNTER — TELEPHONE (OUTPATIENT)
Dept: OTHER | Facility: CLINIC | Age: 77
End: 2022-03-28

## 2022-03-28 ENCOUNTER — HOSPITAL ENCOUNTER (INPATIENT)
Age: 77
LOS: 2 days | Discharge: HOME | DRG: 291 | End: 2022-03-30
Attending: EMERGENCY MEDICINE | Admitting: INTERNAL MEDICINE
Payer: MEDICARE

## 2022-03-28 DIAGNOSIS — R06.02 SHORTNESS OF BREATH: Primary | ICD-10-CM

## 2022-03-28 PROBLEM — I50.43 CHF (CONGESTIVE HEART FAILURE), NYHA CLASS I, ACUTE ON CHRONIC, COMBINED (HCC): Status: ACTIVE | Noted: 2022-03-28

## 2022-03-28 LAB
ABSOLUTE EOS #: 0.2 K/UL (ref 0–0.44)
ABSOLUTE IMMATURE GRANULOCYTE: 0.15 K/UL (ref 0–0.3)
ABSOLUTE LYMPH #: 0.98 K/UL (ref 1.1–3.7)
ABSOLUTE MONO #: 1.03 K/UL (ref 0.1–1.2)
ALBUMIN SERPL-MCNC: 3.8 G/DL (ref 3.5–5.2)
ALBUMIN/GLOBULIN RATIO: 1.2 (ref 1–2.5)
ALP BLD-CCNC: 100 U/L (ref 40–129)
ALT SERPL-CCNC: 15 U/L (ref 5–41)
ANION GAP SERPL CALCULATED.3IONS-SCNC: 11 MMOL/L (ref 9–17)
AST SERPL-CCNC: 17 U/L
BASOPHILS # BLD: 1 % (ref 0–2)
BASOPHILS ABSOLUTE: 0.12 K/UL (ref 0–0.2)
BILIRUB SERPL-MCNC: 0.25 MG/DL (ref 0.3–1.2)
BILIRUBIN DIRECT: <0.08 MG/DL
BILIRUBIN, INDIRECT: ABNORMAL MG/DL (ref 0–1)
BUN BLDV-MCNC: 36 MG/DL (ref 8–23)
BUN/CREAT BLD: 26 (ref 9–20)
CALCIUM SERPL-MCNC: 9.4 MG/DL (ref 8.6–10.4)
CHLORIDE BLD-SCNC: 97 MMOL/L (ref 98–107)
CO2: 25 MMOL/L (ref 20–31)
CREAT SERPL-MCNC: 1.37 MG/DL (ref 0.7–1.2)
D-DIMER QUANTITATIVE: 0.55 MG/L FEU (ref 0–0.59)
EKG ATRIAL RATE: 80 BPM
EKG P AXIS: 67 DEGREES
EKG P-R INTERVAL: 246 MS
EKG Q-T INTERVAL: 364 MS
EKG QRS DURATION: 84 MS
EKG QTC CALCULATION (BAZETT): 419 MS
EKG R AXIS: -54 DEGREES
EKG T AXIS: 69 DEGREES
EKG VENTRICULAR RATE: 80 BPM
EOSINOPHILS RELATIVE PERCENT: 1 % (ref 1–4)
GFR AFRICAN AMERICAN: >60 ML/MIN
GFR NON-AFRICAN AMERICAN: 51 ML/MIN
GFR SERPL CREATININE-BSD FRML MDRD: ABNORMAL ML/MIN/{1.73_M2}
GLOBULIN: 3.3 G/DL (ref 1.5–3.8)
GLUCOSE BLD-MCNC: 184 MG/DL (ref 75–110)
GLUCOSE BLD-MCNC: 216 MG/DL (ref 75–110)
GLUCOSE BLD-MCNC: 377 MG/DL (ref 70–99)
GLUCOSE BLD-MCNC: 62 MG/DL (ref 75–110)
HCT VFR BLD CALC: 39.4 % (ref 40.7–50.3)
HEMOGLOBIN: 12.8 G/DL (ref 13–17)
IMMATURE GRANULOCYTES: 1 %
LYMPHOCYTES # BLD: 5 % (ref 24–43)
MCH RBC QN AUTO: 26.2 PG (ref 25.2–33.5)
MCHC RBC AUTO-ENTMCNC: 32.5 G/DL (ref 25.2–33.5)
MCV RBC AUTO: 80.6 FL (ref 82.6–102.9)
MONOCYTES # BLD: 5 % (ref 3–12)
NRBC AUTOMATED: 0 PER 100 WBC
PDW BLD-RTO: 14.2 % (ref 11.8–14.4)
PLATELET # BLD: 461 K/UL (ref 138–453)
PMV BLD AUTO: 9.2 FL (ref 8.1–13.5)
POTASSIUM SERPL-SCNC: 5.1 MMOL/L (ref 3.7–5.3)
PRO-BNP: 5473 PG/ML
RBC # BLD: 4.89 M/UL (ref 4.21–5.77)
RBC # BLD: ABNORMAL 10*6/UL
SARS-COV-2, RAPID: NOT DETECTED
SEG NEUTROPHILS: 87 % (ref 36–65)
SEGMENTED NEUTROPHILS ABSOLUTE COUNT: 18.79 K/UL (ref 1.5–8.1)
SODIUM BLD-SCNC: 133 MMOL/L (ref 135–144)
SPECIMEN DESCRIPTION: NORMAL
TOTAL PROTEIN: 7.1 G/DL (ref 6.4–8.3)
TROPONIN, HIGH SENSITIVITY: 71 NG/L (ref 0–22)
TROPONIN, HIGH SENSITIVITY: 72 NG/L (ref 0–22)
TROPONIN, HIGH SENSITIVITY: 74 NG/L (ref 0–22)
WBC # BLD: 21.3 K/UL (ref 3.5–11.3)

## 2022-03-28 PROCEDURE — 94640 AIRWAY INHALATION TREATMENT: CPT

## 2022-03-28 PROCEDURE — 85025 COMPLETE CBC W/AUTO DIFF WBC: CPT

## 2022-03-28 PROCEDURE — 99222 1ST HOSP IP/OBS MODERATE 55: CPT | Performed by: INTERNAL MEDICINE

## 2022-03-28 PROCEDURE — 82947 ASSAY GLUCOSE BLOOD QUANT: CPT

## 2022-03-28 PROCEDURE — 80076 HEPATIC FUNCTION PANEL: CPT

## 2022-03-28 PROCEDURE — 6370000000 HC RX 637 (ALT 250 FOR IP): Performed by: INTERNAL MEDICINE

## 2022-03-28 PROCEDURE — 99284 EMERGENCY DEPT VISIT MOD MDM: CPT

## 2022-03-28 PROCEDURE — 6370000000 HC RX 637 (ALT 250 FOR IP): Performed by: EMERGENCY MEDICINE

## 2022-03-28 PROCEDURE — 2700000000 HC OXYGEN THERAPY PER DAY

## 2022-03-28 PROCEDURE — 83880 ASSAY OF NATRIURETIC PEPTIDE: CPT

## 2022-03-28 PROCEDURE — 71045 X-RAY EXAM CHEST 1 VIEW: CPT

## 2022-03-28 PROCEDURE — 1200000000 HC SEMI PRIVATE

## 2022-03-28 PROCEDURE — 2580000003 HC RX 258: Performed by: INTERNAL MEDICINE

## 2022-03-28 PROCEDURE — 96374 THER/PROPH/DIAG INJ IV PUSH: CPT

## 2022-03-28 PROCEDURE — 80048 BASIC METABOLIC PNL TOTAL CA: CPT

## 2022-03-28 PROCEDURE — 6360000002 HC RX W HCPCS: Performed by: EMERGENCY MEDICINE

## 2022-03-28 PROCEDURE — 6360000002 HC RX W HCPCS: Performed by: INTERNAL MEDICINE

## 2022-03-28 PROCEDURE — 96372 THER/PROPH/DIAG INJ SC/IM: CPT

## 2022-03-28 PROCEDURE — 93005 ELECTROCARDIOGRAM TRACING: CPT | Performed by: EMERGENCY MEDICINE

## 2022-03-28 PROCEDURE — 87635 SARS-COV-2 COVID-19 AMP PRB: CPT

## 2022-03-28 PROCEDURE — 85379 FIBRIN DEGRADATION QUANT: CPT

## 2022-03-28 PROCEDURE — 36415 COLL VENOUS BLD VENIPUNCTURE: CPT

## 2022-03-28 PROCEDURE — 84484 ASSAY OF TROPONIN QUANT: CPT

## 2022-03-28 RX ORDER — ASPIRIN 81 MG/1
81 TABLET, CHEWABLE ORAL DAILY
COMMUNITY

## 2022-03-28 RX ORDER — SODIUM CHLORIDE 0.9 % (FLUSH) 0.9 %
10 SYRINGE (ML) INJECTION EVERY 12 HOURS SCHEDULED
Status: DISCONTINUED | OUTPATIENT
Start: 2022-03-28 | End: 2022-03-30 | Stop reason: HOSPADM

## 2022-03-28 RX ORDER — ACETAMINOPHEN 325 MG/1
650 TABLET ORAL EVERY 4 HOURS PRN
Status: DISCONTINUED | OUTPATIENT
Start: 2022-03-28 | End: 2022-03-30 | Stop reason: HOSPADM

## 2022-03-28 RX ORDER — LOSARTAN POTASSIUM 25 MG/1
TABLET ORAL
COMMUNITY
Start: 2022-03-26 | End: 2022-04-14

## 2022-03-28 RX ORDER — FUROSEMIDE 10 MG/ML
20 INJECTION INTRAMUSCULAR; INTRAVENOUS ONCE
Status: COMPLETED | OUTPATIENT
Start: 2022-03-28 | End: 2022-03-28

## 2022-03-28 RX ORDER — GLIPIZIDE 5 MG/1
10 TABLET ORAL
Status: DISCONTINUED | OUTPATIENT
Start: 2022-03-28 | End: 2022-03-28

## 2022-03-28 RX ORDER — CLOPIDOGREL BISULFATE 75 MG/1
75 TABLET ORAL DAILY
Status: DISCONTINUED | OUTPATIENT
Start: 2022-03-29 | End: 2022-03-30 | Stop reason: HOSPADM

## 2022-03-28 RX ORDER — SODIUM CHLORIDE FOR INHALATION 0.9 %
3 VIAL, NEBULIZER (ML) INHALATION
Status: DISCONTINUED | OUTPATIENT
Start: 2022-03-28 | End: 2022-03-28

## 2022-03-28 RX ORDER — LOSARTAN POTASSIUM 25 MG/1
25 TABLET ORAL DAILY
Status: DISCONTINUED | OUTPATIENT
Start: 2022-03-29 | End: 2022-03-30 | Stop reason: HOSPADM

## 2022-03-28 RX ORDER — VITAMIN B COMPLEX
1000 TABLET ORAL DAILY
Status: DISCONTINUED | OUTPATIENT
Start: 2022-03-29 | End: 2022-03-30 | Stop reason: HOSPADM

## 2022-03-28 RX ORDER — CARVEDILOL 3.12 MG/1
6.25 TABLET ORAL 2 TIMES DAILY WITH MEALS
Status: DISCONTINUED | OUTPATIENT
Start: 2022-03-29 | End: 2022-03-30 | Stop reason: HOSPADM

## 2022-03-28 RX ORDER — GLIPIZIDE 5 MG/1
10 TABLET ORAL DAILY
Status: DISCONTINUED | OUTPATIENT
Start: 2022-03-29 | End: 2022-03-30 | Stop reason: HOSPADM

## 2022-03-28 RX ORDER — DEXTROSE MONOHYDRATE 25 G/50ML
12.5 INJECTION, SOLUTION INTRAVENOUS PRN
Status: DISCONTINUED | OUTPATIENT
Start: 2022-03-28 | End: 2022-03-28 | Stop reason: RX

## 2022-03-28 RX ORDER — NICOTINE POLACRILEX 4 MG
15 LOZENGE BUCCAL PRN
Status: DISCONTINUED | OUTPATIENT
Start: 2022-03-28 | End: 2022-03-30 | Stop reason: HOSPADM

## 2022-03-28 RX ORDER — SODIUM CHLORIDE 9 MG/ML
INJECTION, SOLUTION INTRAVENOUS PRN
Status: DISCONTINUED | OUTPATIENT
Start: 2022-03-28 | End: 2022-03-30 | Stop reason: HOSPADM

## 2022-03-28 RX ORDER — ONDANSETRON 2 MG/ML
4 INJECTION INTRAMUSCULAR; INTRAVENOUS EVERY 6 HOURS PRN
Status: DISCONTINUED | OUTPATIENT
Start: 2022-03-28 | End: 2022-03-30 | Stop reason: HOSPADM

## 2022-03-28 RX ORDER — CARVEDILOL 6.25 MG/1
TABLET ORAL
COMMUNITY
Start: 2022-03-26

## 2022-03-28 RX ORDER — SODIUM CHLORIDE 0.9 % (FLUSH) 0.9 %
10 SYRINGE (ML) INJECTION PRN
Status: DISCONTINUED | OUTPATIENT
Start: 2022-03-28 | End: 2022-03-30 | Stop reason: HOSPADM

## 2022-03-28 RX ORDER — ASPIRIN 81 MG/1
81 TABLET, CHEWABLE ORAL DAILY
Status: DISCONTINUED | OUTPATIENT
Start: 2022-03-29 | End: 2022-03-30 | Stop reason: HOSPADM

## 2022-03-28 RX ORDER — INSULIN GLARGINE 100 [IU]/ML
10 INJECTION, SOLUTION SUBCUTANEOUS
Status: DISCONTINUED | OUTPATIENT
Start: 2022-03-28 | End: 2022-03-30 | Stop reason: HOSPADM

## 2022-03-28 RX ORDER — FUROSEMIDE 10 MG/ML
40 INJECTION INTRAMUSCULAR; INTRAVENOUS DAILY
Status: DISCONTINUED | OUTPATIENT
Start: 2022-03-29 | End: 2022-03-29

## 2022-03-28 RX ORDER — DEXTROSE MONOHYDRATE 50 MG/ML
100 INJECTION, SOLUTION INTRAVENOUS PRN
Status: DISCONTINUED | OUTPATIENT
Start: 2022-03-28 | End: 2022-03-30 | Stop reason: HOSPADM

## 2022-03-28 RX ORDER — IPRATROPIUM BROMIDE AND ALBUTEROL SULFATE 2.5; .5 MG/3ML; MG/3ML
1 SOLUTION RESPIRATORY (INHALATION) 4 TIMES DAILY
Status: DISCONTINUED | OUTPATIENT
Start: 2022-03-28 | End: 2022-03-30 | Stop reason: HOSPADM

## 2022-03-28 RX ORDER — ALBUTEROL SULFATE 2.5 MG/3ML
2.5 SOLUTION RESPIRATORY (INHALATION)
Status: DISCONTINUED | OUTPATIENT
Start: 2022-03-28 | End: 2022-03-30 | Stop reason: HOSPADM

## 2022-03-28 RX ADMIN — SODIUM CHLORIDE, PRESERVATIVE FREE 10 ML: 5 INJECTION INTRAVENOUS at 20:56

## 2022-03-28 RX ADMIN — INSULIN HUMAN 8 UNITS: 100 INJECTION, SOLUTION PARENTERAL at 12:41

## 2022-03-28 RX ADMIN — GLIPIZIDE 10 MG: 5 TABLET ORAL at 17:12

## 2022-03-28 RX ADMIN — INSULIN GLARGINE 10 UNITS: 100 INJECTION, SOLUTION SUBCUTANEOUS at 17:10

## 2022-03-28 RX ADMIN — IPRATROPIUM BROMIDE AND ALBUTEROL SULFATE 1 AMPULE: .5; 3 SOLUTION RESPIRATORY (INHALATION) at 19:46

## 2022-03-28 RX ADMIN — FUROSEMIDE 20 MG: 10 INJECTION, SOLUTION INTRAMUSCULAR; INTRAVENOUS at 12:41

## 2022-03-28 RX ADMIN — INSULIN LISPRO 2 UNITS: 100 INJECTION, SOLUTION INTRAVENOUS; SUBCUTANEOUS at 17:11

## 2022-03-28 RX ADMIN — ENOXAPARIN SODIUM 40 MG: 40 INJECTION SUBCUTANEOUS at 17:12

## 2022-03-28 RX ADMIN — INSULIN LISPRO 3 UNITS: 100 INJECTION, SOLUTION INTRAVENOUS; SUBCUTANEOUS at 17:10

## 2022-03-28 RX ADMIN — FUROSEMIDE 20 MG: 10 INJECTION, SOLUTION INTRAMUSCULAR; INTRAVENOUS at 17:12

## 2022-03-28 ASSESSMENT — PAIN SCALES - GENERAL
PAINLEVEL_OUTOF10: 0

## 2022-03-28 ASSESSMENT — ENCOUNTER SYMPTOMS
SHORTNESS OF BREATH: 1
COUGH: 1

## 2022-03-28 NOTE — ED PROVIDER NOTES
[facility] ED  [address]  Phone: [phone]        Pt Name: Nicanor Duarte  MRN: [id number]  [place] 1945  Date of evaluation: 3/28/22      CHIEF COMPLAINT     Chief Complaint   Patient presents with    Shortness of Breath         HISTORY OF PRESENT ILLNESS  (Location/Symptom, Timing/Onset, Context/Setting, Quality, Duration, Modifying Factors, Severity.)    Nicanor Duarte is a 77 y.o. male who presents with shortness of breath. This 60-year-old male returns the emergency department was seen recently for congestive heart failure was transferred to [facility] was just released last week states that he was feeling back to his baseline status this morning he walked out to the mailbox and became short of breath so comes to the ER for evaluation by the time he arrives here his shortness of breath has resolved he is back to his baseline status he denies any chest pain no fever no chills no abdominal pain no vomiting no diarrhea no unusual swelling of the arms or legs he does have an occasional cough with the shortness of breath      REVIEW OF SYSTEMS    (2-9 systems for level 4, 10 or more for level 5)     Review of Systems   Respiratory: Positive for cough and shortness of breath. All other systems reviewed and are negative.       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 SURGICAL HISTORY      has a past surgical history that includes Circumcision (age 49); Vasectomy; Tympanostomy tube placement; Cataract removal (Bilateral); eye surgery (Bilateral); Cardiac catheterization (2019); Aortic valve surgery (2020); and Cardiac catheterization (2021). CURRENTMEDICATIONS       Previous Medications    AMLODIPINE (NORVASC) 10 MG TABLET    Take 1 tablet by mouth daily    CARVEDILOL (COREG) 6.25 MG TABLET    TAKE 1 TABLET BY MOUTH TWO TIMES A DAY WITH breakfast and dinner MEALS    CLOPIDOGREL (PLAVIX) 75 MG TABLET    Take 1 tablet by mouth daily Take 1 daily    FUROSEMIDE (LASIX) 20 MG TABLET    Take 1 tablet by mouth daily    GLIPIZIDE (GLUCOTROL) 10 MG TABLET    Take 10 mg by mouth 2 times daily (before meals)    HANDICAP PLACARD MISC    by Does not apply route Duration 2 years    LISINOPRIL (PRINIVIL;ZESTRIL) 5 MG TABLET    Take 1 tablet by mouth daily    LOSARTAN (COZAAR) 25 MG TABLET    TAKE 1 TABLET BY MOUTH EVERY DAY    METFORMIN (GLUCOPHAGE) 1000 MG TABLET    Take 1 tablet by mouth 2 times daily (with meals)    METOPROLOL SUCCINATE (TOPROL XL) 25 MG EXTENDED RELEASE TABLET    Take 1 tablet by mouth daily    SIMVASTATIN (ZOCOR) 40 MG TABLET    Take 10 mg by mouth nightly     SPIRONOLACTONE (ALDACTONE) 25 MG TABLET    Take 0.5 tablets by mouth daily    VITAMIN D (CHOLECALCIFEROL) 1000 UNIT TABS TABLET    Take 1,000 Units by mouth 2 times daily        ALLERGIES     is allergic to atorvastatin, sulfa antibiotics, and sulfanilamide. FAMILY HISTORY     He indicated that the status of his mother is unknown. He indicated that his father is . He indicated that his sister is . He indicated that the status of his brother is unknown.     family history includes Coronary Art Dis in his brother and sister; Heart Attack in his father; Hypertension in his mother; Lysle Hashimoto in his mother; Stroke in his mother.     SOCIAL HISTORY      reports that he quit smoking about 52 years ago. He has a 1.00 pack-year smoking history. He has never used smokeless tobacco. He reports current alcohol use. He reports that he does not use drugs. PHYSICAL EXAM    (up to 7 for level 4, 8 or more for level 5)   INITIAL VITALS:  temperature is 98.8 °F (37.1 °C). His blood pressure is 154/79 (abnormal) and his pulse is 95. His respiration is 18 and oxygen saturation is 92%. Physical Exam  Vitals and nursing note reviewed. Constitutional:       Appearance: Normal appearance. HENT:      Head: Normocephalic and atraumatic. Eyes:      Conjunctiva/sclera: Conjunctivae normal.   Cardiovascular:      Rate and Rhythm: Normal rate and regular rhythm. Pulmonary:      Effort: Pulmonary effort is normal. No respiratory distress. Breath sounds: No stridor. Rales present. No wheezing or rhonchi. Comments: Rales in the bilateral bases  Abdominal:      General: There is no distension. Palpations: Abdomen is soft. Tenderness: There is no abdominal tenderness. There is no right CVA tenderness, left CVA tenderness or guarding. Comments: Umbilical hernia that is easily reducible   Musculoskeletal:         General: No swelling or tenderness. Normal range of motion. Cervical back: Normal range of motion and neck supple. Skin:     General: Skin is warm and dry. Neurological:      General: No focal deficit present. Mental Status: He is alert.          DIFFERENTIAL DIAGNOSIS/ MDM:     We will establish an IV check a chest x-ray EKG and labs    DIAGNOSTIC RESULTS     EKG: All EKG's are interpreted by the Emergency Department Physician who either signs or Co-signs this chart in the absence of a cardiologist.      Interpreted by Kurt Pena MD     Rhythm: normal sinus   Rate: 80  Axis: -54  Ectopy: Occasional PAC  Conduction: First-degree AV block  ST Segments: no acute change  T Waves: no acute change  Q Waves: none    Clinical Impression: normal sinus rhythm with no acute changes/normal EKG. No acute infarction/ischemia noted. RADIOLOGY:        Interpretation per the Radiologist below, if available at the time of this note:    XR CHEST PORTABLE (Final result)  Result time 03/28/22 12:26:32  Final result by Parveen Mendieta MD (03/28/22 12:26:32)                Impression:      1. No acute cardiopulmonary abnormality. Narrative:    EXAMINATION:   ONE XRAY VIEW OF THE CHEST     3/28/2022 12:15 pm     COMPARISON:   None. HISTORY:   ORDERING SYSTEM PROVIDED HISTORY: dyspnea   TECHNOLOGIST PROVIDED HISTORY:   dyspnea   Reason for Exam: shortness of breath     FINDINGS:   The lungs are clear, no effusion. No pneumothorax. Heart is normal size. Mediastinal and hilar contours are within normal limits. Bony thorax no acute abnormality.                    LABS:  Results for orders placed or performed during the hospital encounter of 03/28/22   Brain Natriuretic Peptide   Result Value Ref Range    Pro-BNP 5,473 (H) <300 pg/mL   Troponin   Result Value Ref Range    Troponin, High Sensitivity 72 (HH) 0 - 22 ng/L   Hepatic Function Panel   Result Value Ref Range    Albumin 3.8 3.5 - 5.2 g/dL    Alkaline Phosphatase 100 40 - 129 U/L    ALT 15 5 - 41 U/L    AST 17 <40 U/L    Total Bilirubin 0.25 (L) 0.3 - 1.2 mg/dL    Bilirubin, Direct <0.08 <0.31 mg/dL    Bilirubin, Indirect Can not be calculated 0.00 - 1.00 mg/dL    Total Protein 7.1 6.4 - 8.3 g/dL    Globulin 3.3 1.5 - 3.8 g/dL    Albumin/Globulin Ratio 1.2 1.0 - 2.5   CBC with Auto Differential   Result Value Ref Range    WBC 21.3 (H) 3.5 - 11.3 k/uL    RBC 4.89 4.21 - 5.77 m/uL    Hemoglobin 12.8 (L) 13.0 - 17.0 g/dL    Hematocrit 39.4 (L) 40.7 - 50.3 %    MCV 80.6 (L) 82.6 - 102.9 fL    MCH 26.2 25.2 - 33.5 pg    MCHC 32.5 25.2 - 33.5 g/dL    RDW 14.2 11.8 - 14.4 %    Platelets 950 (H) 326 - 453 k/uL    MPV 9.2 8.1 - 13.5 fL    NRBC Automated 0.0 0.0 per 100 WBC    Seg Neutrophils 87 (H) 36 - 65 %    Lymphocytes 5 (L) 24 - 43 %    Monocytes 5 3 - 12 %    Eosinophils % 1 1 - 4 %    Basophils 1 0 - 2 %    Immature Granulocytes 1 (H) 0 %    Segs Absolute 18.79 (H) 1.50 - 8.10 k/uL    Absolute Lymph # 0.98 (L) 1.10 - 3.70 k/uL    Absolute Mono # 1.03 0.10 - 1.20 k/uL    Absolute Eos # 0.20 0.00 - 0.44 k/uL    Basophils Absolute 0.12 0.00 - 0.20 k/uL    Absolute Immature Granulocyte 0.15 0.00 - 0.30 k/uL    RBC Morphology MICROCYTOSIS PRESENT    Basic Metabolic Panel   Result Value Ref Range    Glucose 377 (H) 70 - 99 mg/dL    BUN 36 (H) 8 - 23 mg/dL    CREATININE 1.37 (H) 0.70 - 1.20 mg/dL    Bun/Cre Ratio 26 (H) 9 - 20    Calcium 9.4 8.6 - 10.4 mg/dL    Sodium 133 (L) 135 - 144 mmol/L    Potassium 5.1 3.7 - 5.3 mmol/L    Chloride 97 (L) 98 - 107 mmol/L    CO2 25 20 - 31 mmol/L    Anion Gap 11 9 - 17 mmol/L    GFR Non-African American 51 (L) >60 mL/min    GFR African American >60 >60 mL/min    GFR Comment         EKG 12 Lead   Result Value Ref Range    Ventricular Rate 80 BPM    Atrial Rate 80 BPM    P-R Interval 246 ms    QRS Duration 84 ms    Q-T Interval 364 ms    QTc Calculation (Bazett) 419 ms    P Axis 67 degrees    R Axis -54 degrees    T Axis 69 degrees           EMERGENCY DEPARTMENT COURSE:   Vitals:    Vitals:    03/28/22 1119 03/28/22 1124   BP: (!) 154/79    Pulse: 95    Resp: 18    Temp:  98.8 °F (37.1 °C)   SpO2: 92%      -------------------------  BP: (!) 154/79, Temp: 98.8 °F (37.1 °C), Pulse: 95, Resp: 18          CONSULTS:  I did discuss the patient with my hospitalist who is requesting that we increase the patient's Lasix dose to 40 mg twice a day and arrange for home oxygen and at this point to have the patient follow this up as an outpatient I relayed this information the patient he is agreeable to this plan as he is feeling much better he states his symptoms only lasted about 10 to 15 minutes where he was feeling short of breath he did have an elevated glucose of which he was given Insulin the patient further was noted to still have an elevation in his BNP so was given some Lasix here the patient has no respiratory distress while at rest here in the emergency department I am recommending that he return to the ER for any further shortness of breath any chest pain or other concerns otherwise to follow-up with his family doctor calling tomorrow for an appointment    PROCEDURES:  None    FINAL IMPRESSION      1. Shortness of breath          DISPOSITION/PLAN   DISPOSITION    Admit to Hospitalist service    CONDITION ON DISPOSITION:   Stable    PATIENT REFERRED TO:  ESTHER Nix  [place]  [facility]  [phone]    Call in 1 day        DISCHARGE MEDICATIONS:  New Prescriptions    No medications on file       (Please note that portions of this note were completed with a voicerecognition program.  Efforts were made to edit the dictations but occasionally words are mis-transcribed.)    Su Rob MD,, MD, F.A.C.E.P.   Attending Emergency Medicine Physician       Su Rob MD  03/28/22 [id number]    Addendum to the chart we cannot arrange for home oxygen for the patient so once again I discussed the patient with my hospitalist who at this point is kind enough to admit the patient to his service       Su Rob MD  03/28/22 [id number]

## 2022-03-28 NOTE — PROGRESS NOTES
SW consulted to meet with patient to assist with home 02. List provided patient chose [facility]. SW contacted [facility]. Informed patient has to be tested in a stable environment such as with PCP or admitted and evaluated on the floor. SANJAY discussed with nurse and attending physician.           Electronically signed by TYRESE Lema on 3/28/2022 at 2:32 PM

## 2022-03-28 NOTE — H&P
HOSPITALIST ADMISSION H&P      REASON FOR ADMISSION:  Flash pulmonary edema----CHF acute-on-chronic systolic-----hypoxia   ESTIMATED LENGTH OF STAY:   > 2 midnights--2-3 days     ATTENDING/ADMITTING PHYSICIAN: Arden Lovell MD  PCP: ESTHER Hernandez CNP    HISTORY OF PRESENT ILLNESS:    The patient is a 77 y.o. male patient of ESTHER Hernandez CNP who presents with sudden onset shortness of breath after walking to the mail box---recent hospitalization at [facility] due to bed unavailability at [facility]---medications adjusted and released---lasted 36 hour at home before this recurrent event. Troponin 72--71---typical.   EKG--3.28.2022--SR--80--1st degree AVB---PACs--old anterior infarction. Hypoxia ---> supplemental oxygen----not on supplemental oxygen in the home setting---may require home supplemental oxygen    2D ECHO---3.17.2021----EF ~ 45%---had a life vest previously which had been discontinued with the improved EF    ASCVD---cardiac catheterization---DEVON stents x 3----2016    Valvular heart disease---TAVR    CKD Stage 3a    Diabetes Mellitus Type 2-----uncontrolled BG > 300 upon arrival in ER     See below for additional PMH.     Patient ydkx-[id number]-[id number]-available records reviewed, including, but not limited to,  ER notes--labs---imaging---EKGs---2D ECHOs----office records--OSH records limited---personal notes       Past Medical History:   Diagnosis Date    Acne rosacea     treated with MetroGel    Anemia     Aortic stenosis     echo 3/18 Mod AS, Mild MR    BPH (benign prostatic hypertrophy)     CAD (coronary artery disease)     Stent x 3 DEVON 8/16    Carotid stenosis     Fairly minimal plaquing on ultrasound 5/14--in the conclusion described as less than 50% stenosis but in the body of the report described as minimal    Cataract of both eyes     CRI (chronic renal insufficiency)     Diabetes mellitus, type 2 (HCC)     1.) Proteinuria, 24 hour total urine protein 1,420 mg/24 hrs, 09/08, creatinine clearance 114, 09/08 2.) Repeat protein/creatinine ratio 2.97, 02/12 3. )24 hr urine collection 1958 mg/24 hrs, 04/12. 4.) Repeat protein/creatinine ratio 1.99, 08/12 a.) Repeat protein/creatinine ratio 2.14, 07/13 5.) Renal u/s ok 02/12  6) retinopathy at [address] 12/16  early mod. nonprolifferative  macular involved    Dyslipidemia     Erectile dysfunction     H/O agent Orange exposure     per [facility] in the [facility] along with exposure to cresote and naphtha    Hearing loss     Hyperlipidemia     Hypertension     Mild nonproliferative diabetic retinopathy ([facility]     Non-rheumatic mitral regurgitation 06/27/2016    Pneumonia 7/31/2021    Pneumonia of right lower lobe due to infectious organism     RBBB     Sepsis ([facility] 12/22/2021    Status post transcatheter aortic valve replacement (TAVR) using bioprosthesis 01/21/2020    Wears dentures     upper           Past Surgical History:   Procedure Laterality Date    AORTIC VALVE SURGERY  01/2020    CARDIAC CATHETERIZATION  12/03/2019    CARDIAC CATHETERIZATION  12/27/2021    Patent LAD, D, and LCX stents    CATARACT REMOVAL Bilateral     CIRCUMCISION  age 49   [facility] EYE SURGERY Bilateral     cataract    TYMPANOSTOMY TUBE PLACEMENT      VASECTOMY         Medications Prior to Admission:    Medications Prior to Admission: carvedilol (COREG) 6.25 MG tablet, TAKE 1 TABLET BY MOUTH TWO TIMES A DAY WITH breakfast and dinner MEALS  losartan (COZAAR) 25 MG tablet, TAKE 1 TABLET BY MOUTH EVERY DAY  furosemide (LASIX) 20 MG tablet, Take 1 tablet by mouth daily  metoprolol succinate (TOPROL XL) 25 MG extended release tablet, Take 1 tablet by mouth daily (Patient not taking: Reported on 3/28/2022)  spironolactone (ALDACTONE) 25 MG tablet, Take 0.5 tablets by mouth daily (Patient not taking: Reported on 3/28/2022)  lisinopril (PRINIVIL;ZESTRIL) 5 MG tablet, Take 1 tablet by mouth daily (Patient not taking: Reported on 3/28/2022)  Handicap Yue MISC, by Does not apply route Duration 2 years  amLODIPine (NORVASC) 10 MG tablet, Take 1 tablet by mouth daily (Patient not taking: Reported on 3/28/2022)  clopidogrel (PLAVIX) 75 MG tablet, Take 1 tablet by mouth daily Take 1 daily  simvastatin (ZOCOR) 40 MG tablet, Take 10 mg by mouth nightly   glipiZIDE (GLUCOTROL) 10 MG tablet, Take 10 mg by mouth 2 times daily (before meals)  vitamin D (CHOLECALCIFEROL) 1000 UNIT TABS tablet, Take 1,000 Units by mouth 2 times daily   metFORMIN (GLUCOPHAGE) 1000 MG tablet, Take 1 tablet by mouth 2 times daily (with meals)    Allergies:    Atorvastatin, Sulfa antibiotics, and Sulfanilamide    Social History:    reports that he quit smoking about 52 years ago. He has a 1.00 pack-year smoking history. He has never used smokeless tobacco. He reports current alcohol use. He reports that he does not use drugs. Family History:   family history includes Coronary Art Dis in his brother and sister; Heart Attack in his father; Hypertension in his mother; Ish[place]el Land in his mother; Stroke in his mother. REVIEW OF SYSTEMS:  See HPI and problem list; otherwise no other new complaints with respect to HEENT, neck, pulmonary, coronary, GI, , endocrine, musculoskeletal, immune system/connective tissue disease, hematologic, neuropsych, skin, lymphatics, or malignancies. PHYSICAL EXAM:  Vitals:  /83   Pulse 80   Temp 98.7 °F (37.1 °C) (Tympanic)   Resp 22   SpO2 95%     HEENT: O2 NC---, Normocephalic and Atraumatic  Neck: Supple, No Masses, Tenderness, Nodularity and No Lymphadenopathy  Chest/Lungs: Rales Present, Rhonchi Present, Inspiratory Wheezes Present and Expiratory Wheezes----nonproductive cough  Cardiac: Regular Rate and Rhythm  GI/Abdomen:  Bowel Sounds Present and Soft, Non-tender, without Guarding or Rebound Tenderness  : Not examined  EXT/Skin: No Cyanosis, No Clubbing and Edema Present----minimal   Neuro: alert----hard of hearing---generalized weakness---- and No Localizing Signs/Symptoms        LABS:    CBC with Differential:    Lab Results   Component Value Date    WBC 21.3 03/28/2022    RBC 4.89 03/28/2022    HGB 12.8 03/28/2022    HCT 39.4 03/28/2022     03/28/2022    MCV 80.6 03/28/2022    MCH 26.2 03/28/2022    MCHC 32.5 03/28/2022    RDW 14.2 03/28/2022    LYMPHOPCT 5 03/28/2022    MONOPCT 5 03/28/2022    BASOPCT 1 03/28/2022    MONOSABS 1.03 03/28/2022    LYMPHSABS 0.98 03/28/2022    EOSABS 0.20 03/28/2022    BASOSABS 0.12 03/28/2022    DIFFTYPE NOT REPORTED 01/06/2022     CMP:    Lab Results   Component Value Date     03/28/2022    K 5.1 03/28/2022    CL 97 03/28/2022    CO2 25 03/28/2022    BUN 36 03/28/2022    CREATININE 1.37 03/28/2022    GFRAA >60 03/28/2022    LABGLOM 51 03/28/2022    GLUCOSE 377 03/28/2022    PROT 7.1 03/28/2022    PROT 7.3 02/06/2012    LABALBU 3.8 03/28/2022    CALCIUM 9.4 03/28/2022    BILITOT 0.25 03/28/2022    ALKPHOS 100 03/28/2022    AST 17 03/28/2022    ALT 15 03/28/2022     BMP:    Lab Results   Component Value Date     03/28/2022    K 5.1 03/28/2022    CL 97 03/28/2022    CO2 25 03/28/2022    BUN 36 03/28/2022    LABALBU 3.8 03/28/2022    CREATININE 1.37 03/28/2022    CALCIUM 9.4 03/28/2022    GFRAA >60 03/28/2022    LABGLOM 51 03/28/2022    GLUCOSE 377 03/28/2022       ASSESSMENT:      Patient Active Problem List   Diagnosis    Essential hypertension    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)    Non-rheumatic mitral regurgitation    Nonrheumatic aortic valve stenosis    Uncontrolled type 2 diabetes mellitus with nephropathy ([facility]    Coronary artery disease involving native coronary artery of native heart without angina pectoris    Controlled type 2 diabetes mellitus with mild nonproliferative retinopathy and macular edema, without long-term current use of insulin (HCC)    Carotid stenosis    Chronic [facility] disease, stage 3a ([facility]    COPD exacerbation ([facility])    Aortic stenosis, severe    Hyperlipidemia    H/O agent Orange exposure    Diabetes mellitus, type 2 ([facility]    CAD (coronary artery disease)    Anemia    Proteinuria    Wears hearing aid in both ears    Acute on chronic systolic heart failure (HCC)    S/P TAVR (transcatheter aortic valve replacement)    Second hand smoke exposure    Peripheral arterial disease ([facility])    Heavy sensation of lower extremity    Impaired ambulation    Acute respiratory failure with hypoxia ([facility])    NSTEMI (non-ST elevated myocardial infarction) ([facility]    BJ (acute kidney injury) ([facility]    Obesity    Hypoxia    Incarcerated umbilical hernia    CHF (congestive heart failure), NYHA class I, acute on chronic, combined ([facility])    Shortness of breath     SHIRLEY CERVANTES          76  WM  [brayan Rhodes NP--IM; DC Cardiology---[facility]]  FULL CODE    ELIQUIS  SUPPLEMENTAL OXYGEN   TAVR---2020   AGENT ORANGE exposure     Anti-infectives:      CHF--acute-on-chronic systolic----flash pulmonary edema----recurrent----3.28.2022         Discharged from [facility] [place]----3.26.2022 where he had been transferred                                       due to bed unavailability in the [facility] system         Prior hospitalization [facility]---CHF exacerbation----3.17.2022---improved LVSF à dcd LIFE-VEST         CXR----[-]---3.28.2022         Elevated troponin---chronic-----3.28. .2022         EKG---3.28.2022---SR--80---1st degree AVB--LAD---anterior infarction---old           2D ECHO----3.17.2022---LA moderately dilated------mild reduced LVSF---AV--TAVR                                normal appearance pg 8 mm Hg  mg 3 mm Hg---normal AR---IVC not                               visualized---LVEF ~ 45%    Hypoxia----dyspnea----3.28.2022  Diabetes Mellitus Type 2---uncontrolled---3.28.2022---BG > 300+    ASCVD         Cardiac catheterization---12.27.2021---patent LAD--D---LCx stents          EKG----12. 3.2019----#2--NSR--73--RSR V1-2--RBBB---inferior infarction         EKG---12. 3.2019--NSR--83---RSR V1-2---RBBB--inferior infarction          2D ECHO---4.29.2019---moderately dilated LA--thickened IVS---inferolateral                         LV thin-akinetic--low normal LVSF--mildly dilated RA--RV dilatation--                        NRVSF--MAC--mild MR--AV leaflet calcification---moderate AS pg 61 mm Hg                         mg 33 mm Hg--trivial TR--RVSP ~ 43 mm Hg---mild pulmonary HTN---                        Grade II moderate DD---LVEF ~ 50%          Cardiac catheterization---8.23.2016---PTCA--DEVON stent CQ9-G5-man-LAD--0% LM--                         75% mid-LAD---99% D1--99% OM1---30% left circumflex  Valvular heart disease         Non-rheumatic MR         Non-rheumatic AS  Peripheral vascular disease         Carotid stenosis   TAVR---bioprosthetic---1.21.2020  RBBB  Hypertension  Hyperlipidemia   COPD  CKD--Stage 3a  Diabetes Mellitus Type 2---see above            Mild non-proliferative retinopathy           Diabetic proteinuric nephropathy  Tobacco abuse---quit---1.1.1970  HEARING IMPAIRMENT  PMH:  acne rosacea, anemia, BPH, ED, dentures,  RLL pneumonia--sepsis ruled out---2019,             COPD exacerbation due to pneumonia----12. 3.2019 Elevated troponin---12. 3.2019  PSH:   see above, cataract--IOL---OU, circumcision--age 51, PE tubes, vasectomy     Allergies: sulfa---swelling    Code Status:  FULL CODE  OARRS---3.28.2022--[-]      PLAN:    1. CHF---flash pulmonary edema-----IV diuretics--daily weight---IO---1800 fluid---Cardiology  2. DM2---no metformin---cont'd Glucotrol and add Lantus 10---log 3-3-3  3. ASCVD--awaiting home medications due to recent change at [facility]  4. Home medications reviewed---family to bring in new list  5.    See orders     Note that over 50 minutes was spent in evaluation of the patient, review of the chart and pertinent records, discussion with family/staff, etc    Radha Lassiter MD  4:59 PM  3/28/2022

## 2022-03-28 NOTE — TELEPHONE ENCOUNTER
[facility] Transitions Initial Follow Up Call    Outreach made within 2 business days of discharge: Yes    Patient: Oksana Glover Patient : 1945   MRN: [id number]  Reason for Admission: CHF, hypoxia  Discharge Date: 3/26/22   Spoke with: patient    Discharge department/facility: [facility] Interactive Patient Contact:  Was patient able to fill all prescriptions: Yes  Was patient instructed to bring all medications to the follow-up visit: Yes  Is patient taking all medications as directed in the discharge summary?  Yes  Does patient understand their discharge instructions: Yes  Does patient have questions or concerns that need addressed prior to 7-14 day follow up office visit: no    Scheduled appointment with PCP within 7-14 days    Follow Up  Future Appointments   Date Time Provider Julio Ratliff   3/29/2022 11:15 AM MD STEVE Manning [facility]   3/29/2022  1:30 PM ESTHER Chang CNP [facility]   2022  2:50 PM [facility]       Laila Finch LPN

## 2022-03-28 NOTE — FLOWSHEET NOTE
rounding on ED    Assessment: Patient is resting on bed with his wife present for support. He had at [facility] last week with no clear indications from tests. He continues to have shortness of breath which brought him to ED today. He presents as quiet bu anxious. Wife is calm but also concerned by not being able to define a problem and course of action. Intervention: Engaged in conversation.  provided a listening presence and prayed with them  Patient expressed appreciation for visit and offer of continued prayer. Plan: Chaplains are available on site or on call 24/7 for spiritual and emotional support. 03/28/22 1411   Encounter Summary   Services provided to: Patient and family together   Referral/Consult From: [address] [place] [address] Drive; Family members   Place of [facility] Visiting   (3/28/22)   Complexity of Encounter Moderate   Length of Encounter 15 minutes   Spiritual/Rastafari   Type Spiritual support   Assessment Approachable   Intervention Active listening;Prayer   Outcome Expressed gratitude;Engaged in conversation

## 2022-03-28 NOTE — TELEPHONE ENCOUNTER
Writer contacted Dr. Parker Huggins, ED provider to inform of 30 day readmission risk. No Decision on disposition at this time.

## 2022-03-28 NOTE — PROGRESS NOTES
Incentive Spirometry education and demonstration given by Respiratory Therapy. Pt achieving 1000 mL at time of instruction. Incentive Spirometer left at bedside and   Patient instructed to do a minimum of 10 breaths every hour.       Adele Metz RCP  4:51 PM

## 2022-03-29 ENCOUNTER — APPOINTMENT (OUTPATIENT)
Dept: GENERAL RADIOLOGY | Age: 77
DRG: 291 | End: 2022-03-29
Payer: MEDICARE

## 2022-03-29 LAB
ABSOLUTE EOS #: 1.04 K/UL (ref 0–0.44)
ABSOLUTE IMMATURE GRANULOCYTE: 0.12 K/UL (ref 0–0.3)
ABSOLUTE LYMPH #: 1.66 K/UL (ref 1.1–3.7)
ABSOLUTE MONO #: 1.22 K/UL (ref 0.1–1.2)
ANION GAP SERPL CALCULATED.3IONS-SCNC: 9 MMOL/L (ref 9–17)
BASOPHILS # BLD: 1 % (ref 0–2)
BASOPHILS ABSOLUTE: 0.12 K/UL (ref 0–0.2)
BUN BLDV-MCNC: 35 MG/DL (ref 8–23)
BUN/CREAT BLD: 25 (ref 9–20)
CALCIUM SERPL-MCNC: 9.6 MG/DL (ref 8.6–10.4)
CHLORIDE BLD-SCNC: 101 MMOL/L (ref 98–107)
CO2: 30 MMOL/L (ref 20–31)
CREAT SERPL-MCNC: 1.4 MG/DL (ref 0.7–1.2)
EKG ATRIAL RATE: 74 BPM
EKG P AXIS: 66 DEGREES
EKG P-R INTERVAL: 226 MS
EKG Q-T INTERVAL: 408 MS
EKG QRS DURATION: 100 MS
EKG QTC CALCULATION (BAZETT): 452 MS
EKG R AXIS: -37 DEGREES
EKG T AXIS: 85 DEGREES
EKG VENTRICULAR RATE: 74 BPM
EOSINOPHILS RELATIVE PERCENT: 7 % (ref 1–4)
GFR AFRICAN AMERICAN: 60 ML/MIN
GFR NON-AFRICAN AMERICAN: 49 ML/MIN
GFR SERPL CREATININE-BSD FRML MDRD: ABNORMAL ML/MIN/{1.73_M2}
GLUCOSE BLD-MCNC: 130 MG/DL (ref 70–99)
GLUCOSE BLD-MCNC: 151 MG/DL (ref 75–110)
GLUCOSE BLD-MCNC: 192 MG/DL (ref 75–110)
GLUCOSE BLD-MCNC: 244 MG/DL (ref 75–110)
GLUCOSE BLD-MCNC: 248 MG/DL (ref 75–110)
HCT VFR BLD CALC: 38.3 % (ref 40.7–50.3)
HEMOGLOBIN: 12.4 G/DL (ref 13–17)
IMMATURE GRANULOCYTES: 1 %
LYMPHOCYTES # BLD: 10 % (ref 24–43)
MCH RBC QN AUTO: 26.2 PG (ref 25.2–33.5)
MCHC RBC AUTO-ENTMCNC: 32.4 G/DL (ref 25.2–33.5)
MCV RBC AUTO: 80.8 FL (ref 82.6–102.9)
MONOCYTES # BLD: 8 % (ref 3–12)
NRBC AUTOMATED: 0 PER 100 WBC
PDW BLD-RTO: 14.2 % (ref 11.8–14.4)
PLATELET # BLD: 439 K/UL (ref 138–453)
PMV BLD AUTO: 9.7 FL (ref 8.1–13.5)
POTASSIUM SERPL-SCNC: 4.8 MMOL/L (ref 3.7–5.3)
RBC # BLD: 4.74 M/UL (ref 4.21–5.77)
RBC # BLD: ABNORMAL 10*6/UL
SEG NEUTROPHILS: 73 % (ref 36–65)
SEGMENTED NEUTROPHILS ABSOLUTE COUNT: 11.91 K/UL (ref 1.5–8.1)
SODIUM BLD-SCNC: 140 MMOL/L (ref 135–144)
TROPONIN, HIGH SENSITIVITY: 75 NG/L (ref 0–22)
WBC # BLD: 16.1 K/UL (ref 3.5–11.3)

## 2022-03-29 PROCEDURE — 6360000002 HC RX W HCPCS: Performed by: NURSE PRACTITIONER

## 2022-03-29 PROCEDURE — 6370000000 HC RX 637 (ALT 250 FOR IP): Performed by: INTERNAL MEDICINE

## 2022-03-29 PROCEDURE — 85025 COMPLETE CBC W/AUTO DIFF WBC: CPT

## 2022-03-29 PROCEDURE — 99232 SBSQ HOSP IP/OBS MODERATE 35: CPT | Performed by: INTERNAL MEDICINE

## 2022-03-29 PROCEDURE — 1200000000 HC SEMI PRIVATE

## 2022-03-29 PROCEDURE — 80048 BASIC METABOLIC PNL TOTAL CA: CPT

## 2022-03-29 PROCEDURE — 36415 COLL VENOUS BLD VENIPUNCTURE: CPT

## 2022-03-29 PROCEDURE — 6360000002 HC RX W HCPCS: Performed by: INTERNAL MEDICINE

## 2022-03-29 PROCEDURE — 2580000003 HC RX 258: Performed by: INTERNAL MEDICINE

## 2022-03-29 PROCEDURE — 82947 ASSAY GLUCOSE BLOOD QUANT: CPT

## 2022-03-29 PROCEDURE — 2700000000 HC OXYGEN THERAPY PER DAY

## 2022-03-29 PROCEDURE — 94760 N-INVAS EAR/PLS OXIMETRY 1: CPT

## 2022-03-29 PROCEDURE — 93005 ELECTROCARDIOGRAM TRACING: CPT | Performed by: INTERNAL MEDICINE

## 2022-03-29 PROCEDURE — 97161 PT EVAL LOW COMPLEX 20 MIN: CPT | Performed by: PHYSICAL THERAPIST

## 2022-03-29 PROCEDURE — 94640 AIRWAY INHALATION TREATMENT: CPT

## 2022-03-29 PROCEDURE — 71045 X-RAY EXAM CHEST 1 VIEW: CPT

## 2022-03-29 PROCEDURE — 84484 ASSAY OF TROPONIN QUANT: CPT

## 2022-03-29 PROCEDURE — 97165 OT EVAL LOW COMPLEX 30 MIN: CPT | Performed by: OCCUPATIONAL THERAPIST

## 2022-03-29 RX ORDER — FUROSEMIDE 40 MG/1
40 TABLET ORAL DAILY
Status: DISCONTINUED | OUTPATIENT
Start: 2022-03-29 | End: 2022-03-30 | Stop reason: HOSPADM

## 2022-03-29 RX ADMIN — INSULIN LISPRO 4 UNITS: 100 INJECTION, SOLUTION INTRAVENOUS; SUBCUTANEOUS at 11:53

## 2022-03-29 RX ADMIN — INSULIN GLARGINE 10 UNITS: 100 INJECTION, SOLUTION SUBCUTANEOUS at 17:29

## 2022-03-29 RX ADMIN — CARVEDILOL 6.25 MG: 3.12 TABLET, FILM COATED ORAL at 17:28

## 2022-03-29 RX ADMIN — SODIUM CHLORIDE, PRESERVATIVE FREE 10 ML: 5 INJECTION INTRAVENOUS at 08:01

## 2022-03-29 RX ADMIN — LOSARTAN POTASSIUM 25 MG: 25 TABLET, FILM COATED ORAL at 10:57

## 2022-03-29 RX ADMIN — INSULIN LISPRO 3 UNITS: 100 INJECTION, SOLUTION INTRAVENOUS; SUBCUTANEOUS at 17:29

## 2022-03-29 RX ADMIN — INSULIN LISPRO 2 UNITS: 100 INJECTION, SOLUTION INTRAVENOUS; SUBCUTANEOUS at 20:33

## 2022-03-29 RX ADMIN — CLOPIDOGREL BISULFATE 75 MG: 75 TABLET ORAL at 10:53

## 2022-03-29 RX ADMIN — SODIUM CHLORIDE, PRESERVATIVE FREE 10 ML: 5 INJECTION INTRAVENOUS at 20:40

## 2022-03-29 RX ADMIN — IPRATROPIUM BROMIDE AND ALBUTEROL SULFATE 1 AMPULE: .5; 3 SOLUTION RESPIRATORY (INHALATION) at 12:44

## 2022-03-29 RX ADMIN — ENOXAPARIN SODIUM 40 MG: 40 INJECTION SUBCUTANEOUS at 08:01

## 2022-03-29 RX ADMIN — IPRATROPIUM BROMIDE AND ALBUTEROL SULFATE 1 AMPULE: .5; 3 SOLUTION RESPIRATORY (INHALATION) at 08:43

## 2022-03-29 RX ADMIN — CARVEDILOL 6.25 MG: 3.12 TABLET, FILM COATED ORAL at 10:52

## 2022-03-29 RX ADMIN — INSULIN LISPRO 3 UNITS: 100 INJECTION, SOLUTION INTRAVENOUS; SUBCUTANEOUS at 11:53

## 2022-03-29 RX ADMIN — FUROSEMIDE 40 MG: 40 TABLET ORAL at 10:54

## 2022-03-29 RX ADMIN — CHOLECALCIFEROL TAB 25 MCG (1000 UNIT) 1000 UNITS: 25 TAB at 10:58

## 2022-03-29 RX ADMIN — ASPIRIN 81 MG 81 MG: 81 TABLET ORAL at 10:51

## 2022-03-29 RX ADMIN — INSULIN LISPRO 2 UNITS: 100 INJECTION, SOLUTION INTRAVENOUS; SUBCUTANEOUS at 17:30

## 2022-03-29 RX ADMIN — IPRATROPIUM BROMIDE AND ALBUTEROL SULFATE 1 AMPULE: .5; 3 SOLUTION RESPIRATORY (INHALATION) at 16:19

## 2022-03-29 RX ADMIN — IPRATROPIUM BROMIDE AND ALBUTEROL SULFATE 1 AMPULE: .5; 3 SOLUTION RESPIRATORY (INHALATION) at 20:13

## 2022-03-29 RX ADMIN — GLIPIZIDE 10 MG: 5 TABLET ORAL at 10:55

## 2022-03-29 RX ADMIN — FUROSEMIDE 40 MG: 10 INJECTION, SOLUTION INTRAMUSCULAR; INTRAVENOUS at 08:01

## 2022-03-29 ASSESSMENT — PAIN SCALES - GENERAL
PAINLEVEL_OUTOF10: 0

## 2022-03-29 NOTE — PROGRESS NOTES
Occupational Therapy   Occupational Therapy Initial Assessment  Date: 3/29/2022   Patient Name: Camille Goodell  MRN: [id number]     : 1945    Date of Service: 3/29/2022    Discharge Recommendations:  Home with assist PRN       Assessment   Prognosis: Good  Decision Making: Low Complexity  No Skilled OT: Independent with ADL's  REQUIRES OT FOLLOW UP: No  Safety Devices  Safety Devices in place: Yes  Type of devices: Left in chair;Call light within reach           Patient Diagnosis(es): The encounter diagnosis was Shortness of breath. has a past medical history of Acne rosacea, Anemia, Aortic stenosis, BPH (benign prostatic hypertrophy), CAD (coronary artery disease), Carotid stenosis, Cataract of both eyes, CRI (chronic renal insufficiency), Diabetes mellitus, type 2 ([address] Dyslipidemia, Erectile dysfunction, H/O agent Orange exposure, Hearing loss, Hyperlipidemia, Hypertension, Mild nonproliferative diabetic retinopathy ([address] Non-rheumatic mitral regurgitation, Pneumonia, Pneumonia of right lower lobe due to infectious organism, RBBB, Sepsis ([address] 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 has a past surgical history that includes Circumcision (age 49); Vasectomy; Tympanostomy tube placement; Cataract removal (Bilateral); eye surgery (Bilateral); Cardiac catheterization (2019); Aortic valve surgery (2020); and Cardiac catheterization (2021).          Subjective   General  Chart Reviewed: Yes  Patient assessed for rehabilitation services?: Yes  Family / Caregiver Present: Yes  Referring Practitioner: MATEO Palacios  Diagnosis: CHF  Subjective  Subjective: Patient rec'd in chair, pleasant and cooperative 77 yr old male with SOB  Patient Currently in Pain: No  Vital Signs  Temp: 98.1 °F (36.7 °C)  Temp Source: Tympanic  Pulse: 71  Heart Rate Source: Monitor  Resp: 21  BP: (!) 104/56  BP Location: Left upper arm  MAP (mmHg): 69  Patient Position: Up in chair  Patient Currently in Pain: No  Oxygen Therapy  SpO2: 96 %  O2 Device: Nasal cannula  O2 Flow Rate (L/min): 2 L/min     Social/Functional History  Social/Functional History  Lives With: Spouse  Type of Home: House  Home Layout: One level  Home Access: Stairs to enter with rails  Entrance Stairs - Number of Steps: 2  Bathroom Shower/Tub: Walk-in shower  Bathroom Toilet: Handicap height  ADL Assistance: Independent  Homemaking Assistance: Independent  Homemaking Responsibilities: Yes  Meal Prep Responsibility: Secondary  Laundry Responsibility: Secondary  Cleaning Responsibility: Secondary  Shopping Responsibility: Secondary  Ambulation Assistance: Independent  Transfer Assistance: Independent  Active : Yes  Occupation: Retired       Objective   Vision: Within Functional Limits  Hearing: Within functional limits    Orientation  Overall Orientation Status: Within Normal Limits        ADL  LE Dressing: Independent  Toileting: Independent           Transfers  Sit to stand: Independent  Stand to sit:  Independent     Cognition  Overall Cognitive Status: WNL    LUE AROM (degrees)  LUE AROM : WFL  Left Hand AROM (degrees)  Left Hand AROM: WFL  RUE AROM (degrees)  RUE AROM : WFL  Right Hand AROM (degrees)  Right Hand AROM: WFL  LUE Strength  Gross LUE Strength: WFL  RUE Strength  Gross RUE Strength: WFL          Plan   Plan  Times per week: [place] care OT not required      Goals  Short term goals  Time Frame for Short term goals: eval only       Therapy Time   Individual Concurrent Group Co-treatment   Time In 1510         Time Out 1520         Minutes 10         Timed Code Treatment Minutes: 0 Minutes       ERAN BAPTISTE OTR, OT

## 2022-03-29 NOTE — PROGRESS NOTES
Hospitalist Progress Note    Patient:  Osman Garcia     YOB: 1945    MRN: [id number]   Admit date: 3/28/2022     Acct: [de-identified]     PCP: ESTHER Hackett CNP    CC--Interval History:   CHF---acute-on-chronic systolic---flash pulmonary edema---improved with diuretics----still with crackles---hypoxia----tires with minimal exertion    Chronically elevated troponin---flat peak pattern---[id number]    Diabetes Mellitus Type 2---above goal---insulin adjusted    ASCVD--TAVR    HTN---BP = 120/76    CKD----Stage 3a    See note below        All other ROS negative except noted in HPI    Diet:  Diet NPO    Medications:  Scheduled Meds:   furosemide  40 mg Oral Daily    sodium chloride flush  10 mL IntraVENous 2 times per day    enoxaparin  40 mg SubCUTAneous Daily    insulin lispro  0-12 Units SubCUTAneous TID WC    insulin lispro  0-6 Units SubCUTAneous Nightly    insulin lispro  3 Units SubCUTAneous TID WC    insulin glargine  10 Units SubCUTAneous Dinner    ipratropium-albuterol  1 ampule Inhalation 4x daily    glipiZIDE  10 mg Oral Daily    carvedilol  6.25 mg Oral BID WC    losartan  25 mg Oral Daily    aspirin  81 mg Oral Daily    Vitamin D  1,000 Units Oral Daily    clopidogrel  75 mg Oral Daily     Continuous Infusions:   sodium chloride      dextrose       PRN Meds:sodium chloride flush, sodium chloride, acetaminophen, albuterol, ondansetron, glucose, glucagon (rDNA), dextrose, dextrose bolus (hypoglycemia) **OR** dextrose bolus (hypoglycemia)    Objective:  Labs:  CBC with Differential:    Lab Results   Component Value Date    WBC 16.1 03/29/2022    RBC 4.74 03/29/2022    HGB 12.4 03/29/2022    HCT 38.3 03/29/2022     03/29/2022    MCV 80.8 03/29/2022    MCH 26.2 03/29/2022    MCHC 32.4 03/29/2022    RDW 14.2 03/29/2022    LYMPHOPCT 10 03/29/2022    MONOPCT 8 03/29/2022    BASOPCT 1 03/29/2022    MONOSABS 1.22 03/29/2022    LYMPHSABS 1.66 03/29/2022    EOSABS 1.04 03/29/2022    BASOSABS 0.12 03/29/2022    DIFFTYPE NOT REPORTED 01/06/2022     BMP:    Lab Results   Component Value Date     03/29/2022    K 4.8 03/29/2022     03/29/2022    CO2 30 03/29/2022    BUN 35 03/29/2022    LABALBU 3.8 03/28/2022    CREATININE 1.40 03/29/2022    CALCIUM 9.6 03/29/2022    GFRAA 60 03/29/2022    LABGLOM 49 03/29/2022    GLUCOSE 130 03/29/2022           Physical Exam:  Vitals: /76   Pulse 74   Temp 97.6 °F (36.4 °C) (Tympanic)   Resp 17   Ht 5' 6\" (1.676 m)   Wt 144 lb 12.8 oz (65.7 kg)   SpO2 92%   BMI 23.37 kg/m²   24 hour intake/output:    Intake/Output Summary (Last 24 hours) at 3/29/2022 1042  Last data filed at 3/28/2022 1817  Gross per 24 hour   Intake 240 ml   Output 760 ml   Net -520 ml     Last 3 weights: Wt Readings from Last 3 Encounters:   03/29/22 144 lb 12.8 oz (65.7 kg)   03/21/22 156 lb (70.8 kg)   03/17/22 156 lb 1.6 oz (70.8 kg)     HEENT: O2 NC--, Normocephalic and Atraumatic  Neck: Supple, No Masses, Tenderness, Nodularity and No Lymphadenopathy  Chest/Lungs: crackles---, Prolonged Expiratory Phase and Distant Breath Sounds  Cardiac: Regular Rate and Rhythm  GI/Abdomen: Bowel Sounds Present and Soft, Non-tender, without Guarding or Rebound Tenderness  : Not examined  EXT/Skin: No Edema, No Cyanosis and No Clubbing  Neuro: alert----ahrd of hearing---generalzied weakness---- and No Localizing Signs/Symptoms      Assessment:    Principal Problem:    CHF (congestive heart failure), NYHA class I, acute on chronic, combined (HCC)  Active Problems:    Shortness of breath  Resolved Problems:    * No resolved hospital problems.  *    SHIRLEY CERVANTES NP--IM; [facility]---[facility]]  FULL CODE    ELIQUIS  SUPPLEMENTAL OXYGEN   TAVR---2020   AGENT ORANGE exposure     Anti-infectives:      CHF--acute-on-chronic systolic----flash pulmonary edema----recurrent----3.28.2022         Discharged from [facility] [place]----3.26.2022 where he had been transferred                                       due to bed unavailability in the [facility] system         Prior hospitalization [facility]---CHF exacerbation----3.17.2022---improved LVSF à dcd LIFE-VEST         CXR----[-]---3.28.2022         Elevated troponin---chronic-----3.28. .2022         EKG---3.28.2022---SR--80---1st degree AVB--LAD---anterior infarction---old           2D ECHO----3.17.2022---LA moderately dilated------mild reduced LVSF---AV--TAVR                                normal appearance pg 8 mm Hg  mg 3 mm Hg---normal AR---IVC not                               visualized---LVEF ~ 45%    Hypoxia----dyspnea----3.28.2022  Diabetes Mellitus Type 2---uncontrolled---3.28.2022---BG > 300+    ASCVD         Cardiac catheterization---12.27.2021---patent LAD--D---LCx stents          EKG----12. 3.2019----#2--NSR--73--RSR V1-2--RBBB---inferior infarction         EKG---12. 3.2019--NSR--83---RSR V1-2---RBBB--inferior infarction          2D ECHO---4.29.2019---moderately dilated LA--thickened IVS---inferolateral                         LV thin-akinetic--low normal LVSF--mildly dilated RA--RV dilatation--                        NRVSF--MAC--mild MR--AV leaflet calcification---moderate AS pg 61 mm Hg                         mg 33 mm Hg--trivial TR--RVSP ~ 43 mm Hg---mild pulmonary HTN---                        Grade II moderate DD---LVEF ~ 50%          Cardiac catheterization---8.23.2016---PTCA--DEVON stent ET9-T5-lbp-LAD--0% LM--                         75% mid-LAD---99% D1--99% OM1---30% left circumflex  Valvular heart disease         Non-rheumatic MR         Non-rheumatic AS  Peripheral vascular disease         Carotid stenosis   TAVR---bioprosthetic---1.21.2020  RBBB  Hypertension  Hyperlipidemia   COPD  CKD--Stage 3a  Diabetes Mellitus Type 2---see above            Mild non-proliferative retinopathy           Diabetic proteinuric nephropathy  Tobacco abuse---quit---1.1.1970  HEARING IMPAIRMENT  PMH:  acne rosacea, anemia, BPH, ED, dentures,  RLL pneumonia--sepsis ruled out---2019,             COPD exacerbation due to pneumonia----12. 3.2019 Elevated troponin---12. 3.2019  PSH:   see above, cataract--IOL---OU, circumcision--age 51, PE tubes, vasectomy     Allergies: sulfa---swelling              Plan:  1. CHF----cont'd diuretics---converting to PO---daily weight---IO--fluid restriction  2. Seen by Cardiology  3. DM2--adjust insulin  4.    See orders     Electronically signed by Bhargav Briggs on 3/29/2022 at 10:42 AM    Hospitalist

## 2022-03-29 NOTE — PROGRESS NOTES
DISCHARGE BARRIERS       Reason for Referral:  SW completed a Psychosocial Assessment for evaluation of patient's mental health, social status, and functional capacity within the community. Sd Santana is a 77 y.o. male admitted due to CHF. Patient was accompanied by his spouse. Mental Status:  Alert, oriented, and engaging during assessment. Decision Making:  Makes own decisions. Family/Social/Home Environment: Lives with spouse in [address]: Discussed a good social support network     Current Services: [employer]      Current DMEs: Walker and shower chair     PCP: ESTHER Shoemaker CNP and repots no issues affording medication.  status:  None     ADLs and means of transportation: Independent in ADLs prior to hospitalization and able to transport himself. Food insecurity or needed financial assistance: Denies any food insecurity or financial concerns at this time. ACP and Code Status:  Sd Santana is a Full Code status and does not have an Advance Directive. Collaborative List of SNF/ECF/HH were provided: Declined patient states he will need home 02. Patient chose [facility]. No discharge order at this time. Anticipated Needs/Discharge Plan:  Spoke with patient/family/representative about discharge plan. Patient/Family/Representative verbalizes understanding of the plan of care and denies discharge needs or further services at this time. SW provided business card. SW will continue to monitor needs and assist as appropriate.           Electronically signed by TYRESE Forrester on 3/29/2022 at 11:52 AM

## 2022-03-29 NOTE — PROGRESS NOTES
Physical Therapy    Facility/Department: [facility]  PROGRESSIVE CARE  Initial Assessment    NAME: Camille Goodell  : 1945  MRN: [id number]    Date of Service: 3/29/2022    Discharge Recommendations:  Home with assist PRN        Assessment   Prognosis: Good  Decision Making: Low Complexity  No Skilled PT: Independent with functional mobility   REQUIRES PT FOLLOW UP: No  Activity Tolerance  Activity Tolerance: Patient Tolerated treatment well       Patient Diagnosis(es): The encounter diagnosis was Shortness of breath. has a past medical history of Acne rosacea, Anemia, Aortic stenosis, BPH (benign prostatic hypertrophy), CAD (coronary artery disease), Carotid stenosis, Cataract of both eyes, CRI (chronic renal insufficiency), Diabetes mellitus, type 2 ([address] Dyslipidemia, Erectile dysfunction, H/O agent Orange exposure, Hearing loss, Hyperlipidemia, Hypertension, Mild nonproliferative diabetic retinopathy ([address] Non-rheumatic mitral regurgitation, Pneumonia, Pneumonia of right lower lobe due to infectious organism, RBBB, Sepsis ([address] 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 has a past surgical history that includes Circumcision (age 49); Vasectomy; Tympanostomy tube placement; Cataract removal (Bilateral); eye surgery (Bilateral); Cardiac catheterization (2019); Aortic valve surgery (2020); and Cardiac catheterization (2021).     Restrictions     Vision/Hearing        Subjective  General  Chart Reviewed: Yes  Patient assessed for rehabilitation services?: Yes  Response To Previous Treatment: Not applicable  Family / Caregiver Present: No  Follows Commands: Within Functional Limits  Pain Screening  Patient Currently in Pain: No  Vital Signs  Patient Currently in Pain: No       Orientation  Orientation  Overall Orientation Status: Within Normal Limits  Social/Functional History  Social/Functional History  Type of Home: House  Home Layout: One level  ADL Assistance: Independent  Homemaking Assistance: Independent  Ambulation Assistance: Independent  Transfer Assistance: Independent  Active : Yes  Cognition        Objective     Observation/Palpation  Observation: In bed, on O2, no distress       Strength RLE  Strength RLE: WNL  Strength LLE  Strength LLE: WNL        Bed mobility  Rolling to Left: Independent  Rolling to Right: Independent  Supine to Sit: Independent  Sit to Supine: Independent  Scooting: Independent  Transfers  Sit to Stand: Independent  Stand to sit:  Independent  Ambulation  Ambulation?: Yes  Ambulation 1  Surface: level tile  Device: No Device  Assistance: Independent  Quality of Gait: Stable     Balance  Sitting - Static: Good  Sitting - Dynamic: Good  Standing - Static: Good  Standing - Dynamic: Good        Plan   Safety Devices  Type of devices: Left in bed,Call light within reach    G-Code       OutComes Score                                                  AM-PAC Score             Goals  Short term goals  Time Frame for Short term goals: 1 day  Short term goal 1: Assess functional status       Therapy Time   Individual Concurrent Group Co-treatment   Time In 0810         Time Out 0822         Minutes 12                 Ender Reeves PT

## 2022-03-29 NOTE — PLAN OF CARE
Problem: OXYGENATION/RESPIRATORY FUNCTION  Goal: Patient will maintain patent airway  3/29/2022 0028 by Rodrick Lobato RN  Outcome: Ongoing  3/28/2022 1700 by Carlos Muniz RN  Outcome: Ongoing  Goal: Patient will achieve/maintain normal respiratory rate/effort  Description: Respiratory rate and effort will be within normal limits for the patient  3/29/2022 0028 by Rodrick Lobato RN  Outcome: Ongoing  3/28/2022 1700 by Carlos Muniz RN  Outcome: Ongoing     Problem: HEMODYNAMIC STATUS  Goal: Patient has stable vital signs and fluid balance  3/29/2022 0028 by Rodrick Lobato RN  Outcome: Ongoing  3/28/2022 1700 by Carlos Muniz RN  Outcome: Ongoing     Problem: FLUID AND ELECTROLYTE IMBALANCE  Goal: Fluid and electrolyte balance are achieved/maintained  3/29/2022 0028 by Rodrick Lobato RN  Outcome: Ongoing  3/28/2022 1700 by Carlos Muniz RN  Outcome: Ongoing     Problem: ACTIVITY INTOLERANCE/IMPAIRED MOBILITY  Goal: Mobility/activity is maintained at optimum level for patient  3/29/2022 0028 by Rodrick Lobato RN  Outcome: Ongoing  3/28/2022 1700 by Carlos Muniz RN  Outcome: Ongoing

## 2022-03-29 NOTE — CARE COORDINATION
[address] (425 sepsis)     3/23/2022     Patient: Brendan Castro        Patient : 1945   MRN: [id number]             Reason for Admission  -  : Garald Furl A, coronary stents patent, CHF, LVD - EF 25-30%, Life Vest  Admission 3/17: Incarcerated umbilical hernia, CHF with decreased oxygen sat - improved with 20mg IV lasix in ER & discharged on same home dose of 20mg daily    Noted patient was in [facility] again 3/21/22 for CHF/oxygen sat 40% improved with BiPap - I spoke with spouse 3 days earlier on 3/18 & was informed patient was out running around - happy that Life Vest was discontinued & he had PCP appt scheduled for 3/24. I contacted [facility] on 3/23 as ED notes indicated patient was transferred - confirmed patient was transferred to [facility].    3/28/22 - I contacted [facility] to check if patient was still admitted as BPCI episode ends 3/28/22. I was informed that patient is no longer admitted to that hospital.    3/28/22  Noted that PCP office transition call was completed in AM & patient was back in ED in afternoon. BPCI episode resolved on 3/29/22 - noted patient is still currently admitted to [facility] for CHF treatment. Care Transition will follow patient upon DC - MSSP status.

## 2022-03-29 NOTE — FLOWSHEET NOTE
rounding in PCU. Assessment: Patient doing well today. Patient did express being weary of ongoing issue. Spoke with both patient and wife. Intervention: Engaged in conversation and active listening. Prayed with Patient. Outcome: Patient expressed appreciation for visit and offer of continued prayer. Plan: Chaplains are available on site or on call 24/7 for spiritual and emotional support.      03/29/22 1429   Encounter Summary   Services provided to: Patient   Referral/Consult From: [facility]   (3-29-22)   Complexity of Encounter Low   Length of Encounter 15 minutes   Routine   Type Follow up   Assessment Approachable;Calm   Intervention Active listening;Prayer   Outcome Engaged in conversation;Expressed gratitude   Spiritual/Congregation   Type Spiritual support     Electronically signed by Debbie Michel on 3/29/2022 at 2:31 PM

## 2022-03-29 NOTE — CONSULTS
[facility]  CONSULT NOTE                  Date:   3/29/2022  Patient name: Chelsey Gilbert  Date of admission:  3/28/2022 11:15 AM  MRN:   [id number]  YOB: 1945    Reason for Admission: CHF    CHIEF COMPLAINT:   Shortness of breath    History Obtained From:  Patient and chart review     HISTORY OF PRESENT ILLNESS:      81-year-old male with known underlying CAD, ischemic cardiomyopathy with recent LVEF 35% and history of TAVR, recently discharged from hospital after CHF exacerbation 1 week ago, return to ER with sudden onset of shortness of breath after walking to mailbox. The symptoms resolved after arriving to the ER. Denies any chest pain or angina. Chest x-ray did not show any pulmonary edema. Received IV Lasix last night with improvement in symptoms. This morning feeling much better. No fever, productive cough or pleuritic chest pain. No significant lower extremity edema. Denies any palpitations or dizziness. Has been compliant with his medications. Past Medical History:   has a past medical history of Acne rosacea, Anemia, Aortic stenosis, BPH (benign prostatic hypertrophy), CAD (coronary artery disease), Carotid stenosis, Cataract of both eyes, CRI (chronic renal insufficiency), Diabetes mellitus, type 2 ([address] Dyslipidemia, Erectile dysfunction, H/O agent Orange exposure, Hearing loss, Hyperlipidemia, Hypertension, Mild nonproliferative diabetic retinopathy ([address] Non-rheumatic mitral regurgitation, Pneumonia, Pneumonia of right lower lobe due to infectious organism, RBBB, Sepsis ([address] 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 Past Surgical History:   has a past surgical history that includes Circumcision (age 49); Vasectomy; Tympanostomy tube placement; Cataract removal (Bilateral); eye surgery (Bilateral); Cardiac catheterization (12/03/2019);  Aortic valve surgery (01/2020); and Cardiac catheterization (12/27/2021). Home Medications:    Prior to Admission medications    Medication Sig Start Date End Date Taking?  Authorizing Provider   aspirin 81 MG chewable tablet Take 81 mg by mouth daily   Yes Historical Provider, MD   carvedilol (COREG) 6.25 MG tablet TAKE 1 TABLET BY MOUTH TWO TIMES A DAY WITH breakfast and dinner MEALS 3/26/22   Historical Provider, MD   losartan (COZAAR) 25 MG tablet TAKE 1 TABLET BY MOUTH EVERY DAY 3/26/22   Historical Provider, MD   furosemide (LASIX) 20 MG tablet Take 1 tablet by mouth daily  Patient taking differently: Take 40 mg by mouth daily  2/21/22   ESTHER Shoemaker CNP   metoprolol succinate (TOPROL XL) 25 MG extended release tablet Take 1 tablet by mouth daily  Patient not taking: Reported on 3/28/2022 1/26/22   Javier Gant MD   spironolactone (ALDACTONE) 25 MG tablet Take 0.5 tablets by mouth daily  Patient not taking: Reported on 3/28/2022 1/26/22   Javier Gant MD   lisinopril (PRINIVIL;ZESTRIL) 5 MG tablet Take 1 tablet by mouth daily  Patient not taking: Reported on 3/28/2022 1/26/22   Javier Gant MD   Handicap [employer] [address] [facility] by Does not apply route Duration 2 years 6/2/20   ESTHER Shoemaker CNP   amLODIPine (NORVASC) 10 MG tablet Take 1 tablet by mouth daily  Patient not taking: Reported on 3/28/2022 1/23/20   Deborah Crews MD   clopidogrel (PLAVIX) 75 MG tablet Take 1 tablet by mouth daily Take 1 daily 12/6/19   ESTHER Huffman CNP   simvastatin (ZOCOR) 40 MG tablet Take 10 mg by mouth nightly     Historical Provider, MD   glipiZIDE (GLUCOTROL) 10 MG tablet Take 10 mg by mouth daily     Historical Provider, MD   vitamin D (CHOLECALCIFEROL) 1000 UNIT TABS tablet Take 1,000 Units by mouth 2 times daily     Historical Provider, MD   metFORMIN (GLUCOPHAGE) 1000 MG tablet Take 1 tablet by mouth 2 times daily (with meals) 8/25/16   ESTHER Sanchez CNP       Allergies:  Atorvastatin, Sulfa antibiotics, and Sulfanilamide    Social History:   reports that he quit smoking about 52 years ago. He has a 1.00 pack-year smoking history. He has never used smokeless tobacco. He reports current alcohol use. He reports that he does not use drugs. Family History:   Negative for early CAD    REVIEW OF SYSTEMS:    · Constitutional: there has been no unanticipated weight loss. No change in functional capacity. · Eyes: No visual changes or diplopia. · ENT: No Headaches, hearing loss or vertigo. No mouth sores or sore throat. · Cardiovascular: No chest pain, positive for dyspnea on exertion, negative for orthopnea or lower extremity  · Respiratory: No hx of productive cough, pleuritic chest pain   · Gastrointestinal: No abdominal pain, appetite loss, blood in stools. No change in bowel habits. · Genitourinary: No dysuria, trouble voiding, or hematuria. · Musculoskeletal:  No gait disturbance, No weakness or joint complaints. · Integumentary: No rash or pruritis. · Neurological: No headache, weakness, numbness or tingling. No change in gait, balance, coordination. · Psychiatric: No anxiety, or depression. · Endocrine: No temperature intolerance. No excessive thirst, fluid intake, or urination. No tremor. · Hematologic/Lymphatic: No abnormal bruising or bleeding, blood clots or swollen lymph nodes. · Allergic/Immunologic: No nasal congestion or hives. PHYSICAL EXAM:    Physical Examination:    /76   Pulse 74   Temp 97.6 °F (36.4 °C) (Tympanic)   Resp 17   Ht 5' 6\" (1.676 m)   Wt 144 lb 12.8 oz (65.7 kg)   SpO2 99%   BMI 23.37 kg/m²    Constitutional and General Appearance: alert, cooperative, in no distress on 2 L nasal cannula oxygen  HEENT: PERRL, no cervical lymphadenopathy. Normal oral mucosa  Respiratory:  · Normal excursion and expansion without use of accessory muscles  · Resp Auscultation: Good respiratory effort. No for increased work of breathing.  On auscultation: clear to auscultation bilaterally, minimal bibasilar rales  Cardiovascular:  · The apical impulse is not displaced  · Heart tones are crisp and normal. regular S1 and S2. Murmurs: None   · Jugular venous pulsation Normal  · Thre is no carotid bruit   · Peripheral pulses are symmetrical and full   Abdomen:  · No masses or tenderness  · Bowel sounds present  Extremities:  ·  No Cyanosis or Clubbing  ·  Lower extremity edema: No edema   ·  Skin: Warm and dry  Neurological:  · Not done     DATA:    Diagnostics:      EKG:  SR, pac, left anterior fascicle block      TTE 3/17/2022  Normal left ventricular diameter. Left ventricular systolic function is moderately reduced. Estimated Left  ventricular ejection fraction 35-40%. Inferior and inferolateral wall hypokinesis  Left atrium is moderately dilated. Bioprosthetic aortic valve (TAVR) that is normal in appearance and function. Peak instantaneous gradient 8 mmHg and mean gradient 3 mmHg. Mild mitral regurgitation. No pericardial effusion. Echo 12/21:   Dilated left ventricle with severely reduced systolic function. Estimated EF 25-30%  Severe hypokinesis of inferior, inferolateral and inferoseptal walls. Grade I (mild) left ventricular diastolic dysfunction. Normal right ventricular size and function. Bioprosthetic aortic valve (TAVR) is noted in position, functioning normally  with no significant stenosis/regurgitation or perivalvular leak (Mean  gradient 5 mm hg)  Mild mitral regurgitation. Mild tricuspid regurgitation. Estimated right ventricular systolic pressure is 41 mmHg.   No significant pericardial effusion is seen.         Cath 12/21:  Conclusions:  Patent LAD, D, and LCX stents       Labs:     CBC:   Recent Labs     03/28/22  1146 03/29/22  0424   WBC 21.3* 16.1*   HGB 12.8* 12.4*   HCT 39.4* 38.3*   * 439     BMP:   Recent Labs     03/28/22  1146 03/29/22  0424   * 140   K 5.1 4.8   CO2 25 30   BUN 36* 35*   CREATININE 1.37* 1.40*   LABGLOM 51* 49* GLUCOSE 377* 130*     BNP: No results for input(s): BNP in the last 72 hours. PT/INR: No results for input(s): PROTIME, INR in the last 72 hours. APTT:No results for input(s): APTT in the last 72 hours. CARDIAC ENZYMES:No results for input(s): CKTOTAL, CKMB, CKMBINDEX, TROPONINI in the last 72 hours. FASTING LIPID PANEL:  Lab Results   Component Value Date    HDL 35 03/17/2022    TRIG 112 03/17/2022     LIVER PROFILE:  Recent Labs     03/28/22  1146   AST 17   ALT 15   LABALBU 3.8         IMPRESSION:       1. Acute on chronic HFrEF, resolving, no significant volume overload on exam today, CXR with no acute process, ok to switch IV lasix to PO 40 mg qd. Resume Coreg, losartan and Aldactone. Start Jolanta Juliano as OP. BMP in one week. CHF clinic referral.      2. Non ischemic CM with drop in LVEF to 25-30% in 12/21. LHC showed patent stents. Repeat limited echocardiogram in 03/2022 showed estimated LVEF 35%. Plan for MUGA scan as outpatient. For accurate assessment.      3. CAD with coronary angiography in 12/21, patent stents in LAD/Diag and mid LCX, as above, no angina, maintained on plavix, statin and BB      4. Severe aortic stenosis s/p TAVR on 1/21/2020 - normal function Echo 03/2022    5. Carotid artery disease. Follows with vascular surgery. Discussed with patient and nursing.           Janet Daniel MD, [address] [facility]

## 2022-03-30 ENCOUNTER — TELEPHONE (OUTPATIENT)
Dept: INTERNAL MEDICINE | Age: 77
End: 2022-03-30

## 2022-03-30 ENCOUNTER — APPOINTMENT (OUTPATIENT)
Dept: GENERAL RADIOLOGY | Age: 77
DRG: 291 | End: 2022-03-30
Payer: MEDICARE

## 2022-03-30 VITALS
OXYGEN SATURATION: 100 % | RESPIRATION RATE: 21 BRPM | HEIGHT: 66 IN | BODY MASS INDEX: 23.27 KG/M2 | TEMPERATURE: 98.4 F | DIASTOLIC BLOOD PRESSURE: 69 MMHG | HEART RATE: 60 BPM | SYSTOLIC BLOOD PRESSURE: 117 MMHG | WEIGHT: 144.8 LBS

## 2022-03-30 LAB
ABSOLUTE EOS #: 1.21 K/UL (ref 0–0.44)
ABSOLUTE IMMATURE GRANULOCYTE: 0.11 K/UL (ref 0–0.3)
ABSOLUTE LYMPH #: 1.52 K/UL (ref 1.1–3.7)
ABSOLUTE MONO #: 1.02 K/UL (ref 0.1–1.2)
ANION GAP SERPL CALCULATED.3IONS-SCNC: 10 MMOL/L (ref 9–17)
BASOPHILS # BLD: 1 % (ref 0–2)
BASOPHILS ABSOLUTE: 0.13 K/UL (ref 0–0.2)
BUN BLDV-MCNC: 36 MG/DL (ref 8–23)
BUN/CREAT BLD: 28 (ref 9–20)
CALCIUM SERPL-MCNC: 9.3 MG/DL (ref 8.6–10.4)
CHLORIDE BLD-SCNC: 99 MMOL/L (ref 98–107)
CO2: 28 MMOL/L (ref 20–31)
CREAT SERPL-MCNC: 1.29 MG/DL (ref 0.7–1.2)
EOSINOPHILS RELATIVE PERCENT: 9 % (ref 1–4)
GFR AFRICAN AMERICAN: >60 ML/MIN
GFR NON-AFRICAN AMERICAN: 54 ML/MIN
GFR SERPL CREATININE-BSD FRML MDRD: ABNORMAL ML/MIN/{1.73_M2}
GLUCOSE BLD-MCNC: 145 MG/DL (ref 70–99)
GLUCOSE BLD-MCNC: 204 MG/DL (ref 75–110)
HCT VFR BLD CALC: 37 % (ref 40.7–50.3)
HEMOGLOBIN: 12.2 G/DL (ref 13–17)
IMMATURE GRANULOCYTES: 1 %
LYMPHOCYTES # BLD: 12 % (ref 24–43)
MCH RBC QN AUTO: 26.4 PG (ref 25.2–33.5)
MCHC RBC AUTO-ENTMCNC: 33 G/DL (ref 25.2–33.5)
MCV RBC AUTO: 80.1 FL (ref 82.6–102.9)
MONOCYTES # BLD: 8 % (ref 3–12)
NRBC AUTOMATED: 0 PER 100 WBC
PDW BLD-RTO: 14.4 % (ref 11.8–14.4)
PLATELET # BLD: 408 K/UL (ref 138–453)
PMV BLD AUTO: 9.6 FL (ref 8.1–13.5)
POTASSIUM SERPL-SCNC: 4.3 MMOL/L (ref 3.7–5.3)
RBC # BLD: 4.62 M/UL (ref 4.21–5.77)
RBC # BLD: ABNORMAL 10*6/UL
SEG NEUTROPHILS: 69 % (ref 36–65)
SEGMENTED NEUTROPHILS ABSOLUTE COUNT: 8.96 K/UL (ref 1.5–8.1)
SODIUM BLD-SCNC: 137 MMOL/L (ref 135–144)
WBC # BLD: 13 K/UL (ref 3.5–11.3)

## 2022-03-30 PROCEDURE — 80048 BASIC METABOLIC PNL TOTAL CA: CPT

## 2022-03-30 PROCEDURE — 99238 HOSP IP/OBS DSCHRG MGMT 30/<: CPT | Performed by: INTERNAL MEDICINE

## 2022-03-30 PROCEDURE — 2580000003 HC RX 258: Performed by: INTERNAL MEDICINE

## 2022-03-30 PROCEDURE — 85025 COMPLETE CBC W/AUTO DIFF WBC: CPT

## 2022-03-30 PROCEDURE — 36415 COLL VENOUS BLD VENIPUNCTURE: CPT

## 2022-03-30 PROCEDURE — 94640 AIRWAY INHALATION TREATMENT: CPT

## 2022-03-30 PROCEDURE — 71045 X-RAY EXAM CHEST 1 VIEW: CPT

## 2022-03-30 PROCEDURE — 6370000000 HC RX 637 (ALT 250 FOR IP): Performed by: INTERNAL MEDICINE

## 2022-03-30 PROCEDURE — 6360000002 HC RX W HCPCS: Performed by: INTERNAL MEDICINE

## 2022-03-30 PROCEDURE — 82947 ASSAY GLUCOSE BLOOD QUANT: CPT

## 2022-03-30 RX ORDER — FUROSEMIDE 40 MG/1
40 TABLET ORAL DAILY
Qty: 30 TABLET | Refills: 0 | Status: SHIPPED | OUTPATIENT
Start: 2022-03-31 | End: 2022-05-18 | Stop reason: SDUPTHER

## 2022-03-30 RX ADMIN — SODIUM CHLORIDE, PRESERVATIVE FREE 10 ML: 5 INJECTION INTRAVENOUS at 08:19

## 2022-03-30 RX ADMIN — CHOLECALCIFEROL TAB 25 MCG (1000 UNIT) 1000 UNITS: 25 TAB at 08:15

## 2022-03-30 RX ADMIN — CARVEDILOL 6.25 MG: 3.12 TABLET, FILM COATED ORAL at 08:15

## 2022-03-30 RX ADMIN — CLOPIDOGREL BISULFATE 75 MG: 75 TABLET ORAL at 08:15

## 2022-03-30 RX ADMIN — GLIPIZIDE 10 MG: 5 TABLET ORAL at 08:15

## 2022-03-30 RX ADMIN — INSULIN LISPRO 2 UNITS: 100 INJECTION, SOLUTION INTRAVENOUS; SUBCUTANEOUS at 08:18

## 2022-03-30 RX ADMIN — FUROSEMIDE 40 MG: 40 TABLET ORAL at 08:15

## 2022-03-30 RX ADMIN — LOSARTAN POTASSIUM 25 MG: 25 TABLET, FILM COATED ORAL at 08:15

## 2022-03-30 RX ADMIN — INSULIN LISPRO 3 UNITS: 100 INJECTION, SOLUTION INTRAVENOUS; SUBCUTANEOUS at 08:18

## 2022-03-30 RX ADMIN — INSULIN LISPRO 4 UNITS: 100 INJECTION, SOLUTION INTRAVENOUS; SUBCUTANEOUS at 12:05

## 2022-03-30 RX ADMIN — ASPIRIN 81 MG 81 MG: 81 TABLET ORAL at 08:15

## 2022-03-30 RX ADMIN — IPRATROPIUM BROMIDE AND ALBUTEROL SULFATE 1 AMPULE: .5; 3 SOLUTION RESPIRATORY (INHALATION) at 08:34

## 2022-03-30 RX ADMIN — INSULIN LISPRO 3 UNITS: 100 INJECTION, SOLUTION INTRAVENOUS; SUBCUTANEOUS at 12:05

## 2022-03-30 RX ADMIN — IPRATROPIUM BROMIDE AND ALBUTEROL SULFATE 1 AMPULE: .5; 3 SOLUTION RESPIRATORY (INHALATION) at 12:13

## 2022-03-30 RX ADMIN — ENOXAPARIN SODIUM 40 MG: 40 INJECTION SUBCUTANEOUS at 08:15

## 2022-03-30 ASSESSMENT — PAIN SCALES - GENERAL
PAINLEVEL_OUTOF10: 0

## 2022-03-30 NOTE — PROGRESS NOTES
Nutrition Education    Patient and his wife had questions over his low sodium and CHO restricted diet. Patient and wife overwhelmed with diet restrictions. She reports they don't use a salt shaker, they don't eat frozen meals/ packaged items. She reads the nutrition facts labels. They go out to eat frequently and they go through fast food places. Discussed what to order when out to eat, discussed salad dressings/sauces/gravies on side. Discussed rinsing vegetables off (canned). Discussed lean/ low sodium meats vs high sodium intakes. Discussed fluid restrictions. Briefly discussed CHO intake. Discussed monitoring portion sizes and how much CHO they eat per day. Discussed which food sources are CHO foods. Encouraged to come in as OP for CHO control diet education -- pt and pt wife will see how the diet goes and come in if/as needed. · Verbally reviewed information with Patient and Family  · Educated on CHF and Low Sodium diet, Fluid restriction (if needed)  · Written educational materials provided. · Contact name and number provided. · Refer to Patient Education activity for more details.     Jaimee Morrow RD, LD  Office Number: [phone]

## 2022-03-30 NOTE — PROGRESS NOTES
Discharge teaching and instructions for diagnosis of congestive heart failure completed with patient using teachback method. AVS reviewed with the patient. Prescriptions were sent to [facility] pharmacy and were delivered to the patient's bedside. Patient voiced understanding regarding prescriptions, education was provided by the pharmacy tech, follow up appointments were made for the patient, and care of self at home was discussed. Patient received education about diet by the dietician. Aware that they must have labs drawn prior to following up with Pam Lozada. Patient will follow up with the congestive heart failure. Unit phone number highlighted on AVS if questions arise.

## 2022-03-30 NOTE — FLOWSHEET NOTE
rounding on PCU    Assessment: Patient is sitting in chair with lunch . His wife is present for support. He will be discharged this afternoon. Intervention: Engaged in conversation. Patient expressed appreciation for visit and offer of continued prayer. Plan: Chaplains are available on site or on call 24/7 for spiritual and emotional support.      03/30/22 1239   Encounter Summary   Services provided to: Patient and family together   Referral/Consult From: Rounding   Continue Visiting   (3/30/22)   Complexity of Encounter Low   Length of Encounter 15 minutes   Spiritual/Episcopal   Type Spiritual support   Assessment Approachable   Intervention Active listening;Sustaining presence/ Ministry of presence   Outcome Expressed gratitude;Engaged in conversation

## 2022-03-30 NOTE — TELEPHONE ENCOUNTER
Patient was started on [place] yesterday by cardiology per Dr. Ruth Tapia also has the indication for heart failure associated risk reduction however it does not have the indication for kidney disease progression reduction. I would let this up to cardiology as he has chronic kidney disease along with CHF.

## 2022-03-30 NOTE — PROGRESS NOTES
Per the patient, the patient and wife would like education from the congestive heart failure nurse regarding diet. Carla Carranza updated and will be up to the unit to speak with the patient around lunch time.

## 2022-03-30 NOTE — PROGRESS NOTES
Hospitalist Progress Note    Patient:  Lizbeth Pond     YOB: 1945    MRN: [id number]   Admit date: 3/28/2022     Acct: [de-identified]     PCP: ESTHER Hanks CNP    CC--Interval History:    Flash pulmonary edema---CHF acute-on-chronic systolic-----rec'd [id number]----[id number]---GNCV--1.66.7965----QUSC by Cardiology----see medication adjustments in orders----to enter CHF Clinic    See note below    All other ROS negative except noted in HPI    Diet:  No diet orders on file    Medications:  Scheduled Meds:  Continuous Infusions:  PRN Meds:    Objective:  Labs:  CBC with Differential:    Lab Results   Component Value Date    WBC 13.0 03/30/2022    RBC 4.62 03/30/2022    HGB 12.2 03/30/2022    HCT 37.0 03/30/2022     03/30/2022    MCV 80.1 03/30/2022    MCH 26.4 03/30/2022    MCHC 33.0 03/30/2022    RDW 14.4 03/30/2022    LYMPHOPCT 12 03/30/2022    MONOPCT 8 03/30/2022    BASOPCT 1 03/30/2022    MONOSABS 1.02 03/30/2022    LYMPHSABS 1.52 03/30/2022    EOSABS 1.21 03/30/2022    BASOSABS 0.13 03/30/2022    DIFFTYPE NOT REPORTED 01/06/2022     BMP:    Lab Results   Component Value Date     03/30/2022    K 4.3 03/30/2022    CL 99 03/30/2022    CO2 28 03/30/2022    BUN 36 03/30/2022    LABALBU 3.8 03/28/2022    CREATININE 1.29 03/30/2022    CALCIUM 9.3 03/30/2022    GFRAA >60 03/30/2022    LABGLOM 54 03/30/2022    GLUCOSE 145 03/30/2022           Physical Exam:  Vitals: /69   Pulse 60   Temp 98.4 °F (36.9 °C) (Tympanic)   Resp 21   Ht 5' 6\" (1.676 m)   Wt 144 lb 12.8 oz (65.7 kg)   SpO2 100%   BMI 23.37 kg/m²   24 hour intake/output:No intake or output data in the 24 hours ending 03/30/22 1954  Last 3 weights:   Wt Readings from Last 3 Encounters:   03/29/22 144 lb 12.8 oz (65.7 kg)   03/21/22 156 lb (70.8 kg)   03/17/22 156 lb 1.6 oz (70.8 kg)     HEENT: Normocephalic and Atraumatic  Neck: Supple, No Masses, Tenderness, Nodularity and No Lymphadenopathy  Chest/Lungs: occasional cough and wheeze however, generally,  and Clear to Auscultation without Rales, Rhonchi, or Wheezes  Cardiac: Regular Rate and Rhythm  GI/Abdomen: Bowel Sounds Present and Soft, Non-tender, without Guarding or Rebound Tenderness  : Not examined  EXT/Skin: No Cyanosis, No Clubbing and Edema Present---trivial  Neuro: hard of hearing-----, Alert and Oriented and No Localizing Signs/Symptoms      Assessment:    Principal Problem:    CHF (congestive heart failure), NYHA class I, acute on chronic, combined (HCC)  Active Problems:    Shortness of breath  Resolved Problems:    * No resolved hospital problems. *      SHIRLEY CERVANTES          76  WM  Davis Menard NP--[facility]; [facility]---[facility]]  FULL CODE    ELIQUIS  SUPPLEMENTAL OXYGEN   TAVR---2020   AGENT ORANGE exposure     Anti-infectives:      CHF--acute-on-chronic systolic----flash pulmonary edema----recurrent----3.28.2022         Discharged from [facility], [place]----3.26.2022 where he had been transferred                                       due to bed unavailability in the [facility] system         Prior hospitalization [facility]---CHF exacerbation----3.17.2022---improved LVSF à dcd LIFE-VEST         CXR----[-]---3.28.2022         Elevated troponin---chronic-----3.28. .2022         EKG---3.28.2022---SR--80---1st degree AVB--LAD---anterior infarction---old           2D ECHO----3.17.2022---LA moderately dilated------mild reduced LVSF---AV--TAVR                                normal appearance pg 8 mm Hg  mg 3 mm Hg---normal AR---IVC not                               visualized---LVEF ~ 45%    Hypoxia----dyspnea----3.28.2022  Diabetes Mellitus Type 2---uncontrolled---3.28.2022---BG > 300+    ASCVD         Cardiac catheterization---12.27.2021---patent LAD--D---LCx stents          EKG----12. 3.2019----#2--NSR--73--RSR V1-2--RBBB---inferior infarction         EKG---12. 3.2019--NSR--83---RSR V1-2---RBBB--inferior infarction          2D ECHO---4.29.2019---moderately dilated LA--thickened IVS---inferolateral                         LV thin-akinetic--low normal LVSF--mildly dilated RA--RV dilatation--                        NRVSF--MAC--mild MR--AV leaflet calcification---moderate AS pg 61 mm Hg                         mg 33 mm Hg--trivial TR--RVSP ~ 43 mm Hg---mild pulmonary HTN---                        Grade II moderate DD---LVEF ~ 50%          Cardiac catheterization---8.23.2016---PTCA--DEVON stent WT2-W5-ozj-LAD--0% LM--                         75% mid-LAD---99% D1--99% OM1---30% left circumflex  Valvular heart disease         Non-rheumatic MR         Non-rheumatic AS  Peripheral vascular disease         Carotid stenosis   TAVR---bioprosthetic---1.21.2020  RBBB  Hypertension  Hyperlipidemia   COPD  CKD--Stage 3a  Diabetes Mellitus Type 2---see above            Mild non-proliferative retinopathy           Diabetic proteinuric nephropathy  Tobacco abuse---quit---1.1.1970  HEARING IMPAIRMENT  PMH:  acne rosacea, anemia, BPH, ED, dentures,  RLL pneumonia--sepsis ruled out---2019,             COPD exacerbation due to pneumonia----12. 3.2019 Elevated troponin---12. 3.2019  PSH:   see above, cataract--IOL---OU, circumcision--age 51, PE tubes, vasectomy     Allergies: sulfa---swelling            Plan:  1. Home----3.30.2022  2. Medications reviewed  3. New medications Farxiga--dapaglifozin  4. Norvasc dc'd due to fluid retention  5. Lasix 40 mg PO daily----CHF regimen----extra 40 mg PO if sudden dyspnea and call  6. CHF Clinic  7. Follow up Marylou Bella NP---IM  8. Follow up [facility]---[facility]  9.    See orders     Electronically signed by Howard Hernandes on 3/30/2022 at 7:54 PM    Hospitalist

## 2022-03-30 NOTE — PROGRESS NOTES
CLINICAL PHARMACY NOTE: MEDS TO BEDS    Total # of Prescriptions Filled: 2   The following medications were delivered to the patient:  · Farxiga 5mg  · Furosemide 40mg    Additional Documentation:

## 2022-03-30 NOTE — TELEPHONE ENCOUNTER
Last appt: 12/30/2021  Next appt: 4/6/2022    Patient wife called in asking if Patrizia could switch patient medication [place] to [place]. Patient uses [address] and they will not cover the [place] but will do the Jardiance. Patient wife said it would cost them close to 600 dollars for the farxiga at the [place] pharmacy. Patient said there is no rush on this medication as he has a week supply.

## 2022-03-30 NOTE — DISCHARGE INSTR - DIET

## 2022-03-30 NOTE — CARE COORDINATION
Pt denies any discharge needs at present time. Pt states he is independent at home and lives with his wife.

## 2022-03-31 ENCOUNTER — TELEPHONE (OUTPATIENT)
Dept: CARDIOLOGY | Age: 77
End: 2022-03-31

## 2022-03-31 DIAGNOSIS — I50.22 CHRONIC SYSTOLIC CHF (CONGESTIVE HEART FAILURE) (HCC): Primary | ICD-10-CM

## 2022-03-31 DIAGNOSIS — E11.3219 CONTROLLED TYPE 2 DIABETES MELLITUS WITH MILD NONPROLIFERATIVE RETINOPATHY AND MACULAR EDEMA, WITHOUT LONG-TERM CURRENT USE OF INSULIN, UNSPECIFIED LATERALITY (HCC): ICD-10-CM

## 2022-03-31 RX ORDER — EMPAGLIFLOZIN 10 MG/1
1 TABLET, FILM COATED ORAL DAILY
Qty: 90 TABLET | Refills: 1 | Status: SHIPPED | OUTPATIENT
Start: 2022-03-31 | End: 2022-04-01 | Stop reason: SDUPTHER

## 2022-03-31 NOTE — TELEPHONE ENCOUNTER
The patients wife called because he was prescribed farxiga by Dr. Kamar Brown but it cost 150 for 7pills. The medication was then changed to jardiance and is $1,800 per month. The patient's wife would like to know if the Lary Russ can be sen to the [address] [place] [address] Fax [phone] because the script will be pain for that way. She stated that it needs to be jardiance and that the dose and physicians note stating the script was changed from [place] will need to be included. She stated that they will also need the last four digits of the social security #[id number]  .       The patient was in the hospital Monday through Wednesday and seen on Tuesday by Dr. Kamar Brown via consultation

## 2022-04-01 ENCOUNTER — CARE COORDINATION (OUTPATIENT)
Dept: CASE MANAGEMENT | Age: 77
End: 2022-04-01

## 2022-04-01 DIAGNOSIS — I50.43 CHF (CONGESTIVE HEART FAILURE), NYHA CLASS I, ACUTE ON CHRONIC, COMBINED (HCC): Primary | ICD-10-CM

## 2022-04-01 PROCEDURE — 1111F DSCHRG MED/CURRENT MED MERGE: CPT | Performed by: NURSE PRACTITIONER

## 2022-04-01 NOTE — DISCHARGE SUMMARY
[address]                 [address] [place] [address], [address]                               DISCHARGE SUMMARY    PATIENT NAME: Joann Dias                   :        1945  MED REC NO:   [id number]                             ROOM:       [id number]  ACCOUNT NO:   [de-identified]                           ADMIT DATE: 2022  PROVIDER:     Rock Ochoa. Payal Humphrey MD                  DISCHARGE DATE:  2022    ATTENDING PHYSICIAN OF HOSPITALIZATION:  Isa Otero MD    PERSONAL CARE PROVIDER:  Julian Tse, nurse practitioner, Internal  Medicine, [address] Cardiology, [facility]. DIAGNOSES:  1. Congestive heart failure, acute-on-chronic, systolic/pulmonary  edema, recurrent, 2022. Discharged to [place], [place], 2022, where he had been transferred due to bed  unavailability in the [facility]. Prior hospitalizations for  CHF exacerbation. 2.  Elevated troponins, chronic. 3.  EKG, 2022, sinus rhythm, rate 80, first-degree AV block, LAD,  anterior infarction, old. A 2D echo, 2022, LA moderately dilated,  mildly reduced left ventricular systolic function, TAVR, normal  appearance, peak gradient 8 mmHg, mean gradient 3 mmHg, normal AR, IVC  not visualized, LVEF 45%; hypoxia, dyspnea on arrival, 2022,  corrected. 4.  Diabetes mellitus, type 2, uncontrolled. Blood glucose level over  300 at the time of admission, 2022. Underlying coronary artery  disease, prior cardiac catheterization on 2021, patent LAD,  diagonal, left circumflex stents. Cardiac catheterization, 2016,  PTCA DEVON stent, OM1, D1, mid LAD, 0% OM, 75% mid LAD, 99% D1, 99% OM1,  30% left circumflex. 5.  Valvular heart disease, nonrheumatic AS, nonrheumatic MR.  6.  Peripheral vascular disease, carotid stenosis.   7.  TAVR, bioprosthetic, 2020.  8.  Right bundle-branch physician. FOLLOWING MEDICATIONS CONTINUED:  Aspirin 81 mg p.o. daily; carvedilol  (Coreg) 6.25 mg p.o. twice daily; clopidogrel (Plavix) 75 mg p.o. daily;  glipizide 10 mg p.o. daily; losartan (Cozaar) 25 mg p.o. daily;  metformin 1000 mg p.o. twice daily; simvastatin (Zocor) 10 mg nightly;  spironolactone (Aldactone) 12.5 mg p.o. daily; vitamin D 1000 units p.o.  twice daily. SPECIFICALLY DISCONTINUED DUE TO FLUID RETENTION ISSUES:  Amlodipine  (Norvasc), lisinopril, metoprolol succinate (Toprol XL). Follow up with the patient's personal care provider, Lexie Babinski,  nurse practitioner, Internal Medicine, [facility]. Follow up  with [facility], [facility]. Any aspect of the patient's care not discussed in the chart and/or  dictation will be addressed and treated as an outpatient. The patient's medications have been reviewed including, but not limited  to, pre-hospital, hospital and discharge medications. The patient  and/or the patient's personal representatives were specifically advised  the only medications to be taken are those set forth in the discharge  orders and no other medications should be taken. Any prior medications  not on the discharge orders are specifically discontinued.         Jonathan Handley MD    D: 03/31/2022 8:53:00       T: 03/31/2022 12:44:13     [id number]  Job#: [id number]     Doc#: [id number]    CC:

## 2022-04-01 NOTE — CARE COORDINATION
[facility] Transitions Initial Follow Up Call - recent readmissions - incarcerated umbilical hernia, CHF readmissions    Call within 2 business days of discharge: Yes    Patient: Cierra Finder   Patient : 1945   MRN: [id number]    Reason for Admission: flash pulmonary edema, recurrent, more frequent CHF, recent respiratory failure - influenza A, ICM EF increased to 35-40% - discontinued Life Vest in March  Discharge Date: 3/30/22   RARS: Readmission Risk Score: 17.5 ( )      Last Discharge [facility]       Complaint Diagnosis Description Type Department Provider    3/28/22 Shortness of Breath Shortness of breath ED to Hosp-Admission (Discharged) (ADMITTED) Chris Diamond Solders. .. Facility: [facility]  Non-face-to-face services provided:  Scheduled appointment with PCP-  Scheduled appointment with [facility] clinic , cardio   Obtained and reviewed discharge summary and/or continuity of care documents       Spoke with: patient - spouse in background. Patient was recently discharged from [facility] after readmission prior to that when he was transferred to [facility] b/o bed availability issue in [facility] at the time. Patient DC from [facility] on 3/26, felt good for one day, then readmitted to [facility] on 3/28 with flash pulmonary edema. Medication changes this admission - lisinopril, norvasc, metoprolol & started on daily lasix with prn instructions for weight gain. Reviewed all medications with patient. Set up with CHF clinic for , has PCP appt , cardio . Referrals to dietician + referral to clinical pharmacist as per CHF protocol. Patient receptive of all information as this CHF issue has intensified over the last month. Cardiology evaluating LV function - Life Vest was DC in March. Plan is to repeat a limited MUGA per cardiology.     Patient is trying to recognize his CHF symptoms sooner, but he says they come on so fast.  He will do daily weights - 141 today. Knows to take extra lasix dose that day if increase weight by 2 lbs. BMP in one week. Denies any ankle swelling. Some SOB on exertion, has harsh cough, no expectorant. He is considering purchasing a pulse oximeter. Patient familiar with care transition & CTN contact number. Will also plan to refer to ambulatory at end of transition. Care Transitions 24 Hour Call    Schedule Follow Up Appointment with PCP: Completed  Do you have any ongoing symptoms?: Yes  Patient-reported symptoms: Cough, Shortness of Breath  Interventions for patient-reported symptoms: Other (Comment: improved - medication adjustment - has upcoming appt to re-evaluate med changes)  Do you have a copy of your discharge instructions?: Yes  Do you have all of your prescriptions and are they filled?: Yes (Comment: picking up jardiance once processed (instead of farxiga))  Have you been contacted by a [facility] Pharmacist?: No  Have you scheduled your follow up appointment?: Yes (Comment: 4/6, 4/7, 4/19)  How are you going to get to your appointment?: Car - family or friend to transport  Were you discharged with any Home Care or Post Acute Services: Yes  Post Acute Services: Outpatient/Community Services (Comment: CHF clinic)  Do you feel like you have everything you need to keep you well at home?: Yes  Care Transitions Interventions         Was this an external facility discharge? No    Challenges to be reviewed by the provider   Additional needs identified to be addressed with provider: No  medications-provider already changed farxiga to jardiance because of cost             Method of communication with provider : none      Advance Care Planning:   Does patient have an Advance Directive: not on file. Was this a readmission?  Yes  Patient stated reason for admission: CHF, flash pulmonary edema  Patients top risk factors for readmission: medical condition-CHF  medication management  multiple health system providers    Care Transition Nurse (CTN) contacted the patient by telephone to perform post hospital discharge assessment. Verified name and  with patient as identifiers. Provided introduction to self, and explanation of the CTN role. CTN reviewed discharge instructions, medical action plan and red flags with patient who verbalized understanding. Patient given an opportunity to ask questions and does not have any further questions or concerns at this time. Were discharge instructions available to patient? Yes. Reviewed appropriate site of care based on symptoms and resources available to patient including: PCP  Specialist  CTN. The patient agrees to contact the PCP office for questions related to their healthcare. Medication reconciliation was performed with patient, who verbalizes understanding of administration of home medications. Covid Risk Education - negative covid 3/28 - vaccinated   . CTN reviewed discharge instructions, medical action plan and red flag symptoms with the patient who verbalized understanding. Discussed COVID vaccination status: Yes. . Patient was given an opportunity to verbalize any questions and concerns and agrees to contact CTN or health care provider for questions related to their healthcare. Reviewed and educated patient on any new and changed medications related to discharge diagnosis. Was patient discharged with a pulse oximeter? No - plans to purchase one    CTN provided contact information. Plan for follow-up call in 5-7 days based on severity of symptoms and risk factors.   Plan for next call: symptom management-reassess weight & symptoms  follow up appointment- PCP,  CHF clinic  medication management-check how often extra lasix dose required  community resources-CHF clinic         Follow Up  Future Appointments   Date Time Provider Julio Ratliff   2022 10:00 AM ESTHER Ling - CNP DINT [facility]   2022 10:00 AM KIRAN CHF RM 1 MD CHF [place] HOD   4/19/2022  3:00 PM Harshal Meyer DO DCARDIO [facility]   4/21/2022  2:50 PM Loc Edwards MD DSURG DPP       Roger Mejía RN

## 2022-04-04 ENCOUNTER — HOSPITAL ENCOUNTER (OUTPATIENT)
Dept: LAB | Age: 77
Discharge: HOME | End: 2022-04-04
Payer: MEDICARE

## 2022-04-04 DIAGNOSIS — R06.02 SHORTNESS OF BREATH: ICD-10-CM

## 2022-04-04 LAB
ANION GAP SERPL CALCULATED.3IONS-SCNC: 10 MMOL/L (ref 9–17)
BUN BLDV-MCNC: 42 MG/DL (ref 8–23)
BUN/CREAT BLD: 27 (ref 9–20)
CALCIUM SERPL-MCNC: 9.8 MG/DL (ref 8.6–10.4)
CHLORIDE BLD-SCNC: 97 MMOL/L (ref 98–107)
CO2: 27 MMOL/L (ref 20–31)
CREAT SERPL-MCNC: 1.58 MG/DL (ref 0.7–1.2)
GFR AFRICAN AMERICAN: 52 ML/MIN
GFR NON-AFRICAN AMERICAN: 43 ML/MIN
GFR SERPL CREATININE-BSD FRML MDRD: ABNORMAL ML/MIN/{1.73_M2}
GLUCOSE BLD-MCNC: 320 MG/DL (ref 70–99)
POTASSIUM SERPL-SCNC: 5.6 MMOL/L (ref 3.7–5.3)
SODIUM BLD-SCNC: 134 MMOL/L (ref 135–144)

## 2022-04-04 PROCEDURE — 36415 COLL VENOUS BLD VENIPUNCTURE: CPT

## 2022-04-04 PROCEDURE — 80048 BASIC METABOLIC PNL TOTAL CA: CPT

## 2022-04-05 ENCOUNTER — CARE COORDINATION (OUTPATIENT)
Dept: CASE MANAGEMENT | Age: 77
End: 2022-04-05

## 2022-04-05 DIAGNOSIS — N18.31 CHRONIC KIDNEY DISEASE, STAGE 3A (HCC): Primary | ICD-10-CM

## 2022-04-05 RX ORDER — EMPAGLIFLOZIN 10 MG/1
1 TABLET, FILM COATED ORAL DAILY
Qty: 90 TABLET | Refills: 3 | Status: SHIPPED | OUTPATIENT
Start: 2022-04-05

## 2022-04-05 NOTE — CARE COORDINATION
[facility] Transitions Follow Up Call    2022    Patient: Ragini Schofield    Patient : 1945   MRN: [id number]    Reason for Admission: flash pulmonary edema, recurrent, more frequent CHF, recent respiratory failure - influenza A, ICM EF increased to 35-40% - discontinued Life Vest in March  Discharge Date: 3/30/22   RARS: Readmission Risk Score: 17.5 ( )         Spoke with: patient - reminded of appt with provider tomorrow & CHF clinic on  - planning to go to all appts. Weight stable at 140 - down one lb. Denies any symptoms. He does have an order to to repeat BMP tomorrow before appt & he was notified by office today to increase water - BUN/creatinine was more elevated        Care Transitions Subsequent and Final Call    Schedule Follow Up Appointment with PCP: Completed  Subsequent and Final Calls  Do you have any ongoing symptoms?: Yes  Have your medications changed?: Yes  Do you have any questions related to your medications?: Yes  Patient Reports: wondering about [facility] coverage of jardiance  Do you currently have any active services?: No  Are you currently active with any services?: Outpatient/Community Services  Do you have any needs or concerns that I can assist you with?: No  Care Transitions Interventions  Other Interventions:           Needs to be reviewed by the provider   Additional needs identified to be addressed with provider: No  none             Method of communication with provider : none      Care Transition Nurse (CTN) contacted the patient by telephone to follow up after admission  Verified name and  with patient as identifiers. Addressed changes since last contact: none  Discussed follow-up appointments. If no appointment was previously scheduled, appointment scheduling offered: Yes. Is follow up appointment scheduled within 7 days of discharge? Yes.     CTN reviewed discharge instructions, medical action plan and red flags with patient and discussed any barriers to care and/or understanding of plan of care after discharge. Discussed appropriate site of care based on symptoms and resources available to patient including: PCP  Specialist  CTN. The patient agrees to contact the PCP office for questions related to their healthcare. Patients top risk factors for readmission: medical condition-CHF  medication management  Interventions to address risk factors: Scheduled appointment with PCP-4/6, Scheduled appointment with Specialist-4/19 cardio and Obtained and reviewed discharge summary and/or continuity of care documents    CTN provided contact information for future needs. Plan for follow-up call in 5-7 days based on severity of symptoms and risk factors.   Plan for next call: symptom management-reassess  follow up appointment-review PCP appt 4/6, CHF clinic appt 4/7        Follow Up  Future Appointments   Date Time Provider Julio Ratliff   4/6/2022 10:00 AM ESTHER Dawson - CNP DINT DPP   4/7/2022 10:00 AM KIRAN CHF RM 1 MDHZ CHF [place] HOD   4/19/2022  3:00 PM DO STEVE Gann DPP   4/21/2022  2:50 PM Hasmukh Howard MD DSURG DPP       Marixa Tavares RN

## 2022-04-06 ENCOUNTER — OFFICE VISIT (OUTPATIENT)
Dept: INTERNAL MEDICINE | Age: 77
End: 2022-04-06
Payer: MEDICARE

## 2022-04-06 ENCOUNTER — HOSPITAL ENCOUNTER (OUTPATIENT)
Dept: LAB | Age: 77
Discharge: HOME | End: 2022-04-06
Payer: MEDICARE

## 2022-04-06 VITALS
HEART RATE: 74 BPM | DIASTOLIC BLOOD PRESSURE: 64 MMHG | RESPIRATION RATE: 18 BRPM | OXYGEN SATURATION: 94 % | HEIGHT: 66 IN | WEIGHT: 143.4 LBS | TEMPERATURE: 98.2 F | SYSTOLIC BLOOD PRESSURE: 118 MMHG | BODY MASS INDEX: 23.05 KG/M2

## 2022-04-06 DIAGNOSIS — I25.10 CORONARY ARTERY DISEASE INVOLVING NATIVE CORONARY ARTERY OF NATIVE HEART WITHOUT ANGINA PECTORIS: ICD-10-CM

## 2022-04-06 DIAGNOSIS — E11.3219 CONTROLLED TYPE 2 DIABETES MELLITUS WITH MILD NONPROLIFERATIVE RETINOPATHY AND MACULAR EDEMA, WITHOUT LONG-TERM CURRENT USE OF INSULIN, UNSPECIFIED LATERALITY (HCC): ICD-10-CM

## 2022-04-06 DIAGNOSIS — N17.9 AKI (ACUTE KIDNEY INJURY) (HCC): ICD-10-CM

## 2022-04-06 DIAGNOSIS — N18.31 CHRONIC KIDNEY DISEASE, STAGE 3A (HCC): ICD-10-CM

## 2022-04-06 DIAGNOSIS — I10 ESSENTIAL HYPERTENSION: ICD-10-CM

## 2022-04-06 DIAGNOSIS — I50.22 CHF NYHA CLASS I, CHRONIC, SYSTOLIC (HCC): Primary | ICD-10-CM

## 2022-04-06 DIAGNOSIS — E11.21 TYPE 2 DIABETES MELLITUS WITH PROTEINURIC DIABETIC NEPHROPATHY (HCC): ICD-10-CM

## 2022-04-06 PROBLEM — J44.1 COPD EXACERBATION (HCC): Status: RESOLVED | Noted: 2019-12-03 | Resolved: 2022-04-06

## 2022-04-06 LAB
ANION GAP SERPL CALCULATED.3IONS-SCNC: 10 MMOL/L (ref 9–17)
BUN BLDV-MCNC: 41 MG/DL (ref 8–23)
BUN/CREAT BLD: 25 (ref 9–20)
CALCIUM SERPL-MCNC: 9.8 MG/DL (ref 8.6–10.4)
CHLORIDE BLD-SCNC: 97 MMOL/L (ref 98–107)
CO2: 27 MMOL/L (ref 20–31)
CREAT SERPL-MCNC: 1.65 MG/DL (ref 0.7–1.2)
ESTIMATED AVERAGE GLUCOSE: 200 MG/DL
GFR AFRICAN AMERICAN: 50 ML/MIN
GFR NON-AFRICAN AMERICAN: 41 ML/MIN
GFR SERPL CREATININE-BSD FRML MDRD: ABNORMAL ML/MIN/{1.73_M2}
GLUCOSE BLD-MCNC: 202 MG/DL (ref 70–99)
HBA1C MFR BLD: 8.6 % (ref 4–6)
POTASSIUM SERPL-SCNC: 5.3 MMOL/L (ref 3.7–5.3)
SODIUM BLD-SCNC: 134 MMOL/L (ref 135–144)

## 2022-04-06 PROCEDURE — 83036 HEMOGLOBIN GLYCOSYLATED A1C: CPT

## 2022-04-06 PROCEDURE — 99495 TRANSJ CARE MGMT MOD F2F 14D: CPT | Performed by: NURSE PRACTITIONER

## 2022-04-06 PROCEDURE — 36415 COLL VENOUS BLD VENIPUNCTURE: CPT

## 2022-04-06 PROCEDURE — 99213 OFFICE O/P EST LOW 20 MIN: CPT

## 2022-04-06 PROCEDURE — 80048 BASIC METABOLIC PNL TOTAL CA: CPT

## 2022-04-06 NOTE — PROGRESS NOTES
Transition Care Office Visit    Date of Face-to-Face: 4/6/2022  Persons at visit: Wife  Date of interactive contact/ attempted contact with the patient and/or caregiver: April 1, 2022-See transitional care telephone note. HPI 78-year-old patient being seen for post hospital follow up from [facility] where he was admitted 3/28/2022 through 3/30/2022 for acute on chronic CHF, systolic, pulmonary edema. Recently discharged to [facility] prior to this hospitalization. Chronically elevated troponins, EKG showed sinus rhythm with old anterior infarct, cardiology was consulted. Patient was treated with IV diuretics metoprolol was discontinued and changed to Coreg, Norvasc was discontinued due to fluid retention. Patient with acute kidney injury lisinopril discontinued. Was suggested he start on [address] by cardiology due to CHF and uncontrolled diabetes. Medication highly costly. Attempting to get from the [place] however they will only cover Jardiance. This was cleared by cardiology to switch from [address] to [place]. Patient states [address] will because well over $500 a month. He has been taking samples of Farxiga until the [place] gets cleared through the [place]. We did give him extra 2 weeks of [address] until this is completed. His weight has significantly improved no further swelling to lower extremities. During hospitalization noted to be significantly hypoxic after diuretics given patient with no further hypoxia. Did not qualify for home oxygen. Since discharge patient has been doing much better with breathing. Trying to remain active. He denies any chest discomfort. Activity: activity as tolerated  Any medication changes since post-hospitalization phone call? No  Any treatment changes since post-hospitalization phone call? No  Any further education needed on medications/treatment plan?  No    Current Medications   Outpatient Medications Marked as Taking for the 4/6/22 encounter (Office Visit) with ESTHER Almaraz CNP   Medication Sig Dispense Refill    dapagliflozin (FARXIGA) 5 MG tablet Take 5 mg by mouth every morning Will be switching to Jardiance if approved by [address] by Does not apply route Expires: 2024 2 each 0    furosemide (LASIX) 40 MG tablet Take 1 tablet by mouth daily If weight gain more than 2 pounds or sudden increase in shortness of breath or cough take an additional 40 mg tablet and call physician 30 tablet 0    carvedilol (COREG) 6.25 MG tablet TAKE 1 TABLET BY MOUTH TWO TIMES A DAY WITH breakfast and dinner MEALS      losartan (COZAAR) 25 MG tablet TAKE 1 TABLET BY MOUTH EVERY DAY      aspirin 81 MG chewable tablet Take 81 mg by mouth daily      spironolactone (ALDACTONE) 25 MG tablet Take 0.5 tablets by mouth daily 45 tablet 3    clopidogrel (PLAVIX) 75 MG tablet Take 1 tablet by mouth daily Take 1 daily 60 tablet 3    simvastatin (ZOCOR) 40 MG tablet Take 10 mg by mouth nightly       glipiZIDE (GLUCOTROL) 10 MG tablet Take 10 mg by mouth daily       vitamin D (CHOLECALCIFEROL) 1000 UNIT TABS tablet Take 1,000 Units by mouth 2 times daily       metFORMIN (GLUCOPHAGE) 1000 MG tablet Take 1 tablet by mouth 2 times daily (with meals) 60 tablet 3       Medication Reconciliation  Provider has reviewed medication record and it has been modified as necessary to accurately depict current medications since hospitalization. Chanelle Francisco has been instructed on these changes.      Social History     Socioeconomic History    Marital status:      Spouse name: None    Number of children: None    Years of education: None    Highest education level: None   Occupational History    Occupation: seo   Tobacco Use    Smoking status: Former Smoker     Packs/day: 0.50     Years: 2.00     Pack years: 1.00     Quit date: 1970     Years since quittin.2    Smokeless tobacco: Never Used   Vaping Use    Vaping Use: Never used Substance and Sexual Activity    Alcohol use: Yes     Alcohol/week: 0.0 standard drinks     Comment: RARELY    Drug use: No    Sexual activity: None   Other Topics Concern    None   Social History Narrative    None     Social Determinants of Health     Financial Resource Strain:     Difficulty of Paying Living Expenses: Not on file   Food Insecurity:     Worried About Running Out of Food in the Last Year: Not on file    Gema of Food in the Last Year: Not on file   Transportation Needs:     Lack of Transportation (Medical): Not on file    Lack of Transportation (Non-Medical):  Not on file   Physical Activity:     Days of Exercise per Week: Not on file    Minutes of Exercise per Session: Not on file   Stress:     Feeling of Stress : Not on file   Social Connections:     Frequency of Communication with Friends and Family: Not on file    Frequency of Social Gatherings with Friends and Family: Not on file    Attends Rastafari Services: Not on file    Active Member of [address] [place] [address] or Organizations: Not on file    Attends Club or Organization Meetings: Not on file    Marital Status: Not on file   Intimate Partner Violence:     Fear of Current or Ex-Partner: Not on file    Emotionally Abused: Not on file    Physically Abused: Not on file    Sexually Abused: Not on file   Housing Stability:     Unable to Pay for Housing in the Last Year: Not on file    Number of [place] in the Last Year: Not on file    Unstable Housing in the Last Year: Not on file       Past Medical History:   Diagnosis Date    Acne rosacea     treated with MetroGel    Anemia     Aortic stenosis     echo 3/18 Mod AS, Mild MR    BPH (benign prostatic hypertrophy)     CAD (coronary artery disease)     Stent x 3 DEVON 8/16    Carotid stenosis     Fairly minimal plaquing on ultrasound 5/14--in the conclusion described as less than 50% stenosis but in the body of the report described as minimal    Cataract of both eyes     CRI (chronic renal insufficiency)     Diabetes mellitus, type 2 (HCC)     1.) Proteinuria, 24 hour total urine protein 1,420 mg/24 hrs, 09/08, creatinine clearance 114, 09/08 2.) Repeat protein/creatinine ratio 2.97, 02/12 3. )24 hr urine collection 1958 mg/24 hrs, 04/12. 4.) Repeat protein/creatinine ratio 1.99, 08/12 a.) Repeat protein/creatinine ratio 2.14, 07/13 5.) Renal u/s ok 02/12  6) retinopathy at [address] 12/16  early mod. nonprolifferative  macular involved    Dyslipidemia     Erectile dysfunction     H/O agent Orange exposure     per patient in the [employer] along with exposure to cresote and naphtha    Hearing loss     Hyperlipidemia     Hypertension     Mild nonproliferative diabetic retinopathy ([facility] [address]     Non-rheumatic mitral regurgitation 06/27/2016    Pneumonia 7/31/2021    Pneumonia of right lower lobe due to infectious organism     RBBB     Sepsis ([address] 12/22/2021    Status post transcatheter aortic valve replacement (TAVR) using bioprosthesis 01/21/2020    Wears dentures     upper       Family History   Problem Relation Age of Onset    Heart Attack Father     Coronary Art Dis Sister     Hypertension Mother     Lung Cancer Mother     Stroke Mother     Coronary Art Dis Brother         later in life       Updated and reviewed pertinent [facility]    Allergies   Allergen Reactions    Atorvastatin Other (See Comments)     Muscle pain (myalgias)    Sulfa Antibiotics Swelling     Swelling of retinas    Sulfanilamide Other (See Comments)       ROS   General: Denies fever, chills, night sweats, or recent weight changes. Skin: Denies any rashes/wounds. HEENT: Denies any headaches. Denies any blurred vision, double vision, or eye pain. Denies any tinnitus, vertigo, or dizziness. Denies discharge, stuffiness, obstruction, or epistaxis. Denies any hoarseness, dysphagia/ pain. Cardiovascular: Denies any chest pain, shortness of breath, dyspnea on exertion, orthopnea, or palpations. Respiratory: Denies cough, sputum production, hemoptysis, or wheezing. Gastrointestinal: Denies any nausea, vomiting, diarrhea, constipation, or melena. Genitourinary: Denies any dysuria, hematuria, frequency, urgency, or hesitancy. Endocrine:  Denies any heat or cold intolerances, polydipsia, polyuria, or polyphagia. Musculoskeletal: Denies any arthritis, arthralagias, myalgias, or muscle weakness. Neuropsychiatric: Denies any depression, syncope, tremors, seizures, anxiety or nervousness. Physical Exam:  Vitals /64 (Site: Left Upper Arm, Position: Sitting, Cuff Size: Large Adult)   Pulse 74   Temp 98.2 °F (36.8 °C) (Temporal)   Resp 18   Ht 5' 6\" (1.676 m)   Wt 143 lb 6.4 oz (65 kg)   SpO2 94%   BMI 23.15 kg/m²   General Appearance: Alert, no distress; vital signs were reviewed today  Head: Normocephalic, atraumatic. Eyes:  Sclera clear, no drainage  Ears:  External auditory canals patent, no drainage  Nose:  Septum midline, mucosa normal, no drainage or sinus tenderness  Throat: pharynx moist and pink, no exudate  Neck: Supple, trachea midline, no adenopathy; Thyroid: No enlargement/tenderness/nodules;  Back: Symmetric, no tenderness  Lungs: Chest rises and falls symmetrically, no use of accessory muscles, Lungs clear throughout  Chest wall: No tenderness  Heart[de-identified] Regular rate and rhythm, S1 and S2 normal, no murmur or gallop  Abdomen: Nontender, bowel sounds active in all 4 quadrants, no masses  Extremities: Extremities without clubbing,cyanosis or edema  Skin: Skin color normal, no rashes or lesions  Neurological: Cranial nerves II-XII appears grossly intact- no focal deficit  Psychiatric: mood and affect within normal limits    Assessment/Plan    1. CHF NYHA class I, chronic, systolic (HCC)  Continue on Lasix, Coreg, Cozaar, Aldactone and [place] Jb will be changed over to [place] due to the cost    2.  BJ (acute kidney injury) ([facility] [address]  Take 20 mg of Lasix today, patient appears slightly dry. Increase water slightly. Per wife he is only taking about 800 mL/day. Serial lab follow-ups ordered on Monday  - Basic Metabolic Panel; Future    3. Chronic kidney disease, stage 3a ([facility] [address]  As noted above    4. 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 [place] will be added [facility] by [facility]    5. Essential hypertension  Stable in office    6. Coronary artery disease involving native coronary artery of native heart without angina pectoris  Continues to follow with cardiology, continue on Coreg, Cozaar, aspirin 81 mg daily, Plavix and Zocor. Decision making of moderate complexity   Hospital records reviewed.    Follow up for routine visit, call sooner with any concerns prior    Electronically signed by ESTHER Munguia CNP on 4/6/2022 at 12:51 PM

## 2022-04-07 ENCOUNTER — HOSPITAL ENCOUNTER (OUTPATIENT)
Dept: OTHER | Age: 77
Discharge: HOME | End: 2022-04-07
Payer: MEDICARE

## 2022-04-07 VITALS
SYSTOLIC BLOOD PRESSURE: 122 MMHG | DIASTOLIC BLOOD PRESSURE: 60 MMHG | BODY MASS INDEX: 23.08 KG/M2 | WEIGHT: 143.6 LBS | OXYGEN SATURATION: 98 % | HEART RATE: 67 BPM | RESPIRATION RATE: 18 BRPM | HEIGHT: 66 IN

## 2022-04-07 DIAGNOSIS — E11.3219 CONTROLLED TYPE 2 DIABETES MELLITUS WITH MILD NONPROLIFERATIVE RETINOPATHY AND MACULAR EDEMA, WITHOUT LONG-TERM CURRENT USE OF INSULIN, UNSPECIFIED LATERALITY (HCC): Primary | ICD-10-CM

## 2022-04-07 DIAGNOSIS — E08.69 DIABETES MELLITUS DUE TO UNDERLYING CONDITION WITH OTHER SPECIFIED COMPLICATION, WITHOUT LONG-TERM CURRENT USE OF INSULIN (HCC): ICD-10-CM

## 2022-04-07 PROCEDURE — 99202 OFFICE O/P NEW SF 15 MIN: CPT

## 2022-04-07 ASSESSMENT — EJECTION FRACTION
EF_SOURCE: 2D ECHO
EF_VALUE: 35%

## 2022-04-07 NOTE — PROGRESS NOTES
Patient and wife here for initial visit to CHF clinic. Both watched CHF video. Introduced CHF playbook and CHF Zone tool worksheet to patient and wife who verbalized understanding of material reviewed. Patient reports weighs self daily at home, stable. Patient had appointment with PCP yesterday and medication adjusted and he is scheduled for lab work on Monday. Patient is scheduled to see cardiology on 4-19-22. Verbally reviewed importance of medication compliance with patient; patient verbalized understanding. Wife reports she sets up medications for patient. Discussed 2000mg/day sodium restricted diet; patient verbalized understanding. Patient and wife report they do not add salt to foods when cooking. Moderate daily exercise encouraged as tolerated. Discussed rest breaks as needed; patient verbalized understanding. Patient instructed to weigh self at the same time of each day, using same clothes and same scale; reinforced teaching to monitor for 2-3 lb weight increase over 1-2 days, and to notify the CHF clinic at [phone] and [phone] or physician office if weight change noted. Patient verbalized understanding. Signs and symptoms of CHF discussed with patient, such as feeling more tired than normal, feeling short of breath, coughing that increases when you lie down, sudden weight gain, swelling of your feet, legs or belly. Patient verbalized understanding to notify the CHF clinic at [phone] or [phone] or physician office if these symptoms occur. Compliance with plan of care and further disease process causes discussed with patient, patient encouraged to keep all follow up appointments. Patient verbalized understanding.

## 2022-04-11 ENCOUNTER — HOSPITAL ENCOUNTER (OUTPATIENT)
Dept: LAB | Age: 77
Discharge: HOME | End: 2022-04-11
Payer: MEDICARE

## 2022-04-11 DIAGNOSIS — N17.9 AKI (ACUTE KIDNEY INJURY) (HCC): ICD-10-CM

## 2022-04-11 DIAGNOSIS — E87.5 HYPERKALEMIA: Primary | ICD-10-CM

## 2022-04-11 LAB
ANION GAP SERPL CALCULATED.3IONS-SCNC: 9 MMOL/L (ref 9–17)
BUN BLDV-MCNC: 53 MG/DL (ref 8–23)
BUN/CREAT BLD: 34 (ref 9–20)
CALCIUM SERPL-MCNC: 9.8 MG/DL (ref 8.6–10.4)
CHLORIDE BLD-SCNC: 102 MMOL/L (ref 98–107)
CO2: 24 MMOL/L (ref 20–31)
CREAT SERPL-MCNC: 1.58 MG/DL (ref 0.7–1.2)
GFR AFRICAN AMERICAN: 52 ML/MIN
GFR NON-AFRICAN AMERICAN: 43 ML/MIN
GFR SERPL CREATININE-BSD FRML MDRD: ABNORMAL ML/MIN/{1.73_M2}
GLUCOSE BLD-MCNC: 142 MG/DL (ref 70–99)
POTASSIUM SERPL-SCNC: 5.6 MMOL/L (ref 3.7–5.3)
SODIUM BLD-SCNC: 135 MMOL/L (ref 135–144)

## 2022-04-11 PROCEDURE — 80048 BASIC METABOLIC PNL TOTAL CA: CPT

## 2022-04-11 PROCEDURE — 36415 COLL VENOUS BLD VENIPUNCTURE: CPT

## 2022-04-12 ENCOUNTER — CARE COORDINATION (OUTPATIENT)
Dept: CASE MANAGEMENT | Age: 77
End: 2022-04-12

## 2022-04-12 NOTE — CARE COORDINATION
[facility] Transitions Follow Up Call - Attempted to reach patient for subsequent transitional call #1. VM left to return call to CTN -    Noted patient has had his initial CHF clinic appt on , recent appt  with provider Jorge Alberto Marc.   Cardiology appt scheduled for 2022    Patient: Ragini Schofield    Patient : 1945   MRN: [id number]    Reason for Admission: flash pulmonary edema, recurrent, more frequent CHF, recent respiratory failure - influenza A, ICM EF increased to 35-40% - discontinued Life Vest in March  Discharge Date: 3/30/22   RARS: Readmission Risk Score: 17.5 ( )    Per CHF protocol:  CHF clinic referral - appt   Diet & med referral   Plan ACM referral - planning post transition       Follow Up  Future Appointments   Date Time Provider Julio Ratliff   2022  3:00 PM DO STEVE Pulido [facility]   2022  2:50 PM Tana Godfrey MD [address] [place] [address]   2022 10:00 AM KIRAN CHF RM 1 KIRAN CHF [place] HOD   2022  1:30 PM Dy[facility] Pennington [facility]       Lemuel Vaughn RN

## 2022-04-13 ENCOUNTER — CARE COORDINATION (OUTPATIENT)
Dept: CASE MANAGEMENT | Age: 77
End: 2022-04-13

## 2022-04-14 ENCOUNTER — HOSPITAL ENCOUNTER (OUTPATIENT)
Dept: LAB | Age: 77
Discharge: HOME | End: 2022-04-14
Payer: MEDICARE

## 2022-04-14 ENCOUNTER — HOSPITAL ENCOUNTER (EMERGENCY)
Age: 77
Discharge: HOME | End: 2022-04-14
Attending: EMERGENCY MEDICINE
Payer: MEDICARE

## 2022-04-14 ENCOUNTER — APPOINTMENT (OUTPATIENT)
Dept: GENERAL RADIOLOGY | Age: 77
End: 2022-04-14
Payer: MEDICARE

## 2022-04-14 ENCOUNTER — TELEPHONE (OUTPATIENT)
Dept: CARDIOLOGY | Age: 77
End: 2022-04-14

## 2022-04-14 VITALS
HEIGHT: 66 IN | SYSTOLIC BLOOD PRESSURE: 121 MMHG | DIASTOLIC BLOOD PRESSURE: 69 MMHG | WEIGHT: 140 LBS | RESPIRATION RATE: 16 BRPM | OXYGEN SATURATION: 96 % | HEART RATE: 54 BPM | BODY MASS INDEX: 22.5 KG/M2 | TEMPERATURE: 97.3 F

## 2022-04-14 DIAGNOSIS — E87.5 HYPERKALEMIA: Primary | ICD-10-CM

## 2022-04-14 DIAGNOSIS — N17.9 AKI (ACUTE KIDNEY INJURY) (HCC): ICD-10-CM

## 2022-04-14 DIAGNOSIS — N18.9 CHRONIC KIDNEY DISEASE, UNSPECIFIED CKD STAGE: ICD-10-CM

## 2022-04-14 DIAGNOSIS — N18.31 CHRONIC KIDNEY DISEASE, STAGE 3A (HCC): ICD-10-CM

## 2022-04-14 DIAGNOSIS — E87.5 HYPERKALEMIA: ICD-10-CM

## 2022-04-14 LAB
ABSOLUTE EOS #: 0.51 K/UL (ref 0–0.44)
ABSOLUTE IMMATURE GRANULOCYTE: 0.08 K/UL (ref 0–0.3)
ABSOLUTE LYMPH #: 1.35 K/UL (ref 1.1–3.7)
ABSOLUTE MONO #: 0.76 K/UL (ref 0.1–1.2)
ANION GAP SERPL CALCULATED.3IONS-SCNC: 9 MMOL/L (ref 9–17)
ANION GAP SERPL CALCULATED.3IONS-SCNC: 9 MMOL/L (ref 9–17)
BASOPHILS # BLD: 1 % (ref 0–2)
BASOPHILS ABSOLUTE: 0.09 K/UL (ref 0–0.2)
BUN BLDV-MCNC: 41 MG/DL (ref 8–23)
BUN BLDV-MCNC: 43 MG/DL (ref 8–23)
BUN/CREAT BLD: 26 (ref 9–20)
BUN/CREAT BLD: 29 (ref 9–20)
CALCIUM SERPL-MCNC: 9 MG/DL (ref 8.6–10.4)
CALCIUM SERPL-MCNC: 9.5 MG/DL (ref 8.6–10.4)
CHLORIDE BLD-SCNC: 100 MMOL/L (ref 98–107)
CHLORIDE BLD-SCNC: 99 MMOL/L (ref 98–107)
CO2: 22 MMOL/L (ref 20–31)
CO2: 23 MMOL/L (ref 20–31)
CREAT SERPL-MCNC: 1.49 MG/DL (ref 0.7–1.2)
CREAT SERPL-MCNC: 1.55 MG/DL (ref 0.7–1.2)
EKG ATRIAL RATE: 47 BPM
EKG P AXIS: 42 DEGREES
EKG P-R INTERVAL: 270 MS
EKG Q-T INTERVAL: 398 MS
EKG QRS DURATION: 104 MS
EKG QTC CALCULATION (BAZETT): 352 MS
EKG R AXIS: -61 DEGREES
EKG T AXIS: 42 DEGREES
EKG VENTRICULAR RATE: 47 BPM
EOSINOPHILS RELATIVE PERCENT: 6 % (ref 1–4)
GFR AFRICAN AMERICAN: 53 ML/MIN
GFR AFRICAN AMERICAN: 56 ML/MIN
GFR NON-AFRICAN AMERICAN: 44 ML/MIN
GFR NON-AFRICAN AMERICAN: 46 ML/MIN
GFR SERPL CREATININE-BSD FRML MDRD: ABNORMAL ML/MIN/{1.73_M2}
GFR SERPL CREATININE-BSD FRML MDRD: ABNORMAL ML/MIN/{1.73_M2}
GLUCOSE BLD-MCNC: 178 MG/DL (ref 70–99)
GLUCOSE BLD-MCNC: 266 MG/DL (ref 70–99)
HCT VFR BLD CALC: 38.6 % (ref 40.7–50.3)
HEMOGLOBIN: 12.8 G/DL (ref 13–17)
IMMATURE GRANULOCYTES: 1 %
LYMPHOCYTES # BLD: 15 % (ref 24–43)
MCH RBC QN AUTO: 26.6 PG (ref 25.2–33.5)
MCHC RBC AUTO-ENTMCNC: 33.2 G/DL (ref 25.2–33.5)
MCV RBC AUTO: 80.2 FL (ref 82.6–102.9)
MONOCYTES # BLD: 8 % (ref 3–12)
NRBC AUTOMATED: 0 PER 100 WBC
PDW BLD-RTO: 14.8 % (ref 11.8–14.4)
PLATELET # BLD: 340 K/UL (ref 138–453)
PMV BLD AUTO: 10.1 FL (ref 8.1–13.5)
POTASSIUM SERPL-SCNC: 5.2 MMOL/L (ref 3.7–5.3)
POTASSIUM SERPL-SCNC: 6 MMOL/L (ref 3.7–5.3)
RBC # BLD: 4.81 M/UL (ref 4.21–5.77)
RBC # BLD: ABNORMAL 10*6/UL
SEG NEUTROPHILS: 69 % (ref 36–65)
SEGMENTED NEUTROPHILS ABSOLUTE COUNT: 6.38 K/UL (ref 1.5–8.1)
SODIUM BLD-SCNC: 130 MMOL/L (ref 135–144)
SODIUM BLD-SCNC: 132 MMOL/L (ref 135–144)
WBC # BLD: 9.2 K/UL (ref 3.5–11.3)

## 2022-04-14 PROCEDURE — 71046 X-RAY EXAM CHEST 2 VIEWS: CPT

## 2022-04-14 PROCEDURE — 99285 EMERGENCY DEPT VISIT HI MDM: CPT

## 2022-04-14 PROCEDURE — 93005 ELECTROCARDIOGRAM TRACING: CPT | Performed by: EMERGENCY MEDICINE

## 2022-04-14 PROCEDURE — 80048 BASIC METABOLIC PNL TOTAL CA: CPT

## 2022-04-14 PROCEDURE — 85025 COMPLETE CBC W/AUTO DIFF WBC: CPT

## 2022-04-14 PROCEDURE — 36415 COLL VENOUS BLD VENIPUNCTURE: CPT

## 2022-04-14 RX ORDER — LOSARTAN POTASSIUM 25 MG/1
25 TABLET ORAL DAILY
COMMUNITY
End: 2022-04-14 | Stop reason: ALTCHOICE

## 2022-04-14 RX ORDER — SODIUM POLYSTYRENE SULFONATE 15 G/60ML
15 SUSPENSION ORAL; RECTAL ONCE
Qty: 60 ML | Refills: 0 | Status: SHIPPED | OUTPATIENT
Start: 2022-04-14 | End: 2022-04-14 | Stop reason: SDUPTHER

## 2022-04-14 RX ORDER — SPIRONOLACTONE 25 MG/1
12.5 TABLET ORAL DAILY
COMMUNITY
End: 2022-04-14 | Stop reason: ALTCHOICE

## 2022-04-14 RX ORDER — SODIUM POLYSTYRENE SULFONATE 15 G/60ML
15 SUSPENSION ORAL; RECTAL ONCE
Qty: 60 ML | Refills: 0 | Status: SHIPPED | OUTPATIENT
Start: 2022-04-14 | End: 2022-05-09

## 2022-04-14 NOTE — CARE COORDINATION
[facility] Transitions Follow Up Call    2022    Patient: Cierra Walls    Patient : 1945   MRN: [id number]    Reason for Admission:  flash pulmonary edema, recurrent, more frequent CHF, recent respiratory failure - influenza A, ICM EF increased to 35-40% - discontinued Life Vest in March  Discharge Date: 3/30/22   RARS: Readmission Risk Score: 17.5 ( )     Spoke with: patient - he returned call to CTN since he missed the call yesterday. Denies any new issues or symptoms. Patient was recently seen by PCP + had initial CHF Clinic visit on . Weight is stable - ave 139 lbs. He is now on Jardiance - approved by [place] - cardiology approved that he be switched to Bertha Shank instead of Kaelyn Keshia since cost was too high. Has bmp scheduled for tomorrow. Will plan one more transition call, then plan is to refer to [facility]CM. Informed patient & he is receptive to ambulatory referral.  Has cardio appt . Care Transitions Subsequent and Final Call    Schedule Follow Up Appointment with PCP: Completed  Subsequent and Final Calls  Do you have any ongoing symptoms?: No  Have your medications changed?: No  Do you have any questions related to your medications?: No  Do you currently have any active services?: Yes  Are you currently active with any services?: Outpatient/Community Services  Do you have any needs or concerns that I can assist you with?: No  Care Transitions Interventions  Other Interventions:         Care Transitions Follow Up Call    Needs to be reviewed by the provider   Additional needs identified to be addressed with provider: No  none             Method of communication with provider : none    Care Transition Nurse (CTN) contacted the patient by telephone to follow up after admission Verified name and  with patient as identifiers. Addressed changes since last contact: PCP appt, CHF clinic - switched to Bertha Shank since Kaelyn Keshia is too expensive  Discussed follow-up appointments. If no appointment was previously scheduled, appointment scheduling offered: Yes. Is follow up appointment scheduled within 7 days of discharge? Yes. Patients top risk factors for readmission: lack of knowledge about disease  medical condition-CHF  medication management  Interventions to address risk factors: Scheduled appointment with PCP-completed, Scheduled appointment with Specialist-cardio 4/19 and Obtained and reviewed discharge summary and/or continuity of care documents      CTN provided contact information for future needs. Plan for follow-up call in 5-7 days based on severity of symptoms and risk factors.   Plan for next call: referral to ambulatory care manager-informed Preston Steele of plan & will send referral at final call      Follow Up  Future Appointments   Date Time Provider Julio Ratliff   4/19/2022  3:00 PM DO STEVE Castro [facility]   4/21/2022  2:50 PM Nupur Davalos MD [address] [place] [address]   5/5/2022 10:00 AM KIRAN CHF RM 1 KIRAN CHF [place] HOD   5/9/2022  1:30 PM [facility]       Adriana Shah RN

## 2022-04-14 NOTE — TELEPHONE ENCOUNTER
Personally spoke with patient and notified her of recommendations from cardiology to go to the ER. Patient has not taken the Kayexalate yet.   Patient upset regarding having to go into the ER however understands

## 2022-04-14 NOTE — PROGRESS NOTES
Per patient, Rx is out of stock at [facility] and can't get in until tomorrow. It is in stock at [facility]. Pt in agreement to use [facility]. Re-sent to [facility] pharmacy per order of Cari Purchase, APRN-CNP. Rx cancelled at [facility].

## 2022-04-14 NOTE — TELEPHONE ENCOUNTER
Yes okay to stop aldactone  I will also stop losartan  I recommend however, he go to ER due to the K of 6- maybe for some hyperkalemia treatment- but I will defer to primary/ordering physician.

## 2022-04-14 NOTE — ED PROVIDER NOTES
[facility] ED  [address]  Phone: [phone]        Pt Name: Penny Pacheco  MRN: [id number]  Armsmacielgfurt 1945  Date of evaluation: 4/14/22      CHIEF COMPLAINT     Chief Complaint   Patient presents with    Other     sent here per PCP for increased K+         HISTORY OF PRESENT ILLNESS  (Location/Symptom, Timing/Onset, Context/Setting, Quality, Duration, Modifying Factors, Severity.)    Penny Pacheco is a 77 y.o. male who presents low potassium. Patient has a history of congestive heart failure, diabetes, and chronic kidney disease. He was admitted to [facility]/1930. He followed up as an outpatient with Dr. Mj Chase office. He had blood work drawn on 414 showing a BUN of 43, creatinine 1.49, GFR of 29. His potassium was 6.0. Patient was instructed to come to the emergency department because of the high potassium. He denies having any weakness or fatigue. He denies having any chest pain or shortness of breath. He denies having any fever chills cough or congestion. Denies any sore throat. Denies any abdominal pain nausea vomiting or diarrhea. Was given a prescription for Kayexalate 15 g. He brought that medication with him to the emergency department.       REVIEW OF SYSTEMS    (2-9 systems for level 4, 10 or more for level 5)     Review of Systems signs are reviewed and are negative except was present in the HPI    [address]    has a past medical history of Acne rosacea, Anemia, Aortic stenosis, BPH (benign prostatic hypertrophy), CAD (coronary artery disease), Carotid stenosis, Cataract of both eyes, CRI (chronic renal insufficiency), Diabetes mellitus, type 2 ([address] Dyslipidemia, Erectile dysfunction, H/O agent Orange exposure, Hearing loss, Hyperlipidemia, Hypertension, Mild nonproliferative diabetic retinopathy ([address] Non-rheumatic mitral regurgitation, Pneumonia, Pneumonia of right lower lobe due to infectious organism, RBBB, Sepsis ([facility] 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 SURGICAL HISTORY      has a past surgical history that includes Circumcision (age 49); Vasectomy; Tympanostomy tube placement; Cataract removal (Bilateral); eye surgery (Bilateral); Cardiac catheterization (12/03/2019); Aortic valve surgery (01/2020); and Cardiac catheterization (12/27/2021). [address]       Current Discharge Medication List      CONTINUE these medications which have NOT CHANGED    Details   sodium polystyrene (SPS) 15 GM/60ML suspension Take 60 mLs by mouth once for 1 dose  Qty: 60 mL, Refills: 0      dapagliflozin (FARXIGA) 5 MG tablet Take 5 mg by mouth every morning Will be switching to Jardiance if approved by [place] by Does not apply route Expires: 4/6/2024  Qty: 2 each, Refills: 0      empagliflozin (JARDIANCE) 10 MG tablet Take 1 tablet by mouth daily  Qty: 90 tablet, Refills: 3    Associated Diagnoses: Chronic systolic CHF (congestive heart failure) ([facility]  Controlled type 2 diabetes mellitus with mild nonproliferative retinopathy and macular edema, without long-term current use of insulin, unspecified laterality (HCC)      furosemide (LASIX) 40 MG tablet Take 1 tablet by mouth daily If weight gain more than 2 pounds or sudden increase in shortness of breath or cough take an additional 40 mg tablet and call physician  Qty: 30 tablet, Refills: 0      carvedilol (COREG) 6.25 MG tablet TAKE 1 TABLET BY MOUTH TWO TIMES A DAY WITH breakfast and dinner MEALS      aspirin 81 MG chewable tablet Take 81 mg by mouth daily      clopidogrel (PLAVIX) 75 MG tablet Take 1 tablet by mouth daily Take 1 daily  Qty: 60 tablet, Refills: 3      simvastatin (ZOCOR) 40 MG tablet Take 10 mg by mouth nightly       glipiZIDE (GLUCOTROL) 10 MG tablet Take 10 mg by mouth daily       vitamin D (CHOLECALCIFEROL) 1000 UNIT TABS tablet Take 1,000 Units by mouth 2 times daily       metFORMIN (GLUCOPHAGE) 1000 MG tablet Take 1 tablet by mouth 2 times daily (with meals)  Qty: 60 tablet, Refills: 3             ALLERGIES     is allergic to atorvastatin, sulfa antibiotics, and sulfanilamide. FAMILY HISTORY     He indicated that the status of his mother is unknown. He indicated that his father is . He indicated that his sister is . He indicated that the status of his brother is unknown.     family history includes Coronary Art Dis in his brother and sister; Heart Attack in his father; Hypertension in his mother; Jerre Brown in his mother; Stroke in his mother. SOCIAL HISTORY      reports that he quit smoking about 52 years ago. He has a 1.00 pack-year smoking history. He has never used smokeless tobacco. He reports current alcohol use. He reports that he does not use drugs. PHYSICAL EXAM    (up to 7 for level 4, 8 or more for level 5)   INITIAL VITALS:  height is 5' 6\" (1.676 m) and weight is 140 lb (63.5 kg). His tympanic temperature is 97.3 °F (36.3 °C). His blood pressure is 121/69 and his pulse is 54. His respiration is 16 and oxygen saturation is 96%. Physical Exam  Constitutional:       General: He is not in acute distress. HENT:      Head: Normocephalic and atraumatic. Ears:      Comments: Pt wears hearing aids  Eyes:      Extraocular Movements: Extraocular movements intact. Pupils: Pupils are equal, round, and reactive to light. Cardiovascular:      Rate and Rhythm: Regular rhythm. Bradycardia present. Pulses: Normal pulses. Heart sounds: Normal heart sounds. Pulmonary:      Effort: Pulmonary effort is normal. No tachypnea, bradypnea, accessory muscle usage, prolonged expiration, respiratory distress or retractions. Breath sounds: Normal breath sounds and air entry. Abdominal:      General: Bowel sounds are normal.      Palpations: Abdomen is soft. Tenderness: There is no abdominal tenderness. Musculoskeletal:      Cervical back: Normal range of motion. No pain with movement, spinous process tenderness or muscular tenderness. Right lower leg: No edema. Left lower leg: No edema. Lymphadenopathy:      Cervical: No cervical adenopathy. Right cervical: No superficial, deep or posterior cervical adenopathy. Left cervical: No superficial, deep or posterior cervical adenopathy. Skin:     General: Skin is warm. Capillary Refill: Capillary refill takes less than 2 seconds. Findings: No rash. Neurological:      Mental Status: He is alert. GCS: GCS eye subscore is 4. GCS verbal subscore is 5. GCS motor subscore is 6. Sensory: Sensation is intact. Motor: Motor function is intact. Gait: Gait is intact. Psychiatric:         Attention and Perception: Attention normal.         Speech: Speech normal.         Behavior: Behavior normal.         Thought Content: Thought content normal.         DIFFERENTIAL DIAGNOSIS/ MDM:     Hyperkalemia. Possible cardiac arrhythmia. History of chronic renal failure. Will give patient Kayexalate 15 g p.o. Patient has it with him. Will check kidney function and electrolytes. DIAGNOSTIC RESULTS     EKG: All EKG's are interpreted by the Emergency Department Physician who either signs or Co-signs this chart in the absence of a cardiologist.      Interpreted by Hellen Leung DO     Rhythm: Sinus bradycardia with a first-degree AV block. Rate:47  Axis: normal  Ectopy: none  Conduction: normal  ST Segments: no acute change  T Waves: no acute change, no peaked T waves. Q Waves: V1 V2 V3 and V4    Clinical Impression: Sinus bradycardia with first-degree AV block. Old anterior infarct. Compared to EKG 3/29/2022. Q waves are present on the previous EKG. Patient's bradycardia is new today. .  No acute infarction/ischemia noted.       RADIOLOGY:        Interpretation per the Radiologist below, if available at the time of this note:      ECHO Complete 2D W Doppler W Color    Result Date: 3/17/2022  Transthoracic Echocardiography Report (TTE)  Patient Name [address]     Date of Study               03/17/2022               SHIRLEY PORTER   Date of      1945  Gender                      Male  Birth   Age          77 year(s)  Race                           Room Number  0205        Height:                     66 inch, 167.64 cm   Corporate ID [id number]    Weight:                     198 pounds, 89.8 kg  #   Patient Acct [de-identified]   BSA:          1.99 m^2      BMI:       31.96  #                                                               kg/m^2   MR #         [id number]     Katerina Crowe [facility]   Accession #  [id number]  Interpreting Physician      José Hall   Fellow                   Referring Nurse                           Practitioner   Interpreting             Referring Physician         Rodrick Ferguson CNP  Fellow  Type of Study   TTE procedure:2D Echocardiogram, M-Mode, Doppler, Color Doppler. Procedure Date Date: 03/17/2022 Start: 08:28 AM Study Location: [facility] Technical Quality: Good visualization Indications:Chest pain and Hypoxia. Comments:Patient is wearing lifevest History / Tech. Comments: Procedure explained to patient. Patient Status: Inpatient Height: 66 inches Weight: 198 pounds BSA: 1.99 m^2 BMI: 31.96 kg/m^2 Rhythm: Within normal limits Allergies   - Sulfa. - *Unlisted:(statins). CONCLUSIONS Summary Normal left ventricular diameter. Left ventricular systolic function is moderately reduced. Estimated Left ventricular ejection fraction 35%. Inferior and inferolateral wall hypokinesis Left atrium is moderately dilated. Bioprosthetic aortic valve (TAVR) that is normal in appearance and function. Peak instantaneous gradient 8 mmHg and mean gradient 3 mmHg. Mild mitral regurgitation. No pericardial effusion.  Signature ----------------------------------------------------------------------------  Electronically signed by José Hall(Interpreting physician) on  2022 08:50 AM ---------------------------------------------------------------------------- FINDINGS Left Atrium Left atrium is moderately dilated. Left Ventricle Normal left ventricular diameter. Left ventricular systolic function is moderately reduced. Left ventricular ejection fraction 35 %. Unable to evaluate diastolic function. Right Atrium Right atrial dilatation. Right Ventricle Technically difficult visualization of the right ventricle, but it does appear to be dilated. Mitral Valve Normal mitral valve leaflets. Mild mitral regurgitation. Aortic Valve Bioprosthetic aortic valve (TAVR) that is normal in appearance and function. Peak instantaneous gradient 8 mmHg and mean gradient 3 mmHg. Tricuspid Valve Normal tricuspid valve structure and function. Pulmonic Valve The pulmonic valve is normal in structure. Pericardial Effusion No significant pericardial effusion is seen. Miscellaneous Normal aortic root dimension. IVC not visualized, unable to assess size and respiratory collapse.  M-mode / 2D Measurements & Calculations:   LVIDd:5.4 cm(3.7 - 5.6 cm)       Diastolic HEBCCA:729.6 ml  LVIDs:5.09 cm(2.2 - 4.0 cm)      Systolic WSLPMA:60.9 ml  BVFW:9.56 cm(0.6 - 1.1 cm)       LA Dimension: 4.6 cm(1.9 - 4.0 cm)  Fractional Shortenin.74 %  Calculated LVEF (%): 58.38 %   Mitral:                              Aortic   Peak E-Wave: 1.32 m/s                Peak Velocity: 1.43 m/s                                       Peak Gradient: 8.18 mmHg  Peak Gradient: 6.97 mmHg             Mean Gradient: 3 mmHg      XR CHEST (2 VW)    Result Date: 2022  EXAMINATION: TWO XRAY VIEWS OF THE CHEST 2022 4:52 pm COMPARISON: Chest x-ray dated 2022 HISTORY: ORDERING SYSTEM PROVIDED HISTORY: Hyperkalemia, history of CHF TECHNOLOGIST PROVIDED HISTORY: Hyperkalemia, history of CHF Reason for Exam: Hyperkalemia, history of CHF, nonsmoker FINDINGS: The cardiomediastinal silhouette is within normal limits. No acute airspace infiltrates. No pneumothorax or pleural effusion. No acute cardiopulmonary disease. XR CHEST PORTABLE    Result Date: 3/30/2022  EXAMINATION: ONE XRAY VIEW OF THE CHEST 3/30/2022 6:56 am COMPARISON: 29 March 2022 HISTORY: ORDERING SYSTEM PROVIDED HISTORY: CHF TECHNOLOGIST PROVIDED HISTORY: CHF Reason for Exam: CHF FINDINGS: AP portable view of the chest time stamped at 611 hours demonstrates overlying cardiac monitoring electrodes. Heart size is normal.  Minimal pulmonary venous congestion is noted without overt edema effusion, consolidation or extrapleural air. Minimal pulmonary venous congestion. No significant change from prior study. Old granulomatous changes are noted. XR CHEST PORTABLE    Result Date: 3/29/2022  EXAMINATION: ONE XRAY VIEW OF THE CHEST 3/29/2022 6:38 am COMPARISON: AP chest from 03/28/2022 HISTORY: ORDERING SYSTEM PROVIDED HISTORY: chf TECHNOLOGIST PROVIDED HISTORY: chf Reason for Exam: CHF History of diabetes, hypertension, and COPD. FINDINGS: Overlying ECG monitor leads and gown snaps. Cardiac silhouette WNL in size, and unchanged. Mediastinal structures midline and unremarkable. Mild cephalization of blood flow but no Kerley lines; minimal bibasilar atelectasis or scarring and unchanged tiny nodular density right upper lung. Bones stable. No radiographic CHF. Mild venous hypertension and minimal bibasilar atelectasis. XR CHEST PORTABLE    Result Date: 3/28/2022  EXAMINATION: ONE XRAY VIEW OF THE CHEST 3/28/2022 12:15 pm COMPARISON: None. HISTORY: ORDERING SYSTEM PROVIDED HISTORY: dyspnea TECHNOLOGIST PROVIDED HISTORY: dyspnea Reason for Exam: shortness of breath FINDINGS: The lungs are clear, no effusion. No pneumothorax. Heart is normal size. Mediastinal and hilar contours are within normal limits.  Bony thorax no acute abnormality. 1. No acute cardiopulmonary abnormality. XR CHEST PORTABLE    Result Date: 3/22/2022  EXAMINATION: ONE XRAY VIEW OF THE CHEST 3/22/2022 9:38 am COMPARISON: AP chest from 03/21/2022 HISTORY: ORDERING SYSTEM PROVIDED HISTORY: dyspnea TECHNOLOGIST PROVIDED HISTORY: dyspnea Reason for Exam: Shortness of breath Additional history of diabetes, hypertension, chronic kidney disease, CAD, and recent pneumonia. FINDINGS: Overlying ECG monitor leads and gown snaps, and respiratory tubing. Status post TAVR. Enlarged but stable appearing cardiac silhouette. Mediastinal structures midline and unchanged. Kerley lines demonstrated previously essentially resolved. Mild parenchymal changes mid lower zones without interval change. No consolidation or sizable pleural effusion. No pneumothorax. DJD spine and shoulders. Improved interstitial pleura. No other interval change. XR CHEST PORTABLE    Result Date: 3/21/2022  EXAMINATION: ONE XRAY VIEW OF THE CHEST 3/21/2022 7:44 pm COMPARISON: 03/17/2022 HISTORY: ORDERING SYSTEM PROVIDED HISTORY: shortness of breath TECHNOLOGIST PROVIDED HISTORY: shortness of breath Reason for Exam: SOB, CHF FINDINGS: Cardiomediastinal silhouette is unchanged in size. No pleural effusion or pneumothorax. No pulmonary consolidation. There is mild interstitial prominence. No acute osseous abnormality. Cardiomegaly with suspected interstitial edema. XR CHEST PORTABLE    Result Date: 3/17/2022  EXAMINATION: ONE XRAY VIEW OF THE CHEST 3/17/2022 1:38 am COMPARISON: 12/23/2021 HISTORY: ORDERING SYSTEM PROVIDED HISTORY: Chest Pain TECHNOLOGIST PROVIDED HISTORY: Chest Pain Reason for Exam: Chest pain, hx CHF FINDINGS: The cardiac silhouette is at the upper limits of normal in size. Mediastinal contours are normal.  There is mild pulmonary vascular congestion with low lung volumes. No focal lung infiltrate.   Calcified granulomas noted in the right mid lung field.  No pneumothorax. The visualized osseous structures are unremarkable. 1. Low lung volumes with mild pulmonary vascular congestion, not appreciably changed.        LABS:  Results for orders placed or performed during the hospital encounter of 04/14/22   CBC with Auto Differential   Result Value Ref Range    WBC 9.2 3.5 - 11.3 k/uL    RBC 4.81 4.21 - 5.77 m/uL    Hemoglobin 12.8 (L) 13.0 - 17.0 g/dL    Hematocrit 38.6 (L) 40.7 - 50.3 %    MCV 80.2 (L) 82.6 - 102.9 fL    MCH 26.6 25.2 - 33.5 pg    MCHC 33.2 25.2 - 33.5 g/dL    RDW 14.8 (H) 11.8 - 14.4 %    Platelets 599 375 - 244 k/uL    MPV 10.1 8.1 - 13.5 fL    NRBC Automated 0.0 0.0 per 100 WBC    Seg Neutrophils 69 (H) 36 - 65 %    Lymphocytes 15 (L) 24 - 43 %    Monocytes 8 3 - 12 %    Eosinophils % 6 (H) 1 - 4 %    Basophils 1 0 - 2 %    Immature Granulocytes 1 (H) 0 %    Segs Absolute 6.38 1.50 - 8.10 k/uL    Absolute Lymph # 1.35 1.10 - 3.70 k/uL    Absolute Mono # 0.76 0.10 - 1.20 k/uL    Absolute Eos # 0.51 (H) 0.00 - 0.44 k/uL    Basophils Absolute 0.09 0.00 - 0.20 k/uL    Absolute Immature Granulocyte 0.08 0.00 - 0.30 k/uL    RBC Morphology ANISOCYTOSIS PRESENT    Basic Metabolic Panel w/ Reflex to MG   Result Value Ref Range    Glucose 178 (H) 70 - 99 mg/dL    BUN 41 (H) 8 - 23 mg/dL    CREATININE 1.55 (H) 0.70 - 1.20 mg/dL    Bun/Cre Ratio 26 (H) 9 - 20    Calcium 9.0 8.6 - 10.4 mg/dL    Sodium 132 (L) 135 - 144 mmol/L    Potassium 5.2 3.7 - 5.3 mmol/L    Chloride 100 98 - 107 mmol/L    CO2 23 20 - 31 mmol/L    Anion Gap 9 9 - 17 mmol/L    GFR Non-African American 44 (L) >60 mL/min    GFR  53 (L) >60 mL/min    GFR Comment         EKG 12 Lead   Result Value Ref Range    Ventricular Rate 47 BPM    Atrial Rate 47 BPM    P-R Interval 270 ms    QRS Duration 104 ms    Q-T Interval 398 ms    QTc Calculation (Bazett) 352 ms    P Axis 42 degrees    R Axis -61 degrees    T Axis 42 degrees         EMERGENCY DEPARTMENT COURSE:   Vitals: Vitals:    04/14/22 1604 04/14/22 1615 04/14/22 1718   BP: (!) 140/67 130/61 121/69   Pulse: 56 50 54   Resp: 16 16 16   Temp: 97.3 °F (36.3 °C)     TempSrc: Tympanic     SpO2: 96% 97% 96%   Weight: 140 lb (63.5 kg)     Height: 5' 6\" (1.676 m)       -------------------------  BP: 121/69, Temp: 97.3 °F (36.3 °C), Pulse: 54, Resp: 16      RE-EVALUATION:  5:32 PM EDT patient took a dose of Kayexalate 15 g p.o. while here in the emergency department. His BUN is 41, creatinine 1.55, GFR 44, and potassium 5.2. Blood work has improved significantly. He is going to stop his Aldactone and his losartan as Dr. Amauri Cope had requested. He will be discharged home and follow-up on an outpatient basis to recheck kidney function and potassium. CONSULTS:      PROCEDURES:  None    FINAL IMPRESSION      1. Hyperkalemia    2. Chronic kidney disease, unspecified CKD stage          DISPOSITION/PLAN   DISPOSITION        CONDITION ON DISPOSITION:   Improved    PATIENT REFERRED TO:  Vaishnavi Bai, [address] [place] [address]  [phone]    Call in 4 days      [address] Paulina Gil  [phone]    Call in 4 days        DISCHARGE MEDICATIONS:  Current Discharge Medication List          (Please note that portions of this note were completed with a voicerecognition program.  Efforts were made to edit the dictations but occasionally words are mis-transcribed. )    Unknown DO Enmanuel, F.A.C.E.P.   Attending Emergency Medicine Physician        Cyndie Flores DO  04/14/22 [id number]

## 2022-04-15 ENCOUNTER — TELEPHONE (OUTPATIENT)
Dept: INTERNAL MEDICINE | Age: 77
End: 2022-04-15

## 2022-04-15 DIAGNOSIS — E87.5 HYPERKALEMIA: Primary | ICD-10-CM

## 2022-04-15 DIAGNOSIS — E87.6 HYPOKALEMIA: ICD-10-CM

## 2022-04-15 NOTE — TELEPHONE ENCOUNTER
Patient was in ER Thursday for Hypokalemia and he was told to have his PCP order potassium lab for Monday.     Last Appt:  4/6/2022  Next Appt:   5/9/2022  Med verified in Epic

## 2022-04-19 ENCOUNTER — OFFICE VISIT (OUTPATIENT)
Dept: CARDIOLOGY | Age: 77
End: 2022-04-19
Payer: MEDICARE

## 2022-04-19 ENCOUNTER — TELEPHONE (OUTPATIENT)
Dept: INTERNAL MEDICINE | Age: 77
End: 2022-04-19

## 2022-04-19 VITALS
OXYGEN SATURATION: 98 % | WEIGHT: 149.6 LBS | HEART RATE: 61 BPM | BODY MASS INDEX: 24.04 KG/M2 | SYSTOLIC BLOOD PRESSURE: 130 MMHG | DIASTOLIC BLOOD PRESSURE: 68 MMHG | HEIGHT: 66 IN

## 2022-04-19 DIAGNOSIS — R06.02 SHORTNESS OF BREATH: ICD-10-CM

## 2022-04-19 DIAGNOSIS — N18.32 STAGE 3B CHRONIC KIDNEY DISEASE (HCC): ICD-10-CM

## 2022-04-19 DIAGNOSIS — I21.4 NSTEMI (NON-ST ELEVATED MYOCARDIAL INFARCTION) (HCC): ICD-10-CM

## 2022-04-19 DIAGNOSIS — R10.31 RIGHT LOWER QUADRANT PAIN: Primary | ICD-10-CM

## 2022-04-19 DIAGNOSIS — I50.23 ACUTE ON CHRONIC SYSTOLIC HEART FAILURE (HCC): ICD-10-CM

## 2022-04-19 DIAGNOSIS — I25.10 CORONARY ARTERY DISEASE INVOLVING NATIVE CORONARY ARTERY OF NATIVE HEART WITHOUT ANGINA PECTORIS: ICD-10-CM

## 2022-04-19 DIAGNOSIS — K40.90 HERNIA, INGUINAL, RIGHT: ICD-10-CM

## 2022-04-19 DIAGNOSIS — E78.2 MIXED HYPERLIPIDEMIA: ICD-10-CM

## 2022-04-19 DIAGNOSIS — I73.9 PERIPHERAL ARTERIAL DISEASE (HCC): ICD-10-CM

## 2022-04-19 DIAGNOSIS — I10 HYPERTENSION, ESSENTIAL: ICD-10-CM

## 2022-04-19 DIAGNOSIS — E11.21 TYPE 2 DIABETES MELLITUS WITH PROTEINURIC DIABETIC NEPHROPATHY (HCC): ICD-10-CM

## 2022-04-19 DIAGNOSIS — Z95.2 S/P TAVR (TRANSCATHETER AORTIC VALVE REPLACEMENT): ICD-10-CM

## 2022-04-19 DIAGNOSIS — I20.9 TYPICAL ANGINA (HCC): Primary | ICD-10-CM

## 2022-04-19 PROCEDURE — 3052F HG A1C>EQUAL 8.0%<EQUAL 9.0%: CPT | Performed by: INTERNAL MEDICINE

## 2022-04-19 PROCEDURE — 4040F PNEUMOC VAC/ADMIN/RCVD: CPT | Performed by: INTERNAL MEDICINE

## 2022-04-19 PROCEDURE — 1036F TOBACCO NON-USER: CPT | Performed by: INTERNAL MEDICINE

## 2022-04-19 PROCEDURE — 99214 OFFICE O/P EST MOD 30 MIN: CPT | Performed by: INTERNAL MEDICINE

## 2022-04-19 PROCEDURE — G8427 DOCREV CUR MEDS BY ELIG CLIN: HCPCS | Performed by: INTERNAL MEDICINE

## 2022-04-19 PROCEDURE — 1111F DSCHRG MED/CURRENT MED MERGE: CPT | Performed by: INTERNAL MEDICINE

## 2022-04-19 PROCEDURE — 1123F ACP DISCUSS/DSCN MKR DOCD: CPT | Performed by: INTERNAL MEDICINE

## 2022-04-19 PROCEDURE — G8420 CALC BMI NORM PARAMETERS: HCPCS | Performed by: INTERNAL MEDICINE

## 2022-04-19 PROCEDURE — 99213 OFFICE O/P EST LOW 20 MIN: CPT | Performed by: INTERNAL MEDICINE

## 2022-04-19 PROCEDURE — 2022F DILAT RTA XM EVC RTNOPTHY: CPT | Performed by: INTERNAL MEDICINE

## 2022-04-19 PROCEDURE — 3017F COLORECTAL CA SCREEN DOC REV: CPT | Performed by: INTERNAL MEDICINE

## 2022-04-19 NOTE — PROGRESS NOTES
daily If weight gain more than 2 pounds or sudden increase in shortness of breath or cough take an additional 40 mg tablet and call physician 3/31/22  Yes Jose A Parker   carvedilol (COREG) 6.25 MG tablet TAKE 1 TABLET BY MOUTH TWO TIMES A DAY WITH breakfast and dinner MEALS 3/26/22  Yes Historical Provider, MD   aspirin 81 MG chewable tablet Take 81 mg by mouth daily   Yes Historical Provider, MD   clopidogrel (PLAVIX) 75 MG tablet Take 1 tablet by mouth daily Take 1 daily 12/6/19  Yes ESTHER Schilling CNP   simvastatin (ZOCOR) 40 MG tablet Take 10 mg by mouth nightly    Yes Historical Provider, MD   glipiZIDE (GLUCOTROL) 10 MG tablet Take 10 mg by mouth daily    Yes Historical Provider, MD   vitamin D (CHOLECALCIFEROL) 1000 UNIT TABS tablet Take 1,000 Units by mouth 2 times daily    Yes Historical Provider, MD   metFORMIN (GLUCOPHAGE) 1000 MG tablet Take 1 tablet by mouth 2 times daily (with meals) 8/25/16  Yes ESTHER López CNP   sodium polystyrene (SPS) 15 GM/60ML suspension Take 60 mLs by mouth once for 1 dose 4/14/22 4/14/22  ESTHER Goode CNP       Allergies:  Atorvastatin, Sulfa antibiotics, and Sulfanilamide    Social History:   reports that he quit smoking about 52 years ago. He has a 1.00 pack-year smoking history. He has never used smokeless tobacco. He reports current alcohol use. He reports that he does not use drugs. Family History: family history includes Coronary Art Dis in his brother and sister; Heart Attack in his father; Hypertension in his mother; [place] in his mother; Stroke in his mother. No h/o sudden cardiac death. No for premature CAD    REVIEW OF SYSTEMS:    · Constitutional: there has been no unanticipated weight loss. There's been No change in energy level, No change in activity level. · Eyes: No visual changes or diplopia. No scleral icterus. · ENT: No Headaches, hearing loss or vertigo.  No mouth sores or sore throat. · Cardiovascular: see above  · Respiratory: see above  · Gastrointestinal: No abdominal pain, appetite loss, blood in stools. · Genitourinary: No dysuria, trouble voiding, or hematuria. · Musculoskeletal:  No gait disturbance, No weakness or joint complaints. · Integumentary: No rash or pruritis. · Neurological: No headache or diplopia. No tingling  · Psychiatric: No anxiety, or depression. · Endocrine: No temperature intolerance. · Hematologic/Lymphatic: No abnormal bruising or bleeding, blood clots or swollen lymph nodes. · Allergic/Immunologic: No nasal congestion or hives. PHYSICAL EXAM:      /68   Pulse 61   Ht 5' 6\" (1.676 m)   Wt 149 lb 9.6 oz (67.9 kg)   SpO2 98%   BMI 24.15 kg/m²   General appearance: alert and cooperative with exam  HEENT: Head: Normocephalic, no lesions, without obvious abnormality.   Eyes: PERRL, EOMI  Ears: Not obvious deformations or lack of hearing  Neck: no carotid bruit, no JVD  Lungs: clear to auscultation bilaterally  Heart: regular rate and rhythm, S1, S2 normal, no murmur, click, rub or gallop  Abdomen: soft, non-tender; bowel sounds normal; no masses,  no organomegaly  Extremities: extremities normal, atraumatic, no cyanosis or edema  Neurologic: Mental status: Alert, oriented, thought content appropriate  Skin: WNL for age and condition, no obvious rashes or leasions    Labs:     Lab Results   Component Value Date    CHOL 142 03/17/2022    TRIG 112 03/17/2022    HDL 35 (L) 03/17/2022    LDLCHOLESTEROL 85 03/17/2022    VLDL NOT REPORTED 07/22/2021    CHOLHDLRATIO 4.1 03/17/2022     Lab Results   Component Value Date     (L) 04/14/2022    K 5.2 04/14/2022     04/14/2022    CO2 23 04/14/2022    BUN 41 (H) 04/14/2022    CREATININE 1.55 (H) 04/14/2022    GLUCOSE 178 (H) 04/14/2022    CALCIUM 9.0 04/14/2022    PROT 7.1 03/28/2022    LABALBU 3.8 03/28/2022    BILITOT 0.25 (L) 03/28/2022    ALKPHOS 100 03/28/2022    AST 17 03/28/2022    ALT 15 03/28/2022    LABGLOM 44 (L) 04/14/2022    GFRAA 53 (L) 04/14/2022    GLOB 3.3 03/28/2022     Cardiac Data:  EKG: Sinus  Rhythm  -First degree A-V block   Ml = 250  -Left axis -anterior fascicular block. - Extensive anterior-lateral infarct  Old.    -  Nonspecific T-abnormality. TTE 12/3/19  Summary  Left ventricle is normal in size. Global left ventricular systolic function  is moderately reduced. Calculated EF via heart model is 31 %. Grade I (mild) left ventricular diastolic dysfunction. Left atrium is mildly dilated. Right atrial dilatation. Mildly dilated right ventricular cavity. Right ventricular function appears normal .  The aortic valve is calcified with restricted cusp mobility. Moderate Aortic Stenosis, however, maybe underestimated due to poor LVEF. Mild mitral regurgitation. Trivial tricuspid regurgitation. Estimated right ventricular systolic  pressure is 27 mmHg.     Cardiac cath 12/3/19   Procedure Summary      Severe aortic stenosis.   Mild LV systolic dysfunction.   Patent mid LAD, proximal D1 and mid LCX stents. Focal distal LAD 70%  stenosis.      Recommendations      Medical therapy as needed.   Risk factor modification.   Elective CV surgical consultation for AVR/ CABG     Echo 01/23/2020:  Left ventricle is mildly enlarged, basal septal hypertrophy, global left  ventricular systolic function is moderately to severely reduced, calculated  ejection fraction is 36%. Grade I (mild) left ventricular diastolic dysfunction. Left atrium is moderately dilated. Normal right ventricular size and function. Bioprosthetic valve is seated in the aortic position, appears to be  functioning normally with no obvious regrugitation or stenosis. Trivial perivalvular leak noted. Peak instantaneous gradient 17 mmHg and mean gradient 9 mmHg. Mitral valve sclerosis without stenosis. Mild mitral regurgitation. Tricuspid valve was not well visualized.   Trivial tricuspid regurgitation.     S/p TAVR 1/21/2020   34 mm CoreValve EVOLUT via right Femoral approach. Serial # [id number]     TTE 2/27/2020  Summary  Thinned and severely hypokinetic inferolateral wall. Left ventricular systolic function is mildly reduced. Left ventricular ejection fraction 45 %. Grade II (moderate) left ventricular diastolic dysfunction. Right ventricular dilatation with normal systolic function. Bioprosthetic aortic valve( TAVR) that is normal in appearance and function. Peak instantaneous gradient 9 mmHg and mean gradient 5 mmHg. Trace aortic insufficiency. Mitral leaflets are mildly thickened without stenosis. Mild mitral regurgitation. No pericardial effusion. Echo: 3/16/21  Normal left ventricular diameter. Mildly thickened interventricular septum. Thinned and akinetic inferolateral wall. Left ventricular systolic function is mildly reduced. Left ventricular ejection fraction 45 %. Grade I (mild) left ventricular diastolic dysfunction. Left atrium is severely dilated. Right atrial dilatation. Right ventricular dilatation with normal systolic function. Bioprosthetic aortic valve (per TAVR) that is normal in appearance and  function. Peak instantaneous gradient 12 mmHg and mean gradient 6 mmHg. Mitral annular calcification. Mild mitral regurgitation. Normal function of other valves. No pericardial effusion.     Echo 12/21:   Dilated left ventricle with severely reduced systolic function. Estimated EF 25-30%  Severe hypokinesis of inferior, inferolateral and inferoseptal walls. Grade I (mild) left ventricular diastolic dysfunction. Normal right ventricular size and function. Bioprosthetic aortic valve (TAVR) is noted in position, functioning normally  with no significant stenosis/regurgitation or perivalvular leak (Mean  gradient 5 mm hg)  Mild mitral regurgitation. Mild tricuspid regurgitation. Estimated right ventricular systolic pressure is 41 mmHg.   No significant pericardial effusion is seen.    Cath 12/21:  Conclusions:  Patent LAD, D, and LCX stents     Assessment and Plan:     1. Ischemic and Non ischemic CM- recently worsened with drop in LVEF to 25-30% on Echo 12/21. MUGA scan in Jan 2022 showed EF 32%. Declined ICD after extensive discussion. 2. Cath on 12/21- patent stents with stable non obstructive disease (after drop in LVEF)  3. Daiva Ulises CAD with h/o DEVON to LAD, D and mid LCX 8/23/16- patent stent 12/21. Continue current meds  4. Severe aortic stenosis s/p TAVR on 1/21/2020 - normal function Echo 12/21. 5. Carotid artery disease. Follows with vascular surgery. 6. HTN- stable, chronic  7. Hyperlipidemia- Patient is on low dose simvastatin which he is able to tolerate. Any increase in dose or switch has caused him severe myalgia. I recommended him addition of zetia. He does not want to take this new medication. 8. H/o agent orange exposure. 9. CKD- Has been referred to nephrology by PCP  10. Right lower ext numbess. Seeing neurology and vascular surgery. The patient is to continue heart healthy diet, weight loss and exercise as tolerated. Patient's medications and side effects were discussed. Medication refills were provided if needed. Follow up appointment timing was discussed. All questions and concerns were addressed to patient's satisfaction. The patient is to follow up in 6 months or sooner if necessary. Thank you for allowing me to participate in the care of this patient, please do not hesitate to call if you have any questions. Mark Dickson, [id number] [facility]  [facility]. [facility]  [phone]

## 2022-04-19 NOTE — TELEPHONE ENCOUNTER
Attempted to reach patient to follow up regarding his ER visit on 4/14/22 for hyperkalemia. Patient saw cardiology today. Pt has follow up labs later this week.

## 2022-04-21 ENCOUNTER — INITIAL CONSULT (OUTPATIENT)
Dept: SURGERY | Age: 77
End: 2022-04-21
Payer: MEDICARE

## 2022-04-21 VITALS
BODY MASS INDEX: 24.11 KG/M2 | HEIGHT: 66 IN | DIASTOLIC BLOOD PRESSURE: 68 MMHG | SYSTOLIC BLOOD PRESSURE: 138 MMHG | HEART RATE: 48 BPM | WEIGHT: 150 LBS

## 2022-04-21 DIAGNOSIS — K42.9 UMBILICAL HERNIA WITHOUT OBSTRUCTION AND WITHOUT GANGRENE: ICD-10-CM

## 2022-04-21 PROCEDURE — 4040F PNEUMOC VAC/ADMIN/RCVD: CPT | Performed by: SURGERY

## 2022-04-21 PROCEDURE — 1123F ACP DISCUSS/DSCN MKR DOCD: CPT | Performed by: SURGERY

## 2022-04-21 PROCEDURE — 3017F COLORECTAL CA SCREEN DOC REV: CPT | Performed by: SURGERY

## 2022-04-21 PROCEDURE — 99213 OFFICE O/P EST LOW 20 MIN: CPT | Performed by: SURGERY

## 2022-04-21 PROCEDURE — 1111F DSCHRG MED/CURRENT MED MERGE: CPT | Performed by: SURGERY

## 2022-04-21 PROCEDURE — G8420 CALC BMI NORM PARAMETERS: HCPCS | Performed by: SURGERY

## 2022-04-21 PROCEDURE — 1036F TOBACCO NON-USER: CPT | Performed by: SURGERY

## 2022-04-21 PROCEDURE — G8427 DOCREV CUR MEDS BY ELIG CLIN: HCPCS | Performed by: SURGERY

## 2022-04-21 PROCEDURE — 99215 OFFICE O/P EST HI 40 MIN: CPT | Performed by: SURGERY

## 2022-04-21 NOTE — PROGRESS NOTES
Pt Name: Chetan Felix  MRN: [id number]  [place]: 1945  Date of evaluation: 4/21/2022  Primary Care Physician: ESTHER Morejon - CNP    Reason for evaluation: abdominal bulge            Patient complains of a bulge at the umbilical hernia,   for  2year(s). Patient  does not remember a time they felt a popping sensation. .  Patient states it is  reducible. Pt describes pain as None today out of 10. Aggrevating factors include None. Patient denies any nausea, vomiting, constipation, difficulty urinating or a chronic cough. Patient's work includes works at [employer]. Patient's hernia history includes No previous hernia. Instructions given for surgery.

## 2022-04-22 ENCOUNTER — HOSPITAL ENCOUNTER (OUTPATIENT)
Dept: LAB | Age: 77
Discharge: HOME | End: 2022-04-22
Payer: MEDICARE

## 2022-04-22 ENCOUNTER — TELEPHONE (OUTPATIENT)
Dept: SURGERY | Age: 77
End: 2022-04-22

## 2022-04-22 ENCOUNTER — CARE COORDINATION (OUTPATIENT)
Dept: CASE MANAGEMENT | Age: 77
End: 2022-04-22

## 2022-04-22 DIAGNOSIS — N18.31 CHRONIC KIDNEY DISEASE, STAGE 3A (HCC): ICD-10-CM

## 2022-04-22 DIAGNOSIS — E87.5 HYPERKALEMIA: ICD-10-CM

## 2022-04-22 LAB
ANION GAP SERPL CALCULATED.3IONS-SCNC: 9 MMOL/L (ref 9–17)
BUN BLDV-MCNC: 35 MG/DL (ref 8–23)
BUN/CREAT BLD: 26 (ref 9–20)
CALCIUM SERPL-MCNC: 9.6 MG/DL (ref 8.6–10.4)
CHLORIDE BLD-SCNC: 102 MMOL/L (ref 98–107)
CO2: 26 MMOL/L (ref 20–31)
CREAT SERPL-MCNC: 1.33 MG/DL (ref 0.7–1.2)
GFR AFRICAN AMERICAN: >60 ML/MIN
GFR NON-AFRICAN AMERICAN: 52 ML/MIN
GFR SERPL CREATININE-BSD FRML MDRD: ABNORMAL ML/MIN/{1.73_M2}
GLUCOSE BLD-MCNC: 109 MG/DL (ref 70–99)
POTASSIUM SERPL-SCNC: 5.3 MMOL/L (ref 3.7–5.3)
SODIUM BLD-SCNC: 137 MMOL/L (ref 135–144)

## 2022-04-22 PROCEDURE — 80048 BASIC METABOLIC PNL TOTAL CA: CPT

## 2022-04-22 PROCEDURE — 36415 COLL VENOUS BLD VENIPUNCTURE: CPT

## 2022-04-22 NOTE — TELEPHONE ENCOUNTER
[address]         Patient:Jose Enrique Collazo           :1945           Surgical/Procedure Planned: Open umbilical hernia repair with mesh    Date & Location: [facility] on 22       Outpatient   Planned Length of OR: 1.5 hours    Sedation: intravenous sedation        Estimated Cardiac Risk for Non-Cardiac Surgery/Procedure     Low______ Moderate___X___ High______    Medication Instructions - Clarification needed by this date:         ASA 81 mg/325 mg Hold __5-7_ Days  Plavix   Hold __5-7_ Days       Lovenox indicated: _____Yes __x___NO    Provider:Dr. Hernan Castano of Provider Giving Orders for Medication holds:    _______Rommel Cueva DO ______________________________________

## 2022-04-22 NOTE — CARE COORDINATION
March      Patient: Lizbeth Pond   Patient : 1945           Is patient active with support services? Yes - new CHF clinic appt   Home Self-managing equipment:  Life vest discontinued   Continued Care Coordination Recommended:  yes - CT episode resolved, transition completed.     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 ([address]    Non-rheumatic mitral regurgitation    Nonrheumatic aortic valve stenosis    Uncontrolled type 2 diabetes mellitus with nephropathy ([address]    Coronary artery disease involving native coronary artery of native heart without angina pectoris    Controlled type 2 diabetes mellitus with mild nonproliferative retinopathy and macular edema, without long-term current use of insulin (HCC)    Carotid stenosis    Chronic kidney disease, stage 3a ([address]    Aortic stenosis, severe    Hyperlipidemia    H/O agent Orange exposure    Diabetes mellitus, type 2 ([address]    CAD (coronary artery disease)    Anemia    Proteinuria    Wears hearing aid in both ears    Acute on chronic systolic heart failure ([address]    S/P TAVR (transcatheter aortic valve replacement)    Second hand smoke exposure    Peripheral arterial disease (HCC)    Heavy sensation of lower extremity    Impaired ambulation    Acute respiratory failure with hypoxia (HCC)    NSTEMI (non-ST elevated myocardial infarction) ([address]    BJ (acute kidney injury) ([address]    Obesity    Hypoxia    Incarcerated umbilical hernia    CHF (congestive heart failure), NYHA class I, acute on chronic, combined ([address]    Shortness of breath       Follow Up  Future Appointments   Date Time Provider Julio Ratliff   2022 10:00 AM KIRAN CHF RM 1 KIRAN CHF [place] HOD   2022  1:30 PM [facility]

## 2022-04-22 NOTE — TELEPHONE ENCOUNTER
[address]         Patient:Jose Enrique Luu           :1945           Surgical/Procedure Planned: Open umbilical hernia repair with mesh    Date & Location: 22       Outpatient   Planned Length of OR: 1.5 hours    Sedation: intravenous sedation    Medication Instructions - Clarification needed by this date:     -Other medications: Diabetic meds. Hold all DM meds day of surgery and also have him hold his evening metformin evening prior to procedure- he will need cardiac clearance for procedure.      Resume medications:  Once resuming normal diet       Provider:Dr. Edy Marquez of Provider Giving Orders for Medication holds:    Electronically signed by ESTHER Ling CNP on 2022 at 11:34 AM    _____________________________________________

## 2022-04-25 ENCOUNTER — CARE COORDINATION (OUTPATIENT)
Dept: CARE COORDINATION | Age: 77
End: 2022-04-25

## 2022-04-25 ENCOUNTER — TELEPHONE (OUTPATIENT)
Dept: SURGERY | Age: 77
End: 2022-04-25

## 2022-04-25 SDOH — ECONOMIC STABILITY: HOUSING INSECURITY
IN THE LAST 12 MONTHS, WAS THERE A TIME WHEN YOU DID NOT HAVE A STEADY PLACE TO SLEEP OR SLEPT IN A SHELTER (INCLUDING NOW)?: NO

## 2022-04-25 SDOH — ECONOMIC STABILITY: INCOME INSECURITY: IN THE LAST 12 MONTHS, WAS THERE A TIME WHEN YOU WERE NOT ABLE TO PAY THE MORTGAGE OR RENT ON TIME?: NO

## 2022-04-25 SDOH — HEALTH STABILITY: PHYSICAL HEALTH: ON AVERAGE, HOW MANY DAYS PER WEEK DO YOU ENGAGE IN MODERATE TO STRENUOUS EXERCISE (LIKE A BRISK WALK)?: 5 DAYS

## 2022-04-25 SDOH — HEALTH STABILITY: PHYSICAL HEALTH: ON AVERAGE, HOW MANY MINUTES DO YOU ENGAGE IN EXERCISE AT THIS LEVEL?: 60 MIN

## 2022-04-25 SDOH — ECONOMIC STABILITY: FOOD INSECURITY: WITHIN THE PAST 12 MONTHS, THE FOOD YOU BOUGHT JUST DIDN'T LAST AND YOU DIDN'T HAVE MONEY TO GET MORE.: NEVER TRUE

## 2022-04-25 SDOH — ECONOMIC STABILITY: FOOD INSECURITY: WITHIN THE PAST 12 MONTHS, YOU WORRIED THAT YOUR FOOD WOULD RUN OUT BEFORE YOU GOT MONEY TO BUY MORE.: NEVER TRUE

## 2022-04-25 SDOH — ECONOMIC STABILITY: TRANSPORTATION INSECURITY
IN THE PAST 12 MONTHS, HAS THE LACK OF TRANSPORTATION KEPT YOU FROM MEDICAL APPOINTMENTS OR FROM GETTING MEDICATIONS?: NO

## 2022-04-25 SDOH — ECONOMIC STABILITY: HOUSING INSECURITY: IN THE LAST 12 MONTHS, HOW MANY PLACES HAVE YOU LIVED?: 1

## 2022-04-25 SDOH — ECONOMIC STABILITY: TRANSPORTATION INSECURITY
IN THE PAST 12 MONTHS, HAS LACK OF TRANSPORTATION KEPT YOU FROM MEETINGS, WORK, OR FROM GETTING THINGS NEEDED FOR DAILY LIVING?: NO

## 2022-04-25 ASSESSMENT — SOCIAL DETERMINANTS OF HEALTH (SDOH)
HOW OFTEN DO YOU ATTEND CHURCH OR RELIGIOUS SERVICES?: MORE THAN 4 TIMES PER YEAR
HOW OFTEN DO YOU GET TOGETHER WITH FRIENDS OR RELATIVES?: MORE THAN THREE TIMES A WEEK
IN A TYPICAL WEEK, HOW MANY TIMES DO YOU TALK ON THE PHONE WITH FAMILY, FRIENDS, OR NEIGHBORS?: MORE THAN THREE TIMES A WEEK
DO YOU BELONG TO ANY CLUBS OR ORGANIZATIONS SUCH AS CHURCH GROUPS UNIONS, FRATERNAL OR ATHLETIC GROUPS, OR SCHOOL GROUPS?: YES
HOW OFTEN DO YOU ATTENT MEETINGS OF THE CLUB OR ORGANIZATION YOU BELONG TO?: MORE THAN 4 TIMES PER YEAR
HOW HARD IS IT FOR YOU TO PAY FOR THE VERY BASICS LIKE FOOD, HOUSING, MEDICAL CARE, AND HEATING?: NOT HARD AT ALL

## 2022-04-25 ASSESSMENT — LIFESTYLE VARIABLES
HOW OFTEN DO YOU HAVE A DRINK CONTAINING ALCOHOL: MONTHLY OR LESS
HOW MANY STANDARD DRINKS CONTAINING ALCOHOL DO YOU HAVE ON A TYPICAL DAY: 1 OR 2

## 2022-04-25 NOTE — ACP (ADVANCE CARE PLANNING)
Advance Care Planning   Healthcare Decision Maker:    Primary Decision Maker: Felipe Rajan - [phone]    Click here to complete Healthcare Decision Makers including selection of the Healthcare Decision Maker Relationship (ie \"Primary\"). Today we documented Decision Maker(s) consistent with Legal Next of Kin hierarchy. He has POA and completed ACP documents at home.  He is aware to bring in for EMR

## 2022-04-25 NOTE — CARE COORDINATION
Ambulatory Care Coordination Note  4/25/2022  CM Risk Score: 12  Charlson 10 Year Mortality Risk Score: 100%     ACC: Dilma Brown, RN    Summary Note: Stanley Lopez was referred for ACM from CTN. He has:  Type II Diabetes- Hgb A1c 4/6/2022- 8.6- he just started [place], on medications, follows diet- does not monitor very often at home. Follows with PCP and at the [place]      CAD,  HTN,  Hyperlipidemia, CHf- EF 36%, S/P TAVR 2 years ago,  CHF clinic, low salt diet, fluid restriction, CHF Zone Tool, on medications, follows with cardiology          Plan of Care : Enrolled in ACM      Continue assessments, education and support     SDOH completed     Zone Tool- CHF, DM     Medications reviewed     [facility]. decision maker is up to date- he has completed forms at home and aware to bring in for EMR     Covid- vaccinated     Review clinic, urgent care, and My Chart     Care Gaps     Nutrition- they have followed with dietitian- declined referral      Care team updated     He follows with nephrology Q year     Apt CHF Clinic 5/5/2022     Apt PCP 5/9/2022     Apt cardiology 10/18/2022     He is scheduled for hernia surgery 5/16/2022     F/U wts and BS monitoring       4/25/2022- 11:50 am called and spoke with Stanley Lopez. He was in agreement to this writer following him. He was at the grocery store. This writer will return call today. 12:59 pm spoke with Stanley Lopez. He wrote this writer's contact information down. 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 Walking 30-60 minutes 5 days a week. He has full dentures- upper and partial bottom- follows with [facility]. He sees provider at the [place], receives most of medications through the [place], and receives his eye care there also. He weighs himself daily. Reviewed: Patient to weigh self daily; and record.  Should weigh first thing in the morning, wearing the same type of clothes, after urinating, before breakfast.  Patient to report a weight gain of 2 pounds over night or 5 pounds in a week.  Patient voiced understanding and in agreement. General Assessment    Do you have any symptoms that are causing concern?: No       Diabetes Assessment    Medic Alert ID: No  Meal Planning: Avoidance of concentrated sweets   How often do you test your blood sugar?: No Testing   Do you have barriers with adherence to non-pharmacologic self-management interventions? (Nutrition/Exercise/Self-Monitoring): No   Have you ever had to go to the ED for symptoms of low blood sugar?: No       No patient-reported symptoms       and   Congestive Heart Failure Assessment    Are you currently restricting fluids?: 2000cc  Do you understand a low sodium diet?: Yes  Do you understand how to read food labels?: Yes  How many restaurant meals do you eat per week?: 5 or more  Do you salt your food before tasting it?: No     No patient-reported symptoms      Symptoms:     Symptom course: stable  Patient-reported weight (lb): 144  Weight trend: increasing steadily  Salt intake watch compared to last visit: stable         Ambulatory Care Coordination Assessment    Care Coordination Protocol  Referral from Primary Care Provider: No  Week 1 - Initial Assessment     Do you have all of your prescriptions and are they filled?: Yes (Comment: picking up jardiance once processed (instead of farxiga))  Barriers to medication adherence: None  Are you able to afford your medications?: With Assistance  [facility]  How often do you have trouble taking your medications the way you have been told to take them?: I do not have to take medications. Do you have Home O2 Therapy?: No      Ability to seek help/take action for Emergent Urgent situations i.e. fire, crime, inclement weather or health crisis. : Independent  Ability to ambulate to restroom: Independent  Ability handle personal hygeine needs (bathing/dressing/grooming): Independent  Ability to manage Medications:  Independent  Ability to prepare Food Preparation: Independent  Ability to maintain home (clean home, laundry): Independent  Ability to drive and/or has transportation: Independent  Ability to do shopping: Independent  Ability to manage finances: Independent  Is patient able to live independently?: Yes     Current Housing: Private Residence        Per the Fall Risk Screening, did the patient have 2 or more falls or 1 fall with injury in the past year?: No     Frequent urination at night?: No  Do you use rails/bars?: Yes  Do you have a non-slip tub mat?: Yes     Are you experiencing loss of meaning?: No  Are you experiencing loss of hope and peace?: No     Thinking about your patient's physical health needs, are there any symptoms or problems (risk indicators) you are unsure about that require further investigation?: No identified areas of uncertainly or problems already being investigated   Are the patients physical health problems impacting on their mental well-being?: No identified areas of concern   Are there any problems with your patients lifestyle behaviors (alcohol, drugs, diet, exercise) that are impacting on physical or mental well-being?: No identified areas of concern   Do you have any other concerns about your patients mental well-being?  How would you rate their severity and impact on the patient?: No identified areas of concern   How would you rate their home environment in terms of safety and stability (including domestic violence, insecure housing, neighbor harassment)?: Consistently safe, supportive, stable, no identified problems   How do daily activities impact on the patient's well-being? (include current or anticipated unemployment, work, caregiving, access to transportation or other): No identified problems or perceived positive benefits   How would you rate their social network (family, work, friends)?: Good participation with social networks   How would you rate their financial resources (including ability to afford all required medical care)?: Financially secure, resources adequate, no identified problems   How wells does the patient now understand their health and well-being (symptoms, signs or risk factors) and what they need to do to manage their health?: Reasonable to good understanding and already engages in managing health or is willing to undertake better management   How well do you think your patient can engage in healthcare discussions? (Barriers include language, deafness, aphasia, alcohol or drug problems, learning difficulties, concentration): Clear and open communication, no identified barriers   Do other services need to be involved to help this patient?: Other care/services not required at this time   Are current services involved with this patient well-coordinated? (Include coordination with other services you are now recommendation): All required care/services in place and well-coordinated   Suggested Interventions and Community Resources   Disease Specific Clinic: Completed (Comment: CHF Clinic)   Registered Dietician: Brigette   Zone Management Tools: In Process         Set up/Review Goals, Set up/Review an Education Plan              Prior to Admission medications    Medication Sig Start Date End Date Taking?  Authorizing Provider   empagliflozin (JARDIANCE) 10 MG tablet Take 1 tablet by mouth daily 4/5/22  Yes Mauricio Shi MD   furosemide (LASIX) 40 MG tablet Take 1 tablet by mouth daily If weight gain more than 2 pounds or sudden increase in shortness of breath or cough take an additional 40 mg tablet and call physician 3/31/22  Yes Adilson Raphael   carvedilol (COREG) 6.25 MG tablet TAKE 1 TABLET BY MOUTH TWO TIMES A DAY WITH breakfast and dinner MEALS 3/26/22  Yes Historical Provider, MD   aspirin 81 MG chewable tablet Take 81 mg by mouth daily   Yes Historical Provider, MD   clopidogrel (PLAVIX) 75 MG tablet Take 1 tablet by mouth daily Take 1 daily 12/6/19  Yes Shaye Harrison, APRN - CNP simvastatin (ZOCOR) 40 MG tablet Take 10 mg by mouth nightly    Yes Historical Provider, MD   glipiZIDE (GLUCOTROL) 10 MG tablet Take 10 mg by mouth daily    Yes Historical Provider, MD   vitamin D (CHOLECALCIFEROL) 1000 UNIT TABS tablet Take 1,000 Units by mouth 2 times daily    Yes Historical Provider, MD   metFORMIN (GLUCOPHAGE) 1000 MG tablet Take 1 tablet by mouth 2 times daily (with meals) 8/25/16  Yes ESTHER Hunt CNP   sodium polystyrene (SPS) 15 GM/60ML suspension Take 60 mLs by mouth once for 1 dose 4/14/22 4/14/22  ESTHER Klein CNP   Handicap Placard MISC by Does not apply route Expires: 4/6/2024 4/6/22   ESTHER Klein CNP       Future Appointments   Date Time Provider Julio Ratliff   5/5/2022 10:00 AM KIRAN CHF RM 1 KIRAN CHF [place] HOD   5/9/2022  1:30 PM ESTHER Klein CNP [facility]   10/18/2022  9:15 AM DO STEVE Gauthier [facility]

## 2022-04-25 NOTE — TELEPHONE ENCOUNTER
Cardiology Cleared Moderate Risk, post TAVR EF 35%. Patent Stents noted on 12/21 Cath.   OK for GAGAN

## 2022-04-25 NOTE — TELEPHONE ENCOUNTER
([address]    BJ (acute kidney injury) ([address]    Obesity    Hypoxia    Incarcerated umbilical hernia    CHF (congestive heart failure), NYHA class I, acute on chronic, combined (HCC)    Shortness of breath     Past Medical History:   Diagnosis Date    Acne rosacea     treated with MetroGel    Anemia     Aortic stenosis     echo 3/18 Mod AS, Mild MR    BPH (benign prostatic hypertrophy)     CAD (coronary artery disease)     Stent x 3 DEVON 8/16    Carotid stenosis     Fairly minimal plaquing on ultrasound 5/14--in the conclusion described as less than 50% stenosis but in the body of the report described as minimal    Cataract of both eyes     CRI (chronic renal insufficiency)     Diabetes mellitus, type 2 ([facility])     1.) Proteinuria, 24 hour total urine protein 1,420 mg/24 hrs, 09/08, creatinine clearance 114, 09/08 2.) Repeat protein/creatinine ratio 2.97, 02/12 3. )24 hr urine collection 1958 mg/24 hrs, 04/12. 4.) Repeat protein/creatinine ratio 1.99, 08/12 a.) Repeat protein/creatinine ratio 2.14, 07/13 5.) Renal u/s ok 02/12  6) retinopathy at [address] 12/16  early mod. nonprolifferative  macular involved    Dyslipidemia     Erectile dysfunction     H/O agent Orange exposure     per patient in the [employer] along with exposure to cresote and naphtha    Hearing loss     Hyperlipidemia     Hypertension     Mild nonproliferative diabetic retinopathy ([address]     Non-rheumatic mitral regurgitation 06/27/2016    Pneumonia 7/31/2021    Pneumonia of right lower lobe due to infectious organism     RBBB     Sepsis ([address] 12/22/2021    Status post transcatheter aortic valve replacement (TAVR) using bioprosthesis 01/21/2020    Wears dentures     upper     Past Surgical History:   Procedure Laterality Date    AORTIC VALVE SURGERY  01/2020    CARDIAC CATHETERIZATION  12/03/2019    CARDIAC CATHETERIZATION  12/27/2021    Patent LAD, D, and LCX stents    CATARACT REMOVAL Bilateral     CIRCUMCISION  age 49    EYE SURGERY Bilateral     cataract    TYMPANOSTOMY TUBE PLACEMENT      VASECTOMY       Social History     Tobacco Use    Smoking status: Former Smoker     Packs/day: 0.50     Years: 2.00     Pack years: 1.00     Quit date: 1970     Years since quittin.3    Smokeless tobacco: Never Used   Vaping Use    Vaping Use: Never used   Substance Use Topics    Alcohol use: Yes     Alcohol/week: 0.0 standard drinks     Comment: RARELY    Drug use: No     Medications:  Current Outpatient Medications   Medication Sig Dispense Refill    sodium polystyrene (SPS) 15 GM/60ML suspension Take 60 mLs by mouth once for 1 dose 60 mL 0    Handicap Placard MISC by Does not apply route Expires: 2024 2 each 0    empagliflozin (JARDIANCE) 10 MG tablet Take 1 tablet by mouth daily 90 tablet 3    furosemide (LASIX) 40 MG tablet Take 1 tablet by mouth daily If weight gain more than 2 pounds or sudden increase in shortness of breath or cough take an additional 40 mg tablet and call physician 30 tablet 0    carvedilol (COREG) 6.25 MG tablet TAKE 1 TABLET BY MOUTH TWO TIMES A DAY WITH breakfast and dinner MEALS      aspirin 81 MG chewable tablet Take 81 mg by mouth daily      clopidogrel (PLAVIX) 75 MG tablet Take 1 tablet by mouth daily Take 1 daily 60 tablet 3    simvastatin (ZOCOR) 40 MG tablet Take 10 mg by mouth nightly       glipiZIDE (GLUCOTROL) 10 MG tablet Take 10 mg by mouth daily       vitamin D (CHOLECALCIFEROL) 1000 UNIT TABS tablet Take 1,000 Units by mouth 2 times daily       metFORMIN (GLUCOPHAGE) 1000 MG tablet Take 1 tablet by mouth 2 times daily (with meals) 60 tablet 3     No current facility-administered medications for this visit. Scheduled Meds:  Continuous Infusions:  PRN Meds:. Prior to Admission medications    Medication Sig Start Date End Date Taking?  Authorizing Provider   sodium polystyrene (SPS) 15 GM/60ML suspension Take 60 mLs by mouth once for 1 dose 22  Isaiah MATOS ESTHER Rhodes CNP   Handicap Placard MISC by Does not apply route Expires: 4/6/2024 4/6/22   Natalie ESTHER Kelly CNP   empagliflozin (JARDIANCE) 10 MG tablet Take 1 tablet by mouth daily 4/5/22   Karrie Almaraz MD   furosemide (LASIX) 40 MG tablet Take 1 tablet by mouth daily If weight gain more than 2 pounds or sudden increase in shortness of breath or cough take an additional 40 mg tablet and call physician 3/31/22   Samuel Pastrana   carvedilol (COREG) 6.25 MG tablet TAKE 1 TABLET BY MOUTH TWO TIMES A DAY WITH breakfast and dinner MEALS 3/26/22   Historical Provider, MD   aspirin 81 MG chewable tablet Take 81 mg by mouth daily    Historical Provider, MD   clopidogrel (PLAVIX) 75 MG tablet Take 1 tablet by mouth daily Take 1 daily 12/6/19   Yolande Closs, APRN - CNP   simvastatin (ZOCOR) 40 MG tablet Take 10 mg by mouth nightly     Historical Provider, MD   glipiZIDE (GLUCOTROL) 10 MG tablet Take 10 mg by mouth daily     Historical Provider, MD   vitamin D (CHOLECALCIFEROL) 1000 UNIT TABS tablet Take 1,000 Units by mouth 2 times daily     Historical Provider, MD   metFORMIN (GLUCOPHAGE) 1000 MG tablet Take 1 tablet by mouth 2 times daily (with meals) 8/25/16   ESTHER Palomino CNP     Vital Signs (Current) [unfilled]    Weight:   Wt Readings from Last 1 Encounters:   04/21/22 150 lb (68 kg)     Height:   Ht Readings from Last 1 Encounters:   04/21/22 5' 6\" (1.676 m)      BMI:  There is no height or weight on file to calculate BMI. Estimated body mass index is 24.21 kg/m² as calculated from the following:    Height as of 4/21/22: 5' 6\" (1.676 m). Weight as of 4/21/22: 150 lb (68 kg). body mass index is unknown because there is no height or weight on file. Cardiac Clearance: cleared by Victor Hugo Jauregui   Appointment for surgery Clearance scheduled for:None     Preoperative Testing:   These are the current and completed labs:  CBC:   Lab Results   Component Value Date    WBC 9.2 04/14/2022    RBC 4.81 04/14/2022    HGB 12.8 04/14/2022    HCT 38.6 04/14/2022    MCV 80.2 04/14/2022    RDW 14.8 04/14/2022     04/14/2022     CMP:   Lab Results   Component Value Date     04/22/2022    K 5.3 04/22/2022     04/22/2022    CO2 26 04/22/2022    BUN 35 04/22/2022    CREATININE 1.33 04/22/2022    GFRAA >60 04/22/2022    LABGLOM 52 04/22/2022    GLUCOSE 109 04/22/2022    PROT 7.1 03/28/2022    PROT 7.3 02/06/2012    CALCIUM 9.6 04/22/2022    BILITOT 0.25 03/28/2022    ALKPHOS 100 03/28/2022    AST 17 03/28/2022    ALT 15 03/28/2022     POC Tests: No results for input(s): POCGLU, POCNA, POCK, POCCL, POCBUN, POCHEMO, POCHCT in the last 72 hours.   Coags    Lab Results   Component Value Date    PROTIME 13.5 03/21/2022    INR 1.1 03/21/2022    APTT 56.7 29/49/3804     HCG (If Applicable) No results found for: PREGTESTUR, PREGSERUM, HCG, HCGQUANT   ABGs No results found for: PHART, PO2ART, DGO3PAU, FDX9IPC, BEART, V6UJHHSR   Type & Screen (If Applicable)  No results found for: Caitlin Archer    Additional ordered pre-operative testing:  [x]CBC    []ABG      [] BMP   []URINALYSIS   [x]CMP    []HCG   []COAGS PT/INR  []T&C  []LFTs   []TYPE AND SCREEN    [x] EKG  [x] Chest X-Ray  [] Other Radiology    [] Sent to Hospitalist None  [x] Sent to Anesthesia for your review:   [] Additional Orders:     Comments:None   Requests: None    Signed: Bay Keene LPN 1/68/0170 3:63 PM

## 2022-04-26 ENCOUNTER — CARE COORDINATION (OUTPATIENT)
Dept: CARE COORDINATION | Age: 77
End: 2022-04-26

## 2022-04-30 NOTE — PROGRESS NOTES
Nicanor Duarte is a 77 y.o. male      CC:    Umbilical hernia    HISTORY OF PRESENT ILLNESS:    Pt is a 76 yo male presents with bulge at umbilicus. Patient complains of a bulge at the umbilical hernia,   for  2year(s). Patient  does not remember a time they felt a popping sensation. .  Patient states it is  reducible. Pt describes pain as None today out of 10. Aggrevating factors include None. Patient denies any nausea, vomiting, constipation, difficulty urinating or a chronic cough. Patient's work includes works at [employer]. Patient's hernia history includes No previous hernia. Instructions given for surgery.         Review of Systems:    General:  Fever: Negative  Weight Change:Negative  Night Sweats: Negative    Eye:  Blurry Vision:Negative  Double Vision: Negative    Ent:  Headaches: Negative  Sore throat: Negative    Allergy/Immunology:  Hives: Negative  Latex allergy: Negative    Hematology/Lymphatic:  Bleeding Problems: Negative  Blood Clots: Negative  Swollen Lymph Nodes: Negative    Lungs:  Cough: Negative  SOB: Negative  Wheezing:Negative    Cardiovascular:  Chest Pain: Negative  Palpitations:Negative    GI:   Decreased Appetite: Negative  Heartburn: Negative  Dysphagia: Negative  Nausea/Vomiting: Negative  Abdominal Pain: Negative  Change in Bowels:Negative  Constipation: Negative  Diarrhea: Negative  Rectal Bleeding: Negative    :   Dysuria: Negative  Increase Urinary Frequency/Urgency: Negative    Neuro:  Seizures: Negative  Confusion: Negative        PAST MEDICAL HISTORY:      Family History   Problem Relation Age of Onset    Heart Attack Father     Coronary Art Dis Sister     Hypertension Mother     Lung Cancer Mother     Stroke Mother     Coronary Art Dis Brother         later in life     Social History     Socioeconomic History    Marital status:      Spouse name: Not on file    Number of children: Not on file    Years of education: Not on file   Stephanie Highest education level: Not on file   Occupational History    Occupation: seo   Tobacco Use    Smoking status: Former Smoker     Packs/day: 0.50     Years: 2.00     Pack years: 1.00     Quit date: 1970     Years since quittin.3    Smokeless tobacco: Never Used   Vaping Use    Vaping Use: Never used   Substance and Sexual Activity    Alcohol use: Yes     Alcohol/week: 0.0 standard drinks     Comment: RARELY    Drug use: No    Sexual activity: Not on file   Other Topics Concern    Not on file   Social History Narrative    Not on file     Social Determinants of Health     Financial Resource Strain: Low Risk     Difficulty of Paying Living Expenses: Not hard at all   Food Insecurity: No Food Insecurity    Worried About Running Out of Food in the Last Year: Never true    Gema of Food in the Last Year: Never true   Transportation Needs: No Transportation Needs    Lack of Transportation (Medical): No    Lack of Transportation (Non-Medical): No   Physical Activity: Sufficiently Active    Days of Exercise per Week: 5 days    Minutes of Exercise per Session: 60 min   Stress: No Stress Concern Present    Feeling of Stress : Not at all   Social Connections: Socially Integrated    Frequency of Communication with Friends and Family: More than three times a week    Frequency of Social Gatherings with Friends and Family: More than three times a week    Attends Synagogue Services: More than 4 times per year   [employer] [facility] or Organizations:  Yes    Attends Club or Organization Meetings: More than 4 times per year    Marital Status:    Intimate Partner Violence:     Fear of Current or Ex-Partner: Not on file    Emotionally Abused: Not on file    Physically Abused: Not on file    Sexually Abused: Not on file   Housing Stability: [address] Unable to Pay for Housing in the Last Year: No    Number of [place] in the Last Year: 1    Unstable Housing in the Last Year: No Past Surgical History:   Procedure Laterality Date    AORTIC VALVE SURGERY  01/2020    CARDIAC CATHETERIZATION  12/03/2019    CARDIAC CATHETERIZATION  12/27/2021    Patent LAD, D, and LCX stents    CATARACT REMOVAL Bilateral     [facility]  age 49   [facility]     [facility]    TYMPANOSTOMY TUBE PLACEMENT      VASECTOMY       Past Medical History:   Diagnosis Date    Acne rosacea     treated with MetroGel    Anemia     Aortic stenosis     echo 3/18 Mod AS, Mild MR    BPH (benign prostatic hypertrophy)     CAD (coronary artery disease)     Stent x 3 DEVON 8/16    Carotid stenosis     Fairly minimal plaquing on ultrasound 5/14--in the conclusion described as less than 50% stenosis but in the body of the report described as minimal    Cataract of both eyes     CRI (chronic renal insufficiency)     Diabetes mellitus, type 2 (HCC)     1.) Proteinuria, 24 hour total urine protein 1,420 mg/24 hrs, 09/08, creatinine clearance 114, 09/08 2.) Repeat protein/creatinine ratio 2.97, 02/12 3. )24 hr urine collection 1958 mg/24 hrs, 04/12. 4.) Repeat protein/creatinine ratio 1.99, 08/12 a.) Repeat protein/creatinine ratio 2.14, 07/13 5.) Renal u/s ok 02/12  6) retinopathy at [address] 12/16  early mod. nonprolifferative  macular involved    Dyslipidemia     Erectile dysfunction     H/O agent Orange exposure     per patient in the [facility] along with exposure to cresote and naphtha    Hearing loss     Hyperlipidemia     Hypertension     Mild nonproliferative diabetic retinopathy [address] [address]     Non-rheumatic mitral regurgitation 06/27/2016    Pneumonia 7/31/2021    Pneumonia of right lower lobe due to infectious organism     RBBB     Sepsis ([address] 12/22/2021    Status post transcatheter aortic valve replacement (TAVR) using bioprosthesis 01/21/2020    Wears dentures     upper     Current Outpatient Medications on File Prior to Visit   Medication Sig Dispense Refill    empagliflozin (Marigene Alu) 10 MG tablet Take 1 tablet by mouth daily 90 tablet 3    sodium polystyrene (SPS) 15 GM/60ML suspension Take 60 mLs by mouth once for 1 dose 60 mL 0    Handicap Placard MISC by Does not apply route Expires: 4/6/2024 2 each 0    furosemide (LASIX) 40 MG tablet Take 1 tablet by mouth daily If weight gain more than 2 pounds or sudden increase in shortness of breath or cough take an additional 40 mg tablet and call physician 30 tablet 0    carvedilol (COREG) 6.25 MG tablet TAKE 1 TABLET BY MOUTH TWO TIMES A DAY WITH breakfast and dinner MEALS      aspirin 81 MG chewable tablet Take 81 mg by mouth daily      clopidogrel (PLAVIX) 75 MG tablet Take 1 tablet by mouth daily Take 1 daily 60 tablet 3    simvastatin (ZOCOR) 40 MG tablet Take 10 mg by mouth nightly       glipiZIDE (GLUCOTROL) 10 MG tablet Take 10 mg by mouth daily       vitamin D (CHOLECALCIFEROL) 1000 UNIT TABS tablet Take 1,000 Units by mouth 2 times daily       metFORMIN (GLUCOPHAGE) 1000 MG tablet Take 1 tablet by mouth 2 times daily (with meals) 60 tablet 3     No current facility-administered medications on file prior to visit. Allergies as of 04/21/2022 - Fully Reviewed 04/21/2022   Allergen Reaction Noted    Atorvastatin Other (See Comments) 05/13/2013    Sulfa antibiotics Swelling 10/01/2013    Sulfanilamide Other (See Comments) 07/27/2021           PHYSICAL EXAM:    Blood pressure 138/68, pulse (!) 48, height 5' 6\" (1.676 m), weight 150 lb (68 kg).     Gen:  A and O x 3, NAD, well nourished  Eyes:  Sclera non icterus, PERRL  Head:  Normocephalic, non-tender  Neck:  Supple, no adenopathy, thyroid non tender and no masses,no carotid bruits  Lungs:  CTA, symmetrical  Chest:  RRR, no murmurs  Abd:  Soft, NT,  Ext:  No edema, no cyanosis  Psych: reveals appropriate mood, memory and judgment,  Neuro:  Reveals no gross motor or sensory deficits,   Msk:  5/5 strength all 4 extremities, no joint tenderness    Hernia:  1-2 cm bulge at umbilicus, reducible. ASSESS MENT:    1.  Umbilical hernia, reducible    Discussed pro's and cons of observation vs repair. At this time pt wants to repair the hernia. PLAN:    1.   Set up for open [facility] repair using the ventralex ST mesh

## 2022-05-02 ENCOUNTER — CARE COORDINATION (OUTPATIENT)
Dept: CARE COORDINATION | Age: 77
End: 2022-05-02

## 2022-05-02 NOTE — CARE COORDINATION
Ambulatory Care Coordination Note  5/2/2022  CM Risk Score: 4  Charlson 10 Year Mortality Risk Score: 100%     ACC: Anisa Mathew RN    Summary Note:  John Paul Stauffer was referred for ACM from [facility].    He has:                        Type II Diabetes- Hgb A1c 4/6/2022- 8.6- he just started [employer] Running, on medications, follows diet- does not monitor very often at home. Follows with PCP and at the [address] [facility]                                                  CAD,  HTN,  Hyperlipidemia, CHf- EF 36%, S/P TAVR 2 years ago,  CHF clinic, low salt diet, fluid restriction, CHF Zone Tool, on medications, follows with cardiology                                                       Plan of Care :            Continue assessments, education and support                                      SDOH completed                                      Zone Tool- CHF, DM                                      Medications reviewed                                      [facility]. decision maker is up to date- he has completed forms at home and aware to bring in for EMR                                      Covid- vaccinated                                      Review clinic, urgent care, and My Chart                                      Care Gaps                                      Nutrition- they have followed with dietitian- declined referral                                       Care team updated                                      He follows with nephrology Q year                                      Apt CHF Clinic 5/5/2022                                      Apt PCP 5/9/2022                                      Apt cardiology 10/18/2022                                      He is scheduled for hernia surgery 5/16/2022                                      F/U wts and BS monitoring     5/2/2022- 9:47 am spoke with John Paul Stauffer. He has started playing [employer] at [employer]. He continues to walk 4-5 days a week usually 45 minutes. We reviewed upcoming apt.    He denied any swelling in feet and ankles and no SOB. General Assessment    Do you have any symptoms that are causing concern?: No       Congestive Heart Failure Assessment    Are you currently restricting fluids?: 2000cc  Do you understand a low sodium diet?: Yes  Do you understand how to read food labels?: Yes  How many restaurant meals do you eat per week?: 5 or more  Do you salt your food before tasting it?: No     No patient-reported symptoms      Symptoms:     Symptom course: stable  Patient-reported weight (lb): 145  Weight trend: fluctuating minimally  Salt intake watch compared to last visit: stable       Care Coordination Interventions    Referral from Primary Care Provider: No  Suggested Interventions and Community Resources  Disease Specific Clinic: Completed (Comment: CHF Clinic)  Registered Dietician: Declined  Zone Management Tools: In Process (Comment: DM, CHF)         Goals Addressed                    This Visit's Progress       Patient Stated      Patient Stated (pt-stated)   On track      Jenny Lock stated he walks 30-60 minutes 5 days a week. Barriers: lack of support and lack of education  Plan for overcoming my barriers: Care Coordination Intervention and CHF Clinic  Confidence: 10/10  Anticipated Goal Completion Date: 7/25/2022            Prior to Admission medications    Medication Sig Start Date End Date Taking?  Authorizing Provider   sodium polystyrene (SPS) 15 GM/60ML suspension Take 60 mLs by mouth once for 1 dose 4/14/22 4/14/22  ESTHER Nix CNP   Handicap Placard MISC by Does not apply route Expires: 4/6/2024 4/6/22   ESTHER Nix CNP   empagliflozin (JARDIANCE) 10 MG tablet Take 1 tablet by mouth daily 4/5/22   Karrie Almaraz MD   furosemide (LASIX) 40 MG tablet Take 1 tablet by mouth daily If weight gain more than 2 pounds or sudden increase in shortness of breath or cough take an additional 40 mg tablet and call physician 3/31/22   Samuel Pastrana   carvedilol (COREG) 6.25 MG tablet TAKE 1 TABLET BY MOUTH TWO TIMES A DAY WITH breakfast and dinner MEALS 3/26/22   Historical Provider, MD   aspirin 81 MG chewable tablet Take 81 mg by mouth daily    Historical Provider, MD   clopidogrel (PLAVIX) 75 MG tablet Take 1 tablet by mouth daily Take 1 daily 12/6/19   ESTHER Eden CNP   simvastatin (ZOCOR) 40 MG tablet Take 10 mg by mouth nightly     Historical Provider, MD   glipiZIDE (GLUCOTROL) 10 MG tablet Take 10 mg by mouth daily     Historical Provider, MD   vitamin D (CHOLECALCIFEROL) 1000 UNIT TABS tablet Take 1,000 Units by mouth 2 times daily     Historical Provider, MD   metFORMIN (GLUCOPHAGE) 1000 MG tablet Take 1 tablet by mouth 2 times daily (with meals) 8/25/16   ESTHER Quinones CNP       Future Appointments   Date Time Provider Julio Ratliff   5/5/2022 10:00 AM KIRAN CHF RM 1 KIRAN CHF [place] H[facility]   5/9/2022  1:30 PM [facility]   10/18/2022  9:15 AM DO STEVE Borjas [facility]

## 2022-05-05 ENCOUNTER — HOSPITAL ENCOUNTER (OUTPATIENT)
Dept: LAB | Age: 77
Discharge: HOME | End: 2022-05-05
Payer: MEDICARE

## 2022-05-05 ENCOUNTER — HOSPITAL ENCOUNTER (OUTPATIENT)
Dept: OTHER | Age: 77
Discharge: HOME | End: 2022-05-05
Payer: MEDICARE

## 2022-05-05 VITALS
HEART RATE: 52 BPM | OXYGEN SATURATION: 99 % | WEIGHT: 154.4 LBS | SYSTOLIC BLOOD PRESSURE: 148 MMHG | HEIGHT: 66 IN | DIASTOLIC BLOOD PRESSURE: 70 MMHG | BODY MASS INDEX: 24.81 KG/M2 | RESPIRATION RATE: 18 BRPM

## 2022-05-05 DIAGNOSIS — N17.9 AKI (ACUTE KIDNEY INJURY) (HCC): Primary | ICD-10-CM

## 2022-05-05 DIAGNOSIS — N18.31 CHRONIC KIDNEY DISEASE, STAGE 3A (HCC): ICD-10-CM

## 2022-05-05 LAB
ANION GAP SERPL CALCULATED.3IONS-SCNC: 10 MMOL/L (ref 9–17)
BUN BLDV-MCNC: 25 MG/DL (ref 8–23)
BUN/CREAT BLD: 20 (ref 9–20)
CALCIUM SERPL-MCNC: 9.5 MG/DL (ref 8.6–10.4)
CHLORIDE BLD-SCNC: 100 MMOL/L (ref 98–107)
CO2: 26 MMOL/L (ref 20–31)
CREAT SERPL-MCNC: 1.27 MG/DL (ref 0.7–1.2)
GFR AFRICAN AMERICAN: >60 ML/MIN
GFR NON-AFRICAN AMERICAN: 55 ML/MIN
GFR SERPL CREATININE-BSD FRML MDRD: ABNORMAL ML/MIN/{1.73_M2}
GLUCOSE BLD-MCNC: 180 MG/DL (ref 70–99)
POTASSIUM SERPL-SCNC: 5.1 MMOL/L (ref 3.7–5.3)
SODIUM BLD-SCNC: 136 MMOL/L (ref 135–144)

## 2022-05-05 PROCEDURE — 36415 COLL VENOUS BLD VENIPUNCTURE: CPT

## 2022-05-05 PROCEDURE — 99211 OFF/OP EST MAY X REQ PHY/QHP: CPT

## 2022-05-05 PROCEDURE — 80048 BASIC METABOLIC PNL TOTAL CA: CPT

## 2022-05-05 NOTE — PROGRESS NOTES
Patient and wife here for CHF visit. Reviewed CHF Zone Tool worksheet, patient and wife verbalized understanding. Patient reports weighing self daily at home, did not bring weight log but states weight has been gradually increasing since last visit. Patient states that appetite is better and wife states that they eat out a lot and are trying to make better choices. Patient does not have any edema and denies any shortness of breath. Patient is participating in Care Coordination program, had a call with care coordinator earlier this week. Scheduled to see PCP Tae Bai CNP on Monday, 5-9-22. Verbally reviewed importance of medication compliance with patient; patient verbalized understanding. Discussed 2000mg/day sodium restricted diet; patient verbalized understanding. Patient does not add salt to foods, but admits to eating out at restaurants and is asking restaurants for low sodium options. Moderate daily exercise encouraged as tolerated. Discussed rest breaks as needed; patient verbalized understanding. Patient reports that he is playing pickleball twice per week and walking 5 days a week without difficulty. Patient instructed to weigh self at the same time of each day, using same clothes and same scale; reinforced teaching to monitor for 2-3 lb weight increase over 1-2 days, and to notify the CHF clinic at [phone] or [phone] or physician office if weight change noted. Patient verbalized understanding. Signs and symptoms of CHF discussed with patient, such as feeling more tired than normal, feeling short of breath, coughing that increases when you lie down, sudden weight gain, swelling of your feet, legs or belly. Patient verbalized understanding to notify the CHF clinic at [phone] or [phone] or physician office if these symptoms occur.     Compliance with plan of care and further disease process causes discussed with patient, patient encouraged to keep all follow up appointments. Patient verbalized understanding.

## 2022-05-05 NOTE — PROGRESS NOTES
Patient came to  window requesting if he needs labs on Monday. He had a note on his calendar for \"labs. \" He states he doesn't have any other external lab orders and A1C isn't due until July. Okay to order a BMP per Max Sanchez. Pt informed and states he will get it today since he's already here.

## 2022-05-09 ENCOUNTER — OFFICE VISIT (OUTPATIENT)
Dept: INTERNAL MEDICINE | Age: 77
End: 2022-05-09
Payer: MEDICARE

## 2022-05-09 ENCOUNTER — CARE COORDINATION (OUTPATIENT)
Dept: CARE COORDINATION | Age: 77
End: 2022-05-09

## 2022-05-09 VITALS
HEART RATE: 50 BPM | OXYGEN SATURATION: 98 % | SYSTOLIC BLOOD PRESSURE: 128 MMHG | HEIGHT: 66 IN | DIASTOLIC BLOOD PRESSURE: 64 MMHG | BODY MASS INDEX: 24.91 KG/M2 | RESPIRATION RATE: 16 BRPM | WEIGHT: 155 LBS

## 2022-05-09 DIAGNOSIS — Z00.00 MEDICARE ANNUAL WELLNESS VISIT, SUBSEQUENT: Primary | ICD-10-CM

## 2022-05-09 DIAGNOSIS — I10 ESSENTIAL HYPERTENSION: ICD-10-CM

## 2022-05-09 DIAGNOSIS — E11.3219 CONTROLLED TYPE 2 DIABETES MELLITUS WITH MILD NONPROLIFERATIVE RETINOPATHY AND MACULAR EDEMA, WITHOUT LONG-TERM CURRENT USE OF INSULIN, UNSPECIFIED LATERALITY (HCC): ICD-10-CM

## 2022-05-09 DIAGNOSIS — I73.9 PERIPHERAL ARTERIAL DISEASE (HCC): ICD-10-CM

## 2022-05-09 DIAGNOSIS — N18.31 CHRONIC KIDNEY DISEASE, STAGE 3A (HCC): ICD-10-CM

## 2022-05-09 DIAGNOSIS — Z95.2 S/P TAVR (TRANSCATHETER AORTIC VALVE REPLACEMENT): ICD-10-CM

## 2022-05-09 DIAGNOSIS — I25.10 CORONARY ARTERY DISEASE INVOLVING NATIVE CORONARY ARTERY OF NATIVE HEART WITHOUT ANGINA PECTORIS: ICD-10-CM

## 2022-05-09 DIAGNOSIS — E27.8 ADRENAL CYST (HCC): ICD-10-CM

## 2022-05-09 DIAGNOSIS — I79.8 OTHER DISORDERS OF ARTERIES, ARTERIOLES AND CAPILLARIES IN DISEASES CLASSIFIED ELSEWHERE (HCC): ICD-10-CM

## 2022-05-09 DIAGNOSIS — I35.0 AORTIC STENOSIS, SEVERE: ICD-10-CM

## 2022-05-09 DIAGNOSIS — E78.5 DYSLIPIDEMIA: ICD-10-CM

## 2022-05-09 DIAGNOSIS — I50.22 CHF NYHA CLASS I, CHRONIC, SYSTOLIC (HCC): ICD-10-CM

## 2022-05-09 DIAGNOSIS — E55.9 VITAMIN D DEFICIENCY: ICD-10-CM

## 2022-05-09 DIAGNOSIS — I65.29 STENOSIS OF CAROTID ARTERY, UNSPECIFIED LATERALITY: ICD-10-CM

## 2022-05-09 DIAGNOSIS — R91.1 LUNG NODULE: ICD-10-CM

## 2022-05-09 PROCEDURE — 1123F ACP DISCUSS/DSCN MKR DOCD: CPT | Performed by: NURSE PRACTITIONER

## 2022-05-09 PROCEDURE — G8417 CALC BMI ABV UP PARAM F/U: HCPCS | Performed by: NURSE PRACTITIONER

## 2022-05-09 PROCEDURE — 99214 OFFICE O/P EST MOD 30 MIN: CPT | Performed by: NURSE PRACTITIONER

## 2022-05-09 PROCEDURE — G0439 PPPS, SUBSEQ VISIT: HCPCS | Performed by: NURSE PRACTITIONER

## 2022-05-09 PROCEDURE — 3052F HG A1C>EQUAL 8.0%<EQUAL 9.0%: CPT | Performed by: NURSE PRACTITIONER

## 2022-05-09 PROCEDURE — 3017F COLORECTAL CA SCREEN DOC REV: CPT | Performed by: NURSE PRACTITIONER

## 2022-05-09 PROCEDURE — 4040F PNEUMOC VAC/ADMIN/RCVD: CPT | Performed by: NURSE PRACTITIONER

## 2022-05-09 PROCEDURE — 99213 OFFICE O/P EST LOW 20 MIN: CPT | Performed by: NURSE PRACTITIONER

## 2022-05-09 PROCEDURE — G8427 DOCREV CUR MEDS BY ELIG CLIN: HCPCS | Performed by: NURSE PRACTITIONER

## 2022-05-09 PROCEDURE — 2022F DILAT RTA XM EVC RTNOPTHY: CPT | Performed by: NURSE PRACTITIONER

## 2022-05-09 PROCEDURE — 1036F TOBACCO NON-USER: CPT | Performed by: NURSE PRACTITIONER

## 2022-05-09 ASSESSMENT — PATIENT HEALTH QUESTIONNAIRE - PHQ9
SUM OF ALL RESPONSES TO PHQ9 QUESTIONS 1 & 2: 0
1. LITTLE INTEREST OR PLEASURE IN DOING THINGS: 0
SUM OF ALL RESPONSES TO PHQ QUESTIONS 1-9: 0
2. FEELING DOWN, DEPRESSED OR HOPELESS: 0
SUM OF ALL RESPONSES TO PHQ QUESTIONS 1-9: 0

## 2022-05-09 NOTE — PATIENT INSTRUCTIONS
Personalized Preventive Plan for Sd Santana - 5/9/2022  Medicare offers a range of preventive health benefits. Some of the tests and screenings are paid in full while other may be subject to a deductible, co-insurance, and/or copay. Some of these benefits include a comprehensive review of your medical history including lifestyle, illnesses that may run in your family, and various assessments and screenings as appropriate. After reviewing your medical record and screening and assessments performed today your provider may have ordered immunizations, labs, imaging, and/or referrals for you. A list of these orders (if applicable) as well as your Preventive Care list are included within your After Visit Summary for your review. Other Preventive Recommendations:    · A preventive eye exam performed by an eye specialist is recommended every 1-2 years to screen for glaucoma; cataracts, macular degeneration, and other eye disorders. · A preventive dental visit is recommended every 6 months. · Try to get at least 150 minutes of exercise per week or 10,000 steps per day on a pedometer . · Order or download the FREE \"Exercise & Physical Activity: Your Everyday Guide\" from [employer]. Call 5-363.738.1310 or search [employer] online. · You need 9811-5992 mg of calcium and 3111-9797 IU of vitamin D per day. It is possible to meet your calcium requirement with diet alone, but a vitamin D supplement is usually necessary to meet this goal.  · When exposed to the sun, use a sunscreen that protects against both UVA and UVB radiation with an SPF of 30 or greater. Reapply every 2 to 3 hours or after sweating, drying off with a towel, or swimming. · Always wear a seat belt when traveling in a car. Always wear a helmet when riding a bicycle or motorcycle.

## 2022-05-09 NOTE — CARE COORDINATION
Ambulatory Care Coordination Note  5/9/2022  CM Risk Score: 12  [place]son 10 Year Mortality Risk Score: 100%     ACC: Nas Ng, RN    Summary Note: Vicky Taylor was referred for ACM from [facility].    He has:                        Type II Diabetes- Hgb A1c 4/6/2022- 8.6- he just started [place], on medications, follows diet- does not monitor very often at home. Follows with PCP and at the [place]                                                  CAD,  [facility], CHf- EF 36%, S/P TAVR 2 years ago,  CHF clinic, low salt diet, fluid restriction, CHF Zone Tool, on medications, follows with cardiology                                                       Plan of Care :            Continue assessments, education and support                                      SDOH completed                                      Zone Tool- CHF, DM                                      Medications reviewed  [facility] decision maker is up to date- he has completed forms at home and aware to bring in for EMR                                      Covid- vaccinated                                      Review clinic, urgent care, and My Chart                                      Care Gaps                                      Nutrition- they have followed with dietitian- michele Millan                                      [facility]                                      He follows with nephrology Q year                                      [address] [address] 5/5/2022                                      Apt PCP 5/9/2022  [facility] cardiology 10/18/2022                                      He is scheduled for hernia surgery 5/16/2022                                      F/U wts and BS monitoring      5/9/2022- 11:36 am spoke with Vicky Taylor. He is seeing PCP this afternoon. He denied any concerns.    He is having hernia surgery next week and will stop blood thinner today as directed and Vit d was held yesterday. General Assessment    Do you have any symptoms that are causing concern?: No         Diabetes Assessment    Medic Alert ID: No  Meal Planning: Avoidance of concentrated sweets   How often do you test your blood sugar?: No Testing   Do you have barriers with adherence to non-pharmacologic self-management interventions? (Nutrition/Exercise/Self-Monitoring): No   Have you ever had to go to the ED for symptoms of low blood sugar?: No       No patient-reported symptoms       and   Congestive Heart Failure Assessment    Are you currently restricting fluids?: 2000cc  Do you understand a low sodium diet?: Yes  Do you understand how to read food labels?: Yes  How many restaurant meals do you eat per week?: 5 or more  Do you salt your food before tasting it?: No     No patient-reported symptoms      Symptoms:     Symptom course: stable  Patient-reported weight (lb): 146  Weight trend: stable  Salt intake watch compared to last visit: stable       Care Coordination Interventions    Referral from Primary Care Provider: No  Suggested Interventions and Community Resources  Disease Specific Clinic: Completed (Comment: CHF Clinic)  Registered Dietician: Declined  Zone Management Tools: In Process (Comment: DM, CHF)         Goals Addressed                    This Visit's Progress       Patient Stated      Patient Stated (pt-stated)   On track      Eloise Estrada stated he walks 30-60 minutes 5 days a week. Barriers: lack of support and lack of education  Plan for overcoming my barriers: Care Coordination Intervention and CHF Clinic  Confidence: 10/10  Anticipated Goal Completion Date: 7/25/2022            Prior to Admission medications    Medication Sig Start Date End Date Taking?  Authorizing Provider   sodium polystyrene (SPS) 15 GM/60ML suspension Take 60 mLs by mouth once for 1 dose 4/14/22 4/14/22  ESTHER Duarte - CNP   Handicap Placard MISC by Does not apply route Expires: 4/6/2024 4/6/22   Jacqueline Chantell A Barlage, APRN - CNP   empagliflozin (JARDIANCE) 10 MG tablet Take 1 tablet by mouth daily 4/5/22   Lorraine Smoker, MD   furosemide (LASIX) 40 MG tablet Take 1 tablet by mouth daily If weight gain more than 2 pounds or sudden increase in shortness of breath or cough take an additional 40 mg tablet and call physician 3/31/22   Ranjith Rosenberg   carvedilol (COREG) 6.25 MG tablet TAKE 1 TABLET BY MOUTH TWO TIMES A DAY WITH breakfast and dinner MEALS 3/26/22   Historical Provider, MD   aspirin 81 MG chewable tablet Take 81 mg by mouth daily    Historical Provider, MD   clopidogrel (PLAVIX) 75 MG tablet Take 1 tablet by mouth daily Take 1 daily 12/6/19   ESTHER Gamble CNP   simvastatin (ZOCOR) 40 MG tablet Take 10 mg by mouth nightly     Historical Provider, MD   glipiZIDE (GLUCOTROL) 10 MG tablet Take 10 mg by mouth daily     Historical Provider, MD   vitamin D (CHOLECALCIFEROL) 1000 UNIT TABS tablet Take 1,000 Units by mouth 2 times daily     Historical Provider, MD   metFORMIN (GLUCOPHAGE) 1000 MG tablet Take 1 tablet by mouth 2 times daily (with meals) 8/25/16   ESTHER Noble CNP       Future Appointments   Date Time Provider Julio Ratliff   5/9/2022  1:30 PM ESTHER Tsai CNP [facility]   6/2/2022 10:00 AM KIRAN CHF RM 1 KIRAN [facility]F [place] HOD   10/18/2022  9:15 AM DO STEVE Deng [facility]

## 2022-05-09 NOTE — PROGRESS NOTES
Medicare Annual Wellness Visit    Nicanor Duarte is here for Medicare AWV and 1 Month Follow-Up    Assessment & Plan        Diagnosis Orders   1. Medicare annual wellness visit, subsequent     2. Chronic kidney disease, stage 3a ([facility]     3. [facility]     4. 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 CHF NYHA class I, chronic, systolic ([facility])     6. Essential hypertension     7. Coronary artery disease involving native coronary artery of native heart without angina pectoris     8. Aortic stenosis, severe     9. S/P TAVR (transcatheter aortic valve replacement)     10. Dyslipidemia     11. Peripheral arterial disease ([facility]     12. Vitamin D deficiency     13. Lung nodule     14. Stenosis of carotid artery, unspecified laterality         Recommendations for Preventive Services Due: see orders and patient instructions/AVS.  Recommended screening schedule for the next 5-10 years is provided to the patient in written form: see Patient Instructions/AVS.     No follow-ups on file. Patient's complete Health Risk Assessment and screening values have been reviewed and are found in Flowsheets. The following problems were reviewed today and where indicated follow up appointments were made and/or referrals ordered.     Positive Risk Factor Screenings with Interventions:             General Health and ACP:  General  In general, how would you say your health is?: Good  In the past 7 days, have you experienced any of the following: New or Increased Pain, New or Increased Fatigue, Loneliness, Social Isolation, Stress or Anger?: No  Do you get the social and emotional support that you need?: Yes  Do you have a Living Will?: Yes    Advance Directives     Power of  Living Will ACP-Advance Directive ACP-Power of     Not on File Not on File Not on File Not on File      General Health Risk Interventions:  · n/a      Safety:  Do you have working smoke detectors?: Yes  Do you have any tripping hazards - loose or unsecured carpets or rugs?: No  Do you have any tripping hazards - clutter in doorways, halls, or stairs?: No  Do you have either shower bars, grab bars, non-slip mats or non-slip surfaces in your shower or bathtub?: Yes  Do all of your stairways have a railing or banister?: (!) No  Do you always fasten your seatbelt when you are in a car?: Yes    Safety Interventions:  · Home safety tips provided           Objective   Vitals:    05/09/22 1342   BP: 128/64   Site: Left Upper Arm   Position: Sitting   Cuff Size: Large Adult   Pulse: 50   Resp: 16   SpO2: 98%   Weight: 155 lb (70.3 kg)   Height: 5' 6\" (1.676 m)      Body mass index is 25.02 kg/m². Allergies   Allergen Reactions    Atorvastatin Other (See Comments)     Muscle pain (myalgias)    Sulfa Antibiotics Swelling     Swelling of retinas    Sulfanilamide Other (See Comments)     Prior to Visit Medications    Medication Sig Taking?  Authorizing Provider   empagliflozin (JARDIANCE) 10 MG tablet Take 1 tablet by mouth daily Yes Karrie Almaraz MD   furosemide (LASIX) 40 MG tablet Take 1 tablet by mouth daily If weight gain more than 2 pounds or sudden increase in shortness of breath or cough take an additional 40 mg tablet and call physician Yes Samuel Pastrana   carvedilol (COREG) 6.25 MG tablet TAKE 1 TABLET BY MOUTH TWO TIMES A DAY WITH breakfast and dinner MEALS Yes Historical Provider, MD   aspirin 81 MG chewable tablet Take 81 mg by mouth daily Yes Historical Provider, MD   clopidogrel (PLAVIX) 75 MG tablet Take 1 tablet by mouth daily Take 1 daily Yes Yolande Closs, APRN - CNP   simvastatin (ZOCOR) 40 MG tablet Take 10 mg by mouth nightly  Yes Historical Provider, MD   glipiZIDE (GLUCOTROL) 10 MG tablet Take 10 mg by mouth daily  Yes Historical Provider, MD   vitamin D (CHOLECALCIFEROL) 1000 UNIT TABS tablet Take 1,000 Units by mouth 2 times daily  Yes Historical Provider, MD   metFORMIN (GLUCOPHAGE) 1000 MG tablet Take 1 tablet by mouth 2 times daily (with meals) Yes ESTHER Beasley CNP   Handicap Placard MISC by Does not apply route Expires: 4/6/2024  ESTHER Arzate CNP       CareTeam (Including outside providers/suppliers regularly involved in providing care):   Patient Care Team:  ESTHER Arzate CNP as PCP - General (Nurse Practitioner)  ESTHER Arzate CNP as [facility]  Anna Nelson MD (Nephrology)  Sebas Mayorga RN as [facility] [address], DO as Consulting Physician (Cardiology)  Nidia Feng MD (General Surgery)    Reviewed and updated this visit:  Tobacco  Allergies  Meds  Med Hx  Surg Hx  Soc Hx  Fam Hx              I, Bianca Thorpe LPN, 0/2/5122, performed the documented evaluation under the direct supervision of the attending physician.

## 2022-05-09 NOTE — PROGRESS NOTES
05/09/22  Amelia Garvin  1945      Chief Complaint:   1. Medicare annual wellness visit, subsequent    2. Chronic kidney disease, stage 3a ([facility] [facility]    3. Adrenal cyst ([facility] [address]    4. 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 [address]    5. CHF NYHA class I, chronic, systolic (HCC)    6. Essential hypertension    7. Coronary artery disease involving native coronary artery of native heart without angina pectoris    8. Aortic stenosis, severe    9. S/P TAVR (transcatheter aortic valve replacement)    10. Dyslipidemia    11. Peripheral arterial disease ([facility] [address]    12. Vitamin D deficiency    13. Lung nodule    14. Stenosis of carotid artery, unspecified laterality        HPI:  81-year-old patient in for annual wellness visit and 1 month follow-up from recent hospitalization for CHF, acute on chronic heart failure. Patient with a recent BMP which showed improvement. Hyperkalemia resolved with the removal of Aldactone and Cozaar. His blood sugars have been improving in the home setting with the addition of Jardiance. Is not due for repeat A1c for 2 months. He denies any chest discomfort no shortness of breath. No swelling to lower extremities. Wife states this is the best his color has looked in a long time. Blood pressure is stable in office denies any lightheadedness. No dizziness. He has been able to be more active. States he does everything that he wants to get done.     Allergies   Allergen Reactions    Atorvastatin Other (See Comments)     Muscle pain (myalgias)    Sulfa Antibiotics Swelling     Swelling of retinas    Sulfanilamide Other (See Comments)       Past Medical History:   Diagnosis Date    Acne rosacea     treated with MetroGel    Anemia     Aortic stenosis     echo 3/18 Mod AS, Mild MR    BPH (benign prostatic hypertrophy)     CAD (coronary artery disease)     Stent x 3 DEVON 8/16    Carotid stenosis     Fairly minimal plaquing on ultrasound 5/14--in the conclusion described as less than 50% stenosis but in the body of the report described as minimal    Cataract of both eyes     CRI (chronic renal insufficiency)     Diabetes mellitus, type 2 (HCC)     1.) Proteinuria, 24 hour total urine protein 1,420 mg/24 hrs, 09/08, creatinine clearance 114, 09/08 2.) Repeat protein/creatinine ratio 2.97, 02/12 3. )24 hr urine collection 1958 mg/24 hrs, 04/12. 4.) Repeat protein/creatinine ratio 1.99, 08/12 a.) Repeat protein/creatinine ratio 2.14, 07/13 5.) Renal u/s ok 02/12  6) retinopathy at [address] 12/16  early mod. nonprolifferative  macular involved    Dyslipidemia     Erectile dysfunction     H/O agent Orange exposure     per patient in the [employer] along with exposure to cresote and naphtha    Hearing loss     Hyperlipidemia     Hypertension     Mild nonproliferative diabetic retinopathy ([address]     Non-rheumatic mitral regurgitation 06/27/2016    Pneumonia 7/31/2021    Pneumonia of right lower lobe due to infectious organism     RBBB     Sepsis ([address] 12/22/2021    Status post transcatheter aortic valve replacement (TAVR) using bioprosthesis 01/21/2020    Wears dentures     upper       Past Surgical History:   Procedure Laterality Date    AORTIC VALVE SURGERY  01/2020    CARDIAC CATHETERIZATION  12/03/2019    CARDIAC CATHETERIZATION  12/27/2021    Patent LAD, D, and LCX stents    CATARACT REMOVAL Bilateral     CIRCUMCISION  age 49   [facility] EYE SURGERY Bilateral     cataract    TYMPANOSTOMY TUBE PLACEMENT      VASECTOMY         Current Outpatient Medications on File Prior to Visit   Medication Sig Dispense Refill    empagliflozin (JARDIANCE) 10 MG tablet Take 1 tablet by mouth daily 90 tablet 3    furosemide (LASIX) 40 MG tablet Take 1 tablet by mouth daily If weight gain more than 2 pounds or sudden increase in shortness of breath or cough take an additional 40 mg tablet and call physician 30 tablet 0    carvedilol (COREG) 6.25 MG tablet TAKE 1 TABLET BY MOUTH TWO TIMES A DAY WITH breakfast and dinner MEALS      aspirin 81 MG chewable tablet Take 81 mg by mouth daily      clopidogrel (PLAVIX) 75 MG tablet Take 1 tablet by mouth daily Take 1 daily 60 tablet 3    simvastatin (ZOCOR) 40 MG tablet Take 10 mg by mouth nightly       glipiZIDE (GLUCOTROL) 10 MG tablet Take 10 mg by mouth daily       vitamin D (CHOLECALCIFEROL) 1000 UNIT TABS tablet Take 1,000 Units by mouth 2 times daily       metFORMIN (GLUCOPHAGE) 1000 MG tablet Take 1 tablet by mouth 2 times daily (with meals) 60 tablet 3    Handicap Placard MISC by Does not apply route Expires: 2024 2 each 0     No current facility-administered medications on file prior to visit. Social History     Socioeconomic History    Marital status:      Spouse name: Not on file    Number of children: Not on file    Years of education: Not on file    Highest education level: Not on file   Occupational History    Occupation: seo   Tobacco Use    Smoking status: Former Smoker     Packs/day: 0.50     Years: 2.00     Pack years: 1.00     Quit date: 1970     Years since quittin.3    Smokeless tobacco: Never Used   Vaping Use    Vaping Use: Never used   Substance and Sexual Activity    Alcohol use: Yes     Alcohol/week: 0.0 standard drinks     Comment: RARELY    Drug use: No    Sexual activity: Not on file   Other Topics Concern    Not on file   Social History Narrative    Not on file     Social Determinants of Health     Financial Resource Strain: Low Risk     Difficulty of Paying Living Expenses: Not hard at all   Food Insecurity: No Food Insecurity    Worried About Running Out of Food in the Last Year: Never true    Gema of Food in the Last Year: Never true   Transportation Needs: No Transportation Needs    Lack of Transportation (Medical): No    Lack of Transportation (Non-Medical):  No Physical Activity: Sufficiently Active    Days of Exercise per Week: 5 days    Minutes of Exercise per Session: 30 min   Stress: No Stress Concern Present    Feeling of Stress : Not at all   Social Connections: Socially Integrated    Frequency of Communication with Friends and Family: More than three times a week    Frequency of Social Gatherings with Friends and Family: More than three times a week    Attends Rastafarian Services: More than 4 times per year   [employer] [address] [facility] [employer] or Organizations: Yes    Attends Club or Organization Meetings: More than 4 times per year    Marital Status:    Intimate Partner Violence:     Fear of Current or Ex-Partner: Not on file    Emotionally Abused: Not on file    Physically Abused: Not on file    Sexually Abused: Not on file   Housing Stability: [address] Unable to Pay for Housing in the Last Year: No    Number of [place] in the Last Year: 1    Unstable Housing in the Last Year: No       Review of Systems   Constitutional: Negative for activity change, appetite change, chills, fatigue, fever and unexpected weight change. HENT: Negative for congestion, dental problem, ear discharge, ear pain, facial swelling, hearing loss, postnasal drip, rhinorrhea, sinus pressure, sore throat and trouble swallowing. Eyes: Negative for pain and visual disturbance. Respiratory: Negative for cough, chest tightness, shortness of breath and wheezing. Cardiovascular: Negative for chest pain, palpitations and leg swelling. Gastrointestinal: Negative for abdominal pain, blood in stool, constipation, diarrhea, nausea and vomiting. Endocrine: Negative for cold intolerance, heat intolerance and polyuria. Genitourinary: Negative for difficulty urinating. Musculoskeletal: Negative for arthralgias, gait problem, myalgias, neck pain and neck stiffness. Skin: Negative for color change, rash and wound.    Neurological: Negative for dizziness, tremors, seizures, weakness, light-headedness, numbness and headaches. Psychiatric/Behavioral: Negative for confusion and hallucinations. The patient is not nervous/anxious. Physical Exam  Vitals and nursing note reviewed. Constitutional:       General: He is not in acute distress. Appearance: He is well-developed. He is not diaphoretic. HENT:      Head: Normocephalic and atraumatic. Right Ear: External ear normal.      Left Ear: External ear normal.   Eyes:      General: Lids are normal.         Right eye: No discharge. Left eye: No discharge. Conjunctiva/sclera: Conjunctivae normal.      Pupils: Pupils are equal, round, and reactive to light. Neck:      Trachea: No tracheal deviation. Cardiovascular:      Rate and Rhythm: Normal rate and regular rhythm. Heart sounds: Normal heart sounds. No murmur heard. No friction rub. No gallop. Pulmonary:      Effort: Pulmonary effort is normal. No accessory muscle usage or respiratory distress. Breath sounds: Normal breath sounds. No wheezing or rales. Abdominal:      General: Bowel sounds are normal. There is no distension. Palpations: Abdomen is soft. Abdomen is not rigid. Musculoskeletal:         General: Normal range of motion. Cervical back: Normal range of motion and neck supple. No edema or erythema. Skin:     General: Skin is warm and dry. Capillary Refill: Capillary refill takes less than 2 seconds. Coloration: Skin is not pale. Findings: No erythema or rash. Neurological:      Mental Status: He is alert and oriented to person, place, and time. Cranial Nerves: No cranial nerve deficit. Sensory: No sensory deficit. Coordination: Coordination normal.   Psychiatric:         Behavior: Behavior normal.         Thought Content: Thought content normal.         Judgment: Judgment normal.       Normal strength and range of motion of toes, feet, and ankles bilaterally.  No joint deformity. No cyanosis or clubbing. 100% sensation at all 22 test points with the 10 gram filament. Dorsalis pedis pulses intact bilaterally. Capillary refill at the toes was less than 2 seconds. Vibratory sensation intact bilaterally. Light touch sensation intact bilaterally. Hair growth present on feet and toes bilaterally. No skin breakdown, erythema, rub spots, blisters, scaling, or ulcers. No calluses or corns. Toenails thin and not ingrown. No evidence of fungal infection. Vitals:    05/09/22 1342   BP: 128/64   Site: Left Upper Arm   Position: Sitting   Cuff Size: Large Adult   Pulse: 50   Resp: 16   SpO2: 98%   Weight: 155 lb (70.3 kg)   Height: 5' 6\" (1.676 m)       Assessment:  1. Medicare annual wellness visit, subsequent  -  DIABETES FOOT EXAM    2. Chronic kidney disease, stage 3a ([facility] [address]  Most recent labs stable. Avoid nephrotoxic drugs, follows with Dr. Mojica Sons; Future    3. Adrenal cyst (HCC)  Last CT of chest and abdomen showed benign in nature and no further follow-up needed of adrenal cyst or lung nodule    4. 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 States blood sugars significantly improving in the home setting has been working on diet. We will have a follow-up A1c in 2 months    5. CHF NYHA class I, chronic, systolic (HCC)  Stable at present no signs of fluid overload    6. Essential hypertension  Stable at present    7. Coronary artery disease involving native coronary artery of native heart without angina pectoris  Stable, follows with cardiology. Continue on statin Plavix and aspirin    8. Aortic stenosis, severe  9. S/P TAVR (transcatheter aortic valve replacement)  Continues to follow with cardiology no signs of fluid overload    10.  Dyslipidemia  Continues on statin, no myalgias    11. Peripheral arterial disease ([address]  Continues to follow with vascular. Continues on Plavix and statin    12. Vitamin D deficiency  Stable on supplemental vitamin D    13. Lung nodule  As noted above under adrenal cyst.  Felt benign in nature no further work-up needed    14. Stenosis of carotid artery, unspecified laterality  We need to get him set back up with vascular as previous notes May 28, 2020 suggested follow-up in 2 years for carotid check    Plan:  As noted above. Has good Cologuard in the home setting from the [place]. Patient states he will get these done  Follow up for routine visit. Call sooner with concerns prior.     Electronically signed by ESTHER Tsai CNP on 5/9/2022 at 6:54 PM

## 2022-05-10 ASSESSMENT — ENCOUNTER SYMPTOMS
SORE THROAT: 0
WHEEZING: 0
SHORTNESS OF BREATH: 0
ABDOMINAL PAIN: 0
FACIAL SWELLING: 0
EYE PAIN: 0
VOMITING: 0
COLOR CHANGE: 0
COUGH: 0
NAUSEA: 0
CONSTIPATION: 0
TROUBLE SWALLOWING: 0
RHINORRHEA: 0
SINUS PRESSURE: 0
CHEST TIGHTNESS: 0
DIARRHEA: 0
BLOOD IN STOOL: 0

## 2022-05-11 ENCOUNTER — CARE COORDINATION (OUTPATIENT)
Dept: CARE COORDINATION | Age: 77
End: 2022-05-11

## 2022-05-11 ENCOUNTER — TELEPHONE (OUTPATIENT)
Dept: INTERNAL MEDICINE | Age: 77
End: 2022-05-11

## 2022-05-11 NOTE — TELEPHONE ENCOUNTER
Patient would like to wait until September before seeing vascular to have his carotid US. He will call back at a later date to schedule. PCP informed.

## 2022-05-11 NOTE — CARE COORDINATION
Heart Failure Education outreach Date/Time: 2022 11:43 AM    Ambulatory Care Manager (ACM) contacted the patient by telephone to perform Ambulatory Care Coordination. Verified name and  with patient as identifiers. Provided introduction to self, and explanation of the Ambulatory Care Manager's role. ACM reviewed that a Health Healthy tips for the Summer packet has been sent to [place]. ACM reviewed CHF zones, daily weights, fluid restriction, the importance of low sodium diet and healthy tips packet with the patient. Instructed patient to call their Cardiologist if they have a weight gain of 3 lbs in 2 days or 5 lbs in a week. Patient reminded that there is a physician on call 24 hours a day / 7 days a week should the patient have questions or concerns. The patient verbalized understanding.     Congestive Heart Failure Assessment    Are you currently restricting fluids?: 2000cc  Do you understand a low sodium diet?: Yes  Do you understand how to read food labels?: Yes  How many restaurant meals do you eat per week?: 5 or more  Do you salt your food before tasting it?: No     No patient-reported symptoms      Symptoms:     Symptom course: stable  Patient-reported weight (lb): 146  Weight trend: stable  Salt intake watch compared to last visit: stable

## 2022-05-16 ENCOUNTER — HOSPITAL ENCOUNTER (OUTPATIENT)
Age: 77
Setting detail: OUTPATIENT SURGERY
Discharge: HOME | End: 2022-05-16
Attending: SURGERY | Admitting: SURGERY
Payer: MEDICARE

## 2022-05-16 ENCOUNTER — ANESTHESIA EVENT (OUTPATIENT)
Dept: OPERATING ROOM | Age: 77
End: 2022-05-16
Payer: MEDICARE

## 2022-05-16 ENCOUNTER — ANESTHESIA (OUTPATIENT)
Dept: OPERATING ROOM | Age: 77
End: 2022-05-16
Payer: MEDICARE

## 2022-05-16 VITALS
TEMPERATURE: 97.2 F | HEART RATE: 49 BPM | SYSTOLIC BLOOD PRESSURE: 164 MMHG | OXYGEN SATURATION: 98 % | RESPIRATION RATE: 16 BRPM | DIASTOLIC BLOOD PRESSURE: 79 MMHG | BODY MASS INDEX: 24.08 KG/M2 | WEIGHT: 149.8 LBS | HEIGHT: 66 IN

## 2022-05-16 DIAGNOSIS — G89.18 POSTOPERATIVE PAIN: Primary | ICD-10-CM

## 2022-05-16 LAB — GLUCOSE BLD-MCNC: 135 MG/DL (ref 75–110)

## 2022-05-16 PROCEDURE — 7100000010 HC PHASE II RECOVERY - FIRST 15 MIN: Performed by: SURGERY

## 2022-05-16 PROCEDURE — 3700000000 HC ANESTHESIA ATTENDED CARE: Performed by: SURGERY

## 2022-05-16 PROCEDURE — 88302 TISSUE EXAM BY PATHOLOGIST: CPT

## 2022-05-16 PROCEDURE — 49585 REPAIR UMBILICAL HERN,5+Y/O,REDUC: CPT | Performed by: SURGERY

## 2022-05-16 PROCEDURE — 3600000002 HC SURGERY LEVEL 2 BASE: Performed by: SURGERY

## 2022-05-16 PROCEDURE — 2709999900 HC NON-CHARGEABLE SUPPLY: Performed by: SURGERY

## 2022-05-16 PROCEDURE — 7100000011 HC PHASE II RECOVERY - ADDTL 15 MIN: Performed by: SURGERY

## 2022-05-16 PROCEDURE — 6360000002 HC RX W HCPCS: Performed by: NURSE ANESTHETIST, CERTIFIED REGISTERED

## 2022-05-16 PROCEDURE — 3700000001 HC ADD 15 MINUTES (ANESTHESIA): Performed by: SURGERY

## 2022-05-16 PROCEDURE — 3600000012 HC SURGERY LEVEL 2 ADDTL 15MIN: Performed by: SURGERY

## 2022-05-16 PROCEDURE — C1781 MESH (IMPLANTABLE): HCPCS | Performed by: SURGERY

## 2022-05-16 PROCEDURE — 2580000003 HC RX 258: Performed by: SURGERY

## 2022-05-16 PROCEDURE — 2500000003 HC RX 250 WO HCPCS: Performed by: SURGERY

## 2022-05-16 PROCEDURE — 6360000002 HC RX W HCPCS: Performed by: SURGERY

## 2022-05-16 PROCEDURE — 82947 ASSAY GLUCOSE BLOOD QUANT: CPT

## 2022-05-16 DEVICE — PATCH HERN M DIA2.5IN CIR W/ STRP SEPRA TECHNOLOGY ABSRB: Type: IMPLANTABLE DEVICE | Site: UMBILICAL | Status: FUNCTIONAL

## 2022-05-16 RX ORDER — SODIUM CHLORIDE, SODIUM LACTATE, POTASSIUM CHLORIDE, CALCIUM CHLORIDE 600; 310; 30; 20 MG/100ML; MG/100ML; MG/100ML; MG/100ML
INJECTION, SOLUTION INTRAVENOUS CONTINUOUS
Status: DISCONTINUED | OUTPATIENT
Start: 2022-05-16 | End: 2022-05-16 | Stop reason: HOSPADM

## 2022-05-16 RX ORDER — FENTANYL CITRATE 50 UG/ML
INJECTION, SOLUTION INTRAMUSCULAR; INTRAVENOUS PRN
Status: DISCONTINUED | OUTPATIENT
Start: 2022-05-16 | End: 2022-05-16 | Stop reason: SDUPTHER

## 2022-05-16 RX ORDER — ONDANSETRON 2 MG/ML
INJECTION INTRAMUSCULAR; INTRAVENOUS PRN
Status: DISCONTINUED | OUTPATIENT
Start: 2022-05-16 | End: 2022-05-16 | Stop reason: SDUPTHER

## 2022-05-16 RX ORDER — SODIUM CHLORIDE 9 MG/ML
INJECTION, SOLUTION INTRAVENOUS PRN
Status: DISCONTINUED | OUTPATIENT
Start: 2022-05-16 | End: 2022-05-16 | Stop reason: HOSPADM

## 2022-05-16 RX ORDER — HYDROCODONE BITARTRATE AND ACETAMINOPHEN 5; 325 MG/1; MG/1
1 TABLET ORAL EVERY 6 HOURS PRN
Qty: 28 TABLET | Refills: 0 | Status: SHIPPED | OUTPATIENT
Start: 2022-05-16 | End: 2022-05-23

## 2022-05-16 RX ORDER — SODIUM CHLORIDE 0.9 % (FLUSH) 0.9 %
5-40 SYRINGE (ML) INJECTION EVERY 12 HOURS SCHEDULED
Status: DISCONTINUED | OUTPATIENT
Start: 2022-05-16 | End: 2022-05-16 | Stop reason: HOSPADM

## 2022-05-16 RX ORDER — SODIUM CHLORIDE 0.9 % (FLUSH) 0.9 %
5-40 SYRINGE (ML) INJECTION PRN
Status: DISCONTINUED | OUTPATIENT
Start: 2022-05-16 | End: 2022-05-16 | Stop reason: HOSPADM

## 2022-05-16 RX ORDER — PROPOFOL 10 MG/ML
INJECTION, EMULSION INTRAVENOUS PRN
Status: DISCONTINUED | OUTPATIENT
Start: 2022-05-16 | End: 2022-05-16 | Stop reason: SDUPTHER

## 2022-05-16 RX ORDER — MIDAZOLAM HYDROCHLORIDE 1 MG/ML
INJECTION INTRAMUSCULAR; INTRAVENOUS PRN
Status: DISCONTINUED | OUTPATIENT
Start: 2022-05-16 | End: 2022-05-16 | Stop reason: SDUPTHER

## 2022-05-16 RX ADMIN — FENTANYL CITRATE 25 MCG: 50 INJECTION, SOLUTION INTRAMUSCULAR; INTRAVENOUS at 08:13

## 2022-05-16 RX ADMIN — ONDANSETRON 4 MG: 2 INJECTION INTRAMUSCULAR; INTRAVENOUS at 07:39

## 2022-05-16 RX ADMIN — PROPOFOL 100 MCG/KG/MIN: 10 INJECTION, EMULSION INTRAVENOUS at 07:44

## 2022-05-16 RX ADMIN — Medication 2000 MG: at 07:44

## 2022-05-16 RX ADMIN — SODIUM CHLORIDE, POTASSIUM CHLORIDE, SODIUM LACTATE AND CALCIUM CHLORIDE: 600; 310; 30; 20 INJECTION, SOLUTION INTRAVENOUS at 07:17

## 2022-05-16 RX ADMIN — FENTANYL CITRATE 25 MCG: 50 INJECTION, SOLUTION INTRAMUSCULAR; INTRAVENOUS at 07:50

## 2022-05-16 RX ADMIN — MIDAZOLAM HYDROCHLORIDE 2 MG: 1 INJECTION, SOLUTION INTRAMUSCULAR; INTRAVENOUS at 07:39

## 2022-05-16 RX ADMIN — FENTANYL CITRATE 25 MCG: 50 INJECTION, SOLUTION INTRAMUSCULAR; INTRAVENOUS at 07:39

## 2022-05-16 RX ADMIN — PROPOFOL 40 MG: 10 INJECTION, EMULSION INTRAVENOUS at 07:43

## 2022-05-16 RX ADMIN — FENTANYL CITRATE 25 MCG: 50 INJECTION, SOLUTION INTRAMUSCULAR; INTRAVENOUS at 08:17

## 2022-05-16 ASSESSMENT — PAIN DESCRIPTION - ORIENTATION
ORIENTATION: MID

## 2022-05-16 ASSESSMENT — PAIN DESCRIPTION - PAIN TYPE
TYPE: SURGICAL PAIN

## 2022-05-16 ASSESSMENT — PAIN DESCRIPTION - LOCATION
LOCATION: ABDOMEN

## 2022-05-16 ASSESSMENT — PAIN SCALES - GENERAL
PAINLEVEL_OUTOF10: 4
PAINLEVEL_OUTOF10: 4
PAINLEVEL_OUTOF10: 0
PAINLEVEL_OUTOF10: 4

## 2022-05-16 ASSESSMENT — ENCOUNTER SYMPTOMS: SHORTNESS OF BREATH: 1

## 2022-05-16 ASSESSMENT — PAIN DESCRIPTION - DESCRIPTORS
DESCRIPTORS: DISCOMFORT

## 2022-05-16 ASSESSMENT — PAIN - FUNCTIONAL ASSESSMENT: PAIN_FUNCTIONAL_ASSESSMENT: 0-10

## 2022-05-16 NOTE — FLOWSHEET NOTE
rounding in [address] [place] [address]. Assessment: Patient was doing well following surgery. Has good family and dulce support. Intervention: Engaged in conversation and active listening. Prayed with Patient. Outcome: Patient expressed appreciation for visit and offer of continued prayer. Plan: Chaplains are available on site or on call 24/7 for spiritual and emotional support.      05/16/22 0932   Encounter Summary   Encounter Overview/Reason  Spiritual/Emotional Needs   Service Provided For: Patient and family together   Referral/Consult From: [facility]   Support System Spouse   Last Encounter  05/16/22   Complexity of Encounter Low   Begin Time 0910   End Time  0922   Total Time Calculated 12 min   Encounter    Type Post-Procedural   Spiritual/Emotional needs   Type Spiritual Support   Assessment/Intervention/Outcome   Assessment Calm;Peaceful   Intervention Active listening;Prayer (assurance of)/[place]   Outcome Engaged in conversation;Expressed Gratitude;Receptive     Electronically signed by Milagros Jordan on 5/16/2022 at 9:35 AM

## 2022-05-16 NOTE — H&P
Linda Friend is a 77 y.o. male      CC:    Umbilical hernia    HISTORY OF PRESENT ILLNESS:    Pt is a 78 yo male presents with bulge at umbilicus. Patient complains of a bulge at the umbilical hernia,   for  2year(s). Patient  does not remember a time they felt a popping sensation. .  Patient states it is  reducible. Pt describes pain as None today out of 10. Aggrevating factors include None. Patient denies any nausea, vomiting, constipation, difficulty urinating or a chronic cough. Patient's work includes works at [employer]. Patient's hernia history includes No previous hernia. Instructions given for surgery.         Review of Systems:    General:  Fever: Negative  Weight Change:Negative  Night Sweats: Negative    Eye:  Blurry Vision:Negative  Double Vision: Negative    Ent:  Headaches: Negative  Sore throat: Negative    Allergy/Immunology:  Hives: Negative  Latex allergy: Negative    Hematology/Lymphatic:  Bleeding Problems: Negative  Blood Clots: Negative  Swollen Lymph Nodes: Negative    Lungs:  Cough: Negative  SOB: Negative  Wheezing:Negative    Cardiovascular:  Chest Pain: Negative  Palpitations:Negative    GI:   Decreased Appetite: Negative  Heartburn: Negative  Dysphagia: Negative  Nausea/Vomiting: Negative  Abdominal Pain: Negative  Change in Bowels:Negative  Constipation: Negative  Diarrhea: Negative  Rectal Bleeding: Negative    :   Dysuria: Negative  Increase Urinary Frequency/Urgency: Negative    Neuro:  Seizures: Negative  Confusion: Negative        PAST MEDICAL HISTORY:      Family History   Problem Relation Age of Onset    Heart Attack Father     Coronary Art Dis Sister     Hypertension Mother     Lung Cancer Mother     Stroke Mother     Coronary Art Dis Brother         later in life     Social History     Socioeconomic History    Marital status:      Spouse name: Not on file    Number of children: Not on file    Years of education: Not on file   Stephanie Highest education level: Not on file   Occupational History    Occupation: seo   Tobacco Use    Smoking status: Former Smoker     Packs/day: 0.50     Years: 2.00     Pack years: 1.00     Quit date: 1970     Years since quittin.4    Smokeless tobacco: Never Used   Vaping Use    Vaping Use: Never used   Substance and Sexual Activity    Alcohol use: Yes     Alcohol/week: 0.0 standard drinks     Comment: RARELY    Drug use: No    Sexual activity: Not on file   Other Topics Concern    Not on file   Social History Narrative    Not on file     Social Determinants of Health     Financial Resource Strain: Low Risk     Difficulty of Paying Living Expenses: Not hard at all   Food Insecurity: No Food Insecurity    Worried About Running Out of Food in the Last Year: Never true    Gema of Food in the Last Year: Never true   Transportation Needs: No Transportation Needs    Lack of Transportation (Medical): No    Lack of Transportation (Non-Medical): No   Physical Activity: Sufficiently Active    Days of Exercise per Week: 5 days    Minutes of Exercise per Session: 30 min   Stress: No Stress Concern Present    Feeling of Stress : Not at all   Social Connections: Socially Integrated    Frequency of Communication with Friends and Family: More than three times a week    Frequency of Social Gatherings with Friends and Family: More than three times a week    Attends Tenriism Services: More than 4 times per year   [employer] [facility] or Organizations:  Yes    Attends Club or Organization Meetings: More than 4 times per year    Marital Status:    Intimate Partner Violence:     Fear of Current or Ex-Partner: Not on file    Emotionally Abused: Not on file    Physically Abused: Not on file    Sexually Abused: Not on file   Housing Stability: [address] Unable to Pay for Housing in the Last Year: No    Number of [place] in the Last Year: 1    Unstable Housing in the Last Year: No Past Surgical History:   Procedure Laterality Date    AORTIC VALVE SURGERY  01/2020    CARDIAC CATHETERIZATION  12/03/2019    CARDIAC CATHETERIZATION  12/27/2021    Patent LAD, D, and LCX stents    CATARACT REMOVAL Bilateral     CIRCUMCISION  age 49   [facility]YE SURGERY Bilateral     cataract    TYMPANOSTOMY TUBE PLACEMENT      VASECTOMY       Past Medical History:   Diagnosis Date    Acne rosacea     treated with MetroGel    Anemia     Aortic stenosis     echo 3/18 Mod AS, Mild MR    BPH (benign prostatic hypertrophy)     CAD (coronary artery disease)     Stent x 3 DEVON 8/16    Carotid stenosis     Fairly minimal plaquing on ultrasound 5/14--in the conclusion described as less than 50% stenosis but in the body of the report described as minimal    Cataract of both eyes     CRI (chronic renal insufficiency)     Diabetes mellitus, type 2 (HCC)     1.) Proteinuria, 24 hour total urine protein 1,420 mg/24 hrs, 09/08, creatinine clearance 114, 09/08 2.) Repeat protein/creatinine ratio 2.97, 02/12 3. )24 hr urine collection 1958 mg/24 hrs, 04/12. 4.) Repeat protein/creatinine ratio 1.99, 08/12 a.) Repeat protein/creatinine ratio 2.14, 07/13 5.) Renal u/s ok 02/12  6) retinopathy at [address] 12/16  early mod. nonprolifferative  macular involved    Dyslipidemia     Erectile dysfunction     H/O agent Orange exposure     per patient in the [facility] along with exposure to cresote and naphtha    Hearing loss     Hyperlipidemia     Hypertension     Mild nonproliferative diabetic retinopathy ([address]     Non-rheumatic mitral regurgitation 06/27/2016    Pneumonia 7/31/2021    Pneumonia of right lower lobe due to infectious organism     RBBB     Sepsis ([address] 12/22/2021    Status post transcatheter aortic valve replacement (TAVR) using bioprosthesis 01/21/2020    Wears dentures     upper     No current facility-administered medications on file prior to encounter.      Current Outpatient Medications on File Prior to Encounter   Medication Sig Dispense Refill    Handicap Placard MISC by Does not apply route Expires: 4/6/2024 2 each 0    empagliflozin (JARDIANCE) 10 MG tablet Take 1 tablet by mouth daily 90 tablet 3    furosemide (LASIX) 40 MG tablet Take 1 tablet by mouth daily If weight gain more than 2 pounds or sudden increase in shortness of breath or cough take an additional 40 mg tablet and call physician 30 tablet 0    carvedilol (COREG) 6.25 MG tablet TAKE 1 TABLET BY MOUTH TWO TIMES A DAY WITH breakfast and dinner MEALS      aspirin 81 MG chewable tablet Take 81 mg by mouth daily      clopidogrel (PLAVIX) 75 MG tablet Take 1 tablet by mouth daily Take 1 daily 60 tablet 3    simvastatin (ZOCOR) 40 MG tablet Take 10 mg by mouth nightly       glipiZIDE (GLUCOTROL) 10 MG tablet Take 10 mg by mouth daily       vitamin D (CHOLECALCIFEROL) 1000 UNIT TABS tablet Take 1,000 Units by mouth 2 times daily       metFORMIN (GLUCOPHAGE) 1000 MG tablet Take 1 tablet by mouth 2 times daily (with meals) 60 tablet 3     Allergies as of 04/21/2022 - Fully Reviewed 04/21/2022   Allergen Reaction Noted    Atorvastatin Other (See Comments) 05/13/2013    Sulfa antibiotics Swelling 10/01/2013    Sulfanilamide Other (See Comments) 07/27/2021           PHYSICAL EXAM:    There were no vitals taken for this visit. Gen:  A and O x 3, NAD, well nourished  Eyes:  Sclera non icterus, PERRL  Head:  Normocephalic, non-tender  Neck:  Supple, no adenopathy, thyroid non tender and no masses,no carotid bruits  Lungs:  CTA, symmetrical  Chest:  RRR, no murmurs  Abd:  Soft, NT,  Ext:  No edema, no cyanosis  Psych: reveals appropriate mood, memory and judgment,  Neuro:  Reveals no gross motor or sensory deficits,   Msk:  5/5 strength all 4 extremities, no joint tenderness    Hernia:  1-2 cm bulge at umbilicus, reducible. ASSESS MENT:    1.  Umbilical hernia, reducible    Discussed pro's and cons of observation vs repair.   At this time pt wants to repair the hernia. PLAN:    1.   Set up for open [facility] repair using the ventralex ST mesh

## 2022-05-16 NOTE — ANESTHESIA PRE PROCEDURE
Department of Anesthesiology  Preprocedure Note       Name:  Alvin Wilkins   Age:  77 y.o.  :  1945                                          MRN:  [id number]         Date:  2022      Surgeon: Vanessa Marks):  Kim Evans MD    Procedure: Procedure(s):  v-Open Umbilical Hernia Repair w/ Ventralex mesh    Medications prior to admission:   Prior to Admission medications    Medication Sig Start Date End Date Taking?  Authorizing Provider   Handicap Placard MISC by Does not apply route Expires: 2024   ESTHER Schwartz CNP   empagliflozin (JARDIANCE) 10 MG tablet Take 1 tablet by mouth daily 22   Ngoc Calderón MD   furosemide (LASIX) 40 MG tablet Take 1 tablet by mouth daily If weight gain more than 2 pounds or sudden increase in shortness of breath or cough take an additional 40 mg tablet and call physician 3/31/22   Radha Lassiter   carvedilol (COREG) 6.25 MG tablet TAKE 1 TABLET BY MOUTH TWO TIMES A DAY WITH breakfast and dinner MEALS 3/26/22   Historical Provider, MD   aspirin 81 MG chewable tablet Take 81 mg by mouth daily    Historical Provider, MD   clopidogrel (PLAVIX) 75 MG tablet Take 1 tablet by mouth daily Take 1 daily 19   Arlys January, ESTHER Bearden CNP   simvastatin (ZOCOR) 40 MG tablet Take 10 mg by mouth nightly     Historical Provider, MD   glipiZIDE (GLUCOTROL) 10 MG tablet Take 10 mg by mouth daily     Historical Provider, MD   vitamin D (CHOLECALCIFEROL) 1000 UNIT TABS tablet Take 1,000 Units by mouth 2 times daily     Historical Provider, MD   metFORMIN (GLUCOPHAGE) 1000 MG tablet Take 1 tablet by mouth 2 times daily (with meals) 16   ESTHER Lou - CNP       Current medications:    Current Facility-Administered Medications   Medication Dose Route Frequency Provider Last Rate Last Admin    lactated ringers infusion   IntraVENous Continuous Kim Evans MD        sodium chloride flush 0.9 % injection 5-40 mL  5-40 mL IntraVENous 2 times per day Loc Edwards MD        sodium chloride flush 0.9 % injection 5-40 mL  5-40 mL IntraVENous PRN Loc Edwards MD        0.9 % sodium chloride infusion   IntraVENous PRN Loc Edwards MD        ceFAZolin (ANCEF) 2000 mg in sterile water 20 mL IV syringe  2,000 mg IntraVENous On Call to [address]323 Elizabeth Gallardo MD           Allergies:     Allergies   Allergen Reactions    Atorvastatin Other (See Comments)     Muscle pain (myalgias)    Sulfa Antibiotics Swelling     Swelling of retinas    Sulfanilamide Other (See Comments)     Swelling of retinas       Problem List:    Patient Active Problem List   Diagnosis Code    Essential hypertension I10    Unilateral sensorineural hearing loss H90.5    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 ([facility]) E11.21    Non-rheumatic mitral regurgitation I34.0    Nonrheumatic aortic valve stenosis I35.0    Uncontrolled type 2 diabetes mellitus with nephropathy ([facility]) E11.21, E11.65    Coronary artery disease involving native coronary artery of native heart without angina pectoris I25.10    Controlled type 2 diabetes mellitus with mild nonproliferative retinopathy and macular edema, without long-term current use of insulin ([facility] A21.1411    Carotid stenosis I65.29    Chronic kidney disease, stage 3a ([facility]) N18.31    Aortic stenosis, severe I35.0    Hyperlipidemia E78.5    H/O agent [place] exposure Z77.098    Diabetes mellitus, type 2 ([facility]) E11.9    CAD (coronary artery disease) I25.10    Anemia D64.9    Proteinuria R80.9    Wears hearing aid in both ears Z97.4    Acute on chronic systolic heart failure ([facility]) I50.23    S/P TAVR (transcatheter aortic valve replacement) Z95.2    Second hand smoke exposure Z77.22    Peripheral arterial disease ([facility]) I73.9    Heavy sensation of lower extremity R29.898    Impaired ambulation R26.2    Acute respiratory failure with hypoxia ([facility]) J96.01    NSTEMI (non-ST elevated myocardial infarction) ([facility]) I21.4    BJ (acute kidney injury) ([facility] N17.9    Obesity E66.9    Hypoxia R09.02    Incarcerated umbilical hernia A93.2    CHF (congestive heart failure), NYHA class I, acute on chronic, combined ([facility]) I50.43    Shortness of breath R06.02    Adrenal cyst ([facility]) E27.8       Past Medical History:        Diagnosis Date    Acne rosacea     treated with MetroGel    Anemia     Aortic stenosis     echo 3/18 Mod AS, Mild MR    BPH (benign prostatic hypertrophy)     CAD (coronary artery disease)     Stent x 3 DEVON 8/16    Carotid stenosis     Fairly minimal plaquing on ultrasound 5/14--in the conclusion described as less than 50% stenosis but in the body of the report described as minimal    Cataract of both eyes     CRI (chronic renal insufficiency)     Diabetes mellitus, type 2 ([facility])     1.) Proteinuria, 24 hour total urine protein 1,420 mg/24 hrs, 09/08, creatinine clearance 114, 09/08 2.) Repeat protein/creatinine ratio 2.97, 02/12 3. )24 hr urine collection 1958 mg/24 hrs, 04/12. 4.) Repeat protein/creatinine ratio 1.99, 08/12 a.) Repeat protein/creatinine ratio 2.14, 07/13 5.) Renal u/s ok 02/12  6) retinopathy at [address] 12/16  early mod. nonprolifferative  macular involved    Dyslipidemia     Erectile dysfunction     H/O agent [place] exposure     per patient in the [employer] along with exposure to cresote and naphtha    Hearing loss     Hyperlipidemia     Hypertension     Mild nonproliferative diabetic retinopathy ([facility]     Non-rheumatic mitral regurgitation 06/27/2016    Pneumonia 7/31/2021    Pneumonia of right lower lobe due to infectious organism     RBBB     Sepsis ([address] 12/22/2021    Status post transcatheter aortic valve replacement (TAVR) using bioprosthesis 01/21/2020    Wears dentures     upper       Past Surgical History:        Procedure Laterality Date    AORTIC VALVE SURGERY  01/2020    CARDIAC CATHETERIZATION  12/03/2019    CARDIAC CATHETERIZATION  12/27/2021    Patent LAD, D, and LCX stents    CATARACT REMOVAL Bilateral     CIRCUMCISION  age 49   Floydene Ada EYE SURGERY Bilateral     cataract    TYMPANOSTOMY TUBE PLACEMENT      VASECTOMY         Social History:    Social History     Tobacco Use    Smoking status: Former Smoker     Packs/day: 0.50     Years: 2.00     Pack years: 1.00     Quit date: 1970     Years since quittin.4    Smokeless tobacco: Never Used   Substance Use Topics    Alcohol use: Yes     Alcohol/week: 0.0 standard drinks     Comment: RARELY                                Counseling given: Not Answered      Vital Signs (Current): There were no vitals filed for this visit.                                            BP Readings from Last 3 Encounters:   22 128/64   22 (!) 148/70   22 138/68       NPO Status: Time of last liquid consumption: 1800                        Time of last solid consumption: 1800                        Date of last liquid consumption: 05/15/22                        Date of last solid food consumption: 05/15/22    BMI:   Wt Readings from Last 3 Encounters:   22 155 lb (70.3 kg)   22 154 lb 6.4 oz (70 kg)   22 150 lb (68 kg)     There is no height or weight on file to calculate BMI.    CBC:   Lab Results   Component Value Date    WBC 9.2 2022    RBC 4.81 2022    HGB 12.8 2022    HCT 38.6 2022    MCV 80.2 2022    RDW 14.8 2022     2022       CMP:   Lab Results   Component Value Date     2022    K 5.1 2022     2022    CO2 26 2022    BUN 25 2022    CREATININE 1.27 2022    GFRAA >60 2022    LABGLOM 55 2022    GLUCOSE 180 2022    PROT 7.1 2022    PROT 7.3 2012    CALCIUM 9.5 2022    BILITOT 0.25 2022    ALKPHOS 100 2022    AST 17 2022    ALT 15 2022       POC Tests: No results for input(s): POCGLU, POCNA, POCK, POCCL, POCBUN, POCHEMO, POCHCT in the last 72 hours.    Coags:   Lab Results   Component Value Date    PROTIME 13.5 03/21/2022    INR 1.1 03/21/2022    APTT 56.7 12/27/2021       HCG (If Applicable): No results found for: PREGTESTUR, PREGSERUM, HCG, HCGQUANT     ABGs: No results found for: PHART, PO2ART, VYZ9BAW, YQV6IMU, BEART, R4XWMYTF     Type & Screen (If Applicable):  No results found for: LABABO, LABRH    Drug/Infectious Status (If Applicable):  Lab Results   Component Value Date    HEPCAB NONREACTIVE 06/23/2016       COVID-19 Screening (If Applicable):   Lab Results   Component Value Date    COVID19 Not Detected 03/28/2022           Anesthesia Evaluation  Patient summary reviewed and Nursing notes reviewed no history of anesthetic complications:   Airway: Mallampati: II  TM distance: >3 FB   Neck ROM: full  Mouth opening: > = 3 FB Dental:          Pulmonary:normal exam    (+) pneumonia: no interval change,  shortness of breath: no interval change,                             Cardiovascular:    (+) hypertension: no interval change, valvular problems/murmurs (TAVR 2020): AS, MR and no interval change, past MI: no interval change, CAD: no interval change, CABG/stent: no interval change, dysrhythmias: atrial fibrillation, CHF: no interval change, murmur, hyperlipidemia      ECG reviewed  Rhythm: irregular  Rate: normal  Echocardiogram reviewed         Beta Blocker:  Dose within 24 Hrs      ROS comment: LVEF 35%     Neuro/Psych:               GI/Hepatic/Renal:             Endo/Other:    (+) Diabetesno interval change, , .                 Abdominal:             Vascular:           ROS comment: Carotid artery stenosis. Other Findings:             Anesthesia Plan      TIVA     ASA 4       Induction: intravenous. MIPS: Postoperative opioids intended and Prophylactic antiemetics administered. Anesthetic plan and risks discussed with patient. Use of blood products discussed with patient whom consented to blood products.    Plan discussed with surgical team.                  ESTHER Lopez - CRNA   5/16/2022

## 2022-05-16 NOTE — OP NOTE
OPERATIVE NOTE:    Pre op diagnosis:  1. Umbilical hernia     Post op diagnosis:  1. Same, with defect about 1.5 cm  Operation:  1. Open umbilical hernia repair with large ventralex ST mesh    Anesthesia:  LMA and local    Surgeon:  Dr. Ya Coronado FACS    EBL:  minimal    Procedure:    Patient was taken to the OR and placed in a supine position. The abdomen was prepped and draped in the usual sterile fashion. Local block was obtained using 1 % lidocaine with epi and 0.25% marcaine. A curvilinear incision was made superior to the umbilicus and taken down through the SQ and down to the fascia. There was 1.5 cm defect. The hernia was reduced and the fascia edges cleaned off. A bard ventralex mesh was used and placed in the preperitoneal space. The tails were sutured in with 0 neurolon sutures. The defect was closed over the mesh with horizontal 0 neurolon mattress sutures. The wound was irrigated and the dermis was closed with 3-0 vicryl after tacking the umbilicus down to the fascia. The skin was closed with staples and a sterile dressing was applied. Pt was taken back to the recovery room in stable condition.         Ya Coronado MD

## 2022-05-17 LAB — SURGICAL PATHOLOGY REPORT: NORMAL

## 2022-05-18 ENCOUNTER — CARE COORDINATION (OUTPATIENT)
Dept: CARE COORDINATION | Age: 77
End: 2022-05-18

## 2022-05-18 RX ORDER — FUROSEMIDE 40 MG/1
40 TABLET ORAL DAILY
Qty: 90 TABLET | Refills: 1 | Status: SHIPPED | OUTPATIENT
Start: 2022-05-18

## 2022-05-18 NOTE — TELEPHONE ENCOUNTER
Wife states JoanBrianna Falconradhaevanzaydacarmela [address] called and said his Furosemide was discontinued and hey could not get refill. Wife says he takes the water pill. Can you please clarify and send new rx to [facility]?   Let her know [phone]

## 2022-05-18 NOTE — CARE COORDINATION
Ambulatory Care Coordination Note  5/18/2022  CM Risk Score: 12  Charlson 10 Year Mortality Risk Score: 100%     ACC: Juany Mendoza, RN    Summary Note: [facility] was referred for ACM from CTN.    He has:                        Type II Diabetes- Hgb A1c 4/6/2022- 8.6- he just started [place], on medications, follows diet- does not monitor very often at home. Follows with PCP and at the [place]                                                  CAD[facility]  [facility]- EF 36%, S/P TAVR 2 years ago,  CHF clinic, low salt diet, fluid restriction, CHF Zone Tool, on medications, follows with cardiology                                                       Plan of Care :            Continue assessments, education and support                                      SDOH completed                                      Zone Tool- CHF, DM                                      Medications reviewed  [facility] decision maker is up to date- he has completed forms at home and aware to bring in for EMR                                      Covid- vaccinated                                      Review clinic, urgent care, and My Chart                                      Care Gaps                                      Nutrition- they have followed with dietitian- michele Gonzalez                                      QFTI team updated                                      He follows with nephrology Q year                                      [facility] [address] 6/2/2022                                      Apt PCP 7/26/2022  [facility] cardiology 10/18/2022                                      He is scheduled for hernia surgery 5/16/2022- completed      F/U surgeon 5/26/2022                                      F/U wts and BS monitoring     5/18/2022- 9:25 am spoke with [facility]. He stated his hernia surgery went well. He denied any redness, drainage at [facility] button site\". He denied fever.  He is \"waiting on his bowels to move\". He is taking stool softener and passing gas. He will reach out to surgeon if with concerns. He is eating and drinking fluids. We reviewed Urgent Care hours and he voiced understanding. General Assessment    Do you have any symptoms that are causing concern?: Yes  Progression since Onset: Gradually Improving  Reported Symptoms: Other (Comment: awaiting BM after hernia surgery)       Diabetes Assessment    Medic Alert ID: No  Meal Planning: Avoidance of concentrated sweets   How often do you test your blood sugar?: No Testing   Do you have barriers with adherence to non-pharmacologic self-management interventions? (Nutrition/Exercise/Self-Monitoring): No   Have you ever had to go to the ED for symptoms of low blood sugar?: No       No patient-reported symptoms       and   Congestive Heart Failure Assessment    Are you currently restricting fluids?: 2000cc  Do you understand a low sodium diet?: Yes  Do you understand how to read food labels?: Yes  How many restaurant meals do you eat per week?: 5 or more  Do you salt your food before tasting it?: No     No patient-reported symptoms      Symptoms:     Symptom course: stable  Patient-reported weight (lb): 146  Weight trend: stable  Salt intake watch compared to last visit: stable       Care Coordination Interventions    Referral from Primary Care Provider: No  Suggested Interventions and Community Resources  Disease Specific Clinic: Completed (Comment: CHF Clinic)  Registered Dietician: Declined  Zone Management Tools: In Process (Comment: DM, CHF)         Goals Addressed                    This Visit's Progress       Patient Stated      Patient Stated (pt-stated)   No change      Ludy Colvin stated he walks 30-60 minutes 5 days a week.     Barriers: lack of support and lack of education  Plan for overcoming my barriers: Care Coordination Intervention and CHF Clinic  Confidence: 10/10  Anticipated Goal Completion Date: 7/25/2022 Prior to Admission medications    Medication Sig Start Date End Date Taking? Authorizing Provider   HYDROcodone-acetaminophen (NORCO) 5-325 MG per tablet Take 1 tablet by mouth every 6 hours as needed for Pain for up to 7 days. Intended supply: 7 days.  Take lowest dose possible to manage pain 5/16/22 5/23/22  Andres Hartman MD   Minerva Turner [address] [facility] by Does not apply route Expires: 4/6/2024 4/6/22   ESTHER Carney CNP   empagliflozin (JARDIANCE) 10 MG tablet Take 1 tablet by mouth daily 4/5/22   Oneida Fischer MD   furosemide (LASIX) 40 MG tablet Take 1 tablet by mouth daily If weight gain more than 2 pounds or sudden increase in shortness of breath or cough take an additional 40 mg tablet and call physician 3/31/22   Donald Marques   carvedilol (COREG) 6.25 MG tablet TAKE 1 TABLET BY MOUTH TWO TIMES A DAY WITH breakfast and dinner MEALS 3/26/22   Historical Provider, MD   aspirin 81 MG chewable tablet Take 81 mg by mouth daily    Historical Provider, MD   clopidogrel (PLAVIX) 75 MG tablet Take 1 tablet by mouth daily Take 1 daily 12/6/19   ESTHER Menon CNP   simvastatin (ZOCOR) 40 MG tablet Take 10 mg by mouth nightly     Historical Provider, MD   glipiZIDE (GLUCOTROL) 10 MG tablet Take 10 mg by mouth daily     Historical Provider, MD   vitamin D (CHOLECALCIFEROL) 1000 UNIT TABS tablet Take 1,000 Units by mouth 2 times daily     Historical Provider, MD   metFORMIN (GLUCOPHAGE) 1000 MG tablet Take 1 tablet by mouth 2 times daily (with meals) 8/25/16   ESTHER Peterson CNP       Future Appointments   Date Time Provider Julio Ratliff   5/26/2022 10:00 AM Andres Hartman  [address] [facility]   6/2/2022 10:00 AM KIRAN CHF RM 1 KIRAN CHF [place] HOD   7/26/2022  1:30 PM ESTHER Carney CNP [facility]   10/18/2022  9:15 AM DO STEVE Rendon [facility]

## 2022-05-19 ENCOUNTER — TELEPHONE (OUTPATIENT)
Dept: CARDIOLOGY | Age: 77
End: 2022-05-19

## 2022-05-19 NOTE — TELEPHONE ENCOUNTER
Called patient and spoke to wife who stated that the patient has not taken the metoprolol in a long time but is taking the Coreg. Writer informed wife that patient is to be taking the Coreg and not the metoprolol. Wife verbalized understanding and stated she will inform the patient. Wife had no further questions at the time.

## 2022-05-26 ENCOUNTER — OFFICE VISIT (OUTPATIENT)
Dept: SURGERY | Age: 77
End: 2022-05-26
Payer: MEDICARE

## 2022-05-26 VITALS
DIASTOLIC BLOOD PRESSURE: 70 MMHG | TEMPERATURE: 97.3 F | WEIGHT: 149 LBS | SYSTOLIC BLOOD PRESSURE: 132 MMHG | BODY MASS INDEX: 23.95 KG/M2 | HEART RATE: 66 BPM | RESPIRATION RATE: 16 BRPM | HEIGHT: 66 IN

## 2022-05-26 DIAGNOSIS — Z09 POSTOP CHECK: Primary | ICD-10-CM

## 2022-05-26 PROCEDURE — 99024 POSTOP FOLLOW-UP VISIT: CPT | Performed by: SURGERY

## 2022-05-26 PROCEDURE — 99213 OFFICE O/P EST LOW 20 MIN: CPT | Performed by: SURGERY

## 2022-05-26 NOTE — PROGRESS NOTES
Here for postop umbilical hernia repair done openly with the Ventralex mesh. He is doing quite well. He is eating well tolerating a regular diet having normal bowel movements and urinating well. He did not take any pain pills. At this point he has a little bit of induration there which is normal from the surgery. This will take about 6 to 8 weeks to resolve. The incision looks very good. Plan he can slowly resume activity. He can work at [employer] where he lifts less than 15 pounds at a time. We will hold him off 4 weeks from pickleball and 4 weeks from mowing the yard. Those were his questions.   And otherwise we will slowly increase his activity

## 2022-06-01 ENCOUNTER — CARE COORDINATION (OUTPATIENT)
Dept: CARE COORDINATION | Age: 77
End: 2022-06-01

## 2022-06-01 NOTE — CARE COORDINATION
Spoke with Jose Alberto Vernon. He denied any concerns. He is following guidelines from surgeon. He will keep CHF clinic apt tomorrow. He denied swelling feet and ankles and SOB.      Congestive Heart Failure Assessment    Are you currently restricting fluids?: 2000cc  Do you understand a low sodium diet?: Yes  Do you understand how to read food labels?: Yes  How many restaurant meals do you eat per week?: 5 or more  Do you salt your food before tasting it?: No     No patient-reported symptoms      Symptoms:

## 2022-06-01 NOTE — CARE COORDINATION
Ambulatory Care Coordination Note  6/1/2022  CM Risk Score: 12  Charlson 10 Year Mortality Risk Score: 100%     ACC: Thee Maguire, RN    Summary Note: Jenny Lock was referred for ACM from CTN.    He has:                        Type II Diabetes- Hgb A1c 4/6/2022- 8.6- he just started [place], on medications, follows diet- does not monitor very often at home.  Follows with PCP and at the [place]                                                  CAD[facility]- EF 36%, S/P TAVR 2 years ago,  CHF clinic, low salt diet, fluid restriction, CHF Zone Tool, on medications, follows with cardiology                                                       Plan of Care :            Continue assessments, education and support                                      SDOH completed                                      Zone Tool- CHF, DM                                      Medications reviewed  [facility] decision maker is up to date- he has completed forms at home and aware to bring in for EMR                                      Covid- vaccinated                                      Review clinic, urgent care, and My Chart                                      Care Gaps                                      Nutrition- they have followed with dietitian- michele FIELDS team updated                                      He follows with nephrology Q year                                      [facility] [address] 6/2/2022                                      Apt PCP 7/26/2022  [facility] cardiology 10/18/2022                                      He is scheduled for hernia surgery 5/16/2022- completed                                                  F/U surgeon 5/26/2022- completed                                      F/U wts and BS monitoring      6/1/2022- 9:15 am Left message requesting return call @ [phone]- included apt details for tomorrow. .     Lab Results     None          Care Coordination Interventions    Referral from Primary Care Provider: No  Suggested Interventions and Community Resources  Disease Specific Clinic: Completed (Comment: CHF Clinic)  Registered Dietician: Brigette  Zone Management Tools: In Process (Comment: DM, CHF)         Goals Addressed    None         Prior to Admission medications    Medication Sig Start Date End Date Taking?  Authorizing Provider   furosemide (LASIX) 40 MG tablet Take 1 tablet by mouth daily If weight gain more than 2 pounds or sudden increase in shortness of breath or cough take an additional 40 mg tablet and call physician 5/18/22   ESTHER Duarte CNP   Handicap Placard [address] [place] [address] by Does not apply route Expires: 4/6/2024 4/6/22   ESTHER Duarte CNP   empagliflozin (JARDIANCE) 10 MG tablet Take 1 tablet by mouth daily 4/5/22   Kaushik Schwarz MD   carvedilol (COREG) 6.25 MG tablet TAKE 1 TABLET BY MOUTH TWO TIMES A DAY WITH breakfast and dinner MEALS 3/26/22   Historical Provider, MD   aspirin 81 MG chewable tablet Take 81 mg by mouth daily    Historical Provider, MD   clopidogrel (PLAVIX) 75 MG tablet Take 1 tablet by mouth daily Take 1 daily 12/6/19   ESTHER Kelly CNP   simvastatin (ZOCOR) 40 MG tablet Take 10 mg by mouth nightly     Historical Provider, MD   glipiZIDE (GLUCOTROL) 10 MG tablet Take 10 mg by mouth daily     Historical Provider, MD   vitamin D (CHOLECALCIFEROL) 1000 UNIT TABS tablet Take 1,000 Units by mouth 2 times daily     Historical Provider, MD   metFORMIN (GLUCOPHAGE) 1000 MG tablet Take 1 tablet by mouth 2 times daily (with meals) 8/25/16   ESTHER Terrazas CNP       Future Appointments   Date Time Provider Julio Ratliff   6/2/2022 10:00 AM KIRAN CHF RM 1 KIRAN CHF [place] HOD   7/26/2022  1:30 PM ESTHER Duarte CNP [facility]   10/18/2022  9:15 AM DO STEVE Canales [facility]

## 2022-06-10 ENCOUNTER — HOSPITAL ENCOUNTER (OUTPATIENT)
Dept: OTHER | Age: 77
Discharge: HOME | End: 2022-06-10
Payer: MEDICARE

## 2022-06-10 VITALS
DIASTOLIC BLOOD PRESSURE: 64 MMHG | RESPIRATION RATE: 18 BRPM | HEART RATE: 44 BPM | WEIGHT: 152.8 LBS | OXYGEN SATURATION: 99 % | HEIGHT: 66 IN | SYSTOLIC BLOOD PRESSURE: 132 MMHG | BODY MASS INDEX: 24.56 KG/M2

## 2022-06-10 PROCEDURE — 99211 OFF/OP EST MAY X REQ PHY/QHP: CPT

## 2022-06-10 PROCEDURE — 93005 ELECTROCARDIOGRAM TRACING: CPT

## 2022-06-10 NOTE — PROGRESS NOTES
Patient and wife here for CHF visit. Reviewed CHF Zone tool worksheet with both who stated understanding. Patient is weighing self daily at home, stable per patient and wife. Patient denies shortness of breath or edema. Patient's pulse rate 44 today, EKG obtained. Discussed with Dr. Velma Vieyra who reviewed EKG and previous EKG. Verbal order per Dr. Velma Vieyra for patient to have 48 hour holter monitor. Patient informed of Dr. Imelda Arizmendi order and is agreeable. Verbally reviewed importance of medication compliance with patient; patient verbalized understanding. Discussed 2000mg/day sodium restricted diet; patient verbalized understanding. Patient and wife report watching sodium intake. Moderate daily exercise encouraged as tolerated. Discussed rest breaks as needed; patient verbalized understanding. Patient states he is playing pickleball 2 times per week. Patient instructed to weigh self at the same time of each day, using same clothes and same scale; reinforced teaching to monitor for 2-3 lb weight increase over 1-2 days, and to notify the CHF clinic at [phone] or [phone] or physician office if weight change noted. Patient verbalized understanding. Signs and symptoms of CHF discussed with patient, such as feeling more tired than normal, feeling short of breath, coughing that increases when you lie down, sudden weight gain, swelling of your feet, legs or belly. Patient verbalized understanding to notify the CHF clinic at [phone] or [phone] or physician office if these symptoms occur. Compliance with plan of care and further disease process causes discussed with patient, patient encouraged to keep all follow up appointments. Patient verbalized understanding.

## 2022-06-12 LAB
EKG ATRIAL RATE: 44 BPM
EKG P AXIS: 39 DEGREES
EKG P-R INTERVAL: 266 MS
EKG Q-T INTERVAL: 448 MS
EKG QRS DURATION: 100 MS
EKG QTC CALCULATION (BAZETT): 383 MS
EKG R AXIS: -48 DEGREES
EKG T AXIS: 90 DEGREES
EKG VENTRICULAR RATE: 44 BPM

## 2022-06-13 ENCOUNTER — CARE COORDINATION (OUTPATIENT)
Dept: CARE COORDINATION | Age: 77
End: 2022-06-13

## 2022-06-13 DIAGNOSIS — I50.23 ACUTE ON CHRONIC SYSTOLIC HEART FAILURE (HCC): ICD-10-CM

## 2022-06-13 DIAGNOSIS — I35.0 AORTIC STENOSIS, SEVERE: Primary | ICD-10-CM

## 2022-06-13 NOTE — CARE COORDINATION
Ambulatory Care Coordination Note  6/13/2022  CM Risk Score: 12  Charlson 10 Year Mortality Risk Score: 100%     ACC: Dilma Brown, RN    Summary Note:  Stanley Lopez was referred for ACM from CTN.    He has:                        Type II Diabetes- Hgb A1c 4/6/2022- 8.6- he just started [address], on medications, follows diet- does not monitor very often at home. Follows with PCP and at the [place]                                                  CAD[facility] [address]f- EF 36%, S/P TAVR 2 years ago,  CHF clinic, low salt diet, fluid restriction, CHF Zone Tool, on medications, follows with cardiology                                                       Plan of Care :            Continue assessments, education and support                                      SDOH completed                                      Zone Tool- CHF, DM                                      Medications reviewed  [facility] decision maker is up to date- he has completed forms at home and aware to bring in for EMR                                      Covid- vaccinated                                      Review clinic, urgent care, and My Chart                                      Care Gaps                                      Nutrition- they have followed with dietitian- declined Brendan BELL team updated                                      He follows with nephrology Q year                                      [facility] 7/21/2022                                      Apt PCP 7/26/2022  [facility] cardiology 10/18/2022                                      He is scheduled for hernia surgery 5/16/2022- completed                                                  F/U surgeon 5/26/2022- completed                                      F/U wts and BS monitoring     6/13/2022- 11:23 am spoke with Stanley Lopez. He is having a holter monitor done d/t low HR.  He continues to follow with CHF Clinic. He is going to [place] for vacation in July. He stated he is doing well. General Assessment    Do you have any symptoms that are causing concern?: No       Diabetes Assessment    Medic Alert ID: No  Meal Planning: Avoidance of concentrated sweets   How often do you test your blood sugar?: No Testing   Do you have barriers with adherence to non-pharmacologic self-management interventions? (Nutrition/Exercise/Self-Monitoring): No   Have you ever had to go to the ED for symptoms of low blood sugar?: No       No patient-reported symptoms       and   Congestive Heart Failure Assessment    Are you currently restricting fluids?: 2000cc  Do you understand a low sodium diet?: Yes  Do you understand how to read food labels?: Yes  How many restaurant meals do you eat per week?: 5 or more  Do you salt your food before tasting it?: No     No patient-reported symptoms      Symptoms:     Symptom course: stable  Patient-reported weight (lb): 146  Weight trend: stable  Salt intake watch compared to last visit: stable       Lab Results     None          Care Coordination Interventions    Referral from Primary Care Provider: No  Suggested Interventions and Community Resources  Disease Specific Clinic: Completed (Comment: CHF Clinic)  Registered Dietician: Declined  Zone Management Tools: In Process (Comment: DM, CHF)         Goals Addressed                    This Visit's Progress       Patient Stated      Patient Stated (pt-stated)   On track      Jammie York Mailing stated he walks 30-60 minutes 5 days a week. Barriers: lack of support and lack of education  Plan for overcoming my barriers: Care Coordination Intervention and CHF Clinic  Confidence: 10/10  Anticipated Goal Completion Date: 7/25/2022            Prior to Admission medications    Medication Sig Start Date End Date Taking?  Authorizing Provider   furosemide (LASIX) 40 MG tablet Take 1 tablet by mouth daily If weight gain more than 2 pounds or sudden increase in shortness of breath or cough take an additional 40 mg tablet and call physician 5/18/22   ESTHER Dawson CNP   Handicap Placard MISC by Does not apply route Expires: 4/6/2024 4/6/22   ESTHER Dawson CNP   empagliflozin (JARDIANCE) 10 MG tablet Take 1 tablet by mouth daily 4/5/22   Michael Peña MD   carvedilol (COREG) 6.25 MG tablet TAKE 1 TABLET BY MOUTH TWO TIMES A DAY WITH breakfast and dinner MEALS 3/26/22   Historical Provider, MD   aspirin 81 MG chewable tablet Take 81 mg by mouth daily    Historical Provider, MD   clopidogrel (PLAVIX) 75 MG tablet Take 1 tablet by mouth daily Take 1 daily 12/6/19   ESTHER Ceja CNP   simvastatin (ZOCOR) 40 MG tablet Take 10 mg by mouth nightly     Historical Provider, MD   glipiZIDE (GLUCOTROL) 10 MG tablet Take 10 mg by mouth daily     Historical Provider, MD   vitamin D (CHOLECALCIFEROL) 1000 UNIT TABS tablet Take 1,000 Units by mouth 2 times daily     Historical Provider, MD   metFORMIN (GLUCOPHAGE) 1000 MG tablet Take 1 tablet by mouth 2 times daily (with meals) 8/25/16   ESTHER Morton CNP       Future Appointments   Date Time Provider Julio Ratliff   6/27/2022  3:45 PM SCHEDULE, HEART 238 [facility] EKG [place]   6/29/2022  3:45 PM SCHEDULE, HEART 238 [facility] EKG [place]   7/21/2022  1:00 PM KIRAN CHF RM 1 KIRAN CHF [place] HOD   7/26/2022  1:30 PM ESTHER Dawson CNP DINT [facility]   10/18/2022  9:15 AM DO HARJIT SniderLILLY [facility]

## 2022-06-27 ENCOUNTER — CARE COORDINATION (OUTPATIENT)
Dept: CARE COORDINATION | Age: 77
End: 2022-06-27

## 2022-06-27 NOTE — CARE COORDINATION
Ambulatory Care Coordination Note  6/27/2022  CM Risk Score: 12  Charlson 10 Year Mortality Risk Score: 100%     ACC: Long Becerra, RN    Summary Note:  Omid was referred for ACM from CTN.    He has:                        Type II Diabetes- Hgb A1c 6//2022- 6.7 at the [place]- he just started [place], on medications, follows diet- does not monitor very often at home. Follows with PCP and at      the [place]                                       CAD,  [address], CHf- EF 36%, S/P TAVR 2 years ago,  CHF clinic, low salt diet, fluid restriction, CHF Zone Tool, on medications, follows with cardiology                                                       Plan of Care :            Continue assessments, education and support                                      SDOH completed                                      Zone Tool- CHF, DM                                      Medications reviewed  [facility] decision maker is up to date- he has completed forms at home and aware to bring in for EMR                                      Covid- vaccinated                                      Review clinic, urgent care, and My Chart                                      Care Gaps                                      Nutrition- they have followed with dietitian- declined Leonor SMITH team updated                                      He follows with nephrology Q year                                      [facility] 7/21/2022                                      Apt PCP 7/26/2022  [facility] cardiology 10/18/2022, Holter 7/18/2022                                      He is scheduled for hernia surgery 5/16/2022- completed                                                  F/U surgeon 5/26/2022- completed                                      F/U wts and BS monitoring     6/27/2022- 10:09 am spoke with Esteban Beal.  He was seen at [place]- lisinopril d/c, metformin reduced to 0.5 tab BID. Hgb A1c was 6.7. He will be in [place] 7/1-7/9/2022. This writer will F/U after he returns. He is aware to watch for CHF- 4th of July literature. He does wear sunscreen and will stay hydrated. General Assessment    Do you have any symptoms that are causing concern?: No       Diabetes Assessment    Medic Alert ID: No  Meal Planning: Avoidance of concentrated sweets   How often do you test your blood sugar?: No Testing   Do you have barriers with adherence to non-pharmacologic self-management interventions? (Nutrition/Exercise/Self-Monitoring): No   Have you ever had to go to the ED for symptoms of low blood sugar?: No       No patient-reported symptoms       and   Congestive Heart Failure Assessment    Are you currently restricting fluids?: 2000cc  Do you understand a low sodium diet?: Yes  Do you understand how to read food labels?: Yes  How many restaurant meals do you eat per week?: 5 or more  Do you salt your food before tasting it?: No     No patient-reported symptoms      Symptoms:     Symptom course: stable  Patient-reported weight (lb): 147  Weight trend: stable  Salt intake watch compared to last visit: stable         Lab Results     None          Care Coordination Interventions    Referral from Primary Care Provider: No  Suggested Interventions and Community Resources  Disease Specific Clinic: Completed (Comment: CHF Clinic)  Registered Dietician: Declined  Zone Management Tools: In Process (Comment: DM, CHF)         Goals Addressed                    This Visit's Progress       Patient Stated      Patient Stated (pt-stated)   On track      Stanley Lopez stated he walks 30-60 minutes 5 days a week.     Barriers: lack of support and lack of education  Plan for overcoming my barriers: Care Coordination Intervention and CHF Clinic  Confidence: 10/10  Anticipated Goal Completion Date: 7/25/2022            Prior to Admission medications    Medication Sig Start Date End Date Taking?  Authorizing Provider   furosemide (LASIX) 40 MG tablet Take 1 tablet by mouth daily If weight gain more than 2 pounds or sudden increase in shortness of breath or cough take an additional 40 mg tablet and call physician 5/18/22   ESTHER Christian CNP   Handicap Placard MISC by Does not apply route Expires: 4/6/2024 4/6/22   ESTHER Christian CNP   empagliflozin (JARDIANCE) 10 MG tablet Take 1 tablet by mouth daily 4/5/22   Home Fletcher MD   carvedilol (COREG) 6.25 MG tablet TAKE 1 TABLET BY MOUTH TWO TIMES A DAY WITH breakfast and dinner MEALS 3/26/22   Historical Provider, MD   aspirin 81 MG chewable tablet Take 81 mg by mouth daily    Historical Provider, MD   clopidogrel (PLAVIX) 75 MG tablet Take 1 tablet by mouth daily Take 1 daily 12/6/19   ESTHER Seth CNP   simvastatin (ZOCOR) 40 MG tablet Take 10 mg by mouth nightly     Historical Provider, MD   glipiZIDE (GLUCOTROL) 10 MG tablet Take 10 mg by mouth daily     Historical Provider, MD   vitamin D (CHOLECALCIFEROL) 1000 UNIT TABS tablet Take 1,000 Units by mouth 2 times daily     Historical Provider, MD   metFORMIN (GLUCOPHAGE) 1000 MG tablet Take 1 tablet by mouth 2 times daily (with meals) 8/25/16   ESTHER York CNP       Future Appointments   Date Time Provider Julio Ratliff   7/18/2022  3:15 PM SCHEDULE, HEART 238 [facility] EKG [place]   7/20/2022  3:15 PM SCHEDULE, HEART 238 [facility] EKG [place]   7/21/2022  1:00 PM KIRAN CHF RM 1 KIRAN CHF [place] HOD   7/26/2022  1:30 PM ESTHER Christian CNP DINRAGHU DP   10/18/2022  9:15 AM DO STEVE Mantilla [facility]

## 2022-06-30 ENCOUNTER — CARE COORDINATION (OUTPATIENT)
Dept: CARE COORDINATION | Age: 77
End: 2022-06-30

## 2022-06-30 NOTE — CARE COORDINATION
2022- 9:16 am Left message requesting return call @ [phone] re:CHF  initiative. Heart Failure Education outreach Date/Time: 2022 1:53 PM    Ambulatory Care Manager (ACIESHA) contacted the patient by telephone to perform Ambulatory Care Coordination. Verified name and  with patient as identifiers. Provided introduction to self, and explanation of the Ambulatory Care Manager's role. ACM reviewed that a Health Healthy tips for the   packet has been mailed to the them. ACM reviewed CHF zones, daily weights, fluid restriction, the importance of low sodium diet and healthy tips packet with the patient. Instructed patient to call their Cardiologist if they have a weight gain of 3 lbs in 2 days or 5 lbs in a week. Patient reminded that there is a physician on call 24 hours a day / 7 days a week should the patient have questions or concerns. The patient verbalized understanding. Congestive Heart Failure Assessment    Are you currently restricting fluids?: 2000cc  Do you understand a low sodium diet?: Yes  Do you understand how to read food labels?: Yes  How many restaurant meals do you eat per week?: 5 or more  Do you salt your food before tasting it?: No     No patient-reported symptoms      Symptoms:     Symptom course: stable  Patient-reported weight (lb): 147  Weight trend: stable  Salt intake watch compared to last visit: stable     1:53 pm spoke with Suzette Laboy. Denied any concerns. They are flying to [place] tomorrow. He plans on wearing a mask d/t increase in Covid vases.

## 2022-07-05 ENCOUNTER — CARE COORDINATION (OUTPATIENT)
Dept: CARE COORDINATION | Age: 77
End: 2022-07-05

## 2022-07-11 ENCOUNTER — CARE COORDINATION (OUTPATIENT)
Dept: CARE COORDINATION | Age: 77
End: 2022-07-11

## 2022-07-11 NOTE — CARE COORDINATION
Ambulatory Care Coordination Note  7/11/2022  CM Risk Score: 12  Charlson 10 Year Mortality Risk Score: 100%     ACC: Anita Dodd, RN    Summary Note:  Omid was referred for ACM from CTN.    He has:                        Type II Diabetes- Hgb A1c 6//2022- 6.7 at the [place]- he just started [place], on medications, follows diet- does not monitor very often at home.  Follows with PCP and at                                                the [place]                                       CAD,  HTN,  Hyperlipidemia, CHf- EF 36%, S/P TAVR 2 years ago,  CHF clinic, low salt diet, fluid restriction, CHF Zone Tool, on medications, follows with cardiology                                                       Plan of Care :            Continue assessments, education and support                                      SDOH completed                                      Zone Tool- CHF, DM                                      Medications reviewed  [facility] decision maker is up to date- he has completed forms at home and aware to bring in for EMR                                      Covid- vaccinated                                      Review clinic, urgent care, and My Chart                                      Care Gaps                                      Nutrition- they have followed with dietitian- michele Ames                                      [facility] team updated                                      He follows with nephrology Q year                                      [facility] 7/21/2022                                      Apt PCP 7/26/2022  [facility] cardiology 10/18/2022, Holter 7/18/2022                                      He is scheduled for hernia surgery 5/16/2022- completed                                                  F/U surgeon 5/26/2022- completed                                      F/U wts and BS monitoring     7/11/2022- 11:05 am Left message requesting return call @ [phone]- included upcoming apt details. Lab Results     None          Care Coordination Interventions    Referral from Primary Care Provider: No  Suggested Interventions and Community Resources  Disease Specific Clinic: Completed (Comment: CHF Clinic)  Registered Dietician: Declined  Zone Management Tools: In Process (Comment: DM, CHF)         Goals Addressed    None         Prior to Admission medications    Medication Sig Start Date End Date Taking?  Authorizing Provider   furosemide (LASIX) 40 MG tablet Take 1 tablet by mouth daily If weight gain more than 2 pounds or sudden increase in shortness of breath or cough take an additional 40 mg tablet and call physician 5/18/22   ESTHER Ly CNP   [facility] Does not apply route Expires: 4/6/2024 4/6/22   ESTHER Ly CNP   empagliflozin (JARDIANCE) 10 MG tablet Take 1 tablet by mouth daily 4/5/22   Francisco Suh MD   carvedilol (COREG) 6.25 MG tablet TAKE 1 TABLET BY MOUTH TWO TIMES A DAY WITH breakfast and dinner MEALS 3/26/22   Historical Provider, MD   aspirin 81 MG chewable tablet Take 81 mg by mouth daily    Historical Provider, MD   clopidogrel (PLAVIX) 75 MG tablet Take 1 tablet by mouth daily Take 1 daily 12/6/19   ESTHER Barry CNP   simvastatin (ZOCOR) 40 MG tablet Take 10 mg by mouth nightly     Historical Provider, MD   glipiZIDE (GLUCOTROL) 10 MG tablet Take 10 mg by mouth daily     Historical Provider, MD   vitamin D (CHOLECALCIFEROL) 1000 UNIT TABS tablet Take 1,000 Units by mouth 2 times daily     Historical Provider, MD   metFORMIN (GLUCOPHAGE) 1000 MG tablet Take 1 tablet by mouth 2 times daily (with meals) 8/25/16   ESTHER Espinal CNP       Future Appointments   Date Time Provider Julio Ratliff   7/18/2022  3:15 PM SCHEDULE, HEART 238 [facility] EKG [place]   7/20/2022  3:15 PM SCHEDULE, HEART 238 [facility] E[facility] [place] 7/21/2022  1:00 PM MDHZ CHF RM 1 MDHZ CHF [place] HOD   7/26/2022  1:30 PM Markham Brunner, APRN - CNP DINT [facility]   10/18/2022  9:15 AM DO STEVE Sweeney [facility]

## 2022-07-13 ENCOUNTER — CARE COORDINATION (OUTPATIENT)
Dept: CARE COORDINATION | Age: 77
End: 2022-07-13

## 2022-07-13 NOTE — CARE COORDINATION
Ambulatory Care Coordination Note  7/13/2022  CM Risk Score: 12  Charlson 10 Year Mortality Risk Score: 100%     ACC: Sherlyn Rucker, RN    Summary Note:  Omid was referred for ACM from CTN.    He has:                        Type II Diabetes- Hgb A1c 6//2022- 6.7 at the [place]- he just started [place], on medications, follows diet- does not monitor very often at home. Follows with PCP and at [facility],  HTN,  Hyperlipidemia, CHf- EF 36%, S/P TAVR 2 years ago,  CHF clinic, low salt diet, fluid restriction, CHF Zone Tool, on medications, follows with cardiology                                                       Plan of Care :            Continue assessments, education and support                                      SDOH completed                                      Zone Tool- CHF, DM                                      Medications reviewed  [facility] decision maker is up to date- he has completed forms at home and aware to bring in for EMR                                      Covid- vaccinated                                      Review clinic, urgent care, and My Chart                                      Care Gaps                                      Nutrition- they have followed with dietitian- michele Palmer                                      [facility] team updated                                      He follows with nephrology Q year                                      [facility] 7/21/2022                                      Apt PCP 7/26/2022  [facility] cardiology 10/18/2022, Holter 7/18/2022                                      He is scheduled for hernia surgery 5/16/2022- completed                                                  F/U surgeon 5/26/2022- completed                                      F/U wts and BS monitoring      7/13/2022- [place] Kris [place]  [place] concerns. He will keep upcoming apts as scheduled. General Assessment    Do you have any symptoms that are causing concern?: No       Diabetes Assessment    Medic Alert ID: No  Meal Planning: Avoidance of concentrated sweets   How often do you test your blood sugar?: No Testing   Do you have barriers with adherence to non-pharmacologic self-management interventions? (Nutrition/Exercise/Self-Monitoring): No   Have you ever had to go to the ED for symptoms of low blood sugar?: No       No patient-reported symptoms       and   Congestive Heart Failure Assessment    Are you currently restricting fluids?: 2000cc  Do you understand a low sodium diet?: Yes  Do you understand how to read food labels?: Yes  How many restaurant meals do you eat per week?: 5 or more  Do you salt your food before tasting it?: No     No patient-reported symptoms      Symptoms:            Lab Results     None          Care Coordination Interventions    Referral from Primary Care Provider: No  Suggested Interventions and Community Resources  Disease Specific Clinic: Completed (Comment: CHF Clinic)  Registered Dietician: Brigette  Zone Management Tools: In Process (Comment: DM, CHF)         Goals Addressed                    This Visit's Progress       Patient Stated      Patient Stated (pt-stated)   On track      Arthur Barber stated he walks 30-60 minutes 5 days a week. Barriers: lack of support and lack of education  Plan for overcoming my barriers: Care Coordination Intervention and CHF Clinic  Confidence: 10/10  Anticipated Goal Completion Date: 7/25/2022            Prior to Admission medications    Medication Sig Start Date End Date Taking?  Authorizing Provider   furosemide (LASIX) 40 MG tablet Take 1 tablet by mouth daily If weight gain more than 2 pounds or sudden increase in shortness of breath or cough take an additional 40 mg tablet and call physician 5/18/22   ESTHER Ozuna CNP   Handicap Placard MISC by Does not apply route Expires: 4/6/2024 4/6/22   ESTHER Llanes CNP   empagliflozin (JARDIANCE) 10 MG tablet Take 1 tablet by mouth daily 4/5/22   Denis Joshi MD   carvedilol (COREG) 6.25 MG tablet TAKE 1 TABLET BY MOUTH TWO TIMES A DAY WITH breakfast and dinner MEALS 3/26/22   Historical Provider, MD   aspirin 81 MG chewable tablet Take 81 mg by mouth daily    Historical Provider, MD   clopidogrel (PLAVIX) 75 MG tablet Take 1 tablet by mouth daily Take 1 daily 12/6/19   ESTHER Wilkerson CNP   simvastatin (ZOCOR) 40 MG tablet Take 10 mg by mouth nightly     Historical Provider, MD   glipiZIDE (GLUCOTROL) 10 MG tablet Take 10 mg by mouth daily     Historical Provider, MD   vitamin D (CHOLECALCIFEROL) 1000 UNIT TABS tablet Take 1,000 Units by mouth 2 times daily     Historical Provider, MD   metFORMIN (GLUCOPHAGE) 1000 MG tablet Take 1 tablet by mouth 2 times daily (with meals) 8/25/16   ESTHER Kim CNP       Future Appointments   Date Time Provider Julio Ratliff   7/18/2022  3:15 PM SCHEDULE, HEART 238 [facility] EKG [place]   7/20/2022  3:15 PM SCHEDULE, HEART 238 [facility] EKG [place]   7/21/2022  1:00 PM KIRAN CHF RM 1 KIRAN CHF [place] HOD   7/26/2022  1:30 PM ESTHER Llanes CNP DP   10/18/2022  9:15 AM DO STEVE Cook [facility]

## 2022-07-18 ENCOUNTER — HOSPITAL ENCOUNTER (OUTPATIENT)
Dept: NON INVASIVE DIAGNOSTICS | Age: 77
Discharge: HOME | End: 2022-07-18
Payer: MEDICARE

## 2022-07-18 DIAGNOSIS — I50.23 ACUTE ON CHRONIC SYSTOLIC HEART FAILURE (HCC): ICD-10-CM

## 2022-07-18 DIAGNOSIS — I35.0 AORTIC STENOSIS, SEVERE: ICD-10-CM

## 2022-07-18 PROCEDURE — 93226 XTRNL ECG REC<48 HR SCAN A/R: CPT

## 2022-07-18 PROCEDURE — 93225 XTRNL ECG REC<48 HRS REC: CPT

## 2022-07-19 ENCOUNTER — HOSPITAL ENCOUNTER (OUTPATIENT)
Dept: LAB | Age: 77
Discharge: HOME | End: 2022-07-19
Payer: MEDICARE

## 2022-07-19 DIAGNOSIS — E11.3219 CONTROLLED TYPE 2 DIABETES MELLITUS WITH MILD NONPROLIFERATIVE RETINOPATHY AND MACULAR EDEMA, WITHOUT LONG-TERM CURRENT USE OF INSULIN, UNSPECIFIED LATERALITY (HCC): ICD-10-CM

## 2022-07-19 DIAGNOSIS — N18.31 CHRONIC KIDNEY DISEASE, STAGE 3A (HCC): ICD-10-CM

## 2022-07-19 LAB
ANION GAP SERPL CALCULATED.3IONS-SCNC: 14 MMOL/L (ref 9–17)
BUN BLDV-MCNC: 39 MG/DL (ref 8–23)
BUN/CREAT BLD: 23 (ref 9–20)
CALCIUM SERPL-MCNC: 9.6 MG/DL (ref 8.6–10.4)
CHLORIDE BLD-SCNC: 97 MMOL/L (ref 98–107)
CO2: 23 MMOL/L (ref 20–31)
CREAT SERPL-MCNC: 1.68 MG/DL (ref 0.7–1.2)
GFR AFRICAN AMERICAN: 49 ML/MIN
GFR NON-AFRICAN AMERICAN: 40 ML/MIN
GFR SERPL CREATININE-BSD FRML MDRD: ABNORMAL ML/MIN/{1.73_M2}
GLUCOSE BLD-MCNC: 91 MG/DL (ref 70–99)
POTASSIUM SERPL-SCNC: 4.7 MMOL/L (ref 3.7–5.3)
SODIUM BLD-SCNC: 134 MMOL/L (ref 135–144)

## 2022-07-19 PROCEDURE — 83036 HEMOGLOBIN GLYCOSYLATED A1C: CPT

## 2022-07-19 PROCEDURE — 80048 BASIC METABOLIC PNL TOTAL CA: CPT

## 2022-07-19 PROCEDURE — 36415 COLL VENOUS BLD VENIPUNCTURE: CPT

## 2022-07-20 ENCOUNTER — HOSPITAL ENCOUNTER (OUTPATIENT)
Dept: NON INVASIVE DIAGNOSTICS | Age: 77
Discharge: HOME | End: 2022-07-20

## 2022-07-21 ENCOUNTER — HOSPITAL ENCOUNTER (OUTPATIENT)
Dept: OTHER | Age: 77
Discharge: HOME | End: 2022-07-21
Payer: MEDICARE

## 2022-07-21 VITALS
BODY MASS INDEX: 25.3 KG/M2 | WEIGHT: 157.4 LBS | SYSTOLIC BLOOD PRESSURE: 142 MMHG | RESPIRATION RATE: 20 BRPM | HEART RATE: 52 BPM | DIASTOLIC BLOOD PRESSURE: 68 MMHG | OXYGEN SATURATION: 95 % | HEIGHT: 66 IN

## 2022-07-21 PROCEDURE — 99211 OFF/OP EST MAY X REQ PHY/QHP: CPT

## 2022-07-21 RX ORDER — AMLODIPINE BESYLATE 10 MG/1
10 TABLET ORAL DAILY
COMMUNITY
End: 2022-10-26 | Stop reason: ALTCHOICE

## 2022-07-21 NOTE — PROGRESS NOTES
Patient here for CHF visit. Reviewed CHF Zone tool worksheet with patient who stated understanding. [facility], was started on amlodipine per [address] Patient is scheduled to see PCP Rahel Payton CNP on 7-26-22 and cardiology on 8-2-22. Patient is weighing self daily at home, weight at home today was 149 lbs. No swelling of lower extremities noted. Patient had holter monitor earlier this week for bradycardia; pulse rate today ranged from 46-52. Patient reports he has a pulse oximeter at home and pulse is usually in that range. Verbally reviewed importance of medication compliance with patient; patient verbalized understanding. Discussed 2000mg/day sodium restricted diet; patient verbalized understanding. Moderate daily exercise encouraged as tolerated. Discussed rest breaks as needed; patient verbalized understanding. Patient reports he plays pickleball twice weekly and walks at least 2 miles every other day without difficulty. Patient instructed to weigh self at the same time of each day, using same clothes and same scale; reinforced teaching to monitor for 2-3 lb weight increase over 1-2 days, and to notify the CHF clinic at [phone] or [phone] or physician office if weight change noted. Patient verbalized understanding. Signs and symptoms of CHF discussed with patient, such as feeling more tired than normal, feeling short of breath, coughing that increases when you lie down, sudden weight gain, swelling of your feet, legs or belly. Patient verbalized understanding to notify the CHF clinic at [phone] or [phone] or physician office if these symptoms occur. Compliance with plan of care and further disease process causes discussed with patient, patient encouraged to keep all follow up appointments. Patient verbalized understanding.

## 2022-07-22 LAB
ESTIMATED AVERAGE GLUCOSE: 163 MG/DL
HBA1C MFR BLD: 7.3 % (ref 4–6)

## 2022-07-26 ENCOUNTER — OFFICE VISIT (OUTPATIENT)
Dept: INTERNAL MEDICINE | Age: 77
End: 2022-07-26
Payer: MEDICARE

## 2022-07-26 VITALS
DIASTOLIC BLOOD PRESSURE: 82 MMHG | WEIGHT: 154.6 LBS | HEART RATE: 60 BPM | BODY MASS INDEX: 24.85 KG/M2 | SYSTOLIC BLOOD PRESSURE: 134 MMHG | HEIGHT: 66 IN

## 2022-07-26 DIAGNOSIS — Z95.2 S/P TAVR (TRANSCATHETER AORTIC VALVE REPLACEMENT): ICD-10-CM

## 2022-07-26 DIAGNOSIS — I35.0 AORTIC STENOSIS, SEVERE: ICD-10-CM

## 2022-07-26 DIAGNOSIS — I10 ESSENTIAL HYPERTENSION: ICD-10-CM

## 2022-07-26 DIAGNOSIS — N18.31 CHRONIC KIDNEY DISEASE, STAGE 3A (HCC): Primary | ICD-10-CM

## 2022-07-26 DIAGNOSIS — E55.9 VITAMIN D DEFICIENCY: ICD-10-CM

## 2022-07-26 DIAGNOSIS — I25.10 CORONARY ARTERY DISEASE INVOLVING NATIVE CORONARY ARTERY OF NATIVE HEART WITHOUT ANGINA PECTORIS: ICD-10-CM

## 2022-07-26 DIAGNOSIS — R91.1 LUNG NODULE: ICD-10-CM

## 2022-07-26 DIAGNOSIS — E11.22 TYPE 2 DIABETES MELLITUS WITH DIABETIC CHRONIC KIDNEY DISEASE, UNSPECIFIED CKD STAGE, UNSPECIFIED WHETHER LONG TERM INSULIN USE (HCC): ICD-10-CM

## 2022-07-26 DIAGNOSIS — E11.3219 CONTROLLED TYPE 2 DIABETES MELLITUS WITH MILD NONPROLIFERATIVE RETINOPATHY AND MACULAR EDEMA, WITHOUT LONG-TERM CURRENT USE OF INSULIN, UNSPECIFIED LATERALITY (HCC): ICD-10-CM

## 2022-07-26 DIAGNOSIS — I50.22 CHF NYHA CLASS I, CHRONIC, SYSTOLIC (HCC): ICD-10-CM

## 2022-07-26 DIAGNOSIS — E78.5 DYSLIPIDEMIA: ICD-10-CM

## 2022-07-26 DIAGNOSIS — I73.9 PERIPHERAL ARTERIAL DISEASE (HCC): ICD-10-CM

## 2022-07-26 DIAGNOSIS — E27.8 ADRENAL CYST (HCC): ICD-10-CM

## 2022-07-26 PROCEDURE — 3051F HG A1C>EQUAL 7.0%<8.0%: CPT | Performed by: NURSE PRACTITIONER

## 2022-07-26 PROCEDURE — 99214 OFFICE O/P EST MOD 30 MIN: CPT | Performed by: NURSE PRACTITIONER

## 2022-07-26 PROCEDURE — G8427 DOCREV CUR MEDS BY ELIG CLIN: HCPCS | Performed by: NURSE PRACTITIONER

## 2022-07-26 PROCEDURE — 1036F TOBACCO NON-USER: CPT | Performed by: NURSE PRACTITIONER

## 2022-07-26 PROCEDURE — G8420 CALC BMI NORM PARAMETERS: HCPCS | Performed by: NURSE PRACTITIONER

## 2022-07-26 PROCEDURE — 1123F ACP DISCUSS/DSCN MKR DOCD: CPT | Performed by: NURSE PRACTITIONER

## 2022-07-26 NOTE — PROGRESS NOTES
07/26/22  Elizabeth Le  1945      Chief Complaint:   1. Chronic kidney disease, stage 3a ([address]    2. Adrenal cyst ([address]    3. 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 CHF NYHA class I, chronic, systolic ([address]    5. Essential hypertension    6. Coronary artery disease involving native coronary artery of native heart without angina pectoris    7. Aortic stenosis, severe    8. S/P TAVR (transcatheter aortic valve replacement)    9. Peripheral arterial disease ([address]    10. Dyslipidemia    11. Vitamin D deficiency    12. Lung nodule        HPI:  60-year-old patient being seen for 3-month follow-up of above chronic health conditions. He is following with nephrology now for chronic kidney disease. Overall labs have been fairly stable. His blood sugars are improving. We reviewed his A1c which is much improved. Follows with the CHF clinic. Weights have been stable denies any orthopnea. Swelling to lower extremities stable. Blood pressure stable in office denies any dizziness lightheadedness. No chest discomfort. Continues on statin for dyslipidemia denies any significant myalgias. Recently went to [place] and has been vacationing up at the [facility]. Denies any lightheadedness dizziness. Did have a Holter per cardiology. Did show some persistent bradycardia. Again asymptomatic. Does have a follow-up with cardiology next week.     Allergies   Allergen Reactions    Atorvastatin Other (See Comments)     Muscle pain (myalgias)    Sulfa Antibiotics Swelling     Swelling of retinas    Sulfanilamide Other (See Comments)     Swelling of retinas       Past Medical History:   Diagnosis Date    Acne rosacea     treated with MetroGel    Anemia     Aortic stenosis     echo 3/18 Mod AS, Mild MR    BPH (benign prostatic hypertrophy)     CAD (coronary artery disease)     Stent x 3 DEVON 8/16    Carotid stenosis     Fairly minimal plaquing on ultrasound 5/14--in the conclusion described as less than 50% stenosis but in the body of the report described as minimal    Cataract of both eyes     CRI (chronic renal insufficiency)     Diabetes mellitus, type 2 (HCC)     1.) Proteinuria, 24 hour total urine protein 1,420 mg/24 hrs, 09/08, creatinine clearance 114, 09/08 2.) Repeat protein/creatinine ratio 2.97, 02/12 3. )24 hr urine collection 1958 mg/24 hrs, 04/12. 4.) Repeat protein/creatinine ratio 1.99, 08/12 a.) Repeat protein/creatinine ratio 2.14, 07/13 5.) Renal u/s ok 02/12  6) retinopathy at [address] 12/16  early mod. nonprolifferative  macular involved    Dyslipidemia     Erectile dysfunction     H/O agent Orange exposure     per patient in the [employer] along with exposure to cresote and naphtha    Hearing loss     Hyperlipidemia     Hypertension     Mild nonproliferative diabetic retinopathy ([address]     Non-rheumatic mitral regurgitation 06/27/2016    Pneumonia 7/31/2021    Pneumonia of right lower lobe due to infectious organism     RBBB     Sepsis ([address] 12/22/2021    Status post transcatheter aortic valve replacement (TAVR) using bioprosthesis 01/21/2020    Wears dentures     upper       Past Surgical History:   Procedure Laterality Date    AORTIC VALVE SURGERY  01/2020    CARDIAC CATHETERIZATION  12/03/2019    CARDIAC CATHETERIZATION  12/27/2021    Patent LAD, D, and LCX stents    CATARACT REMOVAL Bilateral     CIRCUMCISION  age 49    EYE SURGERY Bilateral     cataract    HERNIA REPAIR  71/01/5661    Open umbilical hernia repair -Dr. Cari Del Valle 84 N/A 5/44/6260    Open Umbilical Hernia Repair w/ Ventralex mesh performed by Giselle Aguilera MD at [address]         Current Outpatient Medications on File Prior to Visit   Medication Sig Dispense Refill    amLODIPine (NORVASC) 10 MG tablet Take 10 mg by mouth in the morning. furosemide (LASIX) 40 MG tablet Take 1 tablet by mouth daily If weight gain more than 2 pounds or sudden increase in shortness of breath or cough take an additional 40 mg tablet and call physician 90 tablet 1    empagliflozin (JARDIANCE) 10 MG tablet Take 1 tablet by mouth daily 90 tablet 3    carvedilol (COREG) 6.25 MG tablet TAKE 1 TABLET BY MOUTH TWO TIMES A DAY WITH breakfast and dinner MEALS      aspirin 81 MG chewable tablet Take 81 mg by mouth daily      clopidogrel (PLAVIX) 75 MG tablet Take 1 tablet by mouth daily Take 1 daily 60 tablet 3    simvastatin (ZOCOR) 40 MG tablet Take 20 mg by mouth nightly      glipiZIDE (GLUCOTROL) 10 MG tablet Take 10 mg by mouth in the morning and 10 mg in the evening. Take before meals. vitamin D (CHOLECALCIFEROL) 1000 UNIT TABS tablet Take 1,000 Units by mouth 2 times daily       metFORMIN (GLUCOPHAGE) 1000 MG tablet Take 1 tablet by mouth 2 times daily (with meals) 60 tablet 3    Handicap Placard MISC by Does not apply route Expires: 2024 2 each 0     No current facility-administered medications on file prior to visit. Social History     Socioeconomic History    Marital status:      Spouse name: Not on file    Number of children: Not on file    Years of education: Not on file    Highest education level: Not on file   Occupational History    Occupation: seo   Tobacco Use    Smoking status: Former     Packs/day: 0.50     Years: 2.00     Pack years: 1.00     Types: Cigarettes     Quit date: 1970     Years since quittin.6    Smokeless tobacco: Never   Vaping Use    Vaping Use: Never used   Substance and Sexual Activity    Alcohol use:  Yes     Alcohol/week: 0.0 standard drinks     Comment: RARELY    Drug use: No    Sexual activity: Not on file   Other Topics Concern    Not on file   Social History Narrative    Not on file     Social Determinants of Health     Financial Resource Strain: Low Risk     Difficulty of Paying Living Expenses: Not hard at all   Food Insecurity: No Food Insecurity    Worried About [facility] in the Last Year: Never true    Ran Out of Food in the Last Year: Never true   Transportation Needs: No Transportation Needs    Lack of Transportation (Medical): No    Lack of Transportation (Non-Medical): No   Physical Activity: Sufficiently Active    Days of Exercise per Week: 5 days    Minutes of Exercise per Session: 30 min   Stress: No Stress Concern Present    Feeling of Stress : Not at all   Social Connections: Socially Integrated    Frequency of Communication with Friends and Family: More than three times a week    Frequency of Social Gatherings with Friends and Family: More than three times a week    Attends Christianity Services: More than 4 times per year    Active Member of [address] [place] [address] or Organizations: Yes    Attends Club or Organization Meetings: More than 4 times per year    Marital Status:    Intimate Partner Violence: Not on file   Housing Stability: Low Risk     Unable to Pay for Housing in the Last Year: No    Number of Places Lived in the Last Year: 1    Unstable Housing in the Last Year: No       Review of Systems   Constitutional:  Negative for activity change, appetite change, chills, fatigue, fever and unexpected weight change. HENT:  Negative for congestion, dental problem, ear discharge, ear pain, facial swelling, hearing loss, postnasal drip, rhinorrhea, sinus pressure, sore throat and trouble swallowing. Eyes:  Negative for pain and visual disturbance. Respiratory:  Negative for cough, chest tightness, shortness of breath and wheezing. Cardiovascular:  Negative for chest pain, palpitations and leg swelling. Gastrointestinal:  Negative for abdominal pain, blood in stool, constipation, diarrhea, nausea and vomiting. Endocrine: Negative for cold intolerance, heat intolerance and polyuria. Genitourinary:  Negative for difficulty urinating.    Musculoskeletal:  Negative for arthralgias, gait problem, myalgias, neck pain and neck stiffness. Skin:  Negative for color change, rash and wound. Neurological:  Negative for dizziness, tremors, seizures, weakness, light-headedness, numbness and headaches. Psychiatric/Behavioral:  Negative for confusion and hallucinations. The patient is not nervous/anxious. Physical Exam  Vitals and nursing note reviewed. Constitutional:       General: He is not in acute distress. Appearance: He is well-developed. He is not diaphoretic. HENT:      Head: Normocephalic and atraumatic. Right Ear: External ear normal.      Left Ear: External ear normal.   Eyes:      General: Lids are normal.         Right eye: No discharge. Left eye: No discharge. Conjunctiva/sclera: Conjunctivae normal.      Pupils: Pupils are equal, round, and reactive to light. Neck:      Trachea: No tracheal deviation. Cardiovascular:      Rate and Rhythm: Regular rhythm. Bradycardia present. Heart sounds: Normal heart sounds. No murmur heard. No friction rub. No gallop. Pulmonary:      Effort: Pulmonary effort is normal. No accessory muscle usage or respiratory distress. Breath sounds: Normal breath sounds. No stridor. No wheezing, rhonchi or rales. Abdominal:      General: Bowel sounds are normal. There is no distension. Palpations: Abdomen is soft. Abdomen is not rigid. Tenderness: There is no abdominal tenderness. Musculoskeletal:         General: Normal range of motion. Cervical back: Normal range of motion and neck supple. No edema or erythema. Right lower leg: No edema. Left lower leg: No edema. Skin:     General: Skin is warm and dry. Capillary Refill: Capillary refill takes less than 2 seconds. Coloration: Skin is not jaundiced or pale. Findings: No erythema or rash. Neurological:      Mental Status: He is alert and oriented to person, place, and time. Cranial Nerves: No cranial nerve deficit. sooner with concerns prior.     Electronically signed by ESTHER Torres CNP on 7/26/2022 at 2:38 PM

## 2022-07-28 ASSESSMENT — ENCOUNTER SYMPTOMS
TROUBLE SWALLOWING: 0
NAUSEA: 0
SORE THROAT: 0
DIARRHEA: 0
RHINORRHEA: 0
SHORTNESS OF BREATH: 0
WHEEZING: 0
CONSTIPATION: 0
FACIAL SWELLING: 0
EYE PAIN: 0
VOMITING: 0
CHEST TIGHTNESS: 0
ABDOMINAL PAIN: 0
COLOR CHANGE: 0
BLOOD IN STOOL: 0
COUGH: 0
SINUS PRESSURE: 0

## 2022-08-02 ENCOUNTER — OFFICE VISIT (OUTPATIENT)
Dept: CARDIOLOGY | Age: 77
End: 2022-08-02
Payer: MEDICARE

## 2022-08-02 VITALS
HEIGHT: 64 IN | DIASTOLIC BLOOD PRESSURE: 71 MMHG | BODY MASS INDEX: 26.98 KG/M2 | TEMPERATURE: 59 F | SYSTOLIC BLOOD PRESSURE: 147 MMHG | WEIGHT: 158 LBS

## 2022-08-02 DIAGNOSIS — E11.22 TYPE 2 DIABETES MELLITUS WITH STAGE 2 CHRONIC KIDNEY DISEASE, WITHOUT LONG-TERM CURRENT USE OF INSULIN (HCC): ICD-10-CM

## 2022-08-02 DIAGNOSIS — I10 HYPERTENSION, ESSENTIAL: ICD-10-CM

## 2022-08-02 DIAGNOSIS — Z95.5 S/P CORONARY ARTERY STENT PLACEMENT: ICD-10-CM

## 2022-08-02 DIAGNOSIS — Z98.890 S/P CARDIAC CATH: ICD-10-CM

## 2022-08-02 DIAGNOSIS — N18.32 STAGE 3B CHRONIC KIDNEY DISEASE (HCC): ICD-10-CM

## 2022-08-02 DIAGNOSIS — E11.9 CONTROLLED TYPE 2 DIABETES MELLITUS WITHOUT COMPLICATION, UNSPECIFIED WHETHER LONG TERM INSULIN USE (HCC): ICD-10-CM

## 2022-08-02 DIAGNOSIS — Z95.2 S/P TAVR (TRANSCATHETER AORTIC VALVE REPLACEMENT): ICD-10-CM

## 2022-08-02 DIAGNOSIS — I25.10 CORONARY ARTERY DISEASE INVOLVING NATIVE CORONARY ARTERY OF NATIVE HEART WITHOUT ANGINA PECTORIS: Primary | ICD-10-CM

## 2022-08-02 DIAGNOSIS — E78.00 PURE HYPERCHOLESTEROLEMIA: ICD-10-CM

## 2022-08-02 DIAGNOSIS — N18.2 TYPE 2 DIABETES MELLITUS WITH STAGE 2 CHRONIC KIDNEY DISEASE, WITHOUT LONG-TERM CURRENT USE OF INSULIN (HCC): ICD-10-CM

## 2022-08-02 PROCEDURE — 1123F ACP DISCUSS/DSCN MKR DOCD: CPT | Performed by: INTERNAL MEDICINE

## 2022-08-02 PROCEDURE — 99212 OFFICE O/P EST SF 10 MIN: CPT | Performed by: INTERNAL MEDICINE

## 2022-08-02 PROCEDURE — G8427 DOCREV CUR MEDS BY ELIG CLIN: HCPCS | Performed by: INTERNAL MEDICINE

## 2022-08-02 PROCEDURE — G8417 CALC BMI ABV UP PARAM F/U: HCPCS | Performed by: INTERNAL MEDICINE

## 2022-08-02 PROCEDURE — 1036F TOBACCO NON-USER: CPT | Performed by: INTERNAL MEDICINE

## 2022-08-02 PROCEDURE — 3051F HG A1C>EQUAL 7.0%<8.0%: CPT | Performed by: INTERNAL MEDICINE

## 2022-08-02 PROCEDURE — 99214 OFFICE O/P EST MOD 30 MIN: CPT | Performed by: INTERNAL MEDICINE

## 2022-08-02 NOTE — PROGRESS NOTES
Today's Date: 8/2/2022  Patient's Name: Dolly Santana  Patient's age: 69 y.o., 1945    CC: follow up for CAD, AS, TAVR    HPI and CC  Dolly Santana  Is here for follow up for CAD, AS, TAVR. Patient is doing well from a cardiac standpoint. Good functional capacity with no significant change in functional capacity. No chest pain, no dyspnea, no PND, no syncope or pre-syncope, no orthopnea. No symptoms of CHF or angina/chest pain. Past Medical History:   has a past medical history of Acne rosacea, Anemia, Aortic stenosis, BPH (benign prostatic hypertrophy), CAD (coronary artery disease), Carotid stenosis, Cataract of both eyes, CRI (chronic renal insufficiency), Diabetes mellitus, type 2 ([address] Dyslipidemia, Erectile dysfunction, H/O agent Orange exposure, Hearing loss, Hyperlipidemia, Hypertension, Mild nonproliferative diabetic retinopathy ([address] Non-rheumatic mitral regurgitation, Pneumonia, Pneumonia of right lower lobe due to infectious organism, RBBB, Sepsis ([address] 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 Past Surgical History:   has a past surgical history that includes Circumcision (age 49); Vasectomy; Tympanostomy tube placement; Cataract removal (Bilateral); eye surgery (Bilateral); Cardiac catheterization (12/03/2019); Aortic valve surgery (01/2020); Cardiac catheterization (12/27/2021); Umbilical hernia repair (N/A, 5/16/2022); and hernia repair (05/16/2022). Home Medications:  Prior to Admission medications    Medication Sig Start Date End Date Taking? Authorizing Provider   amLODIPine (NORVASC) 10 MG tablet Take 10 mg by mouth in the morning.    Yes Historical Provider, MD   furosemide (LASIX) 40 MG tablet Take 1 tablet by mouth daily If weight gain more than 2 pounds or sudden increase in shortness of breath or cough take an additional 40 mg tablet and call physician 5/18/22  Yes ESTHER Mueller - CADENCE Handicap Placard MISC by Does not apply route Expires: 4/6/2024 4/6/22  Yes Osiris Campbell APRN - CNP   empagliflozin (JARDIANCE) 10 MG tablet Take 1 tablet by mouth daily 4/5/22  Yes Francisco Suh MD   carvedilol (COREG) 6.25 MG tablet TAKE 1 TABLET BY MOUTH TWO TIMES A DAY WITH breakfast and dinner MEALS 3/26/22  Yes Historical Provider, MD   aspirin 81 MG chewable tablet Take 81 mg by mouth daily   Yes Historical Provider, MD   clopidogrel (PLAVIX) 75 MG tablet Take 1 tablet by mouth daily Take 1 daily 12/6/19  Yes Marlo Quiroga APRN - CNP   simvastatin (ZOCOR) 40 MG tablet Take 20 mg by mouth nightly   Yes Historical Provider, MD   glipiZIDE (GLUCOTROL) 10 MG tablet Take 10 mg by mouth in the morning and 10 mg in the evening. Take before meals. Yes Historical Provider, MD   vitamin D (CHOLECALCIFEROL) 1000 UNIT TABS tablet Take 1,000 Units by mouth 2 times daily    Yes Historical Provider, MD   metFORMIN (GLUCOPHAGE) 1000 MG tablet Take 1 tablet by mouth 2 times daily (with meals) 8/25/16  Yes Chanelleema West APRN - CNP       Allergies:  Atorvastatin, Sulfa antibiotics, and Sulfanilamide    Social History:   reports that he quit smoking about 52 years ago. He has a 1.00 pack-year smoking history. He has never used smokeless tobacco. He reports current alcohol use. He reports that he does not use drugs. Family History: family history includes Coronary Art Dis in his brother and sister; Heart Attack in his father; Hypertension in his mother; Duanne Vida in his mother; Stroke in his mother. No h/o sudden cardiac death. No for premature CAD    REVIEW OF SYSTEMS:    Constitutional: there has been no unanticipated weight loss. There's been No change in energy level, No change in activity level. Eyes: No visual changes or diplopia. No scleral icterus. ENT: No Headaches, hearing loss or vertigo. No mouth sores or sore throat.   Cardiovascular: see above  Respiratory: see above  Gastrointestinal: No abdominal pain, appetite loss, blood in stools. Genitourinary: No dysuria, trouble voiding, or hematuria. Musculoskeletal:  No gait disturbance, No weakness or joint complaints. Integumentary: No rash or pruritis. Neurological: No headache or diplopia. No tingling  Psychiatric: No anxiety, or depression. Endocrine: No temperature intolerance. Hematologic/Lymphatic: No abnormal bruising or bleeding, blood clots or swollen lymph nodes. Allergic/Immunologic: No nasal congestion or hives. PHYSICAL EXAM:      BP (!) 147/71   Temp (!) 59 °F (15 °C)   Ht 5' 4\" (1.626 m)   Wt 158 lb (71.7 kg)   BMI 27.12 kg/m²   General appearance: alert and cooperative with exam  HEENT: Head: Normocephalic, no lesions, without obvious abnormality.   Eyes: PERRL, EOMI  Ears: Not obvious deformations or lack of hearing  Neck: no carotid bruit, no JVD  Lungs: clear to auscultation bilaterally  Heart: regular rate and rhythm, S1, S2 normal, no murmur, click, rub or gallop  Abdomen: soft, non-tender; bowel sounds normal; no masses,  no organomegaly  Extremities: extremities normal, atraumatic, no cyanosis or edema  Neurologic: Mental status: Alert, oriented, thought content appropriate  Skin: WNL for age and condition, no obvious rashes or leasions    Labs:     Lab Results   Component Value Date    CHOL 142 03/17/2022    TRIG 112 03/17/2022    HDL 35 (L) 03/17/2022    LDLCHOLESTEROL 85 03/17/2022    VLDL NOT REPORTED 07/22/2021    CHOLHDLRATIO 4.1 03/17/2022     Lab Results   Component Value Date     (L) 07/19/2022    K 4.7 07/19/2022    CL 97 (L) 07/19/2022    CO2 23 07/19/2022    BUN 39 (H) 07/19/2022    CREATININE 1.68 (H) 07/19/2022    GLUCOSE 91 07/19/2022    CALCIUM 9.6 07/19/2022    PROT 7.1 03/28/2022    LABALBU 3.8 03/28/2022    BILITOT 0.25 (L) 03/28/2022    ALKPHOS 100 03/28/2022    AST 17 03/28/2022    ALT 15 03/28/2022    LABGLOM 40 (L) 07/19/2022    GFRAA 49 (L) 07/19/2022    GLOB 3.3 03/28/2022     Cardiac Data:  EKG: Sinus  Rhythm  -First degree A-V block   Ml = 250  -Left axis -anterior fascicular block. - Extensive anterior-lateral infarct  Old.    -  Nonspecific T-abnormality. TTE 12/3/19  Summary  Left ventricle is normal in size. Global left ventricular systolic function  is moderately reduced. Calculated EF via heart model is 31 %. Grade I (mild) left ventricular diastolic dysfunction. Left atrium is mildly dilated. Right atrial dilatation. Mildly dilated right ventricular cavity. Right ventricular function appears normal .  The aortic valve is calcified with restricted cusp mobility. Moderate Aortic Stenosis, however, maybe underestimated due to poor LVEF. Mild mitral regurgitation. Trivial tricuspid regurgitation. Estimated right ventricular systolic  pressure is 27 mmHg. Cardiac cath 12/3/19   Procedure Summary      Severe aortic stenosis. Mild LV systolic dysfunction. Patent mid LAD, proximal D1 and mid LCX stents. Focal distal LAD 70%  stenosis. Recommendations      Medical therapy as needed. Risk factor modification. Elective CV surgical consultation for AVR/ CABG     Echo 01/23/2020:  Left ventricle is mildly enlarged, basal septal hypertrophy, global left  ventricular systolic function is moderately to severely reduced, calculated  ejection fraction is 36%. Grade I (mild) left ventricular diastolic dysfunction. Left atrium is moderately dilated. Normal right ventricular size and function. Bioprosthetic valve is seated in the aortic position, appears to be  functioning normally with no obvious regrugitation or stenosis. Trivial perivalvular leak noted. Peak instantaneous gradient 17 mmHg and mean gradient 9 mmHg. Mitral valve sclerosis without stenosis. Mild mitral regurgitation. Tricuspid valve was not well visualized. Trivial tricuspid regurgitation. S/p TAVR 1/21/2020   34 mm CoreValve EVOLUT via right Femoral approach.  Serial # [id number]     TTE 2/27/2020  Summary  Thinned and severely hypokinetic inferolateral wall. Left ventricular systolic function is mildly reduced. Left ventricular ejection fraction 45 %. Grade II (moderate) left ventricular diastolic dysfunction. Right ventricular dilatation with normal systolic function. Bioprosthetic aortic valve( TAVR) that is normal in appearance and function. Peak instantaneous gradient 9 mmHg and mean gradient 5 mmHg. Trace aortic insufficiency. Mitral leaflets are mildly thickened without stenosis. Mild mitral regurgitation. No pericardial effusion. Echo: 3/16/21  Normal left ventricular diameter. Mildly thickened interventricular septum. Thinned and akinetic inferolateral wall. Left ventricular systolic function is mildly reduced. Left ventricular ejection fraction 45 %. Grade I (mild) left ventricular diastolic dysfunction. Left atrium is severely dilated. Right atrial dilatation. Right ventricular dilatation with normal systolic function. Bioprosthetic aortic valve (per TAVR) that is normal in appearance and  function. Peak instantaneous gradient 12 mmHg and mean gradient 6 mmHg. Mitral annular calcification. Mild mitral regurgitation. Normal function of other valves. No pericardial effusion. Echo 12/21:   Dilated left ventricle with severely reduced systolic function. Estimated EF 25-30%  Severe hypokinesis of inferior, inferolateral and inferoseptal walls. Grade I (mild) left ventricular diastolic dysfunction. Normal right ventricular size and function. Bioprosthetic aortic valve (TAVR) is noted in position, functioning normally  with no significant stenosis/regurgitation or perivalvular leak (Mean  gradient 5 mm hg)  Mild mitral regurgitation. Mild tricuspid regurgitation. Estimated right ventricular systolic pressure is 41 mmHg. No significant pericardial effusion is seen.     Cath 12/21:  Conclusions:  Patent LAD, D, and LCX stents     Assessment and Plan:     Asymptomatic bradycardia- reviewed holter with him, average HR was 54bpm.  Ischemic and Non ischemic CM- recently worsened with drop in LVEF to 25-30% on Echo 12/21. MUGA scan in Jan 2022 showed EF 32%. Declined ICD after extensive discussion. Cath on 12/21- patent stents with stable non obstructive disease (after drop in LVEF)  Knonw CAD with h/o DEVON to LAD, D and mid LCX 8/23/16- patent stent 12/21. Continue current meds. Stable, chronic  Severe aortic stenosis s/p TAVR on 1/21/2020 - normal function Echo 12/21. Stable, chronic  Carotid artery disease. Follows with vascular surgery. Stable, chronic  HTN- stable, chronic  Hyperlipidemia- Patient is on low dose simvastatin which he is able to tolerate. Any increase in dose or switch has caused him severe myalgia. I recommended him addition of zetia. He does not want to take this new medication. Stable, chronic  H/o agent orange exposure. CKD- Has been referred to nephrology by PCP. Stable, chronic  Right lower ext numbess. Seeing neurology and vascular surgery. Stable, chronic    The patient is to continue heart healthy diet, weight loss and exercise as tolerated. Patient's medications and side effects were discussed. Medication refills were provided if needed. Follow up appointment timing was discussed. All questions and concerns were addressed to patient's satisfaction. The patient is to follow up in 6 months or sooner if necessary. Thank you for allowing me to participate in the care of this patient, please do not hesitate to call if you have any questions. Juan Carlos Stack, [id number] [facility]  [facility]. [facility]  [phone]

## 2022-08-03 ENCOUNTER — CARE COORDINATION (OUTPATIENT)
Dept: CARE COORDINATION | Age: 77
End: 2022-08-03

## 2022-08-03 NOTE — CARE COORDINATION
Ambulatory Care Coordination Note  8/3/2022    ACC: Diane Dickson, RN    Summary Note:  Priti Magallanes was referred for ACM from [address] [place] [address] Drive. He has:                        Type II Diabetes- Hgb A1c 6//2022- 6.7 at the [place]- he just started [place], on medications, follows diet- does not monitor very often at home. Follows with PCP and at                                                the [place]                                       CAD,  HTN,  Hyperlipidemia, CHf- EF 36%, S/P TAVR 2 years ago,  CHF clinic, low salt diet, fluid restriction, CHF Zone Tool, on medications, follows with cardiology                                                       Plan of Care :            Completion of ACM    8/3/2022- 10:23 am spoke with Priti Magallanes. He was ouit walking. He is feeling well. Has seen PCP recently. He has apts scheduled for F/U. He is following with CHF clinic. Education completed. He is aware that this writer will no longer be calling. He will reach out if needed. General Assessment    Do you have any symptoms that are causing concern?: No        Diabetes Assessment    Medic Alert ID: No  Meal Planning: Avoidance of concentrated sweets   How often do you test your blood sugar?: No Testing   Do you have barriers with adherence to non-pharmacologic self-management interventions?  (Nutrition/Exercise/Self-Monitoring): No   Have you ever had to go to the ED for symptoms of low blood sugar?: No       No patient-reported symptoms         and   Congestive Heart Failure Assessment    Are you currently restricting fluids?: 2000cc  Do you understand a low sodium diet?: Yes  Do you understand how to read food labels?: Yes  How many restaurant meals do you eat per week?: 5 or more  Do you salt your food before tasting it?: No     No patient-reported symptoms      Symptoms:     Symptom course: stable  Patient-reported weight (lb): 150  Weight trend: stable  Salt intake watch compared to last visit: stable            Lab Results       None Care Coordination Interventions    Referral from Primary Care Provider: No  Suggested Interventions and Community Resources  Disease Specific Clinic: Completed (Comment: CHF Clinic)  Registered Dietician: Brigette  Zone Management Tools: In Process (Comment: DM, CHF)          Goals Addressed                      This Visit's Progress       Patient Stated      Patient Stated (pt-stated)   On track      Madeline David stated he walks 30-60 minutes 5 days a week. Barriers: lack of support and lack of education  Plan for overcoming my barriers: Care Coordination Intervention and CHF Clinic  Confidence: 10/10  Anticipated Goal Completion Date: 7/25/2022              Prior to Admission medications    Medication Sig Start Date End Date Taking? Authorizing Provider   amLODIPine (NORVASC) 10 MG tablet Take 10 mg by mouth in the morning. Historical Provider, MD   furosemide (LASIX) 40 MG tablet Take 1 tablet by mouth daily If weight gain more than 2 pounds or sudden increase in shortness of breath or cough take an additional 40 mg tablet and call physician 5/18/22   ESTHER Fox CNP   Handicap Placard [facility] by Does not apply route Expires: 4/6/2024 4/6/22   ESTHER Fox CNP   empagliflozin (JARDIANCE) 10 MG tablet Take 1 tablet by mouth daily 4/5/22   Enedina Issa MD   carvedilol (COREG) 6.25 MG tablet TAKE 1 TABLET BY MOUTH TWO TIMES A DAY WITH breakfast and dinner MEALS 3/26/22   Historical Provider, MD   aspirin 81 MG chewable tablet Take 81 mg by mouth daily    Historical Provider, MD   clopidogrel (PLAVIX) 75 MG tablet Take 1 tablet by mouth daily Take 1 daily 12/6/19   ESTHER English CNP   simvastatin (ZOCOR) 40 MG tablet Take 20 mg by mouth nightly    Historical Provider, MD   glipiZIDE (GLUCOTROL) 10 MG tablet Take 10 mg by mouth in the morning and 10 mg in the evening. Take before meals.     Historical Provider, MD   vitamin D (CHOLECALCIFEROL) 1000 UNIT TABS tablet Take 1,000 Units by mouth 2 times daily     Historical Provider, MD   metFORMIN (GLUCOPHAGE) 1000 MG tablet Take 1 tablet by mouth 2 times daily (with meals) 8/25/16   ESTHER White CNP       Future Appointments   Date Time Provider Julio Ratliff   9/1/2022  1:00 PM [facility] CHF RM 1 [facility] CHF [place] [facility]OD   9/20/2022  1:30 PM POLI VASCULAR ULTRASOUND RM 1 [facility] VASCLAB [facility] [place]   10/26/2022  1:30 PM ESTHER Espinoza CNP [facility]   2/7/2023 10:15 AM [facility]

## 2022-09-08 ENCOUNTER — HOSPITAL ENCOUNTER (OUTPATIENT)
Dept: OTHER | Age: 77
Discharge: HOME | End: 2022-09-08
Payer: MEDICARE

## 2022-09-08 ENCOUNTER — TELEPHONE (OUTPATIENT)
Dept: OTHER | Age: 77
End: 2022-09-08

## 2022-09-08 VITALS
BODY MASS INDEX: 25.26 KG/M2 | HEART RATE: 64 BPM | DIASTOLIC BLOOD PRESSURE: 76 MMHG | SYSTOLIC BLOOD PRESSURE: 130 MMHG | OXYGEN SATURATION: 96 % | RESPIRATION RATE: 20 BRPM | WEIGHT: 157.2 LBS | HEIGHT: 66 IN

## 2022-09-08 PROCEDURE — 99211 OFF/OP EST MAY X REQ PHY/QHP: CPT

## 2022-09-08 NOTE — PROGRESS NOTES
Patient here for CHF visit. Reviewed CHF Zone Tool worksheet with patient who stated understanding. Patient denied questions over material reviewed. Patient states is able to normal activities. Verbally reviewed importance of medication compliance with patient; patient verbalized understanding. Patient reports that he stopped taking amlodipine last week while he was on vacation in the [facility] due to ankle swelling. Patient stated he had not notified cardiology or PCP office; writer told patient a note would be sent to cardiology to inform, patient in agreement with this plan. Discussed 2000mg/day sodium restricted diet; patient verbalized understanding. Patient states he is not adding salt to foods. Moderate daily exercise encouraged as tolerated. Discussed rest breaks as needed; patient verbalized understanding. Patient continues to play [employer]-ball twice per week without difficulty. Patient instructed to weigh self at the same time of each day, using same clothes and same scale; reinforced teaching to monitor for 2-3 lb weight increase over 1-2 days, and to notify the CHF clinic at [phone] or [phone] or physician office if weight change noted. Patient verbalized understanding. Signs and symptoms of CHF discussed with patient, such as feeling more tired than normal, feeling short of breath, coughing that increases when you lie down, sudden weight gain, swelling of your feet, legs or belly. Patient verbalized understanding to notify the CHF clinic at [phone] ir [phone] or physician office if these symptoms occur. Compliance with plan of care and further disease process causes discussed with patient, patient encouraged to keep all follow up appointments. Patient verbalized understanding.

## 2022-09-08 NOTE — TELEPHONE ENCOUNTER
Patient had CHF visit today; patient reported that he stopped taking amlodipine 10 mg daily last week due to ankle swelling. Patient had not informed cardiology or PCP; writer explained to patient a note would be sent to cardiology to inform, patient in agreement with this plan. Patient states has not had ankle swelling since stopping medication, BP today 130/76.

## 2022-09-13 NOTE — TELEPHONE ENCOUNTER
Pt notified per Dr Anna Arias, that it is okay to stay off of the amlodipine for now and to call office if BP is consistently over 150. Pt verbalized understanding and had no further questions at the time.

## 2022-09-23 ENCOUNTER — HOSPITAL ENCOUNTER (OUTPATIENT)
Dept: INTERVENTIONAL RADIOLOGY/VASCULAR | Age: 77
Discharge: HOME | End: 2022-09-25
Payer: MEDICARE

## 2022-09-23 DIAGNOSIS — I25.10 CORONARY ARTERY DISEASE INVOLVING NATIVE CORONARY ARTERY OF NATIVE HEART WITHOUT ANGINA PECTORIS: ICD-10-CM

## 2022-09-23 DIAGNOSIS — I79.8 OTHER DISORDERS OF ARTERIES, ARTERIOLES AND CAPILLARIES IN DISEASES CLASSIFIED ELSEWHERE (HCC): ICD-10-CM

## 2022-09-23 DIAGNOSIS — I65.29 STENOSIS OF CAROTID ARTERY, UNSPECIFIED LATERALITY: ICD-10-CM

## 2022-09-23 PROCEDURE — 93880 EXTRACRANIAL BILAT STUDY: CPT

## 2022-10-06 ENCOUNTER — HOSPITAL ENCOUNTER (OUTPATIENT)
Dept: OTHER | Age: 77
Discharge: HOME | End: 2022-10-06
Payer: MEDICARE

## 2022-10-06 VITALS
SYSTOLIC BLOOD PRESSURE: 152 MMHG | OXYGEN SATURATION: 96 % | RESPIRATION RATE: 18 BRPM | DIASTOLIC BLOOD PRESSURE: 70 MMHG | HEART RATE: 50 BPM | WEIGHT: 159.2 LBS | BODY MASS INDEX: 25.58 KG/M2 | HEIGHT: 66 IN

## 2022-10-06 PROCEDURE — 99211 OFF/OP EST MAY X REQ PHY/QHP: CPT

## 2022-10-06 NOTE — PROGRESS NOTES
Patient here for CHF visit. Reviewed CHF Zone tool worksheet with patient who stated understanding. Patient is weighing self daily at home, stable per patient. Patient has appointment with PCP Radames Costello CNP on October 26, 2022. Verbally reviewed importance of medication compliance with patient; patient verbalized understanding. Discussed 2000mg/day sodium restricted diet; patient verbalized understanding. Patient states he is not adding salt to foods. Moderate daily exercise encouraged as tolerated. Discussed rest breaks as needed; patient verbalized understanding. Patient continues to play [employer] twice per week. Patient instructed to weigh self at the same time of each day, using same clothes and same scale; reinforced teaching to monitor for 2-3 lb weight increase over 1-2 days, and to notify the CHF clinic at [phone] or [phone] or physician office if weight change noted. Patient verbalized understanding. Signs and symptoms of CHF discussed with patient, such as feeling more tired than normal, feeling short of breath, coughing that increases when you lie down, sudden weight gain, swelling of your feet, legs or belly. Patient verbalized understanding to notify the CHF clinic at [phone] or [phone] or physician office if these symptoms occur. Compliance with plan of care and further disease process causes discussed with patient, patient encouraged to keep all follow up appointments. Patient verbalized understanding.

## 2022-10-10 ENCOUNTER — IMMUNIZATION (OUTPATIENT)
Dept: LAB | Age: 77
End: 2022-10-10
Payer: MEDICARE

## 2022-10-10 PROCEDURE — 91313 COVID-19, MODERNA BIVALENT BOOSTER, (AGE 18Y+), IM, 50 MCG/0.5 ML: CPT | Performed by: FAMILY MEDICINE

## 2022-10-10 PROCEDURE — PBSHW COVID-19, MODERNA BIVALENT BOOSTER, (AGE 18Y+), IM, 50 MCG/0.5 ML: Performed by: FAMILY MEDICINE

## 2022-10-24 ENCOUNTER — HOSPITAL ENCOUNTER (OUTPATIENT)
Dept: LAB | Age: 77
Discharge: HOME | End: 2022-10-24
Payer: MEDICARE

## 2022-10-24 DIAGNOSIS — E11.22 TYPE 2 DIABETES MELLITUS WITH DIABETIC CHRONIC KIDNEY DISEASE, UNSPECIFIED CKD STAGE, UNSPECIFIED WHETHER LONG TERM INSULIN USE (HCC): ICD-10-CM

## 2022-10-24 DIAGNOSIS — E11.3219 CONTROLLED TYPE 2 DIABETES MELLITUS WITH MILD NONPROLIFERATIVE RETINOPATHY AND MACULAR EDEMA, WITHOUT LONG-TERM CURRENT USE OF INSULIN, UNSPECIFIED LATERALITY (HCC): ICD-10-CM

## 2022-10-24 LAB
ANION GAP SERPL CALCULATED.3IONS-SCNC: 9 MMOL/L (ref 9–17)
BUN BLDV-MCNC: 42 MG/DL (ref 8–23)
BUN/CREAT BLD: 24 (ref 9–20)
CALCIUM SERPL-MCNC: 9.4 MG/DL (ref 8.6–10.4)
CHLORIDE BLD-SCNC: 101 MMOL/L (ref 98–107)
CO2: 26 MMOL/L (ref 20–31)
CREAT SERPL-MCNC: 1.75 MG/DL (ref 0.7–1.2)
GFR SERPL CREATININE-BSD FRML MDRD: 40 ML/MIN/1.73M2
GLUCOSE BLD-MCNC: 344 MG/DL (ref 70–99)
POTASSIUM SERPL-SCNC: 5.2 MMOL/L (ref 3.7–5.3)
SODIUM BLD-SCNC: 136 MMOL/L (ref 135–144)

## 2022-10-24 PROCEDURE — 83036 HEMOGLOBIN GLYCOSYLATED A1C: CPT

## 2022-10-24 PROCEDURE — 36415 COLL VENOUS BLD VENIPUNCTURE: CPT

## 2022-10-24 PROCEDURE — 80048 BASIC METABOLIC PNL TOTAL CA: CPT

## 2022-10-25 LAB
ESTIMATED AVERAGE GLUCOSE: 203 MG/DL
HBA1C MFR BLD: 8.7 % (ref 4–6)

## 2022-10-26 ENCOUNTER — OFFICE VISIT (OUTPATIENT)
Dept: INTERNAL MEDICINE | Age: 77
End: 2022-10-26
Payer: MEDICARE

## 2022-10-26 VITALS
SYSTOLIC BLOOD PRESSURE: 136 MMHG | WEIGHT: 158.6 LBS | RESPIRATION RATE: 16 BRPM | HEART RATE: 56 BPM | HEIGHT: 66 IN | DIASTOLIC BLOOD PRESSURE: 80 MMHG | BODY MASS INDEX: 25.49 KG/M2 | OXYGEN SATURATION: 96 %

## 2022-10-26 DIAGNOSIS — E11.3219 CONTROLLED TYPE 2 DIABETES MELLITUS WITH MILD NONPROLIFERATIVE RETINOPATHY AND MACULAR EDEMA, WITHOUT LONG-TERM CURRENT USE OF INSULIN, UNSPECIFIED LATERALITY (HCC): ICD-10-CM

## 2022-10-26 DIAGNOSIS — E55.9 VITAMIN D DEFICIENCY: ICD-10-CM

## 2022-10-26 DIAGNOSIS — Z95.2 S/P TAVR (TRANSCATHETER AORTIC VALVE REPLACEMENT): ICD-10-CM

## 2022-10-26 DIAGNOSIS — R91.1 LUNG NODULE: ICD-10-CM

## 2022-10-26 DIAGNOSIS — N18.31 CHRONIC KIDNEY DISEASE, STAGE 3A (HCC): Primary | ICD-10-CM

## 2022-10-26 DIAGNOSIS — I73.9 PERIPHERAL ARTERIAL DISEASE (HCC): ICD-10-CM

## 2022-10-26 DIAGNOSIS — I10 ESSENTIAL HYPERTENSION: ICD-10-CM

## 2022-10-26 DIAGNOSIS — I25.10 CORONARY ARTERY DISEASE INVOLVING NATIVE CORONARY ARTERY OF NATIVE HEART WITHOUT ANGINA PECTORIS: ICD-10-CM

## 2022-10-26 DIAGNOSIS — R73.9 HYPERGLYCEMIA, UNSPECIFIED: ICD-10-CM

## 2022-10-26 DIAGNOSIS — I35.0 AORTIC STENOSIS, SEVERE: ICD-10-CM

## 2022-10-26 DIAGNOSIS — E27.8 ADRENAL CYST (HCC): ICD-10-CM

## 2022-10-26 DIAGNOSIS — I50.22 CHF NYHA CLASS I, CHRONIC, SYSTOLIC (HCC): ICD-10-CM

## 2022-10-26 DIAGNOSIS — E78.5 DYSLIPIDEMIA: ICD-10-CM

## 2022-10-26 PROCEDURE — 1123F ACP DISCUSS/DSCN MKR DOCD: CPT | Performed by: NURSE PRACTITIONER

## 2022-10-26 PROCEDURE — 3052F HG A1C>EQUAL 8.0%<EQUAL 9.0%: CPT | Performed by: NURSE PRACTITIONER

## 2022-10-26 PROCEDURE — 1036F TOBACCO NON-USER: CPT | Performed by: NURSE PRACTITIONER

## 2022-10-26 PROCEDURE — 99214 OFFICE O/P EST MOD 30 MIN: CPT | Performed by: NURSE PRACTITIONER

## 2022-10-26 PROCEDURE — 3074F SYST BP LT 130 MM HG: CPT | Performed by: NURSE PRACTITIONER

## 2022-10-26 PROCEDURE — G8427 DOCREV CUR MEDS BY ELIG CLIN: HCPCS | Performed by: NURSE PRACTITIONER

## 2022-10-26 PROCEDURE — 3078F DIAST BP <80 MM HG: CPT | Performed by: NURSE PRACTITIONER

## 2022-10-26 PROCEDURE — G8484 FLU IMMUNIZE NO ADMIN: HCPCS | Performed by: NURSE PRACTITIONER

## 2022-10-26 PROCEDURE — G8417 CALC BMI ABV UP PARAM F/U: HCPCS | Performed by: NURSE PRACTITIONER

## 2022-10-26 NOTE — PATIENT INSTRUCTIONS
Decrease lasix to 20 mg daily  Continue to monitor weight and for leg swelling  Continue off norvasc

## 2022-10-27 ASSESSMENT — ENCOUNTER SYMPTOMS
SINUS PRESSURE: 0
NAUSEA: 0
ABDOMINAL PAIN: 0
WHEEZING: 0
TROUBLE SWALLOWING: 0
DIARRHEA: 0
RHINORRHEA: 0
BLOOD IN STOOL: 0
SHORTNESS OF BREATH: 0
VOMITING: 0
SORE THROAT: 0
EYE PAIN: 0
CONSTIPATION: 0
FACIAL SWELLING: 0
COUGH: 0
CHEST TIGHTNESS: 0
COLOR CHANGE: 0

## 2022-10-27 NOTE — PROGRESS NOTES
10/26/22  [facility]  1945      Chief Complaint:   1. Chronic kidney disease, stage 3a ([address]    2. Adrenal cyst ([address]    3. 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 CHF NYHA class I, chronic, systolic ([address]    5. Essential hypertension    6. Coronary artery disease involving native coronary artery of native heart without angina pectoris    7. Aortic stenosis, severe    8. S/P TAVR (transcatheter aortic valve replacement)    9. Peripheral arterial disease ([address]    10. Dyslipidemia    11. Vitamin D deficiency    12. Lung nodule    13. Hyperglycemia, unspecified         HPI:  79-year-old patient in with his wife for 3-month follow-up of above chronic health conditions. Continues to follow with nephrology for his chronic kidney disease. We reviewed his BMP which showed elevation. He has been taking his Lasix. States he is always thirsty. States even in the middle the night he gets up and drinks a glass of water. His blood sugars have been more elevated. We did decrease his metformin previously as his sugars were more stable. Discussed we have to watch his metformin dose with his kidney disease. Patient stated understanding. Has been taking his glipizide and Jardiance. We discussed if we could get the Jardiance changed over to [place] through the [facility] that would be a better option for him. Wife will call the [facility]. He follows with the CHF clinic. Again his weights have been stable. Watches his diet closely. Blood pressure stable in office denies any dizziness or lightheadedness. He has been off of the Norvasc. Swelling to his ankles subsided. Follows with cardiology for his CAD. Takes the medication as directed denies any chest discomfort no shortness of breath. Wife states he is more active than anyone she knows.   He walks at least 3 miles a day and then continues to move doing other things throughout the day such as working at [employer]. Continues on his Zocor for his dyslipidemia. No myalgias. Allergies   Allergen Reactions    Atorvastatin Other (See Comments)     Muscle pain (myalgias)    Sulfa Antibiotics Swelling     Swelling of retinas    Sulfanilamide Other (See Comments)     Swelling of retinas       Past Medical History:   Diagnosis Date    Acne rosacea     treated with MetroGel    Anemia     Aortic stenosis     echo 3/18 Mod AS, Mild MR    BPH (benign prostatic hypertrophy)     CAD (coronary artery disease)     Stent x 3 DEVON 8/16    Carotid stenosis     Fairly minimal plaquing on ultrasound 5/14--in the conclusion described as less than 50% stenosis but in the body of the report described as minimal    Cataract of both eyes     CRI (chronic renal insufficiency)     Diabetes mellitus, type 2 (HCC)     1.) Proteinuria, 24 hour total urine protein 1,420 mg/24 hrs, 09/08, creatinine clearance 114, 09/08 2.) Repeat protein/creatinine ratio 2.97, 02/12 3. )24 hr urine collection 1958 mg/24 hrs, 04/12. 4.) Repeat protein/creatinine ratio 1.99, 08/12 a.) Repeat protein/creatinine ratio 2.14, 07/13 5.) Renal u/s ok 02/12  6) retinopathy at [address] 12/16  early mod. nonprolifferative  macular involved    Dyslipidemia     Erectile dysfunction     H/O agent Orange exposure     per patient in the [employer] along with exposure to cresote and naphtha    Hearing loss     Hyperlipidemia     Hypertension     Mild nonproliferative diabetic retinopathy ([address]     Non-rheumatic mitral regurgitation 06/27/2016    Pneumonia 7/31/2021    Pneumonia of right lower lobe due to infectious organism     RBBB     Sepsis ([address] 12/22/2021    Status post transcatheter aortic valve replacement (TAVR) using bioprosthesis 01/21/2020    Wears dentures     upper       Past Surgical History:   Procedure Laterality Date    AORTIC VALVE SURGERY  01/2020    CARDIAC CATHETERIZATION  12/03/2019 CARDIAC CATHETERIZATION  2021    Patent LAD, D, and LCX stents    CATARACT REMOVAL Bilateral     CIRCUMCISION  age 49    EYE SURGERY Bilateral     cataract    HERNIA REPAIR      Open umbilical hernia repair -Dr. Hudson Oconnor N/A     Open Umbilical Hernia Repair w/ Ventralex mesh performed by Jose Antonio Wilson MD at [address] [place] [address] [address]         Current Outpatient Medications on File Prior to Visit   Medication Sig Dispense Refill    furosemide (LASIX) 40 MG tablet Take 1 tablet by mouth daily If weight gain more than 2 pounds or sudden increase in shortness of breath or cough take an additional 40 mg tablet and call physician 90 tablet 1    empagliflozin (JARDIANCE) 10 MG tablet Take 1 tablet by mouth daily 90 tablet 3    carvedilol (COREG) 6.25 MG tablet TAKE 1 TABLET BY MOUTH TWO TIMES A DAY WITH breakfast and dinner MEALS      aspirin 81 MG chewable tablet Take 81 mg by mouth daily      clopidogrel (PLAVIX) 75 MG tablet Take 1 tablet by mouth daily Take 1 daily 60 tablet 3    simvastatin (ZOCOR) 40 MG tablet Take 20 mg by mouth nightly      glipiZIDE (GLUCOTROL) 10 MG tablet Take 10 mg by mouth in the morning and 10 mg in the evening. Take before meals. vitamin D (CHOLECALCIFEROL) 1000 UNIT TABS tablet Take 2,000 Units by mouth daily      Handicap Placard MISC by Does not apply route Expires: 2024 2 each 0     No current facility-administered medications on file prior to visit.        Social History     Socioeconomic History    Marital status:      Spouse name: Not on file    Number of children: Not on file    Years of education: Not on file    Highest education level: Not on file   Occupational History    Occupation: seo   Tobacco Use    Smoking status: Former     Packs/day: 0.50     Years: 2.00     Pack years: 1.00     Types: Cigarettes     Quit date: 1970     Years since quittin.8    Smokeless tobacco: Never Vaping Use    Vaping Use: Never used   Substance and Sexual Activity    Alcohol use: Yes     Alcohol/week: 0.0 standard drinks     Comment: RARELY    Drug use: No    Sexual activity: Not on file   Other Topics Concern    Not on file   Social History Narrative    Not on file     Social Determinants of Health     Financial Resource Strain: Low Risk     Difficulty of Paying Living Expenses: Not hard at all   Food Insecurity: No Food Insecurity    Worried About Running Out of Food in the Last Year: Never true    [address] in the Last Year: Never true   Transportation Needs: No Transportation Needs    Lack of Transportation (Medical): No    Lack of Transportation (Non-Medical): No   Physical Activity: Sufficiently Active    Days of Exercise per Week: 5 days    Minutes of Exercise per Session: 30 min   Stress: No Stress Concern Present    Feeling of Stress : Not at all   Social Connections: Socially Integrated    Frequency of Communication with Friends and Family: More than three times a week    Frequency of Social Gatherings with Friends and Family: More than three times a week    Attends Anabaptism Services: More than 4 times per year    Active Member of [employer] or Organizations: Yes    Attends Club or Organization Meetings: More than 4 times per year    Marital Status:    Intimate Partner Violence: Not on file   Housing Stability: Low Risk     Unable to Pay for Housing in the Last Year: No    Number of Places Lived in the Last Year: 1    Unstable Housing in the Last Year: No       Review of Systems   Constitutional:  Negative for activity change, appetite change, chills, fatigue, fever and unexpected weight change. HENT:  Negative for congestion, dental problem, ear discharge, ear pain, facial swelling, hearing loss, postnasal drip, rhinorrhea, sinus pressure, sore throat and trouble swallowing. Eyes:  Negative for pain and visual disturbance.    Respiratory:  Negative for cough, chest tightness, shortness of breath and wheezing. Cardiovascular:  Negative for chest pain, palpitations and leg swelling. Gastrointestinal:  Negative for abdominal pain, blood in stool, constipation, diarrhea, nausea and vomiting. Endocrine: Negative for cold intolerance, heat intolerance and polyuria. Genitourinary:  Negative for difficulty urinating. Musculoskeletal:  Negative for arthralgias, gait problem, myalgias, neck pain and neck stiffness. Skin:  Negative for color change, rash and wound. Neurological:  Negative for dizziness, tremors, seizures, weakness, light-headedness, numbness and headaches. Psychiatric/Behavioral:  Negative for confusion and hallucinations. The patient is not nervous/anxious. Physical Exam  Vitals and nursing note reviewed. Constitutional:       General: He is not in acute distress. Appearance: He is well-developed. He is not diaphoretic. HENT:      Head: Normocephalic and atraumatic. Right Ear: External ear normal.      Left Ear: External ear normal.   Eyes:      General: Lids are normal.         Right eye: No discharge. Left eye: No discharge. Conjunctiva/sclera: Conjunctivae normal.      Pupils: Pupils are equal, round, and reactive to light. Neck:      Trachea: No tracheal deviation. Cardiovascular:      Rate and Rhythm: Normal rate and regular rhythm. Heart sounds: Normal heart sounds. No murmur heard. No friction rub. No gallop. Pulmonary:      Effort: Pulmonary effort is normal. No accessory muscle usage or respiratory distress. Breath sounds: Normal breath sounds. No stridor. No wheezing, rhonchi or rales. Abdominal:      General: Bowel sounds are normal. There is no distension. Palpations: Abdomen is soft. Abdomen is not rigid. Tenderness: There is no abdominal tenderness. Musculoskeletal:         General: No swelling. Normal range of motion. Cervical back: Normal range of motion and neck supple.  No edema or erythema. Right lower leg: No edema. Left lower leg: No edema. Skin:     General: Skin is warm and dry. Capillary Refill: Capillary refill takes less than 2 seconds. Coloration: Skin is not jaundiced or pale. Findings: No erythema or rash. Neurological:      Mental Status: He is alert and oriented to person, place, and time. Cranial Nerves: No cranial nerve deficit. Sensory: No sensory deficit. Motor: No weakness. Coordination: Coordination normal.      Gait: Gait normal.   Psychiatric:         Behavior: Behavior normal.         Thought Content: Thought content normal.         Judgment: Judgment normal.     Vitals:    10/26/22 1343   BP: 136/80   Site: Right Upper Arm   Position: Sitting   Cuff Size: Large Adult   Pulse: 56   Resp: 16   SpO2: 96%   Weight: 158 lb 9.6 oz (71.9 kg)   Height: 5' 6\" (1.676 m)       Assessment:  1. Chronic kidney disease, stage 3a ([address]  Per labs patient appears dry. We will decrease his Lasix to 20 mg daily, monitor weights, monitor for swelling. Follows with the CHF clinic. If gains more than 2 pounds in 24 hours will take the additional 20 mg of Lasix. Continue to follow a 2 g sodium diet. Wife will reach out to [place] to see if they would cover Zettie Like for the chronic kidney disease and stop the [place]. - Basic Metabolic Panel; Future  - Basic Metabolic Panel; Future  - Hemoglobin A1C; Future    2. Adrenal cyst (HCC)  Noted benign and most recent CT scan. 3. 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 Will work on diet, go back up on his metformin however with his chronic kidney disease we will need to watch his labs. We will follow-up with labs next week  - Basic Metabolic Panel; Future  - Hemoglobin A1C; Future    4.  CHF NYHA class I, chronic, systolic (HCC)  Continue on Lasix however at a lower dose, Jardiance, Coreg, 2 g sodium diet, weigh daily, follows with CHF clinic. Continue to follow with cardiology    5. Essential hypertension  Stable on current antihypertensive regimen, continue to monitor    6. Coronary artery disease involving native coronary artery of native heart without angina pectoris  Continues to follow with cardiology. Stable on aspirin, Plavix, Zocor, Jardiance. 7. Aortic stenosis, severe  8. S/P TAVR (transcatheter aortic valve replacement)  No signs of fluid overload    9. Peripheral arterial disease ([address]  Continue to follow with cardio continue on statin Plavix aspirin. 10. Dyslipidemia  Stable on Zocor    11. Vitamin D deficiency  Stable on supplemental vitamin D    12. Lung nodule  Last CT benign in nature    13. Hyperglycemia, unspecified   - Hemoglobin A1C; Future    10. Carotid stenosis. Most recent ultrasound 5/11/2022 showed 0 to 50% stenosis bilateral, bilateral vertebral arteries patent. We will continue to monitor    Plan:  As noted above. Follow up for routine visit. Call sooner with concerns prior.     Electronically signed by ESTHER Velasquez CNP on 10/26/2022 at 8:52 PM

## 2022-11-04 ENCOUNTER — HOSPITAL ENCOUNTER (OUTPATIENT)
Dept: LAB | Age: 77
Discharge: HOME | End: 2022-11-04
Payer: MEDICARE

## 2022-11-04 DIAGNOSIS — N18.31 CHRONIC KIDNEY DISEASE, STAGE 3A (HCC): ICD-10-CM

## 2022-11-04 LAB
ANION GAP SERPL CALCULATED.3IONS-SCNC: 7 MMOL/L (ref 9–17)
BUN BLDV-MCNC: 41 MG/DL (ref 8–23)
BUN/CREAT BLD: 28 (ref 9–20)
CALCIUM SERPL-MCNC: 9.6 MG/DL (ref 8.6–10.4)
CHLORIDE BLD-SCNC: 101 MMOL/L (ref 98–107)
CO2: 26 MMOL/L (ref 20–31)
CREAT SERPL-MCNC: 1.49 MG/DL (ref 0.7–1.2)
GFR SERPL CREATININE-BSD FRML MDRD: 48 ML/MIN/1.73M2
GLUCOSE BLD-MCNC: 177 MG/DL (ref 70–99)
POTASSIUM SERPL-SCNC: 5.5 MMOL/L (ref 3.7–5.3)
SODIUM BLD-SCNC: 134 MMOL/L (ref 135–144)

## 2022-11-04 PROCEDURE — 36415 COLL VENOUS BLD VENIPUNCTURE: CPT

## 2022-11-04 PROCEDURE — 80048 BASIC METABOLIC PNL TOTAL CA: CPT

## 2022-11-07 DIAGNOSIS — N18.31 CHRONIC KIDNEY DISEASE, STAGE 3A (HCC): Primary | ICD-10-CM

## 2022-11-09 ENCOUNTER — TELEPHONE (OUTPATIENT)
Dept: INTERNAL MEDICINE | Age: 77
End: 2022-11-09

## 2022-11-09 NOTE — LETTER
[facility] Internal Med A department of [facility]  Phone: [phone]  Fax: [phone]    ESTHER Perez CNP        November 9, 2022     Patient: Marcos Hoyt   YOB: 1945   Date of Visit: 11/9/2022       To Whom It May Concern:    Alexander Villanueva is being followed in our office for his chronic medical conditions   which includes his essential HTN. If you have any questions or concerns, please don't hesitate to call.     Sincerely,        ESTHER Perez CNP

## 2022-11-09 NOTE — TELEPHONE ENCOUNTER
Pt needs a letter from PCP stating he is being treated for HTN. He states [facility]. He will  the letter when ready.

## 2022-11-21 ENCOUNTER — HOSPITAL ENCOUNTER (OUTPATIENT)
Dept: OTHER | Age: 77
Discharge: HOME | End: 2022-11-21
Payer: MEDICARE

## 2022-11-21 VITALS
RESPIRATION RATE: 20 BRPM | OXYGEN SATURATION: 95 % | BODY MASS INDEX: 26 KG/M2 | HEIGHT: 66 IN | WEIGHT: 161.8 LBS | SYSTOLIC BLOOD PRESSURE: 140 MMHG | HEART RATE: 52 BPM | DIASTOLIC BLOOD PRESSURE: 60 MMHG

## 2022-11-21 PROCEDURE — 99211 OFF/OP EST MAY X REQ PHY/QHP: CPT

## 2022-11-21 NOTE — PROGRESS NOTES
Patient here for CHF visit. Reviewed CHF Zone tool worksheet with patient who stated understanding. Patient is weighing self daily at home, stable per patient. Patient saw PCP Lorette Hammans, CNP in October; is scheduled again in January and will see cardiology in February. Patient denies edema of bilateral lower extremities. PCP has given patient parameters with Lasix dosage depending on weight; patient stated understanding of those instructions. (See office visit note from 10-26-22 with Lorette Hammans, CNP). Verbally reviewed importance of medication compliance with patient; patient verbalized understanding. Discussed 2000mg/day sodium restricted diet; patient verbalized understanding. Moderate daily exercise encouraged as tolerated. Discussed rest breaks as needed; patient verbalized understanding. Patient reports he plays pickleball twice weekly without difficulty. Patient instructed to weigh self at the same time of each day, using same clothes and same scale; reinforced teaching to monitor for 2-3 lb weight increase over 1-2 days, and to notify the CHF clinic at [phone] or [phone] or physician office if weight change noted. Patient verbalized understanding. Signs and symptoms of CHF discussed with patient, such as feeling more tired than normal, feeling short of breath, coughing that increases when you lie down, sudden weight gain, swelling of your feet, legs or belly. Patient verbalized understanding to notify the CHF clinic at [phone] or [phone] or physician office if these symptoms occur. Compliance with plan of care and further disease process causes discussed with patient, patient encouraged to keep all follow up appointments. Patient verbalized understanding.

## 2022-11-25 ENCOUNTER — HOSPITAL ENCOUNTER (OUTPATIENT)
Dept: LAB | Age: 77
Discharge: HOME | End: 2022-11-25
Payer: MEDICARE

## 2022-11-25 DIAGNOSIS — N18.31 CHRONIC KIDNEY DISEASE, STAGE 3A (HCC): ICD-10-CM

## 2022-11-25 LAB
ANION GAP SERPL CALCULATED.3IONS-SCNC: 11 MMOL/L (ref 9–17)
BUN BLDV-MCNC: 43 MG/DL (ref 8–23)
BUN/CREAT BLD: 26 (ref 9–20)
CALCIUM SERPL-MCNC: 9.6 MG/DL (ref 8.6–10.4)
CHLORIDE BLD-SCNC: 101 MMOL/L (ref 98–107)
CO2: 26 MMOL/L (ref 20–31)
CREAT SERPL-MCNC: 1.64 MG/DL (ref 0.7–1.2)
GFR SERPL CREATININE-BSD FRML MDRD: 43 ML/MIN/1.73M2
GLUCOSE BLD-MCNC: 192 MG/DL (ref 70–99)
POTASSIUM SERPL-SCNC: 5.3 MMOL/L (ref 3.7–5.3)
SODIUM BLD-SCNC: 138 MMOL/L (ref 135–144)

## 2022-11-25 PROCEDURE — 80048 BASIC METABOLIC PNL TOTAL CA: CPT

## 2022-11-25 PROCEDURE — 36415 COLL VENOUS BLD VENIPUNCTURE: CPT

## 2023-02-03 ENCOUNTER — HOSPITAL ENCOUNTER (OUTPATIENT)
Dept: OTHER | Age: 78
Discharge: HOME | End: 2023-02-03
Payer: MEDICARE

## 2023-02-03 VITALS
HEIGHT: 66 IN | BODY MASS INDEX: 24.11 KG/M2 | OXYGEN SATURATION: 97 % | HEART RATE: 66 BPM | DIASTOLIC BLOOD PRESSURE: 80 MMHG | SYSTOLIC BLOOD PRESSURE: 152 MMHG | RESPIRATION RATE: 20 BRPM | WEIGHT: 150 LBS

## 2023-02-03 PROCEDURE — 99211 OFF/OP EST MAY X REQ PHY/QHP: CPT

## 2023-02-03 NOTE — PROGRESS NOTES
Patient here for CHF visit. Reviewed CHF Zone Tool worksheet with patient who stated understanding. Patient is weighing self daily at home. Patient is scheduled to see cardiology on 2-7-23. Verbally reviewed importance of medication compliance with patient; patient verbalized understanding. Discussed 2000mg/day sodium restricted diet; patient verbalized understanding. Patient reports he is watching sodium content in diet, is not adding salt to foods and has cut out salty snacks. Moderate daily exercise encouraged as tolerated. Discussed rest breaks as needed; patient verbalized understanding. Patient plays [employer] twice weekly and is walking the other days of the week, reports he tolerates it well. Patient instructed to weigh self at the same time of each day, using same clothes and same scale; reinforced teaching to monitor for 2-3 lb weight increase over 1-2 days, and to notify the CHF clinic at [phone] or [phone] or physician office if weight change noted. Patient verbalized understanding. Signs and symptoms of CHF discussed with patient, such as feeling more tired than normal, feeling short of breath, coughing that increases when you lie down, sudden weight gain, swelling of your feet, legs or belly. Patient verbalized understanding to notify the CHF clinic at [phone] or [phone] or physician office if these symptoms occur. Compliance with plan of care and further disease process causes discussed with patient, patient encouraged to keep all follow up appointments. Patient verbalized understanding.

## 2023-02-07 ENCOUNTER — HOSPITAL ENCOUNTER (OUTPATIENT)
Age: 78
Discharge: HOME | End: 2023-02-07
Payer: MEDICARE

## 2023-02-07 ENCOUNTER — OFFICE VISIT (OUTPATIENT)
Dept: CARDIOLOGY | Age: 78
End: 2023-02-07
Payer: MEDICARE

## 2023-02-07 VITALS
WEIGHT: 150 LBS | BODY MASS INDEX: 24.11 KG/M2 | SYSTOLIC BLOOD PRESSURE: 122 MMHG | HEART RATE: 73 BPM | HEIGHT: 66 IN | OXYGEN SATURATION: 95 % | RESPIRATION RATE: 18 BRPM | DIASTOLIC BLOOD PRESSURE: 72 MMHG

## 2023-02-07 DIAGNOSIS — I73.9 PERIPHERAL ARTERIAL DISEASE (HCC): ICD-10-CM

## 2023-02-07 DIAGNOSIS — I50.22 CHF NYHA CLASS I, CHRONIC, SYSTOLIC (HCC): ICD-10-CM

## 2023-02-07 DIAGNOSIS — R73.9 HYPERGLYCEMIA, UNSPECIFIED: ICD-10-CM

## 2023-02-07 DIAGNOSIS — I20.9 TYPICAL ANGINA (HCC): ICD-10-CM

## 2023-02-07 DIAGNOSIS — Z95.2 HISTORY OF TRANSCATHETER AORTIC VALVE REPLACEMENT (TAVR): ICD-10-CM

## 2023-02-07 DIAGNOSIS — N18.31 CHRONIC KIDNEY DISEASE, STAGE 3A (HCC): ICD-10-CM

## 2023-02-07 DIAGNOSIS — I25.10 CORONARY ARTERY DISEASE INVOLVING NATIVE CORONARY ARTERY OF NATIVE HEART WITHOUT ANGINA PECTORIS: Primary | ICD-10-CM

## 2023-02-07 DIAGNOSIS — E11.3219 CONTROLLED TYPE 2 DIABETES MELLITUS WITH MILD NONPROLIFERATIVE RETINOPATHY AND MACULAR EDEMA, WITHOUT LONG-TERM CURRENT USE OF INSULIN, UNSPECIFIED LATERALITY (HCC): ICD-10-CM

## 2023-02-07 LAB
ANION GAP SERPL CALCULATED.3IONS-SCNC: 10 MMOL/L (ref 9–17)
BUN SERPL-MCNC: 42 MG/DL (ref 8–23)
BUN/CREAT BLD: 30 (ref 9–20)
CALCIUM SERPL-MCNC: 9.6 MG/DL (ref 8.6–10.4)
CHLORIDE SERPL-SCNC: 98 MMOL/L (ref 98–107)
CO2 SERPL-SCNC: 26 MMOL/L (ref 20–31)
CREAT SERPL-MCNC: 1.38 MG/DL (ref 0.7–1.2)
EST. AVERAGE GLUCOSE BLD GHB EST-MCNC: 192 MG/DL
GFR SERPL CREATININE-BSD FRML MDRD: 53 ML/MIN/1.73M2
GLUCOSE SERPL-MCNC: 306 MG/DL (ref 70–99)
HBA1C MFR BLD: 8.3 % (ref 4–6)
POTASSIUM SERPL-SCNC: 5 MMOL/L (ref 3.7–5.3)
SODIUM SERPL-SCNC: 134 MMOL/L (ref 135–144)

## 2023-02-07 PROCEDURE — 83036 HEMOGLOBIN GLYCOSYLATED A1C: CPT

## 2023-02-07 PROCEDURE — 93005 ELECTROCARDIOGRAM TRACING: CPT | Performed by: INTERNAL MEDICINE

## 2023-02-07 PROCEDURE — 80048 BASIC METABOLIC PNL TOTAL CA: CPT

## 2023-02-07 PROCEDURE — 36415 COLL VENOUS BLD VENIPUNCTURE: CPT

## 2023-02-07 PROCEDURE — 99212 OFFICE O/P EST SF 10 MIN: CPT | Performed by: INTERNAL MEDICINE

## 2023-02-07 NOTE — PROGRESS NOTES
Today's Date: 2/7/2023  Patient's Name: Maciel High  Patient's age: 69 y.o., 1945    CC: follow up for CAD, AS, TAVR    HPI and CC  Maciel High  Is here for follow up for CAD, AS, TAVR. Patient is doing well from a cardiac standpoint. He exercises regularly walking 2-1/2 miles 3 times a week and light weightlifting  other 2 days a week with no chest pain, no dyspnea, no PND, no syncope or pre-syncope, no orthopnea. No symptoms of CHF or angina/chest pain. Being followed at CHF clinic. Past Medical History:   has a past medical history of Acne rosacea, Anemia, Aortic stenosis, BPH (benign prostatic hypertrophy), CAD (coronary artery disease), Carotid stenosis, Cataract of both eyes, CRI (chronic renal insufficiency), Diabetes mellitus, type 2 ([address] Dyslipidemia, Erectile dysfunction, H/O agent Orange exposure, Hearing loss, Hyperlipidemia, Hypertension, Mild nonproliferative diabetic retinopathy ([address] Non-rheumatic mitral regurgitation, Pneumonia, Pneumonia of right lower lobe due to infectious organism, RBBB, Sepsis ([address] 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 Past Surgical History:   has a past surgical history that includes Circumcision (age 49); Vasectomy; Tympanostomy tube placement; Cataract removal (Bilateral); eye surgery (Bilateral); Cardiac catheterization (12/03/2019); Aortic valve surgery (01/2020); Cardiac catheterization (12/27/2021); Umbilical hernia repair (N/A, 5/16/2022); and hernia repair (05/16/2022). Home Medications:  Prior to Admission medications    Medication Sig Start Date End Date Taking?  Authorizing Provider   metFORMIN (GLUCOPHAGE) 1000 MG tablet Take 1 tablet by mouth 2 times daily (with meals) 10/26/22  Yes Lemuel Avalos APRN - CNP   furosemide (LASIX) 40 MG tablet Take 1 tablet by mouth daily If weight gain more than 2 pounds or sudden increase in shortness of breath or cough take an additional 40 mg tablet and call physician  Patient taking differently: Take 20 mg by mouth daily If weight gain more than 2 pounds or sudden increase in shortness of breath or cough take an additional 40 mg tablet and call physician 5/18/22  Yes ESTHER Jorgensen CNP   Handicap Placard MISC by Does not apply route Expires: 4/6/2024 4/6/22  Yes ESTHER Jorgensen CNP   empagliflozin (JARDIANCE) 10 MG tablet Take 1 tablet by mouth daily 4/5/22  Yes Allison Mondragon MD   carvedilol (COREG) 6.25 MG tablet TAKE 1 TABLET BY MOUTH TWO TIMES A DAY WITH breakfast and dinner MEALS 3/26/22  Yes Historical Provider, MD   aspirin 81 MG chewable tablet Take 81 mg by mouth daily   Yes Historical Provider, MD   clopidogrel (PLAVIX) 75 MG tablet Take 1 tablet by mouth daily Take 1 daily 12/6/19  Yes ESTHER Crawford CNP   simvastatin (ZOCOR) 40 MG tablet Take 20 mg by mouth nightly   Yes Historical Provider, MD   glipiZIDE (GLUCOTROL) 10 MG tablet Take 10 mg by mouth in the morning and 10 mg in the evening. Take before meals. Yes Historical Provider, MD   vitamin D (CHOLECALCIFEROL) 1000 UNIT TABS tablet Take 2,000 Units by mouth daily   Yes Historical Provider, MD       Allergies:  Atorvastatin, Sulfa antibiotics, and Sulfanilamide    Social History:   reports that he quit smoking about 53 years ago. His smoking use included cigarettes. He has a 1.00 pack-year smoking history. He has never used smokeless tobacco. He reports current alcohol use. He reports that he does not use drugs. Family History: family history includes Coronary Art Dis in his brother and sister; Heart Attack in his father; Hypertension in his mother; [place] in his mother; Stroke in his mother. No h/o sudden cardiac death. No for premature CAD    REVIEW OF SYSTEMS:    Constitutional: there has been no unanticipated weight loss. There's been No change in energy level, No change in activity level.      Eyes: No visual changes or diplopia. No scleral icterus. ENT: No Headaches, hearing loss or vertigo. No mouth sores or sore throat. Cardiovascular: see above  Respiratory: see above  Gastrointestinal: No abdominal pain, appetite loss, blood in stools. Genitourinary: No dysuria, trouble voiding, or hematuria. Musculoskeletal:  No gait disturbance, No weakness or joint complaints. Integumentary: No rash or pruritis. Neurological: No headache or diplopia. No tingling  Psychiatric: No anxiety, or depression. Endocrine: No temperature intolerance. Hematologic/Lymphatic: No abnormal bruising or bleeding, blood clots or swollen lymph nodes. Allergic/Immunologic: No nasal congestion or hives. PHYSICAL EXAM:      /72 (Site: Right Upper Arm, Position: Sitting)   Pulse 73   Resp 18   Ht 5' 6\" (1.676 m)   Wt 150 lb (68 kg)   SpO2 95%   BMI 24.21 kg/m²   General appearance: alert and cooperative with exam  HEENT: Head: Normocephalic, no lesions, without obvious abnormality.   Eyes: PERRL, EOMI  Ears: Not obvious deformations or lack of hearing  Neck: no carotid bruit, no JVD  Lungs: clear to auscultation bilaterally  Heart: regular rate and rhythm, S1, S2 normal, no murmur, click, rub or gallop  Abdomen: soft, non-tender; bowel sounds normal; no masses,  no organomegaly  Extremities: extremities normal, atraumatic, no cyanosis or edema  Neurologic: Mental status: Alert, oriented, thought content appropriate  Skin: WNL for age and condition, no obvious rashes or leasions    Labs:     Lab Results   Component Value Date    CHOL 142 03/17/2022    TRIG 112 03/17/2022    HDL 35 (L) 03/17/2022    LDLCHOLESTEROL 85 03/17/2022    VLDL NOT REPORTED 07/22/2021    CHOLHDLRATIO 4.1 03/17/2022     Lab Results   Component Value Date     11/25/2022    K 5.3 11/25/2022     11/25/2022    CO2 26 11/25/2022    BUN 43 (H) 11/25/2022    CREATININE 1.64 (H) 11/25/2022    GLUCOSE 192 (H) 11/25/2022    CALCIUM 9.6 11/25/2022 PROT 7.1 03/28/2022    LABALBU 3.8 03/28/2022    BILITOT 0.25 (L) 03/28/2022    ALKPHOS 100 03/28/2022    AST 17 03/28/2022    ALT 15 03/28/2022    LABGLOM 43 (L) 11/25/2022    GFRAA 49 (L) 07/19/2022    GLOB 3.3 03/28/2022     Cardiac Data:  EKG: Sinus  Rhythm  -First degree A-V block   Ml = 250  -Left axis -anterior fascicular block. - Extensive anterior-lateral infarct  Old.    -  Nonspecific T-abnormality. TTE 12/3/19  Summary  Left ventricle is normal in size. Global left ventricular systolic function  is moderately reduced. Calculated EF via heart model is 31 %. Grade I (mild) left ventricular diastolic dysfunction. Left atrium is mildly dilated. Right atrial dilatation. Mildly dilated right ventricular cavity. Right ventricular function appears normal .  The aortic valve is calcified with restricted cusp mobility. Moderate Aortic Stenosis, however, maybe underestimated due to poor LVEF. Mild mitral regurgitation. Trivial tricuspid regurgitation. Estimated right ventricular systolic  pressure is 27 mmHg. Cardiac cath 12/3/19   Procedure Summary      Severe aortic stenosis. Mild LV systolic dysfunction. Patent mid LAD, proximal D1 and mid LCX stents. Focal distal LAD 70%  stenosis. Recommendations      Medical therapy as needed. Risk factor modification. Elective CV surgical consultation for AVR/ CABG     Echo 01/23/2020:  Left ventricle is mildly enlarged, basal septal hypertrophy, global left  ventricular systolic function is moderately to severely reduced, calculated  ejection fraction is 36%. Grade I (mild) left ventricular diastolic dysfunction. Left atrium is moderately dilated. Normal right ventricular size and function. Bioprosthetic valve is seated in the aortic position, appears to be  functioning normally with no obvious regrugitation or stenosis. Trivial perivalvular leak noted. Peak instantaneous gradient 17 mmHg and mean gradient 9 mmHg.   Mitral valve sclerosis without stenosis. Mild mitral regurgitation. Tricuspid valve was not well visualized. Trivial tricuspid regurgitation. S/p TAVR 1/21/2020   34 mm CoreValve EVOLUT via right Femoral approach. Serial # [id number]     TTE 2/27/2020  Summary  Thinned and severely hypokinetic inferolateral wall. Left ventricular systolic function is mildly reduced. Left ventricular ejection fraction 45 %. Grade II (moderate) left ventricular diastolic dysfunction. Right ventricular dilatation with normal systolic function. Bioprosthetic aortic valve( TAVR) that is normal in appearance and function. Peak instantaneous gradient 9 mmHg and mean gradient 5 mmHg. Trace aortic insufficiency. Mitral leaflets are mildly thickened without stenosis. Mild mitral regurgitation. No pericardial effusion. Echo: 3/16/21  Normal left ventricular diameter. Mildly thickened interventricular septum. Thinned and akinetic inferolateral wall. Left ventricular systolic function is mildly reduced. Left ventricular ejection fraction 45 %. Grade I (mild) left ventricular diastolic dysfunction. Left atrium is severely dilated. Right atrial dilatation. Right ventricular dilatation with normal systolic function. Bioprosthetic aortic valve (per TAVR) that is normal in appearance and  function. Peak instantaneous gradient 12 mmHg and mean gradient 6 mmHg. Mitral annular calcification. Mild mitral regurgitation. Normal function of other valves. No pericardial effusion. Echo 12/21:   Dilated left ventricle with severely reduced systolic function. Estimated EF 25-30%  Severe hypokinesis of inferior, inferolateral and inferoseptal walls. Grade I (mild) left ventricular diastolic dysfunction. Normal right ventricular size and function. Bioprosthetic aortic valve (TAVR) is noted in position, functioning normally  with no significant stenosis/regurgitation or perivalvular leak (Mean  gradient 5 mm hg)  Mild mitral regurgitation.   Mild tricuspid regurgitation. Estimated right ventricular systolic pressure is 41 mmHg. No significant pericardial effusion is seen. Cath 12/21:  Conclusions:  Patent LAD, D, and LCX stents     Assessment and Plan:     Asymptomatic bradycardia- reviewed holter with him, average HR was 54bpm.  Ischemic and Non ischemic CM- recently worsened with drop in LVEF to 25-30% on Echo 12/21. MUGA scan in Jan 2022 showed EF 32%. Declined ICD after extensive discussion. Cath on 12/21- patent stents with stable non obstructive disease (after drop in LVEF)  Knonw CAD with h/o DEVON to LAD, D and mid LCX 8/23/16- patent stent 12/21. Continue current meds. Stable, chronic  Severe aortic stenosis s/p TAVR on 1/21/2020 - normal function Echo 12/21. Stable, chronic  Carotid artery disease. Follows with vascular surgery. Stable, chronic  HTN- stable, chronic  Hyperlipidemia- Patient is on low dose simvastatin which he is able to tolerate. Any increase in dose or switch has caused him severe myalgia. I recommended him addition of zetia. He does not want to take this new medication. Stable, chronic  H/o agent orange exposure. CKD- Has been referred to nephrology by PCP. Stable, chronic  Right lower ext numbess. Seeing neurology and vascular surgery. Stable, chronic    The patient is to continue heart healthy diet, weight loss and exercise as tolerated. Patient's medications and side effects were discussed. Medication refills were provided if needed. Follow up appointment timing was discussed. All questions and concerns were addressed to patient's satisfaction. The patient is to follow up in 6 months or sooner if necessary. Thank you for allowing me to participate in the care of this patient, please do not hesitate to call if you have any questions.     Electronically signed by aLurence Lindsey MD on 2/7/2023 at 10:42 [facility]  [phone]

## 2023-02-08 ENCOUNTER — OFFICE VISIT (OUTPATIENT)
Dept: INTERNAL MEDICINE | Age: 78
End: 2023-02-08
Payer: MEDICARE

## 2023-02-08 VITALS
HEIGHT: 66 IN | HEART RATE: 60 BPM | SYSTOLIC BLOOD PRESSURE: 128 MMHG | WEIGHT: 147.8 LBS | DIASTOLIC BLOOD PRESSURE: 80 MMHG | BODY MASS INDEX: 23.75 KG/M2

## 2023-02-08 DIAGNOSIS — I10 ESSENTIAL HYPERTENSION: ICD-10-CM

## 2023-02-08 DIAGNOSIS — E11.22 TYPE 2 DIABETES MELLITUS WITH DIABETIC CHRONIC KIDNEY DISEASE, UNSPECIFIED CKD STAGE, UNSPECIFIED WHETHER LONG TERM INSULIN USE (HCC): ICD-10-CM

## 2023-02-08 DIAGNOSIS — E78.5 DYSLIPIDEMIA: ICD-10-CM

## 2023-02-08 DIAGNOSIS — I65.29 STENOSIS OF CAROTID ARTERY, UNSPECIFIED LATERALITY: ICD-10-CM

## 2023-02-08 DIAGNOSIS — I73.9 PERIPHERAL ARTERIAL DISEASE (HCC): ICD-10-CM

## 2023-02-08 DIAGNOSIS — I35.0 AORTIC STENOSIS, SEVERE: ICD-10-CM

## 2023-02-08 DIAGNOSIS — I34.0 NON-RHEUMATIC MITRAL REGURGITATION: ICD-10-CM

## 2023-02-08 DIAGNOSIS — E55.9 VITAMIN D DEFICIENCY: ICD-10-CM

## 2023-02-08 DIAGNOSIS — E11.3219 CONTROLLED TYPE 2 DIABETES MELLITUS WITH MILD NONPROLIFERATIVE RETINOPATHY AND MACULAR EDEMA, WITHOUT LONG-TERM CURRENT USE OF INSULIN, UNSPECIFIED LATERALITY (HCC): ICD-10-CM

## 2023-02-08 DIAGNOSIS — Z95.2 S/P TAVR (TRANSCATHETER AORTIC VALVE REPLACEMENT): ICD-10-CM

## 2023-02-08 DIAGNOSIS — I25.10 CORONARY ARTERY DISEASE INVOLVING NATIVE CORONARY ARTERY OF NATIVE HEART WITHOUT ANGINA PECTORIS: ICD-10-CM

## 2023-02-08 DIAGNOSIS — N18.31 CHRONIC KIDNEY DISEASE, STAGE 3A (HCC): Primary | ICD-10-CM

## 2023-02-08 DIAGNOSIS — E27.8 ADRENAL CYST (HCC): ICD-10-CM

## 2023-02-08 DIAGNOSIS — I50.22 CHF NYHA CLASS I, CHRONIC, SYSTOLIC (HCC): ICD-10-CM

## 2023-02-08 DIAGNOSIS — R91.1 LUNG NODULE: ICD-10-CM

## 2023-02-08 PROCEDURE — 99214 OFFICE O/P EST MOD 30 MIN: CPT | Performed by: NURSE PRACTITIONER

## 2023-02-08 PROCEDURE — G8484 FLU IMMUNIZE NO ADMIN: HCPCS | Performed by: NURSE PRACTITIONER

## 2023-02-08 PROCEDURE — 3074F SYST BP LT 130 MM HG: CPT | Performed by: NURSE PRACTITIONER

## 2023-02-08 PROCEDURE — 3078F DIAST BP <80 MM HG: CPT | Performed by: NURSE PRACTITIONER

## 2023-02-08 PROCEDURE — 3052F HG A1C>EQUAL 8.0%<EQUAL 9.0%: CPT | Performed by: NURSE PRACTITIONER

## 2023-02-08 PROCEDURE — 1123F ACP DISCUSS/DSCN MKR DOCD: CPT | Performed by: NURSE PRACTITIONER

## 2023-02-08 PROCEDURE — G8420 CALC BMI NORM PARAMETERS: HCPCS | Performed by: NURSE PRACTITIONER

## 2023-02-08 PROCEDURE — G8427 DOCREV CUR MEDS BY ELIG CLIN: HCPCS | Performed by: NURSE PRACTITIONER

## 2023-02-08 PROCEDURE — 1036F TOBACCO NON-USER: CPT | Performed by: NURSE PRACTITIONER

## 2023-02-08 RX ORDER — AZITHROMYCIN 250 MG/1
TABLET, FILM COATED ORAL
Qty: 1 PACKET | Refills: 0 | Status: SHIPPED | OUTPATIENT
Start: 2023-02-08

## 2023-02-08 ASSESSMENT — PATIENT HEALTH QUESTIONNAIRE - PHQ9
1. LITTLE INTEREST OR PLEASURE IN DOING THINGS: 0
SUM OF ALL RESPONSES TO PHQ QUESTIONS 1-9: 0
2. FEELING DOWN, DEPRESSED OR HOPELESS: 0
SUM OF ALL RESPONSES TO PHQ QUESTIONS 1-9: 0
SUM OF ALL RESPONSES TO PHQ QUESTIONS 1-9: 0
SUM OF ALL RESPONSES TO PHQ9 QUESTIONS 1 & 2: 0
SUM OF ALL RESPONSES TO PHQ QUESTIONS 1-9: 0

## 2023-02-08 NOTE — PROGRESS NOTES
02/08/23  Samir Saleh  1945      Chief Complaint:   1. Chronic kidney disease, stage 3a ([address]    2. Adrenal cyst ([address]    3. 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 Type 2 diabetes mellitus with diabetic chronic kidney disease, unspecified CKD stage, unspecified whether long term insulin use ([facility]    5. CHF NYHA class I, chronic, systolic (HCC)    6. Essential hypertension    7. Coronary artery disease involving native coronary artery of native heart without angina pectoris    8. Aortic stenosis, severe    9. S/P TAVR (transcatheter aortic valve replacement)    10. Non-rheumatic mitral regurgitation    11. Peripheral arterial disease ([facility]    12. Dyslipidemia    13. Vitamin D deficiency    14. Lung nodule    15. Stenosis of carotid artery, unspecified laterality        HPI:  29-year-old patient in for 3-month follow-up of above chronic health conditions. Of most concern is he has had increased cough. States occasional productive green sputum worsening over the last 2 to 4 weeks. No exposure to known illness. No fevers no chills. States just persisting. Has tried over-the-counter medication with little to no relief. Continues to follow with nephrology for the chronic kidney disease. Last visit we decreased his Lasix his weights have remained stable continues to follow with the CHF clinic. No increased swelling to lower extremities. His A1c is improved however remains elevated at 8.3. He gets his medications through the [place]. He is taking the metformin as directed last GFR 53. He also continues on the [place] for cardiac and kidney coverage. Also is on glyburide. We discussed with the SGLT2's would not like him on a sulfonylurea. Discussed stopping this and starting him on one of the GLP medications such as Ozempic or Rybelsus.   States if this med is costly he would not pay for it out-of-pocket. He does follow through the [facility]. They do cover his Jardiance. Also had some insomnia. States he goes through bouts of this. States he just wakes up about 4:00 and takes him a while to go back to bed. It is noted that he is taking some more naps during the day. States he will have 4 or 5 good nights then have 1 or 2 bad nights. We discussed medications for insomnia which she politely declines. Blood pressure stable in office denies any chest pain no dizziness lightheadedness. Taking medications as directed. Denies any myalgias from his Zocor. Allergies   Allergen Reactions    Atorvastatin Other (See Comments)     Muscle pain (myalgias)    Sulfa Antibiotics Swelling     Swelling of retinas    Sulfanilamide Other (See Comments)     Swelling of retinas       Past Medical History:   Diagnosis Date    Acne rosacea     treated with MetroGel    Anemia     Aortic stenosis     echo 3/18 Mod AS, Mild MR    BPH (benign prostatic hypertrophy)     CAD (coronary artery disease)     Stent x 3 DEVON 8/16    Carotid stenosis     Fairly minimal plaquing on ultrasound 5/14--in the conclusion described as less than 50% stenosis but in the body of the report described as minimal    Cataract of both eyes     CRI (chronic renal insufficiency)     Diabetes mellitus, type 2 (HCC)     1.) Proteinuria, 24 hour total urine protein 1,420 mg/24 hrs, 09/08, creatinine clearance 114, 09/08 2.) Repeat protein/creatinine ratio 2.97, 02/12 3. )24 hr urine collection 1958 mg/24 hrs, 04/12. 4.) Repeat protein/creatinine ratio 1.99, 08/12 a.) Repeat protein/creatinine ratio 2.14, 07/13 5.) Renal u/s ok 02/12  6) retinopathy at [address] 12/16  early mod. nonprolifferative  macular involved    Dyslipidemia     Erectile dysfunction     H/O agent Orange exposure     per patient in the [employer] along with exposure to cresote and naphtha    Hearing loss     Hyperlipidemia     Hypertension     Mild nonproliferative diabetic retinopathy ([facility]     Non-rheumatic mitral regurgitation 06/27/2016    Pneumonia 7/31/2021    Pneumonia of right lower lobe due to infectious organism     RBBB     Sepsis ([facility] 12/22/2021    Status post transcatheter aortic valve replacement (TAVR) using bioprosthesis 01/21/2020    Wears dentures     upper       Past Surgical History:   Procedure Laterality Date    AORTIC VALVE SURGERY  01/2020    CARDIAC CATHETERIZATION  12/03/2019    CARDIAC CATHETERIZATION  12/27/2021    Patent LAD, D, and LCX stents    CATARACT REMOVAL Bilateral     CIRCUMCISION  age 49    EYE SURGERY Bilateral     cataract    HERNIA REPAIR  32/39/5931    Open umbilical hernia repair -Dr. Bette Del Valle 85 N/A 7/51/8520    Open Umbilical Hernia Repair w/ Ventralex mesh performed by Silas De Leon MD at [address] [place], [place] [address]y 65         Current Outpatient Medications on File Prior to Visit   Medication Sig Dispense Refill    metFORMIN (GLUCOPHAGE) 1000 MG tablet Take 1 tablet by mouth 2 times daily (with meals) 60 tablet 3    furosemide (LASIX) 40 MG tablet Take 1 tablet by mouth daily If weight gain more than 2 pounds or sudden increase in shortness of breath or cough take an additional 40 mg tablet and call physician (Patient taking differently: Take 20 mg by mouth daily If weight gain more than 2 pounds or sudden increase in shortness of breath or cough take an additional 40 mg tablet and call physician) 90 tablet 1    empagliflozin (JARDIANCE) 10 MG tablet Take 1 tablet by mouth daily 90 tablet 3    carvedilol (COREG) 6.25 MG tablet TAKE 1 TABLET BY MOUTH TWO TIMES A DAY WITH breakfast and dinner MEALS      aspirin 81 MG chewable tablet Take 81 mg by mouth daily      clopidogrel (PLAVIX) 75 MG tablet Take 1 tablet by mouth daily Take 1 daily 60 tablet 3    simvastatin (ZOCOR) 40 MG tablet Take 20 mg by mouth nightly      glipiZIDE (GLUCOTROL) 10 MG tablet Take 10 mg by mouth in the morning and 10 mg in the evening. Take before meals. vitamin D (CHOLECALCIFEROL) 1000 UNIT TABS tablet Take 2,000 Units by mouth daily      Handicap Placard MISC by Does not apply route Expires: 2024 2 each 0     No current facility-administered medications on file prior to visit. Social History     Socioeconomic History    Marital status:      Spouse name: Not on file    Number of children: Not on file    Years of education: Not on file    Highest education level: Not on file   Occupational History    Occupation: seo   Tobacco Use    Smoking status: Former     Packs/day: 0.50     Years: 2.00     Pack years: 1.00     Types: Cigarettes     Quit date: 1970     Years since quittin.1    Smokeless tobacco: Never   Vaping Use    Vaping Use: Never used   Substance and Sexual Activity    Alcohol use: Yes     Alcohol/week: 0.0 standard drinks     Comment: RARELY    Drug use: No    Sexual activity: Not on file   Other Topics Concern    Not on file   Social History Narrative    Not on file     Social Determinants of Health     Financial Resource Strain: Low Risk     Difficulty of Paying Living Expenses: Not hard at all   Food Insecurity: No Food Insecurity    Worried About Running Out of Food in the Last Year: Never true    [address] in the Last Year: Never true   Transportation Needs: No Transportation Needs    Lack of Transportation (Medical): No    Lack of Transportation (Non-Medical): No   Physical Activity: Sufficiently Active    Days of Exercise per Week: 5 days    Minutes of Exercise per Session: 30 min   Stress: No Stress Concern Present    Feeling of Stress : Not at all   Social Connections: Socially Integrated    Frequency of Communication with Friends and Family: More than three times a week    Frequency of Social Gatherings with Friends and Family: More than three times a week    Attends Sabian[employer] Services: More than 4 times per year    Active Member of [address] [place] [address] or Organizations:  Yes Attends Club or Organization Meetings: More than 4 times per year    Marital Status:    Intimate Partner Violence: Not on file   Housing Stability: [address] to Pay for Housing in the Last Year: No    Number of [place] in the Last Year: 1    Unstable Housing in the Last Year: No       Review of Systems   Constitutional:  Negative for activity change, appetite change, chills, fatigue, fever and unexpected weight change. HENT:  Positive for congestion. Negative for dental problem, ear discharge, ear pain, facial swelling, hearing loss, postnasal drip, rhinorrhea, sinus pressure, sore throat and trouble swallowing. Eyes:  Negative for pain and visual disturbance. Respiratory:  Positive for cough. Negative for chest tightness, shortness of breath and wheezing. Cardiovascular:  Negative for chest pain, palpitations and leg swelling. Gastrointestinal:  Negative for abdominal pain, blood in stool, constipation, diarrhea, nausea and vomiting. Endocrine: Negative for cold intolerance, heat intolerance and polyuria. Genitourinary:  Negative for difficulty urinating. Musculoskeletal:  Negative for arthralgias, gait problem, myalgias, neck pain and neck stiffness. Skin:  Negative for color change, rash and wound. Neurological:  Negative for dizziness, tremors, seizures, weakness, light-headedness, numbness and headaches. Psychiatric/Behavioral:  Positive for sleep disturbance (Occasional). Negative for confusion, hallucinations and self-injury. The patient is not nervous/anxious. Physical Exam  Vitals and nursing note reviewed. Constitutional:       General: He is not in acute distress. Appearance: He is not diaphoretic. HENT:      Right Ear: Tympanic membrane normal. No decreased hearing noted. No drainage or swelling. Tympanic membrane is not perforated, erythematous, retracted or bulging. Left Ear: Tympanic membrane normal. No decreased hearing noted.  No drainage or swelling. Tympanic membrane is not perforated, erythematous, retracted or bulging. Nose: No mucosal edema, congestion or rhinorrhea. Right Sinus: No maxillary sinus tenderness or frontal sinus tenderness. Left Sinus: No maxillary sinus tenderness or frontal sinus tenderness. Mouth/Throat:      Pharynx: Uvula midline. No oropharyngeal exudate, posterior oropharyngeal erythema or uvula swelling. Tonsils: No tonsillar abscesses. Eyes:      General:         Right eye: No discharge. Left eye: No discharge. Conjunctiva/sclera: Conjunctivae normal.   Neck:      Trachea: No tracheal deviation. Cardiovascular:      Rate and Rhythm: Normal rate and regular rhythm. Heart sounds: Normal heart sounds. No murmur heard. No friction rub. No gallop. Pulmonary:      Effort: Pulmonary effort is normal. No respiratory distress. Breath sounds: Normal breath sounds. No wheezing or rales. Chest:      Chest wall: No tenderness. Abdominal:      General: Bowel sounds are normal. There is no distension. Palpations: Abdomen is soft. Tenderness: There is no abdominal tenderness. There is no rebound. Musculoskeletal:      Cervical back: Neck supple. Lymphadenopathy:      Cervical: No cervical adenopathy. Skin:     General: Skin is warm and dry. Findings: No rash. Neurological:      Mental Status: He is alert and oriented to person, place, and time. Cranial Nerves: No cranial nerve deficit. Coordination: Coordination normal.   Psychiatric:         Judgment: Judgment normal.     Vitals:    02/08/23 1334   BP: 128/80   Site: Left Upper Arm   Position: Sitting   Cuff Size: Medium Adult   Pulse: 60   Weight: 147 lb 12.8 oz (67 kg)   Height: 5' 6\" (1.676 m)       Assessment:  1. Chronic kidney disease, stage 3a ([address]  Continue on Jardiance, continues to follow with nephrology. BMP has been stable. - Basic Metabolic Panel; Future    2.  Adrenal cyst [facility])  Noted as benign on most recent scan    3. 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 As noted in HPI discussed stopping his sulfonylurea as he is on the SGLT and starting Ozempic. We will reach out to the [facility] [address] [facility] to see if this would be a covered med. 4. Type 2 diabetes mellitus with diabetic chronic kidney disease, unspecified CKD stage, unspecified whether long term insulin use ([facility]  As noted above  - Hemoglobin A1C; Future    5. CHF NYHA class I, chronic, systolic (HCC)  Stable on current regimen, follows with the CHF clinic. Continue on Lasix, Jardiance, Coreg. 6. Essential hypertension  Stable on current antihypertensive regimen    7. Coronary artery disease involving native coronary artery of native heart without angina pectoris  Stable, follows with cardiology, continue on Plavix, aspirin, Coreg, Jardiance    8. Aortic stenosis, severe  9. S/P TAVR (transcatheter aortic valve replacement)  10. Non-rheumatic mitral regurgitation  Followed through cardiology. 11. Peripheral arterial disease ([address]  Stable at present continues on statin, Plavix, and aspirin, good blood pressure control    12. Dyslipidemia  Continue on Zocor    13. Vitamin D deficiency  Stable on supplemental vitamin D    14. Lung nodule  Last CT noted as benign in nature    15. Stenosis of carotid artery, unspecified laterality  Most recent ultrasound 5/11/2022 showed 0 to 50% stenosis bilateral.  Also noted vertebral arteries patent bilaterally. Continue to monitor    16. Persistent productive cough  Add Zithromax as directed. If persist notify office for chest x-ray      Plan:  As noted above. Follow up for routine visit. Call sooner with concerns prior.     Electronically signed by ESTHER Poon CNP on 2/8/2023 at 4:24 PM

## 2023-02-09 ASSESSMENT — ENCOUNTER SYMPTOMS
DIARRHEA: 0
CHEST TIGHTNESS: 0
VOMITING: 0
SINUS PRESSURE: 0
EYE PAIN: 0
COUGH: 1
COLOR CHANGE: 0
CONSTIPATION: 0
RHINORRHEA: 0
FACIAL SWELLING: 0
TROUBLE SWALLOWING: 0
SHORTNESS OF BREATH: 0
WHEEZING: 0
ABDOMINAL PAIN: 0
NAUSEA: 0
BLOOD IN STOOL: 0
SORE THROAT: 0

## 2023-03-06 NOTE — TELEPHONE ENCOUNTER
Patient called in requesting a refill on metformin for only a 30 day supply to be sent to [facility].      Medication pended if agreeable    Last Appt:  2/8/2023  Next Appt:   6/19/2023  Med verified in Epic

## 2023-03-08 ENCOUNTER — TELEPHONE (OUTPATIENT)
Dept: INTERNAL MEDICINE | Age: 78
End: 2023-03-08

## 2023-03-08 RX ORDER — ORAL SEMAGLUTIDE 3 MG/1
1 TABLET ORAL DAILY
COMMUNITY

## 2023-03-08 NOTE — TELEPHONE ENCOUNTER
Torie Luis with the [address] [facility] pharmacy called in on patient stating that he is part of patients care team and today they started him on Rybelsus 3 mg once daily for 1 month and will increase him to 7 mg after that. Torie Luis states they continued all other medications. Torie Luis can be reached at [phone] extension [phone]  He states he will also fax when notes are completed.

## 2023-03-09 NOTE — TELEPHONE ENCOUNTER
Patient's spouse, Rafia Spencer reports that the [place] wanted pt to continue all current meds including the glipizide. Rafia Spencer states she discussed with Kingsley Slaughter at the [place] that PCP had suggested to d/c the glipizide. Rafia Spencer states she would prefer to follow Ruth's recommendation on the glipizide - please advise.

## 2023-03-17 ENCOUNTER — HOSPITAL ENCOUNTER (OUTPATIENT)
Dept: OTHER | Age: 78
Discharge: HOME | End: 2023-03-17
Payer: MEDICARE

## 2023-03-17 VITALS
SYSTOLIC BLOOD PRESSURE: 138 MMHG | BODY MASS INDEX: 23.05 KG/M2 | HEIGHT: 66 IN | WEIGHT: 143.4 LBS | RESPIRATION RATE: 20 BRPM | DIASTOLIC BLOOD PRESSURE: 62 MMHG | HEART RATE: 93 BPM | OXYGEN SATURATION: 95 %

## 2023-03-17 PROCEDURE — 99211 OFF/OP EST MAY X REQ PHY/QHP: CPT

## 2023-03-17 NOTE — PROGRESS NOTES
Patient here for CHF visit. Reviewed updated CHF Zone tool worksheet with patient who stated understanding. Patient is weighing self daily at home. Patient had recent visits with cardiology, [facility]  Patient reports he is playing pickle ball two times per week and walks three times per week; tolerates well. Verbally reviewed importance of medication compliance with patient; patient verbalized understanding. Discussed 2000mg/day sodium restricted diet; patient verbalized understanding. Patient states he does not add salt to foods when cooking or when eating. Moderate daily exercise encouraged as tolerated. Discussed rest breaks as needed; patient verbalized understanding. Patient instructed to weigh self at the same time of each day, using same clothes and same scale; reinforced teaching to monitor for 2-3 lb weight increase over 1-2 days, and to notify the CHF clinic at [phone] or [phone] or physician office if weight change noted. Patient verbalized understanding. Signs and symptoms of CHF discussed with patient, such as feeling more tired than normal, feeling short of breath, coughing that increases when you lie down, sudden weight gain, swelling of your feet, legs or belly. Patient verbalized understanding to notify the CHF clinic at [phone] or [phone] or physician office if these symptoms occur. Compliance with plan of care and further disease process causes discussed with patient, patient encouraged to keep all follow up appointments. Patient verbalized understanding.

## 2023-03-30 NOTE — ANESTHESIA PRE PROCEDURE
- Due for thyroid ultrasound next month. Order placed today   Department of Anesthesiology  Preprocedure Note       Name:  Rossi Lima   Age:  69 y.o.  :  1945                                          MRN:  [id number]         Date:  2020      Surgeon: * Surgery not found *    Procedure: TAVR W/ ANESTHESIA    Medications prior to admission:   Prior to Admission medications    Medication Sig Start Date End Date Taking? Authorizing Provider   Handicap Placard MISC by Does not apply route Expires: 2020  Yes ESTHER Fischer CNP   furosemide (LASIX) 40 MG tablet Take 0.5 tablets by mouth daily 19  Yes Obdulia Mckeon MD   clopidogrel (PLAVIX) 75 MG tablet Take 1 tablet by mouth daily Take 1 daily 19  Yes ESTHER Fabian CNP   pioglitazone (ACTOS) 30 MG tablet Take 30 mg by mouth daily   Yes Historical Provider, MD   simvastatin (ZOCOR) 10 MG tablet Take 10 mg by mouth nightly   Yes Historical Provider, MD   glipiZIDE (GLUCOTROL) 10 MG tablet Take 10 mg by mouth 2 times daily (before meals)   Yes Historical Provider, MD   vitamin D (CHOLECALCIFEROL) 1000 UNIT TABS tablet Take 1,000 Units by mouth 2 times daily    Yes Historical Provider, MD   metFORMIN (GLUCOPHAGE) 1000 MG tablet Take 1 tablet by mouth 2 times daily (with meals) 16  Yes ESTHER Brian CNP   lisinopril (PRINIVIL;ZESTRIL) 20 MG tablet Take 1 tablet by mouth daily 16  Yes ESTHER Brian CNP   amLODIPine (NORVASC) 10 MG tablet Take 10 mg by mouth daily. Yes Historical Provider, MD   aspirin 81 MG EC tablet Take 81 mg by mouth daily.    Yes Historical Provider, MD       Current medications:    Current Outpatient Medications   Medication Sig Dispense Refill    Handicap Placard MISC by Does not apply route Expires: 2020 1 each 0    furosemide (LASIX) 40 MG tablet Take 0.5 tablets by mouth daily 30 tablet 0    clopidogrel (PLAVIX) 75 MG tablet Take 1 tablet by mouth daily Take 1 daily 60 tablet 3    pioglitazone (ACTOS) 30 MG tablet Take 30 mg by mouth daily      simvastatin (ZOCOR) 10 MG tablet Take 10 mg by mouth nightly      glipiZIDE (GLUCOTROL) 10 MG tablet Take 10 mg by mouth 2 times daily (before meals)      vitamin D (CHOLECALCIFEROL) 1000 UNIT TABS tablet Take 1,000 Units by mouth 2 times daily       metFORMIN (GLUCOPHAGE) 1000 MG tablet Take 1 tablet by mouth 2 times daily (with meals) 60 tablet 3    lisinopril (PRINIVIL;ZESTRIL) 20 MG tablet Take 1 tablet by mouth daily 30 tablet 3    amLODIPine (NORVASC) 10 MG tablet Take 10 mg by mouth daily.  aspirin 81 MG EC tablet Take 81 mg by mouth daily. Current Facility-Administered Medications   Medication Dose Route Frequency Provider Last Rate Last Dose    0.9 % sodium chloride infusion   Intravenous Continuous Baby Ottoniel, APRN - CNP 75 mL/hr at 01/21/20 0648      vancomycin (VANCOCIN) 1000 mg in dextrose 5% 200 mL IVPB  1,000 mg Intravenous Q12H Baby Ottoniel, APRN - CNP           Allergies:     Allergies   Allergen Reactions    Sulfa Antibiotics Swelling     Swelling of retinas       Problem List:    Patient Active Problem List   Diagnosis Code    Dyslipidemia E78.5    Essential hypertension I10    Chronic kidney disease N18.9    Unilateral sensorineural hearing loss H90.5    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 ([facility]) E11.21    Non-rheumatic mitral regurgitation I34.0    Nonrheumatic aortic valve stenosis I35.0    Uncontrolled type 2 diabetes mellitus with nephropathy ([facility]) E11.21, E11.65    Coronary artery disease involving native coronary artery of native heart without angina pectoris I25.10    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                 Abdominal:           Vascular:   + PVD, aortic or cerebral, . Anesthesia Plan      MAC     ASA 4       Induction: intravenous. arterial line  MIPS: Postoperative opioids intended. Anesthetic plan and risks discussed with patient, spouse and child/children. Plan discussed with CRNA.                 Isauro Montes MD   1/21/2020

## 2023-04-20 ENCOUNTER — HOSPITAL ENCOUNTER (OUTPATIENT)
Dept: OTHER | Age: 78
Discharge: HOME | End: 2023-04-20
Payer: MEDICARE

## 2023-04-20 VITALS
RESPIRATION RATE: 18 BRPM | SYSTOLIC BLOOD PRESSURE: 126 MMHG | OXYGEN SATURATION: 98 % | DIASTOLIC BLOOD PRESSURE: 60 MMHG | BODY MASS INDEX: 22.79 KG/M2 | HEIGHT: 66 IN | WEIGHT: 141.8 LBS | HEART RATE: 62 BPM

## 2023-04-20 PROCEDURE — 99211 OFF/OP EST MAY X REQ PHY/QHP: CPT

## 2023-04-20 NOTE — PROGRESS NOTES
Patient here for CHF visit. Reviewed CHF Zone tool worksheet with patient who stated understanding. Patient weighing self daily at home, stable per patient. Patient had recent visit with PCP Juan Luis Lovell and Jose E Bello, diabetic educator. Patient is scheduled for follow up visits in May with Jose E Bello, June with Juan Luis Lovell and August with Cardiology. Verbally reviewed importance of medication compliance with patient; patient verbalized understanding. Discussed 2000mg/day sodium restricted diet; patient verbalized understanding. Patient reports he is watching his sodium content and doesn't add salt to foods when eating. Moderate daily exercise encouraged as tolerated. Discussed rest breaks as needed; patient verbalized understanding. Patient continues to play [employer] two times per week as well as walking at least 2 days per week. Patient instructed to weigh self at the same time of each day, using same clothes and same scale; reinforced teaching to monitor for 2-3 lb weight increase over 1-2 days, and to notify the CHF clinic at [phone] or [phone] or physician office if weight change noted. Patient verbalized understanding. Signs and symptoms of CHF discussed with patient, such as feeling more tired than normal, feeling short of breath, coughing that increases when you lie down, sudden weight gain, swelling of your feet, legs or belly. Patient verbalized understanding to notify the CHF clinic at [phone] or [phone] or physician office if these symptoms occur. Compliance with plan of care and further disease process causes discussed with patient, patient encouraged to keep all follow up appointments. Patient verbalized understanding.

## 2023-04-24 ENCOUNTER — TELEPHONE (OUTPATIENT)
Dept: INTERNAL MEDICINE | Age: 78
End: 2023-04-24

## 2023-04-24 NOTE — TELEPHONE ENCOUNTER
Patients wife called in stating that at last ov patient was set up with a cgm Lou 3. Wife states they got supplies in the mail today and got a lou 2. Patients wife states she is very confused, she would like to know if these are compatible with each other and also states that they got a device and don't know how to use it. Please call patient when possible.  [phone]

## 2023-04-25 NOTE — TELEPHONE ENCOUNTER
Yes I believe so, please let pt know insurance will only cover [email or web address] at this time. They can use the [employer] kaitlin or if they did not get a  they should call to check on it.

## 2023-04-27 ENCOUNTER — OFFICE VISIT (OUTPATIENT)
Dept: DIABETES SERVICES | Age: 78
End: 2023-04-27
Payer: MEDICARE

## 2023-04-27 VITALS
WEIGHT: 142.8 LBS | RESPIRATION RATE: 16 BRPM | BODY MASS INDEX: 22.95 KG/M2 | SYSTOLIC BLOOD PRESSURE: 110 MMHG | HEIGHT: 66 IN | DIASTOLIC BLOOD PRESSURE: 74 MMHG

## 2023-04-27 DIAGNOSIS — Z71.89 ENCOUNTER FOR GLUCOMETER INSTRUCTION: ICD-10-CM

## 2023-04-27 DIAGNOSIS — E11.22 TYPE 2 DIABETES MELLITUS WITH STAGE 3A CHRONIC KIDNEY DISEASE, WITHOUT LONG-TERM CURRENT USE OF INSULIN (HCC): Primary | ICD-10-CM

## 2023-04-27 DIAGNOSIS — N18.31 TYPE 2 DIABETES MELLITUS WITH STAGE 3A CHRONIC KIDNEY DISEASE, WITHOUT LONG-TERM CURRENT USE OF INSULIN (HCC): Primary | ICD-10-CM

## 2023-04-27 PROCEDURE — 1036F TOBACCO NON-USER: CPT | Performed by: NURSE PRACTITIONER

## 2023-04-27 PROCEDURE — 3078F DIAST BP <80 MM HG: CPT | Performed by: NURSE PRACTITIONER

## 2023-04-27 PROCEDURE — 3052F HG A1C>EQUAL 8.0%<EQUAL 9.0%: CPT | Performed by: NURSE PRACTITIONER

## 2023-04-27 PROCEDURE — 99214 OFFICE O/P EST MOD 30 MIN: CPT | Performed by: NURSE PRACTITIONER

## 2023-04-27 PROCEDURE — 99211 OFF/OP EST MAY X REQ PHY/QHP: CPT | Performed by: NURSE PRACTITIONER

## 2023-04-27 PROCEDURE — 3074F SYST BP LT 130 MM HG: CPT | Performed by: NURSE PRACTITIONER

## 2023-04-27 PROCEDURE — 1123F ACP DISCUSS/DSCN MKR DOCD: CPT | Performed by: NURSE PRACTITIONER

## 2023-04-27 PROCEDURE — G8420 CALC BMI NORM PARAMETERS: HCPCS | Performed by: NURSE PRACTITIONER

## 2023-04-27 PROCEDURE — G8427 DOCREV CUR MEDS BY ELIG CLIN: HCPCS | Performed by: NURSE PRACTITIONER

## 2023-04-27 ASSESSMENT — ENCOUNTER SYMPTOMS
ABDOMINAL PAIN: 0
SHORTNESS OF BREATH: 0
DIARRHEA: 0
RESPIRATORY NEGATIVE: 1

## 2023-04-27 NOTE — PROGRESS NOTES
[address], [address]  [place] [address] [place] [place] [id number]        HISTORY:    Latisha Chang presents today for evaluation and management of:  Chief Complaint   Patient presents with    Diabetes       Patient Care Team:  ESTHER Jordan CNP as PCP - General (Nurse Practitioner)  EST[facility] Jordan [facility]  Roger Faustin MD (Nephrology)  Michael Carter DO as Consulting Physician (Cardiology)  Keshia Reid MD (General Surgery)      Interval History:    Past DM Medications   Actos-therapy completed  Onglyza- therapy completed  Glipizide therapy completed     Current Diabetic Medications  Metformin 1000 mg bid  Jardiance 10 mg once daily   Rybelsus 7 mg once daily      DKA episodes: 0       04/13/23   Latisha Chang is a 69 y.o. male patient who presents to clinic today for his diabetes. he has a history of CAD, CHF, nephropathy, retinopathy which contributes to his diabetes. He is here for initial dm management and interested in cgm therapy. he denies any current signs or symptoms of hyper/hypoglycemia. he states they are taking their medications as prescribed without any adverse effects. Diet: low carb  Exercise: walking and pickleball  BS testing: once daily   Hypoglycemia: Yes    Sweats and Tremors                 Hypoglycemia Frequency: 3 per month, 4/4/2023- 52 glucose tabs, 3/23/2023 50-candy, 315/2023 49- candy   Hypoglycemia as needed treatment: candy, glucose tabs   Issues:   Diabetic foot exam up-to-date: Yes  Diabetic retinal exam up-to-date: Yes     Diabetes complications:nephropathy, retinopathy, and cardiovascular disease    04/27/23   Latisha Chang is a 69 y.o. male patient who presents to clinic today for his diabetes. he has a history of CAD, CHF, nephropathy, retinopathy which contributes to his diabetes.  At previous visit cgm ordered

## 2023-05-08 ENCOUNTER — CLINICAL DOCUMENTATION ONLY (OUTPATIENT)
Facility: CLINIC | Age: 78
End: 2023-05-08

## 2023-05-25 ENCOUNTER — OFFICE VISIT (OUTPATIENT)
Dept: DIABETES SERVICES | Age: 78
End: 2023-05-25
Payer: MEDICARE

## 2023-05-25 VITALS
DIASTOLIC BLOOD PRESSURE: 60 MMHG | SYSTOLIC BLOOD PRESSURE: 132 MMHG | BODY MASS INDEX: 23.14 KG/M2 | WEIGHT: 144 LBS | HEIGHT: 66 IN | RESPIRATION RATE: 16 BRPM | HEART RATE: 48 BPM

## 2023-05-25 DIAGNOSIS — I10 ESSENTIAL HYPERTENSION: ICD-10-CM

## 2023-05-25 DIAGNOSIS — E11.22 TYPE 2 DIABETES MELLITUS WITH STAGE 3A CHRONIC KIDNEY DISEASE, WITHOUT LONG-TERM CURRENT USE OF INSULIN (HCC): Primary | ICD-10-CM

## 2023-05-25 DIAGNOSIS — E78.2 MIXED HYPERLIPIDEMIA: ICD-10-CM

## 2023-05-25 DIAGNOSIS — N18.31 TYPE 2 DIABETES MELLITUS WITH STAGE 3A CHRONIC KIDNEY DISEASE, WITHOUT LONG-TERM CURRENT USE OF INSULIN (HCC): Primary | ICD-10-CM

## 2023-05-25 PROCEDURE — 99213 OFFICE O/P EST LOW 20 MIN: CPT

## 2023-05-25 PROCEDURE — G8427 DOCREV CUR MEDS BY ELIG CLIN: HCPCS | Performed by: NURSE PRACTITIONER

## 2023-05-25 PROCEDURE — 3078F DIAST BP <80 MM HG: CPT | Performed by: NURSE PRACTITIONER

## 2023-05-25 PROCEDURE — G8420 CALC BMI NORM PARAMETERS: HCPCS | Performed by: NURSE PRACTITIONER

## 2023-05-25 PROCEDURE — 1036F TOBACCO NON-USER: CPT | Performed by: NURSE PRACTITIONER

## 2023-05-25 PROCEDURE — 3052F HG A1C>EQUAL 8.0%<EQUAL 9.0%: CPT | Performed by: NURSE PRACTITIONER

## 2023-05-25 PROCEDURE — 99214 OFFICE O/P EST MOD 30 MIN: CPT | Performed by: NURSE PRACTITIONER

## 2023-05-25 PROCEDURE — 3074F SYST BP LT 130 MM HG: CPT | Performed by: NURSE PRACTITIONER

## 2023-05-25 PROCEDURE — 1123F ACP DISCUSS/DSCN MKR DOCD: CPT | Performed by: NURSE PRACTITIONER

## 2023-05-25 RX ORDER — SEMAGLUTIDE 1.34 MG/ML
0.5 INJECTION, SOLUTION SUBCUTANEOUS WEEKLY
Qty: 1 ADJUSTABLE DOSE PRE-FILLED PEN SYRINGE | Refills: 3 | Status: SHIPPED | OUTPATIENT
Start: 2023-05-25

## 2023-05-30 ASSESSMENT — ENCOUNTER SYMPTOMS
RESPIRATORY NEGATIVE: 1
SHORTNESS OF BREATH: 0
DIARRHEA: 0
ABDOMINAL PAIN: 0

## 2023-06-07 ENCOUNTER — HOSPITAL ENCOUNTER (OUTPATIENT)
Dept: OTHER | Age: 78
Discharge: HOME | End: 2023-06-07
Payer: MEDICARE

## 2023-06-07 VITALS
BODY MASS INDEX: 23.11 KG/M2 | DIASTOLIC BLOOD PRESSURE: 60 MMHG | HEART RATE: 49 BPM | RESPIRATION RATE: 16 BRPM | OXYGEN SATURATION: 95 % | SYSTOLIC BLOOD PRESSURE: 124 MMHG | HEIGHT: 66 IN | WEIGHT: 143.8 LBS

## 2023-06-07 PROCEDURE — 99211 OFF/OP EST MAY X REQ PHY/QHP: CPT

## 2023-06-07 NOTE — PROGRESS NOTES
Patient here for CHF education visit. Reviewed CHF Zone Tool worksheet with patient who stated understanding. Patient continues to weigh self daily at home, stable per patient. Patient had recent visits with diabetes educator, diabetes medications have been changed. Patient is scheduled to see PCP on 6-20-23. Verbally reviewed importance of medication compliance with patient; patient verbalized understanding. Discussed 2000mg/day sodium restricted diet; patient verbalized understanding. Patient reports he is following low sodium diet. Moderate daily exercise encouraged as tolerated. Discussed rest breaks as needed; patient verbalized understanding. Patient continues to play pickle ball twice weekly; typically walks on days he doesn't play pickle ball, but walking has been on hold due to walking partner unavailable. Patient instructed to weigh self at the same time of each day, using same clothes and same scale; reinforced teaching to monitor for 2-3 lb weight increase over 1-2 days, and to notify the CHF clinic at [phone] or [phone] or physician office if weight change noted. Patient verbalized understanding. Signs and symptoms of CHF discussed with patient, such as feeling more tired than normal, feeling short of breath, coughing that increases when you lie down, sudden weight gain, swelling of your feet, legs or belly. Patient verbalized understanding to notify the CHF clinic at [phone] or [phone] or physician office if these symptoms occur. Compliance with plan of care and further disease process causes discussed with patient, patient encouraged to keep all follow up appointments. Patient verbalized understanding.

## 2023-06-20 ENCOUNTER — OFFICE VISIT (OUTPATIENT)
Dept: INTERNAL MEDICINE | Age: 78
End: 2023-06-20
Payer: MEDICARE

## 2023-06-20 VITALS
SYSTOLIC BLOOD PRESSURE: 126 MMHG | BODY MASS INDEX: 23.01 KG/M2 | WEIGHT: 143.2 LBS | HEART RATE: 60 BPM | HEIGHT: 66 IN | DIASTOLIC BLOOD PRESSURE: 70 MMHG

## 2023-06-20 DIAGNOSIS — I65.29 STENOSIS OF CAROTID ARTERY, UNSPECIFIED LATERALITY: ICD-10-CM

## 2023-06-20 DIAGNOSIS — E78.5 DYSLIPIDEMIA: ICD-10-CM

## 2023-06-20 DIAGNOSIS — I35.0 AORTIC STENOSIS, SEVERE: ICD-10-CM

## 2023-06-20 DIAGNOSIS — Z00.00 MEDICARE ANNUAL WELLNESS VISIT, SUBSEQUENT: Primary | ICD-10-CM

## 2023-06-20 DIAGNOSIS — Z95.2 S/P TAVR (TRANSCATHETER AORTIC VALVE REPLACEMENT): ICD-10-CM

## 2023-06-20 DIAGNOSIS — E55.9 VITAMIN D DEFICIENCY: ICD-10-CM

## 2023-06-20 DIAGNOSIS — I34.0 NON-RHEUMATIC MITRAL REGURGITATION: ICD-10-CM

## 2023-06-20 DIAGNOSIS — E27.8 ADRENAL CYST (HCC): ICD-10-CM

## 2023-06-20 DIAGNOSIS — I73.9 PERIPHERAL ARTERIAL DISEASE (HCC): ICD-10-CM

## 2023-06-20 DIAGNOSIS — I25.10 CORONARY ARTERY DISEASE INVOLVING NATIVE CORONARY ARTERY OF NATIVE HEART WITHOUT ANGINA PECTORIS: ICD-10-CM

## 2023-06-20 DIAGNOSIS — I10 ESSENTIAL HYPERTENSION: ICD-10-CM

## 2023-06-20 DIAGNOSIS — E11.3219 CONTROLLED TYPE 2 DIABETES MELLITUS WITH MILD NONPROLIFERATIVE RETINOPATHY AND MACULAR EDEMA, WITHOUT LONG-TERM CURRENT USE OF INSULIN, UNSPECIFIED LATERALITY (HCC): ICD-10-CM

## 2023-06-20 DIAGNOSIS — R91.1 LUNG NODULE: ICD-10-CM

## 2023-06-20 DIAGNOSIS — I50.22 CHF NYHA CLASS I, CHRONIC, SYSTOLIC (HCC): ICD-10-CM

## 2023-06-20 DIAGNOSIS — E11.22 TYPE 2 DIABETES MELLITUS WITH DIABETIC CHRONIC KIDNEY DISEASE, UNSPECIFIED CKD STAGE, UNSPECIFIED WHETHER LONG TERM INSULIN USE (HCC): ICD-10-CM

## 2023-06-20 DIAGNOSIS — N18.31 CHRONIC KIDNEY DISEASE, STAGE 3A (HCC): ICD-10-CM

## 2023-06-20 PROCEDURE — 3051F HG A1C>EQUAL 7.0%<8.0%: CPT | Performed by: NURSE PRACTITIONER

## 2023-06-20 PROCEDURE — 3078F DIAST BP <80 MM HG: CPT | Performed by: NURSE PRACTITIONER

## 2023-06-20 PROCEDURE — 1123F ACP DISCUSS/DSCN MKR DOCD: CPT | Performed by: NURSE PRACTITIONER

## 2023-06-20 PROCEDURE — 1036F TOBACCO NON-USER: CPT | Performed by: NURSE PRACTITIONER

## 2023-06-20 PROCEDURE — 99214 OFFICE O/P EST MOD 30 MIN: CPT | Performed by: NURSE PRACTITIONER

## 2023-06-20 PROCEDURE — 99213 OFFICE O/P EST LOW 20 MIN: CPT | Performed by: NURSE PRACTITIONER

## 2023-06-20 PROCEDURE — G8420 CALC BMI NORM PARAMETERS: HCPCS | Performed by: NURSE PRACTITIONER

## 2023-06-20 PROCEDURE — G0439 PPPS, SUBSEQ VISIT: HCPCS | Performed by: NURSE PRACTITIONER

## 2023-06-20 PROCEDURE — 3074F SYST BP LT 130 MM HG: CPT | Performed by: NURSE PRACTITIONER

## 2023-06-20 PROCEDURE — G8427 DOCREV CUR MEDS BY ELIG CLIN: HCPCS | Performed by: NURSE PRACTITIONER

## 2023-06-20 SDOH — ECONOMIC STABILITY: FOOD INSECURITY: WITHIN THE PAST 12 MONTHS, YOU WORRIED THAT YOUR FOOD WOULD RUN OUT BEFORE YOU GOT MONEY TO BUY MORE.: NEVER TRUE

## 2023-06-20 SDOH — ECONOMIC STABILITY: FOOD INSECURITY: WITHIN THE PAST 12 MONTHS, THE FOOD YOU BOUGHT JUST DIDN'T LAST AND YOU DIDN'T HAVE MONEY TO GET MORE.: NEVER TRUE

## 2023-06-20 SDOH — ECONOMIC STABILITY: INCOME INSECURITY: HOW HARD IS IT FOR YOU TO PAY FOR THE VERY BASICS LIKE FOOD, HOUSING, MEDICAL CARE, AND HEATING?: NOT HARD AT ALL

## 2023-06-20 ASSESSMENT — PATIENT HEALTH QUESTIONNAIRE - PHQ9
SUM OF ALL RESPONSES TO PHQ QUESTIONS 1-9: 0
1. LITTLE INTEREST OR PLEASURE IN DOING THINGS: 0
SUM OF ALL RESPONSES TO PHQ QUESTIONS 1-9: 0
2. FEELING DOWN, DEPRESSED OR HOPELESS: 0
SUM OF ALL RESPONSES TO PHQ9 QUESTIONS 1 & 2: 0
SUM OF ALL RESPONSES TO PHQ QUESTIONS 1-9: 0
SUM OF ALL RESPONSES TO PHQ QUESTIONS 1-9: 0

## 2023-06-20 NOTE — PATIENT INSTRUCTIONS
Personalized Preventive Plan for Renetta Rao - 6/20/2023  Medicare offers a range of preventive health benefits. Some of the tests and screenings are paid in full while other may be subject to a deductible, co-insurance, and/or copay. Some of these benefits include a comprehensive review of your medical history including lifestyle, illnesses that may run in your family, and various assessments and screenings as appropriate. After reviewing your medical record and screening and assessments performed today your provider may have ordered immunizations, labs, imaging, and/or referrals for you. A list of these orders (if applicable) as well as your Preventive Care list are included within your After Visit Summary for your review. Other Preventive Recommendations:    A preventive eye exam performed by an eye specialist is recommended every 1-2 years to screen for glaucoma; cataracts, macular degeneration, and other eye disorders. A preventive dental visit is recommended every 6 months. Try to get at least 150 minutes of exercise per week or 10,000 steps per day on a pedometer . Order or download the FREE \"Exercise & Physical Activity: Your Everyday Guide\" from [employer]. Call 2-352.196.2816 or search [employer] online. You need 6215-9079 mg of calcium and 8942-8964 IU of vitamin D per day. It is possible to meet your calcium requirement with diet alone, but a vitamin D supplement is usually necessary to meet this goal.  When exposed to the sun, use a sunscreen that protects against both UVA and UVB radiation with an SPF of 30 or greater. Reapply every 2 to 3 hours or after sweating, drying off with a towel, or swimming. Always wear a seat belt when traveling in a car. Always wear a helmet when riding a bicycle or motorcycle.

## 2023-06-21 NOTE — PROGRESS NOTES
Medicare Annual Wellness Visit    Miranda Daniels is here for Medicare AWV and Diabetes (4 month follow up)    Assessment & Plan     Recommendations for Preventive Services Due: see orders and patient instructions/AVS.  Recommended screening schedule for the next 5-10 years is provided to the patient in written form: see Patient Instructions/AVS.     No follow-ups on file. Subjective       Patient's complete Health Risk Assessment and screening values have been reviewed and are found in Flowsheets. The following problems were reviewed today and where indicated follow up appointments were made and/or referrals ordered. Positive Risk Factor Screenings with Interventions:                           N/A            Objective   Vitals:    06/20/23 1342   BP: 126/70   Site: Left Upper Arm   Position: Sitting   Cuff Size: Large Adult   Pulse: 60   Weight: 143 lb 3.2 oz (65 kg)   Height: 5' 6\" (1.676 m)      Body mass index is 23.11 kg/m². Allergies   Allergen Reactions    Atorvastatin Other (See Comments)     Muscle pain (myalgias)    Sulfa Antibiotics Swelling     Swelling of retinas    Sulfanilamide Other (See Comments)     Swelling of retinas     Prior to Visit Medications    Medication Sig Taking?  Authorizing Provider   Semaglutide,0.25 or 0.5MG/DOS, (OZEMPIC, 0.25 OR 0.5 MG/DOSE,) 2 MG/1.5ML SOPN Inject 0.5 mg into the skin once a week Yes ESTHER Stephenson CNP   metFORMIN (GLUCOPHAGE) 1000 MG tablet Take 1 tablet by mouth 2 times daily (with meals) Yes ESTHER Stinson CNP   furosemide (LASIX) 40 MG tablet Take 1 tablet by mouth daily If weight gain more than 2 pounds or sudden increase in shortness of breath or cough take an additional 40 mg tablet and call physician  Patient taking differently: Take 0.5 tablets by mouth daily If weight gain more than 2 pounds or sudden increase in shortness of breath or cough take an additional 40 mg tablet and call physician Yes [place]
hypertension  Stable on current antihypertensive regimen, continue to monitor    8. Coronary artery disease involving native coronary artery of native heart without angina pectoris  Stable follows with cardiology. Continues on aspirin, Plavix, Coreg, Jardiance  - Comprehensive Metabolic Panel; Future  - Lipid Panel; Future    9. Aortic stenosis, severe  10. S/P TAVR (transcatheter aortic valve replacement)  11. Non-rheumatic mitral regurgitation  Ongoing follow-up with cardiology in place    12. Peripheral arterial disease (HCC)  Stable at present, continues on statin Plavix aspirin with good blood pressure control    13. Dyslipidemia  Continues on Zocor    14. Vitamin D deficiency  Stable on supplemental vitamin D    15. Lung nodule  Last CT noted as benign in nature    16. Stenosis of carotid artery, unspecified laterality  Most recent ultrasound in May 2022 showed 0- 50% bilateral.      Plan:  As noted above. Follow up for routine visit. Call sooner with concerns prior.     Electronically signed by ESTHER Dickson CNP on 6/20/2023 at 9:47 PM

## 2023-06-22 ASSESSMENT — ENCOUNTER SYMPTOMS
CHEST TIGHTNESS: 0
TROUBLE SWALLOWING: 0
BLOOD IN STOOL: 0
COUGH: 0
SORE THROAT: 0
DIARRHEA: 0
ABDOMINAL PAIN: 0
SINUS PRESSURE: 0
FACIAL SWELLING: 0
NAUSEA: 0
CONSTIPATION: 0
COLOR CHANGE: 0
WHEEZING: 0
RHINORRHEA: 0
SHORTNESS OF BREATH: 0
VOMITING: 0
EYE PAIN: 0

## 2023-07-27 ENCOUNTER — HOSPITAL ENCOUNTER (OUTPATIENT)
Dept: OTHER | Age: 78
Discharge: HOME | End: 2023-07-27
Payer: MEDICARE

## 2023-07-27 VITALS
BODY MASS INDEX: 23.66 KG/M2 | SYSTOLIC BLOOD PRESSURE: 162 MMHG | OXYGEN SATURATION: 99 % | HEART RATE: 50 BPM | DIASTOLIC BLOOD PRESSURE: 70 MMHG | HEIGHT: 66 IN | RESPIRATION RATE: 18 BRPM | WEIGHT: 147.2 LBS

## 2023-07-27 PROCEDURE — 99211 OFF/OP EST MAY X REQ PHY/QHP: CPT

## 2023-07-27 NOTE — PROGRESS NOTES
Patient here for CHF education visit. Reviewed CHF Zone tool worksheet with patient who verbalized understanding. Patient continues to weigh self at home. No edema of bilateral lower extremities noted. Patient had recent visit with PCP Aguila Manning CNP and is scheduled to see cardiology on 8-8-23. Verbally reviewed importance of medication compliance with patient; patient verbalized understanding. Discussed 2000mg/day sodium restricted diet; patient verbalized understanding. Patient states he is watching sodium intake and doesn't add salt to foods. Moderate daily exercise encouraged as tolerated. Discussed rest breaks as needed; patient verbalized understanding. Patient continues to play pickleball at least 2 times per week and states he also walks in addition to that sometimes, not as much as he used to though. Patient instructed to weigh self at the same time of each day, using same clothes and same scale; reinforced teaching to monitor for 2-3 lb weight increase over 1-2 days, and to notify the CHF clinic at [phone] or [phone] or physician office if weight change noted. Patient verbalized understanding. Signs and symptoms of CHF discussed with patient, such as feeling more tired than normal, feeling short of breath, coughing that increases when you lie down, sudden weight gain, swelling of your feet, legs or belly. Patient verbalized understanding to notify the CHF clinic at [phone] or [phone] or physician office if these symptoms occur. Compliance with plan of care and further disease process causes discussed with patient, patient encouraged to keep all follow up appointments. Patient verbalized understanding.

## 2023-08-08 ENCOUNTER — OFFICE VISIT (OUTPATIENT)
Dept: CARDIOLOGY | Age: 78
End: 2023-08-08
Payer: MEDICARE

## 2023-08-08 VITALS
BODY MASS INDEX: 23.56 KG/M2 | HEART RATE: 50 BPM | SYSTOLIC BLOOD PRESSURE: 132 MMHG | WEIGHT: 146.6 LBS | HEIGHT: 66 IN | DIASTOLIC BLOOD PRESSURE: 74 MMHG

## 2023-08-08 DIAGNOSIS — I25.10 CORONARY ARTERY DISEASE INVOLVING NATIVE CORONARY ARTERY OF NATIVE HEART WITHOUT ANGINA PECTORIS: Primary | ICD-10-CM

## 2023-08-08 PROCEDURE — 99214 OFFICE O/P EST MOD 30 MIN: CPT | Performed by: INTERNAL MEDICINE

## 2023-08-08 PROCEDURE — 99212 OFFICE O/P EST SF 10 MIN: CPT | Performed by: INTERNAL MEDICINE

## 2023-08-08 PROCEDURE — G8420 CALC BMI NORM PARAMETERS: HCPCS | Performed by: INTERNAL MEDICINE

## 2023-08-08 PROCEDURE — 93005 ELECTROCARDIOGRAM TRACING: CPT | Performed by: INTERNAL MEDICINE

## 2023-08-08 PROCEDURE — 3075F SYST BP GE 130 - 139MM HG: CPT | Performed by: INTERNAL MEDICINE

## 2023-08-08 PROCEDURE — 1036F TOBACCO NON-USER: CPT | Performed by: INTERNAL MEDICINE

## 2023-08-08 PROCEDURE — 1123F ACP DISCUSS/DSCN MKR DOCD: CPT | Performed by: INTERNAL MEDICINE

## 2023-08-08 PROCEDURE — G8427 DOCREV CUR MEDS BY ELIG CLIN: HCPCS | Performed by: INTERNAL MEDICINE

## 2023-08-08 PROCEDURE — 93010 ELECTROCARDIOGRAM REPORT: CPT | Performed by: INTERNAL MEDICINE

## 2023-08-08 PROCEDURE — 3078F DIAST BP <80 MM HG: CPT | Performed by: INTERNAL MEDICINE

## 2023-08-08 NOTE — PROGRESS NOTES
[place] [address] [address]  Dept: [phone]    CC: CAD , S/P STENTS LAD , D1 ,OM ,AS , TAVR HLP    Subjective: The patient is a 69y.o. year old, , male is in the office for a follow up visit. Patient is doing quit well from cardiac stand point. He denies any chest pain or discomfort. No orthopnea or PND. Denies any palpitation, dizziness or syncope. He is completely asymptomatic from cardiac stand point. Past Medical History:   has a past medical history of Acne rosacea, Anemia, Aortic stenosis, BPH (benign prostatic hypertrophy), CAD (coronary artery disease), Carotid stenosis, Cataract of both eyes, CRI (chronic renal insufficiency), Diabetes mellitus, type 2 ([address]), Dyslipidemia, Erectile dysfunction, H/O agent Orange exposure, Hearing loss, Hyperlipidemia, Hypertension, Mild nonproliferative diabetic retinopathy ([address]), Non-rheumatic mitral regurgitation, Pneumonia, Pneumonia of right lower lobe due to infectious organism, RBBB, Sepsis ([address]), 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 Past Surgical History:   has a past surgical history that includes Circumcision (age 49); Vasectomy; Tympanostomy tube placement; Cataract removal (Bilateral); eye surgery (Bilateral); Cardiac catheterization (12/03/2019); Aortic valve surgery (01/2020); Cardiac catheterization (12/27/2021); Umbilical hernia repair (N/A, 5/16/2022); and hernia repair (05/16/2022). Home Medications:  Prior to Admission medications    Medication Sig Start Date End Date Taking?  Authorizing Provider   Semaglutide,0.25 or 0.5MG/DOS, (OZEMPIC, 0.25 OR 0.5 MG/DOSE,) 2 MG/1.5ML SOPN Inject 0.5 mg into the skin once a week 5/25/23  Yes ESTHER Marte CNP   metFORMIN (GLUCOPHAGE) 1000 MG tablet Take 1 tablet by mouth 2 times

## 2023-08-24 ENCOUNTER — HOSPITAL ENCOUNTER (OUTPATIENT)
Dept: OTHER | Age: 78
Discharge: HOME | End: 2023-08-24
Payer: MEDICARE

## 2023-08-24 VITALS
BODY MASS INDEX: 23.33 KG/M2 | RESPIRATION RATE: 18 BRPM | OXYGEN SATURATION: 97 % | WEIGHT: 145.2 LBS | DIASTOLIC BLOOD PRESSURE: 76 MMHG | HEIGHT: 66 IN | SYSTOLIC BLOOD PRESSURE: 144 MMHG | HEART RATE: 50 BPM

## 2023-08-24 PROCEDURE — 99211 OFF/OP EST MAY X REQ PHY/QHP: CPT

## 2023-08-24 NOTE — PROGRESS NOTES
Patient here for CHF education visit. Reviewed CHF Zone tool worksheet with patient who stated understanding of material reviewed. Patient had recent visit with cardiology and is scheduled to see diabetic educator in September and PCP in December. Patient also follows at the [facility]. Verbally reviewed importance of medication compliance with patient; patient verbalized understanding. Discussed 2000mg/day sodium restricted diet; patient verbalized understanding. Patient states he does not have a large amount of sodium in his diet. Moderate daily exercise encouraged as tolerated. Discussed rest breaks as needed; patient verbalized understanding. Patient remains active, he walks at least 2 days a week and also plays [employer] ball at twice weekly. Patient instructed to weigh self at the same time of each day, using same clothes and same scale; reinforced teaching to monitor for 2-3 lb weight increase over 1-2 days, and to notify the CHF clinic at [phone] or [phone] or physician office if weight change noted. Patient verbalized understanding. Signs and symptoms of CHF discussed with patient, such as feeling more tired than normal, feeling short of breath, coughing that increases when you lie down, sudden weight gain, swelling of your feet, legs or belly. Patient verbalized understanding to notify the CHF clinic at [phone] or [phone] or physician office if these symptoms occur. Compliance with plan of care and further disease process causes discussed with patient, patient encouraged to keep all follow up appointments. Patient verbalized understanding.

## 2023-09-25 ENCOUNTER — OFFICE VISIT (OUTPATIENT)
Dept: PRIMARY CARE CLINIC | Age: 78
End: 2023-09-25
Payer: MEDICARE

## 2023-09-25 VITALS
TEMPERATURE: 98.3 F | BODY MASS INDEX: 23.89 KG/M2 | HEART RATE: 46 BPM | WEIGHT: 148 LBS | OXYGEN SATURATION: 95 % | DIASTOLIC BLOOD PRESSURE: 82 MMHG | SYSTOLIC BLOOD PRESSURE: 138 MMHG

## 2023-09-25 DIAGNOSIS — J40 BRONCHITIS: Primary | ICD-10-CM

## 2023-09-25 PROCEDURE — 1123F ACP DISCUSS/DSCN MKR DOCD: CPT | Performed by: STUDENT IN AN ORGANIZED HEALTH CARE EDUCATION/TRAINING PROGRAM

## 2023-09-25 PROCEDURE — 99213 OFFICE O/P EST LOW 20 MIN: CPT | Performed by: STUDENT IN AN ORGANIZED HEALTH CARE EDUCATION/TRAINING PROGRAM

## 2023-09-25 PROCEDURE — 3079F DIAST BP 80-89 MM HG: CPT | Performed by: STUDENT IN AN ORGANIZED HEALTH CARE EDUCATION/TRAINING PROGRAM

## 2023-09-25 PROCEDURE — 99214 OFFICE O/P EST MOD 30 MIN: CPT | Performed by: STUDENT IN AN ORGANIZED HEALTH CARE EDUCATION/TRAINING PROGRAM

## 2023-09-25 PROCEDURE — G8428 CUR MEDS NOT DOCUMENT: HCPCS | Performed by: STUDENT IN AN ORGANIZED HEALTH CARE EDUCATION/TRAINING PROGRAM

## 2023-09-25 PROCEDURE — 3075F SYST BP GE 130 - 139MM HG: CPT | Performed by: STUDENT IN AN ORGANIZED HEALTH CARE EDUCATION/TRAINING PROGRAM

## 2023-09-25 PROCEDURE — G8420 CALC BMI NORM PARAMETERS: HCPCS | Performed by: STUDENT IN AN ORGANIZED HEALTH CARE EDUCATION/TRAINING PROGRAM

## 2023-09-25 PROCEDURE — 1036F TOBACCO NON-USER: CPT | Performed by: STUDENT IN AN ORGANIZED HEALTH CARE EDUCATION/TRAINING PROGRAM

## 2023-09-25 RX ORDER — CEFDINIR 300 MG/1
300 CAPSULE ORAL 2 TIMES DAILY
Qty: 20 CAPSULE | Refills: 0 | Status: SHIPPED | OUTPATIENT
Start: 2023-09-25 | End: 2023-10-05

## 2023-09-25 ASSESSMENT — ENCOUNTER SYMPTOMS
VOMITING: 0
EYE PAIN: 0
SHORTNESS OF BREATH: 0
ABDOMINAL PAIN: 0
DIARRHEA: 0
CONSTIPATION: 0
BLOOD IN STOOL: 0
NAUSEA: 0
TROUBLE SWALLOWING: 0
COUGH: 1

## 2023-09-25 ASSESSMENT — PATIENT HEALTH QUESTIONNAIRE - PHQ9
2. FEELING DOWN, DEPRESSED OR HOPELESS: 0
SUM OF ALL RESPONSES TO PHQ QUESTIONS 1-9: 0
SUM OF ALL RESPONSES TO PHQ QUESTIONS 1-9: 0
1. LITTLE INTEREST OR PLEASURE IN DOING THINGS: 0
SUM OF ALL RESPONSES TO PHQ QUESTIONS 1-9: 0
SUM OF ALL RESPONSES TO PHQ QUESTIONS 1-9: 0
SUM OF ALL RESPONSES TO PHQ9 QUESTIONS 1 & 2: 0

## 2023-09-25 NOTE — PROGRESS NOTES
[facility]             [address] [facility] [facility]                        Telephone [phone]             Fax [phone]       Yesenia Swift  :  1945  Age:  69 y.o. MRN:  [id number]  Date of visit:  2023     Assessment and Plan:    1. Bronchitis  Likely bronchitis no clear signs of acute CHF exacerbation at this time. We will trial cefdinir twice daily for total of 10 days. Recommend return to the urgent care if symptoms worsen or fail to improve. Can use over-the-counter cough medicines as needed. - cefdinir (OMNICEF) 300 MG capsule; Take 1 capsule by mouth 2 times daily for 10 days  Dispense: 20 capsule; Refill: 0      Subjective:    Yesenia Swift is a 69 y.o. male who presents to [facility] today (2023) for evaluation of:  Cough (Had cold for few days 2 weeks ago, cough had lingered )    Patient is a 81-year-old male who presents to the urgent care today for evaluation of 2-week history of cough. States that 2 weeks ago he had a cold for a few days and states that his cough has lingered since then. States he has a history of congestive heart failure and wanted to make sure that he was not having any possible pneumonia as he frequently will get pneumonia with his CHF. He states that he for the most part does not feel too bad other than his cough.   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

## 2023-09-27 ENCOUNTER — HOSPITAL ENCOUNTER (OUTPATIENT)
Age: 78
Discharge: HOME | End: 2023-09-27
Payer: MEDICARE

## 2023-09-27 DIAGNOSIS — E11.22 TYPE 2 DIABETES MELLITUS WITH DIABETIC CHRONIC KIDNEY DISEASE, UNSPECIFIED CKD STAGE, UNSPECIFIED WHETHER LONG TERM INSULIN USE (HCC): ICD-10-CM

## 2023-09-27 DIAGNOSIS — I25.10 CORONARY ARTERY DISEASE INVOLVING NATIVE CORONARY ARTERY OF NATIVE HEART WITHOUT ANGINA PECTORIS: ICD-10-CM

## 2023-09-27 DIAGNOSIS — E11.3219 CONTROLLED TYPE 2 DIABETES MELLITUS WITH MILD NONPROLIFERATIVE RETINOPATHY AND MACULAR EDEMA, WITHOUT LONG-TERM CURRENT USE OF INSULIN, UNSPECIFIED LATERALITY (HCC): ICD-10-CM

## 2023-09-27 LAB
ALBUMIN SERPL-MCNC: 3.8 G/DL (ref 3.5–5.2)
ALBUMIN/GLOB SERPL: 1.2 {RATIO} (ref 1–2.5)
ALP SERPL-CCNC: 100 U/L (ref 40–129)
ALT SERPL-CCNC: 12 U/L (ref 5–41)
ANION GAP SERPL CALCULATED.3IONS-SCNC: 9 MMOL/L (ref 9–17)
AST SERPL-CCNC: 15 U/L
BILIRUB SERPL-MCNC: 0.3 MG/DL (ref 0.3–1.2)
BUN SERPL-MCNC: 29 MG/DL (ref 8–23)
BUN/CREAT SERPL: 21 (ref 9–20)
CALCIUM SERPL-MCNC: 9.7 MG/DL (ref 8.6–10.4)
CHLORIDE SERPL-SCNC: 102 MMOL/L (ref 98–107)
CHOLEST SERPL-MCNC: 173 MG/DL
CHOLESTEROL/HDL RATIO: 4.8
CO2 SERPL-SCNC: 27 MMOL/L (ref 20–31)
CREAT SERPL-MCNC: 1.4 MG/DL (ref 0.7–1.2)
GFR SERPL CREATININE-BSD FRML MDRD: 52 ML/MIN/1.73M2
GLUCOSE SERPL-MCNC: 154 MG/DL (ref 70–99)
HDLC SERPL-MCNC: 36 MG/DL
LDLC SERPL CALC-MCNC: 102 MG/DL (ref 0–130)
POTASSIUM SERPL-SCNC: 4.8 MMOL/L (ref 3.7–5.3)
PROT SERPL-MCNC: 7 G/DL (ref 6.4–8.3)
SODIUM SERPL-SCNC: 138 MMOL/L (ref 135–144)
TRIGL SERPL-MCNC: 175 MG/DL

## 2023-09-27 PROCEDURE — 36415 COLL VENOUS BLD VENIPUNCTURE: CPT

## 2023-09-27 PROCEDURE — 83036 HEMOGLOBIN GLYCOSYLATED A1C: CPT

## 2023-09-27 PROCEDURE — 80061 LIPID PANEL: CPT

## 2023-09-27 PROCEDURE — 80053 COMPREHEN METABOLIC PANEL: CPT

## 2023-09-28 ENCOUNTER — OFFICE VISIT (OUTPATIENT)
Dept: DIABETES SERVICES | Age: 78
End: 2023-09-28
Payer: MEDICARE

## 2023-09-28 VITALS
HEART RATE: 50 BPM | HEIGHT: 66 IN | DIASTOLIC BLOOD PRESSURE: 76 MMHG | BODY MASS INDEX: 23.78 KG/M2 | OXYGEN SATURATION: 97 % | WEIGHT: 148 LBS | SYSTOLIC BLOOD PRESSURE: 138 MMHG

## 2023-09-28 DIAGNOSIS — E78.2 MIXED HYPERLIPIDEMIA: ICD-10-CM

## 2023-09-28 DIAGNOSIS — I10 ESSENTIAL HYPERTENSION: ICD-10-CM

## 2023-09-28 DIAGNOSIS — N18.31 TYPE 2 DIABETES MELLITUS WITH STAGE 3A CHRONIC KIDNEY DISEASE, WITHOUT LONG-TERM CURRENT USE OF INSULIN (HCC): Primary | ICD-10-CM

## 2023-09-28 DIAGNOSIS — E11.22 TYPE 2 DIABETES MELLITUS WITH STAGE 3A CHRONIC KIDNEY DISEASE, WITHOUT LONG-TERM CURRENT USE OF INSULIN (HCC): Primary | ICD-10-CM

## 2023-09-28 LAB
EST. AVERAGE GLUCOSE BLD GHB EST-MCNC: 157 MG/DL
HBA1C MFR BLD: 7.1 % (ref 4–6)

## 2023-09-28 PROCEDURE — G8420 CALC BMI NORM PARAMETERS: HCPCS | Performed by: NURSE PRACTITIONER

## 2023-09-28 PROCEDURE — 3078F DIAST BP <80 MM HG: CPT | Performed by: NURSE PRACTITIONER

## 2023-09-28 PROCEDURE — 99212 OFFICE O/P EST SF 10 MIN: CPT | Performed by: NURSE PRACTITIONER

## 2023-09-28 PROCEDURE — 95251 CONT GLUC MNTR ANALYSIS I&R: CPT | Performed by: NURSE PRACTITIONER

## 2023-09-28 PROCEDURE — 3051F HG A1C>EQUAL 7.0%<8.0%: CPT | Performed by: NURSE PRACTITIONER

## 2023-09-28 PROCEDURE — 1036F TOBACCO NON-USER: CPT | Performed by: NURSE PRACTITIONER

## 2023-09-28 PROCEDURE — 3075F SYST BP GE 130 - 139MM HG: CPT | Performed by: NURSE PRACTITIONER

## 2023-09-28 PROCEDURE — G8427 DOCREV CUR MEDS BY ELIG CLIN: HCPCS | Performed by: NURSE PRACTITIONER

## 2023-09-28 PROCEDURE — 99214 OFFICE O/P EST MOD 30 MIN: CPT | Performed by: NURSE PRACTITIONER

## 2023-09-28 PROCEDURE — 1123F ACP DISCUSS/DSCN MKR DOCD: CPT | Performed by: NURSE PRACTITIONER

## 2023-09-28 ASSESSMENT — ENCOUNTER SYMPTOMS
DIARRHEA: 0
RESPIRATORY NEGATIVE: 1
SHORTNESS OF BREATH: 0
ABDOMINAL PAIN: 0

## 2023-09-28 NOTE — PROGRESS NOTES
of diet, exercise, medication, complication avoidance, reviewed the signs and symptoms of diabetes, hypoglycemic episodes, significance of HbA1C.     - Basic Metabolic Panel; Future  - Hemoglobin A1C; Future  - Microalbumin, Ur; Future    2. Mixed hyperlipidemia  stable, lipid panel reviewed, continue current medications. Diet and exercise. 3. Essential hypertension   stable, continue current medications. Diet and exercise Seek emergent care if chest pain develops. Answered all patient questions. Agrees to follow plan of care and to follow up in 3 months, sooner if needed. Call office if unexplained blood sugars less than 70 occur or above 400. Call office or access [employer] with any further questions or concerns. Be sure to bring glucometer/food log at next appointment. Total time spent reviewing chart, labs, counseling patient and documenting on the date of the encounter: 30 min.       Electronically signed by ESTHER Mcmanus CNP on 9/28/2023 at 1:46 PM      (Please note that portions of this note were completed with a voice-recognition program. Efforts were made to edit the dictation but occasionally words are mis-transcribed.)

## 2023-10-05 ENCOUNTER — TELEPHONE (OUTPATIENT)
Dept: OTHER | Age: 78
End: 2023-10-05

## 2023-10-05 NOTE — TELEPHONE ENCOUNTER
Patient did not show for CHF education visit today. Called and left a message for patient to return call.

## 2023-10-12 ENCOUNTER — HOSPITAL ENCOUNTER (OUTPATIENT)
Dept: OTHER | Age: 78
Discharge: HOME | End: 2023-10-12
Payer: MEDICARE

## 2023-10-12 VITALS
BODY MASS INDEX: 24.27 KG/M2 | RESPIRATION RATE: 18 BRPM | OXYGEN SATURATION: 99 % | WEIGHT: 151 LBS | HEIGHT: 66 IN | SYSTOLIC BLOOD PRESSURE: 132 MMHG | DIASTOLIC BLOOD PRESSURE: 60 MMHG | HEART RATE: 57 BPM

## 2023-10-12 PROCEDURE — 99211 OFF/OP EST MAY X REQ PHY/QHP: CPT

## 2023-10-12 NOTE — PROGRESS NOTES
Patient and wife here for CHF Education visit. Reviewed CHF Zone tool worksheet with both who verbalized understanding. Patient continues to weigh self almost every day. Patient had recent visit with diabetic educator. He is scheduled to see PCP in December. Verbally reviewed importance of medication compliance with patient; patient verbalized understanding. Discussed 2000mg/day sodium restricted diet; patient verbalized understanding. Patient reports he is watching sodium intake; doesn't cook with or add salt to foods. Moderate daily exercise encouraged as tolerated. Discussed rest breaks as needed; patient verbalized understanding. Patient continues to play [employer] ball 2 times per week and walks 3 times per week. Patient instructed to weigh self at the same time of each day, using same clothes and same scale; reinforced teaching to monitor for 2-3 lb weight increase over 1-2 days, and to notify the CHF clinic at [phone] or [phone] or physician office if weight change noted. Patient verbalized understanding. Signs and symptoms of CHF discussed with patient, such as feeling more tired than normal, feeling short of breath, coughing that increases when you lie down, sudden weight gain, swelling of your feet, legs or belly. Patient verbalized understanding to notify the CHF clinic at [phone] or [phone] or physician office if these symptoms occur. Compliance with plan of care and further disease process causes discussed with patient, patient encouraged to keep all follow up appointments. Patient verbalized understanding.

## 2023-11-28 ENCOUNTER — HOSPITAL ENCOUNTER (OUTPATIENT)
Dept: OTHER | Age: 78
Discharge: HOME | End: 2023-11-28
Payer: MEDICARE

## 2023-11-28 VITALS
RESPIRATION RATE: 20 BRPM | WEIGHT: 148.6 LBS | OXYGEN SATURATION: 99 % | DIASTOLIC BLOOD PRESSURE: 82 MMHG | SYSTOLIC BLOOD PRESSURE: 154 MMHG | BODY MASS INDEX: 23.88 KG/M2 | HEART RATE: 51 BPM | HEIGHT: 66 IN

## 2023-11-28 PROCEDURE — 99211 OFF/OP EST MAY X REQ PHY/QHP: CPT

## 2023-11-28 NOTE — PROGRESS NOTES
Patient here for CHF education visit. Reviewed CHF Zone tool worksheet with patient who stated understanding. Patient reports he continues to weigh self at home, stable per patient. Patient is scheduled to see PCP on 12-20-23, diabetes educator on 1-3-24 and cardiology on 2-13-24. Verbally reviewed importance of medication compliance with patient; patient verbalized understanding. Discussed 2000mg/day sodium restricted diet; patient verbalized understanding. Patient continues to follow low sodium diet. He does not add salt to foods at the table. Moderate daily exercise encouraged as tolerated. Discussed rest breaks as needed; patient verbalized understanding. Patient remains active; playing pickleball 2-3 times per week and walking 3 times per week. Patient instructed to weigh self at the same time of each day, using same clothes and same scale; reinforced teaching to monitor for 2-3 lb weight increase over 1-2 days, and to notify the CHF clinic at [phone] or [phone] or physician office if weight change noted. Patient verbalized understanding. Signs and symptoms of CHF discussed with patient, such as feeling more tired than normal, feeling short of breath, coughing that increases when you lie down, sudden weight gain, swelling of your feet, legs or belly. Patient verbalized understanding to notify the CHF clinic at [phone] or [phone] or physician office if these symptoms occur. Compliance with plan of care and further disease process causes discussed with patient, patient encouraged to keep all follow up appointments. Patient verbalized understanding.

## 2024-01-03 ENCOUNTER — OFFICE VISIT (OUTPATIENT)
Dept: DIABETES SERVICES | Age: 79
End: 2024-01-03
Payer: MEDICARE

## 2024-01-03 VITALS
HEIGHT: 66 IN | WEIGHT: 146 LBS | DIASTOLIC BLOOD PRESSURE: 86 MMHG | SYSTOLIC BLOOD PRESSURE: 138 MMHG | BODY MASS INDEX: 23.46 KG/M2 | OXYGEN SATURATION: 92 % | HEART RATE: 67 BPM

## 2024-01-03 DIAGNOSIS — I10 ESSENTIAL HYPERTENSION: ICD-10-CM

## 2024-01-03 DIAGNOSIS — E11.22 TYPE 2 DIABETES MELLITUS WITH STAGE 3A CHRONIC KIDNEY DISEASE, WITHOUT LONG-TERM CURRENT USE OF INSULIN (HCC): Primary | ICD-10-CM

## 2024-01-03 DIAGNOSIS — N18.31 TYPE 2 DIABETES MELLITUS WITH STAGE 3A CHRONIC KIDNEY DISEASE, WITHOUT LONG-TERM CURRENT USE OF INSULIN (HCC): Primary | ICD-10-CM

## 2024-01-03 DIAGNOSIS — E78.2 MIXED HYPERLIPIDEMIA: ICD-10-CM

## 2024-01-03 PROCEDURE — 99212 OFFICE O/P EST SF 10 MIN: CPT | Performed by: NURSE PRACTITIONER

## 2024-01-03 ASSESSMENT — ENCOUNTER SYMPTOMS
SHORTNESS OF BREATH: 0
DIARRHEA: 0
RESPIRATORY NEGATIVE: 1
ABDOMINAL PAIN: 0

## 2024-01-03 NOTE — PROGRESS NOTES
[facility]  [address] [facility] [address] [phone]        HISTORY:    Jose Enrique Rodas presents today for evaluation and management of:  Chief Complaint   Patient presents with    Diabetes     3 month follow up       Patient Care Team:  Ruth Rhodes APRN - CNP as PCP - General (Nurse Practitioner)  Ruth Rhodes APRN - CNP as [facility]  Robbie Duckworth MD (Nephrology)  Rommel Cueva DO as Consulting Physician (Cardiology)  Manfred Hernandez MD (General Surgery)      Interval History:    Past DM Medications   Actos-therapy completed  Onglyza- therapy completed  Glipizide therapy completed  Rybelsus 7 mg- compliance     Current Diabetic Medications  Metformin 1000 mg bid  Jardiance 10 mg once daily   Ozempic 0.25 mg once weekly (does not tolerate 0.5 mg dose well)     DKA episodes: 0       09/28/23   Jose Enrique Rodas is a 78 y.o. male patient who presents to clinic today for his diabetes. he has a history of CAD, CHF, nephropathy, retinopathy which contributes to his diabetes. At previous visit rybelesus was changed to ozempic for better . he denies any current signs or symptoms of hyper/hypoglycemia. he states they are taking their medications as prescribed without any adverse effects.   Diet: regular  Exercise: walking pickleball  BS testing: uses cgm daily with success and is adherent to cgm therapy  Hypoglycemia: no  Hypoglycemia as needed treatment: snack  Issues:   Diabetic foot exam up-to-date: Yes  Diabetic retinal exam up-to-date: Yes     Diabetes complications:nephropathy, retinopathy, and cardiovascular disease    01/03/24   Jose Enrique Rodas is a 78 y.o. male patient who presents to clinic today for his diabetes. he has a history of CAD, CHF, nephropathy, retinopathy which contributes to his diabetes. At previous visit diabetes counseling was provided. he denies

## 2024-01-09 ENCOUNTER — HOSPITAL ENCOUNTER (OUTPATIENT)
Dept: OTHER | Age: 79
Discharge: HOME | End: 2024-01-09
Payer: MEDICARE

## 2024-01-09 VITALS
HEIGHT: 66 IN | WEIGHT: 147 LBS | BODY MASS INDEX: 23.63 KG/M2 | HEART RATE: 68 BPM | DIASTOLIC BLOOD PRESSURE: 62 MMHG | RESPIRATION RATE: 18 BRPM | SYSTOLIC BLOOD PRESSURE: 144 MMHG | OXYGEN SATURATION: 94 %

## 2024-01-09 PROCEDURE — 99211 OFF/OP EST MAY X REQ PHY/QHP: CPT

## 2024-01-09 NOTE — PROGRESS NOTES
Patient here for CHF education visit.  Reviewed CHF Zone tool worksheet with patient who stated understanding of material reviewed.  Patient had recent appointments with PCP and diabetic educator and is scheduled to see cardiologist in February.      Verbally reviewed importance of medication compliance with patient; patient verbalized understanding.    Discussed 2000mg/day sodium restricted diet; patient verbalized understanding.  Patient continues to follow low sodium diet.  He reports he does not add salt to foods when cooking or at the table.    Moderate daily exercise encouraged as tolerated. Discussed rest breaks as needed; patient verbalized understanding.  Patient remains active playing [employer] 2 days per week and also walks 2-3 miles 2-3 days per week.      Patient instructed to weigh self at the same time of each day, using same clothes and same scale; reinforced teaching to monitor for 2-3 lb weight increase over 1-2 days, and to notify the CHF clinic at [phone] or [phone] or physician office if weight change noted.  Patient verbalized understanding.    Signs and symptoms of CHF discussed with patient, such as feeling more tired than normal, feeling short of breath, coughing that increases when you lie down, sudden weight gain, swelling of your feet, legs or belly.  Patient verbalized understanding to notify the CHF clinic at [phone] or [phone] or physician office if these symptoms occur.    Compliance with plan of care and further disease process causes discussed with patient, patient encouraged to keep all follow up appointments.  Patient verbalized understanding.

## 2024-02-13 ENCOUNTER — OFFICE VISIT (OUTPATIENT)
Dept: CARDIOLOGY | Age: 79
End: 2024-02-13
Payer: MEDICARE

## 2024-02-13 VITALS
DIASTOLIC BLOOD PRESSURE: 62 MMHG | HEIGHT: 66 IN | BODY MASS INDEX: 23.46 KG/M2 | WEIGHT: 146 LBS | SYSTOLIC BLOOD PRESSURE: 132 MMHG | HEART RATE: 51 BPM

## 2024-02-13 DIAGNOSIS — E11.9 CONTROLLED TYPE 2 DIABETES MELLITUS WITHOUT COMPLICATION, UNSPECIFIED WHETHER LONG TERM INSULIN USE (HCC): ICD-10-CM

## 2024-02-13 DIAGNOSIS — I25.10 CORONARY ARTERY DISEASE INVOLVING NATIVE CORONARY ARTERY OF NATIVE HEART WITHOUT ANGINA PECTORIS: Primary | ICD-10-CM

## 2024-02-13 DIAGNOSIS — I21.4 NSTEMI (NON-ST ELEVATED MYOCARDIAL INFARCTION) (HCC): ICD-10-CM

## 2024-02-13 DIAGNOSIS — Z95.5 S/P CORONARY ARTERY STENT PLACEMENT: ICD-10-CM

## 2024-02-13 DIAGNOSIS — E78.00 PURE HYPERCHOLESTEROLEMIA: ICD-10-CM

## 2024-02-13 DIAGNOSIS — N17.9 AKI (ACUTE KIDNEY INJURY) (HCC): ICD-10-CM

## 2024-02-13 DIAGNOSIS — E66.09 CLASS 1 OBESITY DUE TO EXCESS CALORIES WITH SERIOUS COMORBIDITY IN ADULT, UNSPECIFIED BMI: ICD-10-CM

## 2024-02-13 DIAGNOSIS — I34.0 NON-RHEUMATIC MITRAL REGURGITATION: ICD-10-CM

## 2024-02-13 DIAGNOSIS — I20.9 TYPICAL ANGINA (HCC): ICD-10-CM

## 2024-02-13 DIAGNOSIS — I10 HYPERTENSION, ESSENTIAL: ICD-10-CM

## 2024-02-13 DIAGNOSIS — E11.22 TYPE 2 DIABETES MELLITUS WITH CHRONIC KIDNEY DISEASE, WITHOUT LONG-TERM CURRENT USE OF INSULIN, UNSPECIFIED CKD STAGE (HCC): ICD-10-CM

## 2024-02-13 DIAGNOSIS — Z98.890 S/P CARDIAC CATH: ICD-10-CM

## 2024-02-13 DIAGNOSIS — Z95.2 S/P TAVR (TRANSCATHETER AORTIC VALVE REPLACEMENT): ICD-10-CM

## 2024-02-13 DIAGNOSIS — R06.02 SOB (SHORTNESS OF BREATH): ICD-10-CM

## 2024-02-13 DIAGNOSIS — I35.0 NONRHEUMATIC AORTIC VALVE STENOSIS: ICD-10-CM

## 2024-02-13 DIAGNOSIS — J96.01 ACUTE RESPIRATORY FAILURE WITH HYPOXIA (HCC): ICD-10-CM

## 2024-02-13 DIAGNOSIS — I35.0 AORTIC STENOSIS, SEVERE: ICD-10-CM

## 2024-02-13 PROCEDURE — 93010 ELECTROCARDIOGRAM REPORT: CPT | Performed by: INTERNAL MEDICINE

## 2024-02-13 PROCEDURE — G8427 DOCREV CUR MEDS BY ELIG CLIN: HCPCS | Performed by: INTERNAL MEDICINE

## 2024-02-13 PROCEDURE — 99213 OFFICE O/P EST LOW 20 MIN: CPT | Performed by: INTERNAL MEDICINE

## 2024-02-13 PROCEDURE — 99214 OFFICE O/P EST MOD 30 MIN: CPT | Performed by: INTERNAL MEDICINE

## 2024-02-13 PROCEDURE — G8420 CALC BMI NORM PARAMETERS: HCPCS | Performed by: INTERNAL MEDICINE

## 2024-02-13 PROCEDURE — 1123F ACP DISCUSS/DSCN MKR DOCD: CPT | Performed by: INTERNAL MEDICINE

## 2024-02-13 PROCEDURE — 3075F SYST BP GE 130 - 139MM HG: CPT | Performed by: INTERNAL MEDICINE

## 2024-02-13 PROCEDURE — G8484 FLU IMMUNIZE NO ADMIN: HCPCS | Performed by: INTERNAL MEDICINE

## 2024-02-13 PROCEDURE — 3078F DIAST BP <80 MM HG: CPT | Performed by: INTERNAL MEDICINE

## 2024-02-13 PROCEDURE — 1036F TOBACCO NON-USER: CPT | Performed by: INTERNAL MEDICINE

## 2024-02-13 PROCEDURE — 93005 ELECTROCARDIOGRAM TRACING: CPT | Performed by: INTERNAL MEDICINE

## 2024-02-13 NOTE — PROGRESS NOTES
stenosis.  Mild mitral regurgitation.  No pericardial effusion.     Echo: 3/16/21  Normal left ventricular diameter.  Mildly thickened interventricular septum.  Thinned and akinetic inferolateral wall.  Left ventricular systolic function is mildly reduced.  Left ventricular ejection fraction 45 %.  Grade I (mild) left ventricular diastolic dysfunction.  Left atrium is severely dilated.  Right atrial dilatation.  Right ventricular dilatation with normal systolic function.  Bioprosthetic aortic valve (per TAVR) that is normal in appearance and  function.  Peak instantaneous gradient 12 mmHg and mean gradient 6 mmHg.  Mitral annular calcification.  Mild mitral regurgitation.  Normal function of other valves.  No pericardial effusion.     Echo 12/21:   Dilated left ventricle with severely reduced systolic function. Estimated EF 25-30%  Severe hypokinesis of inferior, inferolateral and inferoseptal walls.  Grade I (mild) left ventricular diastolic dysfunction.  Normal right ventricular size and function.  Bioprosthetic aortic valve (TAVR) is noted in position, functioning normally  with no significant stenosis/regurgitation or perivalvular leak (Mean  gradient 5 mm hg)  Mild mitral regurgitation.  Mild tricuspid regurgitation.  Estimated right ventricular systolic pressure is 41 mmHg.  No significant pericardial effusion is seen.     Cath 12/21:  Conclusions:  Patent LAD, D, and LCX stents     Labs:     CBC: No results for input(s): \"WBC\", \"HGB\", \"HCT\", \"PLT\" in the last 72 hours.  BMP: No results for input(s): \"NA\", \"K\", \"CO2\", \"BUN\", \"CREATININE\", \"LABGLOM\", \"GLUCOSE\" in the last 72 hours.  PT/INR: No results for input(s): \"PROTIME\", \"INR\" in the last 72 hours.  FASTING LIPID PANEL:  Lab Results   Component Value Date/Time    CHOL 173 09/27/2023 09:26 AM    HDL 36 09/27/2023 09:26 AM    LDLCHOLESTEROL 102 09/27/2023 09:26 AM    TRIG 175 09/27/2023 09:26 AM    VLDL NOT REPORTED 07/22/2021 08:23 AM    CHOLHDLRATIO 4.8

## 2024-02-20 ENCOUNTER — HOSPITAL ENCOUNTER (OUTPATIENT)
Dept: OTHER | Age: 79
Discharge: HOME | End: 2024-02-20
Payer: MEDICARE

## 2024-02-20 VITALS
OXYGEN SATURATION: 99 % | DIASTOLIC BLOOD PRESSURE: 56 MMHG | RESPIRATION RATE: 18 BRPM | BODY MASS INDEX: 23.53 KG/M2 | HEIGHT: 66 IN | HEART RATE: 55 BPM | WEIGHT: 146.4 LBS | SYSTOLIC BLOOD PRESSURE: 108 MMHG

## 2024-02-20 PROCEDURE — 99211 OFF/OP EST MAY X REQ PHY/QHP: CPT

## 2024-02-20 NOTE — PROGRESS NOTES
Patient here for CHF education visit.  Reviewed CHF Zone tool worksheet with patient who stated understanding.  Patient continues to weigh self daily at home.  He had recent visits with cardiology, PCP and diabetes educator.  He is scheduled for an echocardiogram on 2-23-24.      Verbally reviewed importance of medication compliance with patient; patient verbalized understanding.    Discussed 2000mg/day sodium restricted diet; patient verbalized understanding.  Patient does not add salt to foods when cooking or when eating.      Moderate daily exercise encouraged as tolerated. Discussed rest breaks as needed; patient verbalized understanding.  Patient continues to be active playing [employer] two times per week and walks three days per week.      Patient instructed to weigh self at the same time of each day, using same clothes and same scale; reinforced teaching to monitor for 2-3 lb weight increase over 1-2 days, and to notify the CHF clinic at [phone] or [phone] or physician office if weight change noted.  Patient verbalized understanding.    Signs and symptoms of CHF discussed with patient, such as feeling more tired than normal, feeling short of breath, coughing that increases when you lie down, sudden weight gain, swelling of your feet, legs or belly.  Patient verbalized understanding to notify the CHF clinic at [phone] or [phone] or physician office if these symptoms occur.    Compliance with plan of care and further disease process causes discussed with patient, patient encouraged to keep all follow up appointments.  Patient verbalized understanding.

## 2024-02-23 ENCOUNTER — HOSPITAL ENCOUNTER (OUTPATIENT)
Age: 79
End: 2024-02-23
Attending: INTERNAL MEDICINE
Payer: MEDICARE

## 2024-02-23 ENCOUNTER — TELEPHONE (OUTPATIENT)
Dept: CARDIOLOGY | Age: 79
End: 2024-02-23

## 2024-02-23 VITALS
SYSTOLIC BLOOD PRESSURE: 179 MMHG | HEART RATE: 55 BPM | HEIGHT: 65 IN | BODY MASS INDEX: 23.88 KG/M2 | WEIGHT: 143.3 LBS | DIASTOLIC BLOOD PRESSURE: 84 MMHG

## 2024-02-23 DIAGNOSIS — R06.02 SOB (SHORTNESS OF BREATH): ICD-10-CM

## 2024-02-23 DIAGNOSIS — Z95.5 S/P CORONARY ARTERY STENT PLACEMENT: ICD-10-CM

## 2024-02-23 DIAGNOSIS — E78.00 PURE HYPERCHOLESTEROLEMIA: ICD-10-CM

## 2024-02-23 DIAGNOSIS — I35.0 NONRHEUMATIC AORTIC VALVE STENOSIS: ICD-10-CM

## 2024-02-23 DIAGNOSIS — I10 HYPERTENSION, ESSENTIAL: ICD-10-CM

## 2024-02-23 DIAGNOSIS — E11.9 CONTROLLED TYPE 2 DIABETES MELLITUS WITHOUT COMPLICATION, UNSPECIFIED WHETHER LONG TERM INSULIN USE (HCC): ICD-10-CM

## 2024-02-23 DIAGNOSIS — Z95.2 S/P TAVR (TRANSCATHETER AORTIC VALVE REPLACEMENT): ICD-10-CM

## 2024-02-23 DIAGNOSIS — I35.0 AORTIC STENOSIS, SEVERE: ICD-10-CM

## 2024-02-23 DIAGNOSIS — E11.22 TYPE 2 DIABETES MELLITUS WITH CHRONIC KIDNEY DISEASE, WITHOUT LONG-TERM CURRENT USE OF INSULIN, UNSPECIFIED CKD STAGE (HCC): ICD-10-CM

## 2024-02-23 DIAGNOSIS — R94.30 LOW LEFT VENTRICULAR EJECTION FRACTION: ICD-10-CM

## 2024-02-23 DIAGNOSIS — I25.10 CORONARY ARTERY DISEASE INVOLVING NATIVE CORONARY ARTERY OF NATIVE HEART WITHOUT ANGINA PECTORIS: ICD-10-CM

## 2024-02-23 DIAGNOSIS — J96.01 ACUTE RESPIRATORY FAILURE WITH HYPOXIA (HCC): ICD-10-CM

## 2024-02-23 DIAGNOSIS — Z98.890 S/P CARDIAC CATH: ICD-10-CM

## 2024-02-23 DIAGNOSIS — I20.9 TYPICAL ANGINA (HCC): ICD-10-CM

## 2024-02-23 DIAGNOSIS — I25.10 CORONARY ARTERY DISEASE INVOLVING NATIVE CORONARY ARTERY OF NATIVE HEART WITHOUT ANGINA PECTORIS: Primary | ICD-10-CM

## 2024-02-23 DIAGNOSIS — N17.9 AKI (ACUTE KIDNEY INJURY) (HCC): ICD-10-CM

## 2024-02-23 DIAGNOSIS — E66.09 CLASS 1 OBESITY DUE TO EXCESS CALORIES WITH SERIOUS COMORBIDITY IN ADULT, UNSPECIFIED BMI: ICD-10-CM

## 2024-02-23 DIAGNOSIS — I34.0 NON-RHEUMATIC MITRAL REGURGITATION: ICD-10-CM

## 2024-02-23 DIAGNOSIS — I21.4 NSTEMI (NON-ST ELEVATED MYOCARDIAL INFARCTION) (HCC): ICD-10-CM

## 2024-02-23 LAB
ECHO AO ASC DIAM: 3.5 CM
ECHO AO ASCENDING AORTA INDEX: 2.03 CM/M2
ECHO AO ROOT DIAM: 2.7 CM
ECHO AO ROOT INDEX: 1.57 CM/M2
ECHO AV AREA PEAK VELOCITY: 1.5 CM2
ECHO AV AREA VTI: 1.6 CM2
ECHO AV AREA/BSA PEAK VELOCITY: 0.9 CM2/M2
ECHO AV AREA/BSA VTI: 0.9 CM2/M2
ECHO AV MEAN GRADIENT: 5 MMHG
ECHO AV MEAN VELOCITY: 1 M/S
ECHO AV PEAK GRADIENT: 9 MMHG
ECHO AV PEAK VELOCITY: 1.5 M/S
ECHO AV VELOCITY RATIO: 0.53
ECHO AV VTI: 35.6 CM
ECHO BSA: 1.73 M2
ECHO LA AREA 2C: 27.9 CM2
ECHO LA AREA 4C: 26.1 CM2
ECHO LA DIAMETER INDEX: 2.67 CM/M2
ECHO LA DIAMETER: 4.6 CM
ECHO LA MAJOR AXIS: 5.8 CM
ECHO LA MINOR AXIS: 5.9 CM
ECHO LA TO AORTIC ROOT RATIO: 1.7
ECHO LA VOL BP: 100 ML (ref 18–58)
ECHO LA VOL MOD A2C: 104 ML (ref 18–58)
ECHO LA VOL MOD A4C: 94 ML (ref 18–58)
ECHO LA VOL/BSA BIPLANE: 58 ML/M2 (ref 16–34)
ECHO LA VOLUME INDEX MOD A2C: 60 ML/M2 (ref 16–34)
ECHO LA VOLUME INDEX MOD A4C: 55 ML/M2 (ref 16–34)
ECHO LV E' LATERAL VELOCITY: 4 CM/S
ECHO LV E' SEPTAL VELOCITY: 3 CM/S
ECHO LV EDV A2C: 198 ML
ECHO LV EDV A4C: 208 ML
ECHO LV EDV INDEX A4C: 121 ML/M2
ECHO LV EDV NDEX A2C: 115 ML/M2
ECHO LV EJECTION FRACTION A2C: 31 %
ECHO LV EJECTION FRACTION A4C: 30 %
ECHO LV ESV A2C: 137 ML
ECHO LV ESV A4C: 146 ML
ECHO LV ESV INDEX A2C: 80 ML/M2
ECHO LV ESV INDEX A4C: 85 ML/M2
ECHO LV FRACTIONAL SHORTENING: 12 % (ref 28–44)
ECHO LV INTERNAL DIMENSION DIASTOLE INDEX: 3.78 CM/M2
ECHO LV INTERNAL DIMENSION DIASTOLIC: 6.5 CM (ref 4.2–5.9)
ECHO LV INTERNAL DIMENSION SYSTOLIC INDEX: 3.31 CM/M2
ECHO LV INTERNAL DIMENSION SYSTOLIC: 5.7 CM
ECHO LV IVSD: 1.1 CM (ref 0.6–1)
ECHO LV MASS 2D: 320 G (ref 88–224)
ECHO LV MASS INDEX 2D: 186 G/M2 (ref 49–115)
ECHO LV POSTERIOR WALL DIASTOLIC: 1.1 CM (ref 0.6–1)
ECHO LV RELATIVE WALL THICKNESS RATIO: 0.34
ECHO LVOT AREA: 2.8 CM2
ECHO LVOT AV VTI INDEX: 0.55
ECHO LVOT DIAM: 1.9 CM
ECHO LVOT MEAN GRADIENT: 1 MMHG
ECHO LVOT PEAK GRADIENT: 3 MMHG
ECHO LVOT PEAK VELOCITY: 0.8 M/S
ECHO LVOT STROKE VOLUME INDEX: 32.1 ML/M2
ECHO LVOT SV: 55.3 ML
ECHO LVOT VTI: 19.5 CM
ECHO MV A VELOCITY: 0.76 M/S
ECHO MV E DECELERATION TIME (DT): 197 MS
ECHO MV E VELOCITY: 0.68 M/S
ECHO MV E/A RATIO: 0.89
ECHO MV E/E' LATERAL: 17
ECHO MV E/E' RATIO (AVERAGED): 19.83
ECHO RV INTERNAL DIMENSION: 3.8 CM
ECHO RV TAPSE: 1.5 CM (ref 1.7–?)

## 2024-02-23 PROCEDURE — 93306 TTE W/DOPPLER COMPLETE: CPT | Performed by: INTERNAL MEDICINE

## 2024-02-23 PROCEDURE — 93306 TTE W/DOPPLER COMPLETE: CPT

## 2024-02-23 NOTE — TELEPHONE ENCOUNTER
Please review echo and advise.     Interpretation Summary         Left Ventricle: Moderately reduced left ventricular systolic function with a visually estimated EF of 15 - 20%. Left ventricle is moderately dilated. Normal wall thickness. See diagram for wall motion findings. Grade I diastolic dysfunction with normal LAP.    Right Ventricle: Mildly reduced systolic function.    Left Atrium: Left atrium is severely dilated.    Aortic Valve: Bioprosthetic valve (TAVR).  Peak gradient 8 mmHg, Mean gradient 5 mmHg.    Mitral Valve: Mildly thickened leaflet. Mild annular calcification of the mitral valve. Mild to moderate regurgitation.    Image quality is adequate.     Echo Findings    Left Ventricle Moderately reduced left ventricular systolic function with a visually estimated EF of 15 - 20%. Left ventricle is moderately dilated. Normal wall thickness. See diagram for wall motion findings. Grade I diastolic dysfunction with normal LAP.   Left Atrium Left atrium is severely dilated.   Right Ventricle Right ventricle size is normal. Mildly reduced systolic function.   Right Atrium Right atrium size is normal.   Aortic Valve Bioprosthetic valve (TAVR). No regurgitation. No stenosis.   Mitral Valve Mildly thickened leaflet. Mild annular calcification of the mitral valve. Mild to moderate regurgitation. No stenosis noted.   Tricuspid Valve Valve structure is normal. Trace regurgitation. No stenosis noted. Unable to assess RVSP due to inadequate or insignificant tricuspid regurgitation.   Pulmonic Valve Valve structure is normal. Mild regurgitation. No stenosis noted.   Aorta Normal sized aortic root and ascending aorta.   IVC/Hepatic Veins IVC diameter is less than or equal to 21 mm and decreases greater than 50% during inspiration; therefore the estimated right atrial pressure is normal (~3 mmHg). IVC size is normal.   Pericardium No pericardial effusion.

## 2024-02-23 NOTE — TELEPHONE ENCOUNTER
The following orders will be implemented.   Orders with a [] are choices and are NOT implemented unless the box is checked.    Pre-Procedure Orders    Jose Enrique Rodas     1945     Diagnosis: New CHF      Procedure scheduled for:   Procedure ordered was discussed with the patient  including risks, benefits and alternative therapies.    [] Left Heart Catheterization and coronary angiography    [x] Left Heart Catheterization and coronary angiography w/possible PCI/Coronary Stent PCI    [] Right Heart Catheterization  [] With Flolan  [] Aortic Root    [] YVETTE  [] Cardioversion    [] EPS  [] Tilt Table Study per guidelines  [] With Isuprel  [] With NTG    [] Without medication    [] ILR[]    ICD: [] Replacement  [] New implant  [] Lead Extraction    Permanent Pacemaker:  [] Replacement  [] New Implant    [] Lead Extraction    Irrigation:  [] Vancomycin 1 gram ( Check ramsey for PPM/ICD)    Ablation:   [] CRYO  [] SVT  [] A-Fib  [] A-Flutter  [] PVC  [] VT    [] With Anesthesia  [] W/O Anesthesia [] With VIMAL  [] W/O VIMAL     [] With CARTO  [] W/O CARTO    ORDERS    [] EKG   [] Point of care testing  [] Urine Pregnancy  [] PLT    [] PT/INR if on Coumadin  [] NA, K, Glucose, CL, Creat, Calc, HBG/HCT    IV:  [x] Start Peripheral IV: [x] N/S at   60  ml/hr. [] D5 1/2 N/S at     ml/hr.     Patient has history of contrast reaction; give below meds as prophylaxis 30-60 min prior to procedure:    [] Benadryl (diphenhydramine) 25 mg IV x 1 dose    [] Benadryl (diphenhydramine) 50 mg IV x 1 dose    [] Solu-Medrol (methylprednisolone) 125 mg IV x 1 dose    [] Hydrocortisone (SOLU-CORTEF) 100 mg. IV x 1 dose    [] Lidocaine 1% 0.4 ml subq for IV start    Electronically signed by provider ____Rommel Cueva DO ______________

## 2024-02-23 NOTE — TELEPHONE ENCOUNTER
I spoke to pt and his wife Diamond ([phone]).  They are aware of echo results from MyChart, with reduced EF.  They agree to proceed with Cath.  They are familiar with this procedure.  We reviewed instructions briefly and they state understanding.  They will await call from [facility] .

## 2024-02-27 NOTE — TELEPHONE ENCOUNTER
Pt wife called to ask when to expect a call from [facility] to schedule cath. Gave Edilma's number to wife and informed her not to call at least until Friday afternoon because it has not been that long yet.     Writer asked how the pt is doing and she said he feels fine. Pt is playing pickleball twice weekly, volunteers weekly and something else.     Writer just wanted to check if pt should be this active while waiting for a cath.    Sarah Beth [phone]

## 2024-02-27 NOTE — TELEPHONE ENCOUNTER
I would say unless otherwise instructed by provider, pt can continue his normal activities until cath is finished.

## 2024-02-28 ENCOUNTER — HOSPITAL ENCOUNTER (OUTPATIENT)
Age: 79
Setting detail: SPECIMEN
Discharge: HOME | End: 2024-02-28
Payer: MEDICARE

## 2024-02-28 ENCOUNTER — HOSPITAL ENCOUNTER (OUTPATIENT)
Age: 79
End: 2024-02-28
Payer: MEDICARE

## 2024-02-28 DIAGNOSIS — I20.9 TYPICAL ANGINA (HCC): ICD-10-CM

## 2024-02-28 DIAGNOSIS — E11.22 TYPE 2 DIABETES MELLITUS WITH CHRONIC KIDNEY DISEASE, WITHOUT LONG-TERM CURRENT USE OF INSULIN, UNSPECIFIED CKD STAGE (HCC): ICD-10-CM

## 2024-02-28 DIAGNOSIS — J96.01 ACUTE RESPIRATORY FAILURE WITH HYPOXIA (HCC): ICD-10-CM

## 2024-02-28 DIAGNOSIS — R06.02 SOB (SHORTNESS OF BREATH): ICD-10-CM

## 2024-02-28 DIAGNOSIS — E11.9 CONTROLLED TYPE 2 DIABETES MELLITUS WITHOUT COMPLICATION, UNSPECIFIED WHETHER LONG TERM INSULIN USE (HCC): ICD-10-CM

## 2024-02-28 DIAGNOSIS — Z95.2 S/P TAVR (TRANSCATHETER AORTIC VALVE REPLACEMENT): ICD-10-CM

## 2024-02-28 DIAGNOSIS — Z98.890 S/P CARDIAC CATH: ICD-10-CM

## 2024-02-28 DIAGNOSIS — I35.0 AORTIC STENOSIS, SEVERE: ICD-10-CM

## 2024-02-28 DIAGNOSIS — I21.4 NSTEMI (NON-ST ELEVATED MYOCARDIAL INFARCTION) (HCC): ICD-10-CM

## 2024-02-28 DIAGNOSIS — I35.0 NONRHEUMATIC AORTIC VALVE STENOSIS: ICD-10-CM

## 2024-02-28 DIAGNOSIS — I34.0 NON-RHEUMATIC MITRAL REGURGITATION: ICD-10-CM

## 2024-02-28 DIAGNOSIS — Z95.5 S/P CORONARY ARTERY STENT PLACEMENT: ICD-10-CM

## 2024-02-28 DIAGNOSIS — E78.00 PURE HYPERCHOLESTEROLEMIA: ICD-10-CM

## 2024-02-28 DIAGNOSIS — E66.09 CLASS 1 OBESITY DUE TO EXCESS CALORIES WITH SERIOUS COMORBIDITY IN ADULT, UNSPECIFIED BMI: ICD-10-CM

## 2024-02-28 DIAGNOSIS — I10 HYPERTENSION, ESSENTIAL: ICD-10-CM

## 2024-02-28 DIAGNOSIS — N17.9 AKI (ACUTE KIDNEY INJURY) (HCC): ICD-10-CM

## 2024-02-28 DIAGNOSIS — I25.10 CORONARY ARTERY DISEASE INVOLVING NATIVE CORONARY ARTERY OF NATIVE HEART WITHOUT ANGINA PECTORIS: ICD-10-CM

## 2024-02-28 LAB
ANION GAP SERPL CALCULATED.3IONS-SCNC: 11 MMOL/L (ref 9–17)
BUN SERPL-MCNC: 28 MG/DL (ref 8–23)
BUN/CREAT SERPL: 22 (ref 9–20)
CALCIUM SERPL-MCNC: 9.1 MG/DL (ref 8.6–10.4)
CHLORIDE SERPL-SCNC: 103 MMOL/L (ref 98–107)
CHOLEST SERPL-MCNC: 143 MG/DL
CHOLESTEROL/HDL RATIO: 4.5
CO2 SERPL-SCNC: 25 MMOL/L (ref 20–31)
CREAT SERPL-MCNC: 1.3 MG/DL (ref 0.7–1.2)
GFR SERPL CREATININE-BSD FRML MDRD: 57 ML/MIN/1.73M2
GLUCOSE SERPL-MCNC: 128 MG/DL (ref 70–99)
HDLC SERPL-MCNC: 32 MG/DL
LDLC SERPL CALC-MCNC: 82 MG/DL (ref 0–130)
POTASSIUM SERPL-SCNC: 5.2 MMOL/L (ref 3.7–5.3)
SODIUM SERPL-SCNC: 139 MMOL/L (ref 135–144)
TRIGL SERPL-MCNC: 147 MG/DL

## 2024-02-28 PROCEDURE — 82172 ASSAY OF APOLIPOPROTEIN: CPT

## 2024-02-28 PROCEDURE — 80048 BASIC METABOLIC PNL TOTAL CA: CPT

## 2024-02-28 PROCEDURE — 36415 COLL VENOUS BLD VENIPUNCTURE: CPT

## 2024-02-28 PROCEDURE — 80061 LIPID PANEL: CPT

## 2024-03-01 LAB — APO B100 SERPL-MCNC: 96 MG/DL (ref 66–133)

## 2024-03-02 LAB — LPA SERPL-MCNC: 105 MG/DL

## 2024-03-07 ENCOUNTER — HOSPITAL ENCOUNTER (OUTPATIENT)
Age: 79
Setting detail: OUTPATIENT SURGERY
Discharge: HOME | End: 2024-03-07
Attending: INTERNAL MEDICINE | Admitting: INTERNAL MEDICINE
Payer: MEDICARE

## 2024-03-07 VITALS
BODY MASS INDEX: 24.41 KG/M2 | WEIGHT: 143 LBS | SYSTOLIC BLOOD PRESSURE: 134 MMHG | RESPIRATION RATE: 19 BRPM | OXYGEN SATURATION: 98 % | HEIGHT: 64 IN | HEART RATE: 51 BPM | DIASTOLIC BLOOD PRESSURE: 60 MMHG | TEMPERATURE: 98.1 F

## 2024-03-07 DIAGNOSIS — I50.9 HEART FAILURE, UNSPECIFIED HF CHRONICITY, UNSPECIFIED HEART FAILURE TYPE (HCC): ICD-10-CM

## 2024-03-07 LAB — ECHO BSA: 1.71 M2

## 2024-03-07 PROCEDURE — 6360000002 HC RX W HCPCS: Performed by: INTERNAL MEDICINE

## 2024-03-07 PROCEDURE — 2580000003 HC RX 258: Performed by: INTERNAL MEDICINE

## 2024-03-07 PROCEDURE — C1769 GUIDE WIRE: HCPCS | Performed by: INTERNAL MEDICINE

## 2024-03-07 PROCEDURE — 7100000011 HC PHASE II RECOVERY - ADDTL 15 MIN: Performed by: INTERNAL MEDICINE

## 2024-03-07 PROCEDURE — 93454 CORONARY ARTERY ANGIO S&I: CPT | Performed by: INTERNAL MEDICINE

## 2024-03-07 PROCEDURE — C1892 INTRO/SHEATH,FIXED,PEEL-AWAY: HCPCS | Performed by: INTERNAL MEDICINE

## 2024-03-07 PROCEDURE — 99152 MOD SED SAME PHYS/QHP 5/>YRS: CPT | Performed by: INTERNAL MEDICINE

## 2024-03-07 PROCEDURE — 2709999900 HC NON-CHARGEABLE SUPPLY: Performed by: INTERNAL MEDICINE

## 2024-03-07 PROCEDURE — C1894 INTRO/SHEATH, NON-LASER: HCPCS | Performed by: INTERNAL MEDICINE

## 2024-03-07 PROCEDURE — 7100000010 HC PHASE II RECOVERY - FIRST 15 MIN: Performed by: INTERNAL MEDICINE

## 2024-03-07 PROCEDURE — 99153 MOD SED SAME PHYS/QHP EA: CPT | Performed by: INTERNAL MEDICINE

## 2024-03-07 PROCEDURE — 93458 L HRT ARTERY/VENTRICLE ANGIO: CPT | Performed by: INTERNAL MEDICINE

## 2024-03-07 PROCEDURE — 2500000003 HC RX 250 WO HCPCS: Performed by: INTERNAL MEDICINE

## 2024-03-07 RX ORDER — SODIUM CHLORIDE 0.9 % (FLUSH) 0.9 %
5-40 SYRINGE (ML) INJECTION PRN
Status: DISCONTINUED | OUTPATIENT
Start: 2024-03-07 | End: 2024-03-07 | Stop reason: HOSPADM

## 2024-03-07 RX ORDER — SODIUM CHLORIDE 0.9 % (FLUSH) 0.9 %
5-40 SYRINGE (ML) INJECTION EVERY 12 HOURS SCHEDULED
Status: DISCONTINUED | OUTPATIENT
Start: 2024-03-07 | End: 2024-03-07 | Stop reason: HOSPADM

## 2024-03-07 RX ORDER — SPIRONOLACTONE 25 MG/1
25 TABLET ORAL DAILY
Qty: 30 TABLET | Refills: 3 | Status: SHIPPED | OUTPATIENT
Start: 2024-03-07

## 2024-03-07 RX ORDER — MIDAZOLAM HYDROCHLORIDE 1 MG/ML
INJECTION INTRAMUSCULAR; INTRAVENOUS PRN
Status: DISCONTINUED | OUTPATIENT
Start: 2024-03-07 | End: 2024-03-07 | Stop reason: HOSPADM

## 2024-03-07 RX ORDER — SODIUM CHLORIDE 9 MG/ML
INJECTION, SOLUTION INTRAVENOUS CONTINUOUS
Status: DISCONTINUED | OUTPATIENT
Start: 2024-03-07 | End: 2024-03-07 | Stop reason: HOSPADM

## 2024-03-07 RX ORDER — ACETAMINOPHEN 325 MG/1
650 TABLET ORAL EVERY 4 HOURS PRN
Status: DISCONTINUED | OUTPATIENT
Start: 2024-03-07 | End: 2024-03-07 | Stop reason: HOSPADM

## 2024-03-07 RX ORDER — SODIUM CHLORIDE 9 MG/ML
INJECTION, SOLUTION INTRAVENOUS PRN
Status: DISCONTINUED | OUTPATIENT
Start: 2024-03-07 | End: 2024-03-07 | Stop reason: HOSPADM

## 2024-03-07 RX ADMIN — SODIUM CHLORIDE: 900 INJECTION INTRAVENOUS at 12:45

## 2024-03-07 NOTE — PROGRESS NOTES
Patient admitted, consent signed and questions answered.  Call light to reach with side rails up 2 of 2. Bilateral Groin clipped with writer and ANA MARIA Camilo present.  Family at bedside with patient.  History and physical up to date. VS & Pulses obtained and documented. Will continue to monitor.

## 2024-03-07 NOTE — PROCEDURES
[facility]        Date:   3/7/2024  Patient name: Jose Enrique Rodas  Date of admission:  3/7/2024 11:16 AM  MRN:   [id number]  YOB: 1945    CARDIAC CATHETERIZATION    Operators:  Primary: Grace Bryant MD.  Assistant:     Indications for cath: Drop in ejection fraction    Procedure performed: Cardiac cath.    Access: Right Femoral artery      Procedure: After informed consent was obtained with explanation of the risks and benefits, patient was brought to the cath lab. The right groin were prepped and draped in sterile fashion. 1% lidocaine was used for local block. The Femoral artery was cannulated with 6  Fr sheath with brisk arterial blood return. The side port was frequently flushed and aspirated with normal saline.    EBL is 5 mL    Dominance is right    Findings:    Left main: 30% ostial stenosis    LAD: Patent LAD stent with diffuse luminal irregularities of 30 to 40%.  Distal LAD is small vessel diffusely disease of about 70%.  First diagonal patent stent with luminal irregularities of 20 to 30%    LCX: Patent mid stent with luminal irregularities of 20 to 30%    RCA: 50% mid stenosis  RPDA is very small and diffusely diseased of 70 to 80%    The LV gram was not performed    Conclusions:  Patent LAD, diagonal, and LCx stents  Otherwise nonobstructive and diffuse small distal vessel disease  LV gram was not performed    Recommendation:  Medical treatments.  Risk factors modifications.        Electronically signed by Grace Bryant MD on 3/7/2024 at 5:32 PM      [facility]  [phone]

## 2024-03-07 NOTE — H&P
[facility]  Procedure History and Physical Update          Patient Name: Jose Enrique Rodas  MRN:    [id number]  YOB: 1945  Date of evaluation:  3/7/2024    Procedure:    Cardiac cath +/- PCI    Indication for procedure:  New drop in EF to 15 -20 % from 35 %      Please refer to the office note completed by Dr. IESHA Cueva on 02/13/2024 in the medical record and note that:    [x] I have examined the patient and reviewed the H&P/Consult and there are no changes to be made to the assessment or plan.    [] I have examined the patient and reviewed the H&P/Consult and have noted the following changes:    Past Medical History:   Diagnosis Date    Acne rosacea     treated with MetroGel    Anemia     Aortic stenosis     echo 3/18 Mod AS, Mild MR    BPH (benign prostatic hypertrophy)     CAD (coronary artery disease)     Stent x 3 DEVON 8/16    Carotid stenosis     Fairly minimal plaquing on ultrasound 5/14--in the conclusion described as less than 50% stenosis but in the body of the report described as minimal    Cataract of both eyes     CRI (chronic renal insufficiency)     Diabetes mellitus, type 2 (HCC)     1.) Proteinuria, 24 hour total urine protein 1,420 mg/24 hrs, 09/08, creatinine clearance 114, 09/08 2.) Repeat protein/creatinine ratio 2.97, 02/12 3.)24 hr urine collection 1958 mg/24 hrs, 04/12. 4.) Repeat protein/creatinine ratio 1.99, 08/12 a.) Repeat protein/creatinine ratio 2.14, 07/13 5.) Renal u/s ok 02/12  6) [facility]  early mod. nonprolifferative  macular involved    Dyslipidemia     Erectile dysfunction     H/O agent Orange exposure     per patient in the  along with exposure to cresote and naphtha    Hearing loss     Hyperlipidemia     Hypertension     Mild nonproliferative diabetic retinopathy (HCC)     Non-rheumatic mitral regurgitation 06/27/2016    Pneumonia 7/31/2021    Pneumonia of right lower lobe due to infectious organism     RBBB     Sepsis

## 2024-03-07 NOTE — PROGRESS NOTES
All discharge instructions reviewed, questions answered. Patient discharged with all belongings. Pulses obtained & unchanged. Pt ambulatory. Site clean dry and intact. No bleeding, hematoma, or bruising noted. Patient discharged via wheelchair by writer .

## 2024-03-07 NOTE — DISCHARGE INSTRUCTIONS
911, the  may tell you to chew 1 adult-strength or 2 to 4 low-dose aspirin. Wait for an ambulance. Do not try to drive yourself.  You have angina symptoms (such as chest pain or pressure) that do not go away with rest or are not getting better within 5 minutes after you take a dose of nitroglycerin.  You passed out (lost consciousness).  Call your doctor now or seek immediate medical care if:  You are having angina symptoms, such as chest pain or pressure, more often than usual, or they are different or worse than usual.  You have new or increased shortness of breath.  You are dizzy or lightheaded, or you feel like you may faint.  Watch closely for changes in your health, and be sure to contact your doctor if you have any problems.  Where can you learn more?  Go to [email or web address] and sign in to your [employer] account. Enter S038 in the Search Health Information box to learn more about \"Coronary Artery Disease: Care Instructions.\"     If you do not have an account, please click on the \"Sign Up Now\" link.  Current as of: January 27, 2016  Content Version: 11.2  © 8398-0056 [employer]. Care instructions adapted under license by [employer]. If you have questions about a medical condition or this instruction, always ask your healthcare                           SEDATION / ANALGESIA INFORMATION / HOME GOING ADVICE  Sedation/analgesia is used during short medical procedures under controlled supervision. The medication will produce a strong relaxation. You will be able to hear, speak and follow instructions, but your memory and alertness will be decreased.    You will be able to swallow and breathe on your own. During sedation/analgesia your blood pressure, heart and breathing will be watched closely. After the procedure, you may not remember what was said or done.    You may have the following effects from the medication.  \" Drowsiness, dizziness, sleepiness or confusion.  \"

## 2024-03-07 NOTE — PROGRESS NOTES
Patient returned to [facility] room post procedure.  Assessment & VS obtained. Restrictions/Education reviewed with patient and wife. Post procedure pathway initiated. Pt without complications.  Right radial site with 10 mls of air in vasc band. Right fem was closed with a Mynx.  Air to be removed per protocol.  No hematoma noted. Wife at bedside. Pulses obtained and unchanged. Dr. Bryant came to bedside to assess and speak with wife. Patient eating and drinking w/o issues. Will continue to monitor.

## 2024-03-28 ENCOUNTER — HOSPITAL ENCOUNTER (OUTPATIENT)
Age: 79
Discharge: HOME | End: 2024-03-28
Payer: MEDICARE

## 2024-03-28 DIAGNOSIS — E11.22 TYPE 2 DIABETES MELLITUS WITH STAGE 3A CHRONIC KIDNEY DISEASE, WITHOUT LONG-TERM CURRENT USE OF INSULIN (HCC): ICD-10-CM

## 2024-03-28 DIAGNOSIS — N18.31 TYPE 2 DIABETES MELLITUS WITH STAGE 3A CHRONIC KIDNEY DISEASE, WITHOUT LONG-TERM CURRENT USE OF INSULIN (HCC): ICD-10-CM

## 2024-03-28 LAB
ANION GAP SERPL CALCULATED.3IONS-SCNC: 10 MMOL/L (ref 9–17)
BUN SERPL-MCNC: 44 MG/DL (ref 8–23)
BUN/CREAT SERPL: 28 (ref 9–20)
CALCIUM SERPL-MCNC: 9.6 MG/DL (ref 8.6–10.4)
CHLORIDE SERPL-SCNC: 101 MMOL/L (ref 98–107)
CO2 SERPL-SCNC: 28 MMOL/L (ref 20–31)
CREAT SERPL-MCNC: 1.6 MG/DL (ref 0.7–1.2)
CREAT UR-MCNC: 42 MG/DL (ref 39–259)
EST. AVERAGE GLUCOSE BLD GHB EST-MCNC: 151 MG/DL
GFR SERPL CREATININE-BSD FRML MDRD: 44 ML/MIN/1.73M2
GLUCOSE SERPL-MCNC: 158 MG/DL (ref 70–99)
HBA1C MFR BLD: 6.9 % (ref 4–6)
MICROALBUMIN UR-MCNC: 606 MG/L (ref 0–20)
MICROALBUMIN/CREAT UR-RTO: 1443 MCG/MG CREAT (ref 0–17)
POTASSIUM SERPL-SCNC: 5.4 MMOL/L (ref 3.7–5.3)
SODIUM SERPL-SCNC: 139 MMOL/L (ref 135–144)

## 2024-03-28 PROCEDURE — 80048 BASIC METABOLIC PNL TOTAL CA: CPT

## 2024-03-28 PROCEDURE — 82570 ASSAY OF URINE CREATININE: CPT

## 2024-03-28 PROCEDURE — 36415 COLL VENOUS BLD VENIPUNCTURE: CPT

## 2024-03-28 PROCEDURE — 82043 UR ALBUMIN QUANTITATIVE: CPT

## 2024-03-28 PROCEDURE — 83036 HEMOGLOBIN GLYCOSYLATED A1C: CPT

## 2024-04-03 ENCOUNTER — HOSPITAL ENCOUNTER (OUTPATIENT)
Dept: OTHER | Age: 79
Discharge: HOME | End: 2024-04-03
Payer: MEDICARE

## 2024-04-03 ENCOUNTER — OFFICE VISIT (OUTPATIENT)
Dept: DIABETES SERVICES | Age: 79
End: 2024-04-03
Payer: MEDICARE

## 2024-04-03 VITALS
OXYGEN SATURATION: 98 % | HEART RATE: 79 BPM | DIASTOLIC BLOOD PRESSURE: 74 MMHG | BODY MASS INDEX: 23.37 KG/M2 | HEIGHT: 66 IN | SYSTOLIC BLOOD PRESSURE: 138 MMHG | WEIGHT: 145.4 LBS

## 2024-04-03 VITALS
WEIGHT: 141.8 LBS | DIASTOLIC BLOOD PRESSURE: 66 MMHG | BODY MASS INDEX: 23.63 KG/M2 | HEIGHT: 65 IN | RESPIRATION RATE: 18 BRPM | OXYGEN SATURATION: 99 % | HEART RATE: 57 BPM | SYSTOLIC BLOOD PRESSURE: 126 MMHG

## 2024-04-03 DIAGNOSIS — I10 ESSENTIAL HYPERTENSION: ICD-10-CM

## 2024-04-03 DIAGNOSIS — N18.31 TYPE 2 DIABETES MELLITUS WITH STAGE 3A CHRONIC KIDNEY DISEASE, WITHOUT LONG-TERM CURRENT USE OF INSULIN (HCC): Primary | ICD-10-CM

## 2024-04-03 DIAGNOSIS — E78.2 MIXED HYPERLIPIDEMIA: ICD-10-CM

## 2024-04-03 DIAGNOSIS — E11.22 TYPE 2 DIABETES MELLITUS WITH STAGE 3A CHRONIC KIDNEY DISEASE, WITHOUT LONG-TERM CURRENT USE OF INSULIN (HCC): Primary | ICD-10-CM

## 2024-04-03 PROCEDURE — 3044F HG A1C LEVEL LT 7.0%: CPT | Performed by: NURSE PRACTITIONER

## 2024-04-03 PROCEDURE — 3078F DIAST BP <80 MM HG: CPT | Performed by: NURSE PRACTITIONER

## 2024-04-03 PROCEDURE — 99212 OFFICE O/P EST SF 10 MIN: CPT | Performed by: NURSE PRACTITIONER

## 2024-04-03 PROCEDURE — 1036F TOBACCO NON-USER: CPT | Performed by: NURSE PRACTITIONER

## 2024-04-03 PROCEDURE — G2211 COMPLEX E/M VISIT ADD ON: HCPCS | Performed by: NURSE PRACTITIONER

## 2024-04-03 PROCEDURE — 99211 OFF/OP EST MAY X REQ PHY/QHP: CPT

## 2024-04-03 PROCEDURE — G8420 CALC BMI NORM PARAMETERS: HCPCS | Performed by: NURSE PRACTITIONER

## 2024-04-03 PROCEDURE — 99214 OFFICE O/P EST MOD 30 MIN: CPT | Performed by: NURSE PRACTITIONER

## 2024-04-03 PROCEDURE — 3075F SYST BP GE 130 - 139MM HG: CPT | Performed by: NURSE PRACTITIONER

## 2024-04-03 PROCEDURE — G8427 DOCREV CUR MEDS BY ELIG CLIN: HCPCS | Performed by: NURSE PRACTITIONER

## 2024-04-03 PROCEDURE — 95251 CONT GLUC MNTR ANALYSIS I&R: CPT | Performed by: NURSE PRACTITIONER

## 2024-04-03 PROCEDURE — 1123F ACP DISCUSS/DSCN MKR DOCD: CPT | Performed by: NURSE PRACTITIONER

## 2024-04-03 ASSESSMENT — ENCOUNTER SYMPTOMS
SHORTNESS OF BREATH: 0
DIARRHEA: 0
ABDOMINAL PAIN: 0

## 2024-04-03 NOTE — PROGRESS NOTES
Patient here for CHF education visit.  Reviewed CHF Zone tool worksheet with patient who stated understanding.  Patient states he is weighing self at home, but not every day.  Patient's weight is down 4 lbs since last visit to CHF clinic.  Patient had recent echo and heart cath, reduced EF; will repeat limited echo in 3 months per cardiology note.  Patient denies shortness of breath.  Patient saw diabetes educator today who stopped Ozempic and changed dose of Jardiance.      Verbally reviewed importance of medication compliance with patient; patient verbalized understanding.    Discussed 2000mg/day sodium restricted diet; patient verbalized understanding.  Patient does not add salt to foods when cooking or at the table.    Moderate daily exercise encouraged as tolerated. Discussed rest breaks as needed; patient verbalized understanding.  Patient states he is able to do normal activities.  He continues to play [employer] ball 2-3 times a week and also walks the other days.     Patient instructed to weigh self at the same time of each day, using same clothes and same scale; reinforced teaching to monitor for 2-3 lb weight increase over 1-2 days, and to notify the CHF clinic at [phone] or [phone] or physician office if weight change noted.  Patient verbalized understanding.    Signs and symptoms of CHF discussed with patient, such as feeling more tired than normal, feeling short of breath, coughing that increases when you lie down, sudden weight gain, swelling of your feet, legs or belly.  Patient verbalized understanding to notify the CHF clinic at [phone] or [phone] or physician office if these symptoms occur.    Compliance with plan of care and further disease process causes discussed with patient, patient encouraged to keep all follow up appointments.  Patient verbalized understanding.

## 2024-04-03 NOTE — PROGRESS NOTES
[facility]  [address] [facility] [address] [phone]        HISTORY:    Jose Enrique Rodas presents today for evaluation and management of:  Chief Complaint   Patient presents with    Diabetes     3 month follow up       Patient Care Team:  Ruth Rhodes APRN - CNP as PCP - General (Nurse Practitioner)  Ruth Rhodes APRN - CNP as [facility]  Robbie Duckworth MD (Nephrology)  Rommel Cueva DO as Consulting Physician (Cardiology)  Manfred Hernandez MD (General Surgery)      Interval History:    Past DM Medications   Actos-therapy completed  Onglyza- therapy completed  Glipizide therapy completed  Rybelsus 7 mg- compliance     Current Diabetic Medications  Metformin 1000 mg bid  Jardiance 10 mg once daily   Ozempic 0.25 mg once weekly (does not tolerate 0.5 mg dose well)     DKA episodes: 0    01/03/24   Jose Enrique Rodas is a 78 y.o. male patient who presents to clinic today for his diabetes. he has a history of CAD, CHF, nephropathy, retinopathy which contributes to his diabetes. At previous visit diabetes counseling was provided. he denies any current signs or symptoms of hyper/hypoglycemia. he states they are taking their medications as prescribed without any adverse effects.   Diet: regular  Exercise: walking pickleball  BS testing: uses cgm daily with success and is adherent to cgm therapy  Hypoglycemia: no  Hypoglycemia as needed treatment: snack  Issues:   Diabetic foot exam up-to-date: Yes  Diabetic retinal exam up-to-date: Yes     Diabetes complications:nephropathy, retinopathy, and cardiovascular disease    04/03/24   Jose Enrique Rodas is a 78 y.o. male patient who presents to clinic today for his diabetes. he has a history of CAD, CHF, nephropathy, retinopathy which contributes to his diabetes. At previous visit diabetes counseling was provided. he denies any current signs

## 2024-04-22 ENCOUNTER — TELEPHONE (OUTPATIENT)
Dept: CARDIOLOGY | Age: 79
End: 2024-04-22

## 2024-04-22 DIAGNOSIS — I50.43 CHF (CONGESTIVE HEART FAILURE), NYHA CLASS I, ACUTE ON CHRONIC, COMBINED (HCC): Primary | ICD-10-CM

## 2024-04-22 DIAGNOSIS — R06.02 SOB (SHORTNESS OF BREATH): ICD-10-CM

## 2024-04-22 NOTE — TELEPHONE ENCOUNTER
Patient needing new referral to CHF clinic at [facility].     Pended referral for signing.     Last Appt:  2/13/2024  Next Appt:   8/13/2024  Med verified in Epic

## 2024-05-02 ENCOUNTER — HOSPITAL ENCOUNTER (OUTPATIENT)
Dept: OTHER | Age: 79
Discharge: HOME | End: 2024-05-02
Payer: MEDICARE

## 2024-05-02 VITALS
RESPIRATION RATE: 20 BRPM | BODY MASS INDEX: 24.16 KG/M2 | HEART RATE: 64 BPM | SYSTOLIC BLOOD PRESSURE: 114 MMHG | WEIGHT: 145 LBS | DIASTOLIC BLOOD PRESSURE: 64 MMHG | HEIGHT: 65 IN | OXYGEN SATURATION: 96 %

## 2024-05-02 PROCEDURE — 99211 OFF/OP EST MAY X REQ PHY/QHP: CPT

## 2024-05-02 NOTE — PROGRESS NOTES
Patient here for CHF education visit.  Reviewed CHF Zone tool worksheet with patient who verbalized understanding.  Patient states is weighing self at home, but not every day.  Patient's weight in clinic today was 145 lbs, up from 141 lbs at last visit on 4-3-24; patient denies any sudden increase in weight, also denies any shortness of breath or swelling.  He reports he had a recent visit to the ED for vertigo.  He is scheduled to see PCP in June and diabetes educator and cardiology in August.      Verbally reviewed importance of medication compliance with patient; patient verbalized understanding.    Discussed 2000mg/day sodium restricted diet; patient verbalized understanding.  Patient states he does not add salt to foods when cooking.     Moderate daily exercise encouraged as tolerated. Discussed rest breaks as needed; patient verbalized understanding.  Patient continues to play [employer] ball 2-3 times per week and also walks.      Patient instructed to weigh self at the same time of each day, using same clothes and same scale; reinforced teaching to monitor for 2-3 lb weight increase over 1-2 days, and to notify the CHF clinic at [phone] or [phone] or physician office if weight change noted.  Patient verbalized understanding.    Signs and symptoms of CHF discussed with patient, such as feeling more tired than normal, feeling short of breath, coughing that increases when you lie down, sudden weight gain, swelling of your feet, legs or belly.  Patient verbalized understanding to notify the CHF clinic at [phone] or [phone] or physician office if these symptoms occur.    Compliance with plan of care and further disease process causes discussed with patient, patient encouraged to keep all follow up appointments.  Patient verbalized understanding.

## 2024-05-30 ENCOUNTER — HOSPITAL ENCOUNTER (OUTPATIENT)
Dept: OTHER | Age: 79
Discharge: HOME | End: 2024-05-30
Payer: MEDICARE

## 2024-05-30 VITALS
WEIGHT: 148.6 LBS | RESPIRATION RATE: 20 BRPM | BODY MASS INDEX: 24.76 KG/M2 | HEIGHT: 65 IN | SYSTOLIC BLOOD PRESSURE: 120 MMHG | OXYGEN SATURATION: 97 % | DIASTOLIC BLOOD PRESSURE: 66 MMHG | HEART RATE: 56 BPM

## 2024-05-30 PROCEDURE — 99211 OFF/OP EST MAY X REQ PHY/QHP: CPT

## 2024-05-30 NOTE — PROGRESS NOTES
Patient here for CHF education visit.  Reviewed CHF Zone tool worksheet with patient who stated understanding.  Patient continues to weigh self daily at home, stable per patient.  He is scheduled for follow up visit with PCP in June and diabetes educator and cardiology in August.    Verbally reviewed importance of medication compliance with patient; patient verbalized understanding.    Discussed 2000mg/day sodium restricted diet; patient verbalized understanding.  Patient states he doesn't add salt to foods when cooking or at the table.    Moderate daily exercise encouraged as tolerated. Discussed rest breaks as needed; patient verbalized understanding.  Patient continues to play [employer] ball 2-3 times per week as well as walking most days.  [place].    Patient instructed to weigh self at the same time of each day, using same clothes and same scale; reinforced teaching to monitor for 2-3 lb weight increase over 1-2 days, and to notify the CHF clinic at [phone] or [phone] or physician office if weight change noted.  Patient verbalized understanding.    Signs and symptoms of CHF discussed with patient, such as feeling more tired than normal, feeling short of breath, coughing that increases when you lie down, sudden weight gain, swelling of your feet, legs or belly.  Patient verbalized understanding to notify the CHF clinic at [phone] or [phone] or physician office if these symptoms occur.    Compliance with plan of care and further disease process causes discussed with patient, patient encouraged to keep all follow up appointments.  Patient verbalized understanding.

## 2024-06-20 ENCOUNTER — OFFICE VISIT (OUTPATIENT)
Dept: INTERNAL MEDICINE | Age: 79
End: 2024-06-20
Payer: MEDICARE

## 2024-06-20 VITALS
SYSTOLIC BLOOD PRESSURE: 120 MMHG | DIASTOLIC BLOOD PRESSURE: 72 MMHG | HEART RATE: 62 BPM | OXYGEN SATURATION: 96 % | RESPIRATION RATE: 18 BRPM | HEIGHT: 65 IN | BODY MASS INDEX: 24.66 KG/M2 | WEIGHT: 148 LBS

## 2024-06-20 DIAGNOSIS — I73.9 PERIPHERAL ARTERIAL DISEASE (HCC): ICD-10-CM

## 2024-06-20 DIAGNOSIS — I10 ESSENTIAL HYPERTENSION: ICD-10-CM

## 2024-06-20 DIAGNOSIS — E11.3219 CONTROLLED TYPE 2 DIABETES MELLITUS WITH MILD NONPROLIFERATIVE RETINOPATHY AND MACULAR EDEMA, WITHOUT LONG-TERM CURRENT USE OF INSULIN, UNSPECIFIED LATERALITY (HCC): ICD-10-CM

## 2024-06-20 DIAGNOSIS — Z00.00 MEDICARE ANNUAL WELLNESS VISIT, SUBSEQUENT: Primary | ICD-10-CM

## 2024-06-20 DIAGNOSIS — R42 VERTIGO: ICD-10-CM

## 2024-06-20 DIAGNOSIS — I50.22 CHF NYHA CLASS I, CHRONIC, SYSTOLIC (HCC): ICD-10-CM

## 2024-06-20 DIAGNOSIS — N18.31 CHRONIC KIDNEY DISEASE, STAGE 3A (HCC): ICD-10-CM

## 2024-06-20 DIAGNOSIS — I25.10 CORONARY ARTERY DISEASE INVOLVING NATIVE CORONARY ARTERY OF NATIVE HEART WITHOUT ANGINA PECTORIS: ICD-10-CM

## 2024-06-20 DIAGNOSIS — R91.1 LUNG NODULE: ICD-10-CM

## 2024-06-20 DIAGNOSIS — Z95.2 S/P TAVR (TRANSCATHETER AORTIC VALVE REPLACEMENT): ICD-10-CM

## 2024-06-20 DIAGNOSIS — I34.0 NON-RHEUMATIC MITRAL REGURGITATION: ICD-10-CM

## 2024-06-20 DIAGNOSIS — E78.5 DYSLIPIDEMIA: ICD-10-CM

## 2024-06-20 DIAGNOSIS — I65.29 STENOSIS OF CAROTID ARTERY, UNSPECIFIED LATERALITY: ICD-10-CM

## 2024-06-20 DIAGNOSIS — I35.0 AORTIC STENOSIS, SEVERE: ICD-10-CM

## 2024-06-20 DIAGNOSIS — E55.9 VITAMIN D DEFICIENCY: ICD-10-CM

## 2024-06-20 DIAGNOSIS — E27.8 ADRENAL CYST (HCC): ICD-10-CM

## 2024-06-20 PROCEDURE — 99213 OFFICE O/P EST LOW 20 MIN: CPT | Performed by: NURSE PRACTITIONER

## 2024-06-20 RX ORDER — MECLIZINE HYDROCHLORIDE 25 MG/1
TABLET ORAL
COMMUNITY
Start: 2024-04-28

## 2024-06-20 SDOH — ECONOMIC STABILITY: FOOD INSECURITY: WITHIN THE PAST 12 MONTHS, YOU WORRIED THAT YOUR FOOD WOULD RUN OUT BEFORE YOU GOT MONEY TO BUY MORE.: NEVER TRUE

## 2024-06-20 SDOH — ECONOMIC STABILITY: FOOD INSECURITY: WITHIN THE PAST 12 MONTHS, THE FOOD YOU BOUGHT JUST DIDN'T LAST AND YOU DIDN'T HAVE MONEY TO GET MORE.: NEVER TRUE

## 2024-06-20 SDOH — ECONOMIC STABILITY: INCOME INSECURITY: HOW HARD IS IT FOR YOU TO PAY FOR THE VERY BASICS LIKE FOOD, HOUSING, MEDICAL CARE, AND HEATING?: NOT HARD AT ALL

## 2024-06-20 ASSESSMENT — ENCOUNTER SYMPTOMS
SORE THROAT: 0
CONSTIPATION: 0
RHINORRHEA: 0
BLOOD IN STOOL: 0
CHEST TIGHTNESS: 0
SHORTNESS OF BREATH: 0
FACIAL SWELLING: 0
NAUSEA: 0
COUGH: 0
VOMITING: 0
SINUS PRESSURE: 0
WHEEZING: 0
DIARRHEA: 0
EYE PAIN: 0
COLOR CHANGE: 0
ABDOMINAL PAIN: 0
TROUBLE SWALLOWING: 0

## 2024-06-20 ASSESSMENT — LIFESTYLE VARIABLES
HOW MANY STANDARD DRINKS CONTAINING ALCOHOL DO YOU HAVE ON A TYPICAL DAY: 1 OR 2
HOW OFTEN DO YOU HAVE A DRINK CONTAINING ALCOHOL: MONTHLY OR LESS

## 2024-06-20 ASSESSMENT — PATIENT HEALTH QUESTIONNAIRE - PHQ9
2. FEELING DOWN, DEPRESSED OR HOPELESS: NOT AT ALL
SUM OF ALL RESPONSES TO PHQ QUESTIONS 1-9: 0
1. LITTLE INTEREST OR PLEASURE IN DOING THINGS: NOT AT ALL
SUM OF ALL RESPONSES TO PHQ QUESTIONS 1-9: 0
SUM OF ALL RESPONSES TO PHQ QUESTIONS 1-9: 0
SUM OF ALL RESPONSES TO PHQ9 QUESTIONS 1 & 2: 0
SUM OF ALL RESPONSES TO PHQ QUESTIONS 1-9: 0

## 2024-06-20 NOTE — PATIENT INSTRUCTIONS
These can increase your chances of quitting for good. Quitting is one of the most important things you can do to protect your heart. It is never too late to quit. Try to avoid secondhand smoke too.     Stay at a weight that's healthy for you. Talk to your doctor if you need help losing weight.     Try to get 7 to 9 hours of sleep each night.     Limit alcohol to 2 drinks a day for men and 1 drink a day for women. Too much alcohol can cause health problems.     Manage other health problems such as diabetes, high blood pressure, and high cholesterol. If you think you may have a problem with alcohol or drug use, talk to your doctor.   Medicines    Take your medicines exactly as prescribed. Call your doctor if you think you are having a problem with your medicine.     If your doctor recommends aspirin, take the amount directed each day. Make sure you take aspirin and not another kind of pain reliever, such as acetaminophen (Tylenol).   When should you call for help?   Call 911 if you have symptoms of a heart attack. These may include:    Chest pain or pressure, or a strange feeling in the chest.     Sweating.     Shortness of breath.     Pain, pressure, or a strange feeling in the back, neck, jaw, or upper belly or in one or both shoulders or arms.     Lightheadedness or sudden weakness.     A fast or irregular heartbeat.   After you call 911, the  may tell you to chew 1 adult-strength or 2 to 4 low-dose aspirin. Wait for an ambulance. Do not try to drive yourself.  Watch closely for changes in your health, and be sure to contact your doctor if you have any problems.  Where can you learn more?  Go to https://www.[employer].net/patientEd and enter F075 to learn more about \"A Healthy Heart: Care Instructions.\"  Current as of: June 24, 2023  Content Version: 14.1  © 4302-9893 [employer].   Care instructions adapted under license by [employer]. If you have questions about a medical condition or this

## 2024-06-20 NOTE — PROGRESS NOTES
Medicare Annual Wellness Visit    Jose Enrique Rodas is here for Medicare AWV, Chronic Kidney Disease (6 month), and Diabetes (6 month)    Assessment & Plan     Recommendations for Preventive Services Due: see orders and patient instructions/AVS.  Recommended screening schedule for the next 5-10 years is provided to the patient in written form: see Patient Instructions/AVS.     No follow-ups on file.     Subjective     Patient's complete Health Risk Assessment and screening values have been reviewed and are found in Flowsheets. The following problems were reviewed today and where indicated follow up appointments were made and/or referrals ordered.    No Positive Risk Factors identified today.                   Dentist Screen:  Have you seen the dentist within the past year?: (!) No    Intervention:  Encouraged to update dental exam                Objective   Vitals:    06/20/24 1332   BP: 120/72   Site: Left Upper Arm   Position: Sitting   Cuff Size: Large Adult   Pulse: 62   Resp: 18   SpO2: 96%   Weight: 67.1 kg (148 lb)   Height: 1.651 m (5' 5\")      Body mass index is 24.63 kg/m².             Allergies   Allergen Reactions    Lisinopril Cough     Dry cough     Atorvastatin Other (See Comments)     Muscle pain (myalgias)    Sulfa Antibiotics Swelling     Swelling of retinas    Sulfanilamide Other (See Comments)     Swelling of retinas     Prior to Visit Medications    Medication Sig Taking? Authorizing Provider   meclizine (ANTIVERT) 25 MG tablet TAKE 1 TABLET BY MOUTH 3 TIMES A DAY AS NEEDED FOR DIZZINESS OR NAUSEA Yes ProviderJono MD   empagliflozin (JARDIANCE) 25 MG tablet Take 1 tablet by mouth daily Yes Kenia Beard APRN - CNP   spironolactone (ALDACTONE) 25 MG tablet Take 1 tablet by mouth daily Yes Elyssa Stein MD   metFORMIN (GLUCOPHAGE) 1000 MG tablet Take 1 tablet by mouth 2 times daily (with meals) Yes Ruth Rhodes APRN - CNP   furosemide (LASIX) 40 MG tablet Take 1 tablet by mouth 
nursing note reviewed.   Constitutional:       General: He is not in acute distress.     Appearance: He is well-developed. He is not diaphoretic.   HENT:      Head: Normocephalic and atraumatic.      Right Ear: External ear normal.      Left Ear: External ear normal.   Eyes:      General: Lids are normal.         Right eye: No discharge.         Left eye: No discharge.      Conjunctiva/sclera: Conjunctivae normal.      Pupils: Pupils are equal, round, and reactive to light.   Neck:      Trachea: No tracheal deviation.   Cardiovascular:      Rate and Rhythm: Normal rate and regular rhythm.      Heart sounds: Normal heart sounds. No murmur heard.     No friction rub. No gallop.   Pulmonary:      Effort: Pulmonary effort is normal. No accessory muscle usage or respiratory distress.      Breath sounds: Normal breath sounds. No stridor. No wheezing, rhonchi or rales.   Abdominal:      General: Bowel sounds are normal. There is no distension.      Palpations: Abdomen is soft. Abdomen is not rigid.      Tenderness: There is no abdominal tenderness.   Musculoskeletal:         General: Normal range of motion.      Cervical back: Normal range of motion and neck supple. No edema or erythema.      Right lower leg: No edema.      Left lower leg: No edema.   Skin:     General: Skin is warm and dry.      Capillary Refill: Capillary refill takes less than 2 seconds.      Coloration: Skin is not jaundiced or pale.      Findings: No erythema or rash.      Comments: Multiple scabbed over areas to lower extremities occasional bruises throughout.  States from pickleball   Neurological:      General: No focal deficit present.      Mental Status: He is alert and oriented to person, place, and time. Mental status is at baseline.      Cranial Nerves: No cranial nerve deficit.      Sensory: No sensory deficit.      Motor: No weakness.      Coordination: Coordination normal.      Gait: Gait normal.   Psychiatric:         Behavior: Behavior

## 2024-06-26 ENCOUNTER — HOSPITAL ENCOUNTER (OUTPATIENT)
Dept: OTHER | Age: 79
Discharge: HOME | End: 2024-06-26
Payer: MEDICARE

## 2024-06-26 VITALS
SYSTOLIC BLOOD PRESSURE: 140 MMHG | HEIGHT: 65 IN | OXYGEN SATURATION: 99 % | WEIGHT: 146.2 LBS | BODY MASS INDEX: 24.36 KG/M2 | RESPIRATION RATE: 18 BRPM | HEART RATE: 48 BPM | DIASTOLIC BLOOD PRESSURE: 70 MMHG

## 2024-06-26 PROCEDURE — 99211 OFF/OP EST MAY X REQ PHY/QHP: CPT

## 2024-06-26 NOTE — PROGRESS NOTES
Patient here for CHF education visit.  Reviewed CHF Zone tool worksheet with patient who verbalized understanding.  Patient continues to weigh self daily at home.  He had recent visit with PCP.  He is scheduled to see diabetes educator and cardiology in August.  He denies questions or concerns at this time.      Verbally reviewed importance of medication compliance with patient; patient verbalized understanding.    Discussed 2000mg/day sodium restricted diet; patient verbalized understanding.    Moderate daily exercise encouraged as tolerated. Discussed rest breaks as needed; patient verbalized understanding.  Patient remains active playing [employer] and walking most days of the week.      Patient instructed to weigh self at the same time of each day, using same clothes and same scale; reinforced teaching to monitor for 2-3 lb weight increase over 1-2 days, and to notify the CHF clinic at [phone] or [phone] or physician office if weight change noted.  Patient verbalized understanding.    Signs and symptoms of CHF discussed with patient, such as feeling more tired than normal, feeling short of breath, coughing that increases when you lie down, sudden weight gain, swelling of your feet, legs or belly.  Patient verbalized understanding to notify the CHF clinic at [phone] or [phone] or physician office if these symptoms occur.    Compliance with plan of care and further disease process causes discussed with patient, patient encouraged to keep all follow up appointments.  Patient verbalized understanding.

## 2024-08-12 ENCOUNTER — HOSPITAL ENCOUNTER (OUTPATIENT)
Age: 79
Discharge: HOME | End: 2024-08-12
Payer: MEDICARE

## 2024-08-12 DIAGNOSIS — N18.31 CHRONIC KIDNEY DISEASE, STAGE 3A (HCC): Primary | ICD-10-CM

## 2024-08-12 DIAGNOSIS — E55.9 VITAMIN D DEFICIENCY: ICD-10-CM

## 2024-08-12 DIAGNOSIS — E11.22 TYPE 2 DIABETES MELLITUS WITH STAGE 3A CHRONIC KIDNEY DISEASE, WITHOUT LONG-TERM CURRENT USE OF INSULIN (HCC): ICD-10-CM

## 2024-08-12 DIAGNOSIS — N18.31 TYPE 2 DIABETES MELLITUS WITH STAGE 3A CHRONIC KIDNEY DISEASE, WITHOUT LONG-TERM CURRENT USE OF INSULIN (HCC): ICD-10-CM

## 2024-08-12 LAB
25(OH)D3 SERPL-MCNC: 40.1 NG/ML (ref 30–100)
ANION GAP SERPL CALCULATED.3IONS-SCNC: 11 MMOL/L (ref 9–17)
BUN SERPL-MCNC: 61 MG/DL (ref 8–23)
BUN/CREAT SERPL: 34 (ref 9–20)
CALCIUM SERPL-MCNC: 9.9 MG/DL (ref 8.6–10.4)
CHLORIDE SERPL-SCNC: 98 MMOL/L (ref 98–107)
CO2 SERPL-SCNC: 25 MMOL/L (ref 20–31)
CREAT SERPL-MCNC: 1.8 MG/DL (ref 0.7–1.2)
EST. AVERAGE GLUCOSE BLD GHB EST-MCNC: 200 MG/DL
GFR, ESTIMATED: 38 ML/MIN/1.73M2
GLUCOSE SERPL-MCNC: 260 MG/DL (ref 70–99)
HBA1C MFR BLD: 8.6 % (ref 4–6)
POTASSIUM SERPL-SCNC: 5.5 MMOL/L (ref 3.7–5.3)
SODIUM SERPL-SCNC: 134 MMOL/L (ref 135–144)

## 2024-08-12 PROCEDURE — 83036 HEMOGLOBIN GLYCOSYLATED A1C: CPT

## 2024-08-12 PROCEDURE — 36415 COLL VENOUS BLD VENIPUNCTURE: CPT

## 2024-08-12 PROCEDURE — 82306 VITAMIN D 25 HYDROXY: CPT

## 2024-08-12 PROCEDURE — 80048 BASIC METABOLIC PNL TOTAL CA: CPT

## 2024-08-13 ENCOUNTER — OFFICE VISIT (OUTPATIENT)
Dept: DIABETES SERVICES | Age: 79
End: 2024-08-13
Payer: MEDICARE

## 2024-08-13 ENCOUNTER — OFFICE VISIT (OUTPATIENT)
Dept: CARDIOLOGY | Age: 79
End: 2024-08-13
Payer: MEDICARE

## 2024-08-13 VITALS
BODY MASS INDEX: 24.87 KG/M2 | HEIGHT: 65 IN | WEIGHT: 149.25 LBS | SYSTOLIC BLOOD PRESSURE: 130 MMHG | HEART RATE: 56 BPM | DIASTOLIC BLOOD PRESSURE: 84 MMHG

## 2024-08-13 VITALS
HEIGHT: 65 IN | BODY MASS INDEX: 24.86 KG/M2 | OXYGEN SATURATION: 100 % | SYSTOLIC BLOOD PRESSURE: 130 MMHG | DIASTOLIC BLOOD PRESSURE: 84 MMHG | WEIGHT: 149.2 LBS | HEART RATE: 63 BPM

## 2024-08-13 DIAGNOSIS — E66.09 CLASS 1 OBESITY DUE TO EXCESS CALORIES WITH SERIOUS COMORBIDITY IN ADULT, UNSPECIFIED BMI: ICD-10-CM

## 2024-08-13 DIAGNOSIS — I25.10 CORONARY ARTERY DISEASE INVOLVING NATIVE CORONARY ARTERY OF NATIVE HEART WITHOUT ANGINA PECTORIS: Primary | ICD-10-CM

## 2024-08-13 DIAGNOSIS — E78.00 PURE HYPERCHOLESTEROLEMIA: ICD-10-CM

## 2024-08-13 DIAGNOSIS — E11.22 TYPE 2 DIABETES MELLITUS WITH STAGE 3A CHRONIC KIDNEY DISEASE, WITHOUT LONG-TERM CURRENT USE OF INSULIN (HCC): Primary | ICD-10-CM

## 2024-08-13 DIAGNOSIS — I10 HYPERTENSION, ESSENTIAL: ICD-10-CM

## 2024-08-13 DIAGNOSIS — E11.9 CONTROLLED TYPE 2 DIABETES MELLITUS WITHOUT COMPLICATION, UNSPECIFIED WHETHER LONG TERM INSULIN USE (HCC): ICD-10-CM

## 2024-08-13 DIAGNOSIS — N18.31 TYPE 2 DIABETES MELLITUS WITH STAGE 3A CHRONIC KIDNEY DISEASE, WITHOUT LONG-TERM CURRENT USE OF INSULIN (HCC): Primary | ICD-10-CM

## 2024-08-13 DIAGNOSIS — E78.2 MIXED HYPERLIPIDEMIA: ICD-10-CM

## 2024-08-13 DIAGNOSIS — I10 ESSENTIAL HYPERTENSION: ICD-10-CM

## 2024-08-13 DIAGNOSIS — E11.65 TYPE 2 DIABETES MELLITUS WITH HYPERGLYCEMIA, WITHOUT LONG-TERM CURRENT USE OF INSULIN (HCC): ICD-10-CM

## 2024-08-13 PROCEDURE — 95251 CONT GLUC MNTR ANALYSIS I&R: CPT | Performed by: NURSE PRACTITIONER

## 2024-08-13 PROCEDURE — 99212 OFFICE O/P EST SF 10 MIN: CPT | Performed by: NURSE PRACTITIONER

## 2024-08-13 PROCEDURE — 1036F TOBACCO NON-USER: CPT | Performed by: INTERNAL MEDICINE

## 2024-08-13 PROCEDURE — 3075F SYST BP GE 130 - 139MM HG: CPT | Performed by: NURSE PRACTITIONER

## 2024-08-13 PROCEDURE — 3079F DIAST BP 80-89 MM HG: CPT | Performed by: INTERNAL MEDICINE

## 2024-08-13 PROCEDURE — 3052F HG A1C>EQUAL 8.0%<EQUAL 9.0%: CPT | Performed by: INTERNAL MEDICINE

## 2024-08-13 PROCEDURE — 3075F SYST BP GE 130 - 139MM HG: CPT | Performed by: INTERNAL MEDICINE

## 2024-08-13 PROCEDURE — 93010 ELECTROCARDIOGRAM REPORT: CPT | Performed by: INTERNAL MEDICINE

## 2024-08-13 PROCEDURE — 3052F HG A1C>EQUAL 8.0%<EQUAL 9.0%: CPT | Performed by: NURSE PRACTITIONER

## 2024-08-13 PROCEDURE — 99212 OFFICE O/P EST SF 10 MIN: CPT | Performed by: INTERNAL MEDICINE

## 2024-08-13 PROCEDURE — 93005 ELECTROCARDIOGRAM TRACING: CPT | Performed by: INTERNAL MEDICINE

## 2024-08-13 PROCEDURE — 3079F DIAST BP 80-89 MM HG: CPT | Performed by: NURSE PRACTITIONER

## 2024-08-13 PROCEDURE — G8420 CALC BMI NORM PARAMETERS: HCPCS | Performed by: INTERNAL MEDICINE

## 2024-08-13 PROCEDURE — 1123F ACP DISCUSS/DSCN MKR DOCD: CPT | Performed by: INTERNAL MEDICINE

## 2024-08-13 PROCEDURE — G8420 CALC BMI NORM PARAMETERS: HCPCS | Performed by: NURSE PRACTITIONER

## 2024-08-13 PROCEDURE — 99214 OFFICE O/P EST MOD 30 MIN: CPT | Performed by: NURSE PRACTITIONER

## 2024-08-13 PROCEDURE — 99214 OFFICE O/P EST MOD 30 MIN: CPT | Performed by: INTERNAL MEDICINE

## 2024-08-13 PROCEDURE — 1036F TOBACCO NON-USER: CPT | Performed by: NURSE PRACTITIONER

## 2024-08-13 PROCEDURE — G2211 COMPLEX E/M VISIT ADD ON: HCPCS | Performed by: NURSE PRACTITIONER

## 2024-08-13 PROCEDURE — G8427 DOCREV CUR MEDS BY ELIG CLIN: HCPCS | Performed by: INTERNAL MEDICINE

## 2024-08-13 PROCEDURE — 1123F ACP DISCUSS/DSCN MKR DOCD: CPT | Performed by: NURSE PRACTITIONER

## 2024-08-13 PROCEDURE — G8427 DOCREV CUR MEDS BY ELIG CLIN: HCPCS | Performed by: NURSE PRACTITIONER

## 2024-08-13 RX ORDER — GLIMEPIRIDE 2 MG/1
2 TABLET ORAL
Qty: 90 TABLET | Refills: 1 | Status: SHIPPED | OUTPATIENT
Start: 2024-08-13

## 2024-08-13 ASSESSMENT — ENCOUNTER SYMPTOMS
RESPIRATORY NEGATIVE: 1
SHORTNESS OF BREATH: 0
DIARRHEA: 0
ABDOMINAL PAIN: 0

## 2024-08-13 NOTE — PROGRESS NOTES
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, without long-term current use of insulin ([facility])    Carotid stenosis    Chronic kidney disease, stage 3a ([facility])    Aortic stenosis, severe    Hyperlipidemia    H/O agent Orange exposure    Diabetes mellitus, type 2 ([facility])    CAD (coronary artery disease)    Anemia    Proteinuria    Wears hearing aid in both ears    Acute on chronic systolic heart failure ([facility])    S/P TAVR (transcatheter aortic valve replacement)    Second hand smoke exposure    Peripheral arterial disease ([facility])    Heavy sensation of lower extremity    Impaired ambulation    Acute respiratory failure with hypoxia ([facility])    NSTEMI (non-ST elevated myocardial infarction) ([facility])    BJ (acute kidney injury) ([facility])    Obesity    Hypoxia    Incarcerated umbilical hernia    CHF (congestive heart failure), NYHA class I, acute on chronic, combined ([facility])    Shortness of breath    Adrenal cyst ([facility])    Type 2 diabetes mellitus with chronic kidney disease    Typical angina ([facility])         IMPRESSION & RECOMMENDATIONS:    -CAD- History of stents , LAD , D1 , OM 1. Rcent cardi acth - nonobstructive and diffuse vessel disease. Patent stents.   No chest pain, continue current medications  Continue current medications  S/p TAVR, doing well    -Aortic stenosis status post TAVR -   -Last ECHO reviewed. LVEF 15-20%. Optimize GDMT. LV offered but declined. reviewed in detail with patient medical management, med compliance, diet, exercise, and recommendations for [employer]. Questions/concern addressed.  Repeat limited echo in 90 days on OMT to reassess LVEF. June 1st.    ASCVD and T2D- on GLP1  -hypertension- well controlled, will continue current medications. Discussed importance of ambulatory

## 2024-08-13 NOTE — PATIENT INSTRUCTIONS
Stop metformin   Start glimepiride 2 mg once daily   Start Trulicity 0.75 mg once weekly   Monitor glucose 4 times daily   Call in 1 week if numbers are not trending less than 150

## 2024-08-13 NOTE — PROGRESS NOTES
[facility]  [address] [address] [phone]        HISTORY:    Jose Enrique Rodas presents today for evaluation and management of:  Chief Complaint   Patient presents with    Diabetes       Patient Care Team:  Ruth Rhodes APRN - CNP as PCP - General (Nurse Practitioner)  Ruth Rhodes APRN - CNP as PCP [facility]  Robbie Duckworth MD (Nephrology)  Rommel Cueva DO as Consulting Physician (Cardiology)  Manfred Hernandez MD (General Surgery)      Interval History:    Past DM Medications   Actos-therapy completed  Onglyza- therapy completed  Glipizide therapy completed  Rybelsus 7 mg- compliance  Ozempic -gi issues     Current Diabetic Medications  Metformin 1000 mg bid  Jardiance 25 mg once daily        DKA episodes: 0    04/03/24   Jose Enrique Rodas is a 78 y.o. male patient who presents to clinic today for his diabetes. he has a history of CAD, CHF, nephropathy, retinopathy which contributes to his diabetes. At previous visit diabetes counseling was provided. he denies any current signs or symptoms of hyper/hypoglycemia. he states they are taking their medications as prescribed without any adverse effects.   Diet: regular  Exercise: walking pickleball  BS testing: uses cgm daily with success and is adherent to cgm therapy  Hypoglycemia: no  Hypoglycemia as needed treatment: snack  Issues:   Diabetic foot exam up-to-date: Yes  Diabetic retinal exam up-to-date: Yes     Diabetes complications:nephropathy, retinopathy, and cardiovascular disease    08/13/24   Jose Enrique Rodas is a 79 y.o. male patient who presents to clinic today for his diabetes. he has a history of CAD, CHF, nephropathy, retinopathy which contributes to his diabetes. At previous visit ozempic was stopped and jardiance increased. he denies any current signs or symptoms of hyper/hypoglycemia. he states they

## 2024-08-16 ENCOUNTER — HOSPITAL ENCOUNTER (OUTPATIENT)
Age: 79
Discharge: HOME | End: 2024-08-16
Payer: MEDICARE

## 2024-08-16 ENCOUNTER — TELEPHONE (OUTPATIENT)
Dept: INTERNAL MEDICINE | Age: 79
End: 2024-08-16

## 2024-08-16 ENCOUNTER — HOSPITAL ENCOUNTER (OUTPATIENT)
Dept: OTHER | Age: 79
Discharge: HOME | End: 2024-08-16
Payer: MEDICARE

## 2024-08-16 ENCOUNTER — HOSPITAL ENCOUNTER (OUTPATIENT)
Age: 79
Setting detail: OBSERVATION
Discharge: HOME | End: 2024-08-17
Attending: EMERGENCY MEDICINE | Admitting: FAMILY MEDICINE
Payer: MEDICARE

## 2024-08-16 VITALS
OXYGEN SATURATION: 96 % | HEIGHT: 65 IN | WEIGHT: 153.6 LBS | BODY MASS INDEX: 25.59 KG/M2 | HEART RATE: 74 BPM | DIASTOLIC BLOOD PRESSURE: 68 MMHG | RESPIRATION RATE: 20 BRPM | SYSTOLIC BLOOD PRESSURE: 134 MMHG

## 2024-08-16 DIAGNOSIS — R73.9 HYPERGLYCEMIA: ICD-10-CM

## 2024-08-16 DIAGNOSIS — E87.5 HYPERKALEMIA: Primary | ICD-10-CM

## 2024-08-16 DIAGNOSIS — N17.9 ACUTE KIDNEY INJURY (HCC): ICD-10-CM

## 2024-08-16 DIAGNOSIS — N18.31 CHRONIC KIDNEY DISEASE, STAGE 3A (HCC): ICD-10-CM

## 2024-08-16 PROBLEM — I20.9 TYPICAL ANGINA (HCC): Status: RESOLVED | Noted: 2023-02-07 | Resolved: 2024-08-16

## 2024-08-16 PROBLEM — E86.0 DEHYDRATION: Status: ACTIVE | Noted: 2024-08-16

## 2024-08-16 PROBLEM — Z96.1 PSEUDOPHAKIA: Status: ACTIVE | Noted: 2024-08-16

## 2024-08-16 PROBLEM — Z79.4 TYPE 2 DIABETES MELLITUS WITH HYPERGLYCEMIA, WITH LONG-TERM CURRENT USE OF INSULIN (HCC): Status: ACTIVE | Noted: 2024-08-16

## 2024-08-16 PROBLEM — I21.4 NSTEMI (NON-ST ELEVATED MYOCARDIAL INFARCTION) (HCC): Status: RESOLVED | Noted: 2021-12-22 | Resolved: 2024-08-16

## 2024-08-16 PROBLEM — E11.65 TYPE 2 DIABETES MELLITUS WITH HYPERGLYCEMIA, WITH LONG-TERM CURRENT USE OF INSULIN (HCC): Status: ACTIVE | Noted: 2024-08-16

## 2024-08-16 PROBLEM — J96.01 ACUTE RESPIRATORY FAILURE WITH HYPOXIA (HCC): Status: RESOLVED | Noted: 2021-12-22 | Resolved: 2024-08-16

## 2024-08-16 PROBLEM — I35.0 AORTIC STENOSIS, SEVERE: Status: RESOLVED | Noted: 2019-12-03 | Resolved: 2024-08-16

## 2024-08-16 LAB
ANION GAP SERPL CALCULATED.3IONS-SCNC: 10 MMOL/L (ref 9–17)
BASOPHILS # BLD: 0.08 K/UL (ref 0–0.2)
BASOPHILS NFR BLD: 1 % (ref 0–2)
BUN SERPL-MCNC: 42 MG/DL (ref 8–23)
BUN/CREAT SERPL: 25 (ref 9–20)
CALCIUM SERPL-MCNC: 9.1 MG/DL (ref 8.6–10.4)
CHLORIDE SERPL-SCNC: 103 MMOL/L (ref 98–107)
CO2 SERPL-SCNC: 24 MMOL/L (ref 20–31)
CREAT SERPL-MCNC: 1.7 MG/DL (ref 0.7–1.2)
EOSINOPHIL # BLD: 0.35 K/UL (ref 0–0.44)
EOSINOPHILS RELATIVE PERCENT: 4 % (ref 1–4)
ERYTHROCYTE [DISTWIDTH] IN BLOOD BY AUTOMATED COUNT: 14 % (ref 11.8–14.4)
GFR, ESTIMATED: 41 ML/MIN/1.73M2
GLUCOSE BLD-MCNC: 235 MG/DL (ref 75–110)
GLUCOSE BLD-MCNC: 77 MG/DL (ref 75–110)
GLUCOSE SERPL-MCNC: 539 MG/DL (ref 70–99)
HCT VFR BLD AUTO: 39 % (ref 40.7–50.3)
HGB BLD-MCNC: 12.7 G/DL (ref 13–17)
IMM GRANULOCYTES # BLD AUTO: 0.06 K/UL (ref 0–0.3)
IMM GRANULOCYTES NFR BLD: 1 %
LYMPHOCYTES NFR BLD: 1.38 K/UL (ref 1.1–3.7)
LYMPHOCYTES RELATIVE PERCENT: 14 % (ref 24–43)
MCH RBC QN AUTO: 27 PG (ref 25.2–33.5)
MCHC RBC AUTO-ENTMCNC: 32.6 G/DL (ref 25.2–33.5)
MCV RBC AUTO: 82.8 FL (ref 82.6–102.9)
MONOCYTES NFR BLD: 0.89 K/UL (ref 0.1–1.2)
MONOCYTES NFR BLD: 9 % (ref 3–12)
NEUTROPHILS NFR BLD: 71 % (ref 36–65)
NEUTS SEG NFR BLD: 6.8 K/UL (ref 1.5–8.1)
NRBC BLD-RTO: 0 PER 100 WBC
PLATELET # BLD AUTO: 241 K/UL (ref 138–453)
PMV BLD AUTO: 10 FL (ref 8.1–13.5)
POTASSIUM SERPL-SCNC: 5.5 MMOL/L (ref 3.7–5.3)
POTASSIUM SERPL-SCNC: 6 MMOL/L (ref 3.7–5.3)
RBC # BLD AUTO: 4.71 M/UL (ref 4.21–5.77)
SODIUM SERPL-SCNC: 137 MMOL/L (ref 135–144)
WBC OTHER # BLD: 9.6 K/UL (ref 3.5–11.3)

## 2024-08-16 PROCEDURE — 2580000003 HC RX 258: Performed by: NURSE PRACTITIONER

## 2024-08-16 PROCEDURE — 99211 OFF/OP EST MAY X REQ PHY/QHP: CPT

## 2024-08-16 PROCEDURE — 93005 ELECTROCARDIOGRAM TRACING: CPT | Performed by: EMERGENCY MEDICINE

## 2024-08-16 PROCEDURE — 2580000003 HC RX 258: Performed by: EMERGENCY MEDICINE

## 2024-08-16 PROCEDURE — 82947 ASSAY GLUCOSE BLOOD QUANT: CPT

## 2024-08-16 PROCEDURE — 99222 1ST HOSP IP/OBS MODERATE 55: CPT | Performed by: NURSE PRACTITIONER

## 2024-08-16 PROCEDURE — 96360 HYDRATION IV INFUSION INIT: CPT

## 2024-08-16 PROCEDURE — G0378 HOSPITAL OBSERVATION PER HR: HCPCS

## 2024-08-16 PROCEDURE — 85025 COMPLETE CBC W/AUTO DIFF WBC: CPT

## 2024-08-16 PROCEDURE — 99285 EMERGENCY DEPT VISIT HI MDM: CPT

## 2024-08-16 PROCEDURE — 80048 BASIC METABOLIC PNL TOTAL CA: CPT

## 2024-08-16 PROCEDURE — 36415 COLL VENOUS BLD VENIPUNCTURE: CPT

## 2024-08-16 PROCEDURE — 6360000002 HC RX W HCPCS: Performed by: NURSE PRACTITIONER

## 2024-08-16 PROCEDURE — 96374 THER/PROPH/DIAG INJ IV PUSH: CPT

## 2024-08-16 PROCEDURE — 96372 THER/PROPH/DIAG INJ SC/IM: CPT

## 2024-08-16 PROCEDURE — 6370000000 HC RX 637 (ALT 250 FOR IP): Performed by: EMERGENCY MEDICINE

## 2024-08-16 PROCEDURE — 96361 HYDRATE IV INFUSION ADD-ON: CPT

## 2024-08-16 PROCEDURE — 84132 ASSAY OF SERUM POTASSIUM: CPT

## 2024-08-16 RX ORDER — INSULIN LISPRO 100 [IU]/ML
0-4 INJECTION, SOLUTION INTRAVENOUS; SUBCUTANEOUS NIGHTLY
Status: DISCONTINUED | OUTPATIENT
Start: 2024-08-16 | End: 2024-08-17 | Stop reason: HOSPADM

## 2024-08-16 RX ORDER — SODIUM CHLORIDE 0.9 % (FLUSH) 0.9 %
5-40 SYRINGE (ML) INJECTION PRN
Status: DISCONTINUED | OUTPATIENT
Start: 2024-08-16 | End: 2024-08-17 | Stop reason: HOSPADM

## 2024-08-16 RX ORDER — ACETAMINOPHEN 650 MG/1
650 SUPPOSITORY RECTAL EVERY 6 HOURS PRN
Status: DISCONTINUED | OUTPATIENT
Start: 2024-08-16 | End: 2024-08-17 | Stop reason: HOSPADM

## 2024-08-16 RX ORDER — CARVEDILOL 3.12 MG/1
6.25 TABLET ORAL 2 TIMES DAILY WITH MEALS
Status: DISCONTINUED | OUTPATIENT
Start: 2024-08-16 | End: 2024-08-17 | Stop reason: HOSPADM

## 2024-08-16 RX ORDER — VITAMIN B COMPLEX
1000 TABLET ORAL DAILY
Status: DISCONTINUED | OUTPATIENT
Start: 2024-08-17 | End: 2024-08-17 | Stop reason: HOSPADM

## 2024-08-16 RX ORDER — ONDANSETRON 2 MG/ML
4 INJECTION INTRAMUSCULAR; INTRAVENOUS EVERY 6 HOURS PRN
Status: DISCONTINUED | OUTPATIENT
Start: 2024-08-16 | End: 2024-08-17 | Stop reason: HOSPADM

## 2024-08-16 RX ORDER — ENOXAPARIN SODIUM 100 MG/ML
40 INJECTION SUBCUTANEOUS DAILY
Status: DISCONTINUED | OUTPATIENT
Start: 2024-08-16 | End: 2024-08-17 | Stop reason: HOSPADM

## 2024-08-16 RX ORDER — INSULIN LISPRO 100 [IU]/ML
0-8 INJECTION, SOLUTION INTRAVENOUS; SUBCUTANEOUS
Status: DISCONTINUED | OUTPATIENT
Start: 2024-08-17 | End: 2024-08-17 | Stop reason: HOSPADM

## 2024-08-16 RX ORDER — ONDANSETRON 4 MG/1
4 TABLET, ORALLY DISINTEGRATING ORAL EVERY 8 HOURS PRN
Status: DISCONTINUED | OUTPATIENT
Start: 2024-08-16 | End: 2024-08-17 | Stop reason: HOSPADM

## 2024-08-16 RX ORDER — SODIUM CHLORIDE 9 MG/ML
INJECTION, SOLUTION INTRAVENOUS CONTINUOUS
Status: DISCONTINUED | OUTPATIENT
Start: 2024-08-16 | End: 2024-08-17 | Stop reason: HOSPADM

## 2024-08-16 RX ORDER — GLUCAGON 1 MG/ML
1 KIT INJECTION PRN
Status: DISCONTINUED | OUTPATIENT
Start: 2024-08-16 | End: 2024-08-17 | Stop reason: HOSPADM

## 2024-08-16 RX ORDER — POLYETHYLENE GLYCOL 3350 17 G/17G
17 POWDER, FOR SOLUTION ORAL DAILY PRN
Status: DISCONTINUED | OUTPATIENT
Start: 2024-08-16 | End: 2024-08-17 | Stop reason: HOSPADM

## 2024-08-16 RX ORDER — SODIUM CHLORIDE 0.9 % (FLUSH) 0.9 %
5-40 SYRINGE (ML) INJECTION EVERY 12 HOURS SCHEDULED
Status: DISCONTINUED | OUTPATIENT
Start: 2024-08-16 | End: 2024-08-17 | Stop reason: HOSPADM

## 2024-08-16 RX ORDER — MECLIZINE HCL 12.5 MG/1
25 TABLET ORAL 3 TIMES DAILY PRN
Status: DISCONTINUED | OUTPATIENT
Start: 2024-08-16 | End: 2024-08-17 | Stop reason: HOSPADM

## 2024-08-16 RX ORDER — DEXTROSE MONOHYDRATE 100 MG/ML
INJECTION, SOLUTION INTRAVENOUS CONTINUOUS PRN
Status: DISCONTINUED | OUTPATIENT
Start: 2024-08-16 | End: 2024-08-17 | Stop reason: HOSPADM

## 2024-08-16 RX ORDER — ASPIRIN 81 MG/1
81 TABLET, CHEWABLE ORAL DAILY
Status: DISCONTINUED | OUTPATIENT
Start: 2024-08-17 | End: 2024-08-17 | Stop reason: HOSPADM

## 2024-08-16 RX ORDER — SODIUM CHLORIDE 9 MG/ML
INJECTION, SOLUTION INTRAVENOUS PRN
Status: DISCONTINUED | OUTPATIENT
Start: 2024-08-16 | End: 2024-08-17 | Stop reason: HOSPADM

## 2024-08-16 RX ORDER — GLIPIZIDE 5 MG/1
5 TABLET ORAL
Status: DISCONTINUED | OUTPATIENT
Start: 2024-08-17 | End: 2024-08-17 | Stop reason: HOSPADM

## 2024-08-16 RX ORDER — 0.9 % SODIUM CHLORIDE 0.9 %
1000 INTRAVENOUS SOLUTION INTRAVENOUS ONCE
Status: COMPLETED | OUTPATIENT
Start: 2024-08-16 | End: 2024-08-16

## 2024-08-16 RX ORDER — ACETAMINOPHEN 325 MG/1
650 TABLET ORAL EVERY 6 HOURS PRN
Status: DISCONTINUED | OUTPATIENT
Start: 2024-08-16 | End: 2024-08-17 | Stop reason: HOSPADM

## 2024-08-16 RX ORDER — CLOPIDOGREL BISULFATE 75 MG/1
75 TABLET ORAL DAILY
Status: DISCONTINUED | OUTPATIENT
Start: 2024-08-17 | End: 2024-08-17 | Stop reason: HOSPADM

## 2024-08-16 RX ADMIN — SODIUM CHLORIDE: 9 INJECTION, SOLUTION INTRAVENOUS at 20:43

## 2024-08-16 RX ADMIN — INSULIN HUMAN 10 UNITS: 100 INJECTION, SOLUTION PARENTERAL at 16:35

## 2024-08-16 RX ADMIN — SODIUM CHLORIDE 1000 ML: 9 INJECTION, SOLUTION INTRAVENOUS at 16:34

## 2024-08-16 RX ADMIN — SODIUM CHLORIDE, PRESERVATIVE FREE 10 ML: 5 INJECTION INTRAVENOUS at 20:43

## 2024-08-16 RX ADMIN — ENOXAPARIN SODIUM 40 MG: 100 INJECTION SUBCUTANEOUS at 20:50

## 2024-08-16 ASSESSMENT — PAIN - FUNCTIONAL ASSESSMENT: PAIN_FUNCTIONAL_ASSESSMENT: NONE - DENIES PAIN

## 2024-08-16 NOTE — TELEPHONE ENCOUNTER
His potassium is very high at 6.0. This puts him at risk for heart rhythm problems. His kidney function is not much better than it was last week. With that and his blood sugar being in the 500s he should go to the emergency room to get checked out.

## 2024-08-16 NOTE — TELEPHONE ENCOUNTER
AMB US Pelvic Non OB  Date/Time: 3/14/2018 2:16 PM  Performed by: Mago Mclaughlin  Authorized by: Melina Bangura     Procedure details:     Indications: non-obstetric vaginal bleeding      Technique:  Transvaginal US, Non-OB    Position: lithotomy exam    Uterine findings:     Diameter (mm):  51    Length (mm):  83    Width (mm):  72    Myomas: identified      Endometrium thickness (mm):  13 9  Left ovary findings:     Left ovary:  Visualized    Diameter (mm):  18 5    Length (mm):  37 9    Width (mm):  20 3  Right ovary findings:     Right ovary:  Visualized    Diameter (mm):  18 8    Length (mm):  40 2    Width (mm):  27  Other findings:     Free pelvic fluid: not identified      Free peritoneal fluid: not identified    Post-Procedure Details:     Impression:  Anteverted uterus is inhomogeneous throughout with a posterior intramural fibroid at the fundus,3 0cm (previously 2 1cm) and an additional 1 9cm posterior intramural fibroid not previously noted is also identified  The endometrium appears thickened and echogenic  Bilateral ovaries appear within normal limits  No free fluid  Additional Procedure Comments: The patient has had a tubal ligation as a form of birth control  Sonohysterogram  Date/Time: 3/14/2018 2:19 PM  Performed by: Alexia Rick by: Melina Bangura     Consent:     Consent obtained:  Written    Consent given by:  Patient    Patient questions answered: yes    Pre-procedure:     Prepped with: Betadine    Procedure:     Cervix cleaned and prepped: yes      Catheter inserted: yes      Uterine cavity distended with saline: yes    Post-procedure:     No complications: no      Post procedure instructions given to patient: yes      Patient tolerated procedure well with no complications: yes    Comments:      Sonohysterogram demonstrates a smooth appearing endometrium     Endometrial biopsy  Date/Time: 3/14/2018 2:19 PM  Performed by: Melina Bangura  Authorized by: Patient did lab wowrk today. Had been taken off water pill and  he needs to know, today, whther he needs to re-start it or what to do for the weekend. Please call him today [phone]   Catracho Rosario     Consent:     Consent obtained:  Written    Consent given by:  Patient    Patient questions answered: yes      Patient agrees, verbalizes understanding, and wants to proceed: yes    Indication:     Indications:  Other disorder of menstruation and other abnormal bleeding from female genital tract    Procedure:     Procedure: endometrial biopsy with Pipelle      A bivalve speculum was placed in the vagina: yes      Cervix cleaned and prepped: yes      The cervix was dilated: yes

## 2024-08-16 NOTE — TELEPHONE ENCOUNTER
Lab called.  Patient hs critical Glucose of 539 , also, see encounter done earlier in the day and advise.  Thank you

## 2024-08-16 NOTE — ED NOTES
ED Report    Presents to ED from: Home    Chief Complaint   Patient presents with    Abnormal Lab     Potassium 6.0     1. Hyperkalemia    2. Acute kidney injury (HCC)    3. Hyperglycemia      Present Condition: stable  Pertinent Event(s): pt here for potassium of 6.0 given a liter of fluid, insulin and a repeat potassium was 5.5.     LOC:  A+O x 4  Code Status: FULL    Vital Signs   Vitals:    08/16/24 1551   BP: (!) 151/76   Pulse: 50   Resp: 16   Temp: 98 °F (36.7 °C)   TempSrc: Tympanic   SpO2: 98%     Oxygen Baseline: Room Air       Current Oxygen Needs: Room Air  Mobility: Independent       Mobility Aide: Independent    LDAs:   Peripheral IV 08/16/24 Left Hand (Active)   Site Assessment Clean, dry & intact 08/16/24 1631   Line Status Brisk blood return 08/16/24 1631   Line Care Connections checked and tightened 08/16/24 1631   Dressing Status Clean, dry & intact 08/16/24 1631   Dressing Type Transparent 08/16/24 1631       Incision 05/16/22 Umbilicus (Active)     Abnormal ED Labs:    Labs Reviewed   CBC WITH AUTO DIFFERENTIAL - Abnormal; Notable for the following components:       Result Value    Hemoglobin 12.7 (*)     Hematocrit 39.0 (*)     Neutrophils % 71 (*)     Lymphocytes % 14 (*)     Immature Granulocytes % 1 (*)     All other components within normal limits   POTASSIUM - Abnormal; Notable for the following components:    Potassium 5.5 (*)     All other components within normal limits   POC GLUCOSE FINGERSTICK   POCT GLUCOSE     Imaging:    No orders to display     Medications Administered         insulin regular (HumuLIN R;NovoLIN R) injection 10 Units Admin Date  08/16/2024 Action  Given Dose  10 Units Rate   Route  IntraVENous Documented By  Lydia Baig, RN        sodium chloride 0.9 % bolus 1,000 mL Admin Date  08/16/2024 Action  New Bag Dose  1,000 mL Rate  1,000 mL/hr Route  IntraVENous Documented By  Lydia Baig, ANA MARIA          Medication Reconciliation Completed: Yes      Consults:     [x] Hospitalist  Completed     [x] yes     [] no    [] Cardiology  Completed     [] yes     [] no    [] General Surgery  Completed     [] yes     [] no                 [] Orthopedics  Completed     [] yes     [] no    [] OB/GYN   Completed     [] yes     [] no    [] Oncology   Completed     [] yes     [] no    [] Neurology   Completed     [] yes     [] no    [] Podiatry   Completed     [] yes     [] no    [] Urology   Completed     [] yes     [] no         Electronically signed by Leann Mai RN on 8/16/2024 at 6:50 PM

## 2024-08-16 NOTE — ED PROVIDER NOTES
[facility]  eMERGENCY dEPARTMENT eNCOUnter      Pt Name: Jose Enrique Rodas  MRN: [id number]  Birthdate 1945  Date of evaluation: 8/16/2024      CHIEF COMPLAINT       Chief Complaint   Patient presents with    Abnormal Lab     Potassium 6.0         HISTORY OF PRESENT ILLNESS    Jose Enrique Rodas is a 79 y.o. male who presents with an abnormal BUN/creatinine and abnormal potassium he had labs done last week that showed a BUN of 61 and a creatinine of 1.8 with a potassium of 5.5 they stopped his antihypertensives and also his metformin a redo labs today CHF clinic a he is BUN is Down to 41 and his creatinine is 1.7 but his potassium is 6 he has no symptoms blood sugar was also noted to be 539  Patient says he had some problems controlling his blood sugar recently he is he had been tried on Ozempic which gave him a stomachache.  He had been taken off of his metformin he is currently now on Jardiance  Patient says he feels fine he had labs drawn though that showed the potassium elevated and still the elevated creatinine is sent him over here patient said he had just eaten potato soup and a grilled cheese sandwich before he had his blood work done today.  He is recently checked his sugars down to the 260s  Patient says he has no other complaints feels fine he has been playing [employer] ball 3 days a week and he challenged me to a match  REVIEW OF SYSTEMS         PAST MEDICAL HISTORY    has a past medical history of Acne rosacea, Anemia, Aortic stenosis, BPH (benign prostatic hypertrophy), CAD (coronary artery disease), Carotid stenosis, Cataract of both eyes, CRI (chronic renal insufficiency), Diabetes mellitus, type 2 (HCC), Dyslipidemia, Erectile dysfunction, H/O agent Orange exposure, Hearing loss, Hyperlipidemia, Hypertension, Mild nonproliferative diabetic retinopathy (HCC), Non-rheumatic mitral regurgitation, Pneumonia, Pneumonia of right lower lobe due to infectious organism, RBBB, Sepsis ([facility]), Status

## 2024-08-16 NOTE — PROGRESS NOTES
Patient and wife here for CHF education visit.  Reviewed CHF Zone tool worksheet with them and they verbalized understanding.  Patient is weighing self at home, but states he forgot today.  He had recent lab work and visit with diabetes educator and cardiology.  Patient reports that Aldactone was stopped and Lasix has been on hold for 3 days based on labs; he is repeating labs today.  Patient's weight is up 4 lbs since visit with diabetes educator on 8-13-24.  Instructed patient to get labs drawn and then go to PCP office to ask about further instructions for Lasix; patient and wife verbalized understanding.  Patient denies shortness of breath, no edema noted of bilateral lower extremities.  Patient has follow up appointment with diabetes educator on 10-3-24.       Verbally reviewed importance of medication compliance with patient; patient verbalized understanding.    Discussed 2000mg/day sodium restricted diet; patient verbalized understanding.  Patient and wife both state they do not salt foods when cooking or at the table.     Moderate daily exercise encouraged as tolerated. Discussed rest breaks as needed; patient verbalized understanding.  Patient continues to play [employer] ball 2-3 times per week and also walks 2-3 times per week.    Patient instructed to weigh self at the same time of each day, using same clothes and same scale; reinforced teaching to monitor for 2-3 lb weight increase over 1-2 days, and to notify the CHF clinic at [phone] or [phone] or physician office if weight change noted.  Patient verbalized understanding.    Signs and symptoms of CHF discussed with patient, such as feeling more tired than normal, feeling short of breath, coughing that increases when you lie down, sudden weight gain, swelling of your feet, legs or belly.  Patient verbalized understanding to notify the CHF clinic at [phone] or [phone] or physician office if these symptoms occur.    Compliance

## 2024-08-16 NOTE — TELEPHONE ENCOUNTER
LM on patient phone and try to call patient wife    Got a hold of patient wife . She is bring to ER now.

## 2024-08-17 ENCOUNTER — APPOINTMENT (OUTPATIENT)
Dept: GENERAL RADIOLOGY | Age: 79
End: 2024-08-17
Payer: MEDICARE

## 2024-08-17 VITALS
TEMPERATURE: 98.2 F | HEIGHT: 65 IN | DIASTOLIC BLOOD PRESSURE: 83 MMHG | OXYGEN SATURATION: 96 % | BODY MASS INDEX: 25.87 KG/M2 | HEART RATE: 43 BPM | SYSTOLIC BLOOD PRESSURE: 132 MMHG | WEIGHT: 155.3 LBS | RESPIRATION RATE: 17 BRPM

## 2024-08-17 LAB
ANION GAP SERPL CALCULATED.3IONS-SCNC: 8 MMOL/L (ref 9–17)
ANION GAP SERPL CALCULATED.3IONS-SCNC: 9 MMOL/L (ref 9–17)
BASOPHILS # BLD: 0.07 K/UL (ref 0–0.2)
BASOPHILS NFR BLD: 1 % (ref 0–2)
BUN SERPL-MCNC: 32 MG/DL (ref 8–23)
BUN SERPL-MCNC: 37 MG/DL (ref 8–23)
BUN/CREAT SERPL: 21 (ref 9–20)
BUN/CREAT SERPL: 26 (ref 9–20)
CALCIUM SERPL-MCNC: 8.3 MG/DL (ref 8.6–10.4)
CALCIUM SERPL-MCNC: 8.4 MG/DL (ref 8.6–10.4)
CHLORIDE SERPL-SCNC: 107 MMOL/L (ref 98–107)
CHLORIDE SERPL-SCNC: 110 MMOL/L (ref 98–107)
CO2 SERPL-SCNC: 23 MMOL/L (ref 20–31)
CO2 SERPL-SCNC: 23 MMOL/L (ref 20–31)
CREAT SERPL-MCNC: 1.4 MG/DL (ref 0.7–1.2)
CREAT SERPL-MCNC: 1.5 MG/DL (ref 0.7–1.2)
EOSINOPHIL # BLD: 0.51 K/UL (ref 0–0.44)
EOSINOPHILS RELATIVE PERCENT: 6 % (ref 1–4)
ERYTHROCYTE [DISTWIDTH] IN BLOOD BY AUTOMATED COUNT: 14.1 % (ref 11.8–14.4)
GFR, ESTIMATED: 47 ML/MIN/1.73M2
GFR, ESTIMATED: 51 ML/MIN/1.73M2
GLUCOSE BLD-MCNC: 143 MG/DL (ref 75–110)
GLUCOSE SERPL-MCNC: 139 MG/DL (ref 70–99)
GLUCOSE SERPL-MCNC: 242 MG/DL (ref 70–99)
HCT VFR BLD AUTO: 36.5 % (ref 40.7–50.3)
HGB BLD-MCNC: 11.8 G/DL (ref 13–17)
IMM GRANULOCYTES # BLD AUTO: 0.06 K/UL (ref 0–0.3)
IMM GRANULOCYTES NFR BLD: 1 %
LYMPHOCYTES NFR BLD: 1.26 K/UL (ref 1.1–3.7)
LYMPHOCYTES RELATIVE PERCENT: 15 % (ref 24–43)
MAGNESIUM SERPL-MCNC: 2.3 MG/DL (ref 1.6–2.6)
MCH RBC QN AUTO: 27.1 PG (ref 25.2–33.5)
MCHC RBC AUTO-ENTMCNC: 32.3 G/DL (ref 25.2–33.5)
MCV RBC AUTO: 83.9 FL (ref 82.6–102.9)
MONOCYTES NFR BLD: 0.77 K/UL (ref 0.1–1.2)
MONOCYTES NFR BLD: 9 % (ref 3–12)
NEUTROPHILS NFR BLD: 68 % (ref 36–65)
NEUTS SEG NFR BLD: 5.96 K/UL (ref 1.5–8.1)
NRBC BLD-RTO: 0 PER 100 WBC
PLATELET # BLD AUTO: 231 K/UL (ref 138–453)
PMV BLD AUTO: 10.5 FL (ref 8.1–13.5)
POTASSIUM SERPL-SCNC: 4.8 MMOL/L (ref 3.7–5.3)
POTASSIUM SERPL-SCNC: 5.3 MMOL/L (ref 3.7–5.3)
RBC # BLD AUTO: 4.35 M/UL (ref 4.21–5.77)
SODIUM SERPL-SCNC: 138 MMOL/L (ref 135–144)
SODIUM SERPL-SCNC: 142 MMOL/L (ref 135–144)
TSH SERPL DL<=0.05 MIU/L-ACNC: 2.19 UIU/ML (ref 0.3–5)
WBC OTHER # BLD: 8.6 K/UL (ref 3.5–11.3)

## 2024-08-17 PROCEDURE — 96361 HYDRATE IV INFUSION ADD-ON: CPT

## 2024-08-17 PROCEDURE — 36415 COLL VENOUS BLD VENIPUNCTURE: CPT

## 2024-08-17 PROCEDURE — 83735 ASSAY OF MAGNESIUM: CPT

## 2024-08-17 PROCEDURE — G0378 HOSPITAL OBSERVATION PER HR: HCPCS

## 2024-08-17 PROCEDURE — 96372 THER/PROPH/DIAG INJ SC/IM: CPT

## 2024-08-17 PROCEDURE — 99239 HOSP IP/OBS DSCHRG MGMT >30: CPT | Performed by: FAMILY MEDICINE

## 2024-08-17 PROCEDURE — 82947 ASSAY GLUCOSE BLOOD QUANT: CPT

## 2024-08-17 PROCEDURE — 84443 ASSAY THYROID STIM HORMONE: CPT

## 2024-08-17 PROCEDURE — 80048 BASIC METABOLIC PNL TOTAL CA: CPT

## 2024-08-17 PROCEDURE — 6370000000 HC RX 637 (ALT 250 FOR IP): Performed by: NURSE PRACTITIONER

## 2024-08-17 PROCEDURE — 71045 X-RAY EXAM CHEST 1 VIEW: CPT

## 2024-08-17 PROCEDURE — 85025 COMPLETE CBC W/AUTO DIFF WBC: CPT

## 2024-08-17 PROCEDURE — 6360000002 HC RX W HCPCS: Performed by: NURSE PRACTITIONER

## 2024-08-17 RX ADMIN — CHOLECALCIFEROL TAB 25 MCG (1000 UNIT) 1000 UNITS: 25 TAB at 08:47

## 2024-08-17 RX ADMIN — EMPAGLIFLOZIN 25 MG: 10 TABLET, FILM COATED ORAL at 08:47

## 2024-08-17 RX ADMIN — ENOXAPARIN SODIUM 40 MG: 100 INJECTION SUBCUTANEOUS at 08:46

## 2024-08-17 RX ADMIN — CARVEDILOL 6.25 MG: 3.12 TABLET, FILM COATED ORAL at 08:47

## 2024-08-17 RX ADMIN — GLIPIZIDE 5 MG: 5 TABLET ORAL at 06:28

## 2024-08-17 RX ADMIN — CLOPIDOGREL BISULFATE 75 MG: 75 TABLET ORAL at 08:47

## 2024-08-17 RX ADMIN — ASPIRIN 81 MG: 81 TABLET, CHEWABLE ORAL at 08:47

## 2024-08-17 NOTE — DISCHARGE SUMMARY
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, without long-term current use of insulin ([facility])    Carotid stenosis    Chronic kidney disease, stage 3a ([facility])    Hyperlipidemia    H/O agent Orange exposure    Diabetes mellitus, type 2 ([facility])    CAD (coronary artery disease)    Anemia    Proteinuria    Wears hearing aid in both ears    Acute on chronic systolic heart failure ([facility])    S/P TAVR (transcatheter aortic valve replacement)    Second hand smoke exposure    Peripheral arterial disease ([facility])    Heavy sensation of lower extremity    Impaired ambulation    Obesity    Hypoxia    CHF (congestive heart failure), NYHA class I, acute on chronic, combined ([facility])    Shortness of breath    Adrenal cyst ([facility])    Type 2 diabetes mellitus with chronic kidney disease    Type 2 diabetes mellitus with hyperglycemia    Hyperkalemia    Dehydration    Type 2 diabetes mellitus with hyperglycemia, with long-term current use of insulin ([facility])    Pseudophakia        Admission Condition:  fair      Discharged Condition:  good    Hospital Course/Treatments   admitted with hyperkalemia and elevated sugar, pt was started on trulicity and jardiance and amaryl, pt metformin was d/c due to intolerance to same. Pt k was high  pt received litre of fluids  followed by 10 unit of reg insulin, pt sugar came down significantly,  well-defined.  No pneumothorax is identified. Underpenetration of the spine and lower ribs.  No endotracheal tube or nasogastric tube.     Cardiac silhouette remains enlarged although the severity may be exaggerated by the technique. Stable appearance of the pulmonary vasculature, central pulmonary arteries, and interstitium.  Minimal congestive changes are not excluded but there is no overt edema.       Disposition:   home    Discharge Instructions:  Activity: activity as tolerated  Diet:  regular diet    Follow up with Ruth Rhodes APRN - CNP in 1 weeks.    Signed:  Nelson Paulson MD  8/17/2024, 12:30 PM    Time spent in discharge of this pt is more than 30 minutes in examination,evaluvation,  counseling and review of medication and discharge plan

## 2024-08-17 NOTE — DISCHARGE INSTR - DIET
Good nutrition is important when healing from an illness, injury, or surgery.  Follow any nutrition recommendations given to you during your hospital stay.   Follow a diabetic, cardiac (low fat, low cholesterol, low sodium), 2L fluid restriction, low potassium diet.   If you were given an oral nutrition supplement while in the hospital, continue to take this supplement at home.  You can take it with meals, in-between meals, and/or before bedtime. These supplements can be purchased at most local grocery stores, pharmacies, and chain [employer]-stores.   If you have any questions about your diet or nutrition, call the hospital and ask for the dietitian.

## 2024-08-17 NOTE — PLAN OF CARE
Problem: Discharge Planning  Goal: Discharge to home or other facility with appropriate resources  Outcome: Completed  Flowsheets (Taken 8/17/2024 0854)  Discharge to home or other facility with appropriate resources: Identify barriers to discharge with patient and caregiver     Problem: Chronic Conditions and Co-morbidities  Goal: Patient's chronic conditions and co-morbidity symptoms are monitored and maintained or improved  Outcome: Completed  Flowsheets (Taken 8/17/2024 0854)  Care Plan - Patient's Chronic Conditions and Co-Morbidity Symptoms are Monitored and Maintained or Improved: Monitor and assess patient's chronic conditions and comorbid symptoms for stability, deterioration, or improvement

## 2024-08-17 NOTE — H&P
HOSPITALIST ADMISSION H&P    REASON FOR ADMISSION: Hyperkalemia, dehydration  ESTIMATED LENGTH OF STAY:<2 midnights, 1-2 days    ATTENDING/ADMITTING PHYSICIAN: Nelson Paulson MD    HISTORY OF PRESENT ILLNESS:      The patient is a 79 y.o. male patient of Ruth Rhodes APRN - CNP who presents from the ER. He was sent to the ER after outpatient labs showed serum potassium of 6 and glucose 539. After blood work on 08/12/2024 showed worsening of his CKD, his aldactone was discontinued and his lasix was held. On 08/13/2024, his metformin was discontinued and he was prescribed glimepiride and trulicity, however he has not started the trulicity yet. He continues on jardiance and amaryl. Recheck labs today showed minimal improvement in his renal function, still some dehydration. At the CHF clinic, he was noted to have a 4 pound weight gain since 08/12/2024, but denies any chest pain, shortness of breath, or peripheral edema. Last 2D echo showed EF 15-20%.     In ER, he received 1L IV NS and 10 units IV regular insulin. This brought his glucose down to 77 and then he was provided an evening meal. His serum potassium improved to 5.5. EKG showed sinus bradycardia with first degree AVB and LAFB. Hospital observation was requested to allow for gentle IV hydration given his severe CHF.     Serum creatinine 1.7 from 1.4-1.5 at baseline, BUN elevated at 42.     DMII -- HgbA1C 8.6  Last cardiac cath 03/07/2024 showed patent stents, nonobstructive disease.   Holter monitor 07/18/2022 showed sinus rhythm with HR .   S/p TAVR in 2020  Last 2D echo 02/23/2024 showed EF 15-20%, grade 1 diastolic dysfunction, moderate MR -- patient has repeatedly declined LifeVest or AICD  Chronic anemia -- stable     See below for additional PMH.    Patient wjkl-hmlakprjjj-ulxelbrx-available records reviewed, including, but not limited to ER records, imaging results, lab results, office records, personal records, and OARRS -- no signs  Meds:meclizine, sodium chloride flush, sodium chloride, ondansetron **OR** ondansetron, polyethylene glycol, acetaminophen **OR** acetaminophen, glucose, dextrose bolus **OR** dextrose bolus, glucagon (rDNA), dextrose    PLAN:    1. Hyperkalemia -- improving -- telemetry monitoring, con't gentle IV hydration and glucose control over night and monitor, consider holding beta blocker if not improving, plan to treat further with kayexalate or lokelma as needed  2. CKD stage 3a-b with dehydration -- gentle IV hydration, Monitor I&O, renal function, and fluid balance very closely. Avoid nephrotoxins when possible.  3. DMII with hyperglycemia -- Carb controlled diet, con't home jardiance and amaryl,  insulins, monitor and titrate prn  4. Chronic combined CHF -- EF 15-20% -- with history of flash pulmonary edema -- requires slow IV hydration with close monitoring of fluid balance, AM CXR, cardiac diet, monitor I&O and daily weight  5. RT protocols, IS  6. Home medications reviewed  7. DVT prophylaxis -- lovenox -- consider switching to SC heparin if renal function does not improve overnight  8. See orders     Note that over 55 minutes was spent in reviewing and obtaining history, physical examination and evaluation of the patient, placing orders, providing education, coordinating care, reviewing test results, documenting in the medical record, and discussion/communicating with patient/caregiver/family.    Electronically signed by ESTHER Morel - CNP, NP-C, FNTROY-BC on 8/16/2024 at 10:04 PM  Hospitalist/Nocturnist

## 2024-08-18 ENCOUNTER — FOLLOWUP TELEPHONE ENCOUNTER (OUTPATIENT)
Dept: INPATIENT UNIT | Age: 79
End: 2024-08-18

## 2024-08-18 LAB
EKG ATRIAL RATE: 46 BPM
EKG P AXIS: 43 DEGREES
EKG P-R INTERVAL: 280 MS
EKG Q-T INTERVAL: 440 MS
EKG QRS DURATION: 108 MS
EKG QTC CALCULATION (BAZETT): 385 MS
EKG R AXIS: -50 DEGREES
EKG T AXIS: 141 DEGREES
EKG VENTRICULAR RATE: 46 BPM

## 2024-08-19 ENCOUNTER — TELEPHONE (OUTPATIENT)
Dept: INTERNAL MEDICINE | Age: 79
End: 2024-08-19

## 2024-08-19 NOTE — TELEPHONE ENCOUNTER
Patient was Observation 08/16 through 08/17 for Hyperkalemia.     Would pcp like to see patient in office for a hospital follow up? Please advise

## 2024-08-19 NOTE — TELEPHONE ENCOUNTER
Patients wife Sarah Beth informed and states patient has seen Dr. Guy in the past and she will make patient a follow up appt.

## 2024-08-20 ENCOUNTER — TELEPHONE (OUTPATIENT)
Dept: INTERNAL MEDICINE | Age: 79
End: 2024-08-20

## 2024-08-20 DIAGNOSIS — N18.31 CHRONIC KIDNEY DISEASE, STAGE 3A (HCC): Primary | ICD-10-CM

## 2024-08-20 RX ORDER — ORAL SEMAGLUTIDE 3 MG/1
3 TABLET ORAL DAILY
Qty: 30 TABLET | Refills: 0 | Status: SHIPPED | OUTPATIENT
Start: 2024-08-20

## 2024-08-20 RX ORDER — ORAL SEMAGLUTIDE 3 MG/1
3 TABLET ORAL DAILY
Qty: 30 TABLET | Refills: 0 | Status: SHIPPED | COMMUNITY
Start: 2024-08-20

## 2024-08-20 NOTE — TELEPHONE ENCOUNTER
Writer spoke with patient and his wife.  Informed them [facility] Rybelus 3 MG [facility].  Patient requested that a script for a few days be sent to a local pharmacy as [facility] takes [facility] more to get scripts [facility].  Writer suggested that office can offer a sample, however sample would not be available until Wednesday morning.  Patient stated understanding.     Can you please pend order for a short supply sample of Rybelus 3 MG?

## 2024-08-20 NOTE — TELEPHONE ENCOUNTER
I have [facility] in [facility]mg [facility] after once [facility] please have them call if he tolerates and we will increase to 7mg. I have removed Trulicity from med list.

## 2024-08-20 NOTE — TELEPHONE ENCOUNTER
Pt's wife, Diamond voiced understanding. She is still planning get him an appt for early next year, but will have pt get a follow up BMP as ordered.

## 2024-08-20 NOTE — TELEPHONE ENCOUNTER
Sarah Beth ng in saying  Dr. Duckworth is booked thru 11/2024 and 12/2024 calendar not open yet. Dr Duckworth office informed the patient that they  can have their PCP send those recent lab orders to [facility] to have Dr. Duckworth look at the numbers.  Dr Duckworth fax number is [phone]. Patient is wondering what brayan would like them to do  since patient  number having been good lately

## 2024-08-20 NOTE — TELEPHONE ENCOUNTER
Sarah Beth called in stating that juan has been trying to change patients medication to Trulicity. Per Sarah Beth [facility],     Sarah Beth also states that patient and her were talking and he has tried rybelsus in the past and tolerated this well and his only reason for switching was only because it was a little bit of a pain for them. Per Sarah Beth they are willing to try this medication again, if juan is agreeable. Please advise

## 2024-08-22 ENCOUNTER — TELEPHONE (OUTPATIENT)
Dept: INTERNAL MEDICINE | Age: 79
End: 2024-08-22

## 2024-08-22 DIAGNOSIS — N17.9 AKI (ACUTE KIDNEY INJURY) (HCC): ICD-10-CM

## 2024-08-22 DIAGNOSIS — E11.22 TYPE 2 DIABETES MELLITUS WITH DIABETIC CHRONIC KIDNEY DISEASE, UNSPECIFIED CKD STAGE, UNSPECIFIED WHETHER LONG TERM INSULIN USE (HCC): ICD-10-CM

## 2024-08-22 DIAGNOSIS — N18.31 CHRONIC KIDNEY DISEASE, STAGE 3A (HCC): Primary | ICD-10-CM

## 2024-08-22 NOTE — TELEPHONE ENCOUNTER
Sarah Beth santoyo Olga wanted Don to go back to his nephrologist. She called to make appt ([phone]) and was told since he hasn't been seen since 2021, he is a new patient and needs our office to send referral to Dr Robbie Guy in [facility]

## 2024-08-27 ENCOUNTER — HOSPITAL ENCOUNTER (OUTPATIENT)
Age: 79
Discharge: HOME | End: 2024-08-27
Payer: MEDICARE

## 2024-08-27 DIAGNOSIS — N18.31 CHRONIC KIDNEY DISEASE, STAGE 3A (HCC): ICD-10-CM

## 2024-08-27 LAB
ANION GAP SERPL CALCULATED.3IONS-SCNC: 11 MMOL/L (ref 9–17)
BUN SERPL-MCNC: 34 MG/DL (ref 8–23)
BUN/CREAT SERPL: 23 (ref 9–20)
CALCIUM SERPL-MCNC: 10.1 MG/DL (ref 8.6–10.4)
CHLORIDE SERPL-SCNC: 101 MMOL/L (ref 98–107)
CO2 SERPL-SCNC: 25 MMOL/L (ref 20–31)
CREAT SERPL-MCNC: 1.5 MG/DL (ref 0.7–1.2)
GFR, ESTIMATED: 47 ML/MIN/1.73M2
GLUCOSE SERPL-MCNC: 171 MG/DL (ref 70–99)
POTASSIUM SERPL-SCNC: 6 MMOL/L (ref 3.7–5.3)
SODIUM SERPL-SCNC: 137 MMOL/L (ref 135–144)

## 2024-08-27 PROCEDURE — 36415 COLL VENOUS BLD VENIPUNCTURE: CPT

## 2024-08-27 PROCEDURE — 80048 BASIC METABOLIC PNL TOTAL CA: CPT

## 2024-08-28 ENCOUNTER — HOSPITAL ENCOUNTER (OUTPATIENT)
Age: 79
Discharge: HOME | End: 2024-08-28
Payer: MEDICARE

## 2024-08-28 DIAGNOSIS — E87.5 HYPERKALEMIA: Primary | ICD-10-CM

## 2024-08-28 DIAGNOSIS — E87.5 HYPERKALEMIA: ICD-10-CM

## 2024-08-28 LAB — POTASSIUM SERPL-SCNC: 5.2 MMOL/L (ref 3.7–5.3)

## 2024-08-28 PROCEDURE — 36415 COLL VENOUS BLD VENIPUNCTURE: CPT

## 2024-08-28 PROCEDURE — 84132 ASSAY OF SERUM POTASSIUM: CPT

## 2024-09-15 PROBLEM — E86.0 DEHYDRATION: Status: RESOLVED | Noted: 2024-08-16 | Resolved: 2024-09-15

## 2024-09-18 ENCOUNTER — HOSPITAL ENCOUNTER (OUTPATIENT)
Dept: OTHER | Age: 79
Discharge: HOME | End: 2024-09-18
Payer: MEDICARE

## 2024-09-18 VITALS
RESPIRATION RATE: 20 BRPM | BODY MASS INDEX: 26.16 KG/M2 | WEIGHT: 157 LBS | SYSTOLIC BLOOD PRESSURE: 140 MMHG | HEART RATE: 56 BPM | OXYGEN SATURATION: 98 % | DIASTOLIC BLOOD PRESSURE: 60 MMHG | HEIGHT: 65 IN

## 2024-09-18 PROCEDURE — 99211 OFF/OP EST MAY X REQ PHY/QHP: CPT

## 2024-10-03 ENCOUNTER — OFFICE VISIT (OUTPATIENT)
Dept: DIABETES SERVICES | Age: 79
End: 2024-10-03
Payer: MEDICARE

## 2024-10-03 VITALS
HEART RATE: 62 BPM | OXYGEN SATURATION: 99 % | HEIGHT: 65 IN | SYSTOLIC BLOOD PRESSURE: 146 MMHG | WEIGHT: 151 LBS | BODY MASS INDEX: 25.16 KG/M2 | DIASTOLIC BLOOD PRESSURE: 88 MMHG

## 2024-10-03 DIAGNOSIS — I10 ESSENTIAL HYPERTENSION: ICD-10-CM

## 2024-10-03 DIAGNOSIS — E11.22 TYPE 2 DIABETES MELLITUS WITH STAGE 3A CHRONIC KIDNEY DISEASE, WITHOUT LONG-TERM CURRENT USE OF INSULIN (HCC): Primary | ICD-10-CM

## 2024-10-03 DIAGNOSIS — E78.2 MIXED HYPERLIPIDEMIA: ICD-10-CM

## 2024-10-03 DIAGNOSIS — N18.31 TYPE 2 DIABETES MELLITUS WITH STAGE 3A CHRONIC KIDNEY DISEASE, WITHOUT LONG-TERM CURRENT USE OF INSULIN (HCC): Primary | ICD-10-CM

## 2024-10-03 PROCEDURE — 1036F TOBACCO NON-USER: CPT | Performed by: NURSE PRACTITIONER

## 2024-10-03 PROCEDURE — 99214 OFFICE O/P EST MOD 30 MIN: CPT | Performed by: NURSE PRACTITIONER

## 2024-10-03 PROCEDURE — G8427 DOCREV CUR MEDS BY ELIG CLIN: HCPCS | Performed by: NURSE PRACTITIONER

## 2024-10-03 PROCEDURE — 3079F DIAST BP 80-89 MM HG: CPT | Performed by: NURSE PRACTITIONER

## 2024-10-03 PROCEDURE — 3052F HG A1C>EQUAL 8.0%<EQUAL 9.0%: CPT | Performed by: NURSE PRACTITIONER

## 2024-10-03 PROCEDURE — 3077F SYST BP >= 140 MM HG: CPT | Performed by: NURSE PRACTITIONER

## 2024-10-03 PROCEDURE — G2211 COMPLEX E/M VISIT ADD ON: HCPCS | Performed by: NURSE PRACTITIONER

## 2024-10-03 PROCEDURE — G8419 CALC BMI OUT NRM PARAM NOF/U: HCPCS | Performed by: NURSE PRACTITIONER

## 2024-10-03 PROCEDURE — G8484 FLU IMMUNIZE NO ADMIN: HCPCS | Performed by: NURSE PRACTITIONER

## 2024-10-03 PROCEDURE — 95251 CONT GLUC MNTR ANALYSIS I&R: CPT | Performed by: NURSE PRACTITIONER

## 2024-10-03 PROCEDURE — 1123F ACP DISCUSS/DSCN MKR DOCD: CPT | Performed by: NURSE PRACTITIONER

## 2024-10-03 PROCEDURE — 99212 OFFICE O/P EST SF 10 MIN: CPT | Performed by: NURSE PRACTITIONER

## 2024-10-03 RX ORDER — ORAL SEMAGLUTIDE 7 MG/1
7 TABLET ORAL DAILY
Qty: 90 TABLET | Refills: 3 | Status: SHIPPED | OUTPATIENT
Start: 2024-10-03

## 2024-10-03 RX ORDER — GLIMEPIRIDE 2 MG/1
2 TABLET ORAL
Qty: 90 TABLET | Refills: 1 | Status: SHIPPED | OUTPATIENT
Start: 2024-10-03

## 2024-10-03 ASSESSMENT — ENCOUNTER SYMPTOMS
SHORTNESS OF BREATH: 0
DIARRHEA: 0
RESPIRATORY NEGATIVE: 1
ABDOMINAL PAIN: 0

## 2024-10-03 NOTE — PROGRESS NOTES
[facility]  [address] [facility] [address] [phone]        HISTORY:    Jose Enrique Rodas presents today for evaluation and management of:  Chief Complaint   Patient presents with    Diabetes       Patient Care Team:  Ruth Rhodes APRN - CNP as PCP - General (Nurse Practitioner)  Ruth Rhodes APRN - CNP as [facility]CP [facility]  Robbie Duckworth MD (Nephrology)  Rommel Cueva DO as Consulting Physician (Cardiology)  Manfred Hernandez MD (General Surgery)      Interval History:    Past DM Medications   Actos-therapy completed  Onglyza- therapy completed  Glipizide therapy completed  Rybelsus 7 mg- compliance  Ozempic -gi issues  Metformin- ckd     Current Diabetic Medications  Jardiance 25 mg once daily   Glimepiride 2 mg daily   Rybelsus 3 mg once daily         DKA episodes: 0    08/13/24   Jose Enrique Rodas is a 79 y.o. male patient who presents to clinic today for his diabetes. he has a history of CAD, CHF, nephropathy, retinopathy which contributes to his diabetes. At previous visit ozempic was stopped and jardiance increased. he denies any current signs or symptoms of hyper/hypoglycemia. he states they are taking their medications as prescribed without any adverse effects. He had recent labs and kidney function declined and was told to stop lasix, aldactone and metformin.he will recheck labs in 3 days.   Diet: regular  Exercise: walking pickleball  BS testing: uses cgm daily with success and is adherent to cgm therapy  Hypoglycemia: no  Hypoglycemia as needed treatment: snack  Issues:   Diabetic foot exam up-to-date: Yes  Diabetic retinal exam up-to-date: Yes     Diabetes complications:nephropathy, retinopathy, and cardiovascular disease    10/03/24   Jose Enrique Rodas is a 79 y.o. male patient who presents to clinic today for his diabetes. he has a history of CAD, CHF, nephropathy,

## 2024-10-22 ENCOUNTER — HOSPITAL ENCOUNTER (OUTPATIENT)
Dept: OTHER | Age: 79
Discharge: HOME | End: 2024-10-22
Payer: MEDICARE

## 2024-10-22 VITALS
SYSTOLIC BLOOD PRESSURE: 152 MMHG | RESPIRATION RATE: 20 BRPM | BODY MASS INDEX: 25.62 KG/M2 | DIASTOLIC BLOOD PRESSURE: 78 MMHG | HEIGHT: 65 IN | WEIGHT: 153.8 LBS | HEART RATE: 55 BPM | OXYGEN SATURATION: 99 %

## 2024-10-22 PROCEDURE — 99211 OFF/OP EST MAY X REQ PHY/QHP: CPT

## 2024-10-22 NOTE — PROGRESS NOTES
Patient here for CHF education visit.  Reviewed CHF Zone tool worksheet with patient who stated understanding.  Patient continues to weigh self daily at home, stable per patient.  Patient denies shortness of breath, no edema of lower extremities.  He is scheduled for follow up appointment with PCP in December and cardiology in February.      Verbally reviewed importance of medication compliance with patient; patient verbalized understanding.    Discussed 2000mg/day sodium restricted diet; patient verbalized understanding.  Patient states he is not adding salt to foods when cooking or at the table.     Moderate daily exercise encouraged as tolerated. Discussed rest breaks as needed; patient verbalized understanding.  Patient continues to be active playing [employer] 2-3 times per week and walking other days; he states he tolerates well.    Patient instructed to weigh self at the same time of each day, using same clothes and same scale; reinforced teaching to monitor for 2-3 lb weight increase over 1-2 days, and to notify the CHF clinic at [phone] or [phone] or physician office if weight change noted.  Patient verbalized understanding.    Signs and symptoms of CHF discussed with patient, such as feeling more tired than normal, feeling short of breath, coughing that increases when you lie down, sudden weight gain, swelling of your feet, legs or belly.  Patient verbalized understanding to notify the CHF clinic at [phone] or [phone] or physician office if these symptoms occur.    Compliance with plan of care and further disease process causes discussed with patient, patient encouraged to keep all follow up appointments.  Patient verbalized understanding.

## 2024-10-24 ENCOUNTER — IMMUNIZATION (OUTPATIENT)
Dept: LAB | Age: 79
End: 2024-10-24
Payer: MEDICARE

## 2024-10-24 DIAGNOSIS — Z23 NEED FOR VACCINATION: Primary | ICD-10-CM

## 2024-10-24 PROCEDURE — 90653 IIV ADJUVANT VACCINE IM: CPT | Performed by: NURSE PRACTITIONER

## 2024-10-24 PROCEDURE — PBSHW INFLUENZA, FLUAD TRIVALENT, (AGE 65 Y+), IM, PRESERVATIVE FREE, 0.5ML: Performed by: NURSE PRACTITIONER

## 2024-11-21 ENCOUNTER — HOSPITAL ENCOUNTER (OUTPATIENT)
Dept: OTHER | Age: 79
Discharge: HOME | End: 2024-11-21
Payer: MEDICARE

## 2024-11-21 VITALS
HEART RATE: 54 BPM | RESPIRATION RATE: 18 BRPM | DIASTOLIC BLOOD PRESSURE: 58 MMHG | BODY MASS INDEX: 25.89 KG/M2 | OXYGEN SATURATION: 97 % | HEIGHT: 65 IN | WEIGHT: 155.4 LBS | SYSTOLIC BLOOD PRESSURE: 120 MMHG

## 2024-11-21 PROCEDURE — 99211 OFF/OP EST MAY X REQ PHY/QHP: CPT

## 2024-11-21 NOTE — PROGRESS NOTES
Patient here for CHF education visit.  Reviewed CHF Zone tool worksheet with patient who verbalized understanding.  Patient admits he is not weighing self every day, usually every other day.  Patient denies swelling or shortness of breath.  He is scheduled to see PCP next month.      Verbally reviewed importance of medication compliance with patient; patient verbalized understanding.    Discussed 2000mg/day sodium restricted diet; patient verbalized understanding.  Patient states wife doesn't add salt to foods when cooking or at the table.  Discussed low sodium options for Thanksgiving meal, patient stated understanding.     Moderate daily exercise encouraged as tolerated. Discussed rest breaks as needed; patient verbalized understanding.  Patient continues to play [employer] ball 2 times per week and walks at least 3 times per week; tolerates well.      Patient instructed to weigh self at the same time of each day, using same clothes and same scale; reinforced teaching to monitor for 2-3 lb weight increase over 1-2 days, and to notify the CHF clinic at [phone] or [phone] or physician office if weight change noted.  Patient verbalized understanding.    Signs and symptoms of CHF discussed with patient, such as feeling more tired than normal, feeling short of breath, coughing that increases when you lie down, sudden weight gain, swelling of your feet, legs or belly.  Patient verbalized understanding to notify the CHF clinic at [phone] or [phone] or physician office if these symptoms occur.    Compliance with plan of care and further disease process causes discussed with patient, patient encouraged to keep all follow up appointments.  Patient verbalized understanding.

## 2024-12-19 ENCOUNTER — HOSPITAL ENCOUNTER (OUTPATIENT)
Dept: OTHER | Age: 79
Discharge: HOME | End: 2024-12-19
Payer: MEDICARE

## 2024-12-19 VITALS
OXYGEN SATURATION: 96 % | HEART RATE: 59 BPM | RESPIRATION RATE: 20 BRPM | HEIGHT: 65 IN | SYSTOLIC BLOOD PRESSURE: 110 MMHG | DIASTOLIC BLOOD PRESSURE: 58 MMHG | WEIGHT: 152.8 LBS | BODY MASS INDEX: 25.46 KG/M2

## 2024-12-19 PROCEDURE — 99211 OFF/OP EST MAY X REQ PHY/QHP: CPT

## 2024-12-19 NOTE — PROGRESS NOTES
Patient here for CHF education visit.  Reviewed CHF Zone tool worksheet with patient who stated understanding.  Patient states he is weighing himself at home but not every day.  Weight is stable per patient, no sudden increases per patient; no swelling of lower extremities noted. Patient is scheduled to see PCP on 12-23-24 and diabetic educator on 1-8-25 and cardiology in February.      Verbally reviewed importance of medication compliance with patient; patient verbalized understanding.    Discussed 2000mg/day sodium restricted diet; patient verbalized understanding.  Patient reports wife does not add salt to foods when cooking, nor at the table.      Moderate daily exercise encouraged as tolerated. Discussed rest breaks as needed; patient verbalized understanding.  Patient continues to play [employer] ball 2-3 times per week and will walk outside depending on the weather.      Patient instructed to weigh self at the same time of each day, using same clothes and same scale; reinforced teaching to monitor for 2-3 lb weight increase over 1-2 days, and to notify the CHF clinic at [phone] or [phone] or physician office if weight change noted.  Patient verbalized understanding.    Signs and symptoms of CHF discussed with patient, such as feeling more tired than normal, feeling short of breath, coughing that increases when you lie down, sudden weight gain, swelling of your feet, legs or belly.  Patient verbalized understanding to notify the CHF clinic at [phone] or [phone] or physician office if these symptoms occur.    Compliance with plan of care and further disease process causes discussed with patient, patient encouraged to keep all follow up appointments.  Patient verbalized understanding.

## 2024-12-23 ENCOUNTER — OFFICE VISIT (OUTPATIENT)
Dept: INTERNAL MEDICINE | Age: 79
End: 2024-12-23
Payer: MEDICARE

## 2024-12-23 VITALS
DIASTOLIC BLOOD PRESSURE: 70 MMHG | OXYGEN SATURATION: 96 % | SYSTOLIC BLOOD PRESSURE: 126 MMHG | HEIGHT: 65 IN | WEIGHT: 156.8 LBS | BODY MASS INDEX: 26.12 KG/M2 | HEART RATE: 60 BPM | RESPIRATION RATE: 16 BRPM

## 2024-12-23 DIAGNOSIS — I65.29 STENOSIS OF CAROTID ARTERY, UNSPECIFIED LATERALITY: ICD-10-CM

## 2024-12-23 DIAGNOSIS — I25.10 CORONARY ARTERY DISEASE INVOLVING NATIVE CORONARY ARTERY OF NATIVE HEART WITHOUT ANGINA PECTORIS: ICD-10-CM

## 2024-12-23 DIAGNOSIS — N18.32 STAGE 3B CHRONIC KIDNEY DISEASE (HCC): Primary | ICD-10-CM

## 2024-12-23 DIAGNOSIS — I10 ESSENTIAL HYPERTENSION: ICD-10-CM

## 2024-12-23 DIAGNOSIS — I34.0 NON-RHEUMATIC MITRAL REGURGITATION: ICD-10-CM

## 2024-12-23 DIAGNOSIS — R91.1 LUNG NODULE: ICD-10-CM

## 2024-12-23 DIAGNOSIS — E78.5 DYSLIPIDEMIA: ICD-10-CM

## 2024-12-23 DIAGNOSIS — Z95.2 S/P TAVR (TRANSCATHETER AORTIC VALVE REPLACEMENT): ICD-10-CM

## 2024-12-23 DIAGNOSIS — I35.0 AORTIC STENOSIS, SEVERE: ICD-10-CM

## 2024-12-23 DIAGNOSIS — I50.22 CHF NYHA CLASS I, CHRONIC, SYSTOLIC (HCC): ICD-10-CM

## 2024-12-23 DIAGNOSIS — E55.9 VITAMIN D DEFICIENCY: ICD-10-CM

## 2024-12-23 DIAGNOSIS — E27.8 ADRENAL CYST (HCC): ICD-10-CM

## 2024-12-23 DIAGNOSIS — E11.3219 CONTROLLED TYPE 2 DIABETES MELLITUS WITH MILD NONPROLIFERATIVE RETINOPATHY AND MACULAR EDEMA, WITHOUT LONG-TERM CURRENT USE OF INSULIN, UNSPECIFIED LATERALITY (HCC): ICD-10-CM

## 2024-12-23 DIAGNOSIS — I73.9 PERIPHERAL ARTERIAL DISEASE (HCC): ICD-10-CM

## 2024-12-23 PROCEDURE — 3078F DIAST BP <80 MM HG: CPT | Performed by: NURSE PRACTITIONER

## 2024-12-23 PROCEDURE — 99214 OFFICE O/P EST MOD 30 MIN: CPT | Performed by: NURSE PRACTITIONER

## 2024-12-23 PROCEDURE — 3074F SYST BP LT 130 MM HG: CPT | Performed by: NURSE PRACTITIONER

## 2024-12-23 PROCEDURE — G8419 CALC BMI OUT NRM PARAM NOF/U: HCPCS | Performed by: NURSE PRACTITIONER

## 2024-12-23 PROCEDURE — G8427 DOCREV CUR MEDS BY ELIG CLIN: HCPCS | Performed by: NURSE PRACTITIONER

## 2024-12-23 PROCEDURE — G8482 FLU IMMUNIZE ORDER/ADMIN: HCPCS | Performed by: NURSE PRACTITIONER

## 2024-12-23 PROCEDURE — 1123F ACP DISCUSS/DSCN MKR DOCD: CPT | Performed by: NURSE PRACTITIONER

## 2024-12-23 PROCEDURE — 3052F HG A1C>EQUAL 8.0%<EQUAL 9.0%: CPT | Performed by: NURSE PRACTITIONER

## 2024-12-23 PROCEDURE — 1160F RVW MEDS BY RX/DR IN RCRD: CPT | Performed by: NURSE PRACTITIONER

## 2024-12-23 PROCEDURE — G2211 COMPLEX E/M VISIT ADD ON: HCPCS | Performed by: NURSE PRACTITIONER

## 2024-12-23 PROCEDURE — 1159F MED LIST DOCD IN RCRD: CPT | Performed by: NURSE PRACTITIONER

## 2024-12-23 PROCEDURE — 99213 OFFICE O/P EST LOW 20 MIN: CPT | Performed by: NURSE PRACTITIONER

## 2024-12-23 PROCEDURE — 1036F TOBACCO NON-USER: CPT | Performed by: NURSE PRACTITIONER

## 2024-12-23 RX ORDER — SPIRONOLACTONE 25 MG/1
25 TABLET ORAL
COMMUNITY
Start: 2024-08-05 | End: 2024-12-23

## 2024-12-23 NOTE — PROGRESS NOTES
12/23/24  Jose Enrique Rodas  1945      Chief Complaint:   1. Stage 3b chronic kidney disease (HCC)    2. Adrenal cyst (HCC)    3. 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 CHF NYHA class I, chronic, systolic (HCC)    5. Essential hypertension    6. Coronary artery disease involving native coronary artery of native heart without angina pectoris    7. Aortic stenosis, severe    8. S/P TAVR (transcatheter aortic valve replacement)    9. Non-rheumatic mitral regurgitation    10. Peripheral arterial disease (HCC)    11. Dyslipidemia    12. Vitamin D deficiency    13. Lung nodule    14. Stenosis of carotid artery, unspecified laterality        HPI:  Patient in for 6-month follow-up of above chronic health conditions.  Continues to follow with nephrology for his chronic kidney disease.  Continues on Jardiance.  Following with the diabetic nurse practitioner for diabetes.  Most recent A1c greater than 8.  Was on an injectable GLP-1 was causing him to vomit unexpectedly.  Was changed over to Rybelsus.  Over the last week was increased from 3 mg to 7 mg.  States he has mild epigastric tenderness nausea.  Worse at night.  Worse if he eats acidic foods or sweets.  Follows with cardiology routinely.  Denies any chest discomfort no shortness of breath.  Continues on his statin without significant myalgias.  Continues on vitamin D for deficiency no unwanted side effects.  No further episodes of vertigo.      Allergies   Allergen Reactions    Lisinopril Cough     Dry cough     Atorvastatin Other (See Comments)     Muscle pain (myalgias)    Sulfa Antibiotics Swelling     Swelling of retinas    Sulfanilamide Other (See Comments)     Swelling of retinas       Past Medical History:   Diagnosis Date    Acne rosacea     treated with MetroGel    Acute respiratory failure with hypoxia 12/22/2021    BJ (acute

## 2024-12-24 ASSESSMENT — ENCOUNTER SYMPTOMS: NAUSEA: 1

## 2025-01-06 ENCOUNTER — HOSPITAL ENCOUNTER (OUTPATIENT)
Age: 80
Discharge: HOME | End: 2025-01-06
Payer: MEDICARE

## 2025-01-06 DIAGNOSIS — E11.22 TYPE 2 DIABETES MELLITUS WITH STAGE 3A CHRONIC KIDNEY DISEASE, WITHOUT LONG-TERM CURRENT USE OF INSULIN (HCC): ICD-10-CM

## 2025-01-06 DIAGNOSIS — N18.31 TYPE 2 DIABETES MELLITUS WITH STAGE 3A CHRONIC KIDNEY DISEASE, WITHOUT LONG-TERM CURRENT USE OF INSULIN (HCC): ICD-10-CM

## 2025-01-06 DIAGNOSIS — E78.5 DYSLIPIDEMIA: ICD-10-CM

## 2025-01-06 LAB
ANION GAP SERPL CALCULATED.3IONS-SCNC: 11 MMOL/L (ref 9–17)
BUN SERPL-MCNC: 27 MG/DL (ref 8–23)
BUN/CREAT SERPL: 19 (ref 9–20)
CALCIUM SERPL-MCNC: 9.4 MG/DL (ref 8.6–10.4)
CHLORIDE SERPL-SCNC: 103 MMOL/L (ref 98–107)
CHOLEST SERPL-MCNC: 180 MG/DL (ref 0–199)
CHOLESTEROL/HDL RATIO: 4.9
CO2 SERPL-SCNC: 25 MMOL/L (ref 20–31)
CREAT SERPL-MCNC: 1.4 MG/DL (ref 0.7–1.2)
EST. AVERAGE GLUCOSE BLD GHB EST-MCNC: 174 MG/DL
GFR, ESTIMATED: 51 ML/MIN/1.73M2
GLUCOSE SERPL-MCNC: 163 MG/DL (ref 70–99)
HBA1C MFR BLD: 7.7 % (ref 4–6)
HDLC SERPL-MCNC: 37 MG/DL
LDLC SERPL CALC-MCNC: 105 MG/DL (ref 0–100)
POTASSIUM SERPL-SCNC: 4.6 MMOL/L (ref 3.7–5.3)
SODIUM SERPL-SCNC: 139 MMOL/L (ref 135–144)
TRIGL SERPL-MCNC: 191 MG/DL
VLDLC SERPL CALC-MCNC: 38 MG/DL (ref 1–30)

## 2025-01-06 PROCEDURE — 83036 HEMOGLOBIN GLYCOSYLATED A1C: CPT

## 2025-01-06 PROCEDURE — 80061 LIPID PANEL: CPT

## 2025-01-06 PROCEDURE — 80048 BASIC METABOLIC PNL TOTAL CA: CPT

## 2025-01-06 PROCEDURE — 36415 COLL VENOUS BLD VENIPUNCTURE: CPT

## 2025-01-07 DIAGNOSIS — E78.5 DYSLIPIDEMIA: Primary | ICD-10-CM

## 2025-01-08 ENCOUNTER — OFFICE VISIT (OUTPATIENT)
Dept: DIABETES SERVICES | Age: 80
End: 2025-01-08
Payer: MEDICARE

## 2025-01-08 VITALS
DIASTOLIC BLOOD PRESSURE: 82 MMHG | WEIGHT: 157 LBS | SYSTOLIC BLOOD PRESSURE: 134 MMHG | OXYGEN SATURATION: 99 % | HEART RATE: 57 BPM | BODY MASS INDEX: 26.16 KG/M2 | HEIGHT: 65 IN

## 2025-01-08 DIAGNOSIS — N18.31 TYPE 2 DIABETES MELLITUS WITH STAGE 3A CHRONIC KIDNEY DISEASE, WITHOUT LONG-TERM CURRENT USE OF INSULIN (HCC): Primary | ICD-10-CM

## 2025-01-08 DIAGNOSIS — E11.22 TYPE 2 DIABETES MELLITUS WITH STAGE 3A CHRONIC KIDNEY DISEASE, WITHOUT LONG-TERM CURRENT USE OF INSULIN (HCC): Primary | ICD-10-CM

## 2025-01-08 DIAGNOSIS — I10 ESSENTIAL HYPERTENSION: ICD-10-CM

## 2025-01-08 DIAGNOSIS — E78.2 MIXED HYPERLIPIDEMIA: ICD-10-CM

## 2025-01-08 PROCEDURE — 1159F MED LIST DOCD IN RCRD: CPT | Performed by: NURSE PRACTITIONER

## 2025-01-08 PROCEDURE — 99214 OFFICE O/P EST MOD 30 MIN: CPT | Performed by: NURSE PRACTITIONER

## 2025-01-08 PROCEDURE — G8427 DOCREV CUR MEDS BY ELIG CLIN: HCPCS | Performed by: NURSE PRACTITIONER

## 2025-01-08 PROCEDURE — 1036F TOBACCO NON-USER: CPT | Performed by: NURSE PRACTITIONER

## 2025-01-08 PROCEDURE — 1160F RVW MEDS BY RX/DR IN RCRD: CPT | Performed by: NURSE PRACTITIONER

## 2025-01-08 PROCEDURE — 3075F SYST BP GE 130 - 139MM HG: CPT | Performed by: NURSE PRACTITIONER

## 2025-01-08 PROCEDURE — 3079F DIAST BP 80-89 MM HG: CPT | Performed by: NURSE PRACTITIONER

## 2025-01-08 PROCEDURE — M1299 PR FLU IMMUNIZE ORDER/ADMIN: HCPCS | Performed by: NURSE PRACTITIONER

## 2025-01-08 PROCEDURE — G2211 COMPLEX E/M VISIT ADD ON: HCPCS | Performed by: NURSE PRACTITIONER

## 2025-01-08 PROCEDURE — 99212 OFFICE O/P EST SF 10 MIN: CPT | Performed by: NURSE PRACTITIONER

## 2025-01-08 PROCEDURE — 3051F HG A1C>EQUAL 7.0%<8.0%: CPT | Performed by: NURSE PRACTITIONER

## 2025-01-08 PROCEDURE — 1123F ACP DISCUSS/DSCN MKR DOCD: CPT | Performed by: NURSE PRACTITIONER

## 2025-01-08 PROCEDURE — G8419 CALC BMI OUT NRM PARAM NOF/U: HCPCS | Performed by: NURSE PRACTITIONER

## 2025-01-08 ASSESSMENT — ENCOUNTER SYMPTOMS
SHORTNESS OF BREATH: 0
RESPIRATORY NEGATIVE: 1

## 2025-01-08 NOTE — PROGRESS NOTES
Patient Care Team:  Ruth Rhodes APRN - CNP as PCP - General (Nurse Practitioner)  Ruth Rhodes APRN - CNP as [facility]  Robbie Duckworth MD (Nephrology)  Rmomel Cueva DO as Consulting Physician (Cardiology)  Manfred Hernandez MD (General Surgery)    Past DM Medications   Actos-therapy completed  Onglyza- therapy completed  Glipizide therapy completed  Rybelsus 7 mg- compliance  Ozempic -gi issues  Metformin- ckd     Current Diabetic Medications  Jardiance 25 mg once daily   Glimepiride 2 mg daily   Rybelsus 7 mg once daily         DKA episodes: 0      History of Present Illness  The patient presents for the evaluation of diabetes mellitus, hyperlipidemia, and hypertension.    The patient has been on a regimen of Jardiance 25 mg once daily, glimepiride 2 mg once daily, and Rybelsus 7 mg once daily for the past 3 weeks. They report occasional emesis, which they attribute to the Rybelsus, but note that it is less severe compared to their experience with Ozempic. Bowel movements are being managed with a stool softener. The patient utilizes the Lou 2 system for continuous glucose monitoring. Their diet includes ice cream and club sandwiches, particularly during the basketball season, and they have not yet incorporated keto bread into their diet. Physical activity includes playing pickleball and walking three days a week, although walking has been reduced recently due to cold weather.    The patient has been taking Pepcid AC 10 mg, split from a 20 mg tablet, as needed, particularly after consuming Mexican food, but has not required it for the past 3 days.    For hyperlipidemia management, the patient is currently on simvastatin. They have not had their cholesterol levels reviewed recently.    For hypertension management, the patient is on Coreg. They report that their blood pressure tends to be elevated during office visits, but not as significantly as in the past. The patient

## 2025-01-16 ENCOUNTER — HOSPITAL ENCOUNTER (OUTPATIENT)
Dept: OTHER | Age: 80
Discharge: HOME | End: 2025-01-16
Payer: MEDICARE

## 2025-01-16 VITALS
RESPIRATION RATE: 20 BRPM | HEIGHT: 65 IN | BODY MASS INDEX: 25.86 KG/M2 | SYSTOLIC BLOOD PRESSURE: 128 MMHG | WEIGHT: 155.2 LBS | OXYGEN SATURATION: 98 % | DIASTOLIC BLOOD PRESSURE: 60 MMHG | HEART RATE: 57 BPM

## 2025-01-16 PROCEDURE — 99211 OFF/OP EST MAY X REQ PHY/QHP: CPT

## 2025-01-16 NOTE — PROGRESS NOTES
Patient here for CHF Education visit.  Reviewed CHF Zone tool worksheet with patient who stated understanding.  Patient reports he continues to weigh self at home, but admits to not every day.  He had recent visit with diabetes educator and is scheduled to see cardiology in February.      Verbally reviewed importance of medication compliance with patient; patient verbalized understanding.    Discussed 2000mg/day sodium restricted diet; patient verbalized understanding.  Patient and wife do not cook with salt nor add to food at the table.      Moderate daily exercise encouraged as tolerated. Discussed rest breaks as needed; patient verbalized understanding.  Patient continues to play [employer] ball 2-3 times per week.  He states he has not done much walking outside due to the weather.      Patient instructed to weigh self at the same time of each day, using same clothes and same scale; reinforced teaching to monitor for 2-3 lb weight increase over 1-2 days, and to notify the CHF clinic at [phone] or [phone] or physician office if weight change noted.  Patient verbalized understanding.    Signs and symptoms of CHF discussed with patient, such as feeling more tired than normal, feeling short of breath, coughing that increases when you lie down, sudden weight gain, swelling of your feet, legs or belly.  Patient verbalized understanding to notify the CHF clinic at [phone] or [phone] or physician office if these symptoms occur.    Compliance with plan of care and further disease process causes discussed with patient, patient encouraged to keep all follow up appointments.  Patient verbalized understanding.

## 2025-02-18 ENCOUNTER — OFFICE VISIT (OUTPATIENT)
Dept: CARDIOLOGY | Age: 80
End: 2025-02-18
Payer: MEDICARE

## 2025-02-18 VITALS
WEIGHT: 154 LBS | BODY MASS INDEX: 25.66 KG/M2 | DIASTOLIC BLOOD PRESSURE: 70 MMHG | SYSTOLIC BLOOD PRESSURE: 134 MMHG | HEIGHT: 65 IN | HEART RATE: 54 BPM

## 2025-02-18 DIAGNOSIS — I45.10 RBBB: ICD-10-CM

## 2025-02-18 DIAGNOSIS — I35.0 AORTIC STENOSIS, SEVERE: ICD-10-CM

## 2025-02-18 DIAGNOSIS — I35.0 NONRHEUMATIC AORTIC VALVE STENOSIS: ICD-10-CM

## 2025-02-18 DIAGNOSIS — I25.10 CORONARY ARTERY DISEASE INVOLVING NATIVE CORONARY ARTERY OF NATIVE HEART WITHOUT ANGINA PECTORIS: Primary | ICD-10-CM

## 2025-02-18 DIAGNOSIS — E11.9 CONTROLLED TYPE 2 DIABETES MELLITUS WITHOUT COMPLICATION, UNSPECIFIED WHETHER LONG TERM INSULIN USE (HCC): ICD-10-CM

## 2025-02-18 DIAGNOSIS — I10 HYPERTENSION, ESSENTIAL: ICD-10-CM

## 2025-02-18 DIAGNOSIS — E78.00 PURE HYPERCHOLESTEROLEMIA: ICD-10-CM

## 2025-02-18 DIAGNOSIS — I21.4 NSTEMI (NON-ST ELEVATED MYOCARDIAL INFARCTION) (HCC): ICD-10-CM

## 2025-02-18 PROBLEM — Z95.3 STATUS POST TRANSCATHETER AORTIC VALVE REPLACEMENT (TAVR) USING BIOPROSTHESIS: Status: ACTIVE | Noted: 2020-01-21

## 2025-02-18 PROCEDURE — 93005 ELECTROCARDIOGRAM TRACING: CPT | Performed by: INTERNAL MEDICINE

## 2025-02-18 PROCEDURE — 1036F TOBACCO NON-USER: CPT | Performed by: INTERNAL MEDICINE

## 2025-02-18 PROCEDURE — 1123F ACP DISCUSS/DSCN MKR DOCD: CPT | Performed by: INTERNAL MEDICINE

## 2025-02-18 PROCEDURE — 1159F MED LIST DOCD IN RCRD: CPT | Performed by: INTERNAL MEDICINE

## 2025-02-18 PROCEDURE — 99214 OFFICE O/P EST MOD 30 MIN: CPT | Performed by: INTERNAL MEDICINE

## 2025-02-18 PROCEDURE — G8427 DOCREV CUR MEDS BY ELIG CLIN: HCPCS | Performed by: INTERNAL MEDICINE

## 2025-02-18 PROCEDURE — 99212 OFFICE O/P EST SF 10 MIN: CPT | Performed by: INTERNAL MEDICINE

## 2025-02-18 PROCEDURE — 3051F HG A1C>EQUAL 7.0%<8.0%: CPT | Performed by: INTERNAL MEDICINE

## 2025-02-18 PROCEDURE — G8419 CALC BMI OUT NRM PARAM NOF/U: HCPCS | Performed by: INTERNAL MEDICINE

## 2025-02-18 PROCEDURE — 3078F DIAST BP <80 MM HG: CPT | Performed by: INTERNAL MEDICINE

## 2025-02-18 PROCEDURE — 3075F SYST BP GE 130 - 139MM HG: CPT | Performed by: INTERNAL MEDICINE

## 2025-02-18 PROCEDURE — 93010 ELECTROCARDIOGRAM REPORT: CPT | Performed by: INTERNAL MEDICINE

## 2025-02-18 RX ORDER — EZETIMIBE 10 MG/1
10 TABLET ORAL DAILY
Qty: 90 TABLET | Refills: 3 | Status: SHIPPED | OUTPATIENT
Start: 2025-02-18

## 2025-02-18 RX ORDER — ROSUVASTATIN CALCIUM 10 MG/1
10 TABLET, COATED ORAL DAILY
Qty: 90 TABLET | Refills: 3 | Status: SHIPPED | OUTPATIENT
Start: 2025-02-18

## 2025-02-18 NOTE — PROGRESS NOTES
[facility]  [address]  [place], [address]  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 Assessment/ key information: Pt is a 68 y/o female with chief complaint of central LB pain and right hip pain that she states is from [place] and tear\". She states that she had aquatic and land-based PT at this clinic in May, 2017 which was helpful. Pt reports having injections in her LB last month. Pt has begun taking Gabapentin and states that this has \"helped some so far\". She reports having approx 15 minute standing tolerance before needing to sit down due to pain. She describes her pain as constant in her right LB/right hip/ groin region, but states that when she sits down or lies down and \"gets comfortable\" her pain is gone. Pt states that she occasionally needs to lift her right leg with her arms to \"assist it getting in or out of the car\". She also c/o left knee pain, but states that is minimal compared to her LB/right hip. Pt demonstrates decreased trunk AROM, decreased LE strength and decreased LE flexibility.   Pt would benefit from skilled PT to address the aforementioned impairments. Evaluation Complexity History MEDIUM  Complexity : 1-2 comorbidities / personal factors will impact the outcome/ POC ; Examination MEDIUM Complexity : 3 Standardized tests and measures addressing body structure, function, activity limitation and / or participation in recreation  ;Presentation MEDIUM Complexity : Evolving with changing characteristics  ; Clinical Decision Making MEDIUM Complexity : FOTO score of 26-74  Overall Complexity Rating: MEDIUM  Problem List: pain affecting function, decrease ROM, decrease strength, decrease ADL/ functional abilitiies, decrease activity tolerance and decrease flexibility/ joint mobility   Treatment Plan may include any combination of the following: Therapeutic exercise, Therapeutic activities, Neuromuscular re-education, Physical agent/modality, Gait/balance training, Manual therapy, Aquatic therapy, Patient education, Self Care training and Functional mobility training  Patient / Family readiness to learn indicated by: asking questions and interest  Persons(s) to be included in education: patient (P)  Barriers to Learning/Limitations: None  Patient Goal (s): less pain and learn exercises to self help  Patient Self Reported Health Status: good  Rehabilitation Potential: good    Short Term Goals: To be accomplished in 2 weeks:   1. Pt will be (I) and compliant with HEP to maximize therapeutic benefit. 2. Pt will have decrease in average pain level to 3-4/10 on the PAS to improve activity tolerance. Long Term Goals: To be accomplished in 4 weeks:   1. Pt will have improved FOTO score to 54 or greater, to indicate improvement in functional task ability. 2. Pt will be (I) with aquatic exercises to transition to community pool to continue on her own for self-management of symptoms. 3. Pt will demonstrate increased trunk AROM in all planes void of pain, to improve ability to perform ADLs.    4. Pt will demonstrate increase in B hip flexion strength by 1/2-1 MMT grade. 5. Pt will report no difficulty getting in/out of a car. 6. Pt will improve standing tolerance to 30 minutes to improve ability to tolerate cooking. Frequency / Duration: Patient to be seen 2 times per week for 4 weeks. Patient/ Caregiver education and instruction: Diagnosis, prognosis, activity modification and exercises   [x]  Plan of care has been reviewed with PTA    G-Codes (GP)  Mobility   Current  CK= 40-59%   Goal  CK= 40-59%      The severity rating is based on clinical judgment and the FOTO score. Certification Period: 4/11/18 - 6/10/18    Bradly Duane, PT 4/11/2018 3:37 PM    ________________________________________________________________________    I certify that the above Therapy Services are being furnished while the patient is under my care. I agree with the treatment plan and certify that this therapy is necessary.     [de-identified] Signature:____________________  Date:____________Time: _________    Please sign and return to [facility] [address] [place] [address], [facility] [address]  [phone] fax Consultants  [email or web address]  [phone]

## 2025-02-21 ENCOUNTER — HOSPITAL ENCOUNTER (OUTPATIENT)
Dept: OTHER | Age: 80
Discharge: HOME | End: 2025-02-21
Payer: MEDICARE

## 2025-02-21 VITALS
DIASTOLIC BLOOD PRESSURE: 56 MMHG | SYSTOLIC BLOOD PRESSURE: 130 MMHG | BODY MASS INDEX: 25.86 KG/M2 | WEIGHT: 155.2 LBS | HEART RATE: 51 BPM | HEIGHT: 65 IN | OXYGEN SATURATION: 95 % | RESPIRATION RATE: 20 BRPM

## 2025-02-21 PROCEDURE — 99211 OFF/OP EST MAY X REQ PHY/QHP: CPT

## 2025-02-21 RX ORDER — SIMVASTATIN 10 MG
10 TABLET ORAL NIGHTLY
COMMUNITY

## 2025-02-21 NOTE — PROGRESS NOTES
Patient here for CHF education visit.  Reviewed CHF Zone tool worksheet with patient who verbalized understanding.  Patient reports he is weighing self regularly at home, but not every day.  He denies shortness of breath or swelling of lower extremities.  He had a recent visit with cardiology earlier this week.  Per patient, cardiologist made a change to his cholesterol medication, patient states he doesn't want to make the change, prefers to stay with what he had been taking; states he wants to talk to PCP about it.  Encouraged patient to stop by PCP office today or to contact PCP via [employer], he stated understanding.  He is scheduled to see diabetic educator in April and next PCP visit is in June.     Verbally reviewed importance of medication compliance with patient; patient verbalized understanding.    Discussed 2000mg/day sodium restricted diet; patient verbalized understanding.    Moderate daily exercise encouraged as tolerated. Discussed rest breaks as needed; patient verbalized understanding.  Patient continues to play [employer] twice per week; admits he hasn't been walking lately due to the weather.      Patient instructed to weigh self at the same time of each day, using same clothes and same scale; reinforced teaching to monitor for 2-3 lb weight increase over 1-2 days, and to notify the CHF clinic at [phone] or [phone] or physician office if weight change noted.  Patient verbalized understanding.    Signs and symptoms of CHF discussed with patient, such as feeling more tired than normal, feeling short of breath, coughing that increases when you lie down, sudden weight gain, swelling of your feet, legs or belly.  Patient verbalized understanding to notify the CHF clinic at [phone] or [phone] or physician office if these symptoms occur.    Compliance with plan of care and further disease process causes discussed with patient, patient encouraged to keep all follow up

## 2025-03-25 NOTE — TELEPHONE ENCOUNTER
Ambulatory tot treatment area with slight limb mom report played soccer for first time on Saturday increasing right foot pain painful to bear weight denies injury.   Calvin Govea 27 called because the patient is not sure what he is supposed to be taking, she stated that he is on Metoprolol from Dr. Nico Jett and a provider in [facility] coreg.      Last Appt:  4/19/2022  Next Appt:   10/18/2022  Med verified in [address] [place] [address] Rd

## 2025-03-27 ENCOUNTER — HOSPITAL ENCOUNTER (OUTPATIENT)
Dept: OTHER | Age: 80
Discharge: HOME | End: 2025-03-27
Payer: MEDICARE

## 2025-03-27 VITALS
DIASTOLIC BLOOD PRESSURE: 58 MMHG | WEIGHT: 154.8 LBS | HEIGHT: 65 IN | SYSTOLIC BLOOD PRESSURE: 126 MMHG | HEART RATE: 59 BPM | OXYGEN SATURATION: 98 % | RESPIRATION RATE: 18 BRPM | BODY MASS INDEX: 25.79 KG/M2

## 2025-03-27 PROCEDURE — 99211 OFF/OP EST MAY X REQ PHY/QHP: CPT

## 2025-03-27 NOTE — PROGRESS NOTES
Patient here for CHF education.  Reviewed CHF Zone tool worksheet with patient who verbalized understanding.  Patient continues to weigh self regularly at home, stable per patient.  No edema noted in lower extremities.  Patient is scheduled to see diabetic educator on 4-8-25 and PCP in June 2025.      Verbally reviewed importance of medication compliance with patient; patient verbalized understanding.    Discussed 2000mg/day sodium restricted diet; patient verbalized understanding.  Patient does not add salt to foods at the table.  Admits to eating out \"more than probably should\".      Moderate daily exercise encouraged as tolerated. Discussed rest breaks as needed; patient verbalized understanding.  Patient continues to play pickleball 2-3 times per week.  He states he has not been walking outside yet due to the weather.      Patient instructed to weigh self at the same time of each day, using same clothes and same scale; reinforced teaching to monitor for 2-3 lb weight increase over 1-2 days, and to notify the CHF clinic at [phone] or [phone] or physician office if weight change noted.  Patient verbalized understanding.    Signs and symptoms of CHF discussed with patient, such as feeling more tired than normal, feeling short of breath, coughing that increases when you lie down, sudden weight gain, swelling of your feet, legs or belly.  Patient verbalized understanding to notify the CHF clinic at [phone] or [phone] or physician office if these symptoms occur.    Compliance with plan of care and further disease process causes discussed with patient, patient encouraged to keep all follow up appointments.  Patient verbalized understanding.

## 2025-04-03 ENCOUNTER — HOSPITAL ENCOUNTER (OUTPATIENT)
Age: 80
Discharge: HOME | End: 2025-04-03
Payer: MEDICARE

## 2025-04-03 ENCOUNTER — RESULTS FOLLOW-UP (OUTPATIENT)
Dept: INTERNAL MEDICINE | Age: 80
End: 2025-04-03

## 2025-04-03 DIAGNOSIS — E11.22 TYPE 2 DIABETES MELLITUS WITH STAGE 3A CHRONIC KIDNEY DISEASE, WITHOUT LONG-TERM CURRENT USE OF INSULIN (HCC): ICD-10-CM

## 2025-04-03 DIAGNOSIS — I25.10 CORONARY ARTERY DISEASE INVOLVING NATIVE CORONARY ARTERY OF NATIVE HEART WITHOUT ANGINA PECTORIS: ICD-10-CM

## 2025-04-03 DIAGNOSIS — I35.0 NONRHEUMATIC AORTIC VALVE STENOSIS: ICD-10-CM

## 2025-04-03 DIAGNOSIS — N18.31 TYPE 2 DIABETES MELLITUS WITH STAGE 3A CHRONIC KIDNEY DISEASE, WITHOUT LONG-TERM CURRENT USE OF INSULIN (HCC): ICD-10-CM

## 2025-04-03 LAB
ANION GAP SERPL CALCULATED.3IONS-SCNC: 12 MMOL/L (ref 9–16)
BUN SERPL-MCNC: 35 MG/DL (ref 8–23)
BUN/CREAT SERPL: 21 (ref 9–20)
CALCIUM SERPL-MCNC: 10.1 MG/DL (ref 8.6–10.4)
CHLORIDE SERPL-SCNC: 103 MMOL/L (ref 98–107)
CHOLEST SERPL-MCNC: 174 MG/DL (ref 0–199)
CHOLESTEROL/HDL RATIO: 4.6
CO2 SERPL-SCNC: 24 MMOL/L (ref 20–31)
CREAT SERPL-MCNC: 1.7 MG/DL (ref 0.7–1.2)
EST. AVERAGE GLUCOSE BLD GHB EST-MCNC: 169 MG/DL
GFR, ESTIMATED: 41 ML/MIN/1.73M2
GLUCOSE SERPL-MCNC: 169 MG/DL (ref 74–99)
HBA1C MFR BLD: 7.5 % (ref 4–6)
HDLC SERPL-MCNC: 38 MG/DL
LDLC SERPL CALC-MCNC: 102 MG/DL (ref 0–100)
POTASSIUM SERPL-SCNC: 4.9 MMOL/L (ref 3.7–5.3)
SODIUM SERPL-SCNC: 139 MMOL/L (ref 136–145)
TRIGL SERPL-MCNC: 172 MG/DL
VLDLC SERPL CALC-MCNC: 34 MG/DL (ref 1–30)

## 2025-04-03 PROCEDURE — 80061 LIPID PANEL: CPT

## 2025-04-03 PROCEDURE — 80048 BASIC METABOLIC PNL TOTAL CA: CPT

## 2025-04-03 PROCEDURE — 82172 ASSAY OF APOLIPOPROTEIN: CPT

## 2025-04-03 PROCEDURE — 83036 HEMOGLOBIN GLYCOSYLATED A1C: CPT

## 2025-04-03 PROCEDURE — 36415 COLL VENOUS BLD VENIPUNCTURE: CPT

## 2025-04-05 ENCOUNTER — RESULTS FOLLOW-UP (OUTPATIENT)
Dept: INTERNAL MEDICINE | Age: 80
End: 2025-04-05

## 2025-04-05 LAB — APO B100 SERPL-MCNC: 119 MG/DL (ref 66–133)

## 2025-04-08 ENCOUNTER — OFFICE VISIT (OUTPATIENT)
Dept: DIABETES SERVICES | Age: 80
End: 2025-04-08
Payer: MEDICARE

## 2025-04-08 ENCOUNTER — PATIENT MESSAGE (OUTPATIENT)
Dept: INTERNAL MEDICINE | Age: 80
End: 2025-04-08

## 2025-04-08 VITALS
OXYGEN SATURATION: 100 % | HEART RATE: 50 BPM | HEIGHT: 65 IN | SYSTOLIC BLOOD PRESSURE: 126 MMHG | DIASTOLIC BLOOD PRESSURE: 80 MMHG | WEIGHT: 155 LBS | BODY MASS INDEX: 25.83 KG/M2

## 2025-04-08 DIAGNOSIS — E11.22 TYPE 2 DIABETES MELLITUS WITH STAGE 3A CHRONIC KIDNEY DISEASE, WITHOUT LONG-TERM CURRENT USE OF INSULIN (HCC): Primary | ICD-10-CM

## 2025-04-08 DIAGNOSIS — I10 ESSENTIAL HYPERTENSION: ICD-10-CM

## 2025-04-08 DIAGNOSIS — N18.31 TYPE 2 DIABETES MELLITUS WITH STAGE 3A CHRONIC KIDNEY DISEASE, WITHOUT LONG-TERM CURRENT USE OF INSULIN (HCC): Primary | ICD-10-CM

## 2025-04-08 DIAGNOSIS — E78.2 MIXED HYPERLIPIDEMIA: ICD-10-CM

## 2025-04-08 PROCEDURE — 99214 OFFICE O/P EST MOD 30 MIN: CPT | Performed by: NURSE PRACTITIONER

## 2025-04-08 PROCEDURE — 3074F SYST BP LT 130 MM HG: CPT | Performed by: NURSE PRACTITIONER

## 2025-04-08 PROCEDURE — 95251 CONT GLUC MNTR ANALYSIS I&R: CPT | Performed by: NURSE PRACTITIONER

## 2025-04-08 PROCEDURE — 3079F DIAST BP 80-89 MM HG: CPT | Performed by: NURSE PRACTITIONER

## 2025-04-08 PROCEDURE — G8419 CALC BMI OUT NRM PARAM NOF/U: HCPCS | Performed by: NURSE PRACTITIONER

## 2025-04-08 PROCEDURE — 1123F ACP DISCUSS/DSCN MKR DOCD: CPT | Performed by: NURSE PRACTITIONER

## 2025-04-08 PROCEDURE — G8427 DOCREV CUR MEDS BY ELIG CLIN: HCPCS | Performed by: NURSE PRACTITIONER

## 2025-04-08 PROCEDURE — G2211 COMPLEX E/M VISIT ADD ON: HCPCS | Performed by: NURSE PRACTITIONER

## 2025-04-08 PROCEDURE — 3051F HG A1C>EQUAL 7.0%<8.0%: CPT | Performed by: NURSE PRACTITIONER

## 2025-04-08 PROCEDURE — 1036F TOBACCO NON-USER: CPT | Performed by: NURSE PRACTITIONER

## 2025-04-08 PROCEDURE — 1159F MED LIST DOCD IN RCRD: CPT | Performed by: NURSE PRACTITIONER

## 2025-04-08 PROCEDURE — 1160F RVW MEDS BY RX/DR IN RCRD: CPT | Performed by: NURSE PRACTITIONER

## 2025-04-08 ASSESSMENT — ENCOUNTER SYMPTOMS
RESPIRATORY NEGATIVE: 1
SHORTNESS OF BREATH: 0

## 2025-04-08 NOTE — PROGRESS NOTES
PM      (Please note that portions of this note were completed with a voice-recognition program. Efforts were made to edit the dictation but occasionally words are mis-transcribed.)

## 2025-05-01 ENCOUNTER — HOSPITAL ENCOUNTER (OUTPATIENT)
Dept: OTHER | Age: 80
Discharge: HOME | End: 2025-05-01
Payer: MEDICARE

## 2025-05-01 VITALS
WEIGHT: 152.4 LBS | RESPIRATION RATE: 20 BRPM | SYSTOLIC BLOOD PRESSURE: 124 MMHG | DIASTOLIC BLOOD PRESSURE: 60 MMHG | HEART RATE: 63 BPM | HEIGHT: 65 IN | BODY MASS INDEX: 25.39 KG/M2 | OXYGEN SATURATION: 99 %

## 2025-05-01 PROCEDURE — 99211 OFF/OP EST MAY X REQ PHY/QHP: CPT

## 2025-05-01 NOTE — PROGRESS NOTES
Patient here for CHF education visit.  Reviewed CHF Zone tool worksheet with patient who verbalized understanding.  Patient admits to not weighing self every day, but usually every couple of days.  No edema of lower extremities.  Patient denies shortness of breath. He is volunteering at his [employer] twice a week.  He had recent appointment with diabetes educator.  He is scheduled for follow up with PCP in June, diabetes educator in August and cardiology in September.      Verbally reviewed importance of medication compliance with patient; patient verbalized understanding.    Discussed 2000mg/day sodium restricted diet; patient verbalized understanding.  Patient does not add salt to foods and states wife doesn't cook with salt.    Moderate daily exercise encouraged as tolerated. Discussed rest breaks as needed; patient verbalized understanding.  Patient continues to play [employer] 2-3 times per week.    Patient instructed to weigh self at the same time of each day, using same clothes and same scale; reinforced teaching to monitor for 2-3 lb weight increase over 1-2 days, and to notify the CHF clinic at [phone] or [phone] or physician office if weight change noted.  Patient verbalized understanding.    Signs and symptoms of CHF discussed with patient, such as feeling more tired than normal, feeling short of breath, coughing that increases when you lie down, sudden weight gain, swelling of your feet, legs or belly.  Patient verbalized understanding to notify the CHF clinic at [phone] or [phone] or physician office if these symptoms occur.    Compliance with plan of care and further disease process causes discussed with patient, patient encouraged to keep all follow up appointments.  Patient verbalized understanding.

## 2025-06-02 ENCOUNTER — PATIENT MESSAGE (OUTPATIENT)
Dept: DIABETES SERVICES | Age: 80
End: 2025-06-02

## 2025-06-05 ENCOUNTER — HOSPITAL ENCOUNTER (OUTPATIENT)
Dept: OTHER | Age: 80
Discharge: HOME | End: 2025-06-05
Payer: MEDICARE

## 2025-06-05 VITALS
HEART RATE: 50 BPM | SYSTOLIC BLOOD PRESSURE: 132 MMHG | WEIGHT: 154.6 LBS | HEIGHT: 65 IN | OXYGEN SATURATION: 96 % | RESPIRATION RATE: 18 BRPM | BODY MASS INDEX: 25.76 KG/M2 | DIASTOLIC BLOOD PRESSURE: 54 MMHG

## 2025-06-05 PROCEDURE — 99211 OFF/OP EST MAY X REQ PHY/QHP: CPT

## 2025-06-05 NOTE — PROGRESS NOTES
Patient here for CHF education visit.  Reviewed CHF Zone tool worksheet with patient who verbalized understanding.  Patient reports he is weighing self at home, but admits it isn't every day.  He denies any swelling of lower extremities, shortness of breath or cough.  He is scheduled to see PCP on 6-23-25.    Verbally reviewed importance of medication compliance with patient; patient verbalized understanding.    Discussed 2000mg/day sodium restricted diet; patient verbalized understanding. Patient states he is following low sodium diet, does not add any salt to foods when cooking or at the table.    Moderate daily exercise encouraged as tolerated. Discussed rest breaks as needed; patient verbalized understanding. Patient continues to play [employer] ball 2-3 times per week. He is also doing yard work at home.     Patient instructed to weigh self at the same time of each day, using same clothes and same scale; reinforced teaching to monitor for 2-3 lb weight increase over 1-2 days, and to notify the CHF clinic at [phone] or [phone] or physician office if weight change noted.  Patient verbalized understanding.    Signs and symptoms of CHF discussed with patient, such as feeling more tired than normal, feeling short of breath, coughing that increases when you lie down, sudden weight gain, swelling of your feet, legs or belly.  Patient verbalized understanding to notify the CHF clinic at [phone] or [phone] or physician office if these symptoms occur.    Compliance with plan of care and further disease process causes discussed with patient, patient encouraged to keep all follow up appointments.  Patient verbalized understanding.

## 2025-06-16 ENCOUNTER — HOSPITAL ENCOUNTER (OUTPATIENT)
Age: 80
Discharge: HOME | End: 2025-06-16
Payer: MEDICARE

## 2025-06-16 ENCOUNTER — RESULTS FOLLOW-UP (OUTPATIENT)
Dept: INTERNAL MEDICINE | Age: 80
End: 2025-06-16

## 2025-06-16 DIAGNOSIS — N18.31 TYPE 2 DIABETES MELLITUS WITH STAGE 3A CHRONIC KIDNEY DISEASE, WITHOUT LONG-TERM CURRENT USE OF INSULIN (HCC): ICD-10-CM

## 2025-06-16 DIAGNOSIS — E11.22 TYPE 2 DIABETES MELLITUS WITH STAGE 3A CHRONIC KIDNEY DISEASE, WITHOUT LONG-TERM CURRENT USE OF INSULIN (HCC): ICD-10-CM

## 2025-06-16 LAB
EST. AVERAGE GLUCOSE BLD GHB EST-MCNC: 171 MG/DL
HBA1C MFR BLD: 7.6 % (ref 4–6)

## 2025-06-16 PROCEDURE — 83036 HEMOGLOBIN GLYCOSYLATED A1C: CPT

## 2025-06-16 PROCEDURE — 82570 ASSAY OF URINE CREATININE: CPT

## 2025-06-16 PROCEDURE — 82043 UR ALBUMIN QUANTITATIVE: CPT

## 2025-06-16 PROCEDURE — 36415 COLL VENOUS BLD VENIPUNCTURE: CPT

## 2025-06-17 LAB
CREAT UR-MCNC: 42.4 MG/DL (ref 39–259)
MICROALBUMIN UR-MCNC: 316 MG/L (ref 0–20)
MICROALBUMIN/CREAT UR-RTO: 745 MCG/MG CREAT (ref 0–17)

## 2025-06-19 ENCOUNTER — TELEPHONE (OUTPATIENT)
Dept: FAMILY MEDICINE CLINIC | Age: 80
End: 2025-06-19

## 2025-06-19 NOTE — TELEPHONE ENCOUNTER
Our records indicate that Jose Enrique Rodas may benefit from medication optimization to meet the three pillars of CHF care (ACE/ARB/ARNI, Beta Blocker, and MRA therapy). The ask is to change the patient's medications below, as appropriate. If the patient is being followed by a cardiologist, please ensure they have a follow up appointment with that provider to discuss further.     The measure definition, denominator, inclusions, and exclusions are below:

## 2025-06-23 ENCOUNTER — OFFICE VISIT (OUTPATIENT)
Dept: INTERNAL MEDICINE | Age: 80
End: 2025-06-23
Payer: MEDICARE

## 2025-06-23 ENCOUNTER — HOSPITAL ENCOUNTER (OUTPATIENT)
Age: 80
Discharge: HOME | End: 2025-06-23
Payer: MEDICARE

## 2025-06-23 ENCOUNTER — RESULTS FOLLOW-UP (OUTPATIENT)
Dept: INTERNAL MEDICINE | Age: 80
End: 2025-06-23

## 2025-06-23 VITALS
OXYGEN SATURATION: 95 % | DIASTOLIC BLOOD PRESSURE: 78 MMHG | HEIGHT: 65 IN | HEART RATE: 52 BPM | BODY MASS INDEX: 25.56 KG/M2 | RESPIRATION RATE: 16 BRPM | WEIGHT: 153.4 LBS | SYSTOLIC BLOOD PRESSURE: 134 MMHG

## 2025-06-23 DIAGNOSIS — E55.9 VITAMIN D DEFICIENCY: ICD-10-CM

## 2025-06-23 DIAGNOSIS — E11.3219 CONTROLLED TYPE 2 DIABETES MELLITUS WITH MILD NONPROLIFERATIVE RETINOPATHY AND MACULAR EDEMA, WITHOUT LONG-TERM CURRENT USE OF INSULIN, UNSPECIFIED LATERALITY (HCC): ICD-10-CM

## 2025-06-23 DIAGNOSIS — R91.1 LUNG NODULE: ICD-10-CM

## 2025-06-23 DIAGNOSIS — Z00.00 MEDICARE ANNUAL WELLNESS VISIT, SUBSEQUENT: Primary | ICD-10-CM

## 2025-06-23 DIAGNOSIS — I65.29 STENOSIS OF CAROTID ARTERY, UNSPECIFIED LATERALITY: ICD-10-CM

## 2025-06-23 DIAGNOSIS — I35.0 AORTIC STENOSIS, SEVERE: ICD-10-CM

## 2025-06-23 DIAGNOSIS — I73.9 PERIPHERAL ARTERIAL DISEASE: ICD-10-CM

## 2025-06-23 DIAGNOSIS — N18.32 STAGE 3B CHRONIC KIDNEY DISEASE (HCC): ICD-10-CM

## 2025-06-23 DIAGNOSIS — I50.22 CHF NYHA CLASS I, CHRONIC, SYSTOLIC (HCC): ICD-10-CM

## 2025-06-23 DIAGNOSIS — E27.8 ADRENAL CYST: ICD-10-CM

## 2025-06-23 DIAGNOSIS — Z95.2 S/P TAVR (TRANSCATHETER AORTIC VALVE REPLACEMENT): ICD-10-CM

## 2025-06-23 DIAGNOSIS — I10 ESSENTIAL HYPERTENSION: ICD-10-CM

## 2025-06-23 DIAGNOSIS — E78.5 DYSLIPIDEMIA: ICD-10-CM

## 2025-06-23 DIAGNOSIS — I25.10 CORONARY ARTERY DISEASE INVOLVING NATIVE CORONARY ARTERY OF NATIVE HEART WITHOUT ANGINA PECTORIS: ICD-10-CM

## 2025-06-23 DIAGNOSIS — I34.0 NON-RHEUMATIC MITRAL REGURGITATION: ICD-10-CM

## 2025-06-23 PROBLEM — I50.43 CHF (CONGESTIVE HEART FAILURE), NYHA CLASS I, ACUTE ON CHRONIC, COMBINED (HCC): Status: RESOLVED | Noted: 2022-03-28 | Resolved: 2025-06-23

## 2025-06-23 PROBLEM — I50.23 ACUTE ON CHRONIC SYSTOLIC HEART FAILURE (HCC): Status: RESOLVED | Noted: 2020-01-08 | Resolved: 2025-06-23

## 2025-06-23 LAB
ANION GAP SERPL CALCULATED.3IONS-SCNC: 11 MMOL/L (ref 9–16)
BUN SERPL-MCNC: 40 MG/DL (ref 8–23)
BUN/CREAT SERPL: 21 (ref 9–20)
CALCIUM SERPL-MCNC: 9.5 MG/DL (ref 8.6–10.4)
CHLORIDE SERPL-SCNC: 99 MMOL/L (ref 98–107)
CO2 SERPL-SCNC: 21 MMOL/L (ref 20–31)
CREAT SERPL-MCNC: 1.9 MG/DL (ref 0.7–1.2)
GFR, ESTIMATED: 36 ML/MIN/1.73M2
GLUCOSE SERPL-MCNC: 348 MG/DL (ref 74–99)
POTASSIUM SERPL-SCNC: 5.7 MMOL/L (ref 3.7–5.3)
SODIUM SERPL-SCNC: 131 MMOL/L (ref 136–145)

## 2025-06-23 PROCEDURE — 80048 BASIC METABOLIC PNL TOTAL CA: CPT

## 2025-06-23 PROCEDURE — G0439 PPPS, SUBSEQ VISIT: HCPCS | Performed by: NURSE PRACTITIONER

## 2025-06-23 PROCEDURE — 1159F MED LIST DOCD IN RCRD: CPT | Performed by: NURSE PRACTITIONER

## 2025-06-23 PROCEDURE — G8427 DOCREV CUR MEDS BY ELIG CLIN: HCPCS | Performed by: NURSE PRACTITIONER

## 2025-06-23 PROCEDURE — 1160F RVW MEDS BY RX/DR IN RCRD: CPT | Performed by: NURSE PRACTITIONER

## 2025-06-23 PROCEDURE — 1123F ACP DISCUSS/DSCN MKR DOCD: CPT | Performed by: NURSE PRACTITIONER

## 2025-06-23 PROCEDURE — 99214 OFFICE O/P EST MOD 30 MIN: CPT | Performed by: NURSE PRACTITIONER

## 2025-06-23 PROCEDURE — G8419 CALC BMI OUT NRM PARAM NOF/U: HCPCS | Performed by: NURSE PRACTITIONER

## 2025-06-23 PROCEDURE — 3051F HG A1C>EQUAL 7.0%<8.0%: CPT | Performed by: NURSE PRACTITIONER

## 2025-06-23 PROCEDURE — 3078F DIAST BP <80 MM HG: CPT | Performed by: NURSE PRACTITIONER

## 2025-06-23 PROCEDURE — 1036F TOBACCO NON-USER: CPT | Performed by: NURSE PRACTITIONER

## 2025-06-23 PROCEDURE — 99213 OFFICE O/P EST LOW 20 MIN: CPT | Performed by: NURSE PRACTITIONER

## 2025-06-23 PROCEDURE — 36415 COLL VENOUS BLD VENIPUNCTURE: CPT

## 2025-06-23 PROCEDURE — 3075F SYST BP GE 130 - 139MM HG: CPT | Performed by: NURSE PRACTITIONER

## 2025-06-23 ASSESSMENT — PATIENT HEALTH QUESTIONNAIRE - PHQ9
SUM OF ALL RESPONSES TO PHQ QUESTIONS 1-9: 0
2. FEELING DOWN, DEPRESSED OR HOPELESS: NOT AT ALL
1. LITTLE INTEREST OR PLEASURE IN DOING THINGS: NOT AT ALL
SUM OF ALL RESPONSES TO PHQ QUESTIONS 1-9: 0

## 2025-06-23 NOTE — PATIENT INSTRUCTIONS

## 2025-06-24 ENCOUNTER — TELEPHONE (OUTPATIENT)
Dept: DIABETES SERVICES | Age: 80
End: 2025-06-24

## 2025-06-24 DIAGNOSIS — N18.31 TYPE 2 DIABETES MELLITUS WITH STAGE 3A CHRONIC KIDNEY DISEASE, WITHOUT LONG-TERM CURRENT USE OF INSULIN (HCC): ICD-10-CM

## 2025-06-24 DIAGNOSIS — E11.22 TYPE 2 DIABETES MELLITUS WITH STAGE 3A CHRONIC KIDNEY DISEASE, WITHOUT LONG-TERM CURRENT USE OF INSULIN (HCC): ICD-10-CM

## 2025-06-24 DIAGNOSIS — N18.32 STAGE 3B CHRONIC KIDNEY DISEASE (HCC): Primary | ICD-10-CM

## 2025-06-24 RX ORDER — GLIMEPIRIDE 2 MG/1
4 TABLET ORAL
Qty: 90 TABLET | Refills: 1 | Status: ON HOLD | OUTPATIENT
Start: 2025-06-24 | End: 2025-06-27

## 2025-06-24 NOTE — TELEPHONE ENCOUNTER
Please have him increase glimepiride form 2 mg daily to 4 mg daily, updated [facility].

## 2025-06-24 NOTE — TELEPHONE ENCOUNTER
Writer called and spoke with patient and wife. Both stated understanding, and had no further questions at this time.

## 2025-06-24 NOTE — TELEPHONE ENCOUNTER
Patient and spouse called stating that patient was recently taken off of Rybelsus due to vomiting and now, within a weeks time, stated that his sugars are in the 200 & 300's. She also stated that he had blood work done and his creatinine and potassium are higher than normal. Patient and spouse would like a call back at [phone]

## 2025-06-24 NOTE — PROGRESS NOTES
06/23/25  Jose Enrique Maciastz  1945      Chief Complaint:   1. Medicare annual wellness visit, subsequent    2. Stage 3b chronic kidney disease (HCC)    3. Adrenal cyst    4. 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 CHF NYHA class I, chronic, systolic ([facility])    6. Essential hypertension    7. Coronary artery disease involving native coronary artery of native heart without angina pectoris    8. Aortic stenosis, severe    9. S/P TAVR (transcatheter aortic valve replacement)    10. Non-rheumatic mitral regurgitation    11. Peripheral arterial disease    12. Dyslipidemia    13. Vitamin D deficiency    14. Lung nodule    15. Stenosis of carotid artery, unspecified laterality        HPI:  Patient in for annual wellness visit, 6-month follow-up of chronic health conditions.  States he has been having slightly more fatigue.  Has not followed up with nephrology wife states that they have called but never received an appointment.  Follows with the diabetic nurse practitioner.  They had to stop the Rybelsus as he was having significant nausea.  Denies any chest pain no shortness of breath.  Follows with cardiology routinely.  Does continue to walk at least 4 days a week and does play pickle ball routinely.      Allergies   Allergen Reactions    Lisinopril Cough     Dry cough     Atorvastatin Other (See Comments)     Muscle pain (myalgias)    Sulfa Antibiotics Swelling     Swelling of retinas    Sulfanilamide Other (See Comments)     Swelling of retinas       Past Medical History:   Diagnosis Date    Acne rosacea     treated with MetroGel    Acute respiratory failure with hypoxia (HCC) 12/22/2021    BJ (acute kidney injury) 12/22/2021    Anemia     Aortic stenosis     echo 3/18 Mod AS, Mild MR    Aortic stenosis, severe 12/03/2019    S/p TAVR 1/21/20      BPH (benign prostatic hypertrophy)     CAD 
(CHOLECALCIFEROL) 1000 UNIT TABS tablet Take 1 tablet by mouth daily Pa Yes Provider, Historical, MD   Handicap Placard MISC by Does not apply route Duration: 5 years  Dx: Aortic Stenosis, Heart Failure  Rommel Cueva DO   Handicap Placard MISC by Does not apply route Expires: 4/6/2024  Ruth Rhodes APRN - CNP       CareTeam (Including outside providers/suppliers regularly involved in providing care):   Patient Care Team:  Ruth Rhodes APRN - CNP as PCP - General (Nurse Practitioner)  Ruth Rhodes APRN - CNP as PCP - Empaneled Provider  Robbie Duckworth MD (Nephrology)  Rommel Cueva DO as Consulting Physician (Cardiology)  Manfred Hernandez MD (General Surgery)     Recommendations for Preventive Services Due: see orders and patient instructions/AVS.  Recommended screening schedule for the next 5-10 years is provided to the patient in written form: see Patient Instructions/AVS.     Reviewed and updated this visit:  Tobacco  Allergies  Meds  Med Hx  Surg Hx  Fam Hx  Sexual Hx            I, Millicent Mathews LPN, 6/23/2025, performed the documented evaluation under the direct supervision of the attending physician.

## 2025-06-25 ENCOUNTER — HOSPITAL ENCOUNTER (INPATIENT)
Age: 80
LOS: 1 days | Discharge: HOME | DRG: 641 | End: 2025-06-27
Attending: EMERGENCY MEDICINE | Admitting: INTERNAL MEDICINE
Payer: MEDICARE

## 2025-06-25 ENCOUNTER — APPOINTMENT (OUTPATIENT)
Dept: GENERAL RADIOLOGY | Age: 80
DRG: 641 | End: 2025-06-25
Payer: MEDICARE

## 2025-06-25 ENCOUNTER — APPOINTMENT (OUTPATIENT)
Dept: CT IMAGING | Age: 80
DRG: 641 | End: 2025-06-25
Payer: MEDICARE

## 2025-06-25 DIAGNOSIS — R79.89 ELEVATED TROPONIN: ICD-10-CM

## 2025-06-25 DIAGNOSIS — N18.31 TYPE 2 DIABETES MELLITUS WITH STAGE 3A CHRONIC KIDNEY DISEASE, WITHOUT LONG-TERM CURRENT USE OF INSULIN (HCC): ICD-10-CM

## 2025-06-25 DIAGNOSIS — E86.0 DEHYDRATION: ICD-10-CM

## 2025-06-25 DIAGNOSIS — R41.82 ALTERED MENTAL STATUS, UNSPECIFIED ALTERED MENTAL STATUS TYPE: Primary | ICD-10-CM

## 2025-06-25 DIAGNOSIS — E11.22 TYPE 2 DIABETES MELLITUS WITH STAGE 3A CHRONIC KIDNEY DISEASE, WITHOUT LONG-TERM CURRENT USE OF INSULIN (HCC): ICD-10-CM

## 2025-06-25 LAB
ALBUMIN SERPL-MCNC: 4 G/DL (ref 3.5–5.2)
ALBUMIN/GLOB SERPL: 1.5 {RATIO} (ref 1–2.5)
ALP SERPL-CCNC: 97 U/L (ref 40–129)
ALT SERPL-CCNC: 15 U/L (ref 10–50)
ANION GAP SERPL CALCULATED.3IONS-SCNC: 13 MMOL/L (ref 9–16)
AST SERPL-CCNC: 18 U/L (ref 10–50)
BACTERIA URNS QL MICRO: ABNORMAL
BASOPHILS # BLD: 0.08 K/UL (ref 0–0.2)
BASOPHILS NFR BLD: 1 % (ref 0–2)
BILIRUB SERPL-MCNC: <0.2 MG/DL (ref 0–1.2)
BILIRUB UR QL STRIP: NEGATIVE
BUN SERPL-MCNC: 57 MG/DL (ref 8–23)
BUN/CREAT SERPL: 23 (ref 9–20)
CALCIUM SERPL-MCNC: 9.2 MG/DL (ref 8.6–10.4)
CHARACTER UR: ABNORMAL
CHLORIDE SERPL-SCNC: 100 MMOL/L (ref 98–107)
CLARITY UR: CLEAR
CO2 SERPL-SCNC: 19 MMOL/L (ref 20–31)
COLOR UR: YELLOW
CREAT SERPL-MCNC: 2.5 MG/DL (ref 0.7–1.2)
EOSINOPHIL # BLD: 0.31 K/UL (ref 0–0.44)
EOSINOPHILS RELATIVE PERCENT: 3 % (ref 1–4)
EPI CELLS #/AREA URNS HPF: ABNORMAL /HPF (ref 0–5)
ERYTHROCYTE [DISTWIDTH] IN BLOOD BY AUTOMATED COUNT: 14.1 % (ref 11.8–14.4)
ETHANOL PERCENT: 0 %
ETHANOLAMINE SERPL-MCNC: <10 MG/DL (ref 0–0.08)
GFR, ESTIMATED: 26 ML/MIN/1.73M2
GLUCOSE SERPL-MCNC: 334 MG/DL (ref 74–99)
GLUCOSE UR STRIP-MCNC: ABNORMAL MG/DL
HCT VFR BLD AUTO: 45.6 % (ref 40.7–50.3)
HGB BLD-MCNC: 15.4 G/DL (ref 13–17)
HGB UR QL STRIP.AUTO: NEGATIVE
IMM GRANULOCYTES # BLD AUTO: 0.08 K/UL (ref 0–0.3)
IMM GRANULOCYTES NFR BLD: 1 %
INR PPP: 1
KETONES UR STRIP-MCNC: NEGATIVE MG/DL
LEUKOCYTE ESTERASE UR QL STRIP: NEGATIVE
LYMPHOCYTES NFR BLD: 1.68 K/UL (ref 1.1–3.7)
LYMPHOCYTES RELATIVE PERCENT: 17 % (ref 24–43)
MAGNESIUM SERPL-MCNC: 2.3 MG/DL (ref 1.6–2.4)
MCH RBC QN AUTO: 27.6 PG (ref 25.2–33.5)
MCHC RBC AUTO-ENTMCNC: 33.8 G/DL (ref 25.2–33.5)
MCV RBC AUTO: 81.9 FL (ref 82.6–102.9)
MONOCYTES NFR BLD: 0.98 K/UL (ref 0.1–1.2)
MONOCYTES NFR BLD: 10 % (ref 3–12)
NEUTROPHILS NFR BLD: 68 % (ref 36–65)
NEUTS SEG NFR BLD: 6.99 K/UL (ref 1.5–8.1)
NITRITE UR QL STRIP: NEGATIVE
NRBC BLD-RTO: 0 PER 100 WBC
PARTIAL THROMBOPLASTIN TIME: 24.7 SEC (ref 23.9–33.8)
PH UR STRIP: 6 [PH] (ref 5–6)
PLATELET # BLD AUTO: 270 K/UL (ref 138–453)
PMV BLD AUTO: 10.6 FL (ref 8.1–13.5)
POTASSIUM SERPL-SCNC: 5 MMOL/L (ref 3.7–5.3)
PROT SERPL-MCNC: 6.6 G/DL (ref 6.6–8.7)
PROT UR STRIP-MCNC: ABNORMAL MG/DL
PROTHROMBIN TIME: 12.8 SEC (ref 11.5–14.2)
RBC # BLD AUTO: 5.57 M/UL (ref 4.21–5.77)
RBC # BLD: ABNORMAL 10*6/UL
RBC #/AREA URNS HPF: ABNORMAL /HPF (ref 0–4)
SODIUM SERPL-SCNC: 132 MMOL/L (ref 136–145)
SP GR UR STRIP: 1.01 (ref 1.01–1.02)
TROPONIN I SERPL HS-MCNC: 61 NG/L (ref 0–22)
TSH SERPL DL<=0.05 MIU/L-ACNC: 2.78 UIU/ML (ref 0.27–4.2)
UROBILINOGEN UR STRIP-ACNC: NORMAL EU/DL (ref 0–1)
WBC #/AREA URNS HPF: ABNORMAL /HPF (ref 0–4)
WBC OTHER # BLD: 10.1 K/UL (ref 3.5–11.3)

## 2025-06-25 PROCEDURE — 2580000003 HC RX 258: Performed by: EMERGENCY MEDICINE

## 2025-06-25 PROCEDURE — 81001 URINALYSIS AUTO W/SCOPE: CPT

## 2025-06-25 PROCEDURE — 85730 THROMBOPLASTIN TIME PARTIAL: CPT

## 2025-06-25 PROCEDURE — 71045 X-RAY EXAM CHEST 1 VIEW: CPT

## 2025-06-25 PROCEDURE — 36415 COLL VENOUS BLD VENIPUNCTURE: CPT

## 2025-06-25 PROCEDURE — 99285 EMERGENCY DEPT VISIT HI MDM: CPT

## 2025-06-25 PROCEDURE — 85610 PROTHROMBIN TIME: CPT

## 2025-06-25 PROCEDURE — 84443 ASSAY THYROID STIM HORMONE: CPT

## 2025-06-25 PROCEDURE — 83735 ASSAY OF MAGNESIUM: CPT

## 2025-06-25 PROCEDURE — 70450 CT HEAD/BRAIN W/O DYE: CPT

## 2025-06-25 PROCEDURE — 96360 HYDRATION IV INFUSION INIT: CPT

## 2025-06-25 PROCEDURE — G0480 DRUG TEST DEF 1-7 CLASSES: HCPCS

## 2025-06-25 PROCEDURE — 85025 COMPLETE CBC W/AUTO DIFF WBC: CPT

## 2025-06-25 PROCEDURE — 80053 COMPREHEN METABOLIC PANEL: CPT

## 2025-06-25 PROCEDURE — 93005 ELECTROCARDIOGRAM TRACING: CPT | Performed by: EMERGENCY MEDICINE

## 2025-06-25 PROCEDURE — 84484 ASSAY OF TROPONIN QUANT: CPT

## 2025-06-25 RX ORDER — 0.9 % SODIUM CHLORIDE 0.9 %
250 INTRAVENOUS SOLUTION INTRAVENOUS ONCE
Status: COMPLETED | OUTPATIENT
Start: 2025-06-25 | End: 2025-06-26

## 2025-06-25 RX ADMIN — SODIUM CHLORIDE 250 ML: 0.9 INJECTION, SOLUTION INTRAVENOUS at 22:53

## 2025-06-25 ASSESSMENT — ENCOUNTER SYMPTOMS
SHORTNESS OF BREATH: 0
NAUSEA: 0
VOMITING: 0

## 2025-06-25 ASSESSMENT — PAIN - FUNCTIONAL ASSESSMENT: PAIN_FUNCTIONAL_ASSESSMENT: NONE - DENIES PAIN

## 2025-06-26 ENCOUNTER — RESULTS FOLLOW-UP (OUTPATIENT)
Dept: INTERNAL MEDICINE | Age: 80
End: 2025-06-26

## 2025-06-26 ENCOUNTER — APPOINTMENT (OUTPATIENT)
Age: 80
DRG: 641 | End: 2025-06-26
Payer: MEDICARE

## 2025-06-26 ENCOUNTER — APPOINTMENT (OUTPATIENT)
Dept: INTERVENTIONAL RADIOLOGY/VASCULAR | Age: 80
DRG: 641 | End: 2025-06-26
Payer: MEDICARE

## 2025-06-26 PROBLEM — R41.82 ALTERED MENTAL STATUS: Status: ACTIVE | Noted: 2025-06-26

## 2025-06-26 PROBLEM — R79.89 ELEVATED TROPONIN: Status: ACTIVE | Noted: 2025-06-26

## 2025-06-26 LAB
ANION GAP SERPL CALCULATED.3IONS-SCNC: 10 MMOL/L (ref 9–16)
BASOPHILS # BLD: 0.09 K/UL (ref 0–0.2)
BASOPHILS NFR BLD: 1 % (ref 0–2)
BUN SERPL-MCNC: 53 MG/DL (ref 8–23)
BUN/CREAT SERPL: 25 (ref 9–20)
CALCIUM SERPL-MCNC: 9.4 MG/DL (ref 8.6–10.4)
CHLORIDE SERPL-SCNC: 103 MMOL/L (ref 98–107)
CO2 SERPL-SCNC: 22 MMOL/L (ref 20–31)
CREAT SERPL-MCNC: 2.1 MG/DL (ref 0.7–1.2)
ECHO AO ROOT DIAM: 2.9 CM
ECHO AO ROOT INDEX: 1.65 CM/M2
ECHO AV AREA PEAK VELOCITY: 2.1 CM2
ECHO AV AREA VTI: 2.1 CM2
ECHO AV AREA/BSA PEAK VELOCITY: 1.2 CM2/M2
ECHO AV AREA/BSA VTI: 1.2 CM2/M2
ECHO AV MEAN GRADIENT: 3 MMHG
ECHO AV MEAN VELOCITY: 0.8 M/S
ECHO AV PEAK GRADIENT: 6 MMHG
ECHO AV PEAK VELOCITY: 1.3 M/S
ECHO AV VELOCITY RATIO: 0.62
ECHO AV VTI: 32 CM
ECHO BSA: 1.78 M2
ECHO EST RA PRESSURE: 3 MMHG
ECHO IVC PROX: 1.5 CM
ECHO LA AREA 2C: 26.8 CM2
ECHO LA AREA 4C: 21.7 CM2
ECHO LA DIAMETER INDEX: 2.05 CM/M2
ECHO LA DIAMETER: 3.6 CM
ECHO LA MAJOR AXIS: 6.1 CM
ECHO LA MINOR AXIS: 6.2 CM
ECHO LA TO AORTIC ROOT RATIO: 1.24
ECHO LA VOL BP: 75 ML (ref 18–58)
ECHO LA VOL MOD A2C: 93 ML (ref 18–58)
ECHO LA VOL MOD A4C: 60 ML (ref 18–58)
ECHO LA VOL/BSA BIPLANE: 43 ML/M2 (ref 16–34)
ECHO LA VOLUME INDEX MOD A2C: 53 ML/M2 (ref 16–34)
ECHO LA VOLUME INDEX MOD A4C: 34 ML/M2 (ref 16–34)
ECHO LV E' LATERAL VELOCITY: 2.28 CM/S
ECHO LV E' SEPTAL VELOCITY: 2.72 CM/S
ECHO LV EDV A2C: 208 ML
ECHO LV EDV A4C: 187 ML
ECHO LV EDV INDEX A4C: 106 ML/M2
ECHO LV EDV NDEX A2C: 118 ML/M2
ECHO LV EF PHYSICIAN: 26 %
ECHO LV EJECTION FRACTION A2C: 17 %
ECHO LV EJECTION FRACTION A4C: 29 %
ECHO LV EJECTION FRACTION BIPLANE: 26 % (ref 55–100)
ECHO LV ESV A2C: 174 ML
ECHO LV ESV A4C: 133 ML
ECHO LV ESV INDEX A2C: 99 ML/M2
ECHO LV ESV INDEX A4C: 76 ML/M2
ECHO LV FRACTIONAL SHORTENING: 10 % (ref 28–44)
ECHO LV INTERNAL DIMENSION DIASTOLE INDEX: 3.58 CM/M2
ECHO LV INTERNAL DIMENSION DIASTOLIC: 6.3 CM (ref 4.2–5.9)
ECHO LV INTERNAL DIMENSION SYSTOLIC INDEX: 3.24 CM/M2
ECHO LV INTERNAL DIMENSION SYSTOLIC: 5.7 CM
ECHO LV IVSD: 1.3 CM (ref 0.6–1)
ECHO LV MASS 2D: 340.4 G (ref 88–224)
ECHO LV MASS INDEX 2D: 193.4 G/M2 (ref 49–115)
ECHO LV POSTERIOR WALL DIASTOLIC: 1.1 CM (ref 0.6–1)
ECHO LV RELATIVE WALL THICKNESS RATIO: 0.35
ECHO LVOT AREA: 3.5 CM2
ECHO LVOT AV VTI INDEX: 0.61
ECHO LVOT DIAM: 2.1 CM
ECHO LVOT MEAN GRADIENT: 1 MMHG
ECHO LVOT PEAK GRADIENT: 2 MMHG
ECHO LVOT PEAK VELOCITY: 0.8 M/S
ECHO LVOT STROKE VOLUME INDEX: 38.6 ML/M2
ECHO LVOT SV: 67.9 ML
ECHO LVOT VTI: 19.6 CM
ECHO MV A VELOCITY: 1.08 M/S
ECHO MV E DECELERATION TIME (DT): 349 MS
ECHO MV E VELOCITY: 0.5 M/S
ECHO MV E/A RATIO: 0.46
ECHO MV E/E' LATERAL: 21.93
ECHO MV E/E' RATIO (AVERAGED): 20.16
ECHO MV E/E' SEPTAL: 18.38
ECHO RV TAPSE: 1.8 CM (ref 1.7–?)
EKG ATRIAL RATE: 46 BPM
EKG ATRIAL RATE: 52 BPM
EKG P AXIS: 83 DEGREES
EKG P AXIS: 85 DEGREES
EKG P-R INTERVAL: 268 MS
EKG P-R INTERVAL: 268 MS
EKG Q-T INTERVAL: 430 MS
EKG Q-T INTERVAL: 472 MS
EKG QRS DURATION: 110 MS
EKG QRS DURATION: 114 MS
EKG QTC CALCULATION (BAZETT): 399 MS
EKG QTC CALCULATION (BAZETT): 413 MS
EKG R AXIS: -62 DEGREES
EKG R AXIS: -80 DEGREES
EKG T AXIS: 86 DEGREES
EKG T AXIS: 97 DEGREES
EKG VENTRICULAR RATE: 46 BPM
EKG VENTRICULAR RATE: 52 BPM
EOSINOPHIL # BLD: 0.43 K/UL (ref 0–0.44)
EOSINOPHILS RELATIVE PERCENT: 4 % (ref 1–4)
ERYTHROCYTE [DISTWIDTH] IN BLOOD BY AUTOMATED COUNT: 14.2 % (ref 11.8–14.4)
GFR, ESTIMATED: 32 ML/MIN/1.73M2
GLUCOSE BLD-MCNC: 123 MG/DL (ref 75–110)
GLUCOSE BLD-MCNC: 140 MG/DL (ref 75–110)
GLUCOSE BLD-MCNC: 193 MG/DL (ref 75–110)
GLUCOSE BLD-MCNC: 235 MG/DL (ref 75–110)
GLUCOSE BLD-MCNC: 78 MG/DL (ref 75–110)
GLUCOSE SERPL-MCNC: 185 MG/DL (ref 74–99)
HCT VFR BLD AUTO: 42.7 % (ref 40.7–50.3)
HGB BLD-MCNC: 14.4 G/DL (ref 13–17)
IMM GRANULOCYTES # BLD AUTO: 0.08 K/UL (ref 0–0.3)
IMM GRANULOCYTES NFR BLD: 1 %
LYMPHOCYTES NFR BLD: 1.84 K/UL (ref 1.1–3.7)
LYMPHOCYTES RELATIVE PERCENT: 17 % (ref 24–43)
MCH RBC QN AUTO: 27.8 PG (ref 25.2–33.5)
MCHC RBC AUTO-ENTMCNC: 33.7 G/DL (ref 25.2–33.5)
MCV RBC AUTO: 82.4 FL (ref 82.6–102.9)
MONOCYTES NFR BLD: 0.96 K/UL (ref 0.1–1.2)
MONOCYTES NFR BLD: 9 % (ref 3–12)
NEUTROPHILS NFR BLD: 68 % (ref 36–65)
NEUTS SEG NFR BLD: 7.19 K/UL (ref 1.5–8.1)
NRBC BLD-RTO: 0 PER 100 WBC
PLATELET # BLD AUTO: 242 K/UL (ref 138–453)
PMV BLD AUTO: 9.9 FL (ref 8.1–13.5)
POTASSIUM SERPL-SCNC: 5.6 MMOL/L (ref 3.7–5.3)
RBC # BLD AUTO: 5.18 M/UL (ref 4.21–5.77)
RBC # BLD: ABNORMAL 10*6/UL
SODIUM SERPL-SCNC: 135 MMOL/L (ref 136–145)
TROPONIN I SERPL HS-MCNC: 59 NG/L (ref 0–22)
TROPONIN I SERPL HS-MCNC: 63 NG/L (ref 0–22)
TROPONIN I SERPL HS-MCNC: 64 NG/L (ref 0–22)
WBC OTHER # BLD: 10.6 K/UL (ref 3.5–11.3)

## 2025-06-26 PROCEDURE — 99222 1ST HOSP IP/OBS MODERATE 55: CPT

## 2025-06-26 PROCEDURE — G0378 HOSPITAL OBSERVATION PER HR: HCPCS

## 2025-06-26 PROCEDURE — 2580000003 HC RX 258: Performed by: INTERNAL MEDICINE

## 2025-06-26 PROCEDURE — C8929 TTE W OR WO FOL WCON,DOPPLER: HCPCS

## 2025-06-26 PROCEDURE — 6360000004 HC RX CONTRAST MEDICATION: Performed by: INTERNAL MEDICINE

## 2025-06-26 PROCEDURE — 6370000000 HC RX 637 (ALT 250 FOR IP)

## 2025-06-26 PROCEDURE — 85025 COMPLETE CBC W/AUTO DIFF WBC: CPT

## 2025-06-26 PROCEDURE — 99223 1ST HOSP IP/OBS HIGH 75: CPT | Performed by: STUDENT IN AN ORGANIZED HEALTH CARE EDUCATION/TRAINING PROGRAM

## 2025-06-26 PROCEDURE — 80048 BASIC METABOLIC PNL TOTAL CA: CPT

## 2025-06-26 PROCEDURE — 94760 N-INVAS EAR/PLS OXIMETRY 1: CPT

## 2025-06-26 PROCEDURE — 93306 TTE W/DOPPLER COMPLETE: CPT | Performed by: STUDENT IN AN ORGANIZED HEALTH CARE EDUCATION/TRAINING PROGRAM

## 2025-06-26 PROCEDURE — 93880 EXTRACRANIAL BILAT STUDY: CPT

## 2025-06-26 PROCEDURE — 82947 ASSAY GLUCOSE BLOOD QUANT: CPT

## 2025-06-26 PROCEDURE — 36415 COLL VENOUS BLD VENIPUNCTURE: CPT

## 2025-06-26 PROCEDURE — 84484 ASSAY OF TROPONIN QUANT: CPT

## 2025-06-26 PROCEDURE — 6370000000 HC RX 637 (ALT 250 FOR IP): Performed by: INTERNAL MEDICINE

## 2025-06-26 PROCEDURE — 6360000002 HC RX W HCPCS

## 2025-06-26 PROCEDURE — 2580000003 HC RX 258

## 2025-06-26 PROCEDURE — 93005 ELECTROCARDIOGRAM TRACING: CPT | Performed by: EMERGENCY MEDICINE

## 2025-06-26 PROCEDURE — 96361 HYDRATE IV INFUSION ADD-ON: CPT

## 2025-06-26 RX ORDER — INSULIN LISPRO 100 [IU]/ML
0-4 INJECTION, SOLUTION INTRAVENOUS; SUBCUTANEOUS
Status: DISCONTINUED | OUTPATIENT
Start: 2025-06-26 | End: 2025-06-27 | Stop reason: HOSPADM

## 2025-06-26 RX ORDER — INSULIN GLARGINE 100 [IU]/ML
10 INJECTION, SOLUTION SUBCUTANEOUS DAILY
Status: DISCONTINUED | OUTPATIENT
Start: 2025-06-26 | End: 2025-06-26

## 2025-06-26 RX ORDER — SODIUM CHLORIDE 9 MG/ML
INJECTION, SOLUTION INTRAVENOUS PRN
Status: DISCONTINUED | OUTPATIENT
Start: 2025-06-26 | End: 2025-06-27 | Stop reason: HOSPADM

## 2025-06-26 RX ORDER — SODIUM CHLORIDE 9 MG/ML
INJECTION, SOLUTION INTRAVENOUS CONTINUOUS
Status: ACTIVE | OUTPATIENT
Start: 2025-06-26 | End: 2025-06-27

## 2025-06-26 RX ORDER — CLOPIDOGREL BISULFATE 75 MG/1
75 TABLET ORAL DAILY
Status: DISCONTINUED | OUTPATIENT
Start: 2025-06-26 | End: 2025-06-27 | Stop reason: HOSPADM

## 2025-06-26 RX ORDER — ONDANSETRON 2 MG/ML
4 INJECTION INTRAMUSCULAR; INTRAVENOUS EVERY 6 HOURS PRN
Status: DISCONTINUED | OUTPATIENT
Start: 2025-06-26 | End: 2025-06-27 | Stop reason: HOSPADM

## 2025-06-26 RX ORDER — ONDANSETRON 4 MG/1
4 TABLET, ORALLY DISINTEGRATING ORAL EVERY 8 HOURS PRN
Status: DISCONTINUED | OUTPATIENT
Start: 2025-06-26 | End: 2025-06-27 | Stop reason: HOSPADM

## 2025-06-26 RX ORDER — VITAMIN B COMPLEX
1000 TABLET ORAL DAILY
Status: DISCONTINUED | OUTPATIENT
Start: 2025-06-26 | End: 2025-06-27 | Stop reason: HOSPADM

## 2025-06-26 RX ORDER — CARVEDILOL 3.12 MG/1
6.25 TABLET ORAL 2 TIMES DAILY WITH MEALS
Status: DISCONTINUED | OUTPATIENT
Start: 2025-06-26 | End: 2025-06-27 | Stop reason: HOSPADM

## 2025-06-26 RX ORDER — POLYETHYLENE GLYCOL 3350 17 G/17G
17 POWDER, FOR SOLUTION ORAL DAILY PRN
Status: DISCONTINUED | OUTPATIENT
Start: 2025-06-26 | End: 2025-06-27 | Stop reason: HOSPADM

## 2025-06-26 RX ORDER — GLUCAGON 1 MG/ML
1 KIT INJECTION PRN
Status: DISCONTINUED | OUTPATIENT
Start: 2025-06-26 | End: 2025-06-27 | Stop reason: HOSPADM

## 2025-06-26 RX ORDER — ACETAMINOPHEN 325 MG/1
650 TABLET ORAL EVERY 6 HOURS PRN
Status: DISCONTINUED | OUTPATIENT
Start: 2025-06-26 | End: 2025-06-27 | Stop reason: HOSPADM

## 2025-06-26 RX ORDER — INSULIN LISPRO 100 [IU]/ML
10 INJECTION, SOLUTION INTRAVENOUS; SUBCUTANEOUS
Status: DISCONTINUED | OUTPATIENT
Start: 2025-06-26 | End: 2025-06-27 | Stop reason: HOSPADM

## 2025-06-26 RX ORDER — ACETAMINOPHEN 650 MG/1
650 SUPPOSITORY RECTAL EVERY 6 HOURS PRN
Status: DISCONTINUED | OUTPATIENT
Start: 2025-06-26 | End: 2025-06-27 | Stop reason: HOSPADM

## 2025-06-26 RX ORDER — ASPIRIN 81 MG/1
81 TABLET, CHEWABLE ORAL DAILY
Status: DISCONTINUED | OUTPATIENT
Start: 2025-06-26 | End: 2025-06-27 | Stop reason: HOSPADM

## 2025-06-26 RX ORDER — HEPARIN SODIUM 5000 [USP'U]/ML
5000 INJECTION, SOLUTION INTRAVENOUS; SUBCUTANEOUS 2 TIMES DAILY
Status: DISCONTINUED | OUTPATIENT
Start: 2025-06-26 | End: 2025-06-27 | Stop reason: HOSPADM

## 2025-06-26 RX ORDER — ATORVASTATIN CALCIUM 10 MG/1
10 TABLET, FILM COATED ORAL DAILY
Status: DISCONTINUED | OUTPATIENT
Start: 2025-06-26 | End: 2025-06-26

## 2025-06-26 RX ORDER — MAGNESIUM HYDROXIDE/ALUMINUM HYDROXICE/SIMETHICONE 120; 1200; 1200 MG/30ML; MG/30ML; MG/30ML
30 SUSPENSION ORAL EVERY 6 HOURS PRN
Status: DISCONTINUED | OUTPATIENT
Start: 2025-06-26 | End: 2025-06-27 | Stop reason: HOSPADM

## 2025-06-26 RX ORDER — SODIUM CHLORIDE 0.9 % (FLUSH) 0.9 %
5-40 SYRINGE (ML) INJECTION PRN
Status: DISCONTINUED | OUTPATIENT
Start: 2025-06-26 | End: 2025-06-27 | Stop reason: HOSPADM

## 2025-06-26 RX ORDER — SODIUM CHLORIDE 0.9 % (FLUSH) 0.9 %
5-40 SYRINGE (ML) INJECTION EVERY 12 HOURS SCHEDULED
Status: DISCONTINUED | OUTPATIENT
Start: 2025-06-26 | End: 2025-06-27 | Stop reason: HOSPADM

## 2025-06-26 RX ORDER — DEXTROSE MONOHYDRATE 100 MG/ML
INJECTION, SOLUTION INTRAVENOUS CONTINUOUS PRN
Status: DISCONTINUED | OUTPATIENT
Start: 2025-06-26 | End: 2025-06-27 | Stop reason: HOSPADM

## 2025-06-26 RX ORDER — GLIPIZIDE 5 MG/1
10 TABLET ORAL
Status: DISCONTINUED | OUTPATIENT
Start: 2025-06-26 | End: 2025-06-27 | Stop reason: HOSPADM

## 2025-06-26 RX ORDER — INSULIN GLARGINE 100 [IU]/ML
30 INJECTION, SOLUTION SUBCUTANEOUS DAILY
Status: DISCONTINUED | OUTPATIENT
Start: 2025-06-27 | End: 2025-06-27 | Stop reason: HOSPADM

## 2025-06-26 RX ADMIN — CHOLECALCIFEROL TAB 25 MCG (1000 UNIT) 1000 UNITS: 25 TAB at 08:53

## 2025-06-26 RX ADMIN — INSULIN LISPRO 10 UNITS: 100 INJECTION, SOLUTION INTRAVENOUS; SUBCUTANEOUS at 12:16

## 2025-06-26 RX ADMIN — CLOPIDOGREL BISULFATE 75 MG: 75 TABLET, FILM COATED ORAL at 08:53

## 2025-06-26 RX ADMIN — SODIUM CHLORIDE: 0.9 INJECTION, SOLUTION INTRAVENOUS at 16:19

## 2025-06-26 RX ADMIN — INSULIN LISPRO 10 UNITS: 100 INJECTION, SOLUTION INTRAVENOUS; SUBCUTANEOUS at 17:05

## 2025-06-26 RX ADMIN — HEPARIN SODIUM 5000 UNITS: 5000 INJECTION INTRAVENOUS; SUBCUTANEOUS at 20:42

## 2025-06-26 RX ADMIN — INSULIN LISPRO 1 UNITS: 100 INJECTION, SOLUTION INTRAVENOUS; SUBCUTANEOUS at 03:05

## 2025-06-26 RX ADMIN — SODIUM CHLORIDE: 0.9 INJECTION, SOLUTION INTRAVENOUS at 02:38

## 2025-06-26 RX ADMIN — SULFUR HEXAFLUORIDE 2 ML: KIT at 10:06

## 2025-06-26 RX ADMIN — INSULIN GLARGINE 10 UNITS: 100 INJECTION, SOLUTION SUBCUTANEOUS at 08:53

## 2025-06-26 RX ADMIN — HEPARIN SODIUM 5000 UNITS: 5000 INJECTION INTRAVENOUS; SUBCUTANEOUS at 08:53

## 2025-06-26 RX ADMIN — INSULIN LISPRO 1 UNITS: 100 INJECTION, SOLUTION INTRAVENOUS; SUBCUTANEOUS at 12:16

## 2025-06-26 RX ADMIN — INSULIN LISPRO 1 UNITS: 100 INJECTION, SOLUTION INTRAVENOUS; SUBCUTANEOUS at 08:53

## 2025-06-26 RX ADMIN — ALUMINUM HYDROXIDE, MAGNESIUM HYDROXIDE, AND SIMETHICONE 30 ML: 200; 200; 20 SUSPENSION ORAL at 20:03

## 2025-06-26 RX ADMIN — ALUMINUM HYDROXIDE, MAGNESIUM HYDROXIDE, AND SIMETHICONE 30 ML: 200; 200; 20 SUSPENSION ORAL at 03:04

## 2025-06-26 RX ADMIN — ASPIRIN 81 MG: 81 TABLET, CHEWABLE ORAL at 08:53

## 2025-06-26 ASSESSMENT — LIFESTYLE VARIABLES
HOW OFTEN DO YOU HAVE A DRINK CONTAINING ALCOHOL: 2-4 TIMES A MONTH
HOW MANY STANDARD DRINKS CONTAINING ALCOHOL DO YOU HAVE ON A TYPICAL DAY: 1 OR 2

## 2025-06-26 ASSESSMENT — PAIN SCALES - GENERAL
PAINLEVEL_OUTOF10: 0
PAINLEVEL_OUTOF10: 0

## 2025-06-26 NOTE — CARE COORDINATION
Case Management Assessment  Initial Evaluation    Date/Time of Evaluation: 6/26/2025 10:05 AM  Assessment Completed by: Aubrie De León RN    If patient is discharged prior to next notation, then this note serves as note for discharge by case management.    Patient Name: Jose Enrique Rodas                   YOB: 1945  Diagnosis: Dehydration [E86.0]  Elevated troponin [R79.89]  Altered mental status, unspecified altered mental status type [R41.82]                   Date / Time: 6/25/2025  9:33 PM    Patient Admission Status: Observation   Readmission Risk (Low < 19, Mod (19-27), High > 27): No data recorded  Current PCP: Ruth Rhodes APRN - CNP  PCP verified by CM? Yes    Chart Reviewed: Yes      History Provided by: Patient  Patient Orientation: Alert and Oriented    Patient Cognition: Alert    Hospitalization in the last 30 days (Readmission):  No    If yes, Readmission Assessment in CM Navigator will be completed.    Advance Directives:      Code Status: Full Code   Patient's Primary Decision Maker is: Legal Next of Kin (Sarah Beth patient wife)    Primary Decision Maker: Sarah Beth Rodas - Spouse - [phone]    Discharge Planning:    Patient lives with: Spouse/Significant Other Type of Home: House  Primary Care Giver: Self  Patient Support Systems include: Spouse/Significant Other   Current Financial resources: Medicare  Current community resources: None  Current services prior to admission: None            Current DME:              Type of Home Care services:  None    ADLS  Prior functional level: Independent in ADLs/IADLs  Current functional level: Independent in ADLs/IADLs    PT AM-PAC:   /24  OT AM-PAC:   /24    Family can provide assistance at DC: Yes  Would you like Case Management to discuss the discharge plan with any other family members/significant others, and if so, who? Yes (wife present at bedside)  Plans to Return to Present Housing: Yes  Other Identified Issues/Barriers to

## 2025-06-26 NOTE — FLOWSHEET NOTE
Spiritual Health History and Assessment/Progress Note  [facility]    Initial Encounter,  ,  ,      Name: Jose Enrique Rodas MRN: [id number]    Age: 79 y.o.     Sex: male   Language: English   Alevism: Mandaeism   Dehydration     Date: 6/26/2025            Total Time Calculated: 15 min              Spiritual Assessment began in [facility]  PROGRESSIVE CARE        Referral/Consult From: Rounding   Encounter Overview/Reason: Initial Encounter  Service Provided For: Patient, Significant other    Narrative: Visited Omid and his wife while rounding. Talked about how they were doing and then had a good conversation about life. They seem to have a good relationship, and good support. Thankful for the care they received so far here.     Ritu, Belief, Meaning:   Patient identifies as spiritual, is connected with a ritu tradition or spiritual practice, and has beliefs or practices that help with coping during difficult times  Family/Friends identify as spiritual, are connected with a ritu tradition or spiritual practice, and have beliefs or practices that help with coping during difficult times      Importance and Influence:  Patient has spiritual/personal beliefs that influence decisions regarding their health  Family/Friends have spiritual/personal beliefs that influence decisions regarding the patient's health    Community:  Patient is connected with a spiritual community  Family/Friends are connected with a spiritual community:    Assessment and Plan of Care:     Patient Interventions include: Facilitated expression of thoughts and feelings and Explored spiritual coping/struggle/distress  Family/Friends Interventions include: Facilitated expression of thoughts and feelings and Explored spiritual coping/struggle/distress    Patient Plan of Care: No spiritual needs identified for follow-up  Family/Friends Plan of Care: No spiritual needs identified for follow-up    Electronically signed by Chaplain DENEEN on
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
## 2025-06-26 NOTE — CARE COORDINATION
DISCHARGE BARRIERS       Reason for Referral:  SW completed a Psychosocial Assessment for evaluation of patient's mental health, social status, and functional capacity within the community. Jose Enrique Rodas is a 79 y.o. male admitted due to Dehydration. Patient accompanied by spouse. SW provided supportive listening while patient discussed past medical history and events leading up to hospitalization.       Mental Status:  Alert, oriented, and engaging during assessment.     Decision Making:  Makes own decisions.     Family/Social/Home Environment: lives with their spouse    Employment status: Retired     Health Insurance:     Active Insurance as of 6/25/2025       Primary Coverage       Payor Plan Insurance Group Employer/Plan Group    MEDICARE MEDICARE PART A AND B        Payor Address Payor Phone Number Payor Fax Number Effective Dates    [address] [address] [phone]  7/1/2011 - None Entered    [facility] [id number]         Subscriber Name Subscriber Birth Date Member ID       JOSE ENRIQUE RODAS 1945 [id number]               Secondary Coverage       Payor Plan Insurance Group Employer/Plan Group    UNITED [employer] AARP HEALTH CARE MEDICARE SUPP        Payor Plan Address Payor Plan Phone Number Payor Plan Fax Number Effective Dates    [address] [phone]  1/1/2015 - None Entered    [facility] [address]         Subscriber Name Subscriber Birth Date Member ID       JOSE ENRIQUE RODAS 1945 [id number]                     Support: Discussed a good social support network     Current Services:  None      Current DMEs: none reported     PCP: Ruth Rhodes APRN - CNP and tiburcio no issues affording medication.      status:   Army      ADLs and means of transportation: Independent  in ADLs prior to hospitalization and able to transport self.    Food insecurity or needed financial assistance: Denies any food insecurity or financial concerns at this time.    Income Source: social

## 2025-06-26 NOTE — CONSULTS
[facility]    Consult                        Today's Date: 6/26/2025  Patient Name: Jose Enrique Rodas  Date of admission: 6/25/2025  9:33 PM  Patient's age: 79 y.o., 1945  Admission Dx: Dehydration [E86.0]  Elevated troponin [R79.89]  Altered mental status, unspecified altered mental status type [R41.82]    Reason for Consult:  Cardiac evaluation    Requesting Physician: Manfred Yan MD    CHIEF COMPLAINT:  elevated troponin    History Obtained From:  patient    HISTORY OF PRESENT ILLNESS:      The patient is a 79 y.o. male with history of CAD s/p PCI to LAD, OM and Diag, Ischemic cardiomyopathy with last EF 15%, Aortic stenosis s/p TAVR, Hypertension, Dyslipidemia who presented with altered mental status, acute on chronic kidney injury likely related to dehydration. His troponin were mildly elevated, though stable compared to his baseline and non-dynamic. EKG showed sinus rhythm with no ischemic changes.      Past Medical History:   has a past medical history of Acne rosacea, Acute respiratory failure with hypoxia ([facility]), BJ (acute kidney injury), Anemia, Aortic stenosis, Aortic stenosis, severe, BPH (benign prostatic hypertrophy), CAD (coronary artery disease), Carotid stenosis, Cataract of both eyes, CHF (congestive heart failure) ([facility]), CRI (chronic renal insufficiency), Diabetes mellitus, type 2 ([facility]), Dyslipidemia, Erectile dysfunction, H/O agent Orange exposure, Hearing loss, Hyperlipidemia, Hypertension, Incarcerated umbilical hernia, Mild nonproliferative diabetic retinopathy ([facility]), Non-rheumatic mitral regurgitation, Nonrheumatic aortic valve stenosis, NSTEMI (non-ST elevated myocardial infarction) ([facility]), Pneumonia, Pneumonia of right lower lobe due to infectious organism, RBBB, Sepsis ([facility]), Status post transcatheter aortic valve replacement (TAVR) using bioprosthesis, Typical angina, and Wears dentures.    Past Surgical History:   has a past surgical history that includes

## 2025-06-26 NOTE — ED NOTES
ED Report    Presents to ED from: Home    Chief Complaint   Patient presents with    OTHER     1. Altered mental status, unspecified altered mental status type    2. Dehydration    3. Elevated troponin      Present Condition: Stable    Pertinent Event(s): Pt to ED with wife at bedside who states she feels he has been having \"delayed reaction times\" and intermittent episodes of forgetfulness.     LOC:  A+O x 4  Code Status: FULL    Vital Signs   Vitals:    06/25/25 2137 06/25/25 2200 06/25/25 2300 06/26/25 0000   BP: (!) 171/84 (!) 148/56 (!) 155/53 (!) 152/48   Pulse: 60 59 (!) 45 (!) 43   Resp: 18 21 18 18   Temp: 99.1 °F (37.3 °C)      TempSrc: Tympanic      SpO2: 94% 94% 95% 97%   Weight: 69.4 kg (153 lb)      Height: 1.651 m (5' 5\")        Oxygen Baseline: Room Air       Current Oxygen Needs: Room Air  Mobility: Independent       Mobility Aide: Independent    LDAs:   Peripheral IV 06/25/25 Left Forearm (Active)       Incision 05/16/22 Umbilicus (Active)     Abnormal ED Labs:    Labs Reviewed   URINALYSIS WITH REFLEX TO CULTURE - Abnormal; Notable for the following components:       Result Value    Glucose, Ur 3+ (*)     Protein, UA 2+ (*)     All other components within normal limits   CBC WITH AUTO DIFFERENTIAL - Abnormal; Notable for the following components:    MCV 81.9 (*)     MCHC 33.8 (*)     Neutrophils % 68 (*)     Lymphocytes % 17 (*)     Immature Granulocytes % 1 (*)     All other components within normal limits   COMPREHENSIVE METABOLIC PANEL - Abnormal; Notable for the following components:    Sodium 132 (*)     CO2 19 (*)     Glucose 334 (*)     BUN 57 (*)     Creatinine 2.5 (*)     Est, Glom Filt Rate 26 (*)     BUN/Creatinine Ratio 23 (*)     All other components within normal limits   TROPONIN - Abnormal; Notable for the following components:    Troponin, High Sensitivity 61 (*)     All other components within normal limits   MICROSCOPIC URINALYSIS - Abnormal; Notable for the following

## 2025-06-26 NOTE — H&P
HOSPITALIST ADMISSION H&P      REASON FOR ADMISSION:  Dehydration, Elevated troponin  ESTIMATED LENGTH OF STAY:<2 midnights, 1-2 days    ATTENDING/ADMITTING PHYSICIAN: Manfred Yan MD  PCP: Ruth Rhodes APRN - CNP    HISTORY OF PRESENT ILLNESS:      The patient is a 79 y.o. male patient of Ruth Rhodes APRN - CNP who presents from ER with c/o reaction time is off, recent medication change and low heart rate. Patient reports he is here because he got a \"little dehydrated\", states his heart rate is always slow \"that's why they have me on aspirin and a blood thinner\". Patient does say he ran a red light but denies any other issues. Reports he has been at baseball and softball games this week and does report that he thinks he is drinking \"plenty of fluids but with the heat maybe not enough\". Denies dizziness, lightheadedness, chest pain, shortness of breath, nausea, vomiting, diarrhea, constipation, or urinary issues.    Hyponatremia: Sodium 132 corrected for hyperglycemia to 136  DMII: HgbA1c 7.6 on 6/16/25. Follows with Diabetic educator. Recently stopped Rybelsus due to vomiting, Amaryl increased to 4mg daily    CKD: currently stage 4, typically stage 3a/b. Had referral to Nephrology in August 2024, had not had an appointment yet. (Has followed with Dr. Duckworth with [facility] in the past, when office was contacted in August appointments were booked through November and no open schedule until December-no follow up noted from this call-will need follow up on discharge.     In ER: 250 ml IVF.    CT head w/o:   IMPRESSION:  No acute intracranial abnormality. Encephalomalacia in the left frontal lobe.    CXR:   IMPRESSION:  No acute cardiopulmonary findings.    EKG #1:   Sinus bradycardia with 1st degree A-V block with occasional Premature ventricular complexes--52  Left axis deviation  Minimal voltage criteria for LVH, may be normal variant ( Lander product )  Possible Lateral infarct (cited on or

## 2025-06-27 ENCOUNTER — APPOINTMENT (OUTPATIENT)
Dept: GENERAL RADIOLOGY | Age: 80
DRG: 641 | End: 2025-06-27
Payer: MEDICARE

## 2025-06-27 VITALS
DIASTOLIC BLOOD PRESSURE: 81 MMHG | WEIGHT: 153.5 LBS | SYSTOLIC BLOOD PRESSURE: 143 MMHG | HEART RATE: 65 BPM | HEIGHT: 66 IN | OXYGEN SATURATION: 94 % | BODY MASS INDEX: 24.67 KG/M2 | RESPIRATION RATE: 19 BRPM | TEMPERATURE: 98.6 F

## 2025-06-27 LAB
ANION GAP SERPL CALCULATED.3IONS-SCNC: 9 MMOL/L (ref 9–16)
BASOPHILS # BLD: 0.09 K/UL (ref 0–0.2)
BASOPHILS NFR BLD: 1 % (ref 0–2)
BUN SERPL-MCNC: 39 MG/DL (ref 8–23)
BUN/CREAT SERPL: 24 (ref 9–20)
CALCIUM SERPL-MCNC: 8.8 MG/DL (ref 8.6–10.4)
CHLORIDE SERPL-SCNC: 108 MMOL/L (ref 98–107)
CO2 SERPL-SCNC: 22 MMOL/L (ref 20–31)
CREAT SERPL-MCNC: 1.6 MG/DL (ref 0.7–1.2)
ECHO BSA: 1.78 M2
EOSINOPHIL # BLD: 0.38 K/UL (ref 0–0.44)
EOSINOPHILS RELATIVE PERCENT: 4 % (ref 1–4)
ERYTHROCYTE [DISTWIDTH] IN BLOOD BY AUTOMATED COUNT: 14 % (ref 11.8–14.4)
GFR, ESTIMATED: 44 ML/MIN/1.73M2
GLUCOSE BLD-MCNC: 138 MG/DL (ref 75–110)
GLUCOSE SERPL-MCNC: 129 MG/DL (ref 74–99)
HCT VFR BLD AUTO: 41.9 % (ref 40.7–50.3)
HGB BLD-MCNC: 13.9 G/DL (ref 13–17)
IMM GRANULOCYTES # BLD AUTO: 0.07 K/UL (ref 0–0.3)
IMM GRANULOCYTES NFR BLD: 1 %
LYMPHOCYTES NFR BLD: 1.56 K/UL (ref 1.1–3.7)
LYMPHOCYTES RELATIVE PERCENT: 17 % (ref 24–43)
MAGNESIUM SERPL-MCNC: 2.3 MG/DL (ref 1.6–2.4)
MCH RBC QN AUTO: 27.3 PG (ref 25.2–33.5)
MCHC RBC AUTO-ENTMCNC: 33.2 G/DL (ref 25.2–33.5)
MCV RBC AUTO: 82.3 FL (ref 82.6–102.9)
MONOCYTES NFR BLD: 0.74 K/UL (ref 0.1–1.2)
MONOCYTES NFR BLD: 8 % (ref 3–12)
NEUTROPHILS NFR BLD: 69 % (ref 36–65)
NEUTS SEG NFR BLD: 6.41 K/UL (ref 1.5–8.1)
NRBC BLD-RTO: 0 PER 100 WBC
PLATELET # BLD AUTO: 243 K/UL (ref 138–453)
PMV BLD AUTO: 10.4 FL (ref 8.1–13.5)
POTASSIUM SERPL-SCNC: 5.2 MMOL/L (ref 3.7–5.3)
RBC # BLD AUTO: 5.09 M/UL (ref 4.21–5.77)
RBC # BLD: ABNORMAL 10*6/UL
SODIUM SERPL-SCNC: 139 MMOL/L (ref 136–145)
VAS LEFT CCA DIST EDV: 11 CM/S
VAS LEFT CCA DIST PSV: 68.8 CM/S
VAS LEFT CCA MID EDV: 11 CM/S
VAS LEFT CCA MID PSV: 75.3 CM/S
VAS LEFT CCA PROX EDV: 17.4 CM/S
VAS LEFT CCA PROX PSV: 89.1 CM/S
VAS LEFT ECA EDV: 11 CM/S
VAS LEFT ECA PSV: 52.1 CM/S
VAS LEFT ICA DIST EDV: 17.4 CM/S
VAS LEFT ICA DIST PSV: 91.5 CM/S
VAS LEFT ICA MID EDV: 11 CM/S
VAS LEFT ICA MID PSV: 60.8 CM/S
VAS LEFT ICA PROX EDV: 16.2 CM/S
VAS LEFT ICA PROX PSV: 72.7 CM/S
VAS LEFT ICA/CCA PSV: 1.2 NO UNITS
VAS LEFT VERTEBRAL EDV: 14.8 CM/S
VAS LEFT VERTEBRAL PSV: 55.7 CM/S
VAS RIGHT CCA DIST EDV: 12.8 CM/S
VAS RIGHT CCA DIST PSV: 63.4 CM/S
VAS RIGHT CCA MID EDV: 7.6 CM/S
VAS RIGHT CCA MID PSV: 60.8 CM/S
VAS RIGHT CCA PROX EDV: 14.1 CM/S
VAS RIGHT CCA PROX PSV: 69.8 CM/S
VAS RIGHT ECA EDV: 5.1 CM/S
VAS RIGHT ECA PSV: 76.3 CM/S
VAS RIGHT ICA DIST EDV: 15.3 CM/S
VAS RIGHT ICA DIST PSV: 83.5 CM/S
VAS RIGHT ICA MID EDV: 15.4 CM/S
VAS RIGHT ICA MID PSV: 78.9 CM/S
VAS RIGHT ICA PROX EDV: 14.1 CM/S
VAS RIGHT ICA PROX PSV: 111.3 CM/S
VAS RIGHT ICA/CCA PSV: 1.8 NO UNITS
VAS RIGHT VERTEBRAL EDV: 8.9 CM/S
VAS RIGHT VERTEBRAL PSV: 47.5 CM/S
WBC OTHER # BLD: 9.3 K/UL (ref 3.5–11.3)

## 2025-06-27 PROCEDURE — 83735 ASSAY OF MAGNESIUM: CPT

## 2025-06-27 PROCEDURE — 6370000000 HC RX 637 (ALT 250 FOR IP): Performed by: INTERNAL MEDICINE

## 2025-06-27 PROCEDURE — 2060000000 HC ICU INTERMEDIATE R&B

## 2025-06-27 PROCEDURE — 36415 COLL VENOUS BLD VENIPUNCTURE: CPT

## 2025-06-27 PROCEDURE — 2580000003 HC RX 258: Performed by: INTERNAL MEDICINE

## 2025-06-27 PROCEDURE — 93880 EXTRACRANIAL BILAT STUDY: CPT | Performed by: SURGERY

## 2025-06-27 PROCEDURE — 80048 BASIC METABOLIC PNL TOTAL CA: CPT

## 2025-06-27 PROCEDURE — 82947 ASSAY GLUCOSE BLOOD QUANT: CPT

## 2025-06-27 PROCEDURE — 6360000002 HC RX W HCPCS

## 2025-06-27 PROCEDURE — 6370000000 HC RX 637 (ALT 250 FOR IP)

## 2025-06-27 PROCEDURE — 85025 COMPLETE CBC W/AUTO DIFF WBC: CPT

## 2025-06-27 PROCEDURE — 71045 X-RAY EXAM CHEST 1 VIEW: CPT

## 2025-06-27 RX ORDER — GLIMEPIRIDE 2 MG/1
4 TABLET ORAL
Qty: 90 TABLET | Refills: 1
Start: 2025-06-28

## 2025-06-27 RX ADMIN — INSULIN GLARGINE 30 UNITS: 100 INJECTION, SOLUTION SUBCUTANEOUS at 08:44

## 2025-06-27 RX ADMIN — ASPIRIN 81 MG: 81 TABLET, CHEWABLE ORAL at 08:44

## 2025-06-27 RX ADMIN — CLOPIDOGREL BISULFATE 75 MG: 75 TABLET, FILM COATED ORAL at 08:44

## 2025-06-27 RX ADMIN — HEPARIN SODIUM 5000 UNITS: 5000 INJECTION INTRAVENOUS; SUBCUTANEOUS at 08:45

## 2025-06-27 RX ADMIN — INSULIN LISPRO 10 UNITS: 100 INJECTION, SOLUTION INTRAVENOUS; SUBCUTANEOUS at 08:45

## 2025-06-27 RX ADMIN — CARVEDILOL 6.25 MG: 3.12 TABLET, FILM COATED ORAL at 08:48

## 2025-06-27 RX ADMIN — SODIUM CHLORIDE: 0.9 INJECTION, SOLUTION INTRAVENOUS at 02:23

## 2025-06-27 RX ADMIN — CHOLECALCIFEROL TAB 25 MCG (1000 UNIT) 1000 UNITS: 25 TAB at 08:44

## 2025-06-27 ASSESSMENT — PAIN SCALES - GENERAL: PAINLEVEL_OUTOF10: 0

## 2025-06-27 NOTE — PLAN OF CARE
Problem: Chronic Conditions and Co-morbidities  Goal: Patient's chronic conditions and co-morbidity symptoms are monitored and maintained or improved  6/26/2025 0848 by Leigh Ann Oliveira RN  Outcome: Progressing  Flowsheets (Taken 6/26/2025 0847)  Care Plan - Patient's Chronic Conditions and Co-Morbidity Symptoms are Monitored and Maintained or Improved:   Monitor and assess patient's chronic conditions and comorbid symptoms for stability, deterioration, or improvement   Collaborate with multidisciplinary team to address chronic and comorbid conditions and prevent exacerbation or deterioration   Update acute care plan with appropriate goals if chronic or comorbid symptoms are exacerbated and prevent overall improvement and discharge  6/26/2025 0320 by Jeannie Muller RN  Outcome: Progressing     Problem: Discharge Planning  Goal: Discharge to home or other facility with appropriate resources  6/26/2025 0848 by Leigh Ann Oliveira RN  Outcome: Progressing  Flowsheets (Taken 6/26/2025 0847)  Discharge to home or other facility with appropriate resources:   Identify barriers to discharge with patient and caregiver   Arrange for needed discharge resources and transportation as appropriate   Identify discharge learning needs (meds, wound care, etc)   Refer to discharge planning if patient needs post-hospital services based on physician order or complex needs related to functional status, cognitive ability or social support system  6/26/2025 0320 by Jeannie Muller RN  Outcome: Progressing  Flowsheets (Taken 6/26/2025 0239)  Discharge to home or other facility with appropriate resources:   Identify barriers to discharge with patient and caregiver   Identify discharge learning needs (meds, wound care, etc)     Problem: ABCDS Injury Assessment  Goal: Absence of physical injury  6/26/2025 0848 by Leigh Ann Oliveira RN  Outcome: Progressing  6/26/2025 0320 by Jeannie Muller RN  Outcome: Progressing     Problem: Neurosensory - 
  Problem: Chronic Conditions and Co-morbidities  Goal: Patient's chronic conditions and co-morbidity symptoms are monitored and maintained or improved  6/27/2025 1012 by Leigh Ann Oliveira RN  Outcome: Completed  6/27/2025 0843 by Leigh Ann Oliveira RN  Outcome: Progressing  Flowsheets (Taken 6/27/2025 0836)  Care Plan - Patient's Chronic Conditions and Co-Morbidity Symptoms are Monitored and Maintained or Improved:   Monitor and assess patient's chronic conditions and comorbid symptoms for stability, deterioration, or improvement   Collaborate with multidisciplinary team to address chronic and comorbid conditions and prevent exacerbation or deterioration   Update acute care plan with appropriate goals if chronic or comorbid symptoms are exacerbated and prevent overall improvement and discharge  6/27/2025 0330 by Tami Figueroa RN  Outcome: Progressing  Flowsheets (Taken 6/26/2025 0847 by Leigh Ann Oliveira RN)  Care Plan - Patient's Chronic Conditions and Co-Morbidity Symptoms are Monitored and Maintained or Improved:   Monitor and assess patient's chronic conditions and comorbid symptoms for stability, deterioration, or improvement   Collaborate with multidisciplinary team to address chronic and comorbid conditions and prevent exacerbation or deterioration   Update acute care plan with appropriate goals if chronic or comorbid symptoms are exacerbated and prevent overall improvement and discharge     Problem: Discharge Planning  Goal: Discharge to home or other facility with appropriate resources  6/27/2025 1012 by Leigh Ann Oliveira RN  Outcome: Completed  6/27/2025 0843 by Leigh Ann Oliveira RN  Outcome: Progressing  Flowsheets (Taken 6/27/2025 0836)  Discharge to home or other facility with appropriate resources:   Identify barriers to discharge with patient and caregiver   Arrange for needed discharge resources and transportation as appropriate   Identify discharge learning needs (meds, wound care, etc)   Refer to 
  Problem: Chronic Conditions and Co-morbidities  Goal: Patient's chronic conditions and co-morbidity symptoms are monitored and maintained or improved  Outcome: Progressing     Problem: Discharge Planning  Goal: Discharge to home or other facility with appropriate resources  Outcome: Progressing  Flowsheets (Taken 6/26/2025 3559)  Discharge to home or other facility with appropriate resources:   Identify barriers to discharge with patient and caregiver   Identify discharge learning needs (meds, wound care, etc)     Problem: ABCDS Injury Assessment  Goal: Absence of physical injury  Outcome: Progressing     
  Problem: Chronic Conditions and Co-morbidities  Goal: Patient's chronic conditions and co-morbidity symptoms are monitored and maintained or improved  Outcome: Progressing  Flowsheets (Taken 6/26/2025 0847 by Leigh Ann Oliveira RN)  Care Plan - Patient's Chronic Conditions and Co-Morbidity Symptoms are Monitored and Maintained or Improved:   Monitor and assess patient's chronic conditions and comorbid symptoms for stability, deterioration, or improvement   Collaborate with multidisciplinary team to address chronic and comorbid conditions and prevent exacerbation or deterioration   Update acute care plan with appropriate goals if chronic or comorbid symptoms are exacerbated and prevent overall improvement and discharge     Problem: Metabolic/Fluid and Electrolytes - Adult  Goal: Electrolytes maintained within normal limits  Outcome: Progressing  Flowsheets (Taken 6/27/2025 0330)  Electrolytes maintained within normal limits:   Monitor labs and assess patient for signs and symptoms of electrolyte imbalances   Administer electrolyte replacement as ordered   Monitor response to electrolyte replacements, including repeat lab results as appropriate  Goal: Glucose maintained within prescribed range  Outcome: Progressing  Flowsheets (Taken 6/27/2025 0330)  Glucose maintained within prescribed range:   Monitor blood glucose as ordered   Assess for signs and symptoms of hyperglycemia and hypoglycemia   Administer ordered medications to maintain glucose within target range   Assess barriers to adequate nutritional intake and initiate nutrition consult as needed   Instruct patient on self management of diabetes and initiate consult as needed     Problem: Safety - Adult  Goal: Free from fall injury  Outcome: Progressing  Flowsheets (Taken 6/27/2025 0330)  Free From Fall Injury: Instruct family/caregiver on patient safety     
support system     Problem: ABCDS Injury Assessment  Goal: Absence of physical injury  Outcome: Progressing     Problem: Neurosensory - Adult  Goal: Achieves stable or improved neurological status  Outcome: Progressing  Flowsheets (Taken 6/27/2025 0836)  Achieves stable or improved neurological status:   Assess for and report changes in neurological status   Initiate measures to prevent increased intracranial pressure   Maintain blood pressure and fluid volume within ordered parameters to optimize cerebral perfusion and minimize risk of hemorrhage   Monitor temperature, glucose, and sodium. Initiate appropriate interventions as ordered  Goal: Achieves maximal functionality and self care  Outcome: Progressing     Problem: Metabolic/Fluid and Electrolytes - Adult  Goal: Electrolytes maintained within normal limits  6/27/2025 0843 by Leigh Ann Oliveira, RN  Outcome: Progressing  Flowsheets (Taken 6/27/2025 0836)  Electrolytes maintained within normal limits:   Monitor labs and assess patient for signs and symptoms of electrolyte imbalances   Administer electrolyte replacement as ordered   Monitor response to electrolyte replacements, including repeat lab results as appropriate   Fluid restriction as ordered   Instruct patient on fluid and nutrition restrictions as appropriate  6/27/2025 0330 by Tami Figueroa, RN  Outcome: Progressing  Flowsheets (Taken 6/27/2025 0330)  Electrolytes maintained within normal limits:   Monitor labs and assess patient for signs and symptoms of electrolyte imbalances   Administer electrolyte replacement as ordered   Monitor response to electrolyte replacements, including repeat lab results as appropriate  Goal: Hemodynamic stability and optimal renal function maintained  Outcome: Progressing  Flowsheets (Taken 6/27/2025 0836)  Hemodynamic stability and optimal renal function maintained:   Monitor labs and assess for signs and symptoms of volume excess or deficit   Monitor intake, output and

## 2025-06-27 NOTE — DISCHARGE INSTR - DIET

## 2025-06-28 NOTE — DISCHARGE SUMMARY
[address] [place] [address]                            DISCHARGE SUMMARY      PATIENT NAME: SHIRLEY CERVANTES             : 1945  MED REC NO: [id number]                         ROOM: 0201  ACCOUNT NO: [id number]                       ADMIT DATE: 2025  PROVIDER: Manfred Yan MD      PRIMARY CARE PHYSICIAN:  Ruth Rhodes, CNP    PERSONAL CARE PROVIDER:  Ruth Rhodes, nurse practitioner, Internal Medicine.  The patient was seen by [facility], [facility].    DIAGNOSES:    1. Dehydration 2025, elevated troponin 2025 due to dehydration, renal, altered mental status due to dehydration, confusion.  2. Diabetes mellitus type 2.  Mild nonproliferative retinopathy, diabetic proteinuric nephropathy, acute kidney injury, chronic kidney disease stage 4, typically at a stage 3A/B level 2026.  3. 2D echo 2025:  Mild left ventricular hypertrophy, left atrium moderately dilated, moderately to severely reduced left ventricular systolic function, normal right ventricular systolic function, bioprosthetic transcatheter aortic valve replacement.  Mildly thickened mitral valve leaflets.  Mild mitral regurgitation.  Normal AR, AA.  Normal inferior vena cava diameter and inspiratory collapse.  Left ventricular ejection fraction 26% (compared to prior left ventricular ejection fraction of approximately 10%).  CT head 2025, no mass, bleed, or shift.  Chest x-ray 2025, no acute process.  EKG #2, 2025, sinus bradycardia, rate 46, first-degree atrioventricular block, left anterior hemiblock, left ventricular hypertrophy.  EKG #1, sinus bradycardia, rate 53 with first-degree atrioventricular block, premature ventricular contractions, left ventricular hypertrophy, possible lateral and inferior infarctions, bradycardia chronic, congestive heart failure chronic systolic see

## 2025-06-30 ENCOUNTER — HOSPITAL ENCOUNTER (EMERGENCY)
Age: 80
Discharge: HOME | End: 2025-06-30
Attending: EMERGENCY MEDICINE
Payer: MEDICARE

## 2025-06-30 ENCOUNTER — TELEPHONE (OUTPATIENT)
Dept: INTERNAL MEDICINE | Age: 80
End: 2025-06-30

## 2025-06-30 ENCOUNTER — PATIENT MESSAGE (OUTPATIENT)
Dept: DIABETES SERVICES | Age: 80
End: 2025-06-30

## 2025-06-30 ENCOUNTER — RESULTS FOLLOW-UP (OUTPATIENT)
Dept: INTERNAL MEDICINE | Age: 80
End: 2025-06-30

## 2025-06-30 VITALS
SYSTOLIC BLOOD PRESSURE: 170 MMHG | DIASTOLIC BLOOD PRESSURE: 54 MMHG | OXYGEN SATURATION: 97 % | HEART RATE: 53 BPM | TEMPERATURE: 98 F | RESPIRATION RATE: 16 BRPM

## 2025-06-30 DIAGNOSIS — R73.9 HYPERGLYCEMIA: ICD-10-CM

## 2025-06-30 DIAGNOSIS — R40.4 TRANSIENT ALTERATION OF AWARENESS: ICD-10-CM

## 2025-06-30 DIAGNOSIS — E11.22 TYPE 2 DIABETES MELLITUS WITH STAGE 3A CHRONIC KIDNEY DISEASE, WITHOUT LONG-TERM CURRENT USE OF INSULIN (HCC): ICD-10-CM

## 2025-06-30 DIAGNOSIS — E86.0 DEHYDRATION: Primary | ICD-10-CM

## 2025-06-30 DIAGNOSIS — N18.31 TYPE 2 DIABETES MELLITUS WITH STAGE 3A CHRONIC KIDNEY DISEASE, WITHOUT LONG-TERM CURRENT USE OF INSULIN (HCC): ICD-10-CM

## 2025-06-30 LAB
ALBUMIN SERPL-MCNC: 4.1 G/DL (ref 3.5–5.2)
ALBUMIN/GLOB SERPL: 1.5 {RATIO} (ref 1–2.5)
ALP SERPL-CCNC: 92 U/L (ref 40–129)
ALT SERPL-CCNC: 15 U/L (ref 10–50)
ANION GAP SERPL CALCULATED.3IONS-SCNC: 12 MMOL/L (ref 9–16)
AST SERPL-CCNC: 21 U/L (ref 10–50)
BASOPHILS # BLD: 0.08 K/UL (ref 0–0.2)
BASOPHILS NFR BLD: 1 % (ref 0–2)
BILIRUB SERPL-MCNC: 0.3 MG/DL (ref 0–1.2)
BILIRUB UR QL STRIP: NEGATIVE
BUN SERPL-MCNC: 36 MG/DL (ref 8–23)
BUN/CREAT SERPL: 19 (ref 9–20)
CALCIUM SERPL-MCNC: 9.3 MG/DL (ref 8.6–10.4)
CHARACTER UR: ABNORMAL
CHLORIDE SERPL-SCNC: 101 MMOL/L (ref 98–107)
CLARITY UR: CLEAR
CO2 SERPL-SCNC: 21 MMOL/L (ref 20–31)
COLOR UR: YELLOW
CREAT SERPL-MCNC: 1.9 MG/DL (ref 0.7–1.2)
EOSINOPHIL # BLD: 0.24 K/UL (ref 0–0.44)
EOSINOPHILS RELATIVE PERCENT: 2 % (ref 1–4)
EPI CELLS #/AREA URNS HPF: ABNORMAL /HPF (ref 0–5)
ERYTHROCYTE [DISTWIDTH] IN BLOOD BY AUTOMATED COUNT: 14.3 % (ref 11.8–14.4)
GFR, ESTIMATED: 36 ML/MIN/1.73M2
GLUCOSE SERPL-MCNC: 336 MG/DL (ref 74–99)
GLUCOSE UR STRIP-MCNC: ABNORMAL MG/DL
HCT VFR BLD AUTO: 46.2 % (ref 40.7–50.3)
HGB BLD-MCNC: 15.4 G/DL (ref 13–17)
HGB UR QL STRIP.AUTO: NEGATIVE
IMM GRANULOCYTES # BLD AUTO: 0.1 K/UL (ref 0–0.3)
IMM GRANULOCYTES NFR BLD: 1 %
KETONES UR STRIP-MCNC: NEGATIVE MG/DL
LEUKOCYTE ESTERASE UR QL STRIP: NEGATIVE
LYMPHOCYTES NFR BLD: 1.12 K/UL (ref 1.1–3.7)
LYMPHOCYTES RELATIVE PERCENT: 10 % (ref 24–43)
MCH RBC QN AUTO: 27.5 PG (ref 25.2–33.5)
MCHC RBC AUTO-ENTMCNC: 33.3 G/DL (ref 25.2–33.5)
MCV RBC AUTO: 82.5 FL (ref 82.6–102.9)
MONOCYTES NFR BLD: 0.83 K/UL (ref 0.1–1.2)
MONOCYTES NFR BLD: 7 % (ref 3–12)
NEUTROPHILS NFR BLD: 79 % (ref 36–65)
NEUTS SEG NFR BLD: 8.79 K/UL (ref 1.5–8.1)
NITRITE UR QL STRIP: NEGATIVE
NRBC BLD-RTO: 0 PER 100 WBC
PH UR STRIP: 5.5 [PH] (ref 5–6)
PLATELET # BLD AUTO: 264 K/UL (ref 138–453)
PMV BLD AUTO: 10.4 FL (ref 8.1–13.5)
POTASSIUM SERPL-SCNC: 5.1 MMOL/L (ref 3.7–5.3)
PROT SERPL-MCNC: 6.8 G/DL (ref 6.6–8.7)
PROT UR STRIP-MCNC: ABNORMAL MG/DL
RBC # BLD AUTO: 5.6 M/UL (ref 4.21–5.77)
RBC # BLD: ABNORMAL 10*6/UL
RBC #/AREA URNS HPF: ABNORMAL /HPF (ref 0–4)
SODIUM SERPL-SCNC: 134 MMOL/L (ref 136–145)
SP GR UR STRIP: 1.01 (ref 1.01–1.02)
TROPONIN I SERPL HS-MCNC: 61 NG/L (ref 0–22)
UROBILINOGEN UR STRIP-ACNC: NORMAL EU/DL (ref 0–1)
WBC #/AREA URNS HPF: ABNORMAL /HPF (ref 0–4)
WBC OTHER # BLD: 11.2 K/UL (ref 3.5–11.3)

## 2025-06-30 PROCEDURE — 81001 URINALYSIS AUTO W/SCOPE: CPT

## 2025-06-30 PROCEDURE — 84484 ASSAY OF TROPONIN QUANT: CPT

## 2025-06-30 PROCEDURE — 96360 HYDRATION IV INFUSION INIT: CPT

## 2025-06-30 PROCEDURE — 99284 EMERGENCY DEPT VISIT MOD MDM: CPT

## 2025-06-30 PROCEDURE — 2580000003 HC RX 258: Performed by: EMERGENCY MEDICINE

## 2025-06-30 PROCEDURE — 85025 COMPLETE CBC W/AUTO DIFF WBC: CPT

## 2025-06-30 PROCEDURE — 93005 ELECTROCARDIOGRAM TRACING: CPT | Performed by: EMERGENCY MEDICINE

## 2025-06-30 PROCEDURE — 80053 COMPREHEN METABOLIC PANEL: CPT

## 2025-06-30 RX ORDER — 0.9 % SODIUM CHLORIDE 0.9 %
1000 INTRAVENOUS SOLUTION INTRAVENOUS ONCE
Status: COMPLETED | OUTPATIENT
Start: 2025-06-30 | End: 2025-06-30

## 2025-06-30 RX ADMIN — SODIUM CHLORIDE 1000 ML: 0.9 INJECTION, SOLUTION INTRAVENOUS at 15:49

## 2025-06-30 ASSESSMENT — ENCOUNTER SYMPTOMS
ABDOMINAL PAIN: 0
SORE THROAT: 0
NAUSEA: 0
WHEEZING: 0
VOMITING: 0
CONSTIPATION: 0
DIARRHEA: 0
BLOOD IN STOOL: 0
SHORTNESS OF BREATH: 0
BACK PAIN: 0

## 2025-06-30 ASSESSMENT — PAIN - FUNCTIONAL ASSESSMENT: PAIN_FUNCTIONAL_ASSESSMENT: NONE - DENIES PAIN

## 2025-06-30 NOTE — ED PROVIDER NOTES
[facility]  eMERGENCY dEPARTMENT eNCOUnter      Pt Name: Jose Enrique Rodas  MRN: [id number]  Birthdate 1945  Date of evaluation: 6/30/2025      CHIEF COMPLAINT       Chief Complaint   Patient presents with    Altered Mental Status         HISTORY OF PRESENT ILLNESS    Jose Enrique Rodas is a 79 y.o. male who presents with chief complaint of confusion.  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   HENT:  Negative for congestion and sore throat.    Eyes:  Negative for visual disturbance.   Respiratory:  Negative for shortness of breath and wheezing.    Cardiovascular:  Negative for chest pain, palpitations and leg swelling.   Gastrointestinal:  Negative for abdominal pain, blood in stool, constipation, diarrhea, nausea and vomiting.   Genitourinary:  Negative for difficulty urinating and dysuria.   Musculoskeletal:  Negative for back pain, joint swelling and neck pain.   Skin:  Negative for rash.   Neurological:  Negative for dizziness, syncope, weakness and headaches.   Hematological:  Negative for adenopathy. Does not bruise/bleed easily.   Psychiatric/Behavioral:  Positive for confusion. Negative for suicidal ideas.          PAST MEDICAL HISTORY    has a past medical history of Acne rosacea, Acute respiratory failure with hypoxia (HCC), BJ (acute kidney injury), Anemia, Aortic stenosis, Aortic stenosis, severe, BPH (benign prostatic hypertrophy), CAD (coronary artery disease),

## 2025-06-30 NOTE — TELEPHONE ENCOUNTER
Care Transitions Initial Follow Up Call    Outreach made within 2 business days of discharge: Yes    Patient: Jose Enrique Rodas Patient : 1945   MRN: [id number]  Reason for Admission: dehydration     Discharge Date: 25       Spoke with: luli     Discharge department/facility: [facility] Interactive Patient Contact:  Was patient able to fill all prescriptions: Yes  Was patient instructed to bring all medications to the follow-up visit: Yes  Is patient taking all medications as directed in the discharge summary? Yes  Does patient understand their discharge instructions: Yes  Does patient have questions or concerns that need addressed prior to 7-14 day follow up office visit: no    Additional needs identified to be addressed with provider  No needs identified             Scheduled appointment with PCP within 7-14 days    Follow Up  Future Appointments   Date Time Provider Department Center   2025  1:20 PM Grace Bryant MD [facility]   2025  1:00 PM Ruth Rhodes APRN - CNP [facility] E[facility] DEP   7/15/2025  1:00 PM [facility] CH[facility] RM 1 [facility] [place] [facility]   2025 10:00 AM Kenia Beard APRN - CNP [facility]   2025  2:30 PM Rommel Cueva DO [facility]   2025  1:30 PM Ruth Rhodes APRN - CNP [facility] DEP       Elisha Gar MA

## 2025-07-01 ENCOUNTER — OFFICE VISIT (OUTPATIENT)
Dept: CARDIOLOGY | Age: 80
End: 2025-07-01
Payer: MEDICARE

## 2025-07-01 VITALS
HEART RATE: 56 BPM | HEIGHT: 66 IN | DIASTOLIC BLOOD PRESSURE: 68 MMHG | BODY MASS INDEX: 24.75 KG/M2 | SYSTOLIC BLOOD PRESSURE: 170 MMHG | WEIGHT: 154 LBS

## 2025-07-01 DIAGNOSIS — Z95.5 S/P CORONARY ARTERY STENT PLACEMENT: ICD-10-CM

## 2025-07-01 DIAGNOSIS — I10 ESSENTIAL HYPERTENSION: ICD-10-CM

## 2025-07-01 DIAGNOSIS — I25.5 ISCHEMIC CARDIOMYOPATHY: ICD-10-CM

## 2025-07-01 DIAGNOSIS — I25.10 CORONARY ARTERY DISEASE INVOLVING NATIVE CORONARY ARTERY OF NATIVE HEART WITHOUT ANGINA PECTORIS: Primary | ICD-10-CM

## 2025-07-01 DIAGNOSIS — Z95.2 S/P TAVR (TRANSCATHETER AORTIC VALVE REPLACEMENT): ICD-10-CM

## 2025-07-01 LAB
EKG ATRIAL RATE: 47 BPM
EKG P AXIS: 38 DEGREES
EKG P-R INTERVAL: 242 MS
EKG Q-T INTERVAL: 420 MS
EKG QRS DURATION: 114 MS
EKG QTC CALCULATION (BAZETT): 371 MS
EKG R AXIS: -59 DEGREES
EKG T AXIS: 153 DEGREES
EKG VENTRICULAR RATE: 47 BPM

## 2025-07-01 PROCEDURE — 99212 OFFICE O/P EST SF 10 MIN: CPT | Performed by: INTERNAL MEDICINE

## 2025-07-01 PROCEDURE — 1036F TOBACCO NON-USER: CPT | Performed by: INTERNAL MEDICINE

## 2025-07-01 PROCEDURE — G8427 DOCREV CUR MEDS BY ELIG CLIN: HCPCS | Performed by: INTERNAL MEDICINE

## 2025-07-01 PROCEDURE — G8420 CALC BMI NORM PARAMETERS: HCPCS | Performed by: INTERNAL MEDICINE

## 2025-07-01 PROCEDURE — 99214 OFFICE O/P EST MOD 30 MIN: CPT | Performed by: INTERNAL MEDICINE

## 2025-07-01 PROCEDURE — 1123F ACP DISCUSS/DSCN MKR DOCD: CPT | Performed by: INTERNAL MEDICINE

## 2025-07-01 PROCEDURE — 3078F DIAST BP <80 MM HG: CPT | Performed by: INTERNAL MEDICINE

## 2025-07-01 PROCEDURE — 3077F SYST BP >= 140 MM HG: CPT | Performed by: INTERNAL MEDICINE

## 2025-07-01 PROCEDURE — 1111F DSCHRG MED/CURRENT MED MERGE: CPT | Performed by: INTERNAL MEDICINE

## 2025-07-01 PROCEDURE — 1159F MED LIST DOCD IN RCRD: CPT | Performed by: INTERNAL MEDICINE

## 2025-07-01 RX ORDER — CARVEDILOL 3.12 MG/1
3.12 TABLET ORAL NIGHTLY
Qty: 30 TABLET | Refills: 3 | Status: SHIPPED | OUTPATIENT
Start: 2025-07-01

## 2025-07-01 RX ORDER — LOSARTAN POTASSIUM 25 MG/1
25 TABLET ORAL DAILY
Qty: 30 TABLET | Refills: 5 | Status: SHIPPED | OUTPATIENT
Start: 2025-07-01

## 2025-07-01 NOTE — TELEPHONE ENCOUNTER
Lou report downloaded and attached to this encounter for your review.     Last Appt:  4/8/2025  Next Appt:  8/13/2025

## 2025-07-01 NOTE — PROGRESS NOTES
[facility]  [address] [address]  Dept: [phone]    CC: CAD , S/P STENTS LAD , D1 ,OM ,AS , TAVR HLP    HPI and CC:  Patient seen and examined in office today for follow-up post recent hospital presentation with dehydration.    He was admitted recently to the hospital with dehydration and uncontrolled diabetes mellitus was given IV fluids medical treatment was adjusted had noted to have elevated troponin that was consistent with type II elevation was seen by cardiology was advised discharged with follow-up    He presented to the emergency room again yesterday with another episode of dehydration was given IV fluids and discharge.    He is currently fairly asymptomatic from cardiac standpoint.  He is actually quite active and usually plays pickle ball and can walk for 2 miles without getting chest pain or significant shortness of breath.  He denies any orthopnea or PND.  No palpitations dizziness or syncope.      Past Medical History:   has a past medical history of Acne rosacea, Acute respiratory failure with hypoxia ([facility]), BJ (acute kidney injury), Anemia, Aortic stenosis, Aortic stenosis, severe, BPH (benign prostatic hypertrophy), CAD (coronary artery disease), Carotid stenosis, Cataract of both eyes, CHF (congestive heart failure) ([facility]), CRI (chronic renal insufficiency), Diabetes mellitus, type 2 ([facility]), Dyslipidemia, Erectile dysfunction, H/O agent Orange exposure, Hearing loss, Hyperlipidemia, Hypertension, Incarcerated umbilical hernia, Mild nonproliferative diabetic retinopathy ([facility]), Non-rheumatic mitral regurgitation, Nonrheumatic aortic valve stenosis, NSTEMI (non-ST elevated myocardial infarction) ([facility]), Pneumonia, Pneumonia of right lower lobe due to infectious organism, RBBB, Sepsis ([facility]), Status post transcatheter aortic valve replacement (TAVR) using

## 2025-07-02 RX ORDER — GLIMEPIRIDE 2 MG/1
TABLET ORAL
Qty: 270 TABLET | Refills: 1 | Status: SHIPPED | OUTPATIENT
Start: 2025-07-02

## 2025-07-07 ENCOUNTER — HOSPITAL ENCOUNTER (OUTPATIENT)
Age: 80
Discharge: HOME | End: 2025-07-07
Payer: MEDICARE

## 2025-07-07 ENCOUNTER — RESULTS FOLLOW-UP (OUTPATIENT)
Dept: INTERNAL MEDICINE | Age: 80
End: 2025-07-07

## 2025-07-07 ENCOUNTER — OFFICE VISIT (OUTPATIENT)
Dept: INTERNAL MEDICINE | Age: 80
End: 2025-07-07

## 2025-07-07 VITALS
SYSTOLIC BLOOD PRESSURE: 124 MMHG | WEIGHT: 155.4 LBS | OXYGEN SATURATION: 99 % | TEMPERATURE: 98 F | HEART RATE: 74 BPM | RESPIRATION RATE: 16 BRPM | HEIGHT: 66 IN | BODY MASS INDEX: 24.98 KG/M2 | DIASTOLIC BLOOD PRESSURE: 76 MMHG

## 2025-07-07 DIAGNOSIS — I10 ESSENTIAL HYPERTENSION: ICD-10-CM

## 2025-07-07 DIAGNOSIS — E11.3219 CONTROLLED TYPE 2 DIABETES MELLITUS WITH MILD NONPROLIFERATIVE RETINOPATHY AND MACULAR EDEMA, WITHOUT LONG-TERM CURRENT USE OF INSULIN, UNSPECIFIED LATERALITY (HCC): ICD-10-CM

## 2025-07-07 DIAGNOSIS — I25.10 CORONARY ARTERY DISEASE INVOLVING NATIVE CORONARY ARTERY OF NATIVE HEART WITHOUT ANGINA PECTORIS: ICD-10-CM

## 2025-07-07 DIAGNOSIS — I25.5 ISCHEMIC CARDIOMYOPATHY: ICD-10-CM

## 2025-07-07 DIAGNOSIS — N18.32 STAGE 3B CHRONIC KIDNEY DISEASE (HCC): ICD-10-CM

## 2025-07-07 DIAGNOSIS — N18.32 STAGE 3B CHRONIC KIDNEY DISEASE (HCC): Primary | ICD-10-CM

## 2025-07-07 DIAGNOSIS — I50.22 CHF NYHA CLASS I, CHRONIC, SYSTOLIC (HCC): ICD-10-CM

## 2025-07-07 PROBLEM — J96.01 ACUTE RESPIRATORY FAILURE WITH HYPOXIA (HCC): Status: RESOLVED | Noted: 2021-12-22 | Resolved: 2025-07-07

## 2025-07-07 LAB
ANION GAP SERPL CALCULATED.3IONS-SCNC: 9 MMOL/L (ref 9–16)
ANION GAP SERPL CALCULATED.3IONS-SCNC: 9 MMOL/L (ref 9–16)
BUN SERPL-MCNC: 31 MG/DL (ref 8–23)
BUN SERPL-MCNC: 31 MG/DL (ref 8–23)
BUN/CREAT SERPL: 19 (ref 9–20)
BUN/CREAT SERPL: 19 (ref 9–20)
CALCIUM SERPL-MCNC: 9.4 MG/DL (ref 8.6–10.4)
CALCIUM SERPL-MCNC: 9.4 MG/DL (ref 8.6–10.4)
CHLORIDE SERPL-SCNC: 102 MMOL/L (ref 98–107)
CHLORIDE SERPL-SCNC: 102 MMOL/L (ref 98–107)
CO2 SERPL-SCNC: 25 MMOL/L (ref 20–31)
CO2 SERPL-SCNC: 25 MMOL/L (ref 20–31)
CREAT SERPL-MCNC: 1.6 MG/DL (ref 0.7–1.2)
CREAT SERPL-MCNC: 1.6 MG/DL (ref 0.7–1.2)
GFR, ESTIMATED: 44 ML/MIN/1.73M2
GFR, ESTIMATED: 44 ML/MIN/1.73M2
GLUCOSE SERPL-MCNC: 251 MG/DL (ref 74–99)
GLUCOSE SERPL-MCNC: 251 MG/DL (ref 74–99)
POTASSIUM SERPL-SCNC: 5.2 MMOL/L (ref 3.7–5.3)
POTASSIUM SERPL-SCNC: 5.2 MMOL/L (ref 3.7–5.3)
SODIUM SERPL-SCNC: 136 MMOL/L (ref 136–145)
SODIUM SERPL-SCNC: 136 MMOL/L (ref 136–145)

## 2025-07-07 PROCEDURE — 80048 BASIC METABOLIC PNL TOTAL CA: CPT

## 2025-07-07 PROCEDURE — 36415 COLL VENOUS BLD VENIPUNCTURE: CPT

## 2025-07-07 NOTE — PROGRESS NOTES
any headaches.  Denies any blurred vision, double vision, or eye pain. Denies any tinnitus, vertigo, or dizziness.  Denies discharge, stuffiness, obstruction, or epistaxis.  Denies any hoarseness, dysphagia/ pain.    Cardiovascular: Denies any chest pain, shortness of breath, dyspnea on exertion, orthopnea, or palpations.   Respiratory: Denies cough, sputum production, hemoptysis, or wheezing.  Gastrointestinal: Denies any nausea, vomiting, diarrhea, constipation, or melena.    Genitourinary: Denies any dysuria, hematuria, frequency, urgency, or hesitancy.   Endocrine:  Denies any heat or cold intolerances, polydipsia, polyuria, or polyphagia.  Musculoskeletal: Denies any arthritis, arthralagias, myalgias, or muscle weakness.  Neuropsychiatric: Denies any depression, syncope, tremors, seizures, anxiety or nervousness.     Physical Exam:  Vitals /76 (BP Site: Left Upper Arm, Patient Position: Sitting, BP Cuff Size: Large Adult)   Pulse 74   Temp 98 °F (36.7 °C) (Temporal)   Resp 16   Ht 1.676 m (5' 6\")   Wt 70.5 kg (155 lb 6.4 oz)   SpO2 99%   BMI 25.08 kg/m²   General Appearance: Alert, no distress; vital signs were reviewed today  Head: Normocephalic, atraumatic.  Eyes:  Sclera clear, no drainage  Ears:  External auditory canals patent, no drainage  Nose:  Septum midline, mucosa normal, no drainage or sinus tenderness  Throat: pharynx moist and pink, no exudate  Neck: Supple, trachea midline, no adenopathy;   Thyroid: No enlargement/tenderness/nodules;  Back: Symmetric, no tenderness  Lungs: Chest rises and falls symmetrically, no use of accessory muscles, Lungs clear throughout  Chest wall: No tenderness  Heart:: Regular rate and rhythm, S1 and S2 normal, no murmur or gallop  Abdomen: Nontender, bowel sounds active in all 4 quadrants, no masses  Extremities: Extremities without clubbing,cyanosis or edema  Skin: Skin color normal, no rashes or lesions  Neurological: Cranial nerves II-XII appears

## 2025-07-21 ENCOUNTER — CLINICAL SUPPORT (OUTPATIENT)
Dept: INTERNAL MEDICINE | Age: 80
End: 2025-07-21

## 2025-07-21 VITALS — SYSTOLIC BLOOD PRESSURE: 128 MMHG | HEART RATE: 66 BPM | DIASTOLIC BLOOD PRESSURE: 78 MMHG

## 2025-07-21 DIAGNOSIS — I10 ESSENTIAL HYPERTENSION: Primary | ICD-10-CM

## 2025-07-21 NOTE — PROGRESS NOTES
Pt stopped in to get his blood pressure checked & compare it to his new b/p cuff he received from [facility]     B/P 128/78  P-66 (manually)     B/P 153/73  P68 (cuff from [facility]

## 2025-07-26 PROBLEM — E86.0 DEHYDRATION: Status: RESOLVED | Noted: 2024-08-16 | Resolved: 2025-07-26

## 2025-07-26 PROBLEM — R79.89 ELEVATED TROPONIN: Status: RESOLVED | Noted: 2025-06-26 | Resolved: 2025-07-26

## 2025-07-28 ENCOUNTER — TELEPHONE (OUTPATIENT)
Dept: DIABETES SERVICES | Age: 80
End: 2025-07-28

## 2025-07-28 NOTE — TELEPHONE ENCOUNTER
CGM order from [facility] is in your box, however patient is no longer on insulin, and cgm therapy will not be covered my medicare.  Please advise on how you want to proceed.     Last Appt:  4/8/2025  Next Appt:  8/13/2025

## 2025-07-28 NOTE — TELEPHONE ENCOUNTER
Don and wife stopped by to request samples of Lou 2 sensors as they are waiting on some from [facility].  He also needs new script sent to [facility] for an upgrade to Lou 2 (he has iPhone 16)  (After speaking with Jessenia, samples given)

## 2025-07-30 ENCOUNTER — HOSPITAL ENCOUNTER (OUTPATIENT)
Dept: LAB | Age: 80
Discharge: HOME | End: 2025-07-30
Payer: MEDICARE

## 2025-07-30 DIAGNOSIS — I25.10 CORONARY ARTERY DISEASE INVOLVING NATIVE CORONARY ARTERY OF NATIVE HEART WITHOUT ANGINA PECTORIS: ICD-10-CM

## 2025-07-30 LAB
ANION GAP SERPL CALCULATED.3IONS-SCNC: 11 MMOL/L (ref 9–16)
BUN SERPL-MCNC: 34 MG/DL (ref 8–23)
BUN/CREAT SERPL: 20 (ref 9–20)
CALCIUM SERPL-MCNC: 9.5 MG/DL (ref 8.6–10.4)
CHLORIDE SERPL-SCNC: 100 MMOL/L (ref 98–107)
CO2 SERPL-SCNC: 25 MMOL/L (ref 20–31)
CREAT SERPL-MCNC: 1.7 MG/DL (ref 0.7–1.2)
GFR, ESTIMATED: 41 ML/MIN/1.73M2
GLUCOSE SERPL-MCNC: 236 MG/DL (ref 74–99)
POTASSIUM SERPL-SCNC: 5.5 MMOL/L (ref 3.7–5.3)
SODIUM SERPL-SCNC: 136 MMOL/L (ref 136–145)

## 2025-07-30 PROCEDURE — 36415 COLL VENOUS BLD VENIPUNCTURE: CPT

## 2025-07-30 PROCEDURE — 80048 BASIC METABOLIC PNL TOTAL CA: CPT

## 2025-08-05 ENCOUNTER — OFFICE VISIT (OUTPATIENT)
Dept: CARDIOLOGY | Age: 80
End: 2025-08-05
Payer: MEDICARE

## 2025-08-05 ENCOUNTER — HOSPITAL ENCOUNTER (OUTPATIENT)
Dept: OTHER | Age: 80
Discharge: HOME | End: 2025-08-05
Payer: MEDICARE

## 2025-08-05 VITALS
DIASTOLIC BLOOD PRESSURE: 78 MMHG | OXYGEN SATURATION: 96 % | WEIGHT: 155 LBS | BODY MASS INDEX: 25.02 KG/M2 | SYSTOLIC BLOOD PRESSURE: 146 MMHG | HEART RATE: 61 BPM

## 2025-08-05 VITALS
OXYGEN SATURATION: 99 % | RESPIRATION RATE: 18 BRPM | BODY MASS INDEX: 24.85 KG/M2 | WEIGHT: 154.6 LBS | HEIGHT: 66 IN | SYSTOLIC BLOOD PRESSURE: 138 MMHG | DIASTOLIC BLOOD PRESSURE: 70 MMHG | HEART RATE: 62 BPM

## 2025-08-05 DIAGNOSIS — I25.10 CORONARY ARTERY DISEASE INVOLVING NATIVE CORONARY ARTERY OF NATIVE HEART WITHOUT ANGINA PECTORIS: Primary | ICD-10-CM

## 2025-08-05 DIAGNOSIS — Z95.2 S/P TAVR (TRANSCATHETER AORTIC VALVE REPLACEMENT): ICD-10-CM

## 2025-08-05 DIAGNOSIS — I10 ESSENTIAL HYPERTENSION: ICD-10-CM

## 2025-08-05 DIAGNOSIS — I25.5 ISCHEMIC CARDIOMYOPATHY: ICD-10-CM

## 2025-08-05 DIAGNOSIS — Z95.5 S/P CORONARY ARTERY STENT PLACEMENT: ICD-10-CM

## 2025-08-05 PROCEDURE — 3077F SYST BP >= 140 MM HG: CPT | Performed by: INTERNAL MEDICINE

## 2025-08-05 PROCEDURE — 3078F DIAST BP <80 MM HG: CPT | Performed by: INTERNAL MEDICINE

## 2025-08-05 PROCEDURE — G8419 CALC BMI OUT NRM PARAM NOF/U: HCPCS | Performed by: INTERNAL MEDICINE

## 2025-08-05 PROCEDURE — 99211 OFF/OP EST MAY X REQ PHY/QHP: CPT

## 2025-08-05 PROCEDURE — 99213 OFFICE O/P EST LOW 20 MIN: CPT | Performed by: INTERNAL MEDICINE

## 2025-08-05 PROCEDURE — 99214 OFFICE O/P EST MOD 30 MIN: CPT | Performed by: INTERNAL MEDICINE

## 2025-08-05 PROCEDURE — 93010 ELECTROCARDIOGRAM REPORT: CPT | Performed by: INTERNAL MEDICINE

## 2025-08-05 PROCEDURE — 1123F ACP DISCUSS/DSCN MKR DOCD: CPT | Performed by: INTERNAL MEDICINE

## 2025-08-05 PROCEDURE — G8427 DOCREV CUR MEDS BY ELIG CLIN: HCPCS | Performed by: INTERNAL MEDICINE

## 2025-08-05 PROCEDURE — 1159F MED LIST DOCD IN RCRD: CPT | Performed by: INTERNAL MEDICINE

## 2025-08-05 PROCEDURE — 93005 ELECTROCARDIOGRAM TRACING: CPT | Performed by: INTERNAL MEDICINE

## 2025-08-05 PROCEDURE — 1036F TOBACCO NON-USER: CPT | Performed by: INTERNAL MEDICINE

## 2025-08-05 RX ORDER — HYDROCHLOROTHIAZIDE 12.5 MG/1
12.5 CAPSULE ORAL EVERY MORNING
Qty: 30 CAPSULE | Refills: 1 | Status: SHIPPED | OUTPATIENT
Start: 2025-08-05

## 2025-09-02 ENCOUNTER — PATIENT MESSAGE (OUTPATIENT)
Dept: DIABETES SERVICES | Age: 80
End: 2025-09-02

## 2025-09-02 DIAGNOSIS — N18.31 TYPE 2 DIABETES MELLITUS WITH STAGE 3A CHRONIC KIDNEY DISEASE, WITHOUT LONG-TERM CURRENT USE OF INSULIN (HCC): ICD-10-CM

## 2025-09-02 DIAGNOSIS — E11.22 TYPE 2 DIABETES MELLITUS WITH STAGE 3A CHRONIC KIDNEY DISEASE, WITHOUT LONG-TERM CURRENT USE OF INSULIN (HCC): ICD-10-CM

## 2025-09-02 RX ORDER — GLIMEPIRIDE 2 MG/1
TABLET ORAL
Qty: 270 TABLET | Refills: 1
Start: 2025-09-02

## 2025-09-04 ENCOUNTER — HOSPITAL ENCOUNTER (OUTPATIENT)
Dept: LAB | Age: 80
Discharge: HOME | End: 2025-09-04
Payer: MEDICARE

## 2025-09-04 DIAGNOSIS — I25.10 CORONARY ARTERY DISEASE INVOLVING NATIVE CORONARY ARTERY OF NATIVE HEART WITHOUT ANGINA PECTORIS: ICD-10-CM

## 2025-09-04 DIAGNOSIS — I25.5 ISCHEMIC CARDIOMYOPATHY: ICD-10-CM

## 2025-09-04 LAB
ALBUMIN SERPL-MCNC: 4.3 G/DL (ref 3.5–5.2)
ALBUMIN/GLOB SERPL: 1.5 {RATIO} (ref 1–2.5)
ALP SERPL-CCNC: 94 U/L (ref 40–129)
ALT SERPL-CCNC: 11 U/L (ref 10–50)
ANION GAP SERPL CALCULATED.3IONS-SCNC: 10 MMOL/L (ref 9–16)
ANION GAP SERPL CALCULATED.3IONS-SCNC: 10 MMOL/L (ref 9–16)
AST SERPL-CCNC: 19 U/L (ref 10–50)
BILIRUB SERPL-MCNC: 0.2 MG/DL (ref 0–1.2)
BUN SERPL-MCNC: 49 MG/DL (ref 8–23)
BUN SERPL-MCNC: 49 MG/DL (ref 8–23)
BUN/CREAT SERPL: 27 (ref 9–20)
BUN/CREAT SERPL: 27 (ref 9–20)
CALCIUM SERPL-MCNC: 9.6 MG/DL (ref 8.6–10.4)
CALCIUM SERPL-MCNC: 9.6 MG/DL (ref 8.6–10.4)
CHLORIDE SERPL-SCNC: 100 MMOL/L (ref 98–107)
CHLORIDE SERPL-SCNC: 100 MMOL/L (ref 98–107)
CO2 SERPL-SCNC: 26 MMOL/L (ref 20–31)
CO2 SERPL-SCNC: 26 MMOL/L (ref 20–31)
CREAT SERPL-MCNC: 1.8 MG/DL (ref 0.7–1.2)
CREAT SERPL-MCNC: 1.8 MG/DL (ref 0.7–1.2)
GFR, ESTIMATED: 38 ML/MIN/1.73M2
GFR, ESTIMATED: 38 ML/MIN/1.73M2
GLUCOSE SERPL-MCNC: 270 MG/DL (ref 74–99)
GLUCOSE SERPL-MCNC: 270 MG/DL (ref 74–99)
POTASSIUM SERPL-SCNC: 5.4 MMOL/L (ref 3.7–5.3)
POTASSIUM SERPL-SCNC: 5.4 MMOL/L (ref 3.7–5.3)
PROT SERPL-MCNC: 7.1 G/DL (ref 6.6–8.7)
SODIUM SERPL-SCNC: 136 MMOL/L (ref 136–145)
SODIUM SERPL-SCNC: 136 MMOL/L (ref 136–145)

## 2025-09-04 PROCEDURE — 80053 COMPREHEN METABOLIC PANEL: CPT

## 2025-09-04 PROCEDURE — 80048 BASIC METABOLIC PNL TOTAL CA: CPT

## 2025-09-04 PROCEDURE — 36415 COLL VENOUS BLD VENIPUNCTURE: CPT

## 2025-09-05 ENCOUNTER — TELEPHONE (OUTPATIENT)
Dept: CARDIOLOGY | Age: 80
End: 2025-09-05

## 2025-09-05 DIAGNOSIS — E87.5 HYPERKALEMIA: Primary | ICD-10-CM

## 2025-09-05 RX ORDER — SODIUM POLYSTYRENE SULFONATE 4.1 MEQ/G
POWDER, FOR SUSPENSION ORAL; RECTAL
Qty: 6 G | Refills: 1 | Status: SHIPPED | OUTPATIENT
Start: 2025-09-05 | End: 2025-09-08

## (undated) DEVICE — SUTURE NRLN SZ 0 L18IN NONABSORBABLE BLK L26MM CT-2 1/2 CIR C527D

## (undated) DEVICE — Z DISCONTINUED USE 2624853 GLOVE SURG SZ 75 L12IN THK91MIL BRN LTX FREE

## (undated) DEVICE — SOLUTION IV IRRIG POUR BRL 0.9% SODIUM CHL 2F7124

## (undated) DEVICE — BASIN SET: Brand: MEDLINE INDUSTRIES, INC.

## (undated) DEVICE — GLOVE SURG SZ 6 THK91MIL LTX FREE SYN POLYISOPRENE ANTI

## (undated) DEVICE — MASTISOL ADHESIVE LIQ 2/3ML

## (undated) DEVICE — SUTURE MCRYL SZ 4-0 L18IN ABSRB UD L19MM PS-2 3/8 CIR PRIM Y496G

## (undated) DEVICE — 4-PORT MANIFOLD: Brand: NEPTUNE 2

## (undated) DEVICE — ANGIOGRAPHIC CATHETER: Brand: EXPO™

## (undated) DEVICE — GUIDEWIRE 35/260/FC/PTFE/3J: Brand: GUIDEWIRE

## (undated) DEVICE — GARMENT,MEDLINE,DVT,INT,CALF,MED, GEN2: Brand: MEDLINE

## (undated) DEVICE — 9165 UNIVERSAL PATIENT PLATE: Brand: 3M™

## (undated) DEVICE — INTRODUCER SHTH 6FR L11CM 0.038IN STD SIDEPRT EXTN 3 W

## (undated) DEVICE — SURGICAL PROCEDURE TRAY CRD CATH SVMMC

## (undated) DEVICE — MARKER W/PRE PRINTED CUSTOM LABEL

## (undated) DEVICE — KIT MICRO INTRO 4FR STIFF 40CM NIGHTENALL TUNGSTEN 7SMT

## (undated) DEVICE — STRIP,CLOSURE,WOUND,MEDI-STRIP,1/2X4: Brand: MEDLINE

## (undated) DEVICE — SUTURE VCRL SZ 0 L27IN ABSRB VLT L26MM CT-2 1/2 CIR J334H

## (undated) DEVICE — CHLORAPREP 26ML ORANGE

## (undated) DEVICE — TOWEL,OR,DSP,ST,BLUE,STD,4/PK,20PK/CS: Brand: MEDLINE

## (undated) DEVICE — TUBING, SUCTION, 3/16" X 20', STRAIGHT: Brand: MEDLINE

## (undated) DEVICE — GOWN,AURORA,NONRNF,XL,30/CS: Brand: MEDLINE

## (undated) DEVICE — PACK SURG PROC REMINDER N WVN DISPOSABLE BEAC TIME OUT

## (undated) DEVICE — GAUZE,SPONGE,4"X4",12PLY,STERILE,LF,2'S: Brand: MEDLINE

## (undated) DEVICE — PACK,LAPAROTOMY,PK IV,AURORA: Brand: MEDLINE

## (undated) DEVICE — GLOVE SURG SZ 65 THK91MIL LTX FREE SYN POLYISOPRENE

## (undated) DEVICE — 3M™ TEGADERM™ TRANSPARENT FILM DRESSING FRAME STYLE, 1627, 4 IN X 10 IN (10 CM X 25 CM), 20/CT 4CT/CASE: Brand: 3M™ TEGADERM™

## (undated) DEVICE — SUTURE VCRL SZ 3-0 L27IN ABSRB UD L26MM CT-2 1/2 CIR J232H